# Patient Record
Sex: MALE | Race: WHITE | Employment: UNEMPLOYED | URBAN - METROPOLITAN AREA
[De-identification: names, ages, dates, MRNs, and addresses within clinical notes are randomized per-mention and may not be internally consistent; named-entity substitution may affect disease eponyms.]

---

## 2018-03-14 ENCOUNTER — HOSPITAL ENCOUNTER (OUTPATIENT)
Dept: MRI IMAGING | Age: 58
Discharge: HOME | End: 2018-03-16
Payer: MEDICAID

## 2018-03-14 DIAGNOSIS — M50.20 DISPLACEMENT OF CERVICAL INTERVERTEBRAL DISC WITHOUT MYELOPATHY: ICD-10-CM

## 2018-03-14 PROCEDURE — 72141 MRI NECK SPINE W/O DYE: CPT

## 2018-04-17 ENCOUNTER — OFFICE VISIT (OUTPATIENT)
Dept: FAMILY MEDICINE CLINIC | Age: 58
End: 2018-04-17
Payer: MEDICAID

## 2018-04-17 ENCOUNTER — TELEPHONE (OUTPATIENT)
Dept: FAMILY MEDICINE CLINIC | Age: 58
End: 2018-04-17

## 2018-04-17 VITALS
HEIGHT: 68 IN | RESPIRATION RATE: 16 BRPM | BODY MASS INDEX: 30.31 KG/M2 | SYSTOLIC BLOOD PRESSURE: 154 MMHG | WEIGHT: 200 LBS | DIASTOLIC BLOOD PRESSURE: 94 MMHG | HEART RATE: 78 BPM

## 2018-04-17 DIAGNOSIS — K42.9 UMBILICAL HERNIA WITHOUT OBSTRUCTION AND WITHOUT GANGRENE: ICD-10-CM

## 2018-04-17 DIAGNOSIS — E78.2 MIXED HYPERLIPIDEMIA: ICD-10-CM

## 2018-04-17 DIAGNOSIS — M54.12 CERVICAL RADICULOPATHY AT C7: Primary | ICD-10-CM

## 2018-04-17 DIAGNOSIS — R29.898 RIGHT HAND WEAKNESS: ICD-10-CM

## 2018-04-17 DIAGNOSIS — R03.0 ELEVATED BLOOD PRESSURE READING: ICD-10-CM

## 2018-04-17 DIAGNOSIS — M54.2 NECK PAIN: ICD-10-CM

## 2018-04-17 DIAGNOSIS — Z13.1 SCREENING FOR DIABETES MELLITUS: ICD-10-CM

## 2018-04-17 PROCEDURE — 99205 OFFICE O/P NEW HI 60 MIN: CPT | Performed by: FAMILY MEDICINE

## 2018-04-17 PROCEDURE — 1036F TOBACCO NON-USER: CPT | Performed by: FAMILY MEDICINE

## 2018-04-17 PROCEDURE — G8427 DOCREV CUR MEDS BY ELIG CLIN: HCPCS | Performed by: FAMILY MEDICINE

## 2018-04-17 PROCEDURE — G8417 CALC BMI ABV UP PARAM F/U: HCPCS | Performed by: FAMILY MEDICINE

## 2018-04-17 PROCEDURE — 3017F COLORECTAL CA SCREEN DOC REV: CPT | Performed by: FAMILY MEDICINE

## 2018-04-17 RX ORDER — GABAPENTIN 300 MG/1
300 CAPSULE ORAL 3 TIMES DAILY
Qty: 30 CAPSULE | Refills: 0 | Status: SHIPPED | OUTPATIENT
Start: 2018-04-17 | End: 2018-06-15 | Stop reason: ALTCHOICE

## 2018-04-17 RX ORDER — NAPROXEN 500 MG/1
500 TABLET ORAL 2 TIMES DAILY WITH MEALS
Qty: 60 TABLET | Refills: 1 | Status: SHIPPED | OUTPATIENT
Start: 2018-04-17 | End: 2018-10-18 | Stop reason: SDUPTHER

## 2018-04-17 RX ORDER — IBUPROFEN 800 MG/1
TABLET ORAL
Refills: 0 | COMMUNITY
Start: 2018-04-11 | End: 2018-04-17 | Stop reason: ALTCHOICE

## 2018-04-17 ASSESSMENT — PATIENT HEALTH QUESTIONNAIRE - PHQ9
SUM OF ALL RESPONSES TO PHQ QUESTIONS 1-9: 0
2. FEELING DOWN, DEPRESSED OR HOPELESS: 0
1. LITTLE INTEREST OR PLEASURE IN DOING THINGS: 0
SUM OF ALL RESPONSES TO PHQ9 QUESTIONS 1 & 2: 0

## 2018-04-30 ENCOUNTER — HOSPITAL ENCOUNTER (OUTPATIENT)
Dept: NEUROLOGY | Age: 58
Discharge: HOME | End: 2018-04-30
Payer: MEDICAID

## 2018-04-30 DIAGNOSIS — R29.898 RIGHT HAND WEAKNESS: ICD-10-CM

## 2018-04-30 DIAGNOSIS — M54.12 CERVICAL RADICULOPATHY AT C7: ICD-10-CM

## 2018-04-30 PROCEDURE — 95907 NVR CNDJ TST 1-2 STUDIES: CPT

## 2018-04-30 PROCEDURE — 95886 MUSC TEST DONE W/N TEST COMP: CPT

## 2018-05-04 ENCOUNTER — HOSPITAL ENCOUNTER (OUTPATIENT)
Age: 58
Discharge: HOME | End: 2018-05-04
Payer: MEDICAID

## 2018-05-04 DIAGNOSIS — Z13.1 SCREENING FOR DIABETES MELLITUS: ICD-10-CM

## 2018-05-04 DIAGNOSIS — E78.2 MIXED HYPERLIPIDEMIA: ICD-10-CM

## 2018-05-04 DIAGNOSIS — R03.0 ELEVATED BLOOD PRESSURE READING: ICD-10-CM

## 2018-05-04 LAB
ALBUMIN SERPL-MCNC: 4.6 G/DL (ref 3.5–5.2)
ALP BLD-CCNC: 46 U/L (ref 40–129)
ALT SERPL-CCNC: 16 U/L (ref 0–40)
ANION GAP SERPL CALCULATED.3IONS-SCNC: 11 MMOL/L (ref 7–16)
AST SERPL-CCNC: 19 U/L (ref 0–39)
BILIRUB SERPL-MCNC: 0.3 MG/DL (ref 0–1.2)
BUN BLDV-MCNC: 13 MG/DL (ref 6–20)
CALCIUM SERPL-MCNC: 9.5 MG/DL (ref 8.6–10.2)
CHLORIDE BLD-SCNC: 101 MMOL/L (ref 98–107)
CHOLESTEROL, TOTAL: 219 MG/DL (ref 0–199)
CO2: 27 MMOL/L (ref 22–29)
CREAT SERPL-MCNC: 0.7 MG/DL (ref 0.7–1.2)
GFR AFRICAN AMERICAN: >60
GFR NON-AFRICAN AMERICAN: >60 ML/MIN/1.73
GLUCOSE BLD-MCNC: 170 MG/DL (ref 74–109)
HBA1C MFR BLD: 9.1 % (ref 4.8–5.9)
HDLC SERPL-MCNC: 62 MG/DL
LDL CHOLESTEROL CALCULATED: 130 MG/DL (ref 0–99)
POTASSIUM SERPL-SCNC: 4.8 MMOL/L (ref 3.5–5)
SODIUM BLD-SCNC: 139 MMOL/L (ref 132–146)
TOTAL PROTEIN: 7.3 G/DL (ref 6.4–8.3)
TRIGL SERPL-MCNC: 137 MG/DL (ref 0–149)
VLDLC SERPL CALC-MCNC: 27 MG/DL

## 2018-05-04 PROCEDURE — 80061 LIPID PANEL: CPT

## 2018-05-04 PROCEDURE — 80053 COMPREHEN METABOLIC PANEL: CPT

## 2018-05-04 PROCEDURE — 83036 HEMOGLOBIN GLYCOSYLATED A1C: CPT

## 2018-05-04 PROCEDURE — 36415 COLL VENOUS BLD VENIPUNCTURE: CPT

## 2018-05-08 ENCOUNTER — OFFICE VISIT (OUTPATIENT)
Dept: FAMILY MEDICINE CLINIC | Age: 58
End: 2018-05-08
Payer: MEDICAID

## 2018-05-08 VITALS
WEIGHT: 202 LBS | HEART RATE: 104 BPM | SYSTOLIC BLOOD PRESSURE: 145 MMHG | HEIGHT: 68 IN | RESPIRATION RATE: 16 BRPM | DIASTOLIC BLOOD PRESSURE: 98 MMHG | BODY MASS INDEX: 30.62 KG/M2

## 2018-05-08 DIAGNOSIS — E78.2 MIXED HYPERLIPIDEMIA: ICD-10-CM

## 2018-05-08 DIAGNOSIS — M54.12 CERVICAL RADICULOPATHY AT C7: Primary | ICD-10-CM

## 2018-05-08 DIAGNOSIS — E11.65 POORLY CONTROLLED TYPE 2 DIABETES MELLITUS WITH NEUROPATHY (HCC): Chronic | ICD-10-CM

## 2018-05-08 DIAGNOSIS — I10 ESSENTIAL HYPERTENSION: ICD-10-CM

## 2018-05-08 DIAGNOSIS — E11.40 POORLY CONTROLLED TYPE 2 DIABETES MELLITUS WITH NEUROPATHY (HCC): Chronic | ICD-10-CM

## 2018-05-08 DIAGNOSIS — J30.2 CHRONIC SEASONAL ALLERGIC RHINITIS, UNSPECIFIED TRIGGER: ICD-10-CM

## 2018-05-08 PROBLEM — R03.0 ELEVATED BLOOD PRESSURE READING: Status: RESOLVED | Noted: 2018-04-17 | Resolved: 2018-05-08

## 2018-05-08 PROCEDURE — G8427 DOCREV CUR MEDS BY ELIG CLIN: HCPCS | Performed by: FAMILY MEDICINE

## 2018-05-08 PROCEDURE — 93000 ELECTROCARDIOGRAM COMPLETE: CPT | Performed by: FAMILY MEDICINE

## 2018-05-08 PROCEDURE — 1036F TOBACCO NON-USER: CPT | Performed by: FAMILY MEDICINE

## 2018-05-08 PROCEDURE — G8417 CALC BMI ABV UP PARAM F/U: HCPCS | Performed by: FAMILY MEDICINE

## 2018-05-08 PROCEDURE — 3046F HEMOGLOBIN A1C LEVEL >9.0%: CPT | Performed by: FAMILY MEDICINE

## 2018-05-08 PROCEDURE — 3017F COLORECTAL CA SCREEN DOC REV: CPT | Performed by: FAMILY MEDICINE

## 2018-05-08 PROCEDURE — 99214 OFFICE O/P EST MOD 30 MIN: CPT | Performed by: FAMILY MEDICINE

## 2018-05-08 PROCEDURE — 2022F DILAT RTA XM EVC RTNOPTHY: CPT | Performed by: FAMILY MEDICINE

## 2018-05-08 RX ORDER — FLUTICASONE PROPIONATE 50 MCG
2 SPRAY, SUSPENSION (ML) NASAL DAILY
Qty: 2 BOTTLE | Refills: 3 | Status: SHIPPED | OUTPATIENT
Start: 2018-05-08 | End: 2019-02-09 | Stop reason: SDUPTHER

## 2018-05-08 RX ORDER — METFORMIN HYDROCHLORIDE 500 MG/1
1000 TABLET, EXTENDED RELEASE ORAL
Qty: 120 TABLET | Refills: 3 | Status: SHIPPED | OUTPATIENT
Start: 2018-05-08 | End: 2018-05-22

## 2018-05-08 RX ORDER — VALSARTAN AND HYDROCHLOROTHIAZIDE 160; 12.5 MG/1; MG/1
1 TABLET, FILM COATED ORAL DAILY
Qty: 30 TABLET | Refills: 3 | Status: SHIPPED | OUTPATIENT
Start: 2018-05-08 | End: 2018-08-20 | Stop reason: ALTCHOICE

## 2018-05-11 ENCOUNTER — OFFICE VISIT (OUTPATIENT)
Dept: NEUROSURGERY | Age: 58
End: 2018-05-11
Payer: MEDICAID

## 2018-05-11 VITALS
HEART RATE: 100 BPM | DIASTOLIC BLOOD PRESSURE: 86 MMHG | HEIGHT: 68 IN | BODY MASS INDEX: 31.07 KG/M2 | SYSTOLIC BLOOD PRESSURE: 132 MMHG | WEIGHT: 205 LBS

## 2018-05-11 DIAGNOSIS — M54.12 CERVICAL RADICULOPATHY: Primary | ICD-10-CM

## 2018-05-11 PROCEDURE — 1036F TOBACCO NON-USER: CPT | Performed by: PHYSICIAN ASSISTANT

## 2018-05-11 PROCEDURE — 3017F COLORECTAL CA SCREEN DOC REV: CPT | Performed by: PHYSICIAN ASSISTANT

## 2018-05-11 PROCEDURE — 99203 OFFICE O/P NEW LOW 30 MIN: CPT | Performed by: PHYSICIAN ASSISTANT

## 2018-05-11 PROCEDURE — G8417 CALC BMI ABV UP PARAM F/U: HCPCS | Performed by: PHYSICIAN ASSISTANT

## 2018-05-11 PROCEDURE — G8427 DOCREV CUR MEDS BY ELIG CLIN: HCPCS | Performed by: PHYSICIAN ASSISTANT

## 2018-05-11 ASSESSMENT — ENCOUNTER SYMPTOMS
GASTROINTESTINAL NEGATIVE: 1
EYES NEGATIVE: 1
ALLERGIC/IMMUNOLOGIC NEGATIVE: 1
RESPIRATORY NEGATIVE: 1

## 2018-05-17 ENCOUNTER — TELEPHONE (OUTPATIENT)
Dept: FAMILY MEDICINE CLINIC | Age: 58
End: 2018-05-17

## 2018-05-21 ENCOUNTER — TELEPHONE (OUTPATIENT)
Dept: FAMILY MEDICINE CLINIC | Age: 58
End: 2018-05-21

## 2018-05-22 DIAGNOSIS — E11.40 POORLY CONTROLLED TYPE 2 DIABETES MELLITUS WITH NEUROPATHY (HCC): Primary | Chronic | ICD-10-CM

## 2018-05-22 DIAGNOSIS — E11.65 POORLY CONTROLLED TYPE 2 DIABETES MELLITUS WITH NEUROPATHY (HCC): Chronic | ICD-10-CM

## 2018-05-22 DIAGNOSIS — E11.65 POORLY CONTROLLED TYPE 2 DIABETES MELLITUS WITH NEUROPATHY (HCC): Primary | Chronic | ICD-10-CM

## 2018-05-22 DIAGNOSIS — E11.40 POORLY CONTROLLED TYPE 2 DIABETES MELLITUS WITH NEUROPATHY (HCC): Chronic | ICD-10-CM

## 2018-05-22 RX ORDER — GLIPIZIDE 5 MG/1
5 TABLET, FILM COATED, EXTENDED RELEASE ORAL DAILY
Qty: 30 TABLET | Refills: 1 | Status: SHIPPED | OUTPATIENT
Start: 2018-05-22 | End: 2018-06-15 | Stop reason: ALTCHOICE

## 2018-05-22 RX ORDER — GLIPIZIDE 5 MG/1
5 TABLET, FILM COATED, EXTENDED RELEASE ORAL DAILY
Qty: 30 TABLET | Refills: 1 | Status: SHIPPED | OUTPATIENT
Start: 2018-05-22 | End: 2018-05-22 | Stop reason: SDUPTHER

## 2018-06-15 ENCOUNTER — OFFICE VISIT (OUTPATIENT)
Dept: FAMILY MEDICINE CLINIC | Age: 58
End: 2018-06-15
Payer: MEDICAID

## 2018-06-15 VITALS
RESPIRATION RATE: 16 BRPM | DIASTOLIC BLOOD PRESSURE: 93 MMHG | SYSTOLIC BLOOD PRESSURE: 140 MMHG | BODY MASS INDEX: 31.93 KG/M2 | WEIGHT: 210 LBS | HEART RATE: 100 BPM

## 2018-06-15 DIAGNOSIS — I10 ESSENTIAL HYPERTENSION: ICD-10-CM

## 2018-06-15 DIAGNOSIS — M54.12 CERVICAL RADICULOPATHY AT C7: ICD-10-CM

## 2018-06-15 DIAGNOSIS — E78.2 MIXED HYPERLIPIDEMIA: ICD-10-CM

## 2018-06-15 DIAGNOSIS — E11.65 POORLY CONTROLLED TYPE 2 DIABETES MELLITUS WITH NEUROPATHY (HCC): Primary | Chronic | ICD-10-CM

## 2018-06-15 DIAGNOSIS — E11.40 POORLY CONTROLLED TYPE 2 DIABETES MELLITUS WITH NEUROPATHY (HCC): Primary | Chronic | ICD-10-CM

## 2018-06-15 PROCEDURE — G8427 DOCREV CUR MEDS BY ELIG CLIN: HCPCS | Performed by: FAMILY MEDICINE

## 2018-06-15 PROCEDURE — 3046F HEMOGLOBIN A1C LEVEL >9.0%: CPT | Performed by: FAMILY MEDICINE

## 2018-06-15 PROCEDURE — 99214 OFFICE O/P EST MOD 30 MIN: CPT | Performed by: FAMILY MEDICINE

## 2018-06-15 PROCEDURE — G8417 CALC BMI ABV UP PARAM F/U: HCPCS | Performed by: FAMILY MEDICINE

## 2018-06-15 PROCEDURE — 2022F DILAT RTA XM EVC RTNOPTHY: CPT | Performed by: FAMILY MEDICINE

## 2018-06-15 PROCEDURE — 1036F TOBACCO NON-USER: CPT | Performed by: FAMILY MEDICINE

## 2018-06-15 PROCEDURE — 3017F COLORECTAL CA SCREEN DOC REV: CPT | Performed by: FAMILY MEDICINE

## 2018-06-15 RX ORDER — GLIPIZIDE 10 MG/1
10 TABLET, FILM COATED, EXTENDED RELEASE ORAL DAILY
Qty: 30 TABLET | Refills: 3 | Status: SHIPPED | OUTPATIENT
Start: 2018-06-15 | End: 2018-08-20 | Stop reason: SDUPTHER

## 2018-06-15 RX ORDER — CYCLOBENZAPRINE HCL 10 MG
TABLET ORAL
COMMUNITY
Start: 2018-06-06 | End: 2018-12-28 | Stop reason: ALTCHOICE

## 2018-06-15 RX ORDER — OXYCODONE HYDROCHLORIDE AND ACETAMINOPHEN 5; 325 MG/1; MG/1
1 TABLET ORAL EVERY 6 HOURS PRN
Qty: 60 TABLET | Refills: 0 | Status: SHIPPED | OUTPATIENT
Start: 2018-06-15 | End: 2018-07-15

## 2018-06-15 RX ORDER — GABAPENTIN 300 MG/1
CAPSULE ORAL
Refills: 0 | COMMUNITY
Start: 2018-06-12 | End: 2018-12-28 | Stop reason: ALTCHOICE

## 2018-06-15 ASSESSMENT — PATIENT HEALTH QUESTIONNAIRE - PHQ9
1. LITTLE INTEREST OR PLEASURE IN DOING THINGS: 0
SUM OF ALL RESPONSES TO PHQ QUESTIONS 1-9: 0
2. FEELING DOWN, DEPRESSED OR HOPELESS: 0
SUM OF ALL RESPONSES TO PHQ9 QUESTIONS 1 & 2: 0

## 2018-08-18 ENCOUNTER — HOSPITAL ENCOUNTER (OUTPATIENT)
Age: 58
Discharge: HOME | End: 2018-08-18
Payer: MEDICAID

## 2018-08-18 DIAGNOSIS — E11.40 POORLY CONTROLLED TYPE 2 DIABETES MELLITUS WITH NEUROPATHY (HCC): Chronic | ICD-10-CM

## 2018-08-18 DIAGNOSIS — E11.65 POORLY CONTROLLED TYPE 2 DIABETES MELLITUS WITH NEUROPATHY (HCC): Chronic | ICD-10-CM

## 2018-08-18 DIAGNOSIS — E78.2 MIXED HYPERLIPIDEMIA: ICD-10-CM

## 2018-08-18 DIAGNOSIS — I10 ESSENTIAL HYPERTENSION: ICD-10-CM

## 2018-08-18 LAB
ALBUMIN SERPL-MCNC: 4.7 G/DL (ref 3.5–5.2)
ALP BLD-CCNC: 51 U/L (ref 40–129)
ALT SERPL-CCNC: 24 U/L (ref 0–40)
ANION GAP SERPL CALCULATED.3IONS-SCNC: 12 MMOL/L (ref 7–16)
AST SERPL-CCNC: 24 U/L (ref 0–39)
BILIRUB SERPL-MCNC: 0.3 MG/DL (ref 0–1.2)
BUN BLDV-MCNC: 17 MG/DL (ref 6–20)
CALCIUM SERPL-MCNC: 9.8 MG/DL (ref 8.6–10.2)
CHLORIDE BLD-SCNC: 101 MMOL/L (ref 98–107)
CHOLESTEROL, TOTAL: 256 MG/DL (ref 0–199)
CO2: 27 MMOL/L (ref 22–29)
CREAT SERPL-MCNC: 0.9 MG/DL (ref 0.7–1.2)
CREATININE URINE: 134 MG/DL (ref 40–278)
GFR AFRICAN AMERICAN: >60
GFR NON-AFRICAN AMERICAN: >60 ML/MIN/1.73
GLUCOSE BLD-MCNC: 182 MG/DL (ref 74–109)
HBA1C MFR BLD: 8 % (ref 4–5.6)
HDLC SERPL-MCNC: 49 MG/DL
LDL CHOLESTEROL CALCULATED: 176 MG/DL (ref 0–99)
MICROALBUMIN UR-MCNC: 23.7 MG/L
MICROALBUMIN/CREAT UR-RTO: 17.7 (ref 0–30)
POTASSIUM SERPL-SCNC: 4.4 MMOL/L (ref 3.5–5)
SODIUM BLD-SCNC: 140 MMOL/L (ref 132–146)
TOTAL PROTEIN: 7.8 G/DL (ref 6.4–8.3)
TRIGL SERPL-MCNC: 156 MG/DL (ref 0–149)
VLDLC SERPL CALC-MCNC: 31 MG/DL

## 2018-08-18 PROCEDURE — 83036 HEMOGLOBIN GLYCOSYLATED A1C: CPT

## 2018-08-18 PROCEDURE — 36415 COLL VENOUS BLD VENIPUNCTURE: CPT

## 2018-08-18 PROCEDURE — 82570 ASSAY OF URINE CREATININE: CPT

## 2018-08-18 PROCEDURE — 80053 COMPREHEN METABOLIC PANEL: CPT

## 2018-08-18 PROCEDURE — 82044 UR ALBUMIN SEMIQUANTITATIVE: CPT

## 2018-08-18 PROCEDURE — 80061 LIPID PANEL: CPT

## 2018-08-18 NOTE — PROGRESS NOTES
Call patient  Glucose elevated but improving  Cholesterol elevated  Will discuss at next apt  Thanks.

## 2018-08-20 ENCOUNTER — TELEPHONE (OUTPATIENT)
Dept: FAMILY MEDICINE CLINIC | Age: 58
End: 2018-08-20

## 2018-08-20 ENCOUNTER — OFFICE VISIT (OUTPATIENT)
Dept: FAMILY MEDICINE CLINIC | Age: 58
End: 2018-08-20
Payer: MEDICAID

## 2018-08-20 VITALS
DIASTOLIC BLOOD PRESSURE: 82 MMHG | HEART RATE: 104 BPM | WEIGHT: 223 LBS | SYSTOLIC BLOOD PRESSURE: 118 MMHG | RESPIRATION RATE: 16 BRPM | HEIGHT: 68 IN | BODY MASS INDEX: 33.8 KG/M2

## 2018-08-20 DIAGNOSIS — E78.2 MIXED HYPERLIPIDEMIA: ICD-10-CM

## 2018-08-20 DIAGNOSIS — E11.40 POORLY CONTROLLED TYPE 2 DIABETES MELLITUS WITH NEUROPATHY (HCC): Chronic | ICD-10-CM

## 2018-08-20 DIAGNOSIS — K43.9 VENTRAL HERNIA WITHOUT OBSTRUCTION OR GANGRENE: Primary | ICD-10-CM

## 2018-08-20 DIAGNOSIS — I10 ESSENTIAL HYPERTENSION: ICD-10-CM

## 2018-08-20 DIAGNOSIS — E11.65 POORLY CONTROLLED TYPE 2 DIABETES MELLITUS WITH NEUROPATHY (HCC): Chronic | ICD-10-CM

## 2018-08-20 PROCEDURE — G8427 DOCREV CUR MEDS BY ELIG CLIN: HCPCS | Performed by: FAMILY MEDICINE

## 2018-08-20 PROCEDURE — 2022F DILAT RTA XM EVC RTNOPTHY: CPT | Performed by: FAMILY MEDICINE

## 2018-08-20 PROCEDURE — 3045F PR MOST RECENT HEMOGLOBIN A1C LEVEL 7.0-9.0%: CPT | Performed by: FAMILY MEDICINE

## 2018-08-20 PROCEDURE — 1036F TOBACCO NON-USER: CPT | Performed by: FAMILY MEDICINE

## 2018-08-20 PROCEDURE — 99214 OFFICE O/P EST MOD 30 MIN: CPT | Performed by: FAMILY MEDICINE

## 2018-08-20 PROCEDURE — 3017F COLORECTAL CA SCREEN DOC REV: CPT | Performed by: FAMILY MEDICINE

## 2018-08-20 PROCEDURE — G8417 CALC BMI ABV UP PARAM F/U: HCPCS | Performed by: FAMILY MEDICINE

## 2018-08-20 RX ORDER — LOSARTAN POTASSIUM AND HYDROCHLOROTHIAZIDE 12.5; 1 MG/1; MG/1
1 TABLET ORAL DAILY
Qty: 30 TABLET | Refills: 3 | Status: SHIPPED | OUTPATIENT
Start: 2018-08-20 | End: 2018-12-20 | Stop reason: SDUPTHER

## 2018-08-20 RX ORDER — ATORVASTATIN CALCIUM 10 MG/1
10 TABLET, FILM COATED ORAL DAILY
Qty: 90 TABLET | Refills: 1 | Status: SHIPPED | OUTPATIENT
Start: 2018-08-20 | End: 2019-03-14 | Stop reason: SDUPTHER

## 2018-08-20 RX ORDER — GLIPIZIDE 10 MG/1
10 TABLET, FILM COATED, EXTENDED RELEASE ORAL DAILY
Qty: 90 TABLET | Refills: 1 | Status: SHIPPED | OUTPATIENT
Start: 2018-08-20 | End: 2018-10-12 | Stop reason: SDUPTHER

## 2018-08-20 ASSESSMENT — PATIENT HEALTH QUESTIONNAIRE - PHQ9
2. FEELING DOWN, DEPRESSED OR HOPELESS: 0
SUM OF ALL RESPONSES TO PHQ QUESTIONS 1-9: 0
1. LITTLE INTEREST OR PLEASURE IN DOING THINGS: 0
SUM OF ALL RESPONSES TO PHQ9 QUESTIONS 1 & 2: 0
SUM OF ALL RESPONSES TO PHQ QUESTIONS 1-9: 0

## 2018-08-20 NOTE — PROGRESS NOTES
Chief complaint : Johnie Maria  here for follow up of Diabetes Mellitus, type 2. Present illness :    Doing well   Taking medications regularly  Home glucose not checking regularly daily  No episodes of hypoglycemia  No polyuria or polydipsia  No visual changes  Last eye examination last year  No significant weight changes  No lightheadedness or syncope  No exertional chest pain or dyspnea  No intermittent claudication  No foot pain or numbness  No skin ulcers or rashes  Recent labs reviewed and discussed with patient  Underwent cervical spine discectomy and fusion. Pain and weakness are much improved. Complains of bulge in midabdomen with pain      Past Medical History:   Diagnosis Date    Arthritis     Back pain     Hyperlipidemia     Poorly controlled type 2 diabetes mellitus with neuropathy ([facility] [address] 5/8/2018       Past Surgical History:   Procedure Laterality Date    COLONOSCOPY      at age 40 polyps    CYST REMOVAL Right     HERNIA REPAIR Bilateral     groin and umbilical    SPINE SURGERY  06/05/2018    C5-7 fusion at [facility]       Current Outpatient Prescriptions   Medication Sig Dispense Refill    cyclobenzaprine (FLEXERIL) 10 MG tablet       gabapentin (NEURONTIN) 300 MG capsule take 1 capsule by mouth 3 TO 4 TIMES A DAY  0    glipiZIDE (GLUCOTROL XL) 10 MG extended release tablet Take 1 tablet by mouth daily 30 tablet 3    valsartan-hydrochlorothiazide (DIOVAN-HCT) 160-12.5 MG per tablet Take 1 tablet by mouth daily 30 tablet 3    fluticasone (FLONASE) 50 MCG/ACT nasal spray 2 sprays by Nasal route daily 2 Bottle 3    naproxen (NAPROSYN) 500 MG tablet Take 1 tablet by mouth 2 times daily (with meals) 60 tablet 1     No current facility-administered medications for this visit.         Family History   Problem Relation Age of Onset    Diabetes Mother     Other Mother         parkinsons    Diabetes Father    Vertell Favre Sister 79        unknown    Cancer Brother 64 9. 8     Total Protein 08/18/2018 7.8     Alb 08/18/2018 4.7     Total Bilirubin 08/18/2018 0.3     Alkaline Phosphatase 08/18/2018 51     ALT 08/18/2018 24     AST 08/18/2018 24     Microalbumin, Random Uri* 08/18/2018 23.7*    Creatinine, Ur 08/18/2018 134     Microalbumin Creatinine * 08/18/2018 17.7        Assessment / Plan   1. Poorly controlled type 2 diabetes mellitus with neuropathy (HCC)  A1c improving  Add invokana    2. Mixed hyperlipidemia  LDL higher now  10 year risk 8.2%. Patient is fully informed. We'll start Lipitor 10 mg daily    3. Refer to surgery for ventral and umbilical hernia    4. Hypertension well controlled. Continue same. Advised modest weight loss low-salt diet and increase cardio activity. pneumovax  Tdap       Patient reminded of importance of close control of hyperglycemia and hypertension in order to minimize the risks of accelerated macro and microvascular complications including coronary artery disease, cerebrovascular disease and stroke, peripheral artery disease, retinopathy, neuropathy and renal disease. Also reminded of importance of yearly eye examination, regular exercise and attention to proper diet and nutrition. Advised about self-monitoring of blood glucose , regular laboratory tests and follow-up visits. Patient advised to check feet on a daily basis and call if open sores or lesions or any changes in the skin. All questions answered.

## 2018-08-23 DIAGNOSIS — E11.40 POORLY CONTROLLED TYPE 2 DIABETES MELLITUS WITH NEUROPATHY (HCC): Primary | Chronic | ICD-10-CM

## 2018-08-23 DIAGNOSIS — E11.65 POORLY CONTROLLED TYPE 2 DIABETES MELLITUS WITH NEUROPATHY (HCC): Primary | Chronic | ICD-10-CM

## 2018-10-12 DIAGNOSIS — E11.40 POORLY CONTROLLED TYPE 2 DIABETES MELLITUS WITH NEUROPATHY (HCC): Chronic | ICD-10-CM

## 2018-10-12 DIAGNOSIS — E11.65 POORLY CONTROLLED TYPE 2 DIABETES MELLITUS WITH NEUROPATHY (HCC): Chronic | ICD-10-CM

## 2018-10-12 RX ORDER — GLIPIZIDE 10 MG/1
10 TABLET, FILM COATED, EXTENDED RELEASE ORAL DAILY
Qty: 90 TABLET | Refills: 1 | Status: SHIPPED | OUTPATIENT
Start: 2018-10-12 | End: 2019-09-22 | Stop reason: SDUPTHER

## 2018-10-19 ENCOUNTER — TELEPHONE (OUTPATIENT)
Dept: FAMILY MEDICINE CLINIC | Age: 58
End: 2018-10-19

## 2018-10-23 ENCOUNTER — TELEPHONE (OUTPATIENT)
Dept: FAMILY MEDICINE CLINIC | Age: 58
End: 2018-10-23

## 2018-11-02 ENCOUNTER — OFFICE VISIT (OUTPATIENT)
Dept: HEMATOLOGY | Age: 58
End: 2018-11-02
Payer: MEDICAID

## 2018-11-02 VITALS
BODY MASS INDEX: 33.65 KG/M2 | OXYGEN SATURATION: 94 % | RESPIRATION RATE: 18 BRPM | DIASTOLIC BLOOD PRESSURE: 75 MMHG | SYSTOLIC BLOOD PRESSURE: 132 MMHG | HEART RATE: 103 BPM | TEMPERATURE: 98.1 F | WEIGHT: 222 LBS | HEIGHT: 68 IN

## 2018-11-02 DIAGNOSIS — K86.89 PANCREATIC MASS: Primary | ICD-10-CM

## 2018-11-02 PROCEDURE — G8484 FLU IMMUNIZE NO ADMIN: HCPCS | Performed by: TRANSPLANT SURGERY

## 2018-11-02 PROCEDURE — G8417 CALC BMI ABV UP PARAM F/U: HCPCS | Performed by: TRANSPLANT SURGERY

## 2018-11-02 PROCEDURE — 99202 OFFICE O/P NEW SF 15 MIN: CPT | Performed by: TRANSPLANT SURGERY

## 2018-11-02 PROCEDURE — 3017F COLORECTAL CA SCREEN DOC REV: CPT | Performed by: TRANSPLANT SURGERY

## 2018-11-02 PROCEDURE — G8427 DOCREV CUR MEDS BY ELIG CLIN: HCPCS | Performed by: TRANSPLANT SURGERY

## 2018-11-02 PROCEDURE — 99205 OFFICE O/P NEW HI 60 MIN: CPT | Performed by: TRANSPLANT SURGERY

## 2018-11-02 PROCEDURE — 1036F TOBACCO NON-USER: CPT | Performed by: TRANSPLANT SURGERY

## 2018-11-03 ASSESSMENT — ENCOUNTER SYMPTOMS
PHOTOPHOBIA: 0
SHORTNESS OF BREATH: 0
BACK PAIN: 0
EYE DISCHARGE: 0
EYE PAIN: 0
CONSTIPATION: 0
NAUSEA: 0
ABDOMINAL PAIN: 1
VOMITING: 0
BLOOD IN STOOL: 0
DIARRHEA: 0

## 2018-11-09 ENCOUNTER — TELEPHONE (OUTPATIENT)
Dept: SURGERY | Age: 58
End: 2018-11-09

## 2018-11-13 ENCOUNTER — TELEPHONE (OUTPATIENT)
Dept: FAMILY MEDICINE CLINIC | Age: 58
End: 2018-11-13

## 2018-11-13 NOTE — TELEPHONE ENCOUNTER
Patient is having surgery Monday for a biopsy of the pancreas. He needs to know what meds you want him to take in the morning. Surgeon advised him to call you.  thanks

## 2018-11-14 ENCOUNTER — HOSPITAL ENCOUNTER (OUTPATIENT)
Age: 58
Discharge: HOME | End: 2018-11-14
Payer: MEDICAID

## 2018-11-14 DIAGNOSIS — E11.65 POORLY CONTROLLED TYPE 2 DIABETES MELLITUS WITH NEUROPATHY (HCC): Chronic | ICD-10-CM

## 2018-11-14 DIAGNOSIS — I10 ESSENTIAL HYPERTENSION: ICD-10-CM

## 2018-11-14 DIAGNOSIS — E78.2 MIXED HYPERLIPIDEMIA: ICD-10-CM

## 2018-11-14 DIAGNOSIS — E11.40 POORLY CONTROLLED TYPE 2 DIABETES MELLITUS WITH NEUROPATHY (HCC): Chronic | ICD-10-CM

## 2018-11-14 LAB
ALBUMIN SERPL-MCNC: 4.6 G/DL (ref 3.5–5.2)
ALP BLD-CCNC: 48 U/L (ref 40–129)
ALT SERPL-CCNC: 20 U/L (ref 0–40)
ANION GAP SERPL CALCULATED.3IONS-SCNC: 14 MMOL/L (ref 7–16)
AST SERPL-CCNC: 24 U/L (ref 0–39)
BILIRUB SERPL-MCNC: 0.3 MG/DL (ref 0–1.2)
BUN BLDV-MCNC: 24 MG/DL (ref 6–20)
CALCIUM SERPL-MCNC: 9.4 MG/DL (ref 8.6–10.2)
CHLORIDE BLD-SCNC: 101 MMOL/L (ref 98–107)
CHOLESTEROL, TOTAL: 190 MG/DL (ref 0–199)
CO2: 25 MMOL/L (ref 22–29)
CREAT SERPL-MCNC: 0.9 MG/DL (ref 0.7–1.2)
GFR AFRICAN AMERICAN: >60
GFR NON-AFRICAN AMERICAN: >60 ML/MIN/1.73
GLUCOSE BLD-MCNC: 138 MG/DL (ref 74–99)
HBA1C MFR BLD: 7.8 % (ref 4–5.6)
HDLC SERPL-MCNC: 52 MG/DL
LDL CHOLESTEROL CALCULATED: 115 MG/DL (ref 0–99)
POTASSIUM SERPL-SCNC: 4.2 MMOL/L (ref 3.5–5)
SODIUM BLD-SCNC: 140 MMOL/L (ref 132–146)
TOTAL PROTEIN: 7.7 G/DL (ref 6.4–8.3)
TRIGL SERPL-MCNC: 117 MG/DL (ref 0–149)
VLDLC SERPL CALC-MCNC: 23 MG/DL

## 2018-11-14 PROCEDURE — 36415 COLL VENOUS BLD VENIPUNCTURE: CPT

## 2018-11-14 PROCEDURE — 80061 LIPID PANEL: CPT

## 2018-11-14 PROCEDURE — 80053 COMPREHEN METABOLIC PANEL: CPT

## 2018-11-14 PROCEDURE — 83036 HEMOGLOBIN GLYCOSYLATED A1C: CPT

## 2018-11-15 NOTE — TELEPHONE ENCOUNTER
Patient got a call from [facility] on 11/19/18.     Electronically signed by Andres Aguero on 11/15/18 at 8:36 AM

## 2018-11-20 ENCOUNTER — OFFICE VISIT (OUTPATIENT)
Dept: FAMILY MEDICINE CLINIC | Age: 58
End: 2018-11-20
Payer: MEDICAID

## 2018-11-20 VITALS
WEIGHT: 225 LBS | SYSTOLIC BLOOD PRESSURE: 131 MMHG | BODY MASS INDEX: 34.1 KG/M2 | RESPIRATION RATE: 18 BRPM | DIASTOLIC BLOOD PRESSURE: 84 MMHG | HEART RATE: 91 BPM | HEIGHT: 68 IN

## 2018-11-20 DIAGNOSIS — E11.9 TYPE 2 DIABETES MELLITUS WITHOUT COMPLICATION, WITHOUT LONG-TERM CURRENT USE OF INSULIN (HCC): ICD-10-CM

## 2018-11-20 DIAGNOSIS — G89.29 CHRONIC BILATERAL LOW BACK PAIN WITH BILATERAL SCIATICA: ICD-10-CM

## 2018-11-20 DIAGNOSIS — E78.2 MIXED HYPERLIPIDEMIA: Primary | ICD-10-CM

## 2018-11-20 DIAGNOSIS — M54.42 CHRONIC BILATERAL LOW BACK PAIN WITH BILATERAL SCIATICA: ICD-10-CM

## 2018-11-20 DIAGNOSIS — I10 ESSENTIAL HYPERTENSION: ICD-10-CM

## 2018-11-20 DIAGNOSIS — M54.41 CHRONIC BILATERAL LOW BACK PAIN WITH BILATERAL SCIATICA: ICD-10-CM

## 2018-11-20 DIAGNOSIS — Z23 NEED FOR INFLUENZA VACCINATION: ICD-10-CM

## 2018-11-20 PROBLEM — E11.40 POORLY CONTROLLED TYPE 2 DIABETES MELLITUS WITH NEUROPATHY (HCC): Chronic | Status: RESOLVED | Noted: 2018-05-08 | Resolved: 2018-11-20

## 2018-11-20 PROBLEM — E11.65 POORLY CONTROLLED TYPE 2 DIABETES MELLITUS WITH NEUROPATHY (HCC): Chronic | Status: RESOLVED | Noted: 2018-05-08 | Resolved: 2018-11-20

## 2018-11-20 PROCEDURE — 90686 IIV4 VACC NO PRSV 0.5 ML IM: CPT | Performed by: FAMILY MEDICINE

## 2018-11-20 PROCEDURE — G8417 CALC BMI ABV UP PARAM F/U: HCPCS | Performed by: FAMILY MEDICINE

## 2018-11-20 PROCEDURE — 90471 IMMUNIZATION ADMIN: CPT | Performed by: FAMILY MEDICINE

## 2018-11-20 PROCEDURE — 1036F TOBACCO NON-USER: CPT | Performed by: FAMILY MEDICINE

## 2018-11-20 PROCEDURE — G8482 FLU IMMUNIZE ORDER/ADMIN: HCPCS | Performed by: FAMILY MEDICINE

## 2018-11-20 PROCEDURE — 3017F COLORECTAL CA SCREEN DOC REV: CPT | Performed by: FAMILY MEDICINE

## 2018-11-20 PROCEDURE — G8427 DOCREV CUR MEDS BY ELIG CLIN: HCPCS | Performed by: FAMILY MEDICINE

## 2018-11-20 PROCEDURE — 2022F DILAT RTA XM EVC RTNOPTHY: CPT | Performed by: FAMILY MEDICINE

## 2018-11-20 PROCEDURE — 3045F PR MOST RECENT HEMOGLOBIN A1C LEVEL 7.0-9.0%: CPT | Performed by: FAMILY MEDICINE

## 2018-11-20 PROCEDURE — 99214 OFFICE O/P EST MOD 30 MIN: CPT | Performed by: FAMILY MEDICINE

## 2018-11-20 ASSESSMENT — PATIENT HEALTH QUESTIONNAIRE - PHQ9
2. FEELING DOWN, DEPRESSED OR HOPELESS: 0
SUM OF ALL RESPONSES TO PHQ QUESTIONS 1-9: 0
SUM OF ALL RESPONSES TO PHQ QUESTIONS 1-9: 0
SUM OF ALL RESPONSES TO PHQ9 QUESTIONS 1 & 2: 0
1. LITTLE INTEREST OR PLEASURE IN DOING THINGS: 0

## 2018-11-20 NOTE — PROGRESS NOTES
today, and weight loss plan recommended is : conventional weight loss and daily exercise regimen. Hospital Outpatient Visit on 11/14/2018   Component Date Value    Sodium 11/14/2018 140     Potassium 11/14/2018 4.2     Chloride 11/14/2018 101     CO2 11/14/2018 25     Anion Gap 11/14/2018 14     Glucose 11/14/2018 138*    BUN 11/14/2018 24*    CREATININE 11/14/2018 0.9     GFR Non- 11/14/2018 >60     GFR  11/14/2018 >60     Calcium 11/14/2018 9.4     Total Protein 11/14/2018 7.7     Alb 11/14/2018 4.6     Total Bilirubin 11/14/2018 0.3     Alkaline Phosphatase 11/14/2018 48     ALT 11/14/2018 20     AST 11/14/2018 24     Hemoglobin A1C 11/14/2018 7.8*    Cholesterol, Total 11/14/2018 190     Triglycerides 11/14/2018 117     HDL 11/14/2018 52     LDL Calculated 11/14/2018 115*    VLDL Cholesterol Calcula* 11/14/2018 23        Assessment / Plan :   1. Mixed hyperlipidemia  Improving control. Continue same. Advised diet and weight loss    2. Essential hypertension  Well controlled. Continue same    3. Need for influenza vaccination  Administered    4. Type 2 diabetes mellitus without complication, without long-term current use of insulin (HCC)  Continue same. Hemoglobin A1c is much improved. Diet and weight loss    5. Chronic bilateral low back pain with bilateral sciatica  follow up in 2-3 weeks to evaluate this particular concern. Pain radiates and is severe  Patient took narcotics for post op pain for cervical spine and felt better  Advised that it is not recommended in this situation. .               Patient again reminded of importance of close control of  hyperglycemia, hypertension  and hyperlipidemia in order to minimize the risks of accelerated atherosclerosis ( heart disease, strokes, PAD ) and microvascular complications such as renal disease, retinal disease and neuropathies.  Also reminded of the importance of a low calorie, low fat and low salt diet. Encouraged to exercise regularly and maintain proper foot care at all times. Patient advised to check feet daily and call if any sores lesions or skin abnormalities or changes. To have yearly eye examinations and monitor the glucose levels at home. All questions answered. Call at any time if sugar levels remains high or abnormally low. Call if sore of foot or leg. Call if chest pain with exertion or with physical activity. Call if unusual shortness or breath or if any questions or concerns. Follow-up : as scheduled.

## 2018-11-29 ENCOUNTER — OFFICE VISIT (OUTPATIENT)
Dept: HEMATOLOGY | Age: 58
End: 2018-11-29
Payer: MEDICAID

## 2018-11-29 VITALS
WEIGHT: 224.2 LBS | HEART RATE: 86 BPM | DIASTOLIC BLOOD PRESSURE: 72 MMHG | SYSTOLIC BLOOD PRESSURE: 130 MMHG | TEMPERATURE: 98.7 F | HEIGHT: 68 IN | BODY MASS INDEX: 33.98 KG/M2 | OXYGEN SATURATION: 92 %

## 2018-11-29 DIAGNOSIS — D49.0 IPMN (INTRADUCTAL PAPILLARY MUCINOUS NEOPLASM): Primary | ICD-10-CM

## 2018-11-29 PROCEDURE — G8427 DOCREV CUR MEDS BY ELIG CLIN: HCPCS | Performed by: TRANSPLANT SURGERY

## 2018-11-29 PROCEDURE — 99212 OFFICE O/P EST SF 10 MIN: CPT | Performed by: TRANSPLANT SURGERY

## 2018-11-29 PROCEDURE — 3017F COLORECTAL CA SCREEN DOC REV: CPT | Performed by: TRANSPLANT SURGERY

## 2018-11-29 PROCEDURE — 1036F TOBACCO NON-USER: CPT | Performed by: TRANSPLANT SURGERY

## 2018-11-29 PROCEDURE — G8482 FLU IMMUNIZE ORDER/ADMIN: HCPCS | Performed by: TRANSPLANT SURGERY

## 2018-11-29 PROCEDURE — G8417 CALC BMI ABV UP PARAM F/U: HCPCS | Performed by: TRANSPLANT SURGERY

## 2018-12-07 ENCOUNTER — PREP FOR PROCEDURE (OUTPATIENT)
Dept: SURGERY | Age: 58
End: 2018-12-07

## 2018-12-07 RX ORDER — SODIUM CHLORIDE 0.9 % (FLUSH) 0.9 %
10 SYRINGE (ML) INJECTION EVERY 12 HOURS SCHEDULED
Status: CANCELLED | OUTPATIENT
Start: 2018-12-07

## 2018-12-07 RX ORDER — SODIUM CHLORIDE, SODIUM LACTATE, POTASSIUM CHLORIDE, CALCIUM CHLORIDE 600; 310; 30; 20 MG/100ML; MG/100ML; MG/100ML; MG/100ML
INJECTION, SOLUTION INTRAVENOUS CONTINUOUS
Status: CANCELLED | OUTPATIENT
Start: 2018-12-07

## 2018-12-07 NOTE — H&P
Date:   [facility]  Name: Dallin Montoya               : 1960 Sex: M  Age: 63 yrs  Acct#:  [id number]           Patient was referred by Nelia Walls MD.  Patient's primary care provider is Nelia Walls MD.  CC:  Abdominal pain    HPI: This 10year-old male with a ventral hernia. He had some issues with possible pancreatic neoplasm has been cleared up. He has been cleared for hernia surgery. He still complaining of abdominal pain    Meds Prior to Visit:  Losartan Potassium/Hydrochlorothiazide  100-12.5 mg  daily  Lipitor     Glipizide  10 mg  1 by mouth every day  Steglatro  5 mg   Cyclobenzaprine HCL  10 mg   Gabapentin  300 mg   Flonase Allergy Relief        Allergies:  Iodine, Metformin    PMH:  Problem List: Epigastric pain, Diastasis of muscle, Incisional hernia, Neoplasm of digestive system, K43.9 - Ventral hernia without obstruction or gangrene  Health Maintenance:  Colonoscopy - ()  Medical Problems:  Weight Gain, Hypertension, Type 2 Diabetes  Surgical Hx:  Cervical Fusion - (2018)  Abdominal Hernia Repair, Hernia Repair  Reviewed, no changes. FH:  Father:  . Mother:  . Reviewed, no changes. SH:  Personal Habits:  Smoking: Patient has never smoked. Alcohol: Drinks 2 Alcoholic Beverages Per Week. Daily Caffeine: Consumes on average 2 cups of regular coffee per day. Reviewed, no changes. ROS:  Const: Reports fatigue and weight gain, but denies anorexia, anxiety, night sweats and weight loss. Eyes: Denies eye symptoms. ENMT: Denies ear symptoms. Denies nasal symptoms. Denies mouth or throat symptoms. CV: Reports hypertension, but denies other cardiovascular symptoms. Resp: Denies respiratory symptoms. GI: Reports other gastrointestinal symptoms, but denies hepatitis and liver disease. Musculo: Denies musculoskeletal symptoms. Skin: Denies skin, hair and nail symptoms. Breast: Denies breast problems.   Neuro: Denies neurologic

## 2018-12-13 ENCOUNTER — ANESTHESIA (OUTPATIENT)
Dept: OPERATING ROOM | Age: 58
End: 2018-12-13
Payer: MEDICAID

## 2018-12-13 ENCOUNTER — HOSPITAL ENCOUNTER (OUTPATIENT)
Age: 58
Setting detail: OUTPATIENT SURGERY
Discharge: HOME | End: 2018-12-13
Attending: SURGERY | Admitting: SURGERY
Payer: MEDICAID

## 2018-12-13 ENCOUNTER — ANESTHESIA EVENT (OUTPATIENT)
Dept: OPERATING ROOM | Age: 58
End: 2018-12-13
Payer: MEDICAID

## 2018-12-13 VITALS
TEMPERATURE: 97.5 F | WEIGHT: 215 LBS | BODY MASS INDEX: 32.58 KG/M2 | DIASTOLIC BLOOD PRESSURE: 82 MMHG | RESPIRATION RATE: 20 BRPM | SYSTOLIC BLOOD PRESSURE: 147 MMHG | HEIGHT: 68 IN | HEART RATE: 98 BPM | OXYGEN SATURATION: 93 %

## 2018-12-13 VITALS
SYSTOLIC BLOOD PRESSURE: 119 MMHG | RESPIRATION RATE: 21 BRPM | DIASTOLIC BLOOD PRESSURE: 73 MMHG | OXYGEN SATURATION: 91 %

## 2018-12-13 DIAGNOSIS — G89.18 POST-OPERATIVE PAIN: Primary | ICD-10-CM

## 2018-12-13 LAB
ANION GAP SERPL CALCULATED.3IONS-SCNC: 15 MMOL/L (ref 7–16)
BASOPHILS ABSOLUTE: 0.07 E9/L (ref 0–0.2)
BASOPHILS RELATIVE PERCENT: 1 % (ref 0–2)
BUN BLDV-MCNC: 19 MG/DL (ref 6–20)
CALCIUM SERPL-MCNC: 8.6 MG/DL (ref 8.6–10.2)
CHLORIDE BLD-SCNC: 103 MMOL/L (ref 98–107)
CO2: 22 MMOL/L (ref 22–29)
CREAT SERPL-MCNC: 0.7 MG/DL (ref 0.7–1.2)
EOSINOPHILS ABSOLUTE: 0.43 E9/L (ref 0.05–0.5)
EOSINOPHILS RELATIVE PERCENT: 6.2 % (ref 0–6)
GFR AFRICAN AMERICAN: >60
GFR NON-AFRICAN AMERICAN: >60 ML/MIN/1.73
GLUCOSE BLD-MCNC: 130 MG/DL (ref 74–99)
HCT VFR BLD CALC: 46.1 % (ref 37–54)
HEMOGLOBIN: 14.1 G/DL (ref 12.5–16.5)
IMMATURE GRANULOCYTES #: 0.02 E9/L
IMMATURE GRANULOCYTES %: 0.3 % (ref 0–5)
LYMPHOCYTES ABSOLUTE: 1.63 E9/L (ref 1.5–4)
LYMPHOCYTES RELATIVE PERCENT: 23.5 % (ref 20–42)
MCH RBC QN AUTO: 23 PG (ref 26–35)
MCHC RBC AUTO-ENTMCNC: 30.6 % (ref 32–34.5)
MCV RBC AUTO: 75.1 FL (ref 80–99.9)
METER GLUCOSE: 150 MG/DL (ref 74–99)
MONOCYTES ABSOLUTE: 0.7 E9/L (ref 0.1–0.95)
MONOCYTES RELATIVE PERCENT: 10.1 % (ref 2–12)
NEUTROPHILS ABSOLUTE: 4.08 E9/L (ref 1.8–7.3)
NEUTROPHILS RELATIVE PERCENT: 58.9 % (ref 43–80)
PDW BLD-RTO: 15.3 FL (ref 11.5–15)
PLATELET # BLD: 209 E9/L (ref 130–450)
PMV BLD AUTO: 12.9 FL (ref 7–12)
POTASSIUM REFLEX MAGNESIUM: 4.8 MMOL/L (ref 3.5–5)
RBC # BLD: 6.14 E12/L (ref 3.8–5.8)
SODIUM BLD-SCNC: 140 MMOL/L (ref 132–146)
WBC # BLD: 6.9 E9/L (ref 4.5–11.5)

## 2018-12-13 PROCEDURE — S2900 ROBOTIC SURGICAL SYSTEM: HCPCS | Performed by: SURGERY

## 2018-12-13 PROCEDURE — 36415 COLL VENOUS BLD VENIPUNCTURE: CPT

## 2018-12-13 PROCEDURE — 2500000003 HC RX 250 WO HCPCS: Performed by: NURSE ANESTHETIST, CERTIFIED REGISTERED

## 2018-12-13 PROCEDURE — 85025 COMPLETE CBC W/AUTO DIFF WBC: CPT

## 2018-12-13 PROCEDURE — 2500000003 HC RX 250 WO HCPCS: Performed by: SURGERY

## 2018-12-13 PROCEDURE — 7100000001 HC PACU RECOVERY - ADDTL 15 MIN: Performed by: SURGERY

## 2018-12-13 PROCEDURE — 6360000002 HC RX W HCPCS: Performed by: NURSE ANESTHETIST, CERTIFIED REGISTERED

## 2018-12-13 PROCEDURE — 2580000003 HC RX 258: Performed by: SURGERY

## 2018-12-13 PROCEDURE — 3700000001 HC ADD 15 MINUTES (ANESTHESIA): Performed by: SURGERY

## 2018-12-13 PROCEDURE — 82962 GLUCOSE BLOOD TEST: CPT

## 2018-12-13 PROCEDURE — 6360000002 HC RX W HCPCS: Performed by: ANESTHESIOLOGY

## 2018-12-13 PROCEDURE — 3700000000 HC ANESTHESIA ATTENDED CARE: Performed by: SURGERY

## 2018-12-13 PROCEDURE — 7100000010 HC PHASE II RECOVERY - FIRST 15 MIN: Performed by: SURGERY

## 2018-12-13 PROCEDURE — 7100000011 HC PHASE II RECOVERY - ADDTL 15 MIN: Performed by: SURGERY

## 2018-12-13 PROCEDURE — 6370000000 HC RX 637 (ALT 250 FOR IP): Performed by: ANESTHESIOLOGY

## 2018-12-13 PROCEDURE — 3600000019 HC SURGERY ROBOT ADDTL 15MIN: Performed by: SURGERY

## 2018-12-13 PROCEDURE — 7100000000 HC PACU RECOVERY - FIRST 15 MIN: Performed by: SURGERY

## 2018-12-13 PROCEDURE — 6360000002 HC RX W HCPCS: Performed by: SURGERY

## 2018-12-13 PROCEDURE — 3600000009 HC SURGERY ROBOT BASE: Performed by: SURGERY

## 2018-12-13 PROCEDURE — 80048 BASIC METABOLIC PNL TOTAL CA: CPT

## 2018-12-13 PROCEDURE — 2709999900 HC NON-CHARGEABLE SUPPLY: Performed by: SURGERY

## 2018-12-13 PROCEDURE — 93005 ELECTROCARDIOGRAM TRACING: CPT

## 2018-12-13 RX ORDER — FENTANYL CITRATE 50 UG/ML
INJECTION, SOLUTION INTRAMUSCULAR; INTRAVENOUS PRN
Status: DISCONTINUED | OUTPATIENT
Start: 2018-12-13 | End: 2018-12-13 | Stop reason: SDUPTHER

## 2018-12-13 RX ORDER — DEXAMETHASONE SODIUM PHOSPHATE 4 MG/ML
INJECTION, SOLUTION INTRA-ARTICULAR; INTRALESIONAL; INTRAMUSCULAR; INTRAVENOUS; SOFT TISSUE PRN
Status: DISCONTINUED | OUTPATIENT
Start: 2018-12-13 | End: 2018-12-13 | Stop reason: SDUPTHER

## 2018-12-13 RX ORDER — OXYCODONE HYDROCHLORIDE AND ACETAMINOPHEN 5; 325 MG/1; MG/1
1 TABLET ORAL EVERY 6 HOURS PRN
Qty: 28 TABLET | Refills: 0 | Status: SHIPPED | OUTPATIENT
Start: 2018-12-13 | End: 2018-12-20

## 2018-12-13 RX ORDER — PROMETHAZINE HYDROCHLORIDE 25 MG/ML
6.25 INJECTION, SOLUTION INTRAMUSCULAR; INTRAVENOUS
Status: DISCONTINUED | OUTPATIENT
Start: 2018-12-13 | End: 2018-12-13 | Stop reason: HOSPADM

## 2018-12-13 RX ORDER — OXYCODONE HYDROCHLORIDE AND ACETAMINOPHEN 5; 325 MG/1; MG/1
1 TABLET ORAL
Status: COMPLETED | OUTPATIENT
Start: 2018-12-13 | End: 2018-12-13

## 2018-12-13 RX ORDER — LIDOCAINE HYDROCHLORIDE 20 MG/ML
INJECTION, SOLUTION INFILTRATION; PERINEURAL PRN
Status: DISCONTINUED | OUTPATIENT
Start: 2018-12-13 | End: 2018-12-13 | Stop reason: SDUPTHER

## 2018-12-13 RX ORDER — ONDANSETRON 4 MG/1
4 TABLET, FILM COATED ORAL 3 TIMES DAILY PRN
Qty: 30 TABLET | Refills: 0 | Status: SHIPPED | OUTPATIENT
Start: 2018-12-13 | End: 2018-12-28 | Stop reason: ALTCHOICE

## 2018-12-13 RX ORDER — MIDAZOLAM HYDROCHLORIDE 1 MG/ML
INJECTION INTRAMUSCULAR; INTRAVENOUS PRN
Status: DISCONTINUED | OUTPATIENT
Start: 2018-12-13 | End: 2018-12-13 | Stop reason: SDUPTHER

## 2018-12-13 RX ORDER — DOCUSATE SODIUM 100 MG/1
100 CAPSULE, LIQUID FILLED ORAL 2 TIMES DAILY
Qty: 60 CAPSULE | Refills: 0 | Status: SHIPPED | OUTPATIENT
Start: 2018-12-13 | End: 2019-01-12

## 2018-12-13 RX ORDER — SODIUM CHLORIDE, SODIUM LACTATE, POTASSIUM CHLORIDE, CALCIUM CHLORIDE 600; 310; 30; 20 MG/100ML; MG/100ML; MG/100ML; MG/100ML
INJECTION, SOLUTION INTRAVENOUS CONTINUOUS
Status: DISCONTINUED | OUTPATIENT
Start: 2018-12-13 | End: 2018-12-13 | Stop reason: HOSPADM

## 2018-12-13 RX ORDER — SODIUM CHLORIDE 0.9 % (FLUSH) 0.9 %
10 SYRINGE (ML) INJECTION EVERY 12 HOURS SCHEDULED
Status: DISCONTINUED | OUTPATIENT
Start: 2018-12-13 | End: 2018-12-13 | Stop reason: HOSPADM

## 2018-12-13 RX ORDER — MEPERIDINE HYDROCHLORIDE 25 MG/ML
12.5 INJECTION INTRAMUSCULAR; INTRAVENOUS; SUBCUTANEOUS EVERY 10 MIN PRN
Status: DISCONTINUED | OUTPATIENT
Start: 2018-12-13 | End: 2018-12-13 | Stop reason: HOSPADM

## 2018-12-13 RX ORDER — FENTANYL CITRATE 50 UG/ML
50 INJECTION, SOLUTION INTRAMUSCULAR; INTRAVENOUS EVERY 5 MIN PRN
Status: DISCONTINUED | OUTPATIENT
Start: 2018-12-13 | End: 2018-12-13 | Stop reason: HOSPADM

## 2018-12-13 RX ORDER — PROPOFOL 10 MG/ML
INJECTION, EMULSION INTRAVENOUS PRN
Status: DISCONTINUED | OUTPATIENT
Start: 2018-12-13 | End: 2018-12-13 | Stop reason: SDUPTHER

## 2018-12-13 RX ORDER — ONDANSETRON 2 MG/ML
INJECTION INTRAMUSCULAR; INTRAVENOUS PRN
Status: DISCONTINUED | OUTPATIENT
Start: 2018-12-13 | End: 2018-12-13 | Stop reason: SDUPTHER

## 2018-12-13 RX ORDER — ROCURONIUM BROMIDE 10 MG/ML
INJECTION, SOLUTION INTRAVENOUS PRN
Status: DISCONTINUED | OUTPATIENT
Start: 2018-12-13 | End: 2018-12-13 | Stop reason: SDUPTHER

## 2018-12-13 RX ORDER — NEOSTIGMINE METHYLSULFATE 1 MG/ML
INJECTION, SOLUTION INTRAVENOUS PRN
Status: DISCONTINUED | OUTPATIENT
Start: 2018-12-13 | End: 2018-12-13 | Stop reason: SDUPTHER

## 2018-12-13 RX ORDER — GLYCOPYRROLATE 0.2 MG/ML
INJECTION INTRAMUSCULAR; INTRAVENOUS PRN
Status: DISCONTINUED | OUTPATIENT
Start: 2018-12-13 | End: 2018-12-13 | Stop reason: SDUPTHER

## 2018-12-13 RX ORDER — BUPIVACAINE HYDROCHLORIDE AND EPINEPHRINE 2.5; 5 MG/ML; UG/ML
INJECTION, SOLUTION EPIDURAL; INFILTRATION; INTRACAUDAL; PERINEURAL PRN
Status: DISCONTINUED | OUTPATIENT
Start: 2018-12-13 | End: 2018-12-13 | Stop reason: HOSPADM

## 2018-12-13 RX ORDER — CEFAZOLIN SODIUM 2 G/50ML
2 SOLUTION INTRAVENOUS
Status: COMPLETED | OUTPATIENT
Start: 2018-12-13 | End: 2018-12-13

## 2018-12-13 RX ADMIN — PROPOFOL 200 MG: 10 INJECTION, EMULSION INTRAVENOUS at 08:25

## 2018-12-13 RX ADMIN — HYDROMORPHONE HYDROCHLORIDE 0.5 MG: 1 INJECTION, SOLUTION INTRAMUSCULAR; INTRAVENOUS; SUBCUTANEOUS at 09:59

## 2018-12-13 RX ADMIN — HYDROMORPHONE HYDROCHLORIDE 0.5 MG: 1 INJECTION, SOLUTION INTRAMUSCULAR; INTRAVENOUS; SUBCUTANEOUS at 09:48

## 2018-12-13 RX ADMIN — ROCURONIUM BROMIDE 40 MG: 10 SOLUTION INTRAVENOUS at 08:25

## 2018-12-13 RX ADMIN — Medication 3 MG: at 09:14

## 2018-12-13 RX ADMIN — CEFAZOLIN SODIUM 2 G: 2 SOLUTION INTRAVENOUS at 08:19

## 2018-12-13 RX ADMIN — HYDROMORPHONE HYDROCHLORIDE 0.5 MG: 1 INJECTION, SOLUTION INTRAMUSCULAR; INTRAVENOUS; SUBCUTANEOUS at 10:08

## 2018-12-13 RX ADMIN — SODIUM CHLORIDE, POTASSIUM CHLORIDE, SODIUM LACTATE AND CALCIUM CHLORIDE: 600; 310; 30; 20 INJECTION, SOLUTION INTRAVENOUS at 08:19

## 2018-12-13 RX ADMIN — DEXAMETHASONE SODIUM PHOSPHATE 10 MG: 4 INJECTION, SOLUTION INTRAMUSCULAR; INTRAVENOUS at 08:33

## 2018-12-13 RX ADMIN — HYDROMORPHONE HYDROCHLORIDE 0.5 MG: 1 INJECTION, SOLUTION INTRAMUSCULAR; INTRAVENOUS; SUBCUTANEOUS at 09:40

## 2018-12-13 RX ADMIN — ONDANSETRON HYDROCHLORIDE 4 MG: 2 INJECTION, SOLUTION INTRAMUSCULAR; INTRAVENOUS at 08:33

## 2018-12-13 RX ADMIN — MIDAZOLAM HYDROCHLORIDE 2 MG: 1 INJECTION, SOLUTION INTRAMUSCULAR; INTRAVENOUS at 08:19

## 2018-12-13 RX ADMIN — LIDOCAINE HYDROCHLORIDE 40 MG: 20 INJECTION, SOLUTION INFILTRATION; PERINEURAL at 08:25

## 2018-12-13 RX ADMIN — OXYCODONE AND ACETAMINOPHEN 1 TABLET: 5; 325 TABLET ORAL at 11:11

## 2018-12-13 RX ADMIN — GLYCOPYRROLATE 0.6 MG: 0.2 INJECTION, SOLUTION INTRAMUSCULAR; INTRAVENOUS at 09:14

## 2018-12-13 RX ADMIN — FENTANYL CITRATE 100 MCG: 50 INJECTION, SOLUTION INTRAMUSCULAR; INTRAVENOUS at 08:25

## 2018-12-13 ASSESSMENT — PULMONARY FUNCTION TESTS
PIF_VALUE: 34
PIF_VALUE: 36
PIF_VALUE: 21
PIF_VALUE: 0
PIF_VALUE: 37
PIF_VALUE: 32
PIF_VALUE: 35
PIF_VALUE: 33
PIF_VALUE: 34
PIF_VALUE: 26
PIF_VALUE: 35
PIF_VALUE: 34
PIF_VALUE: 1
PIF_VALUE: 22
PIF_VALUE: 35
PIF_VALUE: 24
PIF_VALUE: 0
PIF_VALUE: 23
PIF_VALUE: 34
PIF_VALUE: 22
PIF_VALUE: 21
PIF_VALUE: 34
PIF_VALUE: 30
PIF_VALUE: 2
PIF_VALUE: 0
PIF_VALUE: 29
PIF_VALUE: 35
PIF_VALUE: 34
PIF_VALUE: 3
PIF_VALUE: 34
PIF_VALUE: 35
PIF_VALUE: 27
PIF_VALUE: 23
PIF_VALUE: 26
PIF_VALUE: 34
PIF_VALUE: 3
PIF_VALUE: 3
PIF_VALUE: 24
PIF_VALUE: 35
PIF_VALUE: 34
PIF_VALUE: 31
PIF_VALUE: 35
PIF_VALUE: 35
PIF_VALUE: 23
PIF_VALUE: 38
PIF_VALUE: 35
PIF_VALUE: 35
PIF_VALUE: 2
PIF_VALUE: 21
PIF_VALUE: 4
PIF_VALUE: 21
PIF_VALUE: 24
PIF_VALUE: 39
PIF_VALUE: 23
PIF_VALUE: 35
PIF_VALUE: 34
PIF_VALUE: 36
PIF_VALUE: 21
PIF_VALUE: 29
PIF_VALUE: 34
PIF_VALUE: 21
PIF_VALUE: 1

## 2018-12-13 ASSESSMENT — PAIN DESCRIPTION - PAIN TYPE
TYPE: SURGICAL PAIN

## 2018-12-13 ASSESSMENT — PAIN SCALES - GENERAL
PAINLEVEL_OUTOF10: 8
PAINLEVEL_OUTOF10: 1
PAINLEVEL_OUTOF10: 6
PAINLEVEL_OUTOF10: 7
PAINLEVEL_OUTOF10: 6
PAINLEVEL_OUTOF10: 6
PAINLEVEL_OUTOF10: 8
PAINLEVEL_OUTOF10: 7
PAINLEVEL_OUTOF10: 5
PAINLEVEL_OUTOF10: 6
PAINLEVEL_OUTOF10: 6

## 2018-12-13 ASSESSMENT — PAIN DESCRIPTION - ORIENTATION
ORIENTATION: LEFT;MID

## 2018-12-13 ASSESSMENT — PAIN DESCRIPTION - PROGRESSION
CLINICAL_PROGRESSION: GRADUALLY IMPROVING
CLINICAL_PROGRESSION: NOT CHANGED
CLINICAL_PROGRESSION: RAPIDLY WORSENING
CLINICAL_PROGRESSION: GRADUALLY IMPROVING

## 2018-12-13 ASSESSMENT — PAIN DESCRIPTION - DESCRIPTORS
DESCRIPTORS: DISCOMFORT
DESCRIPTORS: DISCOMFORT
DESCRIPTORS: THROBBING
DESCRIPTORS: DISCOMFORT

## 2018-12-13 ASSESSMENT — PAIN DESCRIPTION - LOCATION
LOCATION: ABDOMEN

## 2018-12-13 ASSESSMENT — PAIN DESCRIPTION - FREQUENCY
FREQUENCY: INTERMITTENT

## 2018-12-13 ASSESSMENT — PAIN DESCRIPTION - ONSET
ONSET: AWAKENED FROM SLEEP
ONSET: ON-GOING

## 2018-12-13 ASSESSMENT — PAIN - FUNCTIONAL ASSESSMENT: PAIN_FUNCTIONAL_ASSESSMENT: 0-10

## 2018-12-13 NOTE — PROGRESS NOTES
Just reached both for ride home.  awaiting his arrival.
Patient states diabetes is controlled at average am blood sugars of 150 as well as hypertension.
products on the day of surgery    [x] If not already done, you can expect a call from registration    [x] You can expect a call the business day prior to procedure to notify you if your arrival time changes    [x] If you receive a survey after surgery we would greatly appreciate your comments    [] Parent/guardian of a minor must accompany their child and remain on the premises  the entire time they are under our care     [] Pediatric patients may bring favorite toy, blanket or comfort item with them    [] A caregiver or family member must remain with the patient during their stay if they are mentally handicapped, have dementia, disoriented or unable to use a call light or would be a safety concern if left unattended    [x] Please notify surgeon if you develop any illness between now and time of surgery (cold, cough, sore throat, fever, nausea, vomiting) or any signs of infections  including skin, wounds, and dental.    [x]  The Outpatient Pharmacy is available to fill your prescription here on your day of surgery, ask your preop nurse for details    [] Other instructions  EDUCATIONAL MATERIALS PROVIDED:    [] PAT Preoperative Education Packet/Booklet     [] Medication List    [] Fluoroscopy Information Pamphlet    [] Transfusion bracelet applied with instructions    [] Joint replacement video reviewed    [] Shower with soap, lather and rinse well, and use CHG wipes provided the evening before surgery as instructed

## 2018-12-13 NOTE — ANESTHESIA PRE PROCEDURE
Department of Anesthesiology  Preprocedure Note       Name:  Torsten Armando   Age:  63 y.o.  :  1960                                          MRN:  [id number]         Date:  2018      Surgeon: Candance Pottier):  David Mays MD    Procedure: HERNIA VENTRAL REPAIR LAPAROSCOPIC ROBOTIC ASSISTED WITH MESH POSS OPEN ++IODINE ALLERGY ++STAFF FROM ROOM 9++ (N/A )    Medications prior to admission:   Prior to Admission medications    Medication Sig Start Date End Date Taking? Authorizing Provider   naproxen (NAPROSYN) 500 MG tablet Take 1 tablet by mouth 2 times daily as needed for Pain 10/19/18  Yes Deborra Moritz, MD   glipiZIDE (GLUCOTROL XL) 10 MG extended release tablet Take 1 tablet by mouth daily 10/12/18  Yes Deborra Moritz, MD   Ertugliflozin L-PyroglutamicAc (STEGLATRO) 5 MG TABS One tab daily  Patient taking differently: Take 5 mg by mouth daily One tab daily 18  Yes Deborra Moritz, MD   losartan-hydrochlorothiazide [facility]) 100-12.5 MG per tablet Take 1 tablet by mouth daily 18  Yes Deborra Moritz, MD   atorvastatin (LIPITOR) 10 MG tablet Take 1 tablet by mouth daily 18  Yes Deborra Moritz, MD   gabapentin (NEURONTIN) 300 MG capsule take 1 capsule by mouth 3 TO 4 TIMES A DAY 18  Yes Historical Provider, MD   fluticasone [facility]) 50 MCG/ACT nasal spray 2 sprays by Nasal route daily 18  Yes Deborra Moritz, MD   cyclobenzaprine (FLEXERIL) 10 MG tablet  18   Historical Provider, MD       Current medications:    Current Facility-Administered Medications   Medication Dose Route Frequency Provider Last Rate Last Dose    ceFAZolin (ANCEF) 2 g in dextrose 3 % 50 mL IVPB (duplex)  2 g Intravenous On Call to OR David Mays MD        lactated ringers infusion   Intravenous Continuous David Mays MD        sodium chloride flush 0.9 % injection 10 mL  10 mL Intravenous 2 times per day David Mays MD           Allergies:     Allergies   Allergen Reactions    Iodine Swelling     Sea food

## 2018-12-14 LAB
EKG ATRIAL RATE: 79 BPM
EKG P AXIS: 57 DEGREES
EKG P-R INTERVAL: 166 MS
EKG Q-T INTERVAL: 396 MS
EKG QRS DURATION: 86 MS
EKG QTC CALCULATION (BAZETT): 454 MS
EKG R AXIS: 14 DEGREES
EKG T AXIS: 70 DEGREES
EKG VENTRICULAR RATE: 79 BPM

## 2018-12-14 NOTE — OP NOTE
[id number] [facility]                  [address]                                OPERATIVE REPORT    PATIENT NAME: Maia Barker                  :        1960  MED REC NO:   [id number]                            ROOM:  ACCOUNT NO:   [de-identified]                           ADMIT DATE: 2018  PROVIDER:     Sun Vaughan MD    DATE OF PROCEDURE:  2018    REFERRING PROVIDER:  _____    PREOPERATIVE DIAGNOSIS:  Recurrent ventral hernia. POSTOPERATIVE DIAGNOSIS:  Recurrent ventral hernia. PROCEDURE PERFORMED:  Robotic assisted laparoscopic repair of recurrent  ventral hernia, primary tissue repair. SURGEON:  Sun Vaughan MD    ANESTHESIA:  General.    ESTIMATED BLOOD LOSS:  Minimal.    FLUIDS:  Per Anesthesia. COMPLICATIONS:  None. SPECIMENS:  None. DISPOSITION:  The patient stable to recovery room. Plan is for  discharge home. DETAILS OF PROCEDURE:  With the patient in the supine position after  induction of anesthesia, skin was prepped and draped in aseptic fashion. 1% lidocaine was used for local anesthesia. Veress needle placed in the  left upper abdomen at Cowan's point. Saline flush was used to confirm  placement. Intraabdominal pressures were then raised to 15. Following  this, the robotic trocar camera port was placed at the left axially line  as lateral as possible. Upon insertion of laparoscope, there was noted  to be incarcerated omentum going underneath the mesh where approximately  one-quarter of the mesh _____ away from the abdominal wall. Two  additional robotic trocars were placed above and below the camera port. The robot was docked and I began my dissection. Using electrocautery  and scissors, I reduced the herniated omentum and exposed the mesh.   Due  to majority of the mesh being intact and peritonealized with good  results on the abdominal wall, I decided to primarily close the

## 2018-12-21 DIAGNOSIS — I10 ESSENTIAL HYPERTENSION: ICD-10-CM

## 2018-12-21 RX ORDER — LOSARTAN POTASSIUM AND HYDROCHLOROTHIAZIDE 12.5; 1 MG/1; MG/1
1 TABLET ORAL DAILY
Qty: 30 TABLET | Refills: 3 | Status: SHIPPED | OUTPATIENT
Start: 2018-12-21 | End: 2018-12-28

## 2018-12-21 NOTE — TELEPHONE ENCOUNTER
From: Patricio Srinivasan  Sent: 12/21/2018 1:16 AM EST  Subject: Medication Renewal Request    Maia Forbes.  Joey would like a refill of the following medications:     losartan-hydrochlorothiazide (HYZAAR) 100-12.5 MG per tablet Jagjit Bautista MD]    Preferred pharmacy: [facility] [address] [place] [address] [phone] -  [phone]

## 2018-12-28 ENCOUNTER — OFFICE VISIT (OUTPATIENT)
Dept: FAMILY MEDICINE CLINIC | Age: 58
End: 2018-12-28
Payer: MEDICAID

## 2018-12-28 ENCOUNTER — TELEPHONE (OUTPATIENT)
Dept: FAMILY MEDICINE CLINIC | Age: 58
End: 2018-12-28

## 2018-12-28 VITALS
DIASTOLIC BLOOD PRESSURE: 78 MMHG | RESPIRATION RATE: 18 BRPM | WEIGHT: 223 LBS | HEART RATE: 90 BPM | SYSTOLIC BLOOD PRESSURE: 133 MMHG | BODY MASS INDEX: 33.8 KG/M2 | HEIGHT: 68 IN

## 2018-12-28 DIAGNOSIS — N62 GYNECOMASTIA: ICD-10-CM

## 2018-12-28 DIAGNOSIS — G89.29 CHRONIC BILATERAL LOW BACK PAIN WITH RIGHT-SIDED SCIATICA: Primary | ICD-10-CM

## 2018-12-28 DIAGNOSIS — M54.41 CHRONIC BILATERAL LOW BACK PAIN WITH RIGHT-SIDED SCIATICA: Primary | ICD-10-CM

## 2018-12-28 PROCEDURE — G8427 DOCREV CUR MEDS BY ELIG CLIN: HCPCS | Performed by: FAMILY MEDICINE

## 2018-12-28 PROCEDURE — 99214 OFFICE O/P EST MOD 30 MIN: CPT | Performed by: FAMILY MEDICINE

## 2018-12-28 PROCEDURE — G8417 CALC BMI ABV UP PARAM F/U: HCPCS | Performed by: FAMILY MEDICINE

## 2018-12-28 PROCEDURE — 1036F TOBACCO NON-USER: CPT | Performed by: FAMILY MEDICINE

## 2018-12-28 PROCEDURE — G8482 FLU IMMUNIZE ORDER/ADMIN: HCPCS | Performed by: FAMILY MEDICINE

## 2018-12-28 PROCEDURE — 3017F COLORECTAL CA SCREEN DOC REV: CPT | Performed by: FAMILY MEDICINE

## 2018-12-28 ASSESSMENT — PATIENT HEALTH QUESTIONNAIRE - PHQ9
2. FEELING DOWN, DEPRESSED OR HOPELESS: 0
SUM OF ALL RESPONSES TO PHQ9 QUESTIONS 1 & 2: 0
SUM OF ALL RESPONSES TO PHQ QUESTIONS 1-9: 0
1. LITTLE INTEREST OR PLEASURE IN DOING THINGS: 0
SUM OF ALL RESPONSES TO PHQ QUESTIONS 1-9: 0

## 2019-02-12 ENCOUNTER — OFFICE VISIT (OUTPATIENT)
Dept: PHYSICAL MEDICINE AND REHAB | Age: 59
End: 2019-02-12
Payer: MEDICAID

## 2019-02-12 VITALS
HEIGHT: 68 IN | HEART RATE: 94 BPM | WEIGHT: 214 LBS | DIASTOLIC BLOOD PRESSURE: 72 MMHG | SYSTOLIC BLOOD PRESSURE: 124 MMHG | BODY MASS INDEX: 32.43 KG/M2

## 2019-02-12 DIAGNOSIS — M54.42 CHRONIC BILATERAL LOW BACK PAIN WITH BILATERAL SCIATICA: ICD-10-CM

## 2019-02-12 DIAGNOSIS — M48.062 LUMBAR STENOSIS WITH NEUROGENIC CLAUDICATION: Primary | ICD-10-CM

## 2019-02-12 DIAGNOSIS — R20.0 NUMBNESS AND TINGLING: ICD-10-CM

## 2019-02-12 DIAGNOSIS — G89.29 CHRONIC BILATERAL LOW BACK PAIN WITH BILATERAL SCIATICA: ICD-10-CM

## 2019-02-12 DIAGNOSIS — R20.2 NUMBNESS AND TINGLING: ICD-10-CM

## 2019-02-12 DIAGNOSIS — M54.41 CHRONIC BILATERAL LOW BACK PAIN WITH BILATERAL SCIATICA: ICD-10-CM

## 2019-02-12 DIAGNOSIS — G56.21 ULNAR NEUROPATHY OF RIGHT UPPER EXTREMITY: ICD-10-CM

## 2019-02-12 PROCEDURE — G8417 CALC BMI ABV UP PARAM F/U: HCPCS | Performed by: PHYSICAL MEDICINE & REHABILITATION

## 2019-02-12 PROCEDURE — 3017F COLORECTAL CA SCREEN DOC REV: CPT | Performed by: PHYSICAL MEDICINE & REHABILITATION

## 2019-02-12 PROCEDURE — 1036F TOBACCO NON-USER: CPT | Performed by: PHYSICAL MEDICINE & REHABILITATION

## 2019-02-12 PROCEDURE — G8427 DOCREV CUR MEDS BY ELIG CLIN: HCPCS | Performed by: PHYSICAL MEDICINE & REHABILITATION

## 2019-02-12 PROCEDURE — 99204 OFFICE O/P NEW MOD 45 MIN: CPT | Performed by: PHYSICAL MEDICINE & REHABILITATION

## 2019-02-12 PROCEDURE — G8482 FLU IMMUNIZE ORDER/ADMIN: HCPCS | Performed by: PHYSICAL MEDICINE & REHABILITATION

## 2019-02-20 ENCOUNTER — TELEPHONE (OUTPATIENT)
Dept: PHYSICAL MEDICINE AND REHAB | Age: 59
End: 2019-02-20

## 2019-02-23 ENCOUNTER — HOSPITAL ENCOUNTER (OUTPATIENT)
Age: 59
Discharge: HOME | End: 2019-02-23
Payer: MEDICAID

## 2019-02-23 DIAGNOSIS — E11.9 TYPE 2 DIABETES MELLITUS WITHOUT COMPLICATION, WITHOUT LONG-TERM CURRENT USE OF INSULIN (HCC): ICD-10-CM

## 2019-02-23 LAB
CREATININE URINE: 156 MG/DL (ref 40–278)
HBA1C MFR BLD: 7.1 % (ref 4–5.6)
MICROALBUMIN UR-MCNC: <12 MG/L
MICROALBUMIN/CREAT UR-RTO: ABNORMAL (ref 0–30)

## 2019-02-23 PROCEDURE — 36415 COLL VENOUS BLD VENIPUNCTURE: CPT

## 2019-02-23 PROCEDURE — 82044 UR ALBUMIN SEMIQUANTITATIVE: CPT

## 2019-02-23 PROCEDURE — 83036 HEMOGLOBIN GLYCOSYLATED A1C: CPT

## 2019-02-23 PROCEDURE — 82570 ASSAY OF URINE CREATININE: CPT

## 2019-02-25 ENCOUNTER — OFFICE VISIT (OUTPATIENT)
Dept: PHYSICAL MEDICINE AND REHAB | Age: 59
End: 2019-02-25
Payer: MEDICAID

## 2019-02-25 VITALS — WEIGHT: 214 LBS | HEIGHT: 68 IN | BODY MASS INDEX: 32.43 KG/M2

## 2019-02-25 DIAGNOSIS — M54.17 LUMBOSACRAL RADICULITIS: Primary | ICD-10-CM

## 2019-02-25 DIAGNOSIS — G56.21 ULNAR NEUROPATHY OF RIGHT UPPER EXTREMITY: ICD-10-CM

## 2019-02-25 DIAGNOSIS — R20.2 NUMBNESS AND TINGLING: ICD-10-CM

## 2019-02-25 DIAGNOSIS — R20.0 NUMBNESS AND TINGLING: ICD-10-CM

## 2019-02-25 PROCEDURE — 95886 MUSC TEST DONE W/N TEST COMP: CPT | Performed by: PHYSICAL MEDICINE & REHABILITATION

## 2019-02-25 PROCEDURE — 95912 NRV CNDJ TEST 11-12 STUDIES: CPT | Performed by: PHYSICAL MEDICINE & REHABILITATION

## 2019-02-26 ENCOUNTER — TELEPHONE (OUTPATIENT)
Dept: PHYSICAL MEDICINE AND REHAB | Age: 59
End: 2019-02-26

## 2019-02-26 DIAGNOSIS — M54.18 SACRAL RADICULITIS: ICD-10-CM

## 2019-02-26 DIAGNOSIS — M54.41 CHRONIC BILATERAL LOW BACK PAIN WITH RIGHT-SIDED SCIATICA: Primary | ICD-10-CM

## 2019-02-26 DIAGNOSIS — G89.29 CHRONIC BILATERAL LOW BACK PAIN WITH RIGHT-SIDED SCIATICA: Primary | ICD-10-CM

## 2019-02-27 PROBLEM — M54.18 SACRAL RADICULITIS: Status: ACTIVE | Noted: 2019-02-27

## 2019-02-28 ENCOUNTER — OFFICE VISIT (OUTPATIENT)
Dept: FAMILY MEDICINE CLINIC | Age: 59
End: 2019-02-28
Payer: MEDICAID

## 2019-02-28 ENCOUNTER — TELEPHONE (OUTPATIENT)
Dept: FAMILY MEDICINE CLINIC | Age: 59
End: 2019-02-28

## 2019-02-28 VITALS
DIASTOLIC BLOOD PRESSURE: 87 MMHG | RESPIRATION RATE: 18 BRPM | HEART RATE: 93 BPM | HEIGHT: 68 IN | BODY MASS INDEX: 31.67 KG/M2 | WEIGHT: 209 LBS | SYSTOLIC BLOOD PRESSURE: 136 MMHG

## 2019-02-28 DIAGNOSIS — F41.0 PANIC DISORDER: ICD-10-CM

## 2019-02-28 DIAGNOSIS — I10 ESSENTIAL HYPERTENSION: ICD-10-CM

## 2019-02-28 DIAGNOSIS — N62 GYNECOMASTIA: ICD-10-CM

## 2019-02-28 DIAGNOSIS — E78.2 MIXED HYPERLIPIDEMIA: ICD-10-CM

## 2019-02-28 DIAGNOSIS — E11.9 TYPE 2 DIABETES MELLITUS WITHOUT COMPLICATION, WITHOUT LONG-TERM CURRENT USE OF INSULIN (HCC): Primary | ICD-10-CM

## 2019-02-28 PROCEDURE — G8482 FLU IMMUNIZE ORDER/ADMIN: HCPCS | Performed by: FAMILY MEDICINE

## 2019-02-28 PROCEDURE — 3017F COLORECTAL CA SCREEN DOC REV: CPT | Performed by: FAMILY MEDICINE

## 2019-02-28 PROCEDURE — G8427 DOCREV CUR MEDS BY ELIG CLIN: HCPCS | Performed by: FAMILY MEDICINE

## 2019-02-28 PROCEDURE — 99214 OFFICE O/P EST MOD 30 MIN: CPT | Performed by: FAMILY MEDICINE

## 2019-02-28 PROCEDURE — 3045F PR MOST RECENT HEMOGLOBIN A1C LEVEL 7.0-9.0%: CPT | Performed by: FAMILY MEDICINE

## 2019-02-28 PROCEDURE — 1036F TOBACCO NON-USER: CPT | Performed by: FAMILY MEDICINE

## 2019-02-28 PROCEDURE — G8417 CALC BMI ABV UP PARAM F/U: HCPCS | Performed by: FAMILY MEDICINE

## 2019-02-28 PROCEDURE — 2022F DILAT RTA XM EVC RTNOPTHY: CPT | Performed by: FAMILY MEDICINE

## 2019-02-28 ASSESSMENT — PATIENT HEALTH QUESTIONNAIRE - PHQ9
1. LITTLE INTEREST OR PLEASURE IN DOING THINGS: 0
2. FEELING DOWN, DEPRESSED OR HOPELESS: 0
SUM OF ALL RESPONSES TO PHQ QUESTIONS 1-9: 0
SUM OF ALL RESPONSES TO PHQ9 QUESTIONS 1 & 2: 0
SUM OF ALL RESPONSES TO PHQ QUESTIONS 1-9: 0

## 2019-03-01 ENCOUNTER — TELEPHONE (OUTPATIENT)
Dept: FAMILY MEDICINE CLINIC | Age: 59
End: 2019-03-01

## 2019-03-01 ENCOUNTER — HOSPITAL ENCOUNTER (OUTPATIENT)
Age: 59
Discharge: HOME | End: 2019-03-01
Payer: MEDICAID

## 2019-03-01 DIAGNOSIS — N62 GYNECOMASTIA: ICD-10-CM

## 2019-03-01 DIAGNOSIS — N62 GYNECOMASTIA: Primary | ICD-10-CM

## 2019-03-01 LAB
PROLACTIN: 7.71 NG/ML
TESTOSTERONE TOTAL: 330.7 NG/DL

## 2019-03-01 PROCEDURE — 36415 COLL VENOUS BLD VENIPUNCTURE: CPT

## 2019-03-01 PROCEDURE — 84403 ASSAY OF TOTAL TESTOSTERONE: CPT

## 2019-03-01 PROCEDURE — 84146 ASSAY OF PROLACTIN: CPT

## 2019-03-01 RX ORDER — TAMOXIFEN CITRATE 10 MG/1
10 TABLET ORAL 2 TIMES DAILY
Qty: 60 TABLET | Refills: 2 | Status: SHIPPED | OUTPATIENT
Start: 2019-03-01 | End: 2019-03-25 | Stop reason: ALTCHOICE

## 2019-03-04 ENCOUNTER — OFFICE VISIT (OUTPATIENT)
Dept: ORTHOPEDIC SURGERY | Age: 59
End: 2019-03-04
Payer: MEDICAID

## 2019-03-04 VITALS — BODY MASS INDEX: 31.67 KG/M2 | WEIGHT: 209 LBS | HEIGHT: 68 IN

## 2019-03-04 DIAGNOSIS — G56.01 RIGHT CARPAL TUNNEL SYNDROME: Primary | ICD-10-CM

## 2019-03-04 DIAGNOSIS — G56.21 CUBITAL TUNNEL SYNDROME ON RIGHT: ICD-10-CM

## 2019-03-04 PROCEDURE — G8482 FLU IMMUNIZE ORDER/ADMIN: HCPCS | Performed by: ORTHOPAEDIC SURGERY

## 2019-03-04 PROCEDURE — 99204 OFFICE O/P NEW MOD 45 MIN: CPT | Performed by: ORTHOPAEDIC SURGERY

## 2019-03-04 PROCEDURE — G8417 CALC BMI ABV UP PARAM F/U: HCPCS | Performed by: ORTHOPAEDIC SURGERY

## 2019-03-04 PROCEDURE — 1036F TOBACCO NON-USER: CPT | Performed by: ORTHOPAEDIC SURGERY

## 2019-03-04 PROCEDURE — G8427 DOCREV CUR MEDS BY ELIG CLIN: HCPCS | Performed by: ORTHOPAEDIC SURGERY

## 2019-03-04 PROCEDURE — 3017F COLORECTAL CA SCREEN DOC REV: CPT | Performed by: ORTHOPAEDIC SURGERY

## 2019-03-09 PROBLEM — M47.816 SPONDYLOSIS OF LUMBAR SPINE: Chronic | Status: ACTIVE | Noted: 2019-03-09

## 2019-03-09 PROBLEM — K42.9 UMBILICAL HERNIA WITHOUT OBSTRUCTION AND WITHOUT GANGRENE: Status: RESOLVED | Noted: 2018-04-17 | Resolved: 2019-03-09

## 2019-03-11 ENCOUNTER — TELEPHONE (OUTPATIENT)
Dept: PHYSICAL MEDICINE AND REHAB | Age: 59
End: 2019-03-11

## 2019-03-12 ENCOUNTER — HOSPITAL ENCOUNTER (OUTPATIENT)
Dept: MAMMOGRAPHY | Age: 59
Discharge: HOME | End: 2019-03-14
Payer: MEDICAID

## 2019-03-12 ENCOUNTER — HOSPITAL ENCOUNTER (OUTPATIENT)
Dept: ULTRASOUND IMAGING | Age: 59
End: 2019-03-12
Payer: MEDICAID

## 2019-03-12 DIAGNOSIS — N62 GYNECOMASTIA: ICD-10-CM

## 2019-03-12 PROCEDURE — 77066 DX MAMMO INCL CAD BI: CPT

## 2019-03-15 ENCOUNTER — APPOINTMENT (OUTPATIENT)
Dept: CT IMAGING | Age: 59
End: 2019-03-15
Payer: MEDICAID

## 2019-03-15 ENCOUNTER — HOSPITAL ENCOUNTER (EMERGENCY)
Age: 59
Discharge: HOME | End: 2019-03-15
Attending: EMERGENCY MEDICINE
Payer: MEDICAID

## 2019-03-15 VITALS
SYSTOLIC BLOOD PRESSURE: 123 MMHG | RESPIRATION RATE: 18 BRPM | HEART RATE: 72 BPM | HEIGHT: 68 IN | WEIGHT: 207 LBS | OXYGEN SATURATION: 95 % | TEMPERATURE: 97.9 F | DIASTOLIC BLOOD PRESSURE: 78 MMHG | BODY MASS INDEX: 31.37 KG/M2

## 2019-03-15 DIAGNOSIS — G51.0 BELL'S PALSY: Primary | ICD-10-CM

## 2019-03-15 LAB
ANION GAP SERPL CALCULATED.3IONS-SCNC: 13 MMOL/L (ref 7–16)
BASOPHILS ABSOLUTE: 0.07 E9/L (ref 0–0.2)
BASOPHILS RELATIVE PERCENT: 1 % (ref 0–2)
BUN BLDV-MCNC: 14 MG/DL (ref 6–20)
CALCIUM SERPL-MCNC: 9.9 MG/DL (ref 8.6–10.2)
CHLORIDE BLD-SCNC: 104 MMOL/L (ref 98–107)
CO2: 26 MMOL/L (ref 22–29)
CREAT SERPL-MCNC: 0.8 MG/DL (ref 0.7–1.2)
EOSINOPHILS ABSOLUTE: 0.21 E9/L (ref 0.05–0.5)
EOSINOPHILS RELATIVE PERCENT: 3 % (ref 0–6)
GFR AFRICAN AMERICAN: >60
GFR NON-AFRICAN AMERICAN: >60 ML/MIN/1.73
GLUCOSE BLD-MCNC: 99 MG/DL (ref 74–99)
HCT VFR BLD CALC: 49.3 % (ref 37–54)
HEMOGLOBIN: 15 G/DL (ref 12.5–16.5)
IMMATURE GRANULOCYTES #: 0.03 E9/L
IMMATURE GRANULOCYTES %: 0.4 % (ref 0–5)
LYMPHOCYTES ABSOLUTE: 1.61 E9/L (ref 1.5–4)
LYMPHOCYTES RELATIVE PERCENT: 23.4 % (ref 20–42)
MCH RBC QN AUTO: 22.4 PG (ref 26–35)
MCHC RBC AUTO-ENTMCNC: 30.4 % (ref 32–34.5)
MCV RBC AUTO: 73.6 FL (ref 80–99.9)
MONOCYTES ABSOLUTE: 0.65 E9/L (ref 0.1–0.95)
MONOCYTES RELATIVE PERCENT: 9.4 % (ref 2–12)
NEUTROPHILS ABSOLUTE: 4.32 E9/L (ref 1.8–7.3)
NEUTROPHILS RELATIVE PERCENT: 62.8 % (ref 43–80)
PDW BLD-RTO: 16.3 FL (ref 11.5–15)
PLATELET # BLD: 229 E9/L (ref 130–450)
PMV BLD AUTO: 12.2 FL (ref 7–12)
POTASSIUM SERPL-SCNC: 4.5 MMOL/L (ref 3.5–5)
RBC # BLD: 6.7 E12/L (ref 3.8–5.8)
SODIUM BLD-SCNC: 143 MMOL/L (ref 132–146)
WBC # BLD: 6.9 E9/L (ref 4.5–11.5)

## 2019-03-15 PROCEDURE — 93005 ELECTROCARDIOGRAM TRACING: CPT | Performed by: EMERGENCY MEDICINE

## 2019-03-15 PROCEDURE — 36415 COLL VENOUS BLD VENIPUNCTURE: CPT

## 2019-03-15 PROCEDURE — 99284 EMERGENCY DEPT VISIT MOD MDM: CPT

## 2019-03-15 PROCEDURE — 70450 CT HEAD/BRAIN W/O DYE: CPT

## 2019-03-15 PROCEDURE — 80048 BASIC METABOLIC PNL TOTAL CA: CPT

## 2019-03-15 PROCEDURE — 85025 COMPLETE CBC W/AUTO DIFF WBC: CPT

## 2019-03-15 RX ORDER — PREDNISONE 50 MG/1
50 TABLET ORAL DAILY
Qty: 4 TABLET | Refills: 0 | Status: SHIPPED | OUTPATIENT
Start: 2019-03-15 | End: 2019-03-19

## 2019-03-15 RX ORDER — ACYCLOVIR 400 MG/1
400 TABLET ORAL
Qty: 35 TABLET | Refills: 0 | Status: SHIPPED | OUTPATIENT
Start: 2019-03-15 | End: 2019-03-22

## 2019-03-15 ASSESSMENT — ENCOUNTER SYMPTOMS
ABDOMINAL DISTENTION: 0
ABDOMINAL PAIN: 0
DIARRHEA: 0
CHEST TIGHTNESS: 0
FACIAL SWELLING: 0
VOMITING: 0
BACK PAIN: 0
EYE PAIN: 0
NAUSEA: 0
SORE THROAT: 0
PHOTOPHOBIA: 0
WHEEZING: 0
TROUBLE SWALLOWING: 0
EYE REDNESS: 0
RHINORRHEA: 1
EYE DISCHARGE: 0
SHORTNESS OF BREATH: 0
SINUS PRESSURE: 0
COUGH: 0

## 2019-03-16 LAB
EKG ATRIAL RATE: 77 BPM
EKG P AXIS: 49 DEGREES
EKG P-R INTERVAL: 158 MS
EKG Q-T INTERVAL: 386 MS
EKG QRS DURATION: 78 MS
EKG QTC CALCULATION (BAZETT): 436 MS
EKG R AXIS: 13 DEGREES
EKG T AXIS: 72 DEGREES
EKG VENTRICULAR RATE: 77 BPM

## 2019-03-17 DIAGNOSIS — E11.40 POORLY CONTROLLED TYPE 2 DIABETES MELLITUS WITH NEUROPATHY (HCC): Chronic | ICD-10-CM

## 2019-03-17 DIAGNOSIS — E11.65 POORLY CONTROLLED TYPE 2 DIABETES MELLITUS WITH NEUROPATHY (HCC): Chronic | ICD-10-CM

## 2019-03-19 ENCOUNTER — TELEPHONE (OUTPATIENT)
Dept: FAMILY MEDICINE CLINIC | Age: 59
End: 2019-03-19

## 2019-03-19 RX ORDER — PREDNISONE 20 MG/1
60 TABLET ORAL DAILY
Qty: 21 TABLET | Refills: 0 | Status: SHIPPED | OUTPATIENT
Start: 2019-03-19 | End: 2019-03-25 | Stop reason: ALTCHOICE

## 2019-03-25 ENCOUNTER — OFFICE VISIT (OUTPATIENT)
Dept: FAMILY MEDICINE CLINIC | Age: 59
End: 2019-03-25
Payer: MEDICAID

## 2019-03-25 VITALS
WEIGHT: 210 LBS | HEART RATE: 82 BPM | HEIGHT: 68 IN | DIASTOLIC BLOOD PRESSURE: 85 MMHG | RESPIRATION RATE: 16 BRPM | BODY MASS INDEX: 31.83 KG/M2 | SYSTOLIC BLOOD PRESSURE: 130 MMHG

## 2019-03-25 DIAGNOSIS — G51.0 BELL'S PALSY: Chronic | ICD-10-CM

## 2019-03-25 PROCEDURE — G8482 FLU IMMUNIZE ORDER/ADMIN: HCPCS | Performed by: FAMILY MEDICINE

## 2019-03-25 PROCEDURE — 1036F TOBACCO NON-USER: CPT | Performed by: FAMILY MEDICINE

## 2019-03-25 PROCEDURE — G8427 DOCREV CUR MEDS BY ELIG CLIN: HCPCS | Performed by: FAMILY MEDICINE

## 2019-03-25 PROCEDURE — 3017F COLORECTAL CA SCREEN DOC REV: CPT | Performed by: FAMILY MEDICINE

## 2019-03-25 PROCEDURE — 99213 OFFICE O/P EST LOW 20 MIN: CPT | Performed by: FAMILY MEDICINE

## 2019-03-25 PROCEDURE — G8417 CALC BMI ABV UP PARAM F/U: HCPCS | Performed by: FAMILY MEDICINE

## 2019-03-26 ENCOUNTER — TELEPHONE (OUTPATIENT)
Dept: FAMILY MEDICINE CLINIC | Age: 59
End: 2019-03-26

## 2019-03-27 ENCOUNTER — ANESTHESIA EVENT (OUTPATIENT)
Dept: OPERATING ROOM | Age: 59
End: 2019-03-27
Payer: MEDICAID

## 2019-03-29 ENCOUNTER — ANESTHESIA (OUTPATIENT)
Dept: OPERATING ROOM | Age: 59
End: 2019-03-29
Payer: MEDICAID

## 2019-03-29 ENCOUNTER — HOSPITAL ENCOUNTER (OUTPATIENT)
Age: 59
Setting detail: OUTPATIENT SURGERY
Discharge: HOME | End: 2019-03-29
Attending: ORTHOPAEDIC SURGERY | Admitting: ORTHOPAEDIC SURGERY
Payer: MEDICAID

## 2019-03-29 VITALS — OXYGEN SATURATION: 97 % | TEMPERATURE: 96.8 F | DIASTOLIC BLOOD PRESSURE: 60 MMHG | SYSTOLIC BLOOD PRESSURE: 84 MMHG

## 2019-03-29 VITALS
RESPIRATION RATE: 14 BRPM | SYSTOLIC BLOOD PRESSURE: 145 MMHG | BODY MASS INDEX: 31.52 KG/M2 | DIASTOLIC BLOOD PRESSURE: 59 MMHG | WEIGHT: 208 LBS | HEIGHT: 68 IN | HEART RATE: 90 BPM | OXYGEN SATURATION: 96 % | TEMPERATURE: 98 F

## 2019-03-29 DIAGNOSIS — G56.21 CUBITAL TUNNEL SYNDROME ON RIGHT: Primary | ICD-10-CM

## 2019-03-29 DIAGNOSIS — G56.01 RIGHT CARPAL TUNNEL SYNDROME: ICD-10-CM

## 2019-03-29 PROBLEM — M71.322 OTHER BURSAL CYST, LEFT ELBOW: Status: ACTIVE | Noted: 2019-03-29

## 2019-03-29 LAB — METER GLUCOSE: 100 MG/DL (ref 74–99)

## 2019-03-29 PROCEDURE — 64721 CARPAL TUNNEL SURGERY: CPT | Performed by: ORTHOPAEDIC SURGERY

## 2019-03-29 PROCEDURE — 6360000002 HC RX W HCPCS: Performed by: NURSE PRACTITIONER

## 2019-03-29 PROCEDURE — 3700000001 HC ADD 15 MINUTES (ANESTHESIA): Performed by: ORTHOPAEDIC SURGERY

## 2019-03-29 PROCEDURE — 82962 GLUCOSE BLOOD TEST: CPT

## 2019-03-29 PROCEDURE — 6370000000 HC RX 637 (ALT 250 FOR IP): Performed by: ANESTHESIOLOGY

## 2019-03-29 PROCEDURE — 2709999900 HC NON-CHARGEABLE SUPPLY: Performed by: ORTHOPAEDIC SURGERY

## 2019-03-29 PROCEDURE — 7100000010 HC PHASE II RECOVERY - FIRST 15 MIN: Performed by: ORTHOPAEDIC SURGERY

## 2019-03-29 PROCEDURE — 7100000001 HC PACU RECOVERY - ADDTL 15 MIN: Performed by: ORTHOPAEDIC SURGERY

## 2019-03-29 PROCEDURE — 2580000003 HC RX 258: Performed by: ANESTHESIOLOGY

## 2019-03-29 PROCEDURE — 7100000000 HC PACU RECOVERY - FIRST 15 MIN: Performed by: ORTHOPAEDIC SURGERY

## 2019-03-29 PROCEDURE — 64718 REVISE ULNAR NERVE AT ELBOW: CPT | Performed by: ORTHOPAEDIC SURGERY

## 2019-03-29 PROCEDURE — 3600000012 HC SURGERY LEVEL 2 ADDTL 15MIN: Performed by: ORTHOPAEDIC SURGERY

## 2019-03-29 PROCEDURE — 3600000002 HC SURGERY LEVEL 2 BASE: Performed by: ORTHOPAEDIC SURGERY

## 2019-03-29 PROCEDURE — 3700000000 HC ANESTHESIA ATTENDED CARE: Performed by: ORTHOPAEDIC SURGERY

## 2019-03-29 PROCEDURE — 7100000011 HC PHASE II RECOVERY - ADDTL 15 MIN: Performed by: ORTHOPAEDIC SURGERY

## 2019-03-29 PROCEDURE — 6360000002 HC RX W HCPCS: Performed by: NURSE ANESTHETIST, CERTIFIED REGISTERED

## 2019-03-29 RX ORDER — DEXAMETHASONE SODIUM PHOSPHATE 10 MG/ML
INJECTION, SOLUTION INTRAMUSCULAR; INTRAVENOUS PRN
Status: DISCONTINUED | OUTPATIENT
Start: 2019-03-29 | End: 2019-03-29 | Stop reason: SDUPTHER

## 2019-03-29 RX ORDER — FENTANYL CITRATE 50 UG/ML
INJECTION, SOLUTION INTRAMUSCULAR; INTRAVENOUS PRN
Status: DISCONTINUED | OUTPATIENT
Start: 2019-03-29 | End: 2019-03-29 | Stop reason: SDUPTHER

## 2019-03-29 RX ORDER — METOCLOPRAMIDE 10 MG/1
10 TABLET ORAL ONCE
Status: COMPLETED | OUTPATIENT
Start: 2019-03-29 | End: 2019-03-29

## 2019-03-29 RX ORDER — OXYCODONE HYDROCHLORIDE AND ACETAMINOPHEN 5; 325 MG/1; MG/1
2 TABLET ORAL
Status: COMPLETED | OUTPATIENT
Start: 2019-03-29 | End: 2019-03-29

## 2019-03-29 RX ORDER — CEFAZOLIN SODIUM 2 G/50ML
2 SOLUTION INTRAVENOUS ONCE
Status: COMPLETED | OUTPATIENT
Start: 2019-03-29 | End: 2019-03-29

## 2019-03-29 RX ORDER — OXYCODONE AND ACETAMINOPHEN 7.5; 325 MG/1; MG/1
1 TABLET ORAL EVERY 6 HOURS PRN
Qty: 28 TABLET | Refills: 0 | Status: SHIPPED | OUTPATIENT
Start: 2019-03-29 | End: 2019-04-04 | Stop reason: SDUPTHER

## 2019-03-29 RX ORDER — SODIUM CHLORIDE, SODIUM LACTATE, POTASSIUM CHLORIDE, CALCIUM CHLORIDE 600; 310; 30; 20 MG/100ML; MG/100ML; MG/100ML; MG/100ML
INJECTION, SOLUTION INTRAVENOUS CONTINUOUS
Status: DISCONTINUED | OUTPATIENT
Start: 2019-03-29 | End: 2019-03-29 | Stop reason: HOSPADM

## 2019-03-29 RX ORDER — ONDANSETRON 2 MG/ML
INJECTION INTRAMUSCULAR; INTRAVENOUS PRN
Status: DISCONTINUED | OUTPATIENT
Start: 2019-03-29 | End: 2019-03-29 | Stop reason: SDUPTHER

## 2019-03-29 RX ORDER — PROPOFOL 10 MG/ML
INJECTION, EMULSION INTRAVENOUS PRN
Status: DISCONTINUED | OUTPATIENT
Start: 2019-03-29 | End: 2019-03-29 | Stop reason: SDUPTHER

## 2019-03-29 RX ORDER — FAMOTIDINE 20 MG/1
20 TABLET, FILM COATED ORAL ONCE
Status: COMPLETED | OUTPATIENT
Start: 2019-03-29 | End: 2019-03-29

## 2019-03-29 RX ORDER — MIDAZOLAM HYDROCHLORIDE 1 MG/ML
INJECTION INTRAMUSCULAR; INTRAVENOUS PRN
Status: DISCONTINUED | OUTPATIENT
Start: 2019-03-29 | End: 2019-03-29 | Stop reason: SDUPTHER

## 2019-03-29 RX ADMIN — METOCLOPRAMIDE 10 MG: 10 TABLET ORAL at 06:54

## 2019-03-29 RX ADMIN — OXYCODONE AND ACETAMINOPHEN 2 TABLET: 5; 325 TABLET ORAL at 09:00

## 2019-03-29 RX ADMIN — DEXAMETHASONE SODIUM PHOSPHATE 10 MG: 10 INJECTION, SOLUTION INTRAMUSCULAR; INTRAVENOUS at 07:20

## 2019-03-29 RX ADMIN — SODIUM CHLORIDE, POTASSIUM CHLORIDE, SODIUM LACTATE AND CALCIUM CHLORIDE: 600; 310; 30; 20 INJECTION, SOLUTION INTRAVENOUS at 07:13

## 2019-03-29 RX ADMIN — PROPOFOL 200 MG: 10 INJECTION, EMULSION INTRAVENOUS at 07:16

## 2019-03-29 RX ADMIN — FAMOTIDINE 20 MG: 20 TABLET ORAL at 07:02

## 2019-03-29 RX ADMIN — ONDANSETRON HYDROCHLORIDE 4 MG: 2 INJECTION, SOLUTION INTRAMUSCULAR; INTRAVENOUS at 07:20

## 2019-03-29 RX ADMIN — CEFAZOLIN SODIUM 2 G: 2 SOLUTION INTRAVENOUS at 07:08

## 2019-03-29 RX ADMIN — SODIUM CHLORIDE, POTASSIUM CHLORIDE, SODIUM LACTATE AND CALCIUM CHLORIDE: 600; 310; 30; 20 INJECTION, SOLUTION INTRAVENOUS at 07:01

## 2019-03-29 RX ADMIN — LIDOCAINE HYDROCHLORIDE 60 MG: 20 INJECTION, SOLUTION INTRAVENOUS at 07:16

## 2019-03-29 RX ADMIN — MIDAZOLAM 2 MG: 1 INJECTION INTRAMUSCULAR; INTRAVENOUS at 07:13

## 2019-03-29 RX ADMIN — FENTANYL CITRATE 50 MCG: 50 INJECTION, SOLUTION INTRAMUSCULAR; INTRAVENOUS at 07:16

## 2019-03-29 RX ADMIN — FENTANYL CITRATE 50 MCG: 50 INJECTION, SOLUTION INTRAMUSCULAR; INTRAVENOUS at 07:25

## 2019-03-29 RX ADMIN — FENTANYL CITRATE 50 MCG: 50 INJECTION, SOLUTION INTRAMUSCULAR; INTRAVENOUS at 07:47

## 2019-03-29 RX ADMIN — FENTANYL CITRATE 50 MCG: 50 INJECTION, SOLUTION INTRAMUSCULAR; INTRAVENOUS at 07:34

## 2019-03-29 ASSESSMENT — PULMONARY FUNCTION TESTS
PIF_VALUE: 5
PIF_VALUE: 4
PIF_VALUE: 5
PIF_VALUE: 4
PIF_VALUE: 5
PIF_VALUE: 4
PIF_VALUE: 2
PIF_VALUE: 5
PIF_VALUE: 5
PIF_VALUE: 2
PIF_VALUE: 4
PIF_VALUE: 5
PIF_VALUE: 6
PIF_VALUE: 5
PIF_VALUE: 5
PIF_VALUE: 1
PIF_VALUE: 1
PIF_VALUE: 3
PIF_VALUE: 5
PIF_VALUE: 4
PIF_VALUE: 3
PIF_VALUE: 4
PIF_VALUE: 5
PIF_VALUE: 6
PIF_VALUE: 3
PIF_VALUE: 5
PIF_VALUE: 2
PIF_VALUE: 5
PIF_VALUE: 5
PIF_VALUE: 2
PIF_VALUE: 5
PIF_VALUE: 3
PIF_VALUE: 4
PIF_VALUE: 1
PIF_VALUE: 2
PIF_VALUE: 0
PIF_VALUE: 3
PIF_VALUE: 4
PIF_VALUE: 5
PIF_VALUE: 5
PIF_VALUE: 6
PIF_VALUE: 5
PIF_VALUE: 3
PIF_VALUE: 12
PIF_VALUE: 3
PIF_VALUE: 4
PIF_VALUE: 3
PIF_VALUE: 4
PIF_VALUE: 3
PIF_VALUE: 5
PIF_VALUE: 19
PIF_VALUE: 5
PIF_VALUE: 3
PIF_VALUE: 0

## 2019-03-29 ASSESSMENT — PAIN DESCRIPTION - LOCATION
LOCATION: ELBOW

## 2019-03-29 ASSESSMENT — PAIN SCALES - GENERAL
PAINLEVEL_OUTOF10: 5
PAINLEVEL_OUTOF10: 7
PAINLEVEL_OUTOF10: 7
PAINLEVEL_OUTOF10: 6
PAINLEVEL_OUTOF10: 7
PAINLEVEL_OUTOF10: 7

## 2019-03-29 ASSESSMENT — PAIN DESCRIPTION - PAIN TYPE
TYPE: CHRONIC PAIN;SURGICAL PAIN
TYPE: SURGICAL PAIN;CHRONIC PAIN
TYPE: SURGICAL PAIN;CHRONIC PAIN
TYPE: CHRONIC PAIN;SURGICAL PAIN
TYPE: SURGICAL PAIN

## 2019-03-29 ASSESSMENT — PAIN DESCRIPTION - ORIENTATION
ORIENTATION: RIGHT

## 2019-03-29 ASSESSMENT — PAIN DESCRIPTION - FREQUENCY
FREQUENCY: CONTINUOUS

## 2019-03-29 ASSESSMENT — PAIN DESCRIPTION - DESCRIPTORS
DESCRIPTORS: ACHING;BURNING;DISCOMFORT
DESCRIPTORS: ACHING;BURNING;DISCOMFORT
DESCRIPTORS: STABBING;SHARP
DESCRIPTORS: ACHING;BURNING;DISCOMFORT
DESCRIPTORS: BURNING;ACHING;DISCOMFORT
DESCRIPTORS: ACHING;BURNING;DISCOMFORT

## 2019-03-29 ASSESSMENT — PAIN - FUNCTIONAL ASSESSMENT: PAIN_FUNCTIONAL_ASSESSMENT: 0-10

## 2019-03-29 NOTE — H&P
Brother        REVIEW OF SYSTEMS:  CONSTITUTIONAL:  negative for  fevers, chills and fatigue  EYES:  negative for  double vision, blurred vision, eye discharge, visual disturbance, redness and icterus  HEENT:  negative for  hearing loss, tinnitus, ear drainage, earaches, epistaxis, sore throat and voice change  RESPIRATORY:  negative for  dyspnea, wheezing, hemoptysis, chest pain, pleuritic pain and cyanosis  CARDIOVASCULAR:  negative for  chest pain, dyspnea, palpitations, syncope  GASTROINTESTINAL:  negative for nausea, vomiting, change in bowel habits, diarrhea, constipation, abdominal pain, hematemesis and hemtochezia  GENITOURINARY:  negative for frequency, urinary incontinence and hematuria  HEMATOLOGIC/LYMPHATIC:  negative for bleeding and petechiae  MUSCULOSKELETAL:  positive for  pain  NEUROLOGICAL:  negative for headaches, dizziness, visual disturbance, weakness, syncope and near syncope  BEHAVIOR/PSYCH:  negative for agitated, increased agitation and anxiety    PHYSICAL EXAM:    VITALS:  /73   Pulse 87   Temp 98.6 °F (37 °C) Comment (Src): skin  Resp 14   Ht 5' 8\" (1.727 m)   Wt 208 lb (94.3 kg)   SpO2 96%   BMI 31.63 kg/m²   CONSTITUTIONAL:  awake, alert, cooperative, no apparent distress, and appears stated age  EYES:  Lids and lashes normal, extra ocular muscles intact, sclera clear, conjunctiva normal  ENT:  Normocephalic, without obvious abnormality, atraumatic, external ears without lesions. NECK:  Supple, symmetrical, trachea midline, no adenopathy, skin normal  LUNGS:  No increased work of breathing, symmetric expansion, good air exchange. CARDIOVASCULAR:  Regular rate and rhythm  ABDOMEN:  Soft, non-distended, non-tender, no masses palpated  NEUROLOGIC:  Awake, alert, oriented to name, place and time. Cranial nerves II-XII are grossly intact. Motor is 5 out of 5 bilaterally. Sensory is intact. Babinski down going.   MUSCULOSKELETAL:  Right upper Extremity:   · Skin intact  · Compartments soft  · 2+ Radial pulse      DATA:    CBC:   Lab Results   Component Value Date    WBC 6.9 03/15/2019    RBC 6.70 03/15/2019    HGB 15.0 03/15/2019    HCT 49.3 03/15/2019    MCV 73.6 03/15/2019    MCH 22.4 03/15/2019    MCHC 30.4 03/15/2019    RDW 16.3 03/15/2019     03/15/2019    MPV 12.2 03/15/2019     IMPRESSION:  · R CTS    PLAN:  · R CTR  · We will perform a Right carpal tunnel and cubital tunnel release with possible cyst excision. The risks and benefits were reviewed with the patient such as:  DVT, infection,  injuries to blood vessels and nerves, non relief of symptoms, continued pain, worsening of symptoms.

## 2019-03-29 NOTE — OP NOTE
syndrome. PROCEDURE: right Cubital tunnel release. ANESTHESIA: general  ESTIMATED BLOOD LOSS: Minimal.   COMPLICATIONS: None. OPERATIVE PROCEDURE: The patient was brought to the operative suite and was   given a general anesthesia. The right arm was identified with a preoperative   time out, placed a tourniquet around the right arm, prepped and draped the arm   in a sterile fashion. I outlined an incision along the medial elbow, along the area of the cubital   tunnel. I made an incision with a #15 blade through skin and subcutaneous   tissue. I dissected down to the level of the medial epicondyle and medial   elbow. I exposed the underlying nerve and felt it. I then dove down into the   cubital tunnel, right through [facility] ligament, released the ligament   proximally and distally to full extent, proximally to anisa of Elysia. I   released the complete extent of the [facility] ligament, dissected down through   into the flexor carpi ulnaris muscle belly and fascia. The nerve was   completely released. There was a small bulbous area that was noted in the   midportion of the ulnar nerve, probably in the area of the Renner ligament. I thoroughly irrigated the wound upon closure and closed the incision with   3-0 Vicryl in the subcu layer followed by 4-0 Prolene in the skin in a   horizontal mattress-type fashion, and the skin was closed. I put a sterile   dressing on the wound. The patient recovered in the recovery room without   difficulty. The patient tolerated the procedure well.

## 2019-04-04 ENCOUNTER — TELEPHONE (OUTPATIENT)
Dept: ORTHOPEDIC SURGERY | Age: 59
End: 2019-04-04

## 2019-04-04 DIAGNOSIS — G56.21 CUBITAL TUNNEL SYNDROME ON RIGHT: ICD-10-CM

## 2019-04-04 DIAGNOSIS — G56.01 RIGHT CARPAL TUNNEL SYNDROME: ICD-10-CM

## 2019-04-04 RX ORDER — OXYCODONE AND ACETAMINOPHEN 7.5; 325 MG/1; MG/1
1 TABLET ORAL EVERY 6 HOURS PRN
Qty: 28 TABLET | Refills: 0 | Status: SHIPPED | OUTPATIENT
Start: 2019-04-04 | End: 2019-04-12 | Stop reason: SDUPTHER

## 2019-04-12 ENCOUNTER — OFFICE VISIT (OUTPATIENT)
Dept: ORTHOPEDIC SURGERY | Age: 59
End: 2019-04-12

## 2019-04-12 VITALS — TEMPERATURE: 98 F | WEIGHT: 208 LBS | HEIGHT: 68 IN | BODY MASS INDEX: 31.52 KG/M2

## 2019-04-12 DIAGNOSIS — G56.21 CUBITAL TUNNEL SYNDROME ON RIGHT: ICD-10-CM

## 2019-04-12 DIAGNOSIS — G56.01 RIGHT CARPAL TUNNEL SYNDROME: ICD-10-CM

## 2019-04-12 PROCEDURE — 99024 POSTOP FOLLOW-UP VISIT: CPT | Performed by: NURSE PRACTITIONER

## 2019-04-12 RX ORDER — OXYCODONE AND ACETAMINOPHEN 7.5; 325 MG/1; MG/1
1 TABLET ORAL EVERY 6 HOURS PRN
Qty: 28 TABLET | Refills: 0 | Status: SHIPPED | OUTPATIENT
Start: 2019-04-12 | End: 2019-04-19 | Stop reason: SDUPTHER

## 2019-04-16 ENCOUNTER — EVALUATION (OUTPATIENT)
Dept: OCCUPATIONAL THERAPY | Age: 59
End: 2019-04-16
Payer: MEDICAID

## 2019-04-16 DIAGNOSIS — G56.21 CUBITAL TUNNEL SYNDROME ON RIGHT: Primary | ICD-10-CM

## 2019-04-16 DIAGNOSIS — G56.01 RIGHT CARPAL TUNNEL SYNDROME: ICD-10-CM

## 2019-04-16 PROCEDURE — 97165 OT EVAL LOW COMPLEX 30 MIN: CPT | Performed by: OCCUPATIONAL THERAPIST

## 2019-04-16 NOTE — PROGRESS NOTES
OCCUPATIONAL THERAPY INITIAL EVALUATION    Phone: [phone]  Fax: [phone]     Date:  2019  Initial Evaluation Date: 19    Patient Name:  Trey Willoughby    :  1960    Restrictions/Precautions: Activity as tolerated, Low fall risk  Diagnosis:  R CTS/ R cubital tunnel       Insurance/Certification information:  Sadia Lax  Referring Physician:  Dr Justyn Parham  Date of Surgery/Injury: surgery 3-29-19  Plan of care signed (Y/N):  N  Visit# / total visits:     Past Medical History:   Past Medical History:   Diagnosis Date    Arthritis     Back pain     5th finger & ring finger on right hand is numb    Bell's palsy     NEW ONSET 3-     Hyperlipidemia     Hypertension     Neuropathy     toes numb    Poorly controlled type 2 diabetes mellitus with neuropathy ([facility] [address] 2018    Sacral radiculitis 2019    Spondylosis of lumbar spine 3/9/2019    Advanced arthritis and degenerative disc disease by MRI 3/2019    Ventral hernia      Past Surgical History:   Past Surgical History:   Procedure Laterality Date    CARPAL TUNNEL RELEASE Right 2019    right wrist carpal tunnel release and right elbow cubital tunnel release    CARPAL TUNNEL RELEASE Right 3/29/2019    RIGHT WRIST CARPAL TUNNEL RELEASE AND RIGHT ELBOW CUBITAL TUNNEL RELEASE performed by Maddy Weaver DO at [facility]      at age 40 polyps    CYST REMOVAL Right     HERNIA REPAIR Bilateral     groin and umbilical    HERNIA REPAIR N/A 2018    ROBOTIC ASSISTED LAPAROSCOPIC PRIMARY REPAIR OF RECURRENT VENTRAL HERNIA  REPAIR performed by Alexei Saeed MD at [address] [facility]  2018    C5-7 fusion at [facility] in [place]       Reason for Referral: Patient presents for outpatient Occupational Therapy with a history of pain and CT Sx in the right wrist/ hand and elbow. Pt required R CTR and R cubital tunnel release on 3-29-19.  His main complaints today include pain in the elbow/ wrist and decrease ease in using his arm for daily activities. Comments: Incision to the inner elbow is red on the scar line with moderate swelling. No active signs of infection are present. Incision over the CT is very tight in the surrounding tissues, although it is well healed. Pt was provided with an elbow protector during evaluation to protect the tender inner elbow. Home Living: Lives with his brother in a townhouse  Prior Level of Function: Independent  IADL History  Homemaking Responsibility: shared  Shopping Responsibility: shared  Mode of Transportation: car  Leisure & Hobbies: time with dogs  Work: pt has applied for temporary disability    Pain Level: 7-8 on scale of 1-10, with elbow movement and 5-6/10 with wrist/ hand movement    Cognition:   Alert/Oriented x3     ADL  Feeding: I  Grooming: Mod I  Bathing: Mod I  UE Dressing: Mod I  LE Dressing: Mod I  Toileting: Mod I  Transfer:  I  Comments: Pt reports increased time and effort to complete self care and light homemaking tasks. Pt states he often overuses the non-effected arm. Pt has yet to resume moderate to heavy activity. UE Assessment: RHD    R UE AROM: Exceptions to WNL  Comments: All AROM is WFL but painful. In addition , tendon glides are tight. R UE strength: Exceptions to WNL  Wrist 3+/5  Elbow 3+/5    Sensation: Mild numbness is present in the ulnar right hand. Light touch to the area is affected but other sensation is [facility]. Dynamometer (setting 2):         Right: 10#    Pinch Meter:   Lateral: Right= 3.5#    Tip: Right= 3#    Coordination: WNL with no acute deficits    Assessment of current deficits   Functional mobility []  ADLs [x] Strength [x]  Cognition []  Functional transfers  [] IADLs [x] Safety Awareness []  Endurance []  Fine Motor Coordination [] Balance [] Vision/perception [] Sensation [x]   Gross Motor Coordination [] ROM []  Work []  Leisure[]     Eval Complexity: Low  Profile and History- Patient to report decreased pain in their affected right elbow/ wrist upper from 5-8/10 with AROM  to 3/10 or less with resistive functional use. 5) Patient will demonstrate a non-tender/non-adherent scar. 6) Patient will report ADL/ IADL functions same as prior to diagnosis of CT/ cubital tunnel. Patient. Education:  [x] Plans/Goals, Risks/Benefits discussed  [] Home exercise program  Method of Education: [x] Verbal  [] Demo  [] Written  Comprehension of Education:  [x] Verbalizes understanding. [] Demonstrates understanding. [] Needs Review. [] Demonstrates/verbalizes understanding of HEP/Ed previously given. Patient understands diagnosis/prognosis and consents to treatment, plan and goals: [x] Yes    [] No         Electronically signed by: Leena Horne OT/FAMILIA   [id number]      [id number] Certification / Comments     Frequency/Duration 2 / week for 8 visits. Certification period From: 4-16-19  To: 5-16-19    I have reviewed the Plan of Care established for skilled therapy services and certify that the services are required and that they will be provided while the patient is under my care. Physician's Comments/Revisions:         Physician's Printed Name:           Dr Yasmine Gallego                                Physician's Signature:                                                               Date:       Please review Patient's OT evaluation and if you agree sign/date and fax back to us at our [facility] fax # [phone].

## 2019-04-17 ENCOUNTER — EVALUATION (OUTPATIENT)
Dept: PHYSICAL THERAPY | Age: 59
End: 2019-04-17
Payer: MEDICAID

## 2019-04-17 DIAGNOSIS — G89.29 CHRONIC BILATERAL LOW BACK PAIN WITH RIGHT-SIDED SCIATICA: Primary | ICD-10-CM

## 2019-04-17 DIAGNOSIS — M54.18 SACRAL RADICULITIS: ICD-10-CM

## 2019-04-17 DIAGNOSIS — M54.41 CHRONIC BILATERAL LOW BACK PAIN WITH RIGHT-SIDED SCIATICA: Primary | ICD-10-CM

## 2019-04-17 PROCEDURE — 97163 PT EVAL HIGH COMPLEX 45 MIN: CPT | Performed by: PHYSICAL THERAPIST

## 2019-04-17 NOTE — PROGRESS NOTES
[facility] OUTPATIENT REHABILITATION  PHYSICAL THERAPY INITIAL EVALUATION         Date:  2019   Patient: Maida Rice  : 1960  MRN: [id number]  Referring Provider: Cora Garcia DO   [place] [id number] [facility]     Medical Diagnosis:   M54.41, G89.29 (ICD-10-CM) - Chronic bilateral low back pain with right-sided sciatica   M54.17 (ICD-10-CM) - Sacral radiculitis       SUBJECTIVE:     Onset date: >10 years became significantly worse 2018    Onset: Insidious onset    Previous PT: yes - helped    Medical Management for Current Problem: medications, injections, epidurals, chiropractic    Chief complaint: pain and decreased motion    Behavior: condition is staying the same    Pain: intermittent  Current: 8/10     Best: 5/10     Worst:9/10    Symptom Type/Quality: sharp, burning, stabbing,   Location[de-identified] Back: lumbar region and right lateral side radiates down the anterior right and left leg         Provoking Activities/Positions: shopping for groceries over 1 hour anterior thighs hurt badly                 Relieving Activitie/Positions: sitting, ice, heat in mornings    Disturbed Sleep: no  Bladder Dysfunction: no  Bowel Dysfunction: no     Imaging results:   Multilevel advanced arthritis and degenerative disc disease seen   throughout the lumbar spine with neuroforaminal narrowing most of   which appeared not to affect the exiting nerve roots although some   levels are equivocal and should be correlated with the side level   radiculopathy.          Past Medical History:  Past Medical History:   Diagnosis Date    Arthritis     Back pain     5th finger & ring finger on right hand is numb    Bell's palsy     NEW ONSET 3-     Hyperlipidemia     Hypertension     Neuropathy     toes numb    Poorly controlled type 2 diabetes mellitus with neuropathy ([facility] [address] 2018    Sacral radiculitis 2019    Spondylosis of lumbar spine 3/9/2019 Advanced arthritis and degenerative disc disease by MRI 3/2019    Ventral hernia      Past Surgical History:   Procedure Laterality Date    CARPAL TUNNEL RELEASE Right 03/29/2019    right wrist carpal tunnel release and right elbow cubital tunnel release    CARPAL TUNNEL RELEASE Right 3/29/2019    RIGHT WRIST CARPAL TUNNEL RELEASE AND RIGHT ELBOW CUBITAL TUNNEL RELEASE performed by Luiza Hodge DO at [facility]      at age 40 polyps    CYST REMOVAL Right     HERNIA REPAIR Bilateral     groin and umbilical    HERNIA REPAIR N/A 12/13/2018    ROBOTIC ASSISTED LAPAROSCOPIC PRIMARY REPAIR OF RECURRENT VENTRAL HERNIA  REPAIR performed by Dawn Whitten MD at [address] [facility]  06/05/2018    C5-7 fusion at [facility] in [facility] [employer]       Medications:   Current Outpatient Medications   Medication Sig Dispense Refill    oxyCODONE-acetaminophen (PERCOCET) 7.5-325 MG per tablet Take 1 tablet by mouth every 6 hours as needed for Pain for up to 7 days. Intended supply: 7 days 28 tablet 0    Ertugliflozin L-PyroglutamicAc (STEGLATRO) 5 MG TABS take 1 tablet by mouth once daily 30 tablet 5    atorvastatin (LIPITOR) 10 MG tablet take 1 tablet by mouth once daily 90 tablet 1    diclofenac (VOLTAREN) 50 MG EC tablet Take 1 tablet by mouth 2 times daily as needed for Pain 60 tablet 2    fluticasone (FLONASE) 50 MCG/ACT nasal spray instill 2 sprays into each nostril once daily (Patient taking differently: instill 2 sprays into each nostril once daily PRN) 32 g 2    TRUE METRIX BLOOD GLUCOSE TEST strip TEST 3 TIMES A WEEK 50 strip 5    losartan-hydrochlorothiazide (HYZAAR) 100-12.5 MG per tablet take 1 tablet by mouth once daily 30 tablet 2    glipiZIDE (GLUCOTROL XL) 10 MG extended release tablet Take 1 tablet by mouth daily 90 tablet 1     No current facility-administered medications for this visit. Occupation: hasn't worked since 2017, construction.  .  Status: applied for disability states he applied for temporary disability    Exercise regimen: none    Hobbies:      Patient Goals: get back to normal life    Contraindications/Precautions: recent surgery, R cubital tunnel release and carpal tunnel     OBJECTIVE:     Inspection:  Standing    Cervical: Forward Head   [] Normal   [x]Mild   [] Moderate   []Severe      Thoracic:         [x] Normal   [] Increased Kyphosis  [] Decreased Kyphosis    Lumbar:   [] Normal   [] Increased Lordosis   [x] Decreased Lordosis           Observations: well nourished male, anxious affect    Gait: antalgic, altered with short step length, width, height    Functional Strength:   Able to toe walk, heel walk, and squat. Range of Motion:    Trunk:   Flexion:  [] Normal   [x] Limited 25%   Extension:  [] Normal   [x] Limited 50%    Right Rotation: [] Normal   [x] Limited 50%   Left Rotation:  [] Normal   [x] Limited 50%   Right Side Bending: [] Normal   [x] Limited 25%   Left Side Bending: [] Normal   [x] Limited 25%    Lower Extremity:   Right:   [x] Normal   [] Limited    Left:   [x] Normal   [] Limited       Strength:     Trunk: 4/5 pain   R LE: 5/5   L LE: 5/5    Palpation: Tender to palpation R LB paraspinal muscles     Sensation: decresaed to light touch R lateral lower leg    Special Tests:   [x] Nerve Root Compression           Right []+ / [x] -    Left []+ / [x] -  [x] Slump           Right []+ / [x] -    Left []+ / [x] -  [x] FADIR          Right []+ / [x] -    Left []+ / [x] -  [] S-I Distraction          Right []+ / [] -    Left []+ / [] -     [x] SLR  R pain about R greater trochanter           Right []+ / [x] -    Left []+ / [x] -     [x] TERRELL          Right []+ / [x] -    Left []+ / [x] -  [] S-I Compression          Right []+ / [] -    Left []+ / [] -   [] Other: []+ / [] -       Special Test Comments: none    ASSESSMENT     Outcome Measure: PT G-Codes  Functional Assessment Tool Used: Oswestry  Score: 66%    Problems:   1.  Pain reported 4-6/10  2. ROM decreased   3. Strength decreased    [x] There are no barriers affecting plan of care or recovery    [] Barriers to this patient's plan of care or recovery include. Domestic Concerns:  [x] No  [] Yes:    Short Term goals (2-3 weeks)  1. Decrease reported pain to 0-5/10  2. Increase ROM to limited 0-25%  3. Increase Strength to 4+/5   4. Oswestry Low Back Disability Questionnaire 45% disability    Long Term goals (4-6 weeks)  1. Decrease reported pain to 0-3/10  2. Increase ROM to WNL w/o pain  3. Increase Strength to 5-/5   4. Oswestry Low Back Disability Questionnaire 25% disability   5. Independent with Home Exercise Programs    Rehab Potential: [x] Good  [] Fair  [] Poor    PLAN       Treatment Plan:  [x] Therapeutic Exercise  [x] Therapeutic Activity  [x] Neuromuscular Re-education   [] Gait Training  [] Balance Training  [] Aerobic conditioning  [] Manual Therapy  [] Massage/Fascial release   [] Work/Sport specific activities    [] Pain Neuroscience [x] Cold/hotpack  [] Vasocompression  [x] Electrical Stimulation  [] Lumbar/Cervical Traction  [] Ultrasound   [] Iontophoresis: 4 mg/mL Dexamethasone Sodium Phosphate 40-80 mAmin        [x] Instruction in HEP      []  Medication allergies reviewed for use of Dexamethasone Sodium Phosphate 4mg/ml  with iontophoresis treatments. Patient is not allergic. The following CPT codes are likely to be used in the care of this patient: 74054 PT Evaluation: High Complexity , 76795 Therapeutic Exercise , 76955 Therapeutic Activities , 50790 Electrical Stimulation (Unattended)       Suggested Professional Referral: [x] No  [] Yes:     Patient Education:  [x] Plans/Goals, Risks/Benefits discussed  [x] Home exercise program  Method of Education: [x] Verbal  [x] Demo  [x] Written  Comprehension of Education:  [x] Verbalizes understanding. [x] Demonstrates understanding. [] Needs Review.   [] Demonstrates/verbalizes understanding of HEP/Ed previously given.    Frequency:  1-2 days per week for 4-6 weeks    Patient understands diagnosis/prognosis and consents to treatment, plan and goals: [x] Yes    [] No     Thank you for the opportunity to work with your patient. If you have questions or comments, please contact me at [phone]; fax: [phone]. Electronically signed by: Frankie Keen PT         Please sign Physician's Certification and return to: [place] [address]  Dept: [phone]  Dept Fax: [phone] Certification / Comments     Frequency/Duration 1-2 days per week for 4-6 weeks. Certification period from 4/17/2019  To 7-10-19. I have reviewed the Plan of Care established for skilled therapy services and certify that the services are required and that they will be provided while the patient is under my care.     Physician's Comments/Revisions:               Physician's Printed Name:                                           [de-identified] Signature:                                                               Date:

## 2019-04-18 ENCOUNTER — TELEPHONE (OUTPATIENT)
Dept: ORTHOPEDIC SURGERY | Age: 59
End: 2019-04-18

## 2019-04-18 DIAGNOSIS — G56.21 CUBITAL TUNNEL SYNDROME ON RIGHT: ICD-10-CM

## 2019-04-18 DIAGNOSIS — G56.01 RIGHT CARPAL TUNNEL SYNDROME: ICD-10-CM

## 2019-04-18 NOTE — TELEPHONE ENCOUNTER
Patient called in requesting a refill of    oxyCODONE-acetaminophen (PERCOCET) 7.5-325 MG per tablet         [facility] [address] [place] [address] [address] [place] [address] [phone] - F [phone]

## 2019-04-19 PROBLEM — M54.18 SACRAL RADICULITIS: Status: ACTIVE | Noted: 2019-04-19

## 2019-04-19 RX ORDER — OXYCODONE AND ACETAMINOPHEN 7.5; 325 MG/1; MG/1
1 TABLET ORAL EVERY 8 HOURS PRN
Qty: 21 TABLET | Refills: 0 | Status: SHIPPED | OUTPATIENT
Start: 2019-04-19 | End: 2019-04-26 | Stop reason: SDUPTHER

## 2019-04-22 ENCOUNTER — TREATMENT (OUTPATIENT)
Dept: PHYSICAL THERAPY | Age: 59
End: 2019-04-22
Payer: MEDICAID

## 2019-04-22 ENCOUNTER — TREATMENT (OUTPATIENT)
Dept: OCCUPATIONAL THERAPY | Age: 59
End: 2019-04-22
Payer: MEDICAID

## 2019-04-22 DIAGNOSIS — G56.01 RIGHT CARPAL TUNNEL SYNDROME: ICD-10-CM

## 2019-04-22 DIAGNOSIS — G56.21 CUBITAL TUNNEL SYNDROME ON RIGHT: Primary | ICD-10-CM

## 2019-04-22 DIAGNOSIS — M54.41 CHRONIC BILATERAL LOW BACK PAIN WITH RIGHT-SIDED SCIATICA: Primary | ICD-10-CM

## 2019-04-22 DIAGNOSIS — G89.29 CHRONIC BILATERAL LOW BACK PAIN WITH RIGHT-SIDED SCIATICA: Primary | ICD-10-CM

## 2019-04-22 PROCEDURE — 97110 THERAPEUTIC EXERCISES: CPT

## 2019-04-22 PROCEDURE — 97530 THERAPEUTIC ACTIVITIES: CPT | Performed by: OCCUPATIONAL THERAPIST

## 2019-04-22 PROCEDURE — 97140 MANUAL THERAPY 1/> REGIONS: CPT | Performed by: OCCUPATIONAL THERAPIST

## 2019-04-22 PROCEDURE — 97014 ELECTRIC STIMULATION THERAPY: CPT

## 2019-04-22 NOTE — PROGRESS NOTES
Physical Therapy Treatment Note    Date: 2019  Patient Name: Nicole Bearden  : 1960   MRN: [id number]  DOInjury: 2018 became significantly worse, has had pain for years  DOSx: none  Referring Provider: Rosanne Messer DO   [facility],  [facility]          Medical Diagnosis:   M54.41, G89.29 (ICD-10-CM) - Chronic bilateral low back pain with right-sided sciatica   M54.17 (ICD-10-CM) - Sacral radiculitis     Outcome Measure:       S: pt reports continued soreness                                      Pt also seeing OT for R wrist/elbow  O:  Time 0930-1030am           Visit -  Repeat outcome measure at mid point and end. Pain 7-8 /10     ROM      Modalities      MH + ES X 20 min seated           Pre ex's  MO         Exercise      ALL EXERCISE DONE WITH DRAW-IN TECHNIQUE                            Functional activities To aid in reaching , pushing, pulling tasks at home     ROWS: H  \"    ROWS: M  \"    ROWS: L  \"    Obliques - high  \"    Obliques - low  \"     THEREX           Nustep   L 5 x 10 min  xx TE   Seated trunk flexion 3 X 10 secs  Xx 3 x 10 sec TE   Trunk rotation X 20 reps  Xx 20 reps TE         Marching            Trunk ext TB      Trunk flex TB      Hip abd      Hip EXT      TG Squats                  A:  Tolerated well. Above added to written HEP. P: Continue with rehab plan  Oj Briscoe PTA  Short Term goals (2-3 weeks)  1. Decrease reported pain to 0-5/10  2. Increase ROM to limited 0-25%  3. Increase Strength to 4+/5   4. Oswestry Low Back Disability Questionnaire 45% disability     Long Term goals (4-6 weeks)  1. Decrease reported pain to 0-3/10  2. Increase ROM to WNL w/o pain  3. Increase Strength to 5-/5   4. Oswestry Low Back Disability Questionnaire 25% disability   5.  Independent with Home Exercise Programs      Treatment Charges: Mins Units   Initial Evaluation     Re-Evaluation     Ther Exercise         TE 35 2   Manual Therapy     MT Ther Activities        TA     Gait Training          GT     Neuro Re-education NR     Modalities ES x 20 min 1   Non-Billable Service Time     Other     Total Time/Units 55 2

## 2019-04-22 NOTE — PROGRESS NOTES
OCCUPATIONAL THERAPY PROGRESS NOTE    Date:  2019                         Initial Evaluation Date: 2019    Patient Name:  Marilee Barone    :  1960    Restrictions/Precautions: Activity as tolerated, Low fall risk  Diagnosis:  R CTS/ R cubital tunnel                                                   Insurance/Certification information:  Renuka Walker  Referring Physician:  Dr Radha Dimas  Date of Surgery/Injury: surgery 3-29-19  Plan of care signed (Y/N):  N  Visit# / total visits:      Pain Level:  Upon entering clinic pain 8/10 - more in the elbow. Achy, hypersensative in elbow scar area. Subjective: \" This elbow pad is really helping. I still can't touch my elbow scar. \"     Objective:  Updated POC to be completed by visit number number 8. INTERVENTION: COMPLETED: SPECIFICS/COMMENTS:   Modality:     MHP x Pre and post trment to decrease pain         AROM:     Tendon gliding X - R hand 10 rep's    Blue ball stretch x Tabletop with elbow ext./wrist flexion and elbow flex /wrist ext. Cuauhtemoc Haagensen:               PROM/Stretching:     R wrist and elbow all planes x Wrist ext stretch with thumb ext stretch        Scar Mass/Edema Control:     Scar and retrograde mass. x Both scar areas - gentle to elbow scar. Strengthening:               Other:     median nerve glide X- 5 rep;s           Assessment/Comments: Pt is making Good progress toward stated plan of care. Pt will continue to wear the elbow sleeve - to try doing gentle scar massage also.  Pain decreased to 4-5/10 by end of session.  -Rehab Potential: Good  -Requires OT Follow Up: Yes  Time In: 10:30 am            Time Out:  11:30 am             Visit #: 2    Treatment Charges: Mins Units   Modalities     Ther Exercise     Manual Therapy 20 1   Thera Activities 30 \2   ADL/Home Mgt      Neuro Re-education     Gait Training     Group Therapy     Non-Billable Service Time MHP 10 0   Other     Total Time/Units 60 4       -Response to Treatment: Pt was happy that his arm pain had decreased by the end of the session. Will continue to update program on a daily basis as he tolerates. Goals: Goals for pt can be see on initial eval occurring on 4/16/2019    Plan:   [x]  Continues Plan of care: Treatment covered based on POC and graduated to patient's progress. Pt education continues at each visit to obtain maximum benefits from skilled OT intervention.   []  Alter Plan of care:   []  Discharge:      Keven Stanton  OT/L, CHT

## 2019-04-25 ENCOUNTER — TREATMENT (OUTPATIENT)
Dept: OCCUPATIONAL THERAPY | Age: 59
End: 2019-04-25
Payer: MEDICAID

## 2019-04-25 ENCOUNTER — TREATMENT (OUTPATIENT)
Dept: PHYSICAL THERAPY | Age: 59
End: 2019-04-25
Payer: MEDICAID

## 2019-04-25 DIAGNOSIS — G56.21 CUBITAL TUNNEL SYNDROME ON RIGHT: Primary | ICD-10-CM

## 2019-04-25 DIAGNOSIS — G56.01 RIGHT CARPAL TUNNEL SYNDROME: ICD-10-CM

## 2019-04-25 DIAGNOSIS — M54.41 CHRONIC BILATERAL LOW BACK PAIN WITH RIGHT-SIDED SCIATICA: Primary | ICD-10-CM

## 2019-04-25 DIAGNOSIS — G89.29 CHRONIC BILATERAL LOW BACK PAIN WITH RIGHT-SIDED SCIATICA: Primary | ICD-10-CM

## 2019-04-25 PROCEDURE — 97530 THERAPEUTIC ACTIVITIES: CPT | Performed by: OCCUPATIONAL THERAPIST

## 2019-04-25 PROCEDURE — 97530 THERAPEUTIC ACTIVITIES: CPT

## 2019-04-25 PROCEDURE — 97022 WHIRLPOOL THERAPY: CPT | Performed by: OCCUPATIONAL THERAPIST

## 2019-04-25 PROCEDURE — 97014 ELECTRIC STIMULATION THERAPY: CPT

## 2019-04-25 PROCEDURE — 97110 THERAPEUTIC EXERCISES: CPT

## 2019-04-25 PROCEDURE — 97140 MANUAL THERAPY 1/> REGIONS: CPT | Performed by: OCCUPATIONAL THERAPIST

## 2019-04-25 NOTE — PROGRESS NOTES
Physical Therapy Treatment Note    Date: 2019  Patient Name: Saima Ruiz  : 1960   MRN: [id number]  DOInjury: 2018 became significantly worse, has had pain for years  DOSx: none  Referring Provider: Naheed Nur DO   [facility],  [facility]          Medical Diagnosis:   M54.41, G89.29 (ICD-10-CM) - Chronic bilateral low back pain with right-sided sciatica   M54.17 (ICD-10-CM) - Sacral radiculitis     Outcome Measure:       S: pt reports continued soreness                                      Pt also seeing OT for R wrist/elbow  O:  Time 3567-1664  am           Visit -  Repeat outcome measure at mid point and end. Pain -8 /10     ROM      Modalities      MH + ES X 15 min seated           Pre ex's  MO         Exercise      ALL EXERCISE DONE WITH DRAW-IN TECHNIQUE                            Functional activities To aid in reaching , pushing, pulling tasks at home     ROWS: H  unable due to R arm  \" TA   ROWS: M \" \" TA   ROWS: L \" \" TA   Obliques - high  \"    Obliques - low  \"     THEREX           Nustep   L 5 x 10 min   using L arm only  TE         Seated trunk flexion 3 X 10 secs   TE   Trunk rotation X 20 reps   TE         Marching                  Trunk ext TB      Trunk flex TB      Hip abd      Hip EXT      TG Squats                  A:  Tolerated well. However, limited for ex's due to R arm. Above added to written HEP. P: Continue with rehab plan  [place] Saunders PTA  Short Term goals (2-3 weeks)  1. Decrease reported pain to 0-5/10  2. Increase ROM to limited 0-25%  3. Increase Strength to 4+/5   4. Oswestry Low Back Disability Questionnaire 45% disability     Long Term goals (4-6 weeks)  1. Decrease reported pain to 0-3/10  2. Increase ROM to WNL w/o pain  3. Increase Strength to 5-/5   4. Oswestry Low Back Disability Questionnaire 25% disability   5.  Independent with Home Exercise Programs      Treatment Charges: Mins Units   Initial Evaluation Re-Evaluation     Ther Exercise         TE 20 1   Manual Therapy     MT     Ther Activities        TA 20 1   Gait Training          GT     Neuro Re-education NR     Modalities ES x 15 min 1   Non-Billable Service Time     Other     Total Time/Units 55 3

## 2019-04-25 NOTE — PROGRESS NOTES
to wear the elbow sleeve -. Pain decreased to 4-5/10 by end of session.  -Rehab Potential: Good  -Requires OT Follow Up: Yes  Time In: 10:30 am            Time Out:  11:30 am             Visit #: 3    Treatment Charges: Mins Units   Modalities-fluiother 10 1   Ther Exercise     Manual Therapy 20 1   Thera Activities 30 2   ADL/Home Mgt      Neuro Re-education     Gait Training     Group Therapy     Non-Billable Service Time MHP     Other     Total Time/Units 60 4       -Response to Treatment: Pt was happy that his arm pain had decreased by the end of the session. Pt continues to c/o pain in his  Elbow scar area and thenar area - therapist assured his this was not unusual. Will continue to update program on a daily basis as he tolerates. Goals: Goals for pt can be see on initial eval occurring on 4/16/2019    Plan:   [x]  Continues Plan of care: Treatment covered based on POC and graduated to patient's progress. Pt education continues at each visit to obtain maximum benefits from skilled OT intervention.   []  Alter Plan of care:   []  Discharge:      March Erik  OT/L, CHT   OT 82

## 2019-04-26 DIAGNOSIS — G56.21 CUBITAL TUNNEL SYNDROME ON RIGHT: ICD-10-CM

## 2019-04-26 DIAGNOSIS — G56.01 RIGHT CARPAL TUNNEL SYNDROME: ICD-10-CM

## 2019-04-26 RX ORDER — OXYCODONE AND ACETAMINOPHEN 7.5; 325 MG/1; MG/1
1 TABLET ORAL EVERY 8 HOURS PRN
Qty: 21 TABLET | Refills: 0 | Status: SHIPPED | OUTPATIENT
Start: 2019-04-26 | End: 2019-05-03

## 2019-05-02 ENCOUNTER — TREATMENT (OUTPATIENT)
Dept: OCCUPATIONAL THERAPY | Age: 59
End: 2019-05-02
Payer: MEDICAID

## 2019-05-02 ENCOUNTER — TREATMENT (OUTPATIENT)
Dept: PHYSICAL THERAPY | Age: 59
End: 2019-05-02
Payer: MEDICAID

## 2019-05-02 DIAGNOSIS — M54.41 CHRONIC BILATERAL LOW BACK PAIN WITH RIGHT-SIDED SCIATICA: Primary | ICD-10-CM

## 2019-05-02 DIAGNOSIS — G56.01 RIGHT CARPAL TUNNEL SYNDROME: ICD-10-CM

## 2019-05-02 DIAGNOSIS — G56.21 CUBITAL TUNNEL SYNDROME ON RIGHT: Primary | ICD-10-CM

## 2019-05-02 DIAGNOSIS — G89.29 CHRONIC BILATERAL LOW BACK PAIN WITH RIGHT-SIDED SCIATICA: Primary | ICD-10-CM

## 2019-05-02 PROCEDURE — 97022 WHIRLPOOL THERAPY: CPT | Performed by: OCCUPATIONAL THERAPIST

## 2019-05-02 PROCEDURE — 97530 THERAPEUTIC ACTIVITIES: CPT | Performed by: OCCUPATIONAL THERAPIST

## 2019-05-02 PROCEDURE — 97014 ELECTRIC STIMULATION THERAPY: CPT

## 2019-05-02 PROCEDURE — 97110 THERAPEUTIC EXERCISES: CPT

## 2019-05-02 PROCEDURE — 97140 MANUAL THERAPY 1/> REGIONS: CPT | Performed by: OCCUPATIONAL THERAPIST

## 2019-05-02 NOTE — PROGRESS NOTES
Physical Therapy Treatment Note    Date: 2019  Patient Name: Loyda Latham  : 1960   MRN: [id number]  DOInjury: 2018 became significantly worse, has had pain for years  DOSx: none  Referring Provider: DO Sarai   [facility],  [facility]          Medical Diagnosis:   M54.41, G89.29 (ICD-10-CM) - Chronic bilateral low back pain with right-sided sciatica   M54.17 (ICD-10-CM) - Sacral radiculitis     Outcome Measure:       S: pt reports continued soreness along with joint pain today due to arthritis . Pt also seeing OT for R wrist/elbow  O:  Time 6169-0122  am           Visit -  Repeat outcome measure at mid point and end. Pain 7-8 /10     ROM      Modalities      MH + ES X 20 min seated           Pre ex's  MO         Exercise      ALL EXERCISE DONE WITH DRAW-IN TECHNIQUE                            Functional activities To aid in reaching , pushing, pulling tasks at home     ROWS: H  unable due to R arm  \" TA   ROWS: M \" \" TA   ROWS: L \" \" TA   Obliques - high  \"    Obliques - low  \"     THEREX           Nustep   L 5 x 15 min   using L arm only  TE         Seated trunk flexion 3 X 10 secs   TE   Trunk rotation X 20 reps   TE         Marching                  Trunk ext TB      Trunk flex TB      Hip abd      Hip EXT      TG Squats                  A:  Tolerated well. Above added to written HEP. P: Continue with rehab plan  Chhaya Solitario PTA  Short Term goals (2-3 weeks)  1. Decrease reported pain to 0-5/10  2. Increase ROM to limited 0-25%  3. Increase Strength to 4+/5   4. Oswestry Low Back Disability Questionnaire 45% disability     Long Term goals (4-6 weeks)  1. Decrease reported pain to 0-3/10  2. Increase ROM to WNL w/o pain  3. Increase Strength to 5-/5   4. Oswestry Low Back Disability Questionnaire 25% disability   5.  Independent with Home Exercise Programs      Treatment Charges: Mins Units   Initial Evaluation     Re-Evaluation     Ther Exercise         TE     Manual Therapy     MT     Ther Activities        TA 35 2   Gait Training          GT     Neuro Re-education NR     Modalities ES x 20 min 1   Non-Billable Service Time     Other     Total Time/Units 55 3

## 2019-05-02 NOTE — PROGRESS NOTES
OCCUPATIONAL THERAPY PROGRESS NOTE    Date:  2019                         Initial Evaluation Date: 2019    Patient Name:  Dexter Plan    :  1960    Restrictions/Precautions: Activity as tolerated, Low fall risk  Diagnosis:  R CTS/ R cubital tunnel                                                   Insurance/Certification information:  Stu Sultana  Referring Physician:  Dr Asad Dimas  Date of Surgery/Injury: surgery 3-29-19  Plan of care signed (Y/N):  N  Visit# / total visits:      Pain Level:  Upon entering clinic pain 7 /10 - more in the elbow. Achy, hypersensative in elbow scar area. Subjective: Pt presents with no new complaints. Objective:  Updated POC to be completed by visit number number 8. INTERVENTION: COMPLETED: SPECIFICS/COMMENTS:   Modality:     MHP  Pre and post trment to decrease pain    fluiotherapy X 10 min. For soft tissue warm- up R wrist/forearm while intermittently moving his wrist/ fingers   AROM:     Tendon gliding X - R hand 10 rep's    Blue ball stretch X - 10 rep's ea Did away from table today-both arms . Did 5 elbow ext - wrist flex. ext  And elbow flex  And then elbow ext with forearm rotation and elbow flexion. Hold wrist and forearm stretch 10 sec   Wrist er ciser X - 5 min. Some cramping in the hand at ex end   AAROM:     UBE X - 4 min Standing 2 min forward and 2 min back - no ^  In pain        PROM/Stretching:     R wrist and elbow all planes x Wrist ext stretch with thumb ext stretch        Scar Mass/Edema Control:     Scar and retrograde mass. x CT area scar  -.massage to thenar area with thumb extension    Blue ball roll elbow scar  Roll onto elbow scar - pt able to control pressure so dorita better then therapist massaging. Strengthening:               Other:     median nerve glide X- 5 rep;s           Assessment/Comments: Pt is making Good progress toward stated plan of care.  Pt will continue to wear the elbow sleeve -. Pain decreased to 4-5/10 by end of session.  -Rehab Potential: Good  -Requires OT Follow Up: Yes  Time In: 10:30 am            Time Out:  11:30 am             Visit #: 4    Treatment Charges: Mins Units   Modalities-fluiother 10 1   Ther Exercise     Manual Therapy 20 1   Thera Activities 30 2   ADL/Home Mgt      Neuro Re-education     Gait Training     Group Therapy     Non-Billable Service Time MHP     Other     Total Time/Units 60 4       -Response to Treatment: Slow progress continues. Goals: Goals for pt can be seen on initial eval occurring on 4/16/2019    Plan:   [x]  Continue Plan of care: Treatment covered based on POC and graduated to patient's progress. Pt education continues at each visit to obtain maximum benefits from skilled OT intervention.   []  [facility] of care:   []  Discharge:      Clint Guzman  OT/L, [id number]

## 2019-05-03 ENCOUNTER — TELEPHONE (OUTPATIENT)
Dept: ORTHOPEDIC SURGERY | Age: 59
End: 2019-05-03

## 2019-05-03 DIAGNOSIS — G56.21 CUBITAL TUNNEL SYNDROME ON RIGHT: Primary | ICD-10-CM

## 2019-05-03 RX ORDER — OXYCODONE AND ACETAMINOPHEN 7.5; 325 MG/1; MG/1
1 TABLET ORAL EVERY 8 HOURS PRN
Qty: 21 TABLET | Refills: 0 | Status: SHIPPED | OUTPATIENT
Start: 2019-05-03 | End: 2019-05-11 | Stop reason: SDUPTHER

## 2019-05-03 NOTE — TELEPHONE ENCOUNTER
Patient called in requesting a refill of   oxyCODONE-acetaminophen (PERCOCET) 7.5-325 MG per tablet         [facility] [address] [place], [place] [id number] [address] [place] [address] [phone] - F [phone]

## 2019-05-06 ENCOUNTER — TREATMENT (OUTPATIENT)
Dept: PHYSICAL THERAPY | Age: 59
End: 2019-05-06
Payer: MEDICAID

## 2019-05-06 ENCOUNTER — TREATMENT (OUTPATIENT)
Dept: OCCUPATIONAL THERAPY | Age: 59
End: 2019-05-06
Payer: MEDICAID

## 2019-05-06 DIAGNOSIS — G89.29 CHRONIC BILATERAL LOW BACK PAIN WITH RIGHT-SIDED SCIATICA: Primary | ICD-10-CM

## 2019-05-06 DIAGNOSIS — G56.21 CUBITAL TUNNEL SYNDROME ON RIGHT: Primary | ICD-10-CM

## 2019-05-06 DIAGNOSIS — M54.41 CHRONIC BILATERAL LOW BACK PAIN WITH RIGHT-SIDED SCIATICA: Primary | ICD-10-CM

## 2019-05-06 DIAGNOSIS — G56.01 RIGHT CARPAL TUNNEL SYNDROME: ICD-10-CM

## 2019-05-06 PROCEDURE — 97022 WHIRLPOOL THERAPY: CPT | Performed by: OCCUPATIONAL THERAPIST

## 2019-05-06 PROCEDURE — 97140 MANUAL THERAPY 1/> REGIONS: CPT | Performed by: OCCUPATIONAL THERAPIST

## 2019-05-06 PROCEDURE — 97014 ELECTRIC STIMULATION THERAPY: CPT

## 2019-05-06 PROCEDURE — 97530 THERAPEUTIC ACTIVITIES: CPT | Performed by: OCCUPATIONAL THERAPIST

## 2019-05-06 NOTE — PROGRESS NOTES
Physical Therapy Treatment Note    Date: 2019  Patient Name: Maida Rice  : 1960   MRN: [id number]  DOInjury: 2018 became significantly worse, has had pain for years  DOSx: none  Referring Provider: Cora Garcia DO   [facility],  [facility]          Medical Diagnosis:   M54.41, G89.29 (ICD-10-CM) - Chronic bilateral low back pain with right-sided sciatica   M54.17 (ICD-10-CM) - Sacral radiculitis     Outcome Measure:       S: pt reports feeling better when the weather is warm. Pt also seeing OT for R wrist/elbow  O:  Time 0930- 1000 am Pt  Arrived 30 mins late . Visit -  Repeat outcome measure at mid point and end. Pain 7-8 /10     ROM      Modalities      MH + ES X 20 min seated           Pre ex's  MO         Exercise      ALL EXERCISE DONE WITH DRAW-IN TECHNIQUE                            Functional activities To aid in reaching , pushing, pulling tasks at home     ROWS: H  unable due to R arm  \" TA   ROWS: M \" \" TA   ROWS: L \" \" TA   Obliques - high  \"    Obliques - low  \"     THEREX                 Marching                  Trunk ext TB      Trunk flex TB      Hip abd      Hip EXT      TG Squats                  A:  Tolerated well. Modalities only due to pt arriving at 0930 am.   Above added to written HEP. P: Continue with rehab plan  Severino Mederos PTA  Short Term goals (2-3 weeks)  1. Decrease reported pain to 0-5/10  2. Increase ROM to limited 0-25%  3. Increase Strength to 4+/5   4. Oswestry Low Back Disability Questionnaire 45% disability     Long Term goals (4-6 weeks)  1. Decrease reported pain to 0-3/10  2. Increase ROM to WNL w/o pain  3. Increase Strength to 5-/5   4. Oswestry Low Back Disability Questionnaire 25% disability   5.  Independent with Home Exercise Programs      Treatment Charges: Mins Units   Initial Evaluation     Re-Evaluation     Ther Exercise         TE     Manual Therapy     MT     Ther Activities        TA Gait Training          GT     Neuro Re-education NR     Modalities ES x 20 min 1   Non-Billable Service Time 10    Other     Total Time/Units 30 1

## 2019-05-06 NOTE — PROGRESS NOTES
OCCUPATIONAL THERAPY PROGRESS NOTE    Date:  2019                         Initial Evaluation Date: 2019    Patient Name:  Nicole Bearden    :  1960    Restrictions/Precautions: Activity as tolerated, Low fall risk  Diagnosis:  R CTS/ R cubital tunnel                                                   Insurance/Certification information:  Karla Shultz  Referring Physician:  Dr Reyes Brunson  Date of Surgery/Injury: surgery 3-29-19  Plan of care signed (Y/N):  N  Visit# / total visits:      Pain Level:  Upon entering clinic pain 6 /10 - more in the elbow/ sensitive scar. However, \"the hand feels better\"    Subjective: Pt presents with no new complaints. Objective:  Updated POC to be completed by visit 8. INTERVENTION: COMPLETED: SPECIFICS/COMMENTS:   Modality:     MHP  Pre and post trment to decrease pain    fluiotherapy X 10 min. For soft tissue warm- up R wrist/forearm while intermittently moving his wrist/ fingers   AROM:     Tendon gliding X - R hand 10 rep's / VQ to decrease straining with ex   Blue ball stretch X - 10 rep's ea Flex/ ext and supin/pron   Wrist er ciser X - 4 min. No cramping today   AAROM:     UBE X - 4 min Standing 2 min forward and 2 min back - no ^  In pain        PROM/Stretching:     R wrist and elbow all planes x Wrist ext stretch with thumb ext stretch        Scar Mass/Edema Control:     Scar and retrograde mass. x CT area scar  -.massage to thenar area with thumb extension    Blue ball roll elbow scar x Roll onto elbow scar - pt able to control pressure so dorita better then therapist massaging. Strengthening:               Other: HEP     median nerve glide/ tendon glides X- 5 rep;s    Scar massage x           Assessment/Comments: Pt is making Good progress toward stated plan of care. Swelling is present in the right hand today. Edema glove provided for home use.  Pain complaints increased for the elbow vs the wrist/ hand.    -Rehab Potential: Good  -Requires OT Follow Up: Yes  Time In: 10:00 am            Time Out:  11:00 am             Visit #: 5    Treatment Charges: Mins Units   Modalities-fluiother 10 1   Ther Exercise     Manual Therapy 20 1   Thera Activities 30 2   ADL/Home Mgt      Neuro Re-education     Gait Training     Group Therapy     Non-Billable Service Time MHP     Other     Total Time/Units 60 4       -Response to Treatment: Slow progress continues. Goals: Goals for pt can be seen on initial eval occurring on 4/16/2019    Plan:   [x]  Continue Plan of care: Treatment covered based on POC and graduated to patient's progress. Pt education continues at each visit to obtain maximum benefits from skilled OT intervention.   []  [address] of care:   []  Discharge:      Sandy Mohamud  OT/L, [id number]

## 2019-05-06 NOTE — TELEPHONE ENCOUNTER
Pharmacy requesting refill of diclofenac 50 mg 1 tab bid prn #60 w/ 2 refills. Last written 2-12-19. Last visit  2-25-19, no new visit scheduled. Medication pended, please advise.

## 2019-05-06 NOTE — TELEPHONE ENCOUNTER
I called the patient and informed him refills were sent to his pharmacy. He voiced understanding and asked when he should follow up. He's doing OT.

## 2019-05-09 ENCOUNTER — TREATMENT (OUTPATIENT)
Dept: OCCUPATIONAL THERAPY | Age: 59
End: 2019-05-09
Payer: MEDICAID

## 2019-05-09 ENCOUNTER — TREATMENT (OUTPATIENT)
Dept: PHYSICAL THERAPY | Age: 59
End: 2019-05-09
Payer: MEDICAID

## 2019-05-09 ENCOUNTER — OFFICE VISIT (OUTPATIENT)
Dept: ORTHOPEDIC SURGERY | Age: 59
End: 2019-05-09

## 2019-05-09 VITALS — HEIGHT: 68 IN | WEIGHT: 210 LBS | BODY MASS INDEX: 31.83 KG/M2

## 2019-05-09 DIAGNOSIS — G56.01 RIGHT CARPAL TUNNEL SYNDROME: ICD-10-CM

## 2019-05-09 DIAGNOSIS — G56.21 CUBITAL TUNNEL SYNDROME ON RIGHT: Primary | ICD-10-CM

## 2019-05-09 DIAGNOSIS — G89.29 CHRONIC BILATERAL LOW BACK PAIN WITH RIGHT-SIDED SCIATICA: Primary | ICD-10-CM

## 2019-05-09 DIAGNOSIS — M54.41 CHRONIC BILATERAL LOW BACK PAIN WITH RIGHT-SIDED SCIATICA: Primary | ICD-10-CM

## 2019-05-09 PROCEDURE — 97110 THERAPEUTIC EXERCISES: CPT

## 2019-05-09 PROCEDURE — G0283 ELEC STIM OTHER THAN WOUND: HCPCS

## 2019-05-09 PROCEDURE — 97022 WHIRLPOOL THERAPY: CPT | Performed by: OCCUPATIONAL THERAPIST

## 2019-05-09 PROCEDURE — 97530 THERAPEUTIC ACTIVITIES: CPT | Performed by: OCCUPATIONAL THERAPIST

## 2019-05-09 PROCEDURE — 97140 MANUAL THERAPY 1/> REGIONS: CPT | Performed by: OCCUPATIONAL THERAPIST

## 2019-05-09 PROCEDURE — 99024 POSTOP FOLLOW-UP VISIT: CPT | Performed by: ORTHOPAEDIC SURGERY

## 2019-05-09 PROCEDURE — 97110 THERAPEUTIC EXERCISES: CPT | Performed by: OCCUPATIONAL THERAPIST

## 2019-05-09 NOTE — PROGRESS NOTES
Physical Therapy Treatment Note    Date: 2019  Patient Name: Loyda Latham  : 1960   MRN: [id number]  DOInjury: 2018 became significantly worse, has had pain for years  DOSx: none  Referring Provider: Alfred Gentile DO   [facility],  [facility]          Medical Diagnosis:   M54.41, G89.29 (ICD-10-CM) - Chronic bilateral low back pain with right-sided sciatica   M54.17 (ICD-10-CM) - Sacral radiculitis     Outcome Measure:       S: pt reports being very stiff the first 2 hours waking up from sleeping each day. Pt also seeing OT for R wrist/elbow  O:  Time 7528-6961   am  Pt arrived at [place] [address] [facility] am            Visit -  Repeat outcome measure at mid point and end. Pain -8  /10 Low back Disability  Questionnaire   completed mid point 2019  Score = 50%     ROM      Modalities      MH + ES X 15 min seated           Pre ex's  MO         Exercise      ALL EXERCISE DONE WITH DRAW-IN TECHNIQUE                            Functional activities To aid in reaching , pushing, pulling tasks at home     ROWS: H  unable due to R arm  \" TA   ROWS: M \" \" TA   ROWS: L \" \" TA   Obliques - high  \"    Obliques - low  \"     THEREX           Nustep   L 5 x 15 min   using L arm only  TE         Marching                  Trunk ext TB      Trunk flex TB      Hip abd      Hip EXT      TG Squats                  A:  Tolerated well. Limited ex's today due to pt having appt after PT with Dr. Debo Unger . Above added to written HEP. P: Continue with rehab plan  Chhaya Solitario PTA  Short Term goals (2-3 weeks)  1. Decrease reported pain to 0-5/10  2. Increase ROM to limited 0-25%  3. Increase Strength to 4+/5   4. Oswestry Low Back Disability Questionnaire 45% disability     Long Term goals (4-6 weeks)  1. Decrease reported pain to 0-3/10  2. Increase ROM to WNL w/o pain  3. Increase Strength to 5-/5   4. Oswestry Low Back Disability Questionnaire 25% disability   5.  Independent with Home Exercise Programs      Treatment Charges: Mins Units   Initial Evaluation     Re-Evaluation     Ther Exercise         TE 15 1   Manual Therapy     MT     Ther Activities        TA     Gait Training          GT     Neuro Re-education NR     Modalities ES x 15  min 1   Non-Billable Service Time     Other 10    Total Time/Units 40 2

## 2019-05-09 NOTE — PROGRESS NOTES
Subjective: Lynn Arshad is here for followup after right carpal tunnel and cubital tunnel surgery. He does report some continued issues with cubital tunnel. He is in OT. Objective:      General :    alert, appears stated age and cooperative   Sutures:   out   Incision:  healing well, no significant drainage, no dehiscence, no significant erythema   Tenderness:  none   Flexion ROM:  full range of motion   Extension ROM:  full range of motion   Effusion:  none        Assessment:     Encounter Diagnoses   Name Primary?  Cubital tunnel syndrome on right Yes    Right carpal tunnel syndrome        Plan:   Continue OT  Range of motion and rehabilitation exercises discussed with the patient.   Follow up in 2 months  Call with any questions or concerns at [phone]

## 2019-05-09 NOTE — PROGRESS NOTES
OCCUPATIONAL THERAPY PROGRESS NOTE    Date:  2019                         Initial Evaluation Date: 2019    Patient Name:  Fanny Qiu    :  1960    Restrictions/Precautions: Activity as tolerated, Low fall risk  Diagnosis:  R CTS/ R cubital tunnel                                                   Insurance/Certification information:  [place]  Referring Physician:  Dr Britton Schaumann  Date of Surgery/Injury: surgery 3-29-19  Plan of care signed (Y/N):  Y  Visit# / total visits:   8    Pain Level:  Upon entering clinic pain 7-8 /10 - more in the elbow/ sensitive scar, mostly in the elbow- Pain decreased to 6/10 by tx end    Subjective: Pt presents with no new complaints. Objective:  Updated POC to be completed by visit 8. INTERVENTION: COMPLETED: SPECIFICS/COMMENTS:   Modality:     MHP  Pre and post trment to decrease pain    fluiotherapy X 10 min. For soft tissue warm- up R wrist/forearm while intermittently moving his wrist/ fingers   AROM:     Tendon gliding X - R hand 10 rep's / VQ to decrease straining with ex   Blue ball stretch X - 10 rep's ea Flex/ ext and supin/pron   Wrist er ciser X - 4 min. No cramping today   AAROM:     UBE X - 4 min Standing 2 min forward and 2 min back - no ^  In pain        PROM/Stretching:     R wrist and elbow all planes x Wrist ext stretch with thumb ext stretch        Scar Mass/Edema Control:     Scar and retrograde mass. x CT area scar  -.massage to thenar area with thumb extension    Blue ball roll elbow scar  Roll onto elbow scar - pt able to control pressure so dorita better then therapist massaging. Strengthening:     Putty ex- soft x  x 20, roll with finger/ wrist extension   Wrist PREs 1# 1-15 Flexion/ ext/ supin/ pron   Other: HEP     median nerve glide/ tendon glides X- 5 rep;s    Scar massage x           Assessment/Comments: Pt is making Good- progress toward stated plan of care. Swelling in the hand is better with edema glove use.  Pains in

## 2019-05-10 ENCOUNTER — TELEPHONE (OUTPATIENT)
Dept: ORTHOPEDIC SURGERY | Age: 59
End: 2019-05-10

## 2019-05-10 DIAGNOSIS — G56.21 CUBITAL TUNNEL SYNDROME ON RIGHT: ICD-10-CM

## 2019-05-10 NOTE — TELEPHONE ENCOUNTER
Patient called in requesting a refill of    oxyCODONE-acetaminophen (PERCOCET) 7.5-325 MG per tablet         [facility] [address] [place] [address] [address] [place] [address] -  [phone] - F [phone]

## 2019-05-11 RX ORDER — OXYCODONE AND ACETAMINOPHEN 7.5; 325 MG/1; MG/1
1 TABLET ORAL EVERY 8 HOURS PRN
Qty: 21 TABLET | Refills: 0 | Status: SHIPPED | OUTPATIENT
Start: 2019-05-11 | End: 2019-05-20 | Stop reason: SDUPTHER

## 2019-05-17 ENCOUNTER — TELEPHONE (OUTPATIENT)
Dept: ORTHOPEDIC SURGERY | Age: 59
End: 2019-05-17

## 2019-05-17 DIAGNOSIS — G56.21 CUBITAL TUNNEL SYNDROME ON RIGHT: ICD-10-CM

## 2019-05-20 ENCOUNTER — TREATMENT (OUTPATIENT)
Dept: PHYSICAL THERAPY | Age: 59
End: 2019-05-20
Payer: MEDICAID

## 2019-05-20 ENCOUNTER — TREATMENT (OUTPATIENT)
Dept: OCCUPATIONAL THERAPY | Age: 59
End: 2019-05-20
Payer: MEDICAID

## 2019-05-20 DIAGNOSIS — M54.41 CHRONIC BILATERAL LOW BACK PAIN WITH RIGHT-SIDED SCIATICA: Primary | ICD-10-CM

## 2019-05-20 DIAGNOSIS — G89.29 CHRONIC BILATERAL LOW BACK PAIN WITH RIGHT-SIDED SCIATICA: Primary | ICD-10-CM

## 2019-05-20 DIAGNOSIS — G56.21 CUBITAL TUNNEL SYNDROME ON RIGHT: Primary | ICD-10-CM

## 2019-05-20 DIAGNOSIS — G56.01 RIGHT CARPAL TUNNEL SYNDROME: ICD-10-CM

## 2019-05-20 PROCEDURE — 97140 MANUAL THERAPY 1/> REGIONS: CPT | Performed by: OCCUPATIONAL THERAPIST

## 2019-05-20 PROCEDURE — 97018 PARAFFIN BATH THERAPY: CPT | Performed by: OCCUPATIONAL THERAPIST

## 2019-05-20 PROCEDURE — 97014 ELECTRIC STIMULATION THERAPY: CPT

## 2019-05-20 PROCEDURE — 97110 THERAPEUTIC EXERCISES: CPT

## 2019-05-20 PROCEDURE — 97110 THERAPEUTIC EXERCISES: CPT | Performed by: OCCUPATIONAL THERAPIST

## 2019-05-20 PROCEDURE — 97530 THERAPEUTIC ACTIVITIES: CPT | Performed by: OCCUPATIONAL THERAPIST

## 2019-05-20 RX ORDER — OXYCODONE AND ACETAMINOPHEN 7.5; 325 MG/1; MG/1
1 TABLET ORAL EVERY 12 HOURS PRN
Qty: 14 TABLET | Refills: 0 | Status: SHIPPED | OUTPATIENT
Start: 2019-05-20 | End: 2019-05-28 | Stop reason: SDUPTHER

## 2019-05-20 NOTE — PROGRESS NOTES
0-3/10  2. Increase ROM to WNL w/o pain  3. Increase Strength to 5-/5   4. Oswestry Low Back Disability Questionnaire 25% disability   5.  Independent with Home Exercise Programs      Treatment Charges: Mins Units   Initial Evaluation     Re-Evaluation     Ther Exercise         TE 30 2   Manual Therapy     MT     Ther Activities        TA     Gait Training          GT     Neuro Re-education NR     Modalities ES x 15  min 1   Non-Billable Service Time     Other     Total Time/Units 45 3

## 2019-05-23 ENCOUNTER — TREATMENT (OUTPATIENT)
Dept: PHYSICAL THERAPY | Age: 59
End: 2019-05-23
Payer: MEDICAID

## 2019-05-23 ENCOUNTER — TREATMENT (OUTPATIENT)
Dept: OCCUPATIONAL THERAPY | Age: 59
End: 2019-05-23
Payer: MEDICAID

## 2019-05-23 DIAGNOSIS — G56.01 RIGHT CARPAL TUNNEL SYNDROME: ICD-10-CM

## 2019-05-23 DIAGNOSIS — G89.29 CHRONIC BILATERAL LOW BACK PAIN WITH RIGHT-SIDED SCIATICA: Primary | ICD-10-CM

## 2019-05-23 DIAGNOSIS — M54.41 CHRONIC BILATERAL LOW BACK PAIN WITH RIGHT-SIDED SCIATICA: Primary | ICD-10-CM

## 2019-05-23 DIAGNOSIS — G56.21 CUBITAL TUNNEL SYNDROME ON RIGHT: Primary | ICD-10-CM

## 2019-05-23 PROCEDURE — 97014 ELECTRIC STIMULATION THERAPY: CPT

## 2019-05-23 PROCEDURE — 97018 PARAFFIN BATH THERAPY: CPT | Performed by: OCCUPATIONAL THERAPY ASSISTANT

## 2019-05-23 PROCEDURE — 97140 MANUAL THERAPY 1/> REGIONS: CPT | Performed by: OCCUPATIONAL THERAPY ASSISTANT

## 2019-05-23 PROCEDURE — 97110 THERAPEUTIC EXERCISES: CPT | Performed by: OCCUPATIONAL THERAPY ASSISTANT

## 2019-05-23 PROCEDURE — 97110 THERAPEUTIC EXERCISES: CPT

## 2019-05-23 PROCEDURE — 97530 THERAPEUTIC ACTIVITIES: CPT | Performed by: OCCUPATIONAL THERAPY ASSISTANT

## 2019-05-23 NOTE — PROGRESS NOTES
OCCUPATIONAL THERAPY PROGRESS NOTE    Date:  2019                         Initial Evaluation Date: 2019    Patient Name:  Kimmy Fuentes    :  1960    Restrictions/Precautions: Activity as tolerated, Low fall risk  Diagnosis:  R CTS/ R cubital tunnel                                                   Insurance/Certification information:  [place]  Referring Physician:  Dr Valentin Roe  Date of Surgery/Injury: surgery 3-29-19  Plan of care signed (Y/N):  Y  Visit# / total visits:      Pain Level:  6/10 in the R elbow with specific movements. Subjective: Pt. Stated that his pain is decreasing, but is still there. Objective:  Updated POC to be completed by visit 8. INTERVENTION: COMPLETED: SPECIFICS/COMMENTS:   Modality:     MHP X Pre and post trment to decrease pain    Paraffin Tx X 10 min. For soft tissue warm- up R wrist/hand prior to exercise   AROM:     Intrinsic hand ex 10x each Digit ABD/ ADD, finger walking, isolated finger extension- challenging   Tendon gliding X - R hand 10 rep's / VQ to decrease straining with ex   Blue ball stretch X Flex/ ext and supin/pron   Wrist er ciser  No cramping today   AAROM:     UBE  Standing 2 min forward and 2 min back - no ^  In pain        PROM/Stretching:     R wrist and elbow all planes x Wrist ext stretch with thumb ext stretch        Scar Mass/Edema Control:     Scar and retrograde mass. x CT area scar  -.massage to thenar area with thumb extension    Blue ball roll elbow scar  Roll onto elbow scar - pt able to control pressure so dorita better then therapist massaging. Strengthening:     Putty ex- soft X  Focus on digit ADD and thumb strengthening today  x 20, roll with finger/ wrist extension   Wrist PREs  Flexion/ ext/ supin/ pron   Other: HEP     median nerve glide/ tendon glides X- 5 rep;s    Scar massage x           Assessment/Comments: Pt is making Good- progress toward stated plan of care.   -Rehab Potential: Good  -Requires OT Follow Up: Yes  Time In: 1400           Time Out:  1500            Visit #:8    Treatment Charges: Mins Units   Modalities-fluido. 10 1   Ther Exercise 20 1   Manual Therapy 15 1   Thera Activities 15 1   ADL/Home Mgt      Neuro Re-education     Gait Training     Group Therapy     Non-Billable Service Time MHP     Other     Total Time/Units 60 4       -Response to Treatment: Slow progress continues. Activation of the ulnar muscles remains challenging. One episode of cramping noted in the ulnar hand. Will re-evaluate at next visit. Goals: Goals for pt can be seen on initial eval occurring on 4/16/2019    Plan:   [x]  Continue Plan of care: Treatment covered based on POC and graduated to patient's progress. Pt education continues at each visit to obtain maximum benefits from skilled OT intervention.   []  [facility] of care:   []  Discharge:      Jose BERMUDEZ/FAMILIA [id number]

## 2019-05-23 NOTE — PROGRESS NOTES
Physical Therapy Treatment Note    Date: 2019  Patient Name: Saima Ruiz  : 1960   MRN: [id number]  DOInjury: 2018 became significantly worse, has had pain for years  DOSx: none  Referring Provider: Naheed Nur DO   [facility],  [facility]          Medical Diagnosis:   M54.41, G89.29 (ICD-10-CM) - Chronic bilateral low back pain with right-sided sciatica   M54.17 (ICD-10-CM) - Sacral radiculitis     Outcome Measure:  Eval score = 66%  Mid point score =50%      S: pt reports soreness today. Pt also seeing OT for R wrist/elbow  O:  Time 1400- 1450                Visit -  Repeat outcome measure at mid point and end. Pain -8  /10 Low back Disability  Questionnaire   completed mid point 2019  Score = 50%     ROM      Modalities      MH + ES X 15 min seated       Post ex's MO         Exercise      ALL EXERCISE DONE WITH DRAW-IN TECHNIQUE                            Functional activities To aid in reaching , pushing, pulling tasks at home     ROWS: H  unable due to R arm  \" TA   ROWS: M \" \" TA   ROWS: L \" \" TA   Obliques - high  \"    Obliques - low  \"     THEREX           Nustep   L 5 x 15 min   using  B arms to dorita. TE          perform next rx  TE    TE    TE             Trunk ext TB      Trunk flex TB      Hip abd      Hip EXT      TG Squats                  A:  Tolerated fair . Newly added WB ex'  as listed above. Above added to written HEP. P: Continue with rehab plan  [place] PTA  Short Term goals (2-3 weeks)  1. Decrease reported pain to 0-5/10  2. Increase ROM to limited 0-25%  3. Increase Strength to 4+/5   4. Oswestry Low Back Disability Questionnaire 45% disability     Long Term goals (4-6 weeks)  1. Decrease reported pain to 0-3/10  2. Increase ROM to WNL w/o pain  3. Increase Strength to 5-/5   4. Oswestry Low Back Disability Questionnaire 25% disability   5.  Independent with Home Exercise Programs      Treatment Charges: Mins

## 2019-05-28 ENCOUNTER — TELEPHONE (OUTPATIENT)
Dept: ORTHOPEDIC SURGERY | Age: 59
End: 2019-05-28

## 2019-05-28 DIAGNOSIS — G56.21 CUBITAL TUNNEL SYNDROME ON RIGHT: ICD-10-CM

## 2019-05-28 RX ORDER — OXYCODONE AND ACETAMINOPHEN 7.5; 325 MG/1; MG/1
1 TABLET ORAL EVERY 12 HOURS PRN
Qty: 14 TABLET | Refills: 0 | Status: SHIPPED | OUTPATIENT
Start: 2019-05-28 | End: 2019-06-05 | Stop reason: SDUPTHER

## 2019-06-04 ENCOUNTER — TELEPHONE (OUTPATIENT)
Dept: ORTHOPEDIC SURGERY | Age: 59
End: 2019-06-04

## 2019-06-04 DIAGNOSIS — G56.21 CUBITAL TUNNEL SYNDROME ON RIGHT: ICD-10-CM

## 2019-06-05 RX ORDER — OXYCODONE AND ACETAMINOPHEN 7.5; 325 MG/1; MG/1
1 TABLET ORAL EVERY 12 HOURS PRN
Qty: 14 TABLET | Refills: 0 | Status: SHIPPED | OUTPATIENT
Start: 2019-06-05 | End: 2019-06-12 | Stop reason: SDUPTHER

## 2019-06-10 RX ORDER — GLUCOSAM/CHON-MSM1/C/MANG/BOSW 500-416.6
TABLET ORAL
Qty: 100 EACH | Refills: 5 | Status: SHIPPED
Start: 2019-06-10 | End: 2020-06-23

## 2019-06-11 ENCOUNTER — TREATMENT (OUTPATIENT)
Dept: OCCUPATIONAL THERAPY | Age: 59
End: 2019-06-11
Payer: MEDICAID

## 2019-06-11 DIAGNOSIS — G56.01 RIGHT CARPAL TUNNEL SYNDROME: ICD-10-CM

## 2019-06-11 DIAGNOSIS — G56.21 CUBITAL TUNNEL SYNDROME ON RIGHT: Primary | ICD-10-CM

## 2019-06-11 PROCEDURE — 97530 THERAPEUTIC ACTIVITIES: CPT | Performed by: OCCUPATIONAL THERAPIST

## 2019-06-11 PROCEDURE — 97022 WHIRLPOOL THERAPY: CPT | Performed by: OCCUPATIONAL THERAPIST

## 2019-06-11 PROCEDURE — 97110 THERAPEUTIC EXERCISES: CPT | Performed by: OCCUPATIONAL THERAPIST

## 2019-06-11 PROCEDURE — 97140 MANUAL THERAPY 1/> REGIONS: CPT | Performed by: OCCUPATIONAL THERAPIST

## 2019-06-11 NOTE — PROGRESS NOTES
OCCUPATIONAL THERAPY   PROGRESS NOTE/ RE-EVALUATION    Date:  2019                         Initial Evaluation Date: 2019    Patient Name:  Cole Hollins    :  1960    Restrictions/Precautions: Activity as tolerated, Low fall risk  Diagnosis:  R CTS/ R cubital tunnel                                                   Insurance/Certification information:  Deedee Oakley  Referring Physician:  Dr Ross Contreras  Date of Surgery/Injury: surgery 3-29-19  Plan of care signed (Y/N):  Y  Visit# / total visits:  9    Pain Level:  6/10 in the R wrist/ hand     Subjective: \" My wrist is hurting me more than the elbow. It wakes me up in the middle of the night\". Objective:  Updated POC to be completed by visit 9. INTERVENTION: COMPLETED: SPECIFICS/COMMENTS:   Modality:     MHP  Pre and post trment to decrease pain    Paraffin Tx X 10 min. For soft tissue warm- up R wrist/hand prior to exercise   AROM:     Intrinsic hand ex 10x each Digit ABD/ ADD, finger walking, isolated finger extension- challenging   Tendon gliding X - R hand 10 rep's / VQ to decrease straining with ex   Blue ball stretch X Flex/ ext and supin/pron   Wrist er ciser  No cramping today   AAROM:     UBE  Standing 2 min forward and 2 min back - no ^  In pain        PROM/Stretching:     R wrist and elbow all planes x Wrist ext stretch with thumb ext stretch        Scar Mass/Edema Control:     Scar and retrograde mass. x CT area scar  -.massage to thenar area with thumb extension    Blue ball roll elbow scar  Roll onto elbow scar - pt able to control pressure so dorita better then therapist massaging. Strengthening:     Putty ex- soft X    x 20, roll with finger/ wrist extension, intrinsics   Wrist PREs 2# 1-20 each Flexion/ ext/ supin/ pron   Other: HEP     median nerve glide/ tendon glides X- 5 rep;s    Scar massage x           Assessment/Comments: Pt is making Good- progress toward stated plan of care.     GOALS (Long term same as Short term):  1) Patient will demonstrate good understanding of home program(exercises/activities/diagnosis/prognosis/goals) with good accuracy. (goal met for ROM, stretches, scar massage, strengthening)    2) Patient will demonstrate increased strength of their right elbow and wrist to [facility] for ADL/IADL completion.(progress noted)  4-/5 from 3+/5    3) Patient will demonstrate increased /pinch strength of at least 10 / 5 pinch pounds of their right hand. (slow progress)   13# from 10#  Lateral pinch 3# from 3.5#    4) Patient to report decreased pain in their affected right elbow/ wrist upper from 5-8/10 with AROM  to 3/10 or less with resistive functional use. (pain levels fluctuate)  Average 6/10    5) Patient will demonstrate a non-tender/non-adherent scar. ( slow progress)  Tightness noted in the CT scar. 6) Patient will report ADL/ IADL functions same as prior to diagnosis of CT/ cubital tunnel. (slow, intermittent progress)    -Rehab Potential: Good  -Requires OT Follow Up: Yes  Time In: 1400           Time Out:  1500            Visit # 9    Treatment Charges: Mins Units   Modalities-fluido. 10 1   Ther Exercise 20 1   Manual Therapy 15 1   Thera Activities 15 1   ADL/Home Mgt      Neuro Re-education     Gait Training     Group Therapy     Non-Billable Service Time [facility]     Other     Total Time/Units 60 4       -Response to Treatment: Continued OT is recommended at 2x/ week for another 2-3 weeks. Will then transition to HEP. Goals: Goals for pt can be seen on initial eval occurring on 4/16/2019    Plan:   [x]  Continue Plan of care: Treatment covered based on POC and graduated to patient's progress. Pt education continues at each visit to obtain maximum benefits from skilled OT intervention.   []  [facility] of care:   []  Discharge:      Sergei Wong  OT/L   [id number]

## 2019-06-12 ENCOUNTER — TELEPHONE (OUTPATIENT)
Dept: ORTHOPEDIC SURGERY | Age: 59
End: 2019-06-12

## 2019-06-12 DIAGNOSIS — G56.21 CUBITAL TUNNEL SYNDROME ON RIGHT: ICD-10-CM

## 2019-06-12 RX ORDER — OXYCODONE AND ACETAMINOPHEN 7.5; 325 MG/1; MG/1
1 TABLET ORAL EVERY 12 HOURS PRN
Qty: 14 TABLET | Refills: 0 | Status: SHIPPED | OUTPATIENT
Start: 2019-06-12 | End: 2019-06-19 | Stop reason: SDUPTHER

## 2019-06-12 NOTE — TELEPHONE ENCOUNTER
.Patient called in requesting refill of   :  oxyCODONE-acetaminophen (PERCOCET) 7.5-325 MG per tablet [[id number]]      Pharmacy:  [facility], [address] - f [phone]

## 2019-06-14 ENCOUNTER — TREATMENT (OUTPATIENT)
Dept: OCCUPATIONAL THERAPY | Age: 59
End: 2019-06-14
Payer: MEDICAID

## 2019-06-14 DIAGNOSIS — G56.01 RIGHT CARPAL TUNNEL SYNDROME: ICD-10-CM

## 2019-06-14 DIAGNOSIS — G56.21 CUBITAL TUNNEL SYNDROME ON RIGHT: Primary | ICD-10-CM

## 2019-06-14 PROCEDURE — 97022 WHIRLPOOL THERAPY: CPT | Performed by: OCCUPATIONAL THERAPIST

## 2019-06-14 PROCEDURE — 97110 THERAPEUTIC EXERCISES: CPT | Performed by: OCCUPATIONAL THERAPIST

## 2019-06-14 PROCEDURE — 97140 MANUAL THERAPY 1/> REGIONS: CPT | Performed by: OCCUPATIONAL THERAPIST

## 2019-06-14 PROCEDURE — 97530 THERAPEUTIC ACTIVITIES: CPT | Performed by: OCCUPATIONAL THERAPIST

## 2019-06-14 NOTE — PROGRESS NOTES
OCCUPATIONAL THERAPY   PROGRESS NOTE    Date:  2019                         Initial Evaluation Date: 2019    Patient Name:  Connor Aburto    :  1960    Restrictions/Precautions: Activity as tolerated, Low fall risk  Diagnosis:  R CTS/ R cubital tunnel                                                   Insurance/Certification information:  Tian Long  Referring Physician:  Dr Mark Garrett  Date of Surgery/Injury: surgery 3-29-19  Plan of care signed (Y/N):  Y  Visit# / total visits:  1/ up to 6 (9 prior)    Pain Level:  6/10 in the R wrist/ hand     Subjective:  Pt presents with no new complaints  Objective:  Updated POC to be completed by visit 9. INTERVENTION: COMPLETED: SPECIFICS/COMMENTS:   Modality:     MHP  Pre and post trment to decrease pain    Paraffin Tx X 10 min. For soft tissue warm- up R wrist/hand prior to exercise   AROM:     Intrinsic hand ex  Digit ABD/ ADD, finger walking, isolated finger extension   Tendon gliding X - R hand 10 rep's / VQ to decrease straining with ex   Blue ball stretch  Flex/ ext and supin/pron   Wrist er ciser x    AAROM:     UBE  Standing 2 min forward and 2 min back - no ^  In pain        PROM/Stretching:     R wrist and elbow all planes x Wrist ext stretch with thumb ext stretch        Scar Mass/Edema Control:     Scar and retrograde mass. x CT area scar  -.massage to thenar area with thumb extension    Strengthening:     Putty ex- soft X    x 20, roll with finger/ wrist extension, intrinsics   Putty ex- tools/ soft x Hook, large lid   Wrist PREs 2# 1-20 each Flexion/ ext/ supin/ pron   Other: HEP     median nerve glide/ tendon glides X- 5 rep;s    Scar massage x           Assessment/Comments: Pt is making Good- progress toward stated plan of care. -Rehab Potential: Good  -Requires OT Follow Up: Yes  Time In: 1435          Time Out:  1535            Visit #  prior Tx    Treatment Charges: Mins Units   Modalities-fluido.  10 1   Ther Exercise 20 1 Manual Therapy 15 1   Thera Activities 15 1   ADL/Home Mgt      Neuro Re-education     Gait Training     Group Therapy     Non-Billable Service Time MHP     Other     Total Time/Units 60 4       -Response to Treatment: Continued OT is recommended at 2x/ week for another 2-3 weeks. Will then transition to HEP. Goals: Goals for pt can be seen on initial eval occurring on 4/16/2019    Plan:   [x]  Continue Plan of care: Treatment covered based on POC and graduated to patient's progress. Pt education continues at each visit to obtain maximum benefits from skilled OT intervention.   []  [address] of care:   []  Discharge:      Sandy Mohamud  OT/L   [id number]

## 2019-06-18 ENCOUNTER — TREATMENT (OUTPATIENT)
Dept: PHYSICAL THERAPY | Age: 59
End: 2019-06-18
Payer: MEDICAID

## 2019-06-18 ENCOUNTER — TREATMENT (OUTPATIENT)
Dept: OCCUPATIONAL THERAPY | Age: 59
End: 2019-06-18
Payer: MEDICAID

## 2019-06-18 DIAGNOSIS — G56.21 CUBITAL TUNNEL SYNDROME ON RIGHT: Primary | ICD-10-CM

## 2019-06-18 DIAGNOSIS — M54.41 CHRONIC BILATERAL LOW BACK PAIN WITH RIGHT-SIDED SCIATICA: Primary | ICD-10-CM

## 2019-06-18 DIAGNOSIS — G56.01 RIGHT CARPAL TUNNEL SYNDROME: ICD-10-CM

## 2019-06-18 DIAGNOSIS — G89.29 CHRONIC BILATERAL LOW BACK PAIN WITH RIGHT-SIDED SCIATICA: Primary | ICD-10-CM

## 2019-06-18 PROCEDURE — 97022 WHIRLPOOL THERAPY: CPT | Performed by: OCCUPATIONAL THERAPIST

## 2019-06-18 PROCEDURE — 97110 THERAPEUTIC EXERCISES: CPT | Performed by: OCCUPATIONAL THERAPIST

## 2019-06-18 PROCEDURE — 97140 MANUAL THERAPY 1/> REGIONS: CPT | Performed by: OCCUPATIONAL THERAPIST

## 2019-06-18 PROCEDURE — 97014 ELECTRIC STIMULATION THERAPY: CPT

## 2019-06-18 PROCEDURE — 97530 THERAPEUTIC ACTIVITIES: CPT | Performed by: OCCUPATIONAL THERAPIST

## 2019-06-18 PROCEDURE — 97110 THERAPEUTIC EXERCISES: CPT

## 2019-06-18 NOTE — PROGRESS NOTES
Programs      Treatment Charges: Mins Units   Initial Evaluation     Re-Evaluation     Ther Exercise         TE 30 2   Manual Therapy     MT     Ther Activities        TA     Gait Training          GT     Neuro Re-education NR     Modalities ES x 15  min 1   Non-Billable Service Time     Other     Total Time/Units 45 3

## 2019-06-18 NOTE — PROGRESS NOTES
OCCUPATIONAL THERAPY   PROGRESS NOTE    Date:  2019                         Initial Evaluation Date: 2019    Patient Name:  Gilford Nailer    :  1960    Restrictions/Precautions: Activity as tolerated, Low fall risk  Diagnosis:  R CTS/ R cubital tunnel                                                   Insurance/Certification information:  [facility]  Referring Physician:  Dr Missy Villanueva  Date of Surgery/Injury: surgery 3-29-19  Plan of care signed (Y/N):  Y  Visit# / total visits:  2/ up to 6 (9 prior)    Pain Level:  6/10 in the R wrist/ hand     Subjective:  Pt presents with no new complaints. Objective:  Updated POC to be completed by visit 9. INTERVENTION: COMPLETED: SPECIFICS/COMMENTS:   Modality:     MHP  Pre and post trment to decrease pain    Paraffin Tx X 10 min. For soft tissue warm- up R wrist/hand prior to exercise   AROM:     Intrinsic hand ex  Digit ABD/ ADD, finger walking, isolated finger extension   Tendon gliding X - R hand 10 rep's / VQ to decrease straining with ex   Blue ball stretch  Flex/ ext and supin/pron   Wrist er ciser x    AAROM:     UBE  Standing 2 min forward and 2 min back - no ^  In pain        PROM/Stretching:     R wrist and elbow all planes x Wrist ext stretch with thumb ext stretch        Scar Mass/Edema Control:     Scar and retrograde mass. x CT area scar  -.massage to thenar area with thumb extension    Strengthening:     Putty ex- soft X    x 20, roll with finger/ wrist extension, intrinsics   Putty ex- tools/ soft x Hook, large lid   giuliana bar/ green 10x each Twist 2 directions, bending   Elbow PREs 5# 1-20 Curls/ neutral and supination   Wrist PREs 4# 1-20 each Flexion/ ext/ supin/ pron   Other: HEP     median nerve glide/ tendon glides X- 5 rep;s    Scar massage x           Assessment/Comments: Pt is making Good- progress toward stated plan of care.   -Rehab Potential: Good  -Requires OT Follow Up: Yes  Time In: 1350          Time Out:  3699 Visit # 2/ 9 prior Tx    Treatment Charges: Mins Units   Modalities-fluido. 10 1   Ther Exercise 20 1   Manual Therapy 10 1   Thera Activities 15 1   ADL/Home Mgt      Neuro Re-education     Gait Training     Group Therapy     Non-Billable Service Time MHP     Other     Total Time/Units 55 4       -Response to Treatment:  Tolerance for resistive activity continues to get better. Goals: Goals for pt can be seen on initial eval occurring on 4/16/2019    Plan:   [x]  Continue Plan of care: Treatment covered based on POC and graduated to patient's progress. Pt education continues at each visit to obtain maximum benefits from skilled OT intervention.   []  [address] [facility] of care:   []  Discharge:      Clint Guzman  OT/L   [id number]

## 2019-06-19 ENCOUNTER — TELEPHONE (OUTPATIENT)
Dept: ORTHOPEDIC SURGERY | Age: 59
End: 2019-06-19

## 2019-06-19 DIAGNOSIS — G56.21 CUBITAL TUNNEL SYNDROME ON RIGHT: ICD-10-CM

## 2019-06-19 RX ORDER — OXYCODONE AND ACETAMINOPHEN 7.5; 325 MG/1; MG/1
1 TABLET ORAL DAILY PRN
Qty: 7 TABLET | Refills: 0 | Status: SHIPPED | OUTPATIENT
Start: 2019-06-19 | End: 2019-06-26 | Stop reason: SDUPTHER

## 2019-06-19 NOTE — TELEPHONE ENCOUNTER
Patient calling in for pain medication refill- Perc 7.5mg to [facility] on [address].

## 2019-06-20 ENCOUNTER — TREATMENT (OUTPATIENT)
Dept: OCCUPATIONAL THERAPY | Age: 59
End: 2019-06-20
Payer: MEDICAID

## 2019-06-20 ENCOUNTER — TREATMENT (OUTPATIENT)
Dept: PHYSICAL THERAPY | Age: 59
End: 2019-06-20
Payer: MEDICAID

## 2019-06-20 DIAGNOSIS — M54.41 CHRONIC BILATERAL LOW BACK PAIN WITH RIGHT-SIDED SCIATICA: Primary | ICD-10-CM

## 2019-06-20 DIAGNOSIS — G56.21 CUBITAL TUNNEL SYNDROME ON RIGHT: Primary | ICD-10-CM

## 2019-06-20 DIAGNOSIS — G89.29 CHRONIC BILATERAL LOW BACK PAIN WITH RIGHT-SIDED SCIATICA: Primary | ICD-10-CM

## 2019-06-20 DIAGNOSIS — G56.01 RIGHT CARPAL TUNNEL SYNDROME: ICD-10-CM

## 2019-06-20 PROCEDURE — 97530 THERAPEUTIC ACTIVITIES: CPT | Performed by: OCCUPATIONAL THERAPIST

## 2019-06-20 PROCEDURE — 97110 THERAPEUTIC EXERCISES: CPT

## 2019-06-20 PROCEDURE — 97140 MANUAL THERAPY 1/> REGIONS: CPT | Performed by: OCCUPATIONAL THERAPIST

## 2019-06-20 PROCEDURE — 97022 WHIRLPOOL THERAPY: CPT | Performed by: OCCUPATIONAL THERAPIST

## 2019-06-20 PROCEDURE — 97014 ELECTRIC STIMULATION THERAPY: CPT

## 2019-06-20 PROCEDURE — 97110 THERAPEUTIC EXERCISES: CPT | Performed by: OCCUPATIONAL THERAPIST

## 2019-06-20 NOTE — PROGRESS NOTES
plan of care. -Rehab Potential: Good  -Requires OT Follow Up: Yes  Time In: 1350          Time Out:  1445        Visit # 3/ 9 prior Tx    Treatment Charges: Mins Units   Modalities-fluido. 10 1   Ther Exercise 20 1   Manual Therapy 10 1   Thera Activities 15 1   ADL/Home Mgt      Neuro Re-education     Gait Training     Group Therapy     Non-Billable Service Time MHP     Other     Total Time/Units 55 4       -Response to Treatment:  Tolerance for resistive activity continues to get better. Goals: Goals for pt can be seen on initial eval occurring on 4/16/2019    Plan:   [x]  Continue Plan of care: Treatment covered based on POC and graduated to patient's progress. Pt education continues at each visit to obtain maximum benefits from skilled OT intervention.   []  [address] of care:   []  Discharge:      Vinay Rey  OT/L   [id number]

## 2019-06-20 NOTE — PROGRESS NOTES
Physical Therapy Treatment Note    Date: 2019  Patient Name: Chip Hyman  : 1960   MRN: [id number]  DOInjury: 2018 became significantly worse, has had pain for years  DOSx: none  Referring Provider: Anisa Fitzgerald DO   [facility], [address] [facility]          Medical Diagnosis:   M54.41, G89.29 (ICD-10-CM) - Chronic bilateral low back pain with right-sided sciatica   M54.17 (ICD-10-CM) - Sacral radiculitis     Outcome Measure:  Eval score = 66%  Mid point score =50%      S: pt reports soreness today. Pt also seeing OT for R wrist/elbow  O:  Time 6131-0905              Visit 10 / 8-12  Repeat outcome measure at mid point and end. Pain -8  /10 Low back Disability  Questionnaire   completed mid point 2019  Score = 50%     ROM      Modalities      MH + ES X 15 min supine       Post ex's MO    Pre-mod setting      Exercise      ALL EXERCISE DONE WITH DRAW-IN TECHNIQUE                            Functional activities To aid in reaching , pushing, pulling tasks at home     ROWS: H  unable due to R arm  \" TA   ROWS: M \" \" TA   ROWS: L \" \" TA   Obliques - high  \"    Obliques - low  \"     THEREX           Nustep   L 5 x 10 min   using  B arms to dorita. TE         Calf raises  2 x 15-20    TE   Marching 2 x 15-20   TE   Sidekicks  2 x 15-20  TE             Trunk ext TB      Trunk flex TB      Hip abd      Hip EXT      TG Squats                  A:  Tolerated well with short term pain relief . Limited ex's . Pt with continued c/o. Above added to written HEP. P: Continue with rehab plan x 2 more rx sessions. Rachelle Mishra PTA  Short Term goals (2-3 weeks)  1. Decrease reported pain to 0-5/10  2. Increase ROM to limited 0-25%  3. Increase Strength to 4+/5   4. Oswestry Low Back Disability Questionnaire 45% disability     Long Term goals (4-6 weeks)  1. Decrease reported pain to 0-3/10  2. Increase ROM to WNL w/o pain  3. Increase Strength to 5-/5   4.  Oswestry Low Back Disability Questionnaire 25% disability   5.  Independent with Home Exercise Programs      Treatment Charges: Mins Units   Initial Evaluation     Re-Evaluation     Ther Exercise         TE 30 2   Manual Therapy     MT     Ther Activities        TA     Gait Training          GT     Neuro Re-education NR     Modalities ES x 15  min 1   Non-Billable Service Time     Other     Total Time/Units 45 3

## 2019-06-23 NOTE — PROGRESS NOTES
Chief complaint : Bettie Ramos  presents for yearly check up and for follow-up of diabetes type 2 , hypertension and hyperlipidemia. Present illness :    Generally feels well. Patient here for scheduled follow-up visit. Diabetes type 2 : no polyuria or polydipsia. No visual changes. No pain or numbness and no unintended weight loss. Taking medications regularly. Glucose levels at home : Not checking regularly  Last eye exam : Over a year ago. Patient fully informed    Hypertension : No headaches. No exertional chest pain, dyspnea or edema. Taking medications regularly. Trying to avoid excessive salt intake. reports that he drinks alcohol. Exercise : Does not    Hyperlipidemia : no myalgias or muscle weakness. Diet : Diabetic  Medications : As listed    Recent labs reviewed and discussed with patient.     Other concerns : numbness of the toes and burning of the thighs  Was evaluated by PM&R  Tried gabapentin to no avail    Past Medical History:   Diagnosis Date    Arthritis     Back pain     5th finger & ring finger on right hand is numb    Bell's palsy     NEW ONSET 3-     Hyperlipidemia     Hypertension     Neuropathy     toes numb    Poorly controlled type 2 diabetes mellitus with neuropathy ([facility] [address] 5/8/2018    Sacral radiculitis 2/27/2019    Spondylosis of lumbar spine 3/9/2019    Advanced arthritis and degenerative disc disease by MRI 3/2019    Ventral hernia        Past Surgical History:   Procedure Laterality Date    CARPAL TUNNEL RELEASE Right 03/29/2019    right wrist carpal tunnel release and right elbow cubital tunnel release    CARPAL TUNNEL RELEASE Right 3/29/2019    RIGHT WRIST CARPAL TUNNEL RELEASE AND RIGHT ELBOW CUBITAL TUNNEL RELEASE performed by De Rush DO at [place] [address]      at age 40 polyps    CYST REMOVAL Right     HERNIA REPAIR Bilateral     groin and umbilical    HERNIA REPAIR N/A 12/13/2018    ROBOTIC ASSISTED LAPAROSCOPIC sores. No skin lesions, no rash. No heat or cold intolerance. No weight changes. No flank pain, no hematuria. No genital lesions or abnormalities. Physical examination :  Blood pressure 118/77, pulse 73, resp. rate 16, height 5' 8\" (1.727 m), weight 219 lb (99.3 kg). GA: alert oriented x 3, no distress. HEENT : unremarkable. Neck : supple, no masses. Thyroid : no goiter. Lymph nodes : No cervical or supraclavicular lymphadenopathy. Carotids : no  bruits. Chest : no deformities. Lungs : clear, no wheezing or crackles. No consolidation. Heart : regular rate and rhythm, normal S1S2 . No murmurs. Abdomen : soft, no masses. No hepatomegaly  or splenomegaly. No bruits. Extremities : no edema. Normal peripheral pulses. Skin : no suspicious lesions or rash. Musculoskeletal : no gross deformities. Feet : no lesions or deformities. Decreased sensations to touch, monofilament, cold and vibration. Neurological : normal gait and balance. Normal speech. No focal deficits. Psychiatric : well groomed. Normal thought content. Mood is normal and affect is congruent. BMI was elevated today, and weight loss plan recommended is : conventional weight loss and daily exercise regimen. Admission on 03/29/2019, Discharged on 03/29/2019   Component Date Value    Meter Glucose 03/29/2019 100*       Assessment / Plan :      1. Type 2 diabetes mellitus without complication, without long-term current use of insulin (HCC)  Controlled. Monitor A1c level periodically    2. Essential hypertension  Very well controlled. Continue same regimen    3. Bell's palsy  Resolved    4. Spondylosis of lumbar spine  Stable    5. Chronic bilateral low back pain with right-sided sciatica  Stable    6. Peripheral neuropathy and neuropathic pain. Chronic problem  Tried neurontin up to 1200 mg per day to no avail. Was denied disability.   Trial of lyrica    Pneumovax  Tdap    Refer for colonoscopy in [place]        Patient again reminded of importance of close control of  hyperglycemia, hypertension  and hyperlipidemia in order to minimize the risks of accelerated atherosclerosis ( heart disease, strokes, PAD ) and microvascular complications such as renal disease, retinal disease and neuropathies. Also reminded of the importance of a low calorie, low fat and low salt diet. Encouraged to exercise regularly and maintain proper foot care at all times. Patient advised to check feet daily and call if any sores lesions or skin abnormalities or changes. To have yearly eye examinations and monitor the glucose levels at home. All questions answered. Call at any time if sugar levels remains high or abnormally low. Call if sore of foot or leg. Call if chest pain with exertion or with physical activity. Call if unusual shortness or breath or if any questions or concerns. Follow-up : as scheduled.

## 2019-06-24 ENCOUNTER — OFFICE VISIT (OUTPATIENT)
Dept: FAMILY MEDICINE CLINIC | Age: 59
End: 2019-06-24
Payer: MEDICAID

## 2019-06-24 ENCOUNTER — TELEPHONE (OUTPATIENT)
Dept: FAMILY MEDICINE CLINIC | Age: 59
End: 2019-06-24

## 2019-06-24 VITALS
BODY MASS INDEX: 33.19 KG/M2 | RESPIRATION RATE: 16 BRPM | HEIGHT: 68 IN | DIASTOLIC BLOOD PRESSURE: 77 MMHG | WEIGHT: 219 LBS | HEART RATE: 73 BPM | SYSTOLIC BLOOD PRESSURE: 118 MMHG

## 2019-06-24 DIAGNOSIS — M79.2 PERIPHERAL NEUROPATHIC PAIN: ICD-10-CM

## 2019-06-24 DIAGNOSIS — M54.41 CHRONIC BILATERAL LOW BACK PAIN WITH RIGHT-SIDED SCIATICA: ICD-10-CM

## 2019-06-24 DIAGNOSIS — G51.0 BELL'S PALSY: Chronic | ICD-10-CM

## 2019-06-24 DIAGNOSIS — I10 ESSENTIAL HYPERTENSION: ICD-10-CM

## 2019-06-24 DIAGNOSIS — E11.9 TYPE 2 DIABETES MELLITUS WITHOUT COMPLICATION, WITHOUT LONG-TERM CURRENT USE OF INSULIN (HCC): Primary | ICD-10-CM

## 2019-06-24 DIAGNOSIS — M47.816 SPONDYLOSIS OF LUMBAR SPINE: Chronic | ICD-10-CM

## 2019-06-24 DIAGNOSIS — Z12.11 SCREEN FOR COLON CANCER: ICD-10-CM

## 2019-06-24 DIAGNOSIS — G89.29 CHRONIC BILATERAL LOW BACK PAIN WITH RIGHT-SIDED SCIATICA: ICD-10-CM

## 2019-06-24 PROCEDURE — 90732 PPSV23 VACC 2 YRS+ SUBQ/IM: CPT | Performed by: FAMILY MEDICINE

## 2019-06-24 PROCEDURE — 99396 PREV VISIT EST AGE 40-64: CPT | Performed by: FAMILY MEDICINE

## 2019-06-24 PROCEDURE — 90715 TDAP VACCINE 7 YRS/> IM: CPT | Performed by: FAMILY MEDICINE

## 2019-06-24 PROCEDURE — 90471 IMMUNIZATION ADMIN: CPT | Performed by: FAMILY MEDICINE

## 2019-06-24 PROCEDURE — 90472 IMMUNIZATION ADMIN EACH ADD: CPT | Performed by: FAMILY MEDICINE

## 2019-06-24 RX ORDER — PREGABALIN 75 MG/1
75 CAPSULE ORAL 2 TIMES DAILY
Qty: 60 CAPSULE | Refills: 3 | Status: SHIPPED | OUTPATIENT
Start: 2019-06-24 | End: 2019-08-12

## 2019-06-25 ENCOUNTER — TREATMENT (OUTPATIENT)
Dept: OCCUPATIONAL THERAPY | Age: 59
End: 2019-06-25
Payer: MEDICAID

## 2019-06-25 ENCOUNTER — TREATMENT (OUTPATIENT)
Dept: PHYSICAL THERAPY | Age: 59
End: 2019-06-25
Payer: MEDICAID

## 2019-06-25 DIAGNOSIS — G89.29 CHRONIC BILATERAL LOW BACK PAIN WITH RIGHT-SIDED SCIATICA: Primary | ICD-10-CM

## 2019-06-25 DIAGNOSIS — M54.41 CHRONIC BILATERAL LOW BACK PAIN WITH RIGHT-SIDED SCIATICA: Primary | ICD-10-CM

## 2019-06-25 DIAGNOSIS — G56.01 RIGHT CARPAL TUNNEL SYNDROME: ICD-10-CM

## 2019-06-25 DIAGNOSIS — G56.21 CUBITAL TUNNEL SYNDROME ON RIGHT: Primary | ICD-10-CM

## 2019-06-25 PROCEDURE — 97110 THERAPEUTIC EXERCISES: CPT | Performed by: OCCUPATIONAL THERAPIST

## 2019-06-25 PROCEDURE — 97014 ELECTRIC STIMULATION THERAPY: CPT | Performed by: PHYSICAL THERAPIST

## 2019-06-25 PROCEDURE — 97110 THERAPEUTIC EXERCISES: CPT | Performed by: PHYSICAL THERAPIST

## 2019-06-25 PROCEDURE — 97018 PARAFFIN BATH THERAPY: CPT | Performed by: OCCUPATIONAL THERAPIST

## 2019-06-25 PROCEDURE — 97140 MANUAL THERAPY 1/> REGIONS: CPT | Performed by: OCCUPATIONAL THERAPIST

## 2019-06-25 PROCEDURE — 97530 THERAPEUTIC ACTIVITIES: CPT | Performed by: OCCUPATIONAL THERAPIST

## 2019-06-25 NOTE — PROGRESS NOTES
Physical Therapy Treatment Note    Date: 2019  Patient Name: Fanny Qiu  : 1960   MRN: [id number]  DOInjury: 2018 became significantly worse, has had pain for years  DOSx: none  Referring Provider: You Huynh DO   [facility], [address] [facility]          Medical Diagnosis:   M54.41, G89.29 (ICD-10-CM) - Chronic bilateral low back pain with right-sided sciatica   M54.17 (ICD-10-CM) - Sacral radiculitis     Outcome Measure:  Eval score = 66%  Mid point score =50%      S: pt reports soreness today. O:  Time 1500- 1615              Visit   Repeat outcome measure at mid point and end. Pain -8  /10 Low back Disability  Questionnaire   completed mid point 2019  Score = 50%     ROM      Modalities      MH + ES X 15 min supine       Post ex's MO    Pre-mod setting      Exercise      ALL EXERCISE DONE WITH DRAW-IN TECHNIQUE                            Functional activities To aid in reaching , pushing, pulling tasks at home     ROWS: H  unable due to R arm  \" TA   ROWS: M \" \" TA   ROWS: L \" \" TA   Obliques - high  \"    Obliques - low  \"     THEREX           Nustep   L 5 x 10 min   using  B arms to dorita. TE         Calf raises  2 x 15-20    TE   Marching 2 x 15-20   TE   Sidekicks  2 x 15-20  TE             Trunk ext TB      Trunk flex TB      Hip abd      Hip EXT      TG Squats                  A:  Tolerated well with short term pain relief . Limited ex's . Pt with continued c/o. Above added to written HEP. P: Continue with rehab plan x 1 more rx session. [place], PTA  Short Term goals (2-3 weeks)  1. Decrease reported pain to 0-5/10  2. Increase ROM to limited 0-25%  3. Increase Strength to 4+/5   4. Oswestry Low Back Disability Questionnaire 45% disability     Long Term goals (4-6 weeks)  1. Decrease reported pain to 0-3/10  2. Increase ROM to WNL w/o pain  3. Increase Strength to 5-/5   4.  Oswestry Low Back Disability Questionnaire 25%

## 2019-06-25 NOTE — PROGRESS NOTES
OCCUPATIONAL THERAPY   PROGRESS NOTE    Date:  2019                         Initial Evaluation Date: 2019    Patient Name:  Licha Nash    :  1960    Restrictions/Precautions: Activity as tolerated, Low fall risk  Diagnosis:  R CTS/ R cubital tunnel                                                   Insurance/Certification information:  [facility]  Referring Physician:  Dr Laci Soria  Date of Surgery/Injury: surgery 3-29-19  Plan of care signed (Y/N):  Y  Visit# / total visits:  4/ up to 6 (9 prior)    Pain Level: 5- 6/10 in the R wrist/ hand / tender in the elbow scar  Subjective:  \" My hand hurts worse than my elbow\". Objective:  Updated POC to be completed by visit 9. INTERVENTION: COMPLETED: SPECIFICS/COMMENTS:   Modality:     MHP  Pre and post treatment to decrease pain    Paraffin Tx X 10 min. For soft tissue warm- up R wrist/hand prior to exercise   AROM:     Intrinsic hand ex x Digit ABD/ ADD, finger walking, isolated finger extension   Tendon gliding X - R hand 10 rep's / VQ to decrease straining with ex   Wrist er ciser     AAROM:     UBE  Standing 2 min forward and 2 min back - no ^  In pain        PROM/Stretching:     R wrist and elbow all planes x Wrist ext stretch with thumb ext stretch        Scar Mass/Edema Control:     Scar massage x    Strengthening:     Putty ex- med soft X    x 20, roll with finger/ wrist extension, intrinsics   Putty ex- tools/ soft  Hook, large lid   giuliana bar/ green 10x each Twist 2 directions, bending   Weighted stick x 2 weight deviations x 20   Elbow PREs 5# 1-20 Curls/ neutral and supination   Wrist PREs 3# 1-20 each Flexion/ ext/ supin/ pron   Other: HEP     median nerve glide/ tendon glides X- 5 rep;s    Scar massage x           Assessment/Comments: Pt is making Good- progress toward stated plan of care.   -Rehab Potential: Good  -Requires OT Follow Up: Yes  Time In: 1355          Time Out:  1455       Visit #  prior Tx    Treatment Charges: Mins Units   Modalities-fluido. 10 1   Ther Exercise 20 1   Manual Therapy 10 1   Thera Activities 20 1   ADL/Home Mgt      Neuro Re-education     Gait Training     Group Therapy     Non-Billable Service Time MHP     Other     Total Time/Units 60 4       -Response to Treatment:  Tolerance for resistive activity continues to get better. Discomfort in along to median and ulnar nerves continues but with decreasing intensity. Goals: Goals for pt can be seen on initial eval occurring on 4/16/2019    Plan:   [x]  Continue Plan of care: Treatment covered based on POC and graduated to patient's progress. Pt education continues at each visit to obtain maximum benefits from skilled OT intervention.   []  [address] of care:   []  Discharge:      Mike Maldonado  OT/L   [id number]

## 2019-06-26 DIAGNOSIS — G56.21 CUBITAL TUNNEL SYNDROME ON RIGHT: ICD-10-CM

## 2019-06-26 RX ORDER — OXYCODONE AND ACETAMINOPHEN 7.5; 325 MG/1; MG/1
1 TABLET ORAL DAILY PRN
Qty: 7 TABLET | Refills: 0 | Status: SHIPPED | OUTPATIENT
Start: 2019-06-26 | End: 2019-07-03 | Stop reason: SDUPTHER

## 2019-06-27 ENCOUNTER — TREATMENT (OUTPATIENT)
Dept: PHYSICAL THERAPY | Age: 59
End: 2019-06-27
Payer: MEDICAID

## 2019-06-27 ENCOUNTER — TREATMENT (OUTPATIENT)
Dept: OCCUPATIONAL THERAPY | Age: 59
End: 2019-06-27
Payer: MEDICAID

## 2019-06-27 DIAGNOSIS — G56.21 CUBITAL TUNNEL SYNDROME ON RIGHT: Primary | ICD-10-CM

## 2019-06-27 DIAGNOSIS — M54.41 CHRONIC BILATERAL LOW BACK PAIN WITH RIGHT-SIDED SCIATICA: Primary | ICD-10-CM

## 2019-06-27 DIAGNOSIS — G56.01 RIGHT CARPAL TUNNEL SYNDROME: ICD-10-CM

## 2019-06-27 DIAGNOSIS — G89.29 CHRONIC BILATERAL LOW BACK PAIN WITH RIGHT-SIDED SCIATICA: Primary | ICD-10-CM

## 2019-06-27 PROCEDURE — 97018 PARAFFIN BATH THERAPY: CPT | Performed by: OCCUPATIONAL THERAPIST

## 2019-06-27 PROCEDURE — 97110 THERAPEUTIC EXERCISES: CPT

## 2019-06-27 PROCEDURE — 97110 THERAPEUTIC EXERCISES: CPT | Performed by: OCCUPATIONAL THERAPIST

## 2019-06-27 PROCEDURE — 97140 MANUAL THERAPY 1/> REGIONS: CPT | Performed by: OCCUPATIONAL THERAPIST

## 2019-06-27 PROCEDURE — 97530 THERAPEUTIC ACTIVITIES: CPT | Performed by: OCCUPATIONAL THERAPIST

## 2019-06-27 PROCEDURE — 97014 ELECTRIC STIMULATION THERAPY: CPT

## 2019-06-27 NOTE — PROGRESS NOTES
Charges: Mins Units   Modalities-fluido. 10 1   Ther Exercise 15 1   Manual Therapy 10 1   Thera Activities 20 1   ADL/Home Mgt      Neuro Re-education     Gait Training     Group Therapy     Non-Billable Service Time MHP     Other     Total Time/Units 55 4       -Response to Treatment:  Tolerance for resistive activity continues to get better. Discomfort in along to median and ulnar nerves continues but with decreasing intensity. Goals: Goals for pt can be seen on initial eval occurring on 4/16/2019    Plan:   [x]  Continue Plan of care: Treatment covered based on POC and graduated to patient's progress. Pt education continues at each visit to obtain maximum benefits from skilled OT intervention.   []  [address] of care:   []  Discharge:      Reba Ramos  OT/L   [id number]

## 2019-06-30 DIAGNOSIS — I10 ESSENTIAL HYPERTENSION: ICD-10-CM

## 2019-07-01 RX ORDER — LOSARTAN POTASSIUM AND HYDROCHLOROTHIAZIDE 12.5; 1 MG/1; MG/1
TABLET ORAL
Qty: 30 TABLET | Refills: 5 | Status: SHIPPED | OUTPATIENT
Start: 2019-07-01 | End: 2020-01-22

## 2019-07-02 ENCOUNTER — PREP FOR PROCEDURE (OUTPATIENT)
Dept: PHYSICAL MEDICINE AND REHAB | Age: 59
End: 2019-07-02

## 2019-07-02 ENCOUNTER — OFFICE VISIT (OUTPATIENT)
Dept: PHYSICAL MEDICINE AND REHAB | Age: 59
End: 2019-07-02
Payer: MEDICAID

## 2019-07-02 ENCOUNTER — TREATMENT (OUTPATIENT)
Dept: OCCUPATIONAL THERAPY | Age: 59
End: 2019-07-02
Payer: MEDICAID

## 2019-07-02 VITALS
SYSTOLIC BLOOD PRESSURE: 130 MMHG | HEART RATE: 70 BPM | HEIGHT: 68 IN | DIASTOLIC BLOOD PRESSURE: 81 MMHG | BODY MASS INDEX: 33.49 KG/M2 | WEIGHT: 221 LBS

## 2019-07-02 DIAGNOSIS — M54.17 LUMBOSACRAL RADICULITIS: Primary | ICD-10-CM

## 2019-07-02 DIAGNOSIS — G56.21 CUBITAL TUNNEL SYNDROME ON RIGHT: Primary | ICD-10-CM

## 2019-07-02 DIAGNOSIS — M47.816 LUMBAR FACET ARTHROPATHY: ICD-10-CM

## 2019-07-02 DIAGNOSIS — G56.01 RIGHT CARPAL TUNNEL SYNDROME: ICD-10-CM

## 2019-07-02 DIAGNOSIS — M47.816 LUMBAR SPONDYLOSIS: ICD-10-CM

## 2019-07-02 PROCEDURE — 97140 MANUAL THERAPY 1/> REGIONS: CPT | Performed by: OCCUPATIONAL THERAPIST

## 2019-07-02 PROCEDURE — 97110 THERAPEUTIC EXERCISES: CPT | Performed by: OCCUPATIONAL THERAPIST

## 2019-07-02 PROCEDURE — 97530 THERAPEUTIC ACTIVITIES: CPT | Performed by: OCCUPATIONAL THERAPIST

## 2019-07-02 PROCEDURE — 3017F COLORECTAL CA SCREEN DOC REV: CPT | Performed by: PHYSICAL MEDICINE & REHABILITATION

## 2019-07-02 PROCEDURE — G8417 CALC BMI ABV UP PARAM F/U: HCPCS | Performed by: PHYSICAL MEDICINE & REHABILITATION

## 2019-07-02 PROCEDURE — 99214 OFFICE O/P EST MOD 30 MIN: CPT | Performed by: PHYSICAL MEDICINE & REHABILITATION

## 2019-07-02 PROCEDURE — G8427 DOCREV CUR MEDS BY ELIG CLIN: HCPCS | Performed by: PHYSICAL MEDICINE & REHABILITATION

## 2019-07-02 PROCEDURE — 1036F TOBACCO NON-USER: CPT | Performed by: PHYSICAL MEDICINE & REHABILITATION

## 2019-07-02 PROCEDURE — 97022 WHIRLPOOL THERAPY: CPT | Performed by: OCCUPATIONAL THERAPIST

## 2019-07-02 NOTE — PROGRESS NOTES
Back/Hip Exam:   Inspection: Pelvis was symmetric. Lumbar lordotic curvature was decreased. There was no scoliosis. No ecchymoses or erythema. Palpation: Palpatory exam revealed tenderness along lumbosacral paraspinals, midline spine, no ttp SI joint sulcus, greater trochanters and TFL on both side. There was paraspinal spasms. There were no trigger points. ROM: ROM decreased  Special/provocative testing:   Supine SLR negative   negative FABERS. Neurological Exam:  Strength:   R  L  Hip Flex  5  5  Knee Ext  5  5  Ankle dorsi  5  5  EHL   5 5  Ankle Plantar  5  5    Sensory:  Decreased sensation LT bilateral soles      Reflexes:   R  L  Patellar  (3+) (3+)  Ankle Jerk  (3+) (3+)      Gait is Antalgic. Difficulty with toe and heel walking       Imaging: (personally reviewed by me 07/02/19)  MRI L spine   T12-L1: Normal T12-L1       L1-L2: Demonstrates a broad-based diffuse disc bulge which flattens   the anterior thecal sac. This also results in bilateral neuroforaminal   narrowing which may contact the left exiting nerve root, sagittal   image 14 series 3. Correlation with side and level of radiculopathy   would be helpful       L2-L3: Diffuse disc bulge flattening the anterior thecal sac. Bilateral neuroforaminal narrowing, not grossly contacting the exiting   nerve roots. Disc bulge raises the posterior longitudinal ligament.       L3-L4: Diffuse disc bulge which flattens the anterior thecal sac. The   nerve roots appear to have exited uninvolved.       L4-L5: Diffuse disc bulge hypertrophy of posterior elements resulting   in transverse narrowing of the spinal canal. The facet joints may   contact the right and left descending nerve roots at this level axial   image 23.  The exiting nerve roots are involved.       L5-S1: Demonstrates a diffuse disc bulge not contacting the exiting   nerve roots.       SOFT TISSUES: The visualized paravertebral soft tissues are within   normal limits.         Impression   Multilevel advanced arthritis and degenerative disc disease seen   throughout the lumbar spine with neuroforaminal narrowing most of   which appeared not to affect the exiting nerve roots although some   levels are equivocal and should be correlated with the side level   radiculopathy.             Impression:   Socorro Schultz is a 63 y.o. male     1. Lumbosacral radiculitis    2. Lumbar facet arthropathy    3. Lumbar spondylosis        Plan:   · MRI reviewed. · Patient would benefit from bilateral L4-5 TFESI. Procedure risks, benefits and alternatives were discussed. Patient would like to proceed. · voltaren 50mg BID PRN   · lyrica in prior auth process     The patient was educated about the diagnosis, prognosis, indications, risks and benefits of treatment. An opportunity to ask questions was given to the patient and questions were answered. The patient agreed to proceed with the recommended treatment as described above.      Follow up after injection     Chhaya Baker DO [facility]   Board Certified Physical Medicine and Rehabilitation

## 2019-07-02 NOTE — H&P
tablet 0    pregabalin (LYRICA) 75 MG capsule Take 1 capsule by mouth 2 times daily for 30 days. 60 capsule 3    TRUEPLUS LANCETS 33G MISC use as directed 3 TIMES A WEEK 100 each 5    diclofenac (VOLTAREN) 50 MG EC tablet take 1 tablet by mouth twice a day if needed for pain 60 tablet 2    Ertugliflozin L-PyroglutamicAc (STEGLATRO) 5 MG TABS take 1 tablet by mouth once daily 30 tablet 5    atorvastatin (LIPITOR) 10 MG tablet take 1 tablet by mouth once daily 90 tablet 1    fluticasone (FLONASE) 50 MCG/ACT nasal spray instill 2 sprays into each nostril once daily (Patient taking differently: instill 2 sprays into each nostril once daily PRN) 32 g 2    TRUE METRIX BLOOD GLUCOSE TEST strip TEST 3 TIMES A WEEK 50 strip 5    glipiZIDE (GLUCOTROL XL) 10 MG extended release tablet Take 1 tablet by mouth daily 90 tablet 1     No current facility-administered medications for this visit.         Past Medical History:   Diagnosis Date    Arthritis     Back pain     5th finger & ring finger on right hand is numb    Bell's palsy     NEW ONSET 3-     Hyperlipidemia     Hypertension     Neuropathy     toes numb    Poorly controlled type 2 diabetes mellitus with neuropathy ([place]) 5/8/2018    Sacral radiculitis 2/27/2019    Spondylosis of lumbar spine 3/9/2019    Advanced arthritis and degenerative disc disease by MRI 3/2019    Ventral hernia        Past Surgical History:   Procedure Laterality Date    CARPAL TUNNEL RELEASE Right 03/29/2019    right wrist carpal tunnel release and right elbow cubital tunnel release    CARPAL TUNNEL RELEASE Right 3/29/2019    RIGHT WRIST CARPAL TUNNEL RELEASE AND RIGHT ELBOW CUBITAL TUNNEL RELEASE performed by Cait Montenegro DO at [place] [address]      at age 40 polyps    CYST REMOVAL Right     HERNIA REPAIR Bilateral     groin and umbilical    HERNIA REPAIR N/A 12/13/2018    ROBOTIC ASSISTED LAPAROSCOPIC PRIMARY REPAIR OF RECURRENT VENTRAL HERNIA  REPAIR

## 2019-07-03 DIAGNOSIS — G56.21 CUBITAL TUNNEL SYNDROME ON RIGHT: ICD-10-CM

## 2019-07-03 RX ORDER — OXYCODONE AND ACETAMINOPHEN 7.5; 325 MG/1; MG/1
1 TABLET ORAL DAILY PRN
Qty: 7 TABLET | Refills: 0 | Status: SHIPPED | OUTPATIENT
Start: 2019-07-03 | End: 2019-07-09 | Stop reason: SDUPTHER

## 2019-07-09 ENCOUNTER — OFFICE VISIT (OUTPATIENT)
Dept: ORTHOPEDIC SURGERY | Age: 59
End: 2019-07-09
Payer: MEDICAID

## 2019-07-09 VITALS — WEIGHT: 210 LBS | BODY MASS INDEX: 31.83 KG/M2 | TEMPERATURE: 98 F | HEIGHT: 68 IN

## 2019-07-09 DIAGNOSIS — G56.01 RIGHT CARPAL TUNNEL SYNDROME: ICD-10-CM

## 2019-07-09 DIAGNOSIS — G56.21 CUBITAL TUNNEL SYNDROME ON RIGHT: Primary | ICD-10-CM

## 2019-07-09 PROCEDURE — 99213 OFFICE O/P EST LOW 20 MIN: CPT | Performed by: ORTHOPAEDIC SURGERY

## 2019-07-09 PROCEDURE — G8417 CALC BMI ABV UP PARAM F/U: HCPCS | Performed by: ORTHOPAEDIC SURGERY

## 2019-07-09 PROCEDURE — 3017F COLORECTAL CA SCREEN DOC REV: CPT | Performed by: ORTHOPAEDIC SURGERY

## 2019-07-09 PROCEDURE — G8427 DOCREV CUR MEDS BY ELIG CLIN: HCPCS | Performed by: ORTHOPAEDIC SURGERY

## 2019-07-09 PROCEDURE — 1036F TOBACCO NON-USER: CPT | Performed by: ORTHOPAEDIC SURGERY

## 2019-07-09 RX ORDER — OXYCODONE AND ACETAMINOPHEN 7.5; 325 MG/1; MG/1
1 TABLET ORAL EVERY 12 HOURS PRN
Qty: 14 TABLET | Refills: 0 | Status: SHIPPED | OUTPATIENT
Start: 2019-07-09 | End: 2019-07-16 | Stop reason: SDUPTHER

## 2019-07-09 NOTE — PROGRESS NOTES
bilateral upper extremities, there is no evidence of deformity of the wrist.  ROM Wrist ROM R wrist DF 80, VF 90, L wrist DF 80, VF 90, R pronation 90/ supination 90, L pronation 90/supination 90. Motor strength is 5/5 with Dorsiflexion/Volarflexion/Supination/Pronation. Motor and sensation is intact and symmetric throughout the bilateral upper extremities in the median, ulnar and radial , musclcutaneous, and axillary nerve distributions. Hand exam:  The skin overlying the hand is  intact. There is not evidence of scar, lesion, laceration, or abrasion. The motion in the small joints of the hand are intact with no stiffness or deformity. The ROM in the MCP flexion 90/ extension 0 , PIP flexion 90/ extension 0, DIP flexion 70/ extension 0. There is not rotational deformity. There is no masses or adenopathy in bilateral upper extremities. Radial pulses are 2+ and symmetric bilaterally. Capillary refill is intact and < 2 seconds. Motor strength is 5/5 with flexion and extension of the small finger joints. Phallens sign(-), Tinnells sign (-), Median nerve compression test (-),  Finklesteins (-), [employer] Grind test (-), [employer](-). Xrays:   n/a  Radiographic findings reviewed with patient    Impression:   Encounter Diagnoses   Name Primary?  Cubital tunnel syndrome on right Yes    Right carpal tunnel syndrome        Plan:   Natural history and expected course discussed. Questions answered. Educational materials distributed. Rest, ice, compression, and elevation (RICE) therapy. Reduction in offending activity discussed.    Ok to discontinue OT  Continue HEP  Activity as tolerated  Fu in 3 months

## 2019-07-11 ENCOUNTER — HOSPITAL ENCOUNTER (OUTPATIENT)
Age: 59
Setting detail: OUTPATIENT SURGERY
Discharge: HOME | End: 2019-07-11
Attending: PHYSICAL MEDICINE & REHABILITATION | Admitting: PHYSICAL MEDICINE & REHABILITATION
Payer: MEDICAID

## 2019-07-11 VITALS
TEMPERATURE: 98 F | HEART RATE: 86 BPM | RESPIRATION RATE: 14 BRPM | OXYGEN SATURATION: 99 % | DIASTOLIC BLOOD PRESSURE: 83 MMHG | SYSTOLIC BLOOD PRESSURE: 136 MMHG

## 2019-07-11 DIAGNOSIS — M54.16 LUMBAR RADICULOPATHY: ICD-10-CM

## 2019-07-11 PROCEDURE — 64483 NJX AA&/STRD TFRM EPI L/S 1: CPT | Performed by: PHYSICAL MEDICINE & REHABILITATION

## 2019-07-11 PROCEDURE — 2500000003 HC RX 250 WO HCPCS: Performed by: PHYSICAL MEDICINE & REHABILITATION

## 2019-07-11 PROCEDURE — 7100000010 HC PHASE II RECOVERY - FIRST 15 MIN: Performed by: PHYSICAL MEDICINE & REHABILITATION

## 2019-07-11 PROCEDURE — 6360000004 HC RX CONTRAST MEDICATION: Performed by: PHYSICAL MEDICINE & REHABILITATION

## 2019-07-11 PROCEDURE — 6360000002 HC RX W HCPCS: Performed by: PHYSICAL MEDICINE & REHABILITATION

## 2019-07-11 PROCEDURE — 2709999900 HC NON-CHARGEABLE SUPPLY: Performed by: PHYSICAL MEDICINE & REHABILITATION

## 2019-07-11 PROCEDURE — 3600000005 HC SURGERY LEVEL 5 BASE: Performed by: PHYSICAL MEDICINE & REHABILITATION

## 2019-07-11 PROCEDURE — 7100000011 HC PHASE II RECOVERY - ADDTL 15 MIN: Performed by: PHYSICAL MEDICINE & REHABILITATION

## 2019-07-11 PROCEDURE — 2580000003 HC RX 258: Performed by: PHYSICAL MEDICINE & REHABILITATION

## 2019-07-11 RX ORDER — LIDOCAINE HYDROCHLORIDE 10 MG/ML
INJECTION, SOLUTION EPIDURAL; INFILTRATION; INTRACAUDAL; PERINEURAL PRN
Status: DISCONTINUED | OUTPATIENT
Start: 2019-07-11 | End: 2019-07-11 | Stop reason: ALTCHOICE

## 2019-07-11 ASSESSMENT — PAIN SCALES - GENERAL
PAINLEVEL_OUTOF10: 4
PAINLEVEL_OUTOF10: 6

## 2019-07-11 ASSESSMENT — PAIN - FUNCTIONAL ASSESSMENT: PAIN_FUNCTIONAL_ASSESSMENT: 0-10

## 2019-07-11 ASSESSMENT — PAIN DESCRIPTION - DESCRIPTORS: DESCRIPTORS: ACHING;DISCOMFORT

## 2019-07-11 NOTE — H&P
for Pain for up to 7 days. 7 tablet 0    pregabalin (LYRICA) 75 MG capsule Take 1 capsule by mouth 2 times daily for 30 days.  60 capsule 3    TRUEPLUS LANCETS 33G MISC use as directed 3 TIMES A WEEK 100 each 5    diclofenac (VOLTAREN) 50 MG EC tablet take 1 tablet by mouth twice a day if needed for pain 60 tablet 2    Ertugliflozin L-PyroglutamicAc (STEGLATRO) 5 MG TABS take 1 tablet by mouth once daily 30 tablet 5    atorvastatin (LIPITOR) 10 MG tablet take 1 tablet by mouth once daily 90 tablet 1    fluticasone (FLONASE) 50 MCG/ACT nasal spray instill 2 sprays into each nostril once daily (Patient taking differently: instill 2 sprays into each nostril once daily PRN) 32 g 2    TRUE METRIX BLOOD GLUCOSE TEST strip TEST 3 TIMES A WEEK 50 strip 5    glipiZIDE (GLUCOTROL XL) 10 MG extended release tablet Take 1 tablet by mouth daily 90 tablet 1      No current facility-administered medications for this visit.               Past Medical History:   Diagnosis Date    Arthritis      Back pain       5th finger & ring finger on right hand is numb    Bell's palsy       NEW ONSET 3-     Hyperlipidemia      Hypertension      Neuropathy       toes numb    Poorly controlled type 2 diabetes mellitus with neuropathy ([facility] [address] 5/8/2018    Sacral radiculitis 2/27/2019    Spondylosis of lumbar spine 3/9/2019     Advanced arthritis and degenerative disc disease by MRI 3/2019    Ventral hernia                 Past Surgical History:   Procedure Laterality Date    CARPAL TUNNEL RELEASE Right 03/29/2019     right wrist carpal tunnel release and right elbow cubital tunnel release    CARPAL TUNNEL RELEASE Right 3/29/2019     RIGHT WRIST CARPAL TUNNEL RELEASE AND RIGHT ELBOW CUBITAL TUNNEL RELEASE performed by Mirian Raymond DO at [place] [address]         at age 40 polyps    CYST REMOVAL Right      HERNIA REPAIR Bilateral       groin and umbilical    HERNIA REPAIR N/A 12/13/2018     ROBOTIC ASSISTED Psych: Mood is calm. Affect is normal.   Ext: No edema noted   Right hand intrinsic atrophy      MSK:   Back/Hip Exam:   Inspection: Pelvis was symmetric. Lumbar lordotic curvature was decreased. There was no scoliosis. No ecchymoses or erythema. Palpation: Palpatory exam revealed tenderness along lumbosacral paraspinals, midline spine, no ttp SI joint sulcus, greater trochanters and TFL on both side. There was paraspinal spasms. There were no trigger points. ROM: ROM decreased  Special/provocative testing:   Supine SLR negative   negative FABERS.      Neurological Exam:  Strength:                     R          L  Hip Flex                       5          5  Knee Ext                     5          5  Ankle dorsi                  5          5  EHL                             5          5  Ankle Plantar               5          5     Sensory:  Decreased sensation LT bilateral soles       Reflexes:                     R          L  Patellar                        (3+)      (3+)  Ankle Jerk                   (3+)      (3+)        Gait is Antalgic. Difficulty with toe and heel walking         Imaging: (personally reviewed by me 07/02/19)  MRI L spine   T12-L1: Normal T12-L1       L1-L2: Demonstrates a broad-based diffuse disc bulge which flattens   the anterior thecal sac. This also results in bilateral neuroforaminal   narrowing which may contact the left exiting nerve root, sagittal   image 14 series 3. Correlation with side and level of radiculopathy   would be helpful       L2-L3: Diffuse disc bulge flattening the anterior thecal sac. Bilateral neuroforaminal narrowing, not grossly contacting the exiting   nerve roots. Disc bulge raises the posterior longitudinal ligament.       L3-L4: Diffuse disc bulge which flattens the anterior thecal sac.  The   nerve roots appear to have exited uninvolved.       L4-L5: Diffuse disc bulge hypertrophy of posterior elements resulting   in transverse narrowing of the

## 2019-07-11 NOTE — OP NOTE
LUMBOSACRAL EPIDURAL STEROID INJECTION, TRANSFORAMINAL APPROACH       WITH FLUOROSCOPIC GUIDANCE    Patient: Marciano Villatoro                         MRN#: [id number]  : 1960   Date of procedure: 2019      Physician Performing Procedure:  Gordon Linda DO    Clinical Scenario: As per electronic documentation. Diagnosis: lumbar radiculitis     Injectate: A total of 5cc, consisting of 1 cc of Dexamethasone 10mg/ml,  with the remainder of normal saline    Levels Treated:  Bilateral L4-5 neural foramen    Approach:   Transforaminal    Improvement after today's procedure: As per nursing record. Comments:  None     Pre-procedural evaluation: The patient was examined today just prior to performing the procedure listed above. The patient's heart rate was normal, lungs were clear to auscultation bilaterally. Procedure: The patient was prepped and draped in a sterile fashion in the prone position after informed consent was signed and all the patient's questions were answered including the risks, benefits, alternative treatment options, and prognosis. The risks include - but are not limited to - infection, allergic reaction, increased pain, lack of therapeutic benefit, steroid reaction, nerve damage, paralysis, stroke, epidural hematoma, syncope, headache, respiratory or cardiac arrest, and scar formation. The C-arm was positioned so that an oblique view of the neural foramen as noted above was visualized. The soft tissues overlying this structure were infiltrated with 2-3 cc. of 1% Lidocaine without Epinephrine. A 22 gauge 3.5 inch spinal needle was inserted toward the target using a trajectory view along the fluoroscope beam.  Under AP and lateral visualization, the needle was advanced so it did not puncture dura. Biplanar projections were used to confirm position. Aspiration was confirmed to be negative for CSF and/or blood.   A 1-2 cc. volume of Isovue contrast was injected at this

## 2019-07-12 ENCOUNTER — TELEPHONE (OUTPATIENT)
Dept: FAMILY MEDICINE CLINIC | Age: 59
End: 2019-07-12

## 2019-07-12 DIAGNOSIS — J30.2 CHRONIC SEASONAL ALLERGIC RHINITIS: ICD-10-CM

## 2019-07-12 RX ORDER — FLUTICASONE PROPIONATE 50 MCG
SPRAY, SUSPENSION (ML) NASAL
Qty: 1 BOTTLE | Refills: 5 | Status: ON HOLD
Start: 2019-07-12 | End: 2021-05-12

## 2019-07-12 NOTE — TELEPHONE ENCOUNTER
Patient called and wanted to know if he should adjust his sugar medication. He had a steroid injection yesterday and they told him it may raise his sugar. Today it was 250. It is usually upper 100s so he was concerned. Please advise.

## 2019-07-16 ENCOUNTER — TELEPHONE (OUTPATIENT)
Dept: ORTHOPEDIC SURGERY | Age: 59
End: 2019-07-16

## 2019-07-16 DIAGNOSIS — G56.21 CUBITAL TUNNEL SYNDROME ON RIGHT: ICD-10-CM

## 2019-07-16 RX ORDER — OXYCODONE AND ACETAMINOPHEN 7.5; 325 MG/1; MG/1
1 TABLET ORAL EVERY 12 HOURS PRN
Qty: 14 TABLET | Refills: 0 | Status: SHIPPED | OUTPATIENT
Start: 2019-07-16 | End: 2019-07-23 | Stop reason: SDUPTHER

## 2019-07-16 NOTE — TELEPHONE ENCOUNTER
Patient called in for refill of  oxyCODONE-acetaminophen (PERCOCET) 7.5-325 MG per tablet    To  [facility] [place], [place] - [id number] [phone]

## 2019-07-23 ENCOUNTER — TELEPHONE (OUTPATIENT)
Dept: ORTHOPEDIC SURGERY | Age: 59
End: 2019-07-23

## 2019-07-23 DIAGNOSIS — G56.21 CUBITAL TUNNEL SYNDROME ON RIGHT: ICD-10-CM

## 2019-07-23 RX ORDER — OXYCODONE AND ACETAMINOPHEN 7.5; 325 MG/1; MG/1
1 TABLET ORAL EVERY 12 HOURS PRN
Qty: 14 TABLET | Refills: 0 | Status: SHIPPED | OUTPATIENT
Start: 2019-07-23 | End: 2019-07-30 | Stop reason: SDUPTHER

## 2019-07-24 ENCOUNTER — OFFICE VISIT (OUTPATIENT)
Dept: PHYSICAL MEDICINE AND REHAB | Age: 59
End: 2019-07-24
Payer: MEDICAID

## 2019-07-24 VITALS
HEART RATE: 81 BPM | DIASTOLIC BLOOD PRESSURE: 75 MMHG | SYSTOLIC BLOOD PRESSURE: 126 MMHG | HEIGHT: 68 IN | BODY MASS INDEX: 34.1 KG/M2 | WEIGHT: 225 LBS

## 2019-07-24 DIAGNOSIS — M47.816 LUMBAR SPONDYLOSIS: ICD-10-CM

## 2019-07-24 DIAGNOSIS — M47.819 FACET ARTHROPATHY: Primary | ICD-10-CM

## 2019-07-24 PROCEDURE — G8427 DOCREV CUR MEDS BY ELIG CLIN: HCPCS | Performed by: PHYSICAL MEDICINE & REHABILITATION

## 2019-07-24 PROCEDURE — 1036F TOBACCO NON-USER: CPT | Performed by: PHYSICAL MEDICINE & REHABILITATION

## 2019-07-24 PROCEDURE — 99214 OFFICE O/P EST MOD 30 MIN: CPT | Performed by: PHYSICAL MEDICINE & REHABILITATION

## 2019-07-24 PROCEDURE — G8417 CALC BMI ABV UP PARAM F/U: HCPCS | Performed by: PHYSICAL MEDICINE & REHABILITATION

## 2019-07-24 PROCEDURE — 3017F COLORECTAL CA SCREEN DOC REV: CPT | Performed by: PHYSICAL MEDICINE & REHABILITATION

## 2019-07-24 NOTE — H&P
Doyel Monge, [address] [facility] Physical Medicine and Rehabilitation  [address] [facility] [place]  Phone: [phone]  Fax: [phone]    PCP: Peter Landa MD  Date of visit: 7/24/19    Chief Complaint   Patient presents with    Back Pain     follow up after injection    Leg Pain     Interval:   Patient presents today for follow up visit regarding low back pain. He underwent bilateral L4-5 TFESI and reports 30% relief in his pain, but reports less cramping. He reports low back pain that is worse than leg pain. The pain is rated Pain Score:   7, is described as stabbing, and is located in the low back. The pain is better with rest.  The pain is worse with standing and walking. There is associated numbness/tingling in toes. There is no weakness. There is no bowel/bladder changes. neurontin was ineffective. The prior workup has included: Xray. , EMG, MRI L Spine     The prior treatment has included:  PT: yes with no relief   Chiropractic: yes with no relief. Modalities: yes with no relief   OTC Tylenol: yes with no relief   NSAIDS: naprosyn with no relief   Opioids: hydrocodone, oxycodone with no relief   Membrane stabilizers: neurontin with no relief   Muscle relaxers: flexeril with no relief   Previous injections: bilateral L4-5 TFESI   Previous surgery at this site: none     Allergies   Allergen Reactions    Iodine Swelling     Sea food    Wheat Extract Swelling    Metformin And Related Hives       Current Outpatient Medications   Medication Sig Dispense Refill    oxyCODONE-acetaminophen (PERCOCET) 7.5-325 MG per tablet Take 1 tablet by mouth every 12 hours as needed for Pain for up to 7 days.  14 tablet 0    fluticasone (FLONASE) 50 MCG/ACT nasal spray instill 2 sprays into each nostril once daily 1 Bottle 5    losartan-hydrochlorothiazide (HYZAAR) 100-12.5 MG per tablet take 1 tablet by mouth once daily 30 tablet 5    pregabalin (LYRICA) 75 MG capsule Take 1

## 2019-07-29 NOTE — TELEPHONE ENCOUNTER
Called and spoke with the patient to inform him that a script for Voltaren was sent to his pharmacy. Patient aware and voiced understanding.

## 2019-07-30 ENCOUNTER — TELEPHONE (OUTPATIENT)
Dept: ORTHOPEDIC SURGERY | Age: 59
End: 2019-07-30

## 2019-07-30 DIAGNOSIS — G56.21 CUBITAL TUNNEL SYNDROME ON RIGHT: ICD-10-CM

## 2019-07-30 RX ORDER — OXYCODONE AND ACETAMINOPHEN 7.5; 325 MG/1; MG/1
1 TABLET ORAL EVERY 12 HOURS PRN
Qty: 14 TABLET | Refills: 0 | Status: SHIPPED | OUTPATIENT
Start: 2019-07-30 | End: 2019-09-03 | Stop reason: SDUPTHER

## 2019-08-01 ENCOUNTER — HOSPITAL ENCOUNTER (OUTPATIENT)
Dept: OPERATING ROOM | Age: 59
Setting detail: OUTPATIENT SURGERY
Discharge: HOME | End: 2019-08-01
Attending: PHYSICAL MEDICINE & REHABILITATION
Payer: MEDICAID

## 2019-08-01 ENCOUNTER — HOSPITAL ENCOUNTER (OUTPATIENT)
Age: 59
Setting detail: OUTPATIENT SURGERY
Discharge: HOME | End: 2019-08-01
Attending: PHYSICAL MEDICINE & REHABILITATION | Admitting: PHYSICAL MEDICINE & REHABILITATION
Payer: MEDICAID

## 2019-08-01 VITALS
RESPIRATION RATE: 16 BRPM | OXYGEN SATURATION: 96 % | DIASTOLIC BLOOD PRESSURE: 73 MMHG | TEMPERATURE: 98 F | HEART RATE: 83 BPM | SYSTOLIC BLOOD PRESSURE: 123 MMHG

## 2019-08-01 DIAGNOSIS — M47.816 OSTEOARTHRITIS OF FACET JOINT OF LUMBAR SPINE: ICD-10-CM

## 2019-08-01 PROCEDURE — 64493 INJ PARAVERT F JNT L/S 1 LEV: CPT | Performed by: PHYSICAL MEDICINE & REHABILITATION

## 2019-08-01 PROCEDURE — 2500000003 HC RX 250 WO HCPCS: Performed by: PHYSICAL MEDICINE & REHABILITATION

## 2019-08-01 PROCEDURE — 3209999900 FLUORO FOR SURGICAL PROCEDURES

## 2019-08-01 PROCEDURE — 7100000011 HC PHASE II RECOVERY - ADDTL 15 MIN: Performed by: PHYSICAL MEDICINE & REHABILITATION

## 2019-08-01 PROCEDURE — 2709999900 HC NON-CHARGEABLE SUPPLY: Performed by: PHYSICAL MEDICINE & REHABILITATION

## 2019-08-01 PROCEDURE — 3600000015 HC SURGERY LEVEL 5 ADDTL 15MIN: Performed by: PHYSICAL MEDICINE & REHABILITATION

## 2019-08-01 PROCEDURE — 3600000005 HC SURGERY LEVEL 5 BASE: Performed by: PHYSICAL MEDICINE & REHABILITATION

## 2019-08-01 PROCEDURE — 64494 INJ PARAVERT F JNT L/S 2 LEV: CPT | Performed by: PHYSICAL MEDICINE & REHABILITATION

## 2019-08-01 PROCEDURE — 7100000010 HC PHASE II RECOVERY - FIRST 15 MIN: Performed by: PHYSICAL MEDICINE & REHABILITATION

## 2019-08-01 RX ORDER — LIDOCAINE HYDROCHLORIDE 10 MG/ML
INJECTION, SOLUTION EPIDURAL; INFILTRATION; INTRACAUDAL; PERINEURAL PRN
Status: DISCONTINUED | OUTPATIENT
Start: 2019-08-01 | End: 2019-08-01 | Stop reason: ALTCHOICE

## 2019-08-01 ASSESSMENT — PAIN SCALES - GENERAL
PAINLEVEL_OUTOF10: 0
PAINLEVEL_OUTOF10: 0

## 2019-08-01 ASSESSMENT — PAIN DESCRIPTION - DESCRIPTORS: DESCRIPTORS: ACHING;DISCOMFORT

## 2019-08-01 ASSESSMENT — PAIN - FUNCTIONAL ASSESSMENT: PAIN_FUNCTIONAL_ASSESSMENT: 0-10

## 2019-08-01 NOTE — H&P
Nicole Lunsford, [address] [facility] Physical Medicine and Rehabilitation  [address] [facility] [place]  Phone: [phone]  Fax: [phone]     PCP: Kirit Finney MD  Date of visit: 7/24/19          Chief Complaint   Patient presents with    Back Pain       follow up after injection    Leg Pain      Interval:   Patient presents today for follow up visit regarding low back pain. He underwent bilateral L4-5 TFESI and reports 30% relief in his pain, but reports less cramping. He reports low back pain that is worse than leg pain. The pain is rated Pain Score:   7, is described as stabbing, and is located in the low back. The pain is better with rest.  The pain is worse with standing and walking. There is associated numbness/tingling in toes. There is no weakness. There is no bowel/bladder changes. neurontin was ineffective.      The prior workup has included: Xray. , EMG, MRI L Spine      The prior treatment has included:  PT: yes with no relief   Chiropractic: yes with no relief. Modalities: yes with no relief   OTC Tylenol: yes with no relief   NSAIDS: naprosyn with no relief   Opioids: hydrocodone, oxycodone with no relief   Membrane stabilizers: neurontin with no relief   Muscle relaxers: flexeril with no relief   Previous injections: bilateral L4-5 TFESI   Previous surgery at this site: none            Allergies   Allergen Reactions    Iodine Swelling       Sea food    Wheat Extract Swelling    Metformin And Related Hives                Current Outpatient Medications   Medication Sig Dispense Refill    oxyCODONE-acetaminophen (PERCOCET) 7.5-325 MG per tablet Take 1 tablet by mouth every 12 hours as needed for Pain for up to 7 days.  14 tablet 0    fluticasone (FLONASE) 50 MCG/ACT nasal spray instill 2 sprays into each nostril once daily 1 Bottle 5    losartan-hydrochlorothiazide (HYZAAR) 100-12.5 MG per tablet take 1 tablet by mouth once daily 30 tablet 5    pregabalin performed by Doron Jamil MD at [address] [facility] [address] Bilateral 07/11/2019     L4-5 transforaminal     SPINE SURGERY   06/05/2018     C5-7 fusion at [facility] in [place]               Family History   Problem Relation Age of Onset    Diabetes Mother      Other Mother           parkinsons    Diabetes Father      Cancer Sister 79         unknown    Cancer Brother 64         stomach    Diabetes Brother        Social History            Tobacco Use    Smoking status: Never Smoker    Smokeless tobacco: Never Used   Substance Use Topics    Alcohol use: Yes       Comment: occasional    Drug use: No         Functional Status: The patient is able to ambulate and perform activities of daily living without the use of an assistive device.       Occupation: The patient is currently seeking disability. ROS:    Constitutional: Denies fevers, chills, night sweats, unintentional weight loss     Skin: Denies rash or skin changes     Eyes: Denies vision changes    Ears/Nose/Throat: Denies nasal congestion or sore throat     Respiratory: Denies SOB or cough     Cardiovascular: Denies CP, palpitations, edema      Gastrointestinal: Denies abdominal pain,  N/V, constipation, or diarrhea    Genitourinary: Denies urinary symptoms    Neurologic: See HPI.     MSK: See HPI. Psychiatric:+sleep disturbance, anxiety, depression    Hematologic/Lymphatic/Immunologic: Denies bruising         Physical Exam:   Blood pressure 126/75, pulse 81, height 5' 8\" (1.727 m), weight 225 lb (102.1 kg). General: well developed and well nourished in no acute distress. Body habitus is non obese  HEENT: No rhinorrhea, sneezing, yawning, or lacrimation. No scleral icterus or conjunctival injection. Resp: symmetrical chest expansion, unlabored breathing, respirations unlabored. CV: Heart rate is regular. Peripheral pulses are palpable  Lymph: No visible regional lymphadenopathy. Skin: No rashes or ecchymosis. Normal turgor.    Psych: Mood is calm. Affect is normal.   Ext: No edema noted   Right hand intrinsic atrophy      MSK:   Back/Hip Exam:   Inspection: Pelvis was symmetric. Lumbar lordotic curvature was decreased. There was no scoliosis. No ecchymoses or erythema. Palpation: Palpatory exam revealed tenderness along lumbosacral paraspinals- right side worse than left, no ttp midline spine, no ttp SI joint sulcus, greater trochanters and TFL on both side. There was paraspinal spasms. There were no trigger points. ROM: ROM decreased. +Facet loading   Special/provocative testing:   Supine SLR negative   negative FABERS.      Neurological Exam:  Strength:                     R          L  Hip Flex                       5          5  Knee Ext                     5          5  Ankle dorsi                  5          5  EHL                             5          5  Ankle Plantar               5          5     Sensory:  Decreased sensation LT bilateral soles       Reflexes:                     R          L  Patellar                        (3+)      (3+)  Ankle Jerk                   (3+)      (3+)        Gait is Antalgic. Difficulty with toe and heel walking         Imaging: (personally reviewed by me 07/24/19)  MRI L spine   T12-L1: Normal T12-L1       L1-L2: Demonstrates a broad-based diffuse disc bulge which flattens   the anterior thecal sac. This also results in bilateral neuroforaminal   narrowing which may contact the left exiting nerve root, sagittal   image 14 series 3. Correlation with side and level of radiculopathy   would be helpful       L2-L3: Diffuse disc bulge flattening the anterior thecal sac. Bilateral neuroforaminal narrowing, not grossly contacting the exiting   nerve roots. Disc bulge raises the posterior longitudinal ligament.       L3-L4: Diffuse disc bulge which flattens the anterior thecal sac.  The   nerve roots appear to have exited uninvolved.       L4-L5: Diffuse disc bulge hypertrophy of posterior elements resulting   in transverse narrowing of the spinal canal. The facet joints may   contact the right and left descending nerve roots at this level axial   image 23. The exiting nerve roots are involved.       L5-S1: Demonstrates a diffuse disc bulge not contacting the exiting   nerve roots.       SOFT TISSUES: The visualized paravertebral soft tissues are within   normal limits.           Impression   Multilevel advanced arthritis and degenerative disc disease seen   throughout the lumbar spine with neuroforaminal narrowing most of   which appeared not to affect the exiting nerve roots although some   levels are equivocal and should be correlated with the side level   radiculopathy.                Impression:   Seamus Kovacs is a 63 y.o. male      1. Facet arthropathy    2. Lumbar spondylosis          Plan:   · MRI reviewed again. · Patient would benefit from Bilateral MBB at L4-5 and L5-S1 x 2 for possible RFA. Procedure risks, benefits and alternatives were discussed. Patient would like to proceed. · Continue HEP      · The patient was educated about the diagnosis, prognosis, indications, risks and benefits of treatment. An opportunity to ask questions was given to the patient and questions were answered.   The patient agreed to proceed with the recommended treatment as described above.      Follow up after injection      Heather Rosales DO, [facility]   Board Certified Physical Medicine and Rehabilitation

## 2019-08-01 NOTE — OP NOTE
levels: Bilateral  L4-5, L5-S1. Negative aspiration was noted prior to each injection. The needles were removed intact and Band-Aids were applied. Baylee Cohen was transferred to the recovery area. He was monitored, reassessed and eventually discharged after an appropriate observatory period. No complications were noted. Following the procedure Baylee Cohen noted improvement of previous pain symptoms. Plan: Baylee Cohen will return to the office as scheduled. He was encouraged to call with questions, concerns or if worsening of symptoms occurs.       Devin Shaffer DO, [facility]   Board Certified Physical Medicine and Rehabilitation

## 2019-08-09 DIAGNOSIS — G89.29 OTHER CHRONIC PAIN: Primary | ICD-10-CM

## 2019-08-12 ENCOUNTER — OFFICE VISIT (OUTPATIENT)
Dept: PHYSICAL MEDICINE AND REHAB | Age: 59
End: 2019-08-12
Payer: MEDICAID

## 2019-08-12 VITALS
HEART RATE: 87 BPM | BODY MASS INDEX: 33.95 KG/M2 | WEIGHT: 224 LBS | SYSTOLIC BLOOD PRESSURE: 122 MMHG | DIASTOLIC BLOOD PRESSURE: 81 MMHG | HEIGHT: 68 IN

## 2019-08-12 DIAGNOSIS — G56.21 ULNAR NEUROPATHY OF RIGHT UPPER EXTREMITY: ICD-10-CM

## 2019-08-12 DIAGNOSIS — M47.816 LUMBAR SPONDYLOSIS: ICD-10-CM

## 2019-08-12 DIAGNOSIS — M54.17 LUMBOSACRAL RADICULITIS: ICD-10-CM

## 2019-08-12 DIAGNOSIS — M47.819 FACET ARTHROPATHY: Primary | ICD-10-CM

## 2019-08-12 DIAGNOSIS — M79.2 NEUROPATHIC PAIN: ICD-10-CM

## 2019-08-12 PROCEDURE — 3017F COLORECTAL CA SCREEN DOC REV: CPT | Performed by: PHYSICAL MEDICINE & REHABILITATION

## 2019-08-12 PROCEDURE — 99214 OFFICE O/P EST MOD 30 MIN: CPT | Performed by: PHYSICAL MEDICINE & REHABILITATION

## 2019-08-12 PROCEDURE — G8428 CUR MEDS NOT DOCUMENT: HCPCS | Performed by: PHYSICAL MEDICINE & REHABILITATION

## 2019-08-12 PROCEDURE — 1036F TOBACCO NON-USER: CPT | Performed by: PHYSICAL MEDICINE & REHABILITATION

## 2019-08-12 PROCEDURE — G8417 CALC BMI ABV UP PARAM F/U: HCPCS | Performed by: PHYSICAL MEDICINE & REHABILITATION

## 2019-08-12 RX ORDER — PREGABALIN 150 MG/1
CAPSULE ORAL
Qty: 60 CAPSULE | Refills: 5 | Status: SHIPPED
Start: 2019-08-12 | End: 2020-06-23

## 2019-08-12 RX ORDER — PREGABALIN 100 MG/1
100 CAPSULE ORAL 2 TIMES DAILY
Qty: 14 CAPSULE | Refills: 0 | Status: SHIPPED | OUTPATIENT
Start: 2019-08-12 | End: 2019-10-15

## 2019-08-12 NOTE — H&P
Right     HERNIA REPAIR Bilateral     groin and umbilical    HERNIA REPAIR N/A 12/13/2018    ROBOTIC ASSISTED LAPAROSCOPIC PRIMARY REPAIR OF RECURRENT VENTRAL HERNIA  REPAIR performed by Didier Sewell MD at [address] Bilateral 07/11/2019    L4-5 transforaminal     NERVE BLOCK Bilateral 08/01/2019    medial branch block    SPINE SURGERY  06/05/2018    C5-7 fusion at [facility]       Family History   Problem Relation Age of Onset    Diabetes Mother     Other Mother         parkinsons    Diabetes Father     Cancer Sister 79        unknown    Cancer Brother 64        stomach    Diabetes Brother      Social History     Tobacco Use    Smoking status: Never Smoker    Smokeless tobacco: Never Used   Substance Use Topics    Alcohol use: Yes     Comment: occasional    Drug use: No       Functional Status: The patient is able to ambulate and perform activities of daily living without the use of an assistive device. Occupation: The patient is currently seeking disability. ROS:    Constitutional: Denies fevers, chills, night sweats, unintentional weight loss     Skin: Denies rash or skin changes     Eyes: Denies vision changes    Ears/Nose/Throat: Denies nasal congestion or sore throat     Respiratory: Denies SOB or cough     Cardiovascular: Denies CP, palpitations, edema      Gastrointestinal: Denies abdominal pain,  N/V, constipation, or diarrhea    Genitourinary: Denies urinary symptoms    Neurologic: See HPI.     MSK: See HPI. Psychiatric:+sleep disturbance, anxiety, depression    Hematologic/Lymphatic/Immunologic: Denies bruising       Physical Exam:   Blood pressure 122/81, pulse 87, height 5' 8\" (1.727 m), weight 224 lb (101.6 kg). General: well developed and well nourished in no acute distress. Body habitus is non obese  HEENT: No rhinorrhea, sneezing, yawning, or lacrimation. No scleral icterus or conjunctival injection.   Resp: symmetrical chest expansion,

## 2019-08-15 ENCOUNTER — HOSPITAL ENCOUNTER (OUTPATIENT)
Age: 59
Setting detail: OUTPATIENT SURGERY
Discharge: HOME | End: 2019-08-15
Attending: PHYSICAL MEDICINE & REHABILITATION | Admitting: PHYSICAL MEDICINE & REHABILITATION
Payer: MEDICAID

## 2019-08-15 ENCOUNTER — HOSPITAL ENCOUNTER (OUTPATIENT)
Dept: OPERATING ROOM | Age: 59
Discharge: HOME | End: 2019-08-15
Payer: MEDICAID

## 2019-08-15 VITALS
HEART RATE: 83 BPM | RESPIRATION RATE: 14 BRPM | DIASTOLIC BLOOD PRESSURE: 94 MMHG | OXYGEN SATURATION: 97 % | SYSTOLIC BLOOD PRESSURE: 149 MMHG

## 2019-08-15 DIAGNOSIS — M47.896 OTHER OSTEOARTHRITIS OF SPINE, LUMBAR REGION: ICD-10-CM

## 2019-08-15 PROCEDURE — 64493 INJ PARAVERT F JNT L/S 1 LEV: CPT | Performed by: PHYSICAL MEDICINE & REHABILITATION

## 2019-08-15 PROCEDURE — 64494 INJ PARAVERT F JNT L/S 2 LEV: CPT | Performed by: PHYSICAL MEDICINE & REHABILITATION

## 2019-08-15 PROCEDURE — 3209999900 FLUORO FOR SURGICAL PROCEDURES

## 2019-08-15 PROCEDURE — 3600000015 HC SURGERY LEVEL 5 ADDTL 15MIN: Performed by: PHYSICAL MEDICINE & REHABILITATION

## 2019-08-15 PROCEDURE — 7100000011 HC PHASE II RECOVERY - ADDTL 15 MIN: Performed by: PHYSICAL MEDICINE & REHABILITATION

## 2019-08-15 PROCEDURE — 2500000003 HC RX 250 WO HCPCS: Performed by: PHYSICAL MEDICINE & REHABILITATION

## 2019-08-15 PROCEDURE — 2709999900 HC NON-CHARGEABLE SUPPLY: Performed by: PHYSICAL MEDICINE & REHABILITATION

## 2019-08-15 PROCEDURE — 7100000010 HC PHASE II RECOVERY - FIRST 15 MIN: Performed by: PHYSICAL MEDICINE & REHABILITATION

## 2019-08-15 PROCEDURE — 3600000005 HC SURGERY LEVEL 5 BASE: Performed by: PHYSICAL MEDICINE & REHABILITATION

## 2019-08-15 PROCEDURE — 64495 INJ PARAVERT F JNT L/S 3 LEV: CPT | Performed by: PHYSICAL MEDICINE & REHABILITATION

## 2019-08-15 RX ORDER — LIDOCAINE HYDROCHLORIDE 10 MG/ML
INJECTION, SOLUTION EPIDURAL; INFILTRATION; INTRACAUDAL; PERINEURAL PRN
Status: DISCONTINUED | OUTPATIENT
Start: 2019-08-15 | End: 2019-08-15 | Stop reason: ALTCHOICE

## 2019-08-15 RX ORDER — BUPIVACAINE HYDROCHLORIDE 2.5 MG/ML
INJECTION, SOLUTION EPIDURAL; INFILTRATION; INTRACAUDAL PRN
Status: DISCONTINUED | OUTPATIENT
Start: 2019-08-15 | End: 2019-08-15 | Stop reason: ALTCHOICE

## 2019-08-15 ASSESSMENT — PAIN SCALES - GENERAL
PAINLEVEL_OUTOF10: 5
PAINLEVEL_OUTOF10: 5

## 2019-08-15 ASSESSMENT — PAIN DESCRIPTION - DESCRIPTORS
DESCRIPTORS: DISCOMFORT

## 2019-08-15 ASSESSMENT — PAIN DESCRIPTION - LOCATION
LOCATION: BACK
LOCATION: BACK

## 2019-08-15 ASSESSMENT — PAIN DESCRIPTION - PAIN TYPE
TYPE: CHRONIC PAIN;SURGICAL PAIN
TYPE: CHRONIC PAIN;SURGICAL PAIN

## 2019-08-15 ASSESSMENT — PAIN DESCRIPTION - ORIENTATION
ORIENTATION: LOWER
ORIENTATION: LOWER

## 2019-08-15 ASSESSMENT — PAIN - FUNCTIONAL ASSESSMENT: PAIN_FUNCTIONAL_ASSESSMENT: 0-10

## 2019-08-15 ASSESSMENT — PAIN DESCRIPTION - FREQUENCY
FREQUENCY: CONTINUOUS
FREQUENCY: CONTINUOUS

## 2019-08-15 NOTE — OP NOTE
Lamar Cook    8/15/2019     CHIEF COMPLAINT:  Low back pain. PRE-OPERATIVE DIAGNOSIS:  Lumbar spondylosis, lumbar facet syndrome, lumbosacral osteoarthritis     POST-OPERATIVE DIAGNOSIS:  Lumbar spondylosis, lumbar facet syndrome, lumbosacral osteoarthritis     PROCEDURE:  # 2 Fluoroscopic guided lumbar medial branch blocks at  levels: medial branch level: L3, L4, L5 Joint: Right L4-5, L5-S1. SURGEON:    Brigitte Wesley DO    ANESTHESIA:   Local.    ESTIMATED BLOOD LOSS:  None.  ______________________________________________________________________  HISTORY & PHYSICAL: See separate H&P. PROCEDURE:  After confirming written and informed consent, Nusrat Jackson was brought to the procedure room and placed in the prone position. Blood pressure, heart rate, O2 saturation, and visual and verbal monitoring were established. A time-out was performed and Eliezers name, date of birth, the procedure to be performed, as well as the plan for the location of the needle insertion were confirmed. Fluoroscopy was utilized to delineate the anatomy of the lumbosacral spine. Surface landmarks were identified as well. After prep and drape, an oblique fluoroscopic view was created to optimize visualization of the junction of the transverse process and the pedicle. After infiltration of local, a #22-gauge, 3.5-inch regional block needle was passed to position the tip at the junction of the superior articular process and the transverse process, along the course of the medial branch. Satisfactory needle placement was confirmed by AP and oblique projections. At the sacral alar level, the sacral alar region was visualized and the needle tip was positioned on the sacral alar at the base of the superior articulating process where the medial branch traverses. At each level the patient received 0.75 cc of 0.25% Marcaine.     The above procedure was performed at each of the following levels:

## 2019-08-15 NOTE — H&P
Sandhya Rice, [id number] [facility]  [address] [place]  Phone: [phone]  Fax: [phone]     PCP: Norma Gould MD  Date of visit: 8/12/19          Chief Complaint   Patient presents with    Back Pain       follow up       Interval:   Patient presents today for first MBB follow up. He underwent bilateral MBB L4-5 and L5-S1 joints on 8/1/19. He reports 50-60% improvement in his pain the day of the procedure and the following morning. The pain is rated Pain Score:   8, is described as stabbing, and is located in the low back. The pain is better with rest.  The pain is worse with standing and walking. There is associated numbness/tingling in toes. There is no weakness. There is no bowel/bladder changes. neurontin was ineffective. He reports worsening right arm and hand aching that wakes him up at night. He is on lyrica 75mg daily currently.      The prior workup has included: Xray. , EMG, MRI L Spine      The prior treatment has included:  PT: yes with no relief   Chiropractic: yes with no relief. Modalities: yes with no relief   OTC Tylenol: yes with no relief   NSAIDS: naprosyn with no relief   Opioids: hydrocodone, oxycodone with no relief   Membrane stabilizers: neurontin with no relief   Muscle relaxers: flexeril with no relief   Previous injections: bilateral L4-5 TFESI   Bilateral MBB L4-5, L5-S1  Previous surgery at this site: none            Allergies   Allergen Reactions    Iodine Swelling       Sea food    Wheat Extract Swelling    Metformin And Related Hives                Current Outpatient Medications   Medication Sig Dispense Refill    pregabalin (LYRICA) 100 MG capsule Take 1 capsule by mouth 2 times daily for 7 days.  And then start 150mg BID 14 capsule 0    pregabalin (LYRICA) 150 MG capsule Take 1 PO BID when finished with 100mg BID 60 capsule 5    diclofenac (VOLTAREN) 50 MG EC tablet Take 1 tablet by mouth lacrimation. No scleral icterus or conjunctival injection. Resp: symmetrical chest expansion, unlabored breathing, respirations unlabored. CV: Heart rate is regular. Peripheral pulses are palpable  Lymph: No visible regional lymphadenopathy. Skin: No rashes or ecchymosis. Normal turgor. Psych: Mood is calm. Affect is normal.   Ext: No edema noted   Right hand intrinsic atrophy      MSK:   Back/Hip Exam:   Inspection: Pelvis was symmetric. Lumbar lordotic curvature was decreased. There was no scoliosis. No ecchymoses or erythema. Palpation: Palpatory exam revealed tenderness along lumbosacral paraspinals- right side worse than left, no ttp midline spine, no ttp SI joint sulcus, greater trochanters and TFL on both side. There was paraspinal spasms. There were no trigger points. ROM: ROM decreased. +Facet loading   Special/provocative testing:   Supine SLR negative   negative FABERS.      Neurological Exam:  Strength:                     R          L  Hip Flex                       5          5  Knee Ext                     5          5  Ankle dorsi                  5          5  EHL                             5          5  Ankle Plantar               5          5     Sensory:  Decreased sensation LT bilateral soles       Reflexes:                     R          L  Patellar                        (3+)      (3+)  Ankle Jerk                   (3+)      (3+)        Gait is Antalgic. Difficulty with toe and heel walking         Imaging: (personally reviewed by me 08/12/19)  MRI L spine   T12-L1: Normal T12-L1       L1-L2: Demonstrates a broad-based diffuse disc bulge which flattens   the anterior thecal sac. This also results in bilateral neuroforaminal   narrowing which may contact the left exiting nerve root, sagittal   image 14 series 3. Correlation with side and level of radiculopathy   would be helpful       L2-L3: Diffuse disc bulge flattening the anterior thecal sac.    Bilateral neuroforaminal

## 2019-08-16 ENCOUNTER — TELEPHONE (OUTPATIENT)
Dept: PHYSICAL MEDICINE AND REHAB | Age: 59
End: 2019-08-16

## 2019-08-22 NOTE — TELEPHONE ENCOUNTER
He would not be a candidate for an RFA because only 20% relief indicates his pain is not coming from the facet joints. We can increase the lyrica even more to 150mg BID if he is tolerating the 100mg okay and see if this helps even more.    Let me know if he wants to

## 2019-08-22 NOTE — TELEPHONE ENCOUNTER
Patient states maybe 20% relief for a few hours. He is interested in the RFA stating that this is something that had been discussed. He also wanted me to inform you that his lyrica increase seems to be helping especially at night. He states that he is getting a lot of spasms in the day and would like to know if that is a normal thing.

## 2019-08-23 ENCOUNTER — OFFICE VISIT (OUTPATIENT)
Dept: PAIN MANAGEMENT | Age: 59
End: 2019-08-23
Payer: MEDICAID

## 2019-08-23 VITALS
HEIGHT: 68 IN | BODY MASS INDEX: 33.34 KG/M2 | HEART RATE: 93 BPM | DIASTOLIC BLOOD PRESSURE: 84 MMHG | OXYGEN SATURATION: 97 % | WEIGHT: 220 LBS | TEMPERATURE: 98.1 F | RESPIRATION RATE: 16 BRPM | SYSTOLIC BLOOD PRESSURE: 122 MMHG

## 2019-08-23 DIAGNOSIS — G89.29 CHRONIC BILATERAL LOW BACK PAIN WITH RIGHT-SIDED SCIATICA: ICD-10-CM

## 2019-08-23 DIAGNOSIS — M54.41 CHRONIC BILATERAL LOW BACK PAIN WITH RIGHT-SIDED SCIATICA: ICD-10-CM

## 2019-08-23 PROCEDURE — 3017F COLORECTAL CA SCREEN DOC REV: CPT | Performed by: PAIN MEDICINE

## 2019-08-23 PROCEDURE — 99204 OFFICE O/P NEW MOD 45 MIN: CPT | Performed by: PAIN MEDICINE

## 2019-08-23 PROCEDURE — 1036F TOBACCO NON-USER: CPT | Performed by: PAIN MEDICINE

## 2019-08-23 PROCEDURE — G8417 CALC BMI ABV UP PARAM F/U: HCPCS | Performed by: PAIN MEDICINE

## 2019-08-23 PROCEDURE — G8427 DOCREV CUR MEDS BY ELIG CLIN: HCPCS | Performed by: PAIN MEDICINE

## 2019-08-28 ENCOUNTER — OFFICE VISIT (OUTPATIENT)
Dept: PHYSICAL MEDICINE AND REHAB | Age: 59
End: 2019-08-28
Payer: MEDICAID

## 2019-08-28 VITALS
WEIGHT: 232 LBS | HEIGHT: 68 IN | BODY MASS INDEX: 35.16 KG/M2 | HEART RATE: 81 BPM | DIASTOLIC BLOOD PRESSURE: 87 MMHG | SYSTOLIC BLOOD PRESSURE: 132 MMHG

## 2019-08-28 DIAGNOSIS — M54.17 LUMBOSACRAL RADICULITIS: ICD-10-CM

## 2019-08-28 DIAGNOSIS — M48.062 LUMBAR STENOSIS WITH NEUROGENIC CLAUDICATION: ICD-10-CM

## 2019-08-28 DIAGNOSIS — M79.641 RIGHT HAND PAIN: ICD-10-CM

## 2019-08-28 DIAGNOSIS — M47.819 FACET ARTHROPATHY: Primary | ICD-10-CM

## 2019-08-28 DIAGNOSIS — M47.816 LUMBAR SPONDYLOSIS: ICD-10-CM

## 2019-08-28 PROCEDURE — G8427 DOCREV CUR MEDS BY ELIG CLIN: HCPCS | Performed by: PHYSICAL MEDICINE & REHABILITATION

## 2019-08-28 PROCEDURE — 99213 OFFICE O/P EST LOW 20 MIN: CPT | Performed by: PHYSICAL MEDICINE & REHABILITATION

## 2019-08-28 PROCEDURE — 3017F COLORECTAL CA SCREEN DOC REV: CPT | Performed by: PHYSICAL MEDICINE & REHABILITATION

## 2019-08-28 PROCEDURE — 1036F TOBACCO NON-USER: CPT | Performed by: PHYSICAL MEDICINE & REHABILITATION

## 2019-08-28 PROCEDURE — G8417 CALC BMI ABV UP PARAM F/U: HCPCS | Performed by: PHYSICAL MEDICINE & REHABILITATION

## 2019-08-28 NOTE — PROGRESS NOTES
Dominique Opitz, [address] [facility] Physical Medicine and Rehabilitation  [address] [place]  Phone: [phone]  Fax: [phone]    PCP: Celso Washburn MD  Date of visit: 8/28/19    Chief Complaint   Patient presents with    Back Pain     follow up     Interval:   Patient presents today for second MBB follow up. He reports no relief in his symptoms at all. Today, he is complaining of increase in neck pain and worsening right hand pain. He describes the pain in his hand as \"something is inside of it\". It is very tender and hard to use. He follows up with ortho in October. He is scheduled to follow up with Dr. Sanchez Soto for his neck pain. He is taking lyrica 150mg BID and reports it does help but he is excessively hungry. The pain is rated Pain Score:   8, is described as stabbing, and is located in the low back. The pain is better with rest.  The pain is worse with standing and walking. There is associated numbness/tingling in toes. There is no weakness. There is no bowel/bladder changes. The prior workup has included: Xray. , EMG, MRI L Spine     The prior treatment has included:  PT: yes with no relief   Chiropractic: yes with no relief.    Modalities: yes with no relief   OTC Tylenol: yes with no relief   NSAIDS: naprosyn with no relief   Opioids: hydrocodone, oxycodone with no relief   Membrane stabilizers: neurontin with no relief, lyrica   Muscle relaxers: flexeril with no relief   Previous injections: bilateral L4-5 TFESI   Bilateral MBB L4-5, L5-S1 x 2 no relief  Previous surgery at this site: none     Allergies   Allergen Reactions    Iodine Swelling     Sea food    Wheat Extract Swelling    Metformin And Related Hives       Current Outpatient Medications   Medication Sig Dispense Refill    pregabalin (LYRICA) 150 MG capsule Take 1 PO BID when finished with 100mg BID 60 capsule 5    diclofenac (VOLTAREN) 50 MG EC tablet Take 1 tablet by mouth 2 times daily as

## 2019-09-03 ENCOUNTER — OFFICE VISIT (OUTPATIENT)
Dept: ORTHOPEDIC SURGERY | Age: 59
End: 2019-09-03
Payer: MEDICAID

## 2019-09-03 VITALS — HEIGHT: 68 IN | BODY MASS INDEX: 34.86 KG/M2 | TEMPERATURE: 98 F | WEIGHT: 230 LBS

## 2019-09-03 DIAGNOSIS — G56.21 CUBITAL TUNNEL SYNDROME ON RIGHT: Primary | ICD-10-CM

## 2019-09-03 DIAGNOSIS — G56.01 RIGHT CARPAL TUNNEL SYNDROME: ICD-10-CM

## 2019-09-03 DIAGNOSIS — M19.049 ARTHRITIS OF CARPOMETACARPAL JOINT: ICD-10-CM

## 2019-09-03 DIAGNOSIS — M79.2 NEUROPATHIC PAIN: ICD-10-CM

## 2019-09-03 DIAGNOSIS — G56.21 ULNAR NEUROPATHY OF RIGHT UPPER EXTREMITY: ICD-10-CM

## 2019-09-03 PROCEDURE — 99213 OFFICE O/P EST LOW 20 MIN: CPT | Performed by: ORTHOPAEDIC SURGERY

## 2019-09-03 PROCEDURE — 20600 DRAIN/INJ JOINT/BURSA W/O US: CPT | Performed by: ORTHOPAEDIC SURGERY

## 2019-09-03 PROCEDURE — G8417 CALC BMI ABV UP PARAM F/U: HCPCS | Performed by: ORTHOPAEDIC SURGERY

## 2019-09-03 PROCEDURE — G8427 DOCREV CUR MEDS BY ELIG CLIN: HCPCS | Performed by: ORTHOPAEDIC SURGERY

## 2019-09-03 PROCEDURE — 1036F TOBACCO NON-USER: CPT | Performed by: ORTHOPAEDIC SURGERY

## 2019-09-03 PROCEDURE — 3017F COLORECTAL CA SCREEN DOC REV: CPT | Performed by: ORTHOPAEDIC SURGERY

## 2019-09-03 RX ORDER — OXYCODONE AND ACETAMINOPHEN 7.5; 325 MG/1; MG/1
1 TABLET ORAL EVERY 12 HOURS PRN
Qty: 14 TABLET | Refills: 0 | Status: SHIPPED | OUTPATIENT
Start: 2019-09-03 | End: 2019-09-10

## 2019-09-06 ENCOUNTER — OFFICE VISIT (OUTPATIENT)
Dept: PAIN MANAGEMENT | Age: 59
End: 2019-09-06
Payer: MEDICAID

## 2019-09-06 ENCOUNTER — HOSPITAL ENCOUNTER (OUTPATIENT)
Age: 59
Discharge: HOME | End: 2019-09-06
Payer: MEDICAID

## 2019-09-06 VITALS
TEMPERATURE: 97.8 F | HEIGHT: 68 IN | OXYGEN SATURATION: 90 % | RESPIRATION RATE: 16 BRPM | SYSTOLIC BLOOD PRESSURE: 118 MMHG | BODY MASS INDEX: 34.1 KG/M2 | HEART RATE: 81 BPM | DIASTOLIC BLOOD PRESSURE: 70 MMHG | WEIGHT: 225 LBS

## 2019-09-06 DIAGNOSIS — M47.816 LUMBAR SPONDYLOSIS: ICD-10-CM

## 2019-09-06 DIAGNOSIS — M47.812 SPONDYLOSIS OF CERVICAL REGION WITHOUT MYELOPATHY OR RADICULOPATHY: Primary | ICD-10-CM

## 2019-09-06 DIAGNOSIS — M48.062 SPINAL STENOSIS OF LUMBAR REGION WITH NEUROGENIC CLAUDICATION: ICD-10-CM

## 2019-09-06 DIAGNOSIS — M54.41 CHRONIC BILATERAL LOW BACK PAIN WITH RIGHT-SIDED SCIATICA: ICD-10-CM

## 2019-09-06 DIAGNOSIS — M51.9 LUMBAR DISC DISORDER: ICD-10-CM

## 2019-09-06 DIAGNOSIS — M47.812 CERVICAL FACET JOINT SYNDROME: ICD-10-CM

## 2019-09-06 DIAGNOSIS — M54.16 LUMBAR RADICULOPATHY: ICD-10-CM

## 2019-09-06 DIAGNOSIS — M50.90 CERVICAL DISC DISORDER: ICD-10-CM

## 2019-09-06 DIAGNOSIS — G89.29 CHRONIC BILATERAL LOW BACK PAIN WITH RIGHT-SIDED SCIATICA: ICD-10-CM

## 2019-09-06 DIAGNOSIS — M47.816 LUMBAR FACET ARTHROPATHY: ICD-10-CM

## 2019-09-06 PROCEDURE — 99213 OFFICE O/P EST LOW 20 MIN: CPT | Performed by: PAIN MEDICINE

## 2019-09-06 PROCEDURE — 1036F TOBACCO NON-USER: CPT | Performed by: PAIN MEDICINE

## 2019-09-06 PROCEDURE — G0480 DRUG TEST DEF 1-7 CLASSES: HCPCS

## 2019-09-06 PROCEDURE — 3017F COLORECTAL CA SCREEN DOC REV: CPT | Performed by: PAIN MEDICINE

## 2019-09-06 PROCEDURE — 99214 OFFICE O/P EST MOD 30 MIN: CPT | Performed by: PAIN MEDICINE

## 2019-09-06 PROCEDURE — G8427 DOCREV CUR MEDS BY ELIG CLIN: HCPCS | Performed by: PAIN MEDICINE

## 2019-09-06 PROCEDURE — 80307 DRUG TEST PRSMV CHEM ANLYZR: CPT

## 2019-09-06 PROCEDURE — G8417 CALC BMI ABV UP PARAM F/U: HCPCS | Performed by: PAIN MEDICINE

## 2019-09-06 NOTE — PROGRESS NOTES
Hayden Hemphill presents to the [address] on 9/6/2019. Brynn Sotelo is complaining of pain in lower back. . The pain is constant. The pain is described as aching, throbbing and stabbing. Pain is rated on his best day at a 7, on his worst day at a 10, and on average at a 8 on the VAS scale. He took his last dose of Percocet this AM.. Any procedures since your last visit: NO    He has not been on anticoagulation medications to include none and is managed by NA. Pacemaker or defibrilator: No Physician managing device is NA.       /70   Pulse 81   Temp 97.8 °F (36.6 °C) (Oral)   Resp 16   Ht 5' 8\" (1.727 m)   Wt 225 lb (102.1 kg)   SpO2 90%   BMI 34.21 kg/m²      No LMP for male patient.

## 2019-09-06 NOTE — PROGRESS NOTES
 Number of children: Not on file    Years of education: Not on file    Highest education level: Not on file   Occupational History    Not on file   Social Needs    Financial resource strain: Not on file    Food insecurity:     Worry: Not on file     Inability: Not on file    Transportation needs:     Medical: Not on file     Non-medical: Not on file   Tobacco Use    Smoking status: Never Smoker    Smokeless tobacco: Never Used   Substance and Sexual Activity    Alcohol use: Yes     Comment: occasional    Drug use: No    Sexual activity: Not on file   Lifestyle    Physical activity:     Days per week: Not on file     Minutes per session: Not on file    Stress: Not on file   Relationships    Social connections:     Talks on phone: Not on file     Gets together: Not on file     Attends Taoism service: Not on file     Active member of club or organization: Not on file     Attends meetings of clubs or organizations: Not on file     Relationship status: Not on file    Intimate partner violence:     Fear of current or ex partner: Not on file     Emotionally abused: Not on file     Physically abused: Not on file     Forced sexual activity: Not on file   Other Topics Concern    Not on file   Social History Narrative    Not on file     Family History   Problem Relation Age of Onset    Diabetes Mother     Other Mother         parkinsons    Diabetes Father     Cancer Sister 79        unknown    Cancer Brother 64        stomach    Diabetes Brother      REVIEW OF SYSTEMS:     Aidee Christian denies fever/chills, chest pain, shortness of breath, new bowel or bladder complaints. All other review of systems was negative. PHYSICAL EXAMINATION:      /70   Pulse 81   Temp 97.8 °F (36.6 °C) (Oral)   Resp 16   Ht 5' 8\" (1.727 m)   Wt 225 lb (102.1 kg)   SpO2 90%   BMI 34.21 kg/m²     General:       General appearance:   pleasant and well-hydrated.    , in moderate discomfort and A & O x3  Build:Overweight     HEENT:     Head:normocephalic and atraumatic  Pupils:regular, round and equal.  Sclera: icterus absent,      Lungs:     Breathing:Breathing Pattern: regular, no distress     Abdomen:     Shape:non-distended and normal  Tenderness:none     Cervical spine:     Inspection:anterior fusion scar  Palpation:tenderness paravertebral muscles, facet loading, left, right, positive and tenderness. Range of motion:abnormal moderately flexion, extension rotation bilateral and is  painful.     Lumbar spine:     Spine inspection:normal   CVA tenderness:No   Palpation:tenderness paravertebral muscles, facet loading, left, right, positive and tenderness. Range of motion:abnormal moderately Lateral bending, flexion, extension rotation bilateral and is  painful.     Musculoskeletal:     Trigger points in Paraveteral:absent bilaterally  Skinner's:negative right, negative left   SI joint tenderness:negative right, negative left              TERRELL test:negative right, negative             left  Piriformis tenderness:negative right, negative left  Trochanteric bursa tenderness:negative right, negative left  SLR:negative right, negative left, sitting      Extremities:     Tremors:None bilaterally upper and lower  Range of motion:Generally normal shoulders, pain with internal rotation of hips negative.   Intact:Yes  Edema:Normal     Neurological:     Sensory:normal to light touch bilateral upper extremities , Tinel's positive median right   Motor:              Right Bicep5/5           Left Bicep5/5              Right Triceps5/5       Left Triceps5/5          Right Deltoid5/5     Left Deltoid5/5                  Right Quadriceps5/5          Left Quadriceps5/5           Right Gastrocnemius5/5    Left Gastrocnemius5/5  Right Ant Tibialis5/5  Left Ant Tibialis5/5  Reflexes:    Right Brachioradialis reflex2+  Left Brachioradialis reflex2+  Right Biceps reflex2+  Left Biceps reflex2+  Right Triceps reflex2+  Left Triceps

## 2019-09-07 PROBLEM — M47.812 SPONDYLOSIS OF CERVICAL REGION WITHOUT MYELOPATHY OR RADICULOPATHY: Status: ACTIVE | Noted: 2019-09-07

## 2019-09-07 PROBLEM — M50.90 CERVICAL DISC DISORDER: Status: ACTIVE | Noted: 2019-09-07

## 2019-09-07 PROBLEM — M47.812 CERVICAL FACET JOINT SYNDROME: Status: ACTIVE | Noted: 2019-09-07

## 2019-09-07 PROBLEM — M51.9 LUMBAR DISC DISORDER: Status: ACTIVE | Noted: 2019-09-07

## 2019-09-07 PROBLEM — M47.816 LUMBAR FACET ARTHROPATHY: Status: ACTIVE | Noted: 2019-09-07

## 2019-09-07 PROBLEM — M47.816 LUMBAR SPONDYLOSIS: Status: ACTIVE | Noted: 2019-09-07

## 2019-09-07 PROBLEM — M48.062 SPINAL STENOSIS OF LUMBAR REGION WITH NEUROGENIC CLAUDICATION: Status: ACTIVE | Noted: 2019-09-07

## 2019-09-09 LAB — SPECIFIC GRAVITY UA: <=1.005 (ref 1–1.03)

## 2019-09-11 LAB
6AM URINE: <10 NG/ML
CODEINE, URINE: <20 NG/ML
HYDROCODONE, URINE: <20 NG/ML
HYDROMORPHONE, URINE: <20 NG/ML
MORPHINE URINE: <20 NG/ML
NORHYDROCODONE, URINE: <20 NG/ML
NOROXYCODONE, URINE: 867 NG/ML
NOROXYMORPHONE, URINE: 71 NG/ML
OXYCODONE, URINE CONFIRMATION: 631 NG/ML
OXYMORPHONE, URINE: <20 NG/ML

## 2019-09-12 DIAGNOSIS — E11.65 POORLY CONTROLLED TYPE 2 DIABETES MELLITUS WITH NEUROPATHY (HCC): Chronic | ICD-10-CM

## 2019-09-12 DIAGNOSIS — E11.40 POORLY CONTROLLED TYPE 2 DIABETES MELLITUS WITH NEUROPATHY (HCC): Chronic | ICD-10-CM

## 2019-09-12 LAB
7-AMINOCLONAZEPAM, URINE: <5 NG/ML
ALPHA-HYDROXYALPRAZOLAM, URINE: <5 NG/ML
ALPHA-HYDROXYMIDAZOLAM, URINE: <20 NG/ML
ALPRAZOLAM, URINE: <5 NG/ML
CHLORDIAZEPOXIDE, URINE: <20 NG/ML
CLONAZEPAM, URINE: <5 NG/ML
DIAZEPAM, URINE: <20 NG/ML
LORAZEPAM, URINE: <20 NG/ML
MIDAZOLAM, URINE: <20 NG/ML
NORDIAZEPAM, URINE: <20 NG/ML
OXAZEPAM, URINE: <20 NG/ML
TEMAZEPAM, URINE: <20 NG/ML

## 2019-09-13 LAB
Lab: NORMAL
REPORT: NORMAL
THIS TEST SENT TO: NORMAL

## 2019-09-20 ENCOUNTER — OFFICE VISIT (OUTPATIENT)
Dept: PAIN MANAGEMENT | Age: 59
End: 2019-09-20
Payer: MEDICAID

## 2019-09-20 ENCOUNTER — PREP FOR PROCEDURE (OUTPATIENT)
Dept: PAIN MANAGEMENT | Age: 59
End: 2019-09-20

## 2019-09-20 VITALS
SYSTOLIC BLOOD PRESSURE: 116 MMHG | WEIGHT: 225 LBS | BODY MASS INDEX: 34.1 KG/M2 | HEIGHT: 68 IN | OXYGEN SATURATION: 98 % | RESPIRATION RATE: 16 BRPM | TEMPERATURE: 98.4 F | HEART RATE: 95 BPM | DIASTOLIC BLOOD PRESSURE: 82 MMHG

## 2019-09-20 DIAGNOSIS — G89.29 CHRONIC BILATERAL LOW BACK PAIN WITH RIGHT-SIDED SCIATICA: ICD-10-CM

## 2019-09-20 DIAGNOSIS — M54.41 CHRONIC BILATERAL LOW BACK PAIN WITH RIGHT-SIDED SCIATICA: ICD-10-CM

## 2019-09-20 DIAGNOSIS — M51.9 LUMBAR DISC DISORDER: Primary | ICD-10-CM

## 2019-09-20 DIAGNOSIS — M48.062 SPINAL STENOSIS OF LUMBAR REGION WITH NEUROGENIC CLAUDICATION: Primary | ICD-10-CM

## 2019-09-20 DIAGNOSIS — M47.812 CERVICAL FACET JOINT SYNDROME: ICD-10-CM

## 2019-09-20 DIAGNOSIS — M48.062 SPINAL STENOSIS OF LUMBAR REGION WITH NEUROGENIC CLAUDICATION: ICD-10-CM

## 2019-09-20 DIAGNOSIS — M50.90 CERVICAL DISC DISORDER: ICD-10-CM

## 2019-09-20 DIAGNOSIS — M47.812 SPONDYLOSIS OF CERVICAL REGION WITHOUT MYELOPATHY OR RADICULOPATHY: ICD-10-CM

## 2019-09-20 DIAGNOSIS — M47.816 LUMBAR SPONDYLOSIS: ICD-10-CM

## 2019-09-20 DIAGNOSIS — M47.816 LUMBAR FACET ARTHROPATHY: ICD-10-CM

## 2019-09-20 DIAGNOSIS — M54.16 LUMBAR RADICULOPATHY: ICD-10-CM

## 2019-09-20 PROCEDURE — G8427 DOCREV CUR MEDS BY ELIG CLIN: HCPCS | Performed by: PAIN MEDICINE

## 2019-09-20 PROCEDURE — 99213 OFFICE O/P EST LOW 20 MIN: CPT | Performed by: PAIN MEDICINE

## 2019-09-20 PROCEDURE — 3017F COLORECTAL CA SCREEN DOC REV: CPT | Performed by: PAIN MEDICINE

## 2019-09-20 PROCEDURE — 1036F TOBACCO NON-USER: CPT | Performed by: PAIN MEDICINE

## 2019-09-20 PROCEDURE — 99214 OFFICE O/P EST MOD 30 MIN: CPT | Performed by: PAIN MEDICINE

## 2019-09-20 PROCEDURE — G8417 CALC BMI ABV UP PARAM F/U: HCPCS | Performed by: PAIN MEDICINE

## 2019-09-20 RX ORDER — ACETAMINOPHEN AND CODEINE PHOSPHATE 300; 30 MG/1; MG/1
1 TABLET ORAL 2 TIMES DAILY PRN
Qty: 60 TABLET | Refills: 0 | Status: SHIPPED | OUTPATIENT
Start: 2019-09-20 | End: 2019-10-16 | Stop reason: SDUPTHER

## 2019-09-20 NOTE — PROGRESS NOTES
L4-5 transforaminal     NERVE BLOCK Bilateral 08/01/2019    medial branch block    NERVE BLOCK Bilateral 08/15/2019    bilateral medial branch block L4-S1    SPINE SURGERY  06/05/2018    C5-7 fusion at [facility] in [place]     Prior to Admission medications    Medication Sig Start Date End Date Taking? Authorizing Provider   Ertugliflozin L-PyroglutamicAc (STEGLATRO) 5 MG TABS take 1 tablet by mouth once daily 9/12/19  Yes Tori Starkey MD   diclofenac (VOLTAREN) 50 MG EC tablet Take 1 tablet by mouth 2 times daily as needed for Pain 7/29/19  Yes Stuart Valero DO   blood glucose test strips (TRUE METRIX BLOOD GLUCOSE TEST) strip As needed. 7/29/19  Yes Tori Starkey MD   fluticasone (FLONASE) 50 MCG/ACT nasal spray instill 2 sprays into each nostril once daily 7/12/19  Yes Barbaraann Curling, MD   losartan-hydrochlorothiazide [facility]) 100-12.5 MG per tablet take 1 tablet by mouth once daily 7/1/19  Yes Tori Starkey MD   TRUEPLUS LANCETS 33G MISC use as directed 3 TIMES A WEEK 6/10/19  Yes Robbin Jones MD   atorvastatin (LIPITOR) 10 MG tablet take 1 tablet by mouth once daily 3/14/19  Yes Tori Starkey MD   glipiZIDE (GLUCOTROL XL) 10 MG extended release tablet Take 1 tablet by mouth daily 10/12/18  Yes Tori Starkey MD   pregabalin (LYRICA) 100 MG capsule Take 1 capsule by mouth 2 times daily for 7 days.  And then start 150mg BID 8/12/19 8/19/19  Aminata Sinha DO   pregabalin (LYRICA) 150 MG capsule Take 1 PO BID when finished with 100mg BID 8/12/19 9/12/19  Aminata Sinha,      Allergies   Allergen Reactions    Iodine Swelling     Sea food    Wheat Extract Swelling    Metformin And Related Hives     Social History     Socioeconomic History    Marital status: Single     Spouse name: Not on file    Number of children: Not on file    Years of education: Not on file    Highest education level: Not on file   Occupational History    Not on file   Social Needs

## 2019-09-22 DIAGNOSIS — E11.65 POORLY CONTROLLED TYPE 2 DIABETES MELLITUS WITH NEUROPATHY (HCC): Chronic | ICD-10-CM

## 2019-09-22 DIAGNOSIS — E11.40 POORLY CONTROLLED TYPE 2 DIABETES MELLITUS WITH NEUROPATHY (HCC): Chronic | ICD-10-CM

## 2019-09-23 RX ORDER — GLIPIZIDE 10 MG/1
10 TABLET, FILM COATED, EXTENDED RELEASE ORAL DAILY
Qty: 90 TABLET | Refills: 1 | Status: ON HOLD
Start: 2019-09-23 | End: 2021-05-12

## 2019-10-10 ENCOUNTER — OFFICE VISIT (OUTPATIENT)
Dept: ORTHOPEDIC SURGERY | Age: 59
End: 2019-10-10
Payer: MEDICAID

## 2019-10-10 ENCOUNTER — TELEPHONE (OUTPATIENT)
Dept: PAIN MANAGEMENT | Age: 59
End: 2019-10-10

## 2019-10-10 VITALS — HEIGHT: 68 IN | BODY MASS INDEX: 34.11 KG/M2 | WEIGHT: 225.09 LBS

## 2019-10-10 DIAGNOSIS — G56.21 CUBITAL TUNNEL SYNDROME ON RIGHT: Primary | ICD-10-CM

## 2019-10-10 DIAGNOSIS — M79.2 NEUROPATHIC PAIN: ICD-10-CM

## 2019-10-10 DIAGNOSIS — G56.21 ULNAR NEUROPATHY OF RIGHT UPPER EXTREMITY: ICD-10-CM

## 2019-10-10 DIAGNOSIS — M19.049 ARTHRITIS OF CARPOMETACARPAL JOINT: ICD-10-CM

## 2019-10-10 DIAGNOSIS — G89.29 OTHER CHRONIC PAIN: ICD-10-CM

## 2019-10-10 DIAGNOSIS — G56.01 RIGHT CARPAL TUNNEL SYNDROME: ICD-10-CM

## 2019-10-10 PROCEDURE — G8417 CALC BMI ABV UP PARAM F/U: HCPCS | Performed by: ORTHOPAEDIC SURGERY

## 2019-10-10 PROCEDURE — G8484 FLU IMMUNIZE NO ADMIN: HCPCS | Performed by: ORTHOPAEDIC SURGERY

## 2019-10-10 PROCEDURE — 3017F COLORECTAL CA SCREEN DOC REV: CPT | Performed by: ORTHOPAEDIC SURGERY

## 2019-10-10 PROCEDURE — 99212 OFFICE O/P EST SF 10 MIN: CPT | Performed by: ORTHOPAEDIC SURGERY

## 2019-10-10 PROCEDURE — G8427 DOCREV CUR MEDS BY ELIG CLIN: HCPCS | Performed by: ORTHOPAEDIC SURGERY

## 2019-10-10 PROCEDURE — 1036F TOBACCO NON-USER: CPT | Performed by: ORTHOPAEDIC SURGERY

## 2019-10-16 ENCOUNTER — OFFICE VISIT (OUTPATIENT)
Dept: PAIN MANAGEMENT | Age: 59
End: 2019-10-16
Payer: MEDICAID

## 2019-10-16 VITALS
BODY MASS INDEX: 34.1 KG/M2 | DIASTOLIC BLOOD PRESSURE: 70 MMHG | SYSTOLIC BLOOD PRESSURE: 102 MMHG | HEIGHT: 68 IN | OXYGEN SATURATION: 91 % | TEMPERATURE: 97.7 F | HEART RATE: 92 BPM | RESPIRATION RATE: 18 BRPM | WEIGHT: 225 LBS

## 2019-10-16 DIAGNOSIS — M50.90 CERVICAL DISC DISORDER: ICD-10-CM

## 2019-10-16 DIAGNOSIS — M47.816 LUMBAR FACET ARTHROPATHY: ICD-10-CM

## 2019-10-16 DIAGNOSIS — M54.16 LUMBAR RADICULOPATHY: ICD-10-CM

## 2019-10-16 DIAGNOSIS — G89.29 CHRONIC BILATERAL LOW BACK PAIN WITH RIGHT-SIDED SCIATICA: ICD-10-CM

## 2019-10-16 DIAGNOSIS — M51.9 LUMBAR DISC DISORDER: ICD-10-CM

## 2019-10-16 DIAGNOSIS — M47.812 SPONDYLOSIS OF CERVICAL REGION WITHOUT MYELOPATHY OR RADICULOPATHY: ICD-10-CM

## 2019-10-16 DIAGNOSIS — M54.16 LUMBAR RADICULITIS: ICD-10-CM

## 2019-10-16 DIAGNOSIS — M54.41 CHRONIC BILATERAL LOW BACK PAIN WITH RIGHT-SIDED SCIATICA: ICD-10-CM

## 2019-10-16 DIAGNOSIS — M47.812 CERVICAL FACET JOINT SYNDROME: ICD-10-CM

## 2019-10-16 DIAGNOSIS — M48.062 SPINAL STENOSIS OF LUMBAR REGION WITH NEUROGENIC CLAUDICATION: ICD-10-CM

## 2019-10-16 DIAGNOSIS — M47.816 LUMBAR SPONDYLOSIS: Primary | ICD-10-CM

## 2019-10-16 PROCEDURE — G8484 FLU IMMUNIZE NO ADMIN: HCPCS | Performed by: PAIN MEDICINE

## 2019-10-16 PROCEDURE — 99213 OFFICE O/P EST LOW 20 MIN: CPT | Performed by: PAIN MEDICINE

## 2019-10-16 PROCEDURE — 3017F COLORECTAL CA SCREEN DOC REV: CPT | Performed by: PAIN MEDICINE

## 2019-10-16 PROCEDURE — 1036F TOBACCO NON-USER: CPT | Performed by: PAIN MEDICINE

## 2019-10-16 PROCEDURE — G8417 CALC BMI ABV UP PARAM F/U: HCPCS | Performed by: PAIN MEDICINE

## 2019-10-16 PROCEDURE — G8427 DOCREV CUR MEDS BY ELIG CLIN: HCPCS | Performed by: PAIN MEDICINE

## 2019-10-16 RX ORDER — ACETAMINOPHEN AND CODEINE PHOSPHATE 300; 30 MG/1; MG/1
1 TABLET ORAL 2 TIMES DAILY PRN
Qty: 60 TABLET | Refills: 0 | Status: SHIPPED | OUTPATIENT
Start: 2019-10-20 | End: 2019-11-19

## 2019-10-17 ENCOUNTER — HOSPITAL ENCOUNTER (OUTPATIENT)
Age: 59
Setting detail: OUTPATIENT SURGERY
Discharge: HOME | End: 2019-10-17
Attending: PAIN MEDICINE | Admitting: PAIN MEDICINE
Payer: MEDICAID

## 2019-10-17 ENCOUNTER — HOSPITAL ENCOUNTER (OUTPATIENT)
Dept: OPERATING ROOM | Age: 59
Setting detail: OUTPATIENT SURGERY
Discharge: HOME | End: 2019-10-17
Attending: PAIN MEDICINE
Payer: MEDICAID

## 2019-10-17 VITALS
SYSTOLIC BLOOD PRESSURE: 124 MMHG | RESPIRATION RATE: 16 BRPM | HEART RATE: 85 BPM | DIASTOLIC BLOOD PRESSURE: 80 MMHG | OXYGEN SATURATION: 94 %

## 2019-10-17 DIAGNOSIS — M51.9 LUMBAR DISC DISEASE: ICD-10-CM

## 2019-10-17 PROCEDURE — 7100000011 HC PHASE II RECOVERY - ADDTL 15 MIN: Performed by: PAIN MEDICINE

## 2019-10-17 PROCEDURE — 6360000004 HC RX CONTRAST MEDICATION: Performed by: PAIN MEDICINE

## 2019-10-17 PROCEDURE — 2709999900 HC NON-CHARGEABLE SUPPLY: Performed by: PAIN MEDICINE

## 2019-10-17 PROCEDURE — 62323 NJX INTERLAMINAR LMBR/SAC: CPT | Performed by: PAIN MEDICINE

## 2019-10-17 PROCEDURE — 6360000002 HC RX W HCPCS: Performed by: PAIN MEDICINE

## 2019-10-17 PROCEDURE — 3600000005 HC SURGERY LEVEL 5 BASE: Performed by: PAIN MEDICINE

## 2019-10-17 PROCEDURE — 2500000003 HC RX 250 WO HCPCS: Performed by: PAIN MEDICINE

## 2019-10-17 PROCEDURE — A9579 GAD-BASE MR CONTRAST NOS,1ML: HCPCS | Performed by: PAIN MEDICINE

## 2019-10-17 PROCEDURE — 7100000010 HC PHASE II RECOVERY - FIRST 15 MIN: Performed by: PAIN MEDICINE

## 2019-10-17 PROCEDURE — 3209999900 FLUORO FOR SURGICAL PROCEDURES

## 2019-10-17 RX ORDER — LIDOCAINE HYDROCHLORIDE 5 MG/ML
INJECTION, SOLUTION INFILTRATION; INTRAVENOUS PRN
Status: DISCONTINUED | OUTPATIENT
Start: 2019-10-17 | End: 2019-10-17 | Stop reason: ALTCHOICE

## 2019-10-17 ASSESSMENT — PAIN SCALES - GENERAL
PAINLEVEL_OUTOF10: 0
PAINLEVEL_OUTOF10: 0

## 2019-10-17 ASSESSMENT — PAIN - FUNCTIONAL ASSESSMENT: PAIN_FUNCTIONAL_ASSESSMENT: 0-10

## 2019-10-17 ASSESSMENT — PAIN DESCRIPTION - DESCRIPTORS: DESCRIPTORS: ACHING;DISCOMFORT

## 2019-10-25 DIAGNOSIS — D49.0 IPMN (INTRADUCTAL PAPILLARY MUCINOUS NEOPLASM): Primary | ICD-10-CM

## 2019-10-25 RX ORDER — PREDNISONE 50 MG/1
50 TABLET ORAL EVERY 6 HOURS
Qty: 3 TABLET | Refills: 0 | Status: SHIPPED | OUTPATIENT
Start: 2019-10-25 | End: 2019-10-26

## 2019-10-25 RX ORDER — DIPHENHYDRAMINE HCL 25 MG
50 TABLET ORAL ONCE
Qty: 2 TABLET | Refills: 0 | Status: SHIPPED
Start: 2019-10-25 | End: 2021-06-21

## 2019-10-28 ENCOUNTER — TELEPHONE (OUTPATIENT)
Dept: ORTHOPEDIC SURGERY | Age: 59
End: 2019-10-28

## 2019-11-13 ENCOUNTER — TELEPHONE (OUTPATIENT)
Dept: HEMATOLOGY | Age: 59
End: 2019-11-13

## 2019-11-22 ENCOUNTER — HOSPITAL ENCOUNTER (OUTPATIENT)
Age: 59
Discharge: HOME | End: 2019-11-22
Payer: MEDICAID

## 2019-11-22 ENCOUNTER — HOSPITAL ENCOUNTER (OUTPATIENT)
Dept: MRI IMAGING | Age: 59
Discharge: HOME | End: 2019-11-24
Payer: MEDICAID

## 2019-11-22 DIAGNOSIS — D49.0 IPMN (INTRADUCTAL PAPILLARY MUCINOUS NEOPLASM): ICD-10-CM

## 2019-11-22 LAB
ANION GAP SERPL CALCULATED.3IONS-SCNC: 21 MMOL/L (ref 7–16)
BUN BLDV-MCNC: 28 MG/DL (ref 6–20)
CALCIUM SERPL-MCNC: 9.8 MG/DL (ref 8.6–10.2)
CHLORIDE BLD-SCNC: 101 MMOL/L (ref 98–107)
CO2: 20 MMOL/L (ref 22–29)
CREAT SERPL-MCNC: 0.9 MG/DL (ref 0.7–1.2)
GFR AFRICAN AMERICAN: >60
GFR NON-AFRICAN AMERICAN: >60 ML/MIN/1.73
GLUCOSE BLD-MCNC: 241 MG/DL (ref 74–99)
POTASSIUM SERPL-SCNC: 4.3 MMOL/L (ref 3.5–5)
SODIUM BLD-SCNC: 142 MMOL/L (ref 132–146)

## 2019-11-22 PROCEDURE — 74183 MRI ABD W/O CNTR FLWD CNTR: CPT

## 2019-11-22 PROCEDURE — 36415 COLL VENOUS BLD VENIPUNCTURE: CPT

## 2019-11-22 PROCEDURE — 80048 BASIC METABOLIC PNL TOTAL CA: CPT

## 2019-11-22 PROCEDURE — 6360000004 HC RX CONTRAST MEDICATION: Performed by: RADIOLOGY

## 2019-11-22 PROCEDURE — A9579 GAD-BASE MR CONTRAST NOS,1ML: HCPCS | Performed by: RADIOLOGY

## 2019-11-22 RX ADMIN — GADOTERIDOL 20 ML: 279.3 INJECTION, SOLUTION INTRAVENOUS at 08:55

## 2019-12-02 ENCOUNTER — OFFICE VISIT (OUTPATIENT)
Dept: HEMATOLOGY | Age: 59
End: 2019-12-02
Payer: MEDICAID

## 2019-12-02 VITALS
DIASTOLIC BLOOD PRESSURE: 78 MMHG | TEMPERATURE: 97.5 F | SYSTOLIC BLOOD PRESSURE: 108 MMHG | OXYGEN SATURATION: 96 % | HEIGHT: 68 IN | WEIGHT: 229.2 LBS | BODY MASS INDEX: 34.74 KG/M2 | HEART RATE: 73 BPM

## 2019-12-02 DIAGNOSIS — K43.2 INCISIONAL HERNIA, WITHOUT OBSTRUCTION OR GANGRENE: ICD-10-CM

## 2019-12-02 DIAGNOSIS — D49.0 IPMN (INTRADUCTAL PAPILLARY MUCINOUS NEOPLASM): Primary | ICD-10-CM

## 2019-12-02 PROCEDURE — 99214 OFFICE O/P EST MOD 30 MIN: CPT | Performed by: TRANSPLANT SURGERY

## 2019-12-02 PROCEDURE — G8417 CALC BMI ABV UP PARAM F/U: HCPCS | Performed by: TRANSPLANT SURGERY

## 2019-12-02 PROCEDURE — G8484 FLU IMMUNIZE NO ADMIN: HCPCS | Performed by: TRANSPLANT SURGERY

## 2019-12-02 PROCEDURE — 99213 OFFICE O/P EST LOW 20 MIN: CPT | Performed by: TRANSPLANT SURGERY

## 2019-12-02 PROCEDURE — G8427 DOCREV CUR MEDS BY ELIG CLIN: HCPCS | Performed by: TRANSPLANT SURGERY

## 2019-12-02 PROCEDURE — 1036F TOBACCO NON-USER: CPT | Performed by: TRANSPLANT SURGERY

## 2019-12-02 PROCEDURE — 3017F COLORECTAL CA SCREEN DOC REV: CPT | Performed by: TRANSPLANT SURGERY

## 2019-12-02 RX ORDER — TRAMADOL HYDROCHLORIDE 50 MG/1
50 TABLET ORAL 2 TIMES DAILY
Refills: 0 | COMMUNITY
Start: 2019-11-23 | End: 2020-06-23

## 2019-12-03 ENCOUNTER — TELEPHONE (OUTPATIENT)
Dept: HEMATOLOGY | Age: 59
End: 2019-12-03

## 2019-12-07 ENCOUNTER — HOSPITAL ENCOUNTER (OUTPATIENT)
Dept: CT IMAGING | Age: 59
Discharge: HOME | End: 2019-12-09
Payer: MEDICAID

## 2019-12-07 DIAGNOSIS — K43.2 INCISIONAL HERNIA, WITHOUT OBSTRUCTION OR GANGRENE: ICD-10-CM

## 2019-12-07 PROCEDURE — 74176 CT ABD & PELVIS W/O CONTRAST: CPT

## 2019-12-09 ENCOUNTER — TELEPHONE (OUTPATIENT)
Dept: HEMATOLOGY | Age: 59
End: 2019-12-09

## 2020-02-03 RX ORDER — PREGABALIN 150 MG/1
CAPSULE ORAL
Qty: 60 CAPSULE | OUTPATIENT
Start: 2020-02-03

## 2020-02-03 NOTE — TELEPHONE ENCOUNTER
Patient last visit 8-28-19 no return visit scheduled. Pharmacy requesting refill on Lyrica 150 mg 1 capsule bid sent 8-12-19 #[phone] refills. Please advise.

## 2020-05-21 ENCOUNTER — OFFICE VISIT (OUTPATIENT)
Dept: ORTHOPEDIC SURGERY | Age: 60
End: 2020-05-21
Payer: MEDICAID

## 2020-05-21 VITALS — BODY MASS INDEX: 34.86 KG/M2 | WEIGHT: 230 LBS | HEIGHT: 68 IN

## 2020-05-21 PROCEDURE — 3017F COLORECTAL CA SCREEN DOC REV: CPT | Performed by: ORTHOPAEDIC SURGERY

## 2020-05-21 PROCEDURE — 20610 DRAIN/INJ JOINT/BURSA W/O US: CPT | Performed by: ORTHOPAEDIC SURGERY

## 2020-05-21 PROCEDURE — G8417 CALC BMI ABV UP PARAM F/U: HCPCS | Performed by: ORTHOPAEDIC SURGERY

## 2020-05-21 PROCEDURE — 1036F TOBACCO NON-USER: CPT | Performed by: ORTHOPAEDIC SURGERY

## 2020-05-21 PROCEDURE — 99214 OFFICE O/P EST MOD 30 MIN: CPT | Performed by: ORTHOPAEDIC SURGERY

## 2020-05-21 PROCEDURE — G8428 CUR MEDS NOT DOCUMENT: HCPCS | Performed by: ORTHOPAEDIC SURGERY

## 2020-05-21 RX ORDER — TRIAMCINOLONE ACETONIDE 40 MG/ML
40 INJECTION, SUSPENSION INTRA-ARTICULAR; INTRAMUSCULAR ONCE
Status: COMPLETED | OUTPATIENT
Start: 2020-05-21 | End: 2020-05-21

## 2020-05-21 RX ADMIN — TRIAMCINOLONE ACETONIDE 40 MG: 40 INJECTION, SUSPENSION INTRA-ARTICULAR; INTRAMUSCULAR at 11:01

## 2020-05-21 NOTE — PROGRESS NOTES
Chief Complaint   Patient presents with    Arm Pain     FOllow up from cubital and carpal tunnel release DOS 3/29/2019. Complains spasms in the right palm. He also states that the ulnar nerve is causing pain in the right shoulder. . He brought disc today. Merlin Money is a 63y.o. year old who presents for follow up s/p carpal and cubital tunnel release, DOS 3/29/19. He reports increased pain to palm of hand, minimal relief to CSI. He continues to have numbness to fingers.  He c/o cramping and spasms to hand intermittently throughout the day    Past Medical History:   Diagnosis Date    Arthritis     Back pain     5th finger & ring finger on right hand is numb    Bell's palsy     NEW ONSET 3-     Hyperlipidemia     Hypertension     Neuropathy     toes numb    Poorly controlled type 2 diabetes mellitus with neuropathy ([facility] [address] 5/8/2018    Sacral radiculitis 2/27/2019    Spondylosis of lumbar spine 3/9/2019    Advanced arthritis and degenerative disc disease by MRI 3/2019    Ventral hernia      Past Surgical History:   Procedure Laterality Date    ANESTHESIA NERVE BLOCK Bilateral 7/11/2019    BILATERAL L4-5 TRANSFORAMINAL EPIDURAL STEROID INJECTION [facility] Bilateral 8/1/2019    BILATERAL MEDIAL BRANCH BLOCK L4-5 AND L5-S1 performed by Chava Holland DO at [address] [place] [id number] Bilateral 8/15/2019    BILATERAL MEDIAL BRANCH BLOCK L4 - 5 AND L5 - S1 performed by Chava Holland DO at [facility] Right 03/29/2019    right wrist carpal tunnel release and right elbow cubital tunnel release    CARPAL TUNNEL RELEASE Right 3/29/2019    RIGHT WRIST CARPAL TUNNEL RELEASE AND RIGHT ELBOW CUBITAL TUNNEL RELEASE performed by Alverto Hills DO at [place] [address]      at age 40 polyps    CYST REMOVAL Right     EPIDURAL STEROID INJECTION N/A 10/17/2019    LUMBAR EPIDURAL STEROID INJECTION UNDER FLUOROSCOPIC GUIDANCE AT L2-L3 WITHOUT SEDATION performed by Elana Pallas, MD at [address] Bilateral     groin and umbilical    HERNIA REPAIR N/A 12/13/2018    ROBOTIC ASSISTED LAPAROSCOPIC PRIMARY REPAIR OF RECURRENT VENTRAL HERNIA  REPAIR performed by Amber Murray MD at [facility] Bilateral 07/11/2019    L4-5 transforaminal     NERVE BLOCK Bilateral 08/01/2019    medial branch block    NERVE BLOCK Bilateral 08/15/2019    bilateral medial branch block L4-S1    NERVE BLOCK  10/17/2019    lumbar epidural    SPINE SURGERY  06/05/2018    C5-7 fusion at [facility] in [facility]       Current Outpatient Medications:     losartan-hydrochlorothiazide (HYZAAR) 100-12.5 MG per tablet, take 1 tablet by mouth once daily, Disp: 30 tablet, Rfl: 1    traMADol (ULTRAM) 50 MG tablet, Take 50 mg by mouth 2 times daily. , Disp: , Rfl: 0    glipiZIDE (GLUCOTROL XL) 10 MG extended release tablet, take 1 tablet by mouth daily, Disp: 90 tablet, Rfl: 1    Ertugliflozin L-PyroglutamicAc (STEGLATRO) 5 MG TABS, take 1 tablet by mouth once daily, Disp: 30 tablet, Rfl: 5    pregabalin (LYRICA) 150 MG capsule, Take 1 PO BID when finished with 100mg BID, Disp: 60 capsule, Rfl: 5    diclofenac (VOLTAREN) 50 MG EC tablet, Take 1 tablet by mouth 2 times daily as needed for Pain, Disp: 60 tablet, Rfl: 1    blood glucose test strips (TRUE METRIX BLOOD GLUCOSE TEST) strip, As needed. , Disp: 50 strip, Rfl: 5    fluticasone (FLONASE) 50 MCG/ACT nasal spray, instill 2 sprays into each nostril once daily, Disp: 1 Bottle, Rfl: 5    TRUEPLUS LANCETS 33G MISC, use as directed 3 TIMES A WEEK, Disp: 100 each, Rfl: 5    atorvastatin (LIPITOR) 10 MG tablet, take 1 tablet by mouth once daily, Disp: 90 tablet, Rfl: 1  Allergies   Allergen Reactions    Iodine Swelling     Sea food    Wheat Extract Swelling    Metformin And Related Hives     Social History

## 2020-06-15 ENCOUNTER — OFFICE VISIT (OUTPATIENT)
Dept: PHYSICAL MEDICINE AND REHAB | Age: 60
End: 2020-06-15
Payer: MEDICAID

## 2020-06-15 VITALS — HEIGHT: 68 IN | BODY MASS INDEX: 34.86 KG/M2 | WEIGHT: 230 LBS

## 2020-06-15 PROCEDURE — 95911 NRV CNDJ TEST 9-10 STUDIES: CPT | Performed by: PHYSICAL MEDICINE & REHABILITATION

## 2020-06-15 PROCEDURE — 95886 MUSC TEST DONE W/N TEST COMP: CPT | Performed by: PHYSICAL MEDICINE & REHABILITATION

## 2020-06-15 NOTE — PROGRESS NOTES
[address] [facility]  Electrodiagnostic Laboratory  *Accredited by the [facility] with exemplary status  [address] [facility]  Phone: [phone]  Fax: [phone]    Referring Provider: Daysi Chapman DO  Primary Care Physician: Whitney Garces DO  Patient Name: Gwendolyn Marquez  Patient YOB: 1960  Gender: male  BMI: Body mass index is 34.97 kg/m². Height 5' 8\" (1.727 m), weight 230 lb (104.3 kg). 6/15/2020    Description of clinical problem:   Chief Complaint   Patient presents with    Hand Pain     Right hand pain present for approximately 2.5 years after no acute injury    Hand Numbness     Right hand numbness      Pain Yes  Pain Score:   6-8; Numbness/tingling  Yes; Weakness  Yes       Brief physical exam:   Sensory deficit Yes; Weakness Yes; Atrophy  Yes; Reflex abnormality No    Sensory NCS      Nerve / Sites Rec. Site Peak Lat PP Amp Segments Distance Velocity Temp. ms µV  cm m/s °C   R Median - Digit II (Antidromic)      Palm Dig II 1.82 22.0 Palm - Dig II 7 58 33.2      Wrist Dig II 3.96 18.9 Wrist - Dig II 14 48 33.2   R Ulnar - Digit V (Antidromic)      Wrist Dig V 2.97 12.5 Wrist - Dig V 14 61 33.8   R Radial - Anatomical snuff box (Forearm)      Forearm Wrist 2.45 21.7 Forearm - Wrist 10 53 33.4   R Dorsal ulnar cutaneous - Hand dorsum (Forearm)      Forearm Hand dorsum 2.08 13.6 Forearm - Hand dorsum 8 50 32.5       Combined Sensory Index      Nerve / Sites Rec. Site Peak Lat NP Amp PP Amp Segments Dist. Peak Diff Temp.      ms µV µV  cm ms °C   R Median - CSI      Median Thumb 3.59 14.5 25.1 Median - Radial 10 1.15 33.2      Radial Thumb 2.45 10.5 10.1 Median - Ulnar 14        CSI     CSI  1.15        Motor NCS      Nerve / Sites Muscle Onset Amplitude Segments Distance Velocity Temp.     ms mV  cm m/s °C   R Median - APB      Palm APB 2.34 4.2 Palm - APB   33      Wrist APB 4.53 3.3* Wrist - Palm 8 37 33      Elbow APB 8.33 3.0* Elbow - Wrist

## 2020-06-23 ENCOUNTER — OFFICE VISIT (OUTPATIENT)
Dept: ORTHOPEDIC SURGERY | Age: 60
End: 2020-06-23
Payer: MEDICAID

## 2020-06-23 VITALS — HEIGHT: 68 IN | BODY MASS INDEX: 34.86 KG/M2 | WEIGHT: 230 LBS

## 2020-06-23 PROCEDURE — 1036F TOBACCO NON-USER: CPT | Performed by: ORTHOPAEDIC SURGERY

## 2020-06-23 PROCEDURE — 99213 OFFICE O/P EST LOW 20 MIN: CPT | Performed by: ORTHOPAEDIC SURGERY

## 2020-06-23 PROCEDURE — 3017F COLORECTAL CA SCREEN DOC REV: CPT | Performed by: ORTHOPAEDIC SURGERY

## 2020-06-23 PROCEDURE — G8428 CUR MEDS NOT DOCUMENT: HCPCS | Performed by: ORTHOPAEDIC SURGERY

## 2020-06-23 PROCEDURE — G8417 CALC BMI ABV UP PARAM F/U: HCPCS | Performed by: ORTHOPAEDIC SURGERY

## 2020-06-23 RX ORDER — EMPAGLIFLOZIN 10 MG/1
TABLET, FILM COATED ORAL
Status: ON HOLD | COMMUNITY
Start: 2020-05-26 | End: 2021-05-12

## 2020-06-23 RX ORDER — LOSARTAN POTASSIUM 100 MG/1
TABLET ORAL
Status: ON HOLD | COMMUNITY
Start: 2020-05-22 | End: 2021-05-12

## 2020-06-23 RX ORDER — HYDROCHLOROTHIAZIDE 12.5 MG/1
TABLET ORAL
Status: ON HOLD | COMMUNITY
Start: 2020-05-22 | End: 2021-05-12

## 2020-06-23 RX ORDER — TIZANIDINE 4 MG/1
TABLET ORAL
COMMUNITY
Start: 2020-05-14 | End: 2020-12-29

## 2020-06-23 NOTE — PROGRESS NOTES
Chief Complaint   Patient presents with    Arm Pain     EMG follow up. Shruthi Orta is a 63y.o. year old who presents for follow up s/p carpal and cubital tunnel release, DOS 3/29/19. He reports increased pain to palm of hand, minimal relief to CSI. He continues to have numbness to fingers. He c/o cramping and spasms to hand intermittently throughout the day. He had EMG.      Past Medical History:   Diagnosis Date    Arthritis     Back pain     5th finger & ring finger on right hand is numb    Bell's palsy     NEW ONSET 3-     Hyperlipidemia     Hypertension     Neuropathy     toes numb    Poorly controlled type 2 diabetes mellitus with neuropathy ([facility] [address] 5/8/2018    Sacral radiculitis 2/27/2019    Spondylosis of lumbar spine 3/9/2019    Advanced arthritis and degenerative disc disease by MRI 3/2019    Ventral hernia      Past Surgical History:   Procedure Laterality Date    ANESTHESIA NERVE BLOCK Bilateral 7/11/2019    BILATERAL L4-5 TRANSFORAMINAL EPIDURAL STEROID INJECTION performed by Bard Velia DO at [address] Bilateral 8/1/2019    BILATERAL MEDIAL BRANCH BLOCK L4-5 AND L5-S1 performed by Bard Velia DO at [address] [place] [address] Bilateral 8/15/2019    BILATERAL MEDIAL BRANCH BLOCK L4 - 5 AND L5 - S1 performed by Bard Velia DO at [facility] Right 03/29/2019    right wrist carpal tunnel release and right elbow cubital tunnel release    CARPAL TUNNEL RELEASE Right 3/29/2019    RIGHT WRIST CARPAL TUNNEL RELEASE AND RIGHT ELBOW CUBITAL TUNNEL RELEASE performed by Neetu Chowdary DO at [place] [address]      at age 40 polyps    CYST REMOVAL Right     EPIDURAL STEROID INJECTION N/A 10/17/2019    LUMBAR EPIDURAL STEROID INJECTION UNDER FLUOROSCOPIC GUIDANCE AT L2-L3 WITHOUT SEDATION performed by Edil Wilcox MD at [address] [facility] status: Never Smoker    Smokeless tobacco: Never Used   Substance and Sexual Activity    Alcohol use: Yes     Comment: occasional    Drug use: No    Sexual activity: Not on file   Lifestyle    Physical activity     Days per week: Not on file     Minutes per session: Not on file    Stress: Not on file   Relationships    Social connections     Talks on phone: Not on file     Gets together: Not on file     Attends Mandaen service: Not on file     Active member of club or organization: Not on file     Attends meetings of clubs or organizations: Not on file     Relationship status: Not on file    Intimate partner violence     Fear of current or ex partner: Not on file     Emotionally abused: Not on file     Physically abused: Not on file     Forced sexual activity: Not on file   Other Topics Concern    Not on file   Social History Narrative    Not on file     Family History   Problem Relation Age of Onset    Diabetes Mother     Other Mother         parkinsons    Diabetes Father     Cancer Sister 79        unknown    Cancer Brother 64        stomach    Diabetes Brother        REVIEW OF SYSTEMS:    General/Constitution:  (-)weight loss, (-)fever, (-)chills, (-)weakness. Skin: (-) rash,(-) psoriasis,(-) eczema, (-)skin cancer. Musculoskeletal: (-) fractures,  (-) dislocations,(-) collagen vascular disease, (-) fibromyalgia, (-) multiple sclerosis, (-) muscular dystrophy, (-) RSD,(-) joint pain (-)swelling, (-) joint pain,swelling. Neurologic: (-) epilepsy, (-)seizures,(-) brain tumor,(-) TIA, (-)stroke, (-)headaches, (-)Parkinson disease,(-) memory loss, (-) LOC. Cardiovascular: (-) Chest pain, (-) swelling in legs/feet, (-) SOB, (-) cramping in legs/feet with walking. Respiratory: (-) SOB, (-) Coughing, (-) night sweats. GI: (-) nausea, (-) vomiting, (-) diarrhea, (-) blood in stool, (-) gastric ulcer.   Psychiatric: (-) Depression, (-) Anxiety, (-) bipolar disease, (-) Alzheimer's shoulder girdle. The range of motion for the Right Shoulder is 140/50/t10 and for the Left shoulder is 160/50/t10. Right shoulder Motor strength is 5/5 in the supraspinatus, 5/5 internal rotation and 5/5 in external rotation, and Left shoulder motor strength 5/5 in supraspinatus, 5/5 in internal rotation, 5/5 in external rotation. + impingement, + pastor    Elbow exam:  Evaluation of the elbow, reveals no signs of swelling or deformity. ROM is 0-140. There is not instability with varus/valgus stresses. Motor strength is 5/5 with flexion/extension. Wrist exam:  Inspection of the bilateral upper extremities, there is no evidence of deformity of the wrist.  ROM Wrist ROM R wrist DF 80, VF 90, L wrist DF 80, VF 90, R pronation 90/ supination 90, L pronation 90/supination 90. Motor strength is 5/5 with Dorsiflexion/Volarflexion/Supination/Pronation. Motor and sensation is intact and symmetric throughout the bilateral upper extremities in the median, ulnar and radial , musclcutaneous, and axillary nerve distributions. Hand exam:  The skin overlying the hand is  intact. There is not evidence of scar, lesion, laceration, or abrasion. The motion in the small joints of the hand are intact with no stiffness or deformity. The ROM in the MCP flexion 90/ extension 0 , PIP flexion 90/ extension 0, DIP flexion 70/ extension 0. There is not rotational deformity. There is no masses or adenopathy in bilateral upper extremities. Radial pulses are 2+ and symmetric bilaterally. Capillary refill is intact and < 2 seconds. Motor strength is 5/5 with flexion and extension of the small finger joints. Bilateral wrist:    Phallens sign(+), Tinnells sign (+), Median nerve compression test (+),  Finklesteins (-), [employer] Grind test (+), [employer](-). Xrays:   Minimal narrowing to cmc joint of thumb    EMG:  This study was abnormal.      1.  There is electrodiagnostic evidence for a non-localizable right motor ulnar

## 2020-09-09 ENCOUNTER — HOSPITAL ENCOUNTER (EMERGENCY)
Age: 60
Discharge: HOME | End: 2020-09-09
Attending: EMERGENCY MEDICINE
Payer: MEDICAID

## 2020-09-09 VITALS
RESPIRATION RATE: 16 BRPM | HEART RATE: 88 BPM | DIASTOLIC BLOOD PRESSURE: 79 MMHG | SYSTOLIC BLOOD PRESSURE: 146 MMHG | OXYGEN SATURATION: 95 % | TEMPERATURE: 98.2 F

## 2020-09-09 LAB
CHP ED QC CHECK: NORMAL
GLUCOSE BLD-MCNC: 136 MG/DL
GLUCOSE BLD-MCNC: 165 MG/DL
GLUCOSE BLD-MCNC: 77 MG/DL
METER GLUCOSE: 136 MG/DL (ref 74–99)
METER GLUCOSE: 165 MG/DL (ref 74–99)
METER GLUCOSE: 77 MG/DL (ref 74–99)

## 2020-09-09 PROCEDURE — 82962 GLUCOSE BLOOD TEST: CPT

## 2020-09-09 PROCEDURE — 99283 EMERGENCY DEPT VISIT LOW MDM: CPT

## 2020-09-09 ASSESSMENT — ENCOUNTER SYMPTOMS
SHORTNESS OF BREATH: 0
BACK PAIN: 0
NAUSEA: 0
EYE PAIN: 0
EYE DISCHARGE: 0
SORE THROAT: 0
SINUS PRESSURE: 0
COUGH: 0
ABDOMINAL PAIN: 0
VOMITING: 0
DIARRHEA: 0
EYE REDNESS: 0
WHEEZING: 0

## 2020-09-09 NOTE — ED PROVIDER NOTES
Patient is a 63 y/o male who presents to the ED via EMS with hypoglycemia. EMS reports that the patient was found to be unresponsive on the floor tonight. His glucose was checked and it was 58. He ate and they rechecked it and his glucose was 69. Patient denies any current complaints. He states that he had a strenuous day and does not have test strips to check his glucose. He denies any injury. Review of Systems   Constitutional: Negative for chills and fever. HENT: Negative for ear pain, sinus pressure and sore throat. Eyes: Negative for pain, discharge and redness. Respiratory: Negative for cough, shortness of breath and wheezing. Cardiovascular: Negative for chest pain. Gastrointestinal: Negative for abdominal pain, diarrhea, nausea and vomiting. Genitourinary: Negative for dysuria and frequency. Musculoskeletal: Negative for arthralgias and back pain. Skin: Negative for rash and wound. Neurological: Negative for weakness and headaches. Hematological: Negative for adenopathy. All other systems reviewed and are negative. Physical Exam  Vitals signs and nursing note reviewed. Constitutional:       General: He is not in acute distress. HENT:      Head: Normocephalic and atraumatic. Right Ear: External ear normal.      Left Ear: External ear normal.      Nose: Nose normal.      Mouth/Throat:      Mouth: Mucous membranes are moist.   Eyes:      Conjunctiva/sclera: Conjunctivae normal.      Pupils: Pupils are equal, round, and reactive to light. Neck:      Musculoskeletal: Normal range of motion and neck supple. Cardiovascular:      Rate and Rhythm: Regular rhythm. Tachycardia present. Heart sounds: No murmur. Pulmonary:      Effort: Pulmonary effort is normal. No respiratory distress. Breath sounds: Normal breath sounds. No stridor. No wheezing, rhonchi or rales. Abdominal:      General: Bowel sounds are normal. There is no distension. Palpations: Abdomen is soft. Tenderness: There is no abdominal tenderness. There is no guarding. Musculoskeletal: Normal range of motion. General: No swelling. Skin:     General: Skin is warm and dry. Findings: No rash. Neurological:      Mental Status: He is alert and oriented to person, place, and time. Procedures     [facility]                --------------------------------------------- PAST HISTORY ---------------------------------------------  Past Medical History:  has a past medical history of Arthritis, Back pain, Bell's palsy, Hyperlipidemia, Hypertension, Neuropathy, Poorly controlled type 2 diabetes mellitus with neuropathy ([facility] [address] Sacral radiculitis, Spondylosis of lumbar spine, and Ventral hernia. Past Surgical History:  has a past surgical history that includes cyst removal (Right); hernia repair (Bilateral); Colonoscopy; Spine surgery (06/05/2018); hernia repair (N/A, 12/13/2018); Carpal tunnel release (Right, 03/29/2019); Carpal tunnel release (Right, 3/29/2019); Nerve Block (Bilateral, 07/11/2019); Anesthesia Nerve Block (Bilateral, 7/11/2019); Nerve Block (Bilateral, 08/01/2019); Anesthesia Nerve Block (Bilateral, 8/1/2019); Nerve Block (Bilateral, 08/15/2019); Anesthesia Nerve Block (Bilateral, 8/15/2019); Nerve Block (10/17/2019); and epidural steroid injection (N/A, 10/17/2019). Social History:  reports that he has never smoked. He has never used smokeless tobacco. He reports current alcohol use. He reports that he does not use drugs. Family History: family history includes Cancer (age of onset: 64) in his brother; Cancer (age of onset: 79) in his sister; Diabetes in his brother, father, and mother; Other in his mother. The patients home medications have been reviewed. Allergies: Iodine;  Wheat extract; and Metformin and related    -------------------------------------------------- RESULTS -------------------------------------------------  Labs:  Results for orders placed or performed during the hospital encounter of 09/09/20   POCT glucose   Result Value Ref Range    Glucose 77 mg/dL    QC OK? ok    POCT Glucose   Result Value Ref Range    Meter Glucose 77 74 - 99 mg/dL   POCT glucose   Result Value Ref Range    Glucose 136 mg/dL    QC OK? ok    POCT Glucose   Result Value Ref Range    Meter Glucose 136 (H) 74 - 99 mg/dL   POCT glucose   Result Value Ref Range    Glucose 165 mg/dL    QC OK? OK    POCT Glucose   Result Value Ref Range    Meter Glucose 165 (H) 74 - 99 mg/dL       Radiology:  No orders to display       ------------------------- NURSING NOTES AND VITALS REVIEWED ---------------------------  Date / Time Roomed:  9/9/2020 12:43 AM  ED Bed Assignment:  13/13    The nursing notes within the ED encounter and vital signs as below have been reviewed. BP (!) 146/79   Pulse 88   Temp 98.2 °F (36.8 °C) (Oral)   Resp 16   SpO2 95%   Oxygen Saturation Interpretation: Normal      ------------------------------------------ PROGRESS NOTES ------------------------------------------  I have spoken with the patient and discussed todays results, in addition to providing specific details for the plan of care and counseling regarding the diagnosis and prognosis. Their questions are answered at this time and they are agreeable with the plan. I discussed at length with them reasons for immediate return here for re evaluation. They will followup with primary care by calling their office tomorrow. --------------------------------- ADDITIONAL PROVIDER NOTES ---------------------------------  At this time the patient is without objective evidence of an acute process requiring hospitalization or inpatient management. They have remained hemodynamically stable throughout their entire ED visit and are stable for discharge with outpatient follow-up.      The plan has been discussed in detail and they are aware of the specific conditions for emergent return, as well as the importance of follow-up. New Prescriptions    No medications on file       Diagnosis:  1. Hypoglycemia        Disposition:  Patient's disposition: Discharge to home  Patient's condition is stable.            [address] [facility],   09/09/20 0097

## 2020-11-03 PROBLEM — M47.816 LUMBAR SPONDYLOSIS: Status: RESOLVED | Noted: 2019-09-07 | Resolved: 2020-11-03

## 2020-12-15 ENCOUNTER — HOSPITAL ENCOUNTER (OUTPATIENT)
Dept: MRI IMAGING | Age: 60
Discharge: HOME | End: 2020-12-17
Payer: MEDICAID

## 2020-12-15 PROCEDURE — 74183 MRI ABD W/O CNTR FLWD CNTR: CPT

## 2020-12-15 PROCEDURE — 6360000004 HC RX CONTRAST MEDICATION: Performed by: RADIOLOGY

## 2020-12-15 PROCEDURE — A9579 GAD-BASE MR CONTRAST NOS,1ML: HCPCS | Performed by: RADIOLOGY

## 2020-12-15 RX ADMIN — GADOTERIDOL 20 ML: 279.3 INJECTION, SOLUTION INTRAVENOUS at 08:54

## 2020-12-17 ENCOUNTER — VIRTUAL VISIT (OUTPATIENT)
Dept: HEMATOLOGY | Age: 60
End: 2020-12-17
Payer: MEDICAID

## 2020-12-17 PROCEDURE — 99441 PR PHYS/QHP TELEPHONE EVALUATION 5-10 MIN: CPT | Performed by: TRANSPLANT SURGERY

## 2020-12-21 ENCOUNTER — TELEPHONE (OUTPATIENT)
Dept: HEMATOLOGY | Age: 60
End: 2020-12-21

## 2020-12-21 NOTE — TELEPHONE ENCOUNTER
Patients insurance [facility] did require prior auth which was received and scanned into the patients chart. The patient is scheduled with his EUS with Dr Deanna Haro at [facility] [address] on 01/08/2021 at 8:00am. Pt was made aware that our pre admission testing office will call prior with more instructions. The patient was also made aware that he MUST go on 12/31/2020 to [facility] location between 6:00am and 2:00 pm, which the patient confirmed this location.  The patient was also made aware of his  Follow up appt with dr Mis Dunn  Electronically signed by Juli Kaiser MA on 12/21/2020 at 12:07 PM

## 2020-12-29 RX ORDER — METHOCARBAMOL 750 MG/1
750 TABLET, FILM COATED ORAL DAILY
Status: ON HOLD | COMMUNITY
End: 2021-05-12

## 2020-12-29 RX ORDER — MELOXICAM 7.5 MG/1
15 TABLET ORAL DAILY
Status: ON HOLD | COMMUNITY
End: 2021-05-12

## 2020-12-29 RX ORDER — OXYCODONE HYDROCHLORIDE 5 MG/1
TABLET ORAL EVERY 8 HOURS PRN
Status: ON HOLD | COMMUNITY
Start: 2020-01-31 | End: 2021-05-12

## 2020-12-29 NOTE — PROGRESS NOTES
Patient agreed to COVID test on 12-31 at the  [address] [place] [address]  located at  [address] the hours of 6 am- 2:30 pm, instructed to bring ID. Patient instructed to self isolate until day of surgery.

## 2020-12-29 NOTE — PROGRESS NOTES
[address] PRE-ADMISSION TESTING ENDOSCOPY INSTRUCTIONS- [facility]-phone number:[phone]    ENDOSCOPY INSTRUCTIONS:   [] Bowel prep instructions reviewed. [x] Nothing by mouth after midnight, including gum, candy, mints, or water. Please follow your surgeons instructions if you are required to complete a bowel prep. Colonoscopy- no solid food-only clear liquids the day prior). [x] You may brush your teeth, gargle, but do NOT swallow water. [] Do not wear makeup, lotions, powders, deodorant. Nail polish as directed by the nurse. [x] Arrange transportation with a responsible adult  to and from the hospital. If you do not have a responsible adult  to transport you, you will need to make arrangements with a medical transportation company (i.e. [employer]. A Uber/taxi/bus is not appropriate unless you are accompanied by a responsible adult ). Arrange for someone to be with you for the remainder of the day and for 24 hours after your procedure due to having had anesthesia. Who will be your  for transportation?____friend______________   Who will be staying with you for 24 hrs after your procedure?___________friend_______    PARKING INSTRUCTIONS:   [x] Arrival Time:______0[address] via [address] door__________________  · [] Parking lot  \"I\" OR 1 is located on [facility] (the corner of [facility]). To enter, press the button and the gate will lift. A free token will be provided to exit the lot. One car per patient is allowed to park in this lot. All other cars are to park on [address] [place] [address] Street either in the parking garage or the handicap lot. [] To reach the [facility] lobby from [address] [place] [address], upon entering the hospital, take elevator B to the 3rd floor. EDUCATION INSTRUCTIONS:  [x] Bring a complete list of your medications, please write the last time you took the medicine, give this list to the nurse. [x] Take the following medications the morning of surgery with 1-2 ounces of water:   [] Stop herbal supplements and vitamins 5 days before your surgery. [x] DO NOT take any diabetic medicine the morning of surgery. Follow instructions for insulin the day before surgery. [] If you are diabetic and your blood sugar is low or you feel symptomatic, you may drink 1-2 ounces of apple juice or take a glucose tablet. The morning of your procedure, you may call the pre-op area if you have concerns about your blood sugar [phone]. [] Use your inhalers the morning of surgery. Bring your emergency inhaler with you day of surgery. [x] Follow physician instructions regarding any blood thinners you may be taking. WHAT TO EXPECT:  [x] The day of your procedure you will be greeted and checked in by the [employer].  In addition, you will be registered in the [place] by a Patient Access Representative. Please bring your photo ID and insurance card. A nurse will greet you in accordance to the time you are needed in the pre-op area to prepare you for surgery. Please do not be discouraged if you are not greeted in the order you arrive as there are many variables that are involved in patient preparation. Your patience is greatly appreciated as you wait for your nurse. Please bring in items such as: books, magazines, newspapers, electronics, or any other items  to occupy your time in the waiting area. []  Delays may occur. Staff will make a sincere effort to keep you informed of delays. If any delays occur with your procedure, we apologize ahead of time for your inconvenience as we recognize the value of your time.

## 2021-01-04 ENCOUNTER — HOSPITAL ENCOUNTER (OUTPATIENT)
Age: 61
Setting detail: SPECIMEN
Discharge: HOME | End: 2021-01-04
Payer: MEDICAID

## 2021-01-04 ENCOUNTER — TELEPHONE (OUTPATIENT)
Dept: HEMATOLOGY | Age: 61
End: 2021-01-04

## 2021-01-04 DIAGNOSIS — U07.1 COVID-19: ICD-10-CM

## 2021-01-04 PROCEDURE — U0003 INFECTIOUS AGENT DETECTION BY NUCLEIC ACID (DNA OR RNA); SEVERE ACUTE RESPIRATORY SYNDROME CORONAVIRUS 2 (SARS-COV-2) (CORONAVIRUS DISEASE [COVID-19]), AMPLIFIED PROBE TECHNIQUE, MAKING USE OF HIGH THROUGHPUT TECHNOLOGIES AS DESCRIBED BY CMS-2020-01-R: HCPCS

## 2021-01-04 NOTE — TELEPHONE ENCOUNTER
Pt called and stated he went for his covid test on Friday at our [facility]. and they were closed. I called and spoke with Aleksandra Krause in [facility], she stated that since the patient is scheduled for his procedure on Friday he can go today and have his covid testing done.  I called patient to let him know and he confirmed  Electronically signed by Christine Pittman MA on 1/4/2021 at 8:04 AM

## 2021-01-05 LAB
SARS-COV-2: NOT DETECTED
SOURCE: NORMAL

## 2021-01-08 ENCOUNTER — ANESTHESIA (OUTPATIENT)
Dept: ENDOSCOPY | Age: 61
End: 2021-01-08
Payer: MEDICAID

## 2021-01-08 ENCOUNTER — ANESTHESIA EVENT (OUTPATIENT)
Dept: ENDOSCOPY | Age: 61
End: 2021-01-08
Payer: MEDICAID

## 2021-01-08 ENCOUNTER — HOSPITAL ENCOUNTER (OUTPATIENT)
Age: 61
Setting detail: OUTPATIENT SURGERY
Discharge: HOME | End: 2021-01-08
Attending: INTERNAL MEDICINE | Admitting: INTERNAL MEDICINE
Payer: MEDICAID

## 2021-01-08 VITALS
OXYGEN SATURATION: 93 % | SYSTOLIC BLOOD PRESSURE: 116 MMHG | DIASTOLIC BLOOD PRESSURE: 73 MMHG | RESPIRATION RATE: 22 BRPM

## 2021-01-08 VITALS
HEART RATE: 81 BPM | RESPIRATION RATE: 16 BRPM | OXYGEN SATURATION: 97 % | HEIGHT: 68 IN | BODY MASS INDEX: 31.83 KG/M2 | SYSTOLIC BLOOD PRESSURE: 123 MMHG | WEIGHT: 210 LBS | DIASTOLIC BLOOD PRESSURE: 72 MMHG | TEMPERATURE: 98 F

## 2021-01-08 DIAGNOSIS — Z01.818 PREOP TESTING: Primary | ICD-10-CM

## 2021-01-08 DIAGNOSIS — U07.1 COVID-19: ICD-10-CM

## 2021-01-08 LAB — METER GLUCOSE: 135 MG/DL (ref 74–99)

## 2021-01-08 PROCEDURE — 3609020800 HC EGD W/EUS FNA: Performed by: INTERNAL MEDICINE

## 2021-01-08 PROCEDURE — 3609017100 HC EGD: Performed by: INTERNAL MEDICINE

## 2021-01-08 PROCEDURE — 43238 EGD US FINE NEEDLE BX/ASPIR: CPT | Performed by: INTERNAL MEDICINE

## 2021-01-08 PROCEDURE — 2720000010 HC SURG SUPPLY STERILE: Performed by: INTERNAL MEDICINE

## 2021-01-08 PROCEDURE — 88305 TISSUE EXAM BY PATHOLOGIST: CPT

## 2021-01-08 PROCEDURE — 2580000003 HC RX 258

## 2021-01-08 PROCEDURE — 2709999900 HC NON-CHARGEABLE SUPPLY: Performed by: INTERNAL MEDICINE

## 2021-01-08 PROCEDURE — 82962 GLUCOSE BLOOD TEST: CPT

## 2021-01-08 PROCEDURE — 7100000011 HC PHASE II RECOVERY - ADDTL 15 MIN: Performed by: INTERNAL MEDICINE

## 2021-01-08 PROCEDURE — 7100000010 HC PHASE II RECOVERY - FIRST 15 MIN: Performed by: INTERNAL MEDICINE

## 2021-01-08 PROCEDURE — 88173 CYTOPATH EVAL FNA REPORT: CPT

## 2021-01-08 PROCEDURE — 3700000000 HC ANESTHESIA ATTENDED CARE: Performed by: INTERNAL MEDICINE

## 2021-01-08 PROCEDURE — 6360000002 HC RX W HCPCS

## 2021-01-08 PROCEDURE — 3700000001 HC ADD 15 MINUTES (ANESTHESIA): Performed by: INTERNAL MEDICINE

## 2021-01-08 RX ORDER — SODIUM CHLORIDE 9 MG/ML
INJECTION, SOLUTION INTRAVENOUS CONTINUOUS PRN
Status: DISCONTINUED | OUTPATIENT
Start: 2021-01-08 | End: 2021-01-08 | Stop reason: SDUPTHER

## 2021-01-08 RX ORDER — HYDROCODONE BITARTRATE AND ACETAMINOPHEN 5; 325 MG/1; MG/1
1 TABLET ORAL
Status: DISCONTINUED | OUTPATIENT
Start: 2021-01-08 | End: 2021-01-08 | Stop reason: HOSPADM

## 2021-01-08 RX ORDER — PROPOFOL 10 MG/ML
INJECTION, EMULSION INTRAVENOUS PRN
Status: DISCONTINUED | OUTPATIENT
Start: 2021-01-08 | End: 2021-01-08 | Stop reason: SDUPTHER

## 2021-01-08 RX ORDER — SODIUM CHLORIDE 0.9 % (FLUSH) 0.9 %
10 SYRINGE (ML) INJECTION EVERY 12 HOURS SCHEDULED
Status: DISCONTINUED | OUTPATIENT
Start: 2021-01-08 | End: 2021-01-08 | Stop reason: HOSPADM

## 2021-01-08 RX ORDER — SODIUM CHLORIDE 0.9 % (FLUSH) 0.9 %
10 SYRINGE (ML) INJECTION PRN
Status: DISCONTINUED | OUTPATIENT
Start: 2021-01-08 | End: 2021-01-08 | Stop reason: HOSPADM

## 2021-01-08 RX ADMIN — PROPOFOL 20 MG: 10 INJECTION, EMULSION INTRAVENOUS at 08:21

## 2021-01-08 RX ADMIN — PROPOFOL 30 MG: 10 INJECTION, EMULSION INTRAVENOUS at 08:24

## 2021-01-08 RX ADMIN — PROPOFOL 20 MG: 10 INJECTION, EMULSION INTRAVENOUS at 08:12

## 2021-01-08 RX ADMIN — PROPOFOL 20 MG: 10 INJECTION, EMULSION INTRAVENOUS at 08:16

## 2021-01-08 RX ADMIN — PROPOFOL 20 MG: 10 INJECTION, EMULSION INTRAVENOUS at 08:11

## 2021-01-08 RX ADMIN — PROPOFOL 30 MG: 10 INJECTION, EMULSION INTRAVENOUS at 08:30

## 2021-01-08 RX ADMIN — PROPOFOL 20 MG: 10 INJECTION, EMULSION INTRAVENOUS at 08:13

## 2021-01-08 RX ADMIN — PROPOFOL 20 MG: 10 INJECTION, EMULSION INTRAVENOUS at 08:15

## 2021-01-08 RX ADMIN — PROPOFOL 30 MG: 10 INJECTION, EMULSION INTRAVENOUS at 08:27

## 2021-01-08 RX ADMIN — PROPOFOL 20 MG: 10 INJECTION, EMULSION INTRAVENOUS at 08:19

## 2021-01-08 RX ADMIN — PROPOFOL 20 MG: 10 INJECTION, EMULSION INTRAVENOUS at 08:18

## 2021-01-08 RX ADMIN — SODIUM CHLORIDE: 9 INJECTION, SOLUTION INTRAVENOUS at 07:20

## 2021-01-08 RX ADMIN — PROPOFOL 20 MG: 10 INJECTION, EMULSION INTRAVENOUS at 08:14

## 2021-01-08 RX ADMIN — PROPOFOL 60 MG: 10 INJECTION, EMULSION INTRAVENOUS at 08:10

## 2021-01-08 ASSESSMENT — PAIN - FUNCTIONAL ASSESSMENT: PAIN_FUNCTIONAL_ASSESSMENT: 0-10

## 2021-01-08 ASSESSMENT — PAIN SCALES - GENERAL: PAINLEVEL_OUTOF10: 0

## 2021-01-08 NOTE — ANESTHESIA PRE PROCEDURE
Department of Anesthesiology  Preprocedure Note       Name:  Opal Mendoza   Age:  62 y.o.  :  1960                                          MRN:  [id number]         Date:  2021      Surgeon: Jodie Sol):  Shellie Aguilar DO    Procedure: Procedure(s):  ENDOSCOPIC EGD  ULTRASOUND    Medications prior to admission:   Prior to Admission medications    Medication Sig Start Date End Date Taking? Authorizing Provider   oxyCODONE (ROXICODONE) 5 MG immediate release tablet oxyCODONE Oxycodone HCl * (Roxicodone 5mg Tablet*) 5 MG TABLET Active 10 Oral EVERY 4 HOURS AS NEEDED as needed for Severe Pain (pain scale 7-10) 60 7 2020 1:39pm 2020  [address] [place] [address] ([id number]) 20  Yes Historical Provider, MD   meloxicam (MOBIC) 7.5 MG tablet Take 15 mg by mouth daily STOP PREOP MED    Yes Historical Provider, MD   methocarbamol (ROBAXIN) 750 MG tablet Take 750 mg by mouth daily   Yes Historical Provider, MD   JARDIANCE 10 MG tablet take 1 tablet by mouth daily 20   Historical Provider, MD   losartan (COZAAR) 100 MG tablet take 1 tablet by mouth once daily 20   Historical Provider, MD   hydrochlorothiazide (HYDRODIURIL) 12.5 MG tablet take 1 tablet by mouth once daily 20   Historical Provider, MD   glipiZIDE (GLUCOTROL XL) 10 MG extended release tablet take 1 tablet by mouth daily 19   Son Osman MD   blood glucose test strips (TRUE METRIX BLOOD GLUCOSE TEST) strip As needed. 19   Anuja Schwab MD   fluticasone (FLONASE) 50 MCG/ACT nasal spray instill 2 sprays into each nostril once daily 19   Reymundo Isidro MD   atorvastatin (LIPITOR) 10 MG tablet take 1 tablet by mouth once daily 3/14/19   Anuja Schwab MD       Current medications:    No current facility-administered medications for this encounter. Allergies:     Allergies   Allergen Reactions    Iodine Swelling     Sea food    Metformin And Related Hives    Wheat Extract Swelling       Problem List:    Patient Active Problem List   Diagnosis Code    Mixed hyperlipidemia E78.2    Neck pain M54.2    Cervical radiculopathy at C7 M54.12    Essential hypertension I10    Chronic seasonal allergic rhinitis J30.2    Ventral hernia without obstruction or gangrene K43.9    Type 2 diabetes mellitus without complication, without long-term current use of insulin ([facility]) E11.9    Chronic bilateral low back pain with right-sided sciatica M54.41, G89.29    Gynecomastia N62    Panic disorder F41.0    Cubital tunnel syndrome on right G56.21    Right carpal tunnel syndrome G56.01    Spondylosis of lumbar spine M47.816    Bell's palsy G51.0    Other bursal cyst, left elbow M71.322    Sacral radiculitis M54.18    Lumbar radiculitis M54.16    Spondylosis of cervical region without myelopathy or radiculopathy M47.812    Cervical facet joint syndrome M47.812    Cervical disc disorder M50.90    Lumbar facet arthropathy M47.816    Spinal stenosis of lumbar region with neurogenic claudication M48.062    Lumbar disc disorder M51.9       Past Medical History:        Diagnosis Date    Arthritis     Back pain     5th finger & ring finger on right hand is numb    Bell's palsy     NEW ONSET 3-     Hyperlipidemia     Hypertension     Neuropathy     toes numb    Poorly controlled type 2 diabetes mellitus with neuropathy ([facility] [address] 5/8/2018    Sacral radiculitis 2/27/2019    Spondylosis of lumbar spine 3/9/2019    Advanced arthritis and degenerative disc disease by MRI 3/2019    Ventral hernia        Past Surgical History:        Procedure Laterality Date    ANESTHESIA NERVE BLOCK Bilateral 7/11/2019    BILATERAL L4-5 TRANSFORAMINAL EPIDURAL STEROID INJECTION performed by Padmini Alegria DO at [address] [facility] Bilateral 8/1/2019    BILATERAL MEDIAL BRANCH BLOCK L4-5 AND L5-S1 performed by Padmini Alegria DO at [address] [facility] ANESTHESIA NERVE BLOCK Bilateral 8/15/2019    BILATERAL MEDIAL BRANCH BLOCK L4 - 5 AND L5 - S1 performed by Ellis Hussein DO at [facility] Right 03/29/2019    right wrist carpal tunnel release and right elbow cubital tunnel release    CARPAL TUNNEL RELEASE Right 3/29/2019    RIGHT WRIST CARPAL TUNNEL RELEASE AND RIGHT ELBOW CUBITAL TUNNEL RELEASE performed by Henrry Fonseca DO at [place] [address]      at age 40 polyps    CYST REMOVAL Right     EPIDURAL STEROID INJECTION N/A 10/17/2019    LUMBAR EPIDURAL STEROID INJECTION UNDER FLUOROSCOPIC GUIDANCE AT L2-L3 WITHOUT SEDATION performed by Paula Torres MD at [address] [facility] Bilateral     groin and umbilical    HERNIA REPAIR N/A 12/13/2018    ROBOTIC ASSISTED LAPAROSCOPIC PRIMARY REPAIR OF RECURRENT VENTRAL HERNIA  REPAIR performed by Maddie Resendiz MD at [address] [facility] [address] Bilateral 07/11/2019    L4-5 transforaminal     NERVE BLOCK Bilateral 08/01/2019    medial branch block    NERVE BLOCK Bilateral 08/15/2019    bilateral medial branch block L4-S1    NERVE BLOCK  10/17/2019    lumbar epidural    SPINE SURGERY  06/05/2018    C5-7 fusion at [place] [address] History:    Social History     Tobacco Use    Smoking status: Never Smoker    Smokeless tobacco: Never Used   Substance Use Topics    Alcohol use: Yes     Comment: occasional                                Counseling given: Not Answered      Vital Signs (Current):   Vitals:    12/29/20 0818 01/08/21 0647   Weight: 210 lb (95.3 kg) 210 lb (95.3 kg)   Height: 5' 8\" (1.727 m) 5' 8\" (1.727 m)                                              BP Readings from Last 3 Encounters:   09/09/20 (!) 146/79   12/02/19 108/78   10/17/19 124/80       NPO Status:                                                                                 BMI:   Wt Readings from Last 3 Encounters:   01/08/21 210 lb (95.3 kg)   06/23/20 230 lb (104.3 kg)   06/15/20 230 lb (104.3 kg)     Body mass index is 31.93 kg/m². CBC:   Lab Results   Component Value Date    WBC 6.9 03/15/2019    RBC 6.70 03/15/2019    HGB 15.0 03/15/2019    HCT 49.3 03/15/2019    MCV 73.6 03/15/2019    RDW 16.3 03/15/2019     03/15/2019       CMP:   Lab Results   Component Value Date     11/22/2019    K 4.3 11/22/2019    K 4.8 12/13/2018     11/22/2019    CO2 20 11/22/2019    BUN 28 11/22/2019    CREATININE 0.9 11/22/2019    GFRAA >60 11/22/2019    LABGLOM >60 11/22/2019    GLUCOSE 165 09/09/2020    PROT 7.7 11/14/2018    CALCIUM 9.8 11/22/2019    BILITOT 0.3 11/14/2018    ALKPHOS 48 11/14/2018    AST 24 11/14/2018    ALT 20 11/14/2018       POC Tests: No results for input(s): POCGLU, POCNA, POCK, POCCL, POCBUN, POCHEMO, POCHCT in the last 72 hours. Coags: No results found for: PROTIME, INR, APTT    HCG (If Applicable): No results found for: PREGTESTUR, PREGSERUM, HCG, HCGQUANT     ABGs: No results found for: PHART, PO2ART, RBH8NZK, JJD5PIC, BEART, Y5IHUMZN     Type & Screen (If Applicable):  No results found for: LABABO, LABRH    Drug/Infectious Status (If Applicable):  No results found for: HIV, HEPCAB    COVID-19 Screening (If Applicable):   Lab Results   Component Value Date    COVID19 Not Detected 01/04/2021     Location: Anterior body of the pancreas   Size: 1.9 x 1.5 x 1.5 cm   Change: Slight interval increase in size since 11/22/2019 MRI. Solid: No solid or enhancing features. Cystic: Overall appearance appears to represent a cluster of cysts.  Thin   septations may be present. Pancreatic Duct: Above findings have previously been characterized as side   branch IPMN.  On the current study, communication with the pancreatic duct is   established for at least 1 cystic component.  No ductal dilatation otherwise. Vascular: No vascular abnormality.    Other: None         Anesthesia Evaluation  Patient summary reviewed and Nursing notes reviewed no history of anesthetic complications:   Airway: Mallampati: III  TM distance: >3 FB   Neck ROM: limited  Mouth opening: > = 3 FB Dental:          Pulmonary:Negative Pulmonary ROS and normal exam                               Cardiovascular:  Exercise tolerance: good (>4 METS),   (+) hypertension: moderate, hyperlipidemia      ECG reviewed  Rhythm: regular  Rate: normal           Beta Blocker:  Not on Beta Blocker      ROS comment: Normal sinus rhythm  Normal ECG  No previous ECGs available  Confirmed by Hunter Levy ([id number]) on 3/16/2019 12:38:07 AM     Neuro/Psych:   (+) neuromuscular disease (Bell's palsy):, psychiatric history: stable with treatmentdepression/anxiety              ROS comment: Neuopathy GI/Hepatic/Renal:            ROS comment: Enlarging pancreatic mass with associated epigastric pain. Endo/Other:    (+) Diabetes (A1C 7.1% in 2019. AM  mg/dL. )Type II DM, poorly controlled, , .          Pt had no PAT visit        ROS comment: OA, DJD, DDD, cervical radiculopathy, chronic low back pain with right sciatica Abdominal:   (+) obese,         Vascular:                                      Anesthesia Plan      MAC     ASA 3     (PONV prophylaxis)  Induction: intravenous. MIPS: Prophylactic antiemetics administered. Anesthetic plan and risks discussed with patient. Plan discussed with JOE.                 Dalton Vazquez DO   1/8/2021

## 2021-01-08 NOTE — PROGRESS NOTES
0940-Patient and verbaizes understanding of given discharge instructions. 1000- Patient discharged per wheelchair to car driven by in stable condition.

## 2021-01-08 NOTE — H&P
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 Rose Dunn for an EUS     Subjective: Patient doing well however he did have a severe bout of upper abdominal pain that radiated to the left side and back after a holiday meal of chicken wings. He had his yearly MRI for surveillance imaging and has had a 5mm growth. He has lost about 30lbs over the past year after stopping neurotin and lyrica. He is going to pain management and had a spinal fusion earlier in the year. He has diabetes but no family history of pancreatic cancer.       Past Medical History:        Diagnosis Date    Arthritis     Back pain     5th finger & ring finger on right hand is numb    Bell's palsy     NEW ONSET 3-     Hyperlipidemia     Hypertension     Neuropathy     toes numb    Poorly controlled type 2 diabetes mellitus with neuropathy ([facility] [address] 5/8/2018    Sacral radiculitis 2/27/2019    Spondylosis of lumbar spine 3/9/2019    Advanced arthritis and degenerative disc disease by MRI 3/2019    Ventral hernia      Past Surgical History:        Procedure Laterality Date    ANESTHESIA NERVE BLOCK Bilateral 7/11/2019    BILATERAL L4-5 TRANSFORAMINAL EPIDURAL STEROID INJECTION performed by Jose Huntley DO at [address] Bilateral 8/1/2019    BILATERAL MEDIAL BRANCH BLOCK L4-5 AND L5-S1 performed by Jose Huntley DO at [address] Bilateral 8/15/2019    BILATERAL MEDIAL BRANCH BLOCK L4 - 5 AND L5 - S1 performed by Jose [facility]ley DO at [facility] Right 03/29/2019    right wrist carpal tunnel release and right elbow cubital tunnel release    CARPAL TUNNEL RELEASE Right 3/29/2019 RIGHT WRIST CARPAL TUNNEL RELEASE AND RIGHT ELBOW CUBITAL TUNNEL RELEASE performed by Chantal Farfan DO at [facility]      at age 40 polyps    CYST REMOVAL Right     EPIDURAL STEROID INJECTION N/A 10/17/2019    LUMBAR EPIDURAL STEROID INJECTION UNDER FLUOROSCOPIC GUIDANCE AT L2-L3 WITHOUT SEDATION performed by Patricia Do MD at [address] [facility] Bilateral     groin and umbilical    HERNIA REPAIR N/A 12/13/2018    ROBOTIC ASSISTED LAPAROSCOPIC PRIMARY REPAIR OF RECURRENT VENTRAL HERNIA  REPAIR performed by Jonathan Espinoza MD at [address] Bilateral 07/11/2019    L4-5 transforaminal     NERVE BLOCK Bilateral 08/01/2019    medial branch block    NERVE BLOCK Bilateral 08/15/2019    bilateral medial branch block L4-S1    NERVE BLOCK  10/17/2019    lumbar epidural    SPINE SURGERY  06/05/2018    C5-7 fusion at [facility] in [place] [address] History:    TOBACCO:   reports that he has never smoked. He has never used smokeless tobacco.  ETOH:   reports current alcohol use. DRUGS:   reports no history of drug use.   Family History:       Problem Relation Age of Onset    Diabetes Mother     Other Mother         parkinsons    Diabetes Father     Cancer Sister 79        unknown    Cancer Brother 64        stomach    Diabetes Brother      Medications Prior to Admission:  Medications Prior to Admission: oxyCODONE (ROXICODONE) 5 MG immediate release tablet, oxyCODONE Oxycodone HCl * (Roxicodone 5mg Tablet*) 5 MG TABLET Active 10 Oral EVERY 4 HOURS AS NEEDED as needed for Severe Pain (pain scale 7-10) 60 7 January 31st, 2020 1:39pm 01-  [address] [facility] ([id number])  meloxicam (MOBIC) 7.5 MG tablet, Take 15 mg by mouth daily STOP PREOP MED 12-31  methocarbamol (ROBAXIN) 750 MG tablet, Take 750 mg by mouth daily  JARDIANCE 10 MG tablet, take 1 tablet by mouth daily  losartan (COZAAR) 100 MG tablet, take 1 tablet by mouth once daily hydrochlorothiazide (HYDRODIURIL) 12.5 MG tablet, take 1 tablet by mouth once daily  glipiZIDE (GLUCOTROL XL) 10 MG extended release tablet, take 1 tablet by mouth daily  fluticasone (FLONASE) 50 MCG/ACT nasal spray, instill 2 sprays into each nostril once daily  atorvastatin (LIPITOR) 10 MG tablet, take 1 tablet by mouth once daily  blood glucose test strips (TRUE METRIX BLOOD GLUCOSE TEST) strip, As needed.   Allergies:  Iodine, Metformin and related, and Wheat extract    CONSTITUTIONAL:  negative  HEENT:  negative  RESPIRATORY:  negative  CARDIOVASCULAR:  negative  GASTROINTESTINAL:  negative  GENITOURINARY:  negative  ENDOCRINE:  negative  MUSCULOSKELETAL:  negative  NEUROLOGICAL:  negative  BEHAVIOR/PSYCH:  negative    PHYSICAL EXAM:  General appearance:  awake, alert, cooperative, no apparent distress, and appears stated age  Neurologic:  No sensory or motor defecits  Lungs:  no increased work of breathing  Heart:  normal apical pulses  Abdomen:  soft  Skin: warm and dry, no rash or erythema  ENT: tympanic membrane, external ear and ear canal normal bilaterally, oropharynx clear and moist with normal mucous membranes and oropharynx clear and moist with normal mucous membrane      General Labs:  CBC:   Lab Results   Component Value Date    WBC 6.9 03/15/2019    RBC 6.70 03/15/2019    HGB 15.0 03/15/2019    HCT 49.3 03/15/2019    MCV 73.6 03/15/2019    RDW 16.3 03/15/2019     03/15/2019     CMP:    Lab Results   Component Value Date     11/22/2019    K 4.3 11/22/2019    K 4.8 12/13/2018     11/22/2019    CO2 20 11/22/2019    BUN 28 11/22/2019    PROT 7.7 11/14/2018     U/A:  No components found for: Vanna Godinez, USPGRAV, UPH, Olive Marcello, UKETONE, UBILI, UBLOOD, UNITRITE, UUROBIL, [place], [facility], [place], [facility], [place], [facility], [place]    Radiology: Reviewed CT and MRI       ASSESSMENT AND PLAN:

## 2021-01-08 NOTE — OP NOTE
Endoscopic Ultrasound Procedure Note    Date of Procedure: 1/8/2021    Pre-procedure Diagnosis: Pancreatic cyst    Post-procedure Diagnosis: Retained gastric food consistent with gastroparesis; Mixed solid and cystic neoplasm in the body of the pancreas - s/p FNA    Physician: Ethel Morales DO    Assistant: None    Estimated Blood Loss: Minimal    Anesthesia: LMAC     Complications: None    Indications and History:  The patient is a 62 y.o. male. The risks, benefits, complications, treatment options and expected outcomes were discussed with the patient. The possibilities of reaction to medication, pulmonary aspiration, perforation of the gastrointestinal tract, bleeding requiring transfusion or operation, respiratory failure requiring placement on a ventilator and failure to diagnose a condition were discussed with the patient who freely signed the consent. Description of Procedure: The patient was taken to the endoscopy suite, identified as [facility] and the procedure verified as Endoscopic Ultrasound (EUS). A Time Out was held and the above information confirmed. The patient was positioned in the left lateral position with an oral bite block and anesthesia was provided for sedation and comfort. The endoscope was passed through the mouth to the second portion of the duodenum. Following the routine endoscopy, the echoendoscope was passed to the second portion of the duodenum. EGD/EUS findings:   Esophagus: normal   Stomach: there is a significant amount of food residue in the fundus, body and antrum consistent with delayed gastric emptying. No mucosal lesions were noted, however more than 50% of the wall was obscured.    Duodenum: normal Pancreas: the lesion in the pancreas body measured at least 21 mm in maximal diameter with the appearance of a multicystic lesion; the walls and septum were thin; the central area had the appearance of a scar or nodule; the lesion overlapped the main duct with mild dilation of the proximal main duct in the tail; FNA was performed with 1 pass of a 25 gauge Sharkcore needle and sent for cytology. Insufficient fluid was obtained for CEA or glucose level. The remainder of the pancreas was normal.     Bile Duct: normal   Gallbladder: normal      Specimens:  1. Pancreas FNA     The Patient was taken to the Endoscopy Recovery area in satisfactory condition.       Electronically signed by Cynthia Yanez DO on 1/8/2021 at 8:07 AM

## 2021-01-08 NOTE — ANESTHESIA POSTPROCEDURE EVALUATION
Department of Anesthesiology  Postprocedure Note    Patient: Nasra Landa  MRN: [id number]  YOB: 1960  Date of evaluation: 1/8/2021  Time:  8:57 AM     Procedure Summary     Date: 01/08/21 Room / Location: [facility] 03 / [facility]    Anesthesia Start: 8526 Anesthesia Stop: 0840    Procedure: EGD W/EUS FNA (N/A ) Diagnosis: (PANCREATIC SIDE BRANCH)    Surgeons: Karina Willard DO Responsible Provider: Jerome العراقي DO    Anesthesia Type: MAC ASA Status: 3          Anesthesia Type: MAC    Dominic Phase I: Dominic Score: 10    Dominic Phase II:      Last vitals: Reviewed and per EMR flowsheets.        Anesthesia Post Evaluation    Patient location during evaluation: bedside  Patient participation: complete - patient participated  Level of consciousness: awake  Pain score: 2  Airway patency: patent  Nausea & Vomiting: no vomiting and no nausea  Complications: no  Cardiovascular status: hemodynamically stable  Respiratory status: acceptable  Hydration status: stable

## 2021-01-26 ENCOUNTER — OFFICE VISIT (OUTPATIENT)
Dept: SURGERY | Age: 61
End: 2021-01-26
Payer: MEDICAID

## 2021-01-26 VITALS
BODY MASS INDEX: 34.1 KG/M2 | WEIGHT: 225 LBS | DIASTOLIC BLOOD PRESSURE: 88 MMHG | TEMPERATURE: 98.9 F | HEIGHT: 68 IN | HEART RATE: 76 BPM | SYSTOLIC BLOOD PRESSURE: 146 MMHG | RESPIRATION RATE: 18 BRPM

## 2021-01-26 DIAGNOSIS — D49.0 IPMN (INTRADUCTAL PAPILLARY MUCINOUS NEOPLASM): Primary | ICD-10-CM

## 2021-01-26 PROCEDURE — G8417 CALC BMI ABV UP PARAM F/U: HCPCS | Performed by: TRANSPLANT SURGERY

## 2021-01-26 PROCEDURE — 3017F COLORECTAL CA SCREEN DOC REV: CPT | Performed by: TRANSPLANT SURGERY

## 2021-01-26 PROCEDURE — 99213 OFFICE O/P EST LOW 20 MIN: CPT | Performed by: TRANSPLANT SURGERY

## 2021-01-26 PROCEDURE — 1036F TOBACCO NON-USER: CPT | Performed by: TRANSPLANT SURGERY

## 2021-01-26 PROCEDURE — G8484 FLU IMMUNIZE NO ADMIN: HCPCS | Performed by: TRANSPLANT SURGERY

## 2021-01-26 PROCEDURE — G8427 DOCREV CUR MEDS BY ELIG CLIN: HCPCS | Performed by: TRANSPLANT SURGERY

## 2021-03-24 ENCOUNTER — TELEPHONE (OUTPATIENT)
Dept: HEMATOLOGY | Age: 61
End: 2021-03-24

## 2021-03-24 NOTE — TELEPHONE ENCOUNTER
I received a voice automated telephone call from the patients insurance [facility] comm plan stating the patients MRI has been approved. Valid from 03/24/21 thru 05/08/21. [id number]. The patient is scheduled for [facility] on 04/19/21. The patient must arrive at 9:30 am and MRI to start at 10:00am. Pt to be NPO 6-8 hours prior. I also made the patient aware that he has a set of labs that need drawn prior, he can either go the same day as his scan at 8:30 am. Or stop at any [facility] lab a week or so prior to have them drawn., the patient stated he would stop at [facility] prior and have them drawn.  While on the phone the patient was given his follow up appt info which he also confirmed, all questions were answered  Electronically signed by Faiza Hendricks MA on 3/24/2021 at 12:55 PM

## 2021-04-16 ENCOUNTER — HOSPITAL ENCOUNTER (OUTPATIENT)
Age: 61
Discharge: HOME | End: 2021-04-16
Payer: MEDICAID

## 2021-04-16 LAB
ANION GAP SERPL CALCULATED.3IONS-SCNC: 10 MMOL/L (ref 7–16)
BUN BLDV-MCNC: 21 MG/DL (ref 6–20)
CALCIUM SERPL-MCNC: 10.1 MG/DL (ref 8.6–10.2)
CHLORIDE BLD-SCNC: 104 MMOL/L (ref 98–107)
CO2: 30 MMOL/L (ref 22–29)
CREAT SERPL-MCNC: 0.8 MG/DL (ref 0.7–1.2)
GFR AFRICAN AMERICAN: >60
GFR NON-AFRICAN AMERICAN: >60 ML/MIN/1.73
GLUCOSE BLD-MCNC: 169 MG/DL (ref 74–99)
POTASSIUM SERPL-SCNC: 5 MMOL/L (ref 3.5–5)
SODIUM BLD-SCNC: 144 MMOL/L (ref 132–146)

## 2021-04-16 PROCEDURE — 36415 COLL VENOUS BLD VENIPUNCTURE: CPT

## 2021-04-16 PROCEDURE — 80048 BASIC METABOLIC PNL TOTAL CA: CPT

## 2021-04-19 ENCOUNTER — HOSPITAL ENCOUNTER (OUTPATIENT)
Dept: MRI IMAGING | Age: 61
Discharge: HOME | End: 2021-04-21
Payer: MEDICAID

## 2021-04-19 DIAGNOSIS — D49.0 IPMN (INTRADUCTAL PAPILLARY MUCINOUS NEOPLASM): ICD-10-CM

## 2021-04-19 PROCEDURE — 6360000004 HC RX CONTRAST MEDICATION: Performed by: RADIOLOGY

## 2021-04-19 PROCEDURE — 74183 MRI ABD W/O CNTR FLWD CNTR: CPT

## 2021-04-19 PROCEDURE — A9579 GAD-BASE MR CONTRAST NOS,1ML: HCPCS | Performed by: RADIOLOGY

## 2021-04-19 RX ADMIN — GADOTERIDOL 20 ML: 279.3 INJECTION, SOLUTION INTRAVENOUS at 11:04

## 2021-04-20 ENCOUNTER — OFFICE VISIT (OUTPATIENT)
Dept: SURGERY | Age: 61
End: 2021-04-20
Payer: MEDICAID

## 2021-04-20 VITALS
RESPIRATION RATE: 18 BRPM | BODY MASS INDEX: 35.04 KG/M2 | WEIGHT: 231.2 LBS | SYSTOLIC BLOOD PRESSURE: 140 MMHG | HEIGHT: 68 IN | TEMPERATURE: 96.8 F | HEART RATE: 65 BPM | DIASTOLIC BLOOD PRESSURE: 81 MMHG

## 2021-04-20 DIAGNOSIS — D49.0 IPMN (INTRADUCTAL PAPILLARY MUCINOUS NEOPLASM): ICD-10-CM

## 2021-04-20 PROCEDURE — G8427 DOCREV CUR MEDS BY ELIG CLIN: HCPCS | Performed by: TRANSPLANT SURGERY

## 2021-04-20 PROCEDURE — 99214 OFFICE O/P EST MOD 30 MIN: CPT | Performed by: TRANSPLANT SURGERY

## 2021-04-20 PROCEDURE — 3017F COLORECTAL CA SCREEN DOC REV: CPT | Performed by: TRANSPLANT SURGERY

## 2021-04-20 PROCEDURE — 1036F TOBACCO NON-USER: CPT | Performed by: TRANSPLANT SURGERY

## 2021-04-20 PROCEDURE — G8417 CALC BMI ABV UP PARAM F/U: HCPCS | Performed by: TRANSPLANT SURGERY

## 2021-04-20 NOTE — PROGRESS NOTES
triphasicHepatobiliary and Pancreatic Surgery Attending History and Physical     Patient's Name/Date of Birth: Roselia Banks /1960 (8 y.o.)     Date: April 20, 2021     CC: Follow up MRI regarding BD-IPMN     HPI:  Patient is a very pleasant 63year old male whom presented to Dr. Kip Zapata office secondary to having an abdominal hernia. A CT scan without contrast was ordered and pancreatic mass was suspected. He then had an MRI with IV contrast which showed a 1.4cm cystic mass in the neck of his pancreas. He has been a diabetic for many years that is somewhat controlled. He admits to weight gain since his neck surgery. is a nonsmoker and a non drinker. We have been following this since 2018, however over the last 6 months we have seen a 1cm growth. He had an EUS 3 months ago with Dr. Quang Melvin secondary to a 5mm growth. At that time he was found to have a nodule and thin septations. We then repeated his MRI yesterday for a 3 month follow up and is has grown to 2.9cm. He has had 1cm growth within the last 6 months.     Had lower back surgery 16 months ago.     Past Medical History        Past Medical History:   Diagnosis Date    Arthritis      Back pain      Hyperlipidemia      Poorly controlled type 2 diabetes mellitus with neuropathy ([facility] [address] 5/8/2018            Past Surgical History         Past Surgical History:   Procedure Laterality Date    COLONOSCOPY         at age 40 polyps    CYST REMOVAL Right      HERNIA REPAIR Bilateral       groin and umbilical    SPINE SURGERY   06/05/2018     C5-7 fusion at [facility] in [facility] [facility]            Current Facility-Administered Medications          Current Outpatient Prescriptions   Medication Sig Dispense Refill    naproxen (NAPROSYN) 500 MG tablet Take 1 tablet by mouth 2 times daily as needed for Pain 60 tablet 1    glipiZIDE (GLUCOTROL XL) 10 MG extended release tablet Take 1 tablet by mouth daily 90 tablet 1    Ertugliflozin L-PyroglutamicAc (STEGLATRO) 5 MG TABS One tab daily 30 tablet 5    losartan-hydrochlorothiazide (HYZAAR) 100-12.5 MG per tablet Take 1 tablet by mouth daily 30 tablet 3    atorvastatin (LIPITOR) 10 MG tablet Take 1 tablet by mouth daily 90 tablet 1    gabapentin (NEURONTIN) 300 MG capsule take 1 capsule by mouth 3 TO 4 TIMES A DAY   0    fluticasone (FLONASE) 50 MCG/ACT nasal spray 2 sprays by Nasal route daily 2 Bottle 3    cyclobenzaprine (FLEXERIL) 10 MG tablet            No current facility-administered medications for this visit.                  Allergies   Allergen Reactions    Iodine Swelling    Wheat Extract Swelling    Metformin And Related Hives         Family History         Family History   Problem Relation Age of Onset    Diabetes Mother      Other Mother           parkinsons    Diabetes Father      Cancer Sister 79         unknown    Cancer Brother 64         stomach    Diabetes Brother              Social History               Social History    Marital status: Single       Spouse name: N/A    Number of children: N/A    Years of education: N/A          Occupational History    Not on file.             Social History Main Topics    Smoking status: Never Smoker    Smokeless tobacco: Never Used    Alcohol use Yes         Comment: occasional    Drug use: No    Sexual activity: Not on file           Other Topics Concern    Not on file          Social History Narrative    No narrative on file            ROS:   Review of Systems   Constitutional: Negative for chills, diaphoresis and fever. HENT: Negative for congestion, ear discharge, ear pain, hearing loss, nosebleeds and tinnitus. Eyes: Negative for photophobia, pain and discharge. Respiratory: Negative for shortness of breath. Cardiovascular: Negative for palpitations and leg swelling. Gastrointestinal: Positive for abdominal pain. Negative for blood in stool, constipation, diarrhea, nausea and vomiting.    Endocrine: Negative for polydipsia. Genitourinary: Negative for frequency, hematuria and urgency. Musculoskeletal: Negative for back pain and neck pain. Skin: Negative for rash. Allergic/Immunologic: Negative for environmental allergies. Neurological: Negative for tremors and seizures. Psychiatric/Behavioral: Negative for hallucinations and suicidal ideas. The patient is not nervous/anxious.          Physical Exam:      Vitals:     11/02/18 1121   BP: 132/75   Pulse: 103   Resp: 18   Temp: 98.1 °F (36.7 °C)   SpO2: 94%         PSYCH: mood and affect normal, alert and oriented x 3  CONSTITUTIONAL: No apparent distress, comfortable  EYES: Sclera white, pupils equal round and reactive to light  ENMT:  Hearing normal, trachea midline, ears externally intact  LYMPH: no lympadenopathy in neck. Nolympadenopathy in groins  RESP: Breath sounds were clear and equal with no rales, wheezes, or rhonchi. Respiratory effort was normal with no retractions or use of accessory muscles. CV: Heart soundswere normal with a regular rate and rhythm. 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 with rapid growth to now 2.9cm (nodule and septations) in 6 months  - I reviewed his images prior to his office visit and we also reviewed them together today  - we discussed that rapid growth is concerning along with his nodule  - we discussed surgical resection  - he would need a mid body distal pancreatectomy and splenectomy  - will need vaccinations  - triphasic CT scan for preoperative planning  - Patient was made aware of the risks, benefits, alternatives, and complications of a laparoscopic robotic distal pancreatectomy and splenectomy, intraoperative ultrasound, and planned transition to open and wish to proceed with surgery.     - will need EKG at PAT    35 Minutes of which greater than 50% was spent counseling or coordinating his care.     Thank you for the consultation allowing me to take part in Mr. Cook' care.      Please send a copy of my note to Dr.'s Christopher

## 2021-04-21 ENCOUNTER — TELEPHONE (OUTPATIENT)
Dept: HEMATOLOGY | Age: 61
End: 2021-04-21

## 2021-04-21 DIAGNOSIS — D49.0 PANCREATIC NEOPLASM: ICD-10-CM

## 2021-04-26 NOTE — PROGRESS NOTES
PATIENT AGREES TO COVID TESTING  TO BE DONE 5/6/21 @ [facility], AGREES TO SELF ISOLATE UNTIL SURGERY DAY.

## 2021-04-27 ENCOUNTER — HOSPITAL ENCOUNTER (OUTPATIENT)
Dept: INFUSION THERAPY | Age: 61
Setting detail: INFUSION SERIES
Discharge: HOME | End: 2021-04-27
Payer: MEDICAID

## 2021-04-27 VITALS
RESPIRATION RATE: 18 BRPM | DIASTOLIC BLOOD PRESSURE: 98 MMHG | TEMPERATURE: 97.4 F | SYSTOLIC BLOOD PRESSURE: 127 MMHG | HEART RATE: 98 BPM

## 2021-04-27 DIAGNOSIS — D49.0 PANCREATIC NEOPLASM: Primary | ICD-10-CM

## 2021-04-27 DIAGNOSIS — D49.0 IPMN (INTRADUCTAL PAPILLARY MUCINOUS NEOPLASM): ICD-10-CM

## 2021-04-27 PROCEDURE — G0009 ADMIN PNEUMOCOCCAL VACCINE: HCPCS | Performed by: TRANSPLANT SURGERY

## 2021-04-27 PROCEDURE — 90647 HIB PRP-OMP VACC 3 DOSE IM: CPT | Performed by: TRANSPLANT SURGERY

## 2021-04-27 PROCEDURE — 96372 THER/PROPH/DIAG INJ SC/IM: CPT

## 2021-04-27 PROCEDURE — 90472 IMMUNIZATION ADMIN EACH ADD: CPT | Performed by: TRANSPLANT SURGERY

## 2021-04-27 PROCEDURE — 90670 PCV13 VACCINE IM: CPT | Performed by: TRANSPLANT SURGERY

## 2021-04-27 PROCEDURE — 90734 MENACWYD/MENACWYCRM VACC IM: CPT | Performed by: TRANSPLANT SURGERY

## 2021-04-27 PROCEDURE — 6360000002 HC RX W HCPCS: Performed by: TRANSPLANT SURGERY

## 2021-04-27 PROCEDURE — 90620 MENB-4C VACCINE IM: CPT | Performed by: TRANSPLANT SURGERY

## 2021-04-27 PROCEDURE — 90471 IMMUNIZATION ADMIN: CPT | Performed by: TRANSPLANT SURGERY

## 2021-04-27 RX ADMIN — NEISSERIA MENINGITIDIS SEROGROUP B NHBA FUSION PROTEIN ANTIGEN, NEISSERIA MENINGITIDIS SEROGROUP B FHBP FUSION PROTEIN ANTIGEN AND NEISSERIA MENINGITIDIS SEROGROUP B NADA PROTEIN ANTIGEN 0.5 ML: 50; 50; 50; 25 INJECTION, SUSPENSION INTRAMUSCULAR at 15:36

## 2021-04-27 RX ADMIN — HAEMOPHILUS B CONJUGATE VACCINE (MENINGOCOCCAL PROTEIN CONJUGATE) 7.5 MCG: 7.5 INJECTION, SUSPENSION INTRAMUSCULAR at 15:38

## 2021-04-27 RX ADMIN — Medication 0.5 ML: at 15:30

## 2021-04-27 RX ADMIN — PNEUMOCOCCAL 13-VALENT CONJUGATE VACCINE 0.5 ML: 2.2; 2.2; 2.2; 2.2; 2.2; 4.4; 2.2; 2.2; 2.2; 2.2; 2.2; 2.2; 2.2 INJECTION, SUSPENSION INTRAMUSCULAR at 15:34

## 2021-04-30 ENCOUNTER — TELEPHONE (OUTPATIENT)
Dept: HEMATOLOGY | Age: 61
End: 2021-04-30

## 2021-04-30 DIAGNOSIS — Z88.8 ALLERGY TO IODINE: Primary | ICD-10-CM

## 2021-04-30 RX ORDER — PREDNISONE 50 MG/1
TABLET ORAL
Qty: 10 TABLET | Refills: 0 | Status: ON HOLD
Start: 2021-04-30 | End: 2021-05-12

## 2021-05-04 RX ORDER — GABAPENTIN 300 MG/1
1 CAPSULE ORAL 2 TIMES DAILY
COMMUNITY
Start: 2021-04-19 | End: 2021-09-01

## 2021-05-04 RX ORDER — PREDNISOLONE ACETATE 10 MG/ML
1 SUSPENSION/ DROPS OPHTHALMIC 2 TIMES DAILY
Status: ON HOLD | COMMUNITY
Start: 2021-04-01 | End: 2021-05-12 | Stop reason: ALTCHOICE

## 2021-05-04 NOTE — PROGRESS NOTES
[address] PRE-ADMISSION TESTING GENERAL INSTRUCTIONS- [facility]-phone number:[phone]    GENERAL INSTRUCTIONS  [x] Antibacterial Soap shower Night before Surgery  [x] Ready Prep CHG wipe instruction sheet and wipes given. [x] Nothing by mouth after midnight, including gum, candy, mints, or water. [x] You may brush your teeth, gargle, but do NOT swallow water. [x]No smoking, chewing tobacco, illegal drugs, or alcohol within 24 hours of your surgery. [x] Jewelry, valuables or body piercing's should not be brought to the hospital. All body and/or tongue piercing's must be removed prior to arriving to hospital.  ALL hair pins must be removed. [x] Do not wear makeup, lotions, powders, deodorant. Nail polish as directed by the nurse. [x] Arrange transportation with a responsible adult  to and from the hospital. If you do not have a responsible adult  to transport you, you will need to make arrangements with a medical transportation company (i.e. [employer]. A Uber/taxi/bus is not appropriate unless you are accompanied by a responsible adult ). Arrange for someone to be with you for the remainder of the day and for 24 hours after your procedure due to having had anesthesia. Who will be your  for transportation? Lazaro, cousin    [x] Bring insurance card and photo ID. [x] Transfusion Bracelet: Please bring with you to hospital, day of surgery       PARKING INSTRUCTIONS:   [x] Arrival Time: 5 am, you and your  will need to wear a mask. · [x] Parking lot '\"I\"  is located on [facility] (the corner of [facility]). To enter, press the button and the gate will lift. A free token will be provided to exit the lot. One car per patient is allowed to park in this lot. All other cars are to park on [address] [place] [address] either in the parking garage or the handicap lot.       Walk up the front walk to the [facility], the door will be locked an employee will greet you and let you in. Park on [facility], enter the main entrance. One person may accompany you. Wear a mask. EDUCATION INSTRUCTIONS:        [x] Pre-admission Testing educational folder given  [x] Incentive Spirometry,coughing & deep breathing exercises reviewed. [x]Medication information sheet(s)       [x]Pain: Post-op pain is normal and to be expected. You will be asked to rate your pain from 0-10 (a zero is not acceptable-education is needed). Your post-op pain goal is:  [x] Ask your nurse for your pain medication. [] Other:  Bring with you  ____________    MEDICATION INSTRUCTIONS:  [x]Bring a complete list of your medications, please write the last time you took the medicine, give this list to the nurse. [x] Take the following medications the morning of surgery with 1-2 ounces of water:  [id number]      May take oxycodone 5 mg morning of surgery if needed with sip of water  [x] Stop herbal supplements and vitamins 5 days before your surgery. Last day meloxicam  (Mobic)5/6  [x] DO NOT take any diabetic medicine the morning of surgery. Follow instructions for insulin the day before surgery. Not prescribed insulin  [x] If you are diabetic and your blood sugar is low or you feel symptomatic, you may drink 1-2 ounces of apple juice or take a glucose tablet. The morning of your procedure, you may call the pre-op area if you have concerns about your blood sugar [phone]. [x] Follow physician instructions regarding any blood thinners you may be taking. WHAT TO EXPECT:  [x] The day of surgery you will be greeted and checked in by the [employer]. Please bring your photo ID and insurance card. A nurse will greet you in accordance to the time you are needed in the pre-op area to prepare you for surgery.  Please do not be discouraged if you are not greeted in the order you arrive as there are many variables that are involved in patient preparation. Your patience is greatly appreciated as you wait for your nurse. Please bring in items such as: books, magazines, newspapers, electronics, or any other items  to occupy your time in the waiting area. [x]  Delays may occur with surgery and staff will make a sincere effort to keep you informed of delays. If any delays occur with your procedure, we apologize ahead of time for your inconvenience as we recognize the value of your time.

## 2021-05-06 ENCOUNTER — HOSPITAL ENCOUNTER (OUTPATIENT)
Dept: PREADMISSION TESTING | Age: 61
Discharge: HOME | End: 2021-05-06
Payer: MEDICAID

## 2021-05-06 ENCOUNTER — HOSPITAL ENCOUNTER (OUTPATIENT)
Dept: CT IMAGING | Age: 61
Discharge: HOME | End: 2021-05-08
Payer: MEDICAID

## 2021-05-06 ENCOUNTER — HOSPITAL ENCOUNTER (OUTPATIENT)
Age: 61
Discharge: HOME | End: 2021-05-08
Payer: MEDICAID

## 2021-05-06 VITALS
OXYGEN SATURATION: 96 % | RESPIRATION RATE: 16 BRPM | TEMPERATURE: 97.8 F | HEART RATE: 82 BPM | SYSTOLIC BLOOD PRESSURE: 131 MMHG | BODY MASS INDEX: 33.8 KG/M2 | HEIGHT: 68 IN | DIASTOLIC BLOOD PRESSURE: 78 MMHG | WEIGHT: 223 LBS

## 2021-05-06 DIAGNOSIS — U07.1 COVID-19: ICD-10-CM

## 2021-05-06 DIAGNOSIS — Z01.812 PRE-OPERATIVE LABORATORY EXAMINATION: Primary | ICD-10-CM

## 2021-05-06 DIAGNOSIS — D49.0 IPMN (INTRADUCTAL PAPILLARY MUCINOUS NEOPLASM): ICD-10-CM

## 2021-05-06 DIAGNOSIS — D49.0: ICD-10-CM

## 2021-05-06 LAB
ABO/RH: NORMAL
ANION GAP SERPL CALCULATED.3IONS-SCNC: 14 MMOL/L (ref 7–16)
ANTIBODY SCREEN: NORMAL
BUN BLDV-MCNC: 24 MG/DL (ref 6–20)
CALCIUM SERPL-MCNC: 10.2 MG/DL (ref 8.6–10.2)
CHLORIDE BLD-SCNC: 100 MMOL/L (ref 98–107)
CO2: 25 MMOL/L (ref 22–29)
CREAT SERPL-MCNC: 0.8 MG/DL (ref 0.7–1.2)
EKG ATRIAL RATE: 78 BPM
EKG P AXIS: 49 DEGREES
EKG P-R INTERVAL: 156 MS
EKG Q-T INTERVAL: 384 MS
EKG QRS DURATION: 88 MS
EKG QTC CALCULATION (BAZETT): 437 MS
EKG R AXIS: 18 DEGREES
EKG T AXIS: 63 DEGREES
EKG VENTRICULAR RATE: 78 BPM
GFR AFRICAN AMERICAN: >60
GFR NON-AFRICAN AMERICAN: >60 ML/MIN/1.73
GLUCOSE BLD-MCNC: 246 MG/DL (ref 74–99)
HCT VFR BLD CALC: 46.3 % (ref 37–54)
HEMOGLOBIN: 14.4 G/DL (ref 12.5–16.5)
INR BLD: 1.1
MCH RBC QN AUTO: 23.4 PG (ref 26–35)
MCHC RBC AUTO-ENTMCNC: 31.1 % (ref 32–34.5)
MCV RBC AUTO: 75.2 FL (ref 80–99.9)
PDW BLD-RTO: 15.7 FL (ref 11.5–15)
PLATELET # BLD: 236 E9/L (ref 130–450)
PMV BLD AUTO: 12.3 FL (ref 7–12)
POTASSIUM SERPL-SCNC: 4.6 MMOL/L (ref 3.5–5)
PROTHROMBIN TIME: 11.9 SEC (ref 9.3–12.4)
RBC # BLD: 6.16 E12/L (ref 3.8–5.8)
SODIUM BLD-SCNC: 139 MMOL/L (ref 132–146)
WBC # BLD: 13 E9/L (ref 4.5–11.5)

## 2021-05-06 PROCEDURE — U0003 INFECTIOUS AGENT DETECTION BY NUCLEIC ACID (DNA OR RNA); SEVERE ACUTE RESPIRATORY SYNDROME CORONAVIRUS 2 (SARS-COV-2) (CORONAVIRUS DISEASE [COVID-19]), AMPLIFIED PROBE TECHNIQUE, MAKING USE OF HIGH THROUGHPUT TECHNOLOGIES AS DESCRIBED BY CMS-2020-01-R: HCPCS

## 2021-05-06 PROCEDURE — 80048 BASIC METABOLIC PNL TOTAL CA: CPT

## 2021-05-06 PROCEDURE — 86850 RBC ANTIBODY SCREEN: CPT

## 2021-05-06 PROCEDURE — 93010 ELECTROCARDIOGRAM REPORT: CPT | Performed by: INTERNAL MEDICINE

## 2021-05-06 PROCEDURE — 85610 PROTHROMBIN TIME: CPT

## 2021-05-06 PROCEDURE — 36415 COLL VENOUS BLD VENIPUNCTURE: CPT

## 2021-05-06 PROCEDURE — 6360000002 HC RX W HCPCS: Performed by: TRANSPLANT SURGERY

## 2021-05-06 PROCEDURE — U0005 INFEC AGEN DETEC AMPLI PROBE: HCPCS

## 2021-05-06 PROCEDURE — 86901 BLOOD TYPING SEROLOGIC RH(D): CPT

## 2021-05-06 PROCEDURE — 6360000004 HC RX CONTRAST MEDICATION: Performed by: RADIOLOGY

## 2021-05-06 PROCEDURE — 74175 CTA ABDOMEN W/CONTRAST: CPT

## 2021-05-06 PROCEDURE — 85027 COMPLETE CBC AUTOMATED: CPT

## 2021-05-06 PROCEDURE — 86900 BLOOD TYPING SEROLOGIC ABO: CPT

## 2021-05-06 PROCEDURE — 93005 ELECTROCARDIOGRAM TRACING: CPT | Performed by: TRANSPLANT SURGERY

## 2021-05-06 PROCEDURE — 86923 COMPATIBILITY TEST ELECTRIC: CPT

## 2021-05-06 PROCEDURE — 2580000003 HC RX 258: Performed by: RADIOLOGY

## 2021-05-06 PROCEDURE — 87081 CULTURE SCREEN ONLY: CPT

## 2021-05-06 RX ORDER — DIPHENHYDRAMINE HYDROCHLORIDE 50 MG/ML
50 INJECTION INTRAMUSCULAR; INTRAVENOUS ONCE
Status: COMPLETED | OUTPATIENT
Start: 2021-05-06 | End: 2021-05-06

## 2021-05-06 RX ORDER — SODIUM CHLORIDE 0.9 % (FLUSH) 0.9 %
10 SYRINGE (ML) INJECTION
Status: COMPLETED | OUTPATIENT
Start: 2021-05-06 | End: 2021-05-06

## 2021-05-06 RX ADMIN — IOPAMIDOL 90 ML: 755 INJECTION, SOLUTION INTRAVENOUS at 09:41

## 2021-05-06 RX ADMIN — Medication 10 ML: at 09:41

## 2021-05-06 RX ADMIN — DIPHENHYDRAMINE HYDROCHLORIDE 50 MG: 50 INJECTION, SOLUTION INTRAMUSCULAR; INTRAVENOUS at 09:15

## 2021-05-06 ASSESSMENT — PAIN DESCRIPTION - ORIENTATION: ORIENTATION: LOWER

## 2021-05-06 ASSESSMENT — PAIN DESCRIPTION - LOCATION: LOCATION: BACK

## 2021-05-07 LAB — MRSA CULTURE ONLY: NORMAL

## 2021-05-08 LAB
SARS-COV-2: NOT DETECTED
SOURCE: NORMAL

## 2021-05-11 ENCOUNTER — ANESTHESIA EVENT (OUTPATIENT)
Dept: OPERATING ROOM | Age: 61
DRG: 260 | End: 2021-05-11
Payer: MEDICAID

## 2021-05-11 NOTE — H&P
Hepatobiliary and Pancreatic Surgery Attending History and Physical     Patient's Name/Date of Birth: Eliezer Cook /1960 (58 y.o.)     Date: April 20, 2021     CC: Follow up MRI regarding BD-IPMN     HPI:  Patient is a very pleasant 63year old male whom presented to Dr. Avery Avila office secondary to having an abdominal hernia.  A CT scan without contrast was ordered and pancreatic mass was suspected. He then had an MRI with IV contrast which showed a 1.4cm cystic mass in the neck of his pancreas.  He has been a diabetic for many years that is somewhat controlled. [facility] admits to weight gain since his neck surgery. is a nonsmoker and a non drinker. We have been following this since 2018, however over the last 6 months we have seen a 1cm growth. He had an EUS 3 months ago with Dr. Natalie Cassidy secondary to a 5mm growth. At that time he was found to have a nodule and thin septations. We then repeated his MRI yesterday for a 3 month follow up and is has grown to 2.9cm. He has had 1cm growth within the last 6 months.     Had lower back surgery 16 months ago.     Past Medical History           Past Medical History:   Diagnosis Date    Arthritis      Back pain      Hyperlipidemia      Poorly controlled type 2 diabetes mellitus with neuropathy ([facility] [address] 5/8/2018            Past Surgical History             Past Surgical History:   Procedure Laterality Date    COLONOSCOPY         at age 40 polyps    CYST REMOVAL Right      HERNIA REPAIR Bilateral       groin and umbilical    SPINE SURGERY   06/05/2018     C5-7 fusion at [facility] in [place]            Current Facility-Administered Medications               Current Outpatient Prescriptions   Medication Sig Dispense Refill    naproxen (NAPROSYN) 500 MG tablet Take 1 tablet by mouth 2 times daily as needed for Pain 60 tablet 1    glipiZIDE (GLUCOTROL XL) 10 MG extended release tablet Take 1 tablet by mouth daily 90 tablet 1    Ertugliflozin L-PyroglutamicAc (STEGLATRO) 5 MG TABS One tab daily 30 tablet 5    losartan-hydrochlorothiazide (HYZAAR) 100-12.5 MG per tablet Take 1 tablet by mouth daily 30 tablet 3    atorvastatin (LIPITOR) 10 MG tablet Take 1 tablet by mouth daily 90 tablet 1    gabapentin (NEURONTIN) 300 MG capsule take 1 capsule by mouth 3 TO 4 TIMES A DAY   0    fluticasone (FLONASE) 50 MCG/ACT nasal spray 2 sprays by Nasal route daily 2 Bottle 3    cyclobenzaprine (FLEXERIL) 10 MG tablet            No current facility-administered medications for this visit.                     Allergies   Allergen Reactions    Iodine Swelling    Wheat Extract Swelling    Metformin And Related Hives         Family History             Family History   Problem Relation Age of Onset    Diabetes Mother      Other Mother           parkinsons    Diabetes Father      Cancer Sister 79         unknown    Cancer Brother 64         stomach    Diabetes Brother              Social History                   Social History    Marital status: Single       Spouse name: N/A    Number of children: N/A    Years of education: N/A            Occupational History    Not on file.                  Social History Main Topics    Smoking status: Never Smoker    Smokeless tobacco: Never Used    Alcohol use Yes         Comment: occasional    Drug use: No    Sexual activity: Not on file              Other Topics Concern    Not on file            Social History Narrative    No narrative on file            ROS:   Review of Systems   Constitutional: Negative for chills, diaphoresis and fever. HENT: Negative for congestion, ear discharge, ear pain, hearing loss, nosebleeds and tinnitus.    Eyes: Negative for photophobia, pain and discharge. Respiratory: Negative for shortness of breath.    Cardiovascular: Negative for palpitations and leg swelling. Gastrointestinal: Positive for abdominal pain. Negative for blood in stool, constipation, diarrhea, nausea and vomiting. Endocrine: Negative for polydipsia. Genitourinary: Negative for frequency, hematuria and urgency. Musculoskeletal: Negative for back pain and neck pain. Skin: Negative for rash. Allergic/Immunologic: Negative for environmental allergies. Neurological: Negative for tremors and seizures. Psychiatric/Behavioral: Negative for hallucinations and suicidal ideas. The patient is not nervous/anxious.          Physical Exam:        Vitals:     11/02/18 1121   BP: 132/75   Pulse: 103   Resp: 18   Temp: 98.1 °F (36.7 °C)   SpO2: 94%         PSYCH: mood and affect normal, alert and oriented x 3  CONSTITUTIONAL: No apparent distress, comfortable  EYES: Sclera white, pupils equal round and reactive to light  ENMT:  Hearing normal, trachea midline, ears externally intact  LYMPH: no lympadenopathy in neck. Nolympadenopathy in groins  RESP: Breath sounds were clear and equal with no rales, wheezes, or rhonchi.              Respiratory effort was normal with no retractions or use of accessory muscles. CV: Heart soundswere normal with a regular rate and rhythm.   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 to now 2.9cm (nodule and septations) in 6 months  - I reviewed his images prior to his office visit and we also reviewed them together today  - we discussed that rapid growth is concerning along with his nodule  - we discussed surgical resection  - he would need a mid body distal pancreatectomy and splenectomy  - will need vaccinations  - triphasic CT scan for preoperative planning  - Patient was made aware of the risks, benefits, alternatives, and complications of a laparoscopic robotic distal pancreatectomy and splenectomy, intraoperative ultrasound, and planned transition to open and wish to proceed with surgery.     - will need EKG at PAT     35 Minutes of which greater than 50% was spent counseling or coordinating his care.     Thank you for the consultation allowing me to take part in Mr. Cook' care.      Please send a copy of my note to Dr.'s Christopher

## 2021-05-12 ENCOUNTER — HOSPITAL ENCOUNTER (INPATIENT)
Age: 61
LOS: 8 days | Discharge: HOME HEALTH | DRG: 260 | End: 2021-05-20
Attending: TRANSPLANT SURGERY | Admitting: TRANSPLANT SURGERY
Payer: MEDICAID

## 2021-05-12 ENCOUNTER — APPOINTMENT (OUTPATIENT)
Dept: GENERAL RADIOLOGY | Age: 61
DRG: 260 | End: 2021-05-12
Attending: TRANSPLANT SURGERY
Payer: MEDICAID

## 2021-05-12 ENCOUNTER — ANESTHESIA (OUTPATIENT)
Dept: OPERATING ROOM | Age: 61
DRG: 260 | End: 2021-05-12
Payer: MEDICAID

## 2021-05-12 VITALS — OXYGEN SATURATION: 92 % | TEMPERATURE: 98.1 F | SYSTOLIC BLOOD PRESSURE: 108 MMHG | DIASTOLIC BLOOD PRESSURE: 63 MMHG

## 2021-05-12 DIAGNOSIS — G89.18 POST-OP PAIN: ICD-10-CM

## 2021-05-12 DIAGNOSIS — Z01.812 PRE-OPERATIVE LABORATORY EXAMINATION: ICD-10-CM

## 2021-05-12 DIAGNOSIS — E78.2 MIXED HYPERLIPIDEMIA: ICD-10-CM

## 2021-05-12 DIAGNOSIS — D49.0 IPMN (INTRADUCTAL PAPILLARY MUCINOUS NEOPLASM): Primary | ICD-10-CM

## 2021-05-12 DIAGNOSIS — U07.1 COVID-19: ICD-10-CM

## 2021-05-12 LAB
AADO2: 131.5 MMHG
ANION GAP SERPL CALCULATED.3IONS-SCNC: 16 MMOL/L (ref 7–16)
B.E.: -4.3 MMOL/L (ref -3–0)
B.E.: -5.2 MMOL/L (ref -3–0)
B.E.: -5.5 MMOL/L (ref -3–3)
B.E.: -6.6 MMOL/L (ref -3–0)
BUN BLDV-MCNC: 17 MG/DL (ref 6–20)
CALCIUM SERPL-MCNC: 7.8 MG/DL (ref 8.6–10.2)
CARDIOPULMONARY BYPASS: NO
CHLORIDE BLD-SCNC: 100 MMOL/L (ref 98–107)
CO2: 19 MMOL/L (ref 22–29)
COHB: 0.4 % (ref 0–1.5)
CREAT SERPL-MCNC: 0.7 MG/DL (ref 0.7–1.2)
CRITICAL: ABNORMAL
DATE ANALYZED: ABNORMAL
DATE OF COLLECTION: ABNORMAL
DEVICE: ABNORMAL
FIO2: 40 %
GFR AFRICAN AMERICAN: >60
GFR NON-AFRICAN AMERICAN: >60 ML/MIN/1.73
GLUCOSE BLD-MCNC: 250 MG/DL (ref 74–99)
HCO3 ARTERIAL: 19.1 MMOL/L (ref 22–26)
HCO3 ARTERIAL: 21.6 MMOL/L (ref 22–26)
HCO3 ARTERIAL: 22.9 MMOL/L (ref 22–26)
HCO3: 19.9 MMOL/L (ref 22–26)
HCT (EST): 33 % (ref 37–54)
HCT (EST): 33 % (ref 37–54)
HCT (EST): 40 % (ref 37–54)
HGB, (EST): 11.1 G/DL (ref 12.5–15.5)
HGB, (EST): 11.3 G/DL (ref 12.5–15.5)
HGB, (EST): 13.6 G/DL (ref 12.5–15.5)
HHB: 2.7 % (ref 0–5)
LAB: ABNORMAL
Lab: ABNORMAL
METER GLUCOSE: 197 MG/DL (ref 74–99)
METER GLUCOSE: 200 MG/DL (ref 74–99)
METER GLUCOSE: 214 MG/DL (ref 74–99)
METER GLUCOSE: 240 MG/DL (ref 74–99)
METER GLUCOSE: 240 MG/DL (ref 74–99)
METER GLUCOSE: 256 MG/DL (ref 74–99)
METHB: 0.2 % (ref 0–1.5)
MODE: ABNORMAL
O2 SATURATION: 94.3 % (ref 92–98.5)
O2 SATURATION: 97.3 % (ref 92–98.5)
O2 SATURATION: 97.6 % (ref 92–98.5)
O2 SATURATION: 97.6 % (ref 92–98.5)
O2HB: 96.7 % (ref 94–97)
OPERATOR ID: 1926
OPERATOR ID: ABNORMAL
PATIENT TEMP: 37 C
PCO2 ARTERIAL: 37.8 MMHG (ref 35–45)
PCO2 ARTERIAL: 41.9 MMHG (ref 35–45)
PCO2 ARTERIAL: 56 MMHG (ref 35–45)
PCO2: 38.6 MMHG (ref 35–45)
PFO2: 2.48 MMHG/%
PH BLOOD GAS: 7.22 (ref 7.35–7.45)
PH BLOOD GAS: 7.31 (ref 7.35–7.45)
PH BLOOD GAS: 7.32 (ref 7.35–7.45)
PH BLOOD GAS: 7.33 (ref 7.35–7.45)
PO2 ARTERIAL: 105.5 MMHG (ref 80–100)
PO2 ARTERIAL: 118.2 MMHG (ref 80–100)
PO2 ARTERIAL: 78 MMHG (ref 80–100)
PO2: 99.3 MMHG (ref 75–100)
POTASSIUM SERPL-SCNC: 4.29 MMOL/L (ref 3.5–5)
POTASSIUM SERPL-SCNC: 4.3 MMOL/L (ref 3.5–5.5)
POTASSIUM SERPL-SCNC: 4.3 MMOL/L (ref 3.5–5.5)
POTASSIUM SERPL-SCNC: 4.4 MMOL/L (ref 3.5–5)
POTASSIUM SERPL-SCNC: 4.6 MMOL/L (ref 3.5–5.5)
RI(T): 1.32
SODIUM BLD-SCNC: 135 MMOL/L (ref 132–146)
SOURCE, BLOOD GAS: ABNORMAL
THB: 12.1 G/DL (ref 11.5–16.5)
TIME ANALYZED: 1617

## 2021-05-12 PROCEDURE — 94660 CPAP INITIATION&MGMT: CPT

## 2021-05-12 PROCEDURE — 84132 ASSAY OF SERUM POTASSIUM: CPT

## 2021-05-12 PROCEDURE — 88309 TISSUE EXAM BY PATHOLOGIST: CPT

## 2021-05-12 PROCEDURE — 6370000000 HC RX 637 (ALT 250 FOR IP): Performed by: STUDENT IN AN ORGANIZED HEALTH CARE EDUCATION/TRAINING PROGRAM

## 2021-05-12 PROCEDURE — 82805 BLOOD GASES W/O2 SATURATION: CPT

## 2021-05-12 PROCEDURE — 2500000003 HC RX 250 WO HCPCS: Performed by: TRANSPLANT SURGERY

## 2021-05-12 PROCEDURE — P9041 ALBUMIN (HUMAN),5%, 50ML: HCPCS | Performed by: NURSE ANESTHETIST, CERTIFIED REGISTERED

## 2021-05-12 PROCEDURE — 2720000010 HC SURG SUPPLY STERILE: Performed by: TRANSPLANT SURGERY

## 2021-05-12 PROCEDURE — 82803 BLOOD GASES ANY COMBINATION: CPT

## 2021-05-12 PROCEDURE — 3600000009 HC SURGERY ROBOT BASE: Performed by: TRANSPLANT SURGERY

## 2021-05-12 PROCEDURE — 0FT40ZZ RESECTION OF GALLBLADDER, OPEN APPROACH: ICD-10-PCS | Performed by: TRANSPLANT SURGERY

## 2021-05-12 PROCEDURE — 3700000000 HC ANESTHESIA ATTENDED CARE: Performed by: TRANSPLANT SURGERY

## 2021-05-12 PROCEDURE — 2500000003 HC RX 250 WO HCPCS: Performed by: NURSE ANESTHETIST, CERTIFIED REGISTERED

## 2021-05-12 PROCEDURE — 2580000003 HC RX 258: Performed by: NURSE ANESTHETIST, CERTIFIED REGISTERED

## 2021-05-12 PROCEDURE — 71045 X-RAY EXAM CHEST 1 VIEW: CPT

## 2021-05-12 PROCEDURE — 80048 BASIC METABOLIC PNL TOTAL CA: CPT

## 2021-05-12 PROCEDURE — 2500000003 HC RX 250 WO HCPCS: Performed by: ANESTHESIOLOGY

## 2021-05-12 PROCEDURE — 49905 OMENTAL FLAP INTRA-ABDOM: CPT | Performed by: TRANSPLANT SURGERY

## 2021-05-12 PROCEDURE — 51702 INSERT TEMP BLADDER CATH: CPT

## 2021-05-12 PROCEDURE — 88305 TISSUE EXAM BY PATHOLOGIST: CPT

## 2021-05-12 PROCEDURE — 76998 US GUIDE INTRAOP: CPT | Performed by: TRANSPLANT SURGERY

## 2021-05-12 PROCEDURE — 2580000003 HC RX 258: Performed by: STUDENT IN AN ORGANIZED HEALTH CARE EDUCATION/TRAINING PROGRAM

## 2021-05-12 PROCEDURE — 8E0W4CZ ROBOTIC ASSISTED PROCEDURE OF TRUNK REGION, PERCUTANEOUS ENDOSCOPIC APPROACH: ICD-10-PCS | Performed by: TRANSPLANT SURGERY

## 2021-05-12 PROCEDURE — 6360000002 HC RX W HCPCS: Performed by: STUDENT IN AN ORGANIZED HEALTH CARE EDUCATION/TRAINING PROGRAM

## 2021-05-12 PROCEDURE — 0FBG0ZZ EXCISION OF PANCREAS, OPEN APPROACH: ICD-10-PCS | Performed by: TRANSPLANT SURGERY

## 2021-05-12 PROCEDURE — 2580000003 HC RX 258: Performed by: TRANSPLANT SURGERY

## 2021-05-12 PROCEDURE — 82962 GLUCOSE BLOOD TEST: CPT

## 2021-05-12 PROCEDURE — 02HV33Z INSERTION OF INFUSION DEVICE INTO SUPERIOR VENA CAVA, PERCUTANEOUS APPROACH: ICD-10-PCS | Performed by: TRANSPLANT SURGERY

## 2021-05-12 PROCEDURE — 6360000002 HC RX W HCPCS: Performed by: ANESTHESIOLOGY

## 2021-05-12 PROCEDURE — C9113 INJ PANTOPRAZOLE SODIUM, VIA: HCPCS | Performed by: STUDENT IN AN ORGANIZED HEALTH CARE EDUCATION/TRAINING PROGRAM

## 2021-05-12 PROCEDURE — 3700000001 HC ADD 15 MINUTES (ANESTHESIA): Performed by: TRANSPLANT SURGERY

## 2021-05-12 PROCEDURE — 88307 TISSUE EXAM BY PATHOLOGIST: CPT

## 2021-05-12 PROCEDURE — 6360000002 HC RX W HCPCS: Performed by: NURSE ANESTHETIST, CERTIFIED REGISTERED

## 2021-05-12 PROCEDURE — 88304 TISSUE EXAM BY PATHOLOGIST: CPT

## 2021-05-12 PROCEDURE — 62325 NJX INTERLAMINAR CRV/THRC: CPT | Performed by: ANESTHESIOLOGY

## 2021-05-12 PROCEDURE — 07TP0ZZ RESECTION OF SPLEEN, OPEN APPROACH: ICD-10-PCS | Performed by: TRANSPLANT SURGERY

## 2021-05-12 PROCEDURE — 7100000000 HC PACU RECOVERY - FIRST 15 MIN: Performed by: TRANSPLANT SURGERY

## 2021-05-12 PROCEDURE — 2060000000 HC ICU INTERMEDIATE R&B

## 2021-05-12 PROCEDURE — S2900 ROBOTIC SURGICAL SYSTEM: HCPCS | Performed by: TRANSPLANT SURGERY

## 2021-05-12 PROCEDURE — 2580000003 HC RX 258: Performed by: ANESTHESIOLOGY

## 2021-05-12 PROCEDURE — 47600 CHOLECYSTECTOMY: CPT | Performed by: TRANSPLANT SURGERY

## 2021-05-12 PROCEDURE — 3600000019 HC SURGERY ROBOT ADDTL 15MIN: Performed by: TRANSPLANT SURGERY

## 2021-05-12 PROCEDURE — 0FJG4ZZ INSPECTION OF PANCREAS, PERCUTANEOUS ENDOSCOPIC APPROACH: ICD-10-PCS | Performed by: TRANSPLANT SURGERY

## 2021-05-12 PROCEDURE — 48140 PARTIAL REMOVAL OF PANCREAS: CPT | Performed by: TRANSPLANT SURGERY

## 2021-05-12 PROCEDURE — 2709999900 HC NON-CHARGEABLE SUPPLY: Performed by: TRANSPLANT SURGERY

## 2021-05-12 PROCEDURE — 7100000001 HC PACU RECOVERY - ADDTL 15 MIN: Performed by: TRANSPLANT SURGERY

## 2021-05-12 RX ORDER — VECURONIUM BROMIDE 1 MG/ML
INJECTION, POWDER, LYOPHILIZED, FOR SOLUTION INTRAVENOUS PRN
Status: DISCONTINUED | OUTPATIENT
Start: 2021-05-12 | End: 2021-05-12 | Stop reason: SDUPTHER

## 2021-05-12 RX ORDER — SODIUM CHLORIDE 9 MG/ML
10 INJECTION INTRAVENOUS DAILY
Status: DISCONTINUED | OUTPATIENT
Start: 2021-05-12 | End: 2021-05-20 | Stop reason: HOSPADM

## 2021-05-12 RX ORDER — DEXTROSE MONOHYDRATE 50 MG/ML
100 INJECTION, SOLUTION INTRAVENOUS PRN
Status: DISCONTINUED | OUTPATIENT
Start: 2021-05-12 | End: 2021-05-20 | Stop reason: HOSPADM

## 2021-05-12 RX ORDER — MIDAZOLAM HYDROCHLORIDE 1 MG/ML
INJECTION INTRAMUSCULAR; INTRAVENOUS PRN
Status: DISCONTINUED | OUTPATIENT
Start: 2021-05-12 | End: 2021-05-12 | Stop reason: SDUPTHER

## 2021-05-12 RX ORDER — LABETALOL HYDROCHLORIDE 5 MG/ML
10 INJECTION, SOLUTION INTRAVENOUS EVERY 30 MIN PRN
Status: DISCONTINUED | OUTPATIENT
Start: 2021-05-12 | End: 2021-05-20 | Stop reason: HOSPADM

## 2021-05-12 RX ORDER — SODIUM CHLORIDE, SODIUM LACTATE, POTASSIUM CHLORIDE, CALCIUM CHLORIDE 600; 310; 30; 20 MG/100ML; MG/100ML; MG/100ML; MG/100ML
INJECTION, SOLUTION INTRAVENOUS CONTINUOUS PRN
Status: DISCONTINUED | OUTPATIENT
Start: 2021-05-12 | End: 2021-05-12 | Stop reason: SDUPTHER

## 2021-05-12 RX ORDER — FENTANYL CITRATE 50 UG/ML
50 INJECTION, SOLUTION INTRAMUSCULAR; INTRAVENOUS EVERY 5 MIN PRN
Status: DISCONTINUED | OUTPATIENT
Start: 2021-05-12 | End: 2021-05-12 | Stop reason: HOSPADM

## 2021-05-12 RX ORDER — HYDROMORPHONE HCL 110MG/55ML
PATIENT CONTROLLED ANALGESIA SYRINGE INTRAVENOUS PRN
Status: DISCONTINUED | OUTPATIENT
Start: 2021-05-12 | End: 2021-05-12 | Stop reason: SDUPTHER

## 2021-05-12 RX ORDER — ONDANSETRON 2 MG/ML
4 INJECTION INTRAMUSCULAR; INTRAVENOUS EVERY 6 HOURS PRN
Status: DISCONTINUED | OUTPATIENT
Start: 2021-05-12 | End: 2021-05-12

## 2021-05-12 RX ORDER — NICOTINE POLACRILEX 4 MG
15 LOZENGE BUCCAL PRN
Status: DISCONTINUED | OUTPATIENT
Start: 2021-05-12 | End: 2021-05-20 | Stop reason: HOSPADM

## 2021-05-12 RX ORDER — SODIUM CHLORIDE 0.9 % (FLUSH) 0.9 %
5-40 SYRINGE (ML) INJECTION PRN
Status: DISCONTINUED | OUTPATIENT
Start: 2021-05-12 | End: 2021-05-12 | Stop reason: HOSPADM

## 2021-05-12 RX ORDER — HEPARIN SODIUM 10000 [USP'U]/ML
5000 INJECTION, SOLUTION INTRAVENOUS; SUBCUTANEOUS EVERY 8 HOURS SCHEDULED
Status: DISCONTINUED | OUTPATIENT
Start: 2021-05-12 | End: 2021-05-13

## 2021-05-12 RX ORDER — FENTANYL CITRATE 50 UG/ML
INJECTION, SOLUTION INTRAMUSCULAR; INTRAVENOUS PRN
Status: DISCONTINUED | OUTPATIENT
Start: 2021-05-12 | End: 2021-05-12 | Stop reason: SDUPTHER

## 2021-05-12 RX ORDER — PROPOFOL 10 MG/ML
INJECTION, EMULSION INTRAVENOUS CONTINUOUS PRN
Status: DISCONTINUED | OUTPATIENT
Start: 2021-05-12 | End: 2021-05-12 | Stop reason: SDUPTHER

## 2021-05-12 RX ORDER — PHENYLEPHRINE HCL IN 0.9% NACL 1 MG/10 ML
SYRINGE (ML) INTRAVENOUS PRN
Status: DISCONTINUED | OUTPATIENT
Start: 2021-05-12 | End: 2021-05-12 | Stop reason: SDUPTHER

## 2021-05-12 RX ORDER — PANTOPRAZOLE SODIUM 40 MG/10ML
40 INJECTION, POWDER, LYOPHILIZED, FOR SOLUTION INTRAVENOUS DAILY
Status: DISCONTINUED | OUTPATIENT
Start: 2021-05-12 | End: 2021-05-20 | Stop reason: HOSPADM

## 2021-05-12 RX ORDER — SODIUM CHLORIDE, SODIUM LACTATE, POTASSIUM CHLORIDE, CALCIUM CHLORIDE 600; 310; 30; 20 MG/100ML; MG/100ML; MG/100ML; MG/100ML
INJECTION, SOLUTION INTRAVENOUS CONTINUOUS
Status: DISCONTINUED | OUTPATIENT
Start: 2021-05-12 | End: 2021-05-14

## 2021-05-12 RX ORDER — LIDOCAINE HYDROCHLORIDE 20 MG/ML
INJECTION, SOLUTION INTRAVENOUS PRN
Status: DISCONTINUED | OUTPATIENT
Start: 2021-05-12 | End: 2021-05-12 | Stop reason: SDUPTHER

## 2021-05-12 RX ORDER — BUPIVACAINE HYDROCHLORIDE 5 MG/ML
INJECTION, SOLUTION EPIDURAL; INTRACAUDAL PRN
Status: DISCONTINUED | OUTPATIENT
Start: 2021-05-12 | End: 2021-05-12 | Stop reason: ALTCHOICE

## 2021-05-12 RX ORDER — ATORVASTATIN CALCIUM 20 MG/1
20 TABLET, FILM COATED ORAL DAILY
Status: DISCONTINUED | OUTPATIENT
Start: 2021-05-12 | End: 2021-05-20 | Stop reason: HOSPADM

## 2021-05-12 RX ORDER — PROMETHAZINE HYDROCHLORIDE 25 MG/1
12.5 TABLET ORAL EVERY 6 HOURS PRN
Status: DISCONTINUED | OUTPATIENT
Start: 2021-05-12 | End: 2021-05-20 | Stop reason: HOSPADM

## 2021-05-12 RX ORDER — GLYCOPYRROLATE 1 MG/5 ML
SYRINGE (ML) INTRAVENOUS PRN
Status: DISCONTINUED | OUTPATIENT
Start: 2021-05-12 | End: 2021-05-12 | Stop reason: SDUPTHER

## 2021-05-12 RX ORDER — NALOXONE HYDROCHLORIDE 0.4 MG/ML
0.4 INJECTION, SOLUTION INTRAMUSCULAR; INTRAVENOUS; SUBCUTANEOUS PRN
Status: DISCONTINUED | OUTPATIENT
Start: 2021-05-12 | End: 2021-05-12

## 2021-05-12 RX ORDER — LIDOCAINE HYDROCHLORIDE ANHYDROUS AND DEXTROSE MONOHYDRATE .4; 5 G/100ML; G/100ML
INJECTION, SOLUTION INTRAVENOUS CONTINUOUS PRN
Status: DISCONTINUED | OUTPATIENT
Start: 2021-05-12 | End: 2021-05-12 | Stop reason: SDUPTHER

## 2021-05-12 RX ORDER — PROMETHAZINE HYDROCHLORIDE 25 MG/ML
6.25 INJECTION, SOLUTION INTRAMUSCULAR; INTRAVENOUS
Status: DISCONTINUED | OUTPATIENT
Start: 2021-05-12 | End: 2021-05-12 | Stop reason: HOSPADM

## 2021-05-12 RX ORDER — DOCUSATE SODIUM 50 MG/5ML
100 LIQUID ORAL DAILY
Status: DISCONTINUED | OUTPATIENT
Start: 2021-05-12 | End: 2021-05-14 | Stop reason: DRUGHIGH

## 2021-05-12 RX ORDER — ONDANSETRON 2 MG/ML
4 INJECTION INTRAMUSCULAR; INTRAVENOUS EVERY 6 HOURS PRN
Status: DISCONTINUED | OUTPATIENT
Start: 2021-05-12 | End: 2021-05-20 | Stop reason: HOSPADM

## 2021-05-12 RX ORDER — OXYCODONE HYDROCHLORIDE AND ACETAMINOPHEN 5; 325 MG/1; MG/1
1 TABLET ORAL
Status: DISCONTINUED | OUTPATIENT
Start: 2021-05-12 | End: 2021-05-12 | Stop reason: HOSPADM

## 2021-05-12 RX ORDER — SODIUM CHLORIDE 9 MG/ML
25 INJECTION, SOLUTION INTRAVENOUS PRN
Status: DISCONTINUED | OUTPATIENT
Start: 2021-05-12 | End: 2021-05-12 | Stop reason: HOSPADM

## 2021-05-12 RX ORDER — ALBUMIN, HUMAN INJ 5% 5 %
SOLUTION INTRAVENOUS PRN
Status: DISCONTINUED | OUTPATIENT
Start: 2021-05-12 | End: 2021-05-12 | Stop reason: SDUPTHER

## 2021-05-12 RX ORDER — ACETAMINOPHEN 325 MG/1
650 TABLET ORAL EVERY 4 HOURS PRN
Status: DISCONTINUED | OUTPATIENT
Start: 2021-05-12 | End: 2021-05-18

## 2021-05-12 RX ORDER — SODIUM CHLORIDE 0.9 % (FLUSH) 0.9 %
5-40 SYRINGE (ML) INJECTION EVERY 12 HOURS SCHEDULED
Status: DISCONTINUED | OUTPATIENT
Start: 2021-05-12 | End: 2021-05-12 | Stop reason: HOSPADM

## 2021-05-12 RX ORDER — POLYETHYLENE GLYCOL 3350 17 G/17G
17 POWDER, FOR SOLUTION ORAL DAILY
Status: DISCONTINUED | OUTPATIENT
Start: 2021-05-12 | End: 2021-05-20 | Stop reason: HOSPADM

## 2021-05-12 RX ORDER — MEPERIDINE HYDROCHLORIDE 25 MG/ML
12.5 INJECTION INTRAMUSCULAR; INTRAVENOUS; SUBCUTANEOUS EVERY 5 MIN PRN
Status: DISCONTINUED | OUTPATIENT
Start: 2021-05-12 | End: 2021-05-12 | Stop reason: HOSPADM

## 2021-05-12 RX ORDER — ONDANSETRON 2 MG/ML
INJECTION INTRAMUSCULAR; INTRAVENOUS PRN
Status: DISCONTINUED | OUTPATIENT
Start: 2021-05-12 | End: 2021-05-12 | Stop reason: SDUPTHER

## 2021-05-12 RX ORDER — FENTANYL CITRATE 50 UG/ML
25 INJECTION, SOLUTION INTRAMUSCULAR; INTRAVENOUS EVERY 5 MIN PRN
Status: DISCONTINUED | OUTPATIENT
Start: 2021-05-12 | End: 2021-05-12 | Stop reason: HOSPADM

## 2021-05-12 RX ORDER — DEXTROSE MONOHYDRATE 25 G/50ML
12.5 INJECTION, SOLUTION INTRAVENOUS PRN
Status: DISCONTINUED | OUTPATIENT
Start: 2021-05-12 | End: 2021-05-20 | Stop reason: HOSPADM

## 2021-05-12 RX ORDER — HYDRALAZINE HYDROCHLORIDE 20 MG/ML
10 INJECTION INTRAMUSCULAR; INTRAVENOUS EVERY 30 MIN PRN
Status: DISCONTINUED | OUTPATIENT
Start: 2021-05-12 | End: 2021-05-20 | Stop reason: HOSPADM

## 2021-05-12 RX ORDER — MEROPENEM 1 G/1
INJECTION, POWDER, FOR SOLUTION INTRAVENOUS PRN
Status: DISCONTINUED | OUTPATIENT
Start: 2021-05-12 | End: 2021-05-12 | Stop reason: SDUPTHER

## 2021-05-12 RX ORDER — NEOSTIGMINE METHYLSULFATE 1 MG/ML
INJECTION, SOLUTION INTRAVENOUS PRN
Status: DISCONTINUED | OUTPATIENT
Start: 2021-05-12 | End: 2021-05-12 | Stop reason: SDUPTHER

## 2021-05-12 RX ORDER — MIDAZOLAM HYDROCHLORIDE 1 MG/ML
2 INJECTION INTRAMUSCULAR; INTRAVENOUS ONCE
Status: COMPLETED | OUTPATIENT
Start: 2021-05-12 | End: 2021-05-12

## 2021-05-12 RX ORDER — SODIUM CHLORIDE 9 MG/ML
INJECTION, SOLUTION INTRAVENOUS CONTINUOUS PRN
Status: DISCONTINUED | OUTPATIENT
Start: 2021-05-12 | End: 2021-05-12 | Stop reason: SDUPTHER

## 2021-05-12 RX ORDER — FENTANYL CITRATE 50 UG/ML
100 INJECTION, SOLUTION INTRAMUSCULAR; INTRAVENOUS ONCE
Status: COMPLETED | OUTPATIENT
Start: 2021-05-12 | End: 2021-05-12

## 2021-05-12 RX ORDER — DEXAMETHASONE SODIUM PHOSPHATE 10 MG/ML
INJECTION INTRAMUSCULAR; INTRAVENOUS PRN
Status: DISCONTINUED | OUTPATIENT
Start: 2021-05-12 | End: 2021-05-12 | Stop reason: SDUPTHER

## 2021-05-12 RX ORDER — BUPIVACAINE HYDROCHLORIDE 2.5 MG/ML
5 INJECTION, SOLUTION EPIDURAL; INFILTRATION; INTRACAUDAL ONCE
Status: COMPLETED | OUTPATIENT
Start: 2021-05-12 | End: 2021-05-12

## 2021-05-12 RX ORDER — DIPHENHYDRAMINE HYDROCHLORIDE 50 MG/ML
12.5 INJECTION INTRAMUSCULAR; INTRAVENOUS
Status: DISCONTINUED | OUTPATIENT
Start: 2021-05-12 | End: 2021-05-12 | Stop reason: HOSPADM

## 2021-05-12 RX ORDER — PROPOFOL 10 MG/ML
INJECTION, EMULSION INTRAVENOUS PRN
Status: DISCONTINUED | OUTPATIENT
Start: 2021-05-12 | End: 2021-05-12 | Stop reason: SDUPTHER

## 2021-05-12 RX ORDER — SODIUM CHLORIDE 9 MG/ML
25 INJECTION, SOLUTION INTRAVENOUS PRN
Status: DISCONTINUED | OUTPATIENT
Start: 2021-05-12 | End: 2021-05-20 | Stop reason: HOSPADM

## 2021-05-12 RX ORDER — SODIUM CHLORIDE 0.9 % (FLUSH) 0.9 %
5-40 SYRINGE (ML) INJECTION PRN
Status: DISCONTINUED | OUTPATIENT
Start: 2021-05-12 | End: 2021-05-20 | Stop reason: HOSPADM

## 2021-05-12 RX ORDER — GABAPENTIN 300 MG/1
300 CAPSULE ORAL 2 TIMES DAILY
Status: DISCONTINUED | OUTPATIENT
Start: 2021-05-12 | End: 2021-05-20 | Stop reason: HOSPADM

## 2021-05-12 RX ORDER — SODIUM CHLORIDE 0.9 % (FLUSH) 0.9 %
5-40 SYRINGE (ML) INJECTION EVERY 12 HOURS SCHEDULED
Status: DISCONTINUED | OUTPATIENT
Start: 2021-05-12 | End: 2021-05-20 | Stop reason: HOSPADM

## 2021-05-12 RX ORDER — ALBUMIN, HUMAN INJ 5% 5 %
SOLUTION INTRAVENOUS PRN
Status: DISCONTINUED | OUTPATIENT
Start: 2021-05-12 | End: 2021-05-12

## 2021-05-12 RX ADMIN — FENTANYL CITRATE 50 MCG: 50 INJECTION, SOLUTION INTRAMUSCULAR; INTRAVENOUS at 10:17

## 2021-05-12 RX ADMIN — DEXAMETHASONE SODIUM PHOSPHATE 10 MG: 10 INJECTION INTRAMUSCULAR; INTRAVENOUS at 07:48

## 2021-05-12 RX ADMIN — SODIUM CHLORIDE, POTASSIUM CHLORIDE, SODIUM LACTATE AND CALCIUM CHLORIDE: 600; 310; 30; 20 INJECTION, SOLUTION INTRAVENOUS at 13:55

## 2021-05-12 RX ADMIN — HEPARIN SODIUM 5000 UNITS: 10000 INJECTION INTRAVENOUS; SUBCUTANEOUS at 22:14

## 2021-05-12 RX ADMIN — MIDAZOLAM 1 MG: 1 INJECTION INTRAMUSCULAR; INTRAVENOUS at 07:15

## 2021-05-12 RX ADMIN — VECURONIUM BROMIDE 3 MG: 10 INJECTION, POWDER, LYOPHILIZED, FOR SOLUTION INTRAVENOUS at 08:38

## 2021-05-12 RX ADMIN — SODIUM CHLORIDE: 9 INJECTION, SOLUTION INTRAVENOUS at 13:19

## 2021-05-12 RX ADMIN — SODIUM CHLORIDE: 9 INJECTION, SOLUTION INTRAVENOUS at 10:08

## 2021-05-12 RX ADMIN — SODIUM CHLORIDE 25 ML: 9 INJECTION, SOLUTION INTRAVENOUS at 06:07

## 2021-05-12 RX ADMIN — FENTANYL CITRATE 6 ML/HR: 0.05 INJECTION, SOLUTION INTRAMUSCULAR; INTRAVENOUS at 10:39

## 2021-05-12 RX ADMIN — INSULIN LISPRO 6 UNITS: 100 INJECTION, SOLUTION INTRAVENOUS; SUBCUTANEOUS at 17:28

## 2021-05-12 RX ADMIN — Medication 200 MCG: at 08:04

## 2021-05-12 RX ADMIN — Medication 3 MG: at 13:06

## 2021-05-12 RX ADMIN — ALBUMIN (HUMAN) 500 ML: 12.5 INJECTION, SOLUTION INTRAVENOUS at 11:10

## 2021-05-12 RX ADMIN — BUPIVACAINE HYDROCHLORIDE 12.5 MG: 2.5 INJECTION, SOLUTION EPIDURAL; INFILTRATION; INTRACAUDAL; PERINEURAL at 17:39

## 2021-05-12 RX ADMIN — PROPOFOL 150 MCG/KG/MIN: 10 INJECTION, EMULSION INTRAVENOUS at 07:51

## 2021-05-12 RX ADMIN — GABAPENTIN 300 MG: 300 CAPSULE ORAL at 20:09

## 2021-05-12 RX ADMIN — FENTANYL CITRATE 50 MCG: 50 INJECTION, SOLUTION INTRAMUSCULAR; INTRAVENOUS at 07:15

## 2021-05-12 RX ADMIN — Medication 100 MCG: at 08:59

## 2021-05-12 RX ADMIN — Medication 100 MCG: at 08:40

## 2021-05-12 RX ADMIN — PANTOPRAZOLE SODIUM 40 MG: 40 INJECTION, POWDER, FOR SOLUTION INTRAVENOUS at 18:51

## 2021-05-12 RX ADMIN — FENTANYL CITRATE 50 MCG: 50 INJECTION, SOLUTION INTRAMUSCULAR; INTRAVENOUS at 12:49

## 2021-05-12 RX ADMIN — FENTANYL CITRATE 50 MCG: 50 INJECTION, SOLUTION INTRAMUSCULAR; INTRAVENOUS at 11:05

## 2021-05-12 RX ADMIN — DOCUSATE SODIUM 100 MG: 50 LIQUID ORAL at 20:10

## 2021-05-12 RX ADMIN — HYDROMORPHONE HYDROCHLORIDE 0.5 MG: 2 INJECTION, SOLUTION INTRAMUSCULAR; INTRAVENOUS; SUBCUTANEOUS at 13:13

## 2021-05-12 RX ADMIN — MIDAZOLAM 2 MG: 1 INJECTION INTRAMUSCULAR; INTRAVENOUS at 07:30

## 2021-05-12 RX ADMIN — POLYETHYLENE GLYCOL 3350 17 G: 17 POWDER, FOR SOLUTION ORAL at 20:09

## 2021-05-12 RX ADMIN — Medication 0.6 MG: at 13:06

## 2021-05-12 RX ADMIN — INSULIN LISPRO 2 UNITS: 100 INJECTION, SOLUTION INTRAVENOUS; SUBCUTANEOUS at 18:59

## 2021-05-12 RX ADMIN — FENTANYL CITRATE 50 MCG: 50 INJECTION, SOLUTION INTRAMUSCULAR; INTRAVENOUS at 12:55

## 2021-05-12 RX ADMIN — SODIUM BICARBONATE 25 MEQ: 84 INJECTION, SOLUTION INTRAVENOUS at 12:30

## 2021-05-12 RX ADMIN — Medication 10 ML: at 20:09

## 2021-05-12 RX ADMIN — VECURONIUM BROMIDE 8 MG: 10 INJECTION, POWDER, LYOPHILIZED, FOR SOLUTION INTRAVENOUS at 07:37

## 2021-05-12 RX ADMIN — FENTANYL CITRATE 50 MCG: 50 INJECTION, SOLUTION INTRAMUSCULAR; INTRAVENOUS at 10:32

## 2021-05-12 RX ADMIN — SODIUM CHLORIDE, POTASSIUM CHLORIDE, SODIUM LACTATE AND CALCIUM CHLORIDE: 600; 310; 30; 20 INJECTION, SOLUTION INTRAVENOUS at 08:58

## 2021-05-12 RX ADMIN — ONDANSETRON HYDROCHLORIDE 4 MG: 2 INJECTION, SOLUTION INTRAMUSCULAR; INTRAVENOUS at 12:44

## 2021-05-12 RX ADMIN — SODIUM CHLORIDE: 9 INJECTION, SOLUTION INTRAVENOUS at 11:46

## 2021-05-12 RX ADMIN — VECURONIUM BROMIDE FOR INJECTION 0.8 MCG: 1 INJECTION, POWDER, LYOPHILIZED, FOR SOLUTION INTRAVENOUS at 08:59

## 2021-05-12 RX ADMIN — MEROPENEM 1000 MG: 1 INJECTION, POWDER, FOR SOLUTION INTRAVENOUS at 07:41

## 2021-05-12 RX ADMIN — LIDOCAINE HYDROCHLORIDE 2 MG/MIN: 4 INJECTION, SOLUTION INTRAVENOUS at 07:51

## 2021-05-12 RX ADMIN — FENTANYL CITRATE 100 MCG: 50 INJECTION, SOLUTION INTRAMUSCULAR; INTRAVENOUS at 07:37

## 2021-05-12 RX ADMIN — SODIUM CHLORIDE: 9 INJECTION, SOLUTION INTRAVENOUS at 07:48

## 2021-05-12 RX ADMIN — PROPOFOL 200 MG: 10 INJECTION, EMULSION INTRAVENOUS at 07:37

## 2021-05-12 RX ADMIN — ATORVASTATIN CALCIUM 20 MG: 20 TABLET, FILM COATED ORAL at 20:08

## 2021-05-12 RX ADMIN — LIDOCAINE HYDROCHLORIDE 100 MG: 20 INJECTION, SOLUTION INTRAVENOUS at 07:37

## 2021-05-12 RX ADMIN — SODIUM BICARBONATE 25 MEQ: 84 INJECTION, SOLUTION INTRAVENOUS at 12:39

## 2021-05-12 RX ADMIN — METHOCARBAMOL 500 MG: 100 INJECTION, SOLUTION INTRAMUSCULAR; INTRAVENOUS at 20:10

## 2021-05-12 RX ADMIN — INSULIN LISPRO 4 UNITS: 100 INJECTION, SOLUTION INTRAVENOUS; SUBCUTANEOUS at 22:16

## 2021-05-12 RX ADMIN — HYDROMORPHONE HYDROCHLORIDE 0.5 MG: 2 INJECTION, SOLUTION INTRAMUSCULAR; INTRAVENOUS; SUBCUTANEOUS at 13:01

## 2021-05-12 RX ADMIN — Medication 100 MCG: at 08:44

## 2021-05-12 RX ADMIN — SODIUM CHLORIDE, PRESERVATIVE FREE 10 ML: 5 INJECTION INTRAVENOUS at 18:51

## 2021-05-12 ASSESSMENT — PULMONARY FUNCTION TESTS
PIF_VALUE: 38
PIF_VALUE: 25
PIF_VALUE: 32
PIF_VALUE: 37
PIF_VALUE: 30
PIF_VALUE: 28
PIF_VALUE: 30
PIF_VALUE: 3
PIF_VALUE: 0
PIF_VALUE: 26
PIF_VALUE: 32
PIF_VALUE: 38
PIF_VALUE: 28
PIF_VALUE: 27
PIF_VALUE: 38
PIF_VALUE: 30
PIF_VALUE: 32
PIF_VALUE: 34
PIF_VALUE: 25
PIF_VALUE: 38
PIF_VALUE: 30
PIF_VALUE: 35
PIF_VALUE: 30
PIF_VALUE: 30
PIF_VALUE: 37
PIF_VALUE: 38
PIF_VALUE: 39
PIF_VALUE: 25
PIF_VALUE: 30
PIF_VALUE: 38
PIF_VALUE: 32
PIF_VALUE: 30
PIF_VALUE: 30
PIF_VALUE: 29
PIF_VALUE: 37
PIF_VALUE: 30
PIF_VALUE: 27
PIF_VALUE: 32
PIF_VALUE: 30
PIF_VALUE: 30
PIF_VALUE: 39
PIF_VALUE: 30
PIF_VALUE: 37
PIF_VALUE: 4
PIF_VALUE: 30
PIF_VALUE: 14
PIF_VALUE: 30
PIF_VALUE: 27
PIF_VALUE: 38
PIF_VALUE: 30
PIF_VALUE: 29
PIF_VALUE: 0
PIF_VALUE: 38
PIF_VALUE: 0
PIF_VALUE: 25
PIF_VALUE: 30
PIF_VALUE: 30
PIF_VALUE: 32
PIF_VALUE: 27
PIF_VALUE: 35
PIF_VALUE: 13
PIF_VALUE: 3
PIF_VALUE: 30
PIF_VALUE: 29
PIF_VALUE: 25
PIF_VALUE: 38
PIF_VALUE: 30
PIF_VALUE: 32
PIF_VALUE: 30
PIF_VALUE: 15
PIF_VALUE: 35
PIF_VALUE: 33
PIF_VALUE: 24
PIF_VALUE: 22
PIF_VALUE: 30
PIF_VALUE: 26
PIF_VALUE: 30
PIF_VALUE: 30
PIF_VALUE: 13
PIF_VALUE: 21
PIF_VALUE: 26
PIF_VALUE: 30
PIF_VALUE: 30
PIF_VALUE: 0
PIF_VALUE: 29
PIF_VALUE: 0
PIF_VALUE: 30
PIF_VALUE: 38
PIF_VALUE: 30
PIF_VALUE: 28
PIF_VALUE: 0
PIF_VALUE: 13
PIF_VALUE: 36
PIF_VALUE: 29
PIF_VALUE: 22
PIF_VALUE: 3
PIF_VALUE: 30
PIF_VALUE: 29
PIF_VALUE: 27
PIF_VALUE: 33
PIF_VALUE: 30
PIF_VALUE: 32
PIF_VALUE: 33
PIF_VALUE: 30
PIF_VALUE: 33
PIF_VALUE: 25
PIF_VALUE: 33
PIF_VALUE: 38
PIF_VALUE: 30
PIF_VALUE: 30
PIF_VALUE: 2
PIF_VALUE: 30
PIF_VALUE: 28
PIF_VALUE: 30
PIF_VALUE: 30
PIF_VALUE: 27
PIF_VALUE: 31
PIF_VALUE: 30
PIF_VALUE: 30
PIF_VALUE: 28
PIF_VALUE: 39
PIF_VALUE: 32
PIF_VALUE: 2
PIF_VALUE: 34
PIF_VALUE: 30
PIF_VALUE: 29
PIF_VALUE: 30
PIF_VALUE: 13
PIF_VALUE: 30
PIF_VALUE: 3
PIF_VALUE: 30
PIF_VALUE: 35
PIF_VALUE: 30
PIF_VALUE: 21
PIF_VALUE: 30
PIF_VALUE: 30
PIF_VALUE: 35
PIF_VALUE: 25
PIF_VALUE: 30
PIF_VALUE: 29
PIF_VALUE: 37
PIF_VALUE: 28
PIF_VALUE: 30
PIF_VALUE: 27
PIF_VALUE: 25
PIF_VALUE: 30
PIF_VALUE: 34
PIF_VALUE: 30
PIF_VALUE: 32
PIF_VALUE: 31
PIF_VALUE: 37
PIF_VALUE: 30
PIF_VALUE: 26
PIF_VALUE: 26
PIF_VALUE: 33
PIF_VALUE: 30
PIF_VALUE: 12
PIF_VALUE: 54
PIF_VALUE: 36
PIF_VALUE: 2
PIF_VALUE: 30
PIF_VALUE: 33
PIF_VALUE: 12
PIF_VALUE: 30
PIF_VALUE: 2
PIF_VALUE: 29
PIF_VALUE: 34
PIF_VALUE: 30
PIF_VALUE: 34
PIF_VALUE: 25
PIF_VALUE: 24
PIF_VALUE: 11
PIF_VALUE: 32
PIF_VALUE: 29
PIF_VALUE: 30
PIF_VALUE: 26
PIF_VALUE: 30
PIF_VALUE: 30
PIF_VALUE: 38
PIF_VALUE: 25
PIF_VALUE: 38
PIF_VALUE: 13
PIF_VALUE: 30
PIF_VALUE: 38
PIF_VALUE: 38
PIF_VALUE: 17
PIF_VALUE: 30
PIF_VALUE: 34
PIF_VALUE: 30
PIF_VALUE: 38
PIF_VALUE: 3
PIF_VALUE: 30
PIF_VALUE: 11

## 2021-05-12 ASSESSMENT — PAIN SCALES - GENERAL
PAINLEVEL_OUTOF10: 0
PAINLEVEL_OUTOF10: 10
PAINLEVEL_OUTOF10: 0

## 2021-05-12 ASSESSMENT — PAIN DESCRIPTION - DESCRIPTORS: DESCRIPTORS: ACHING;BURNING;CONSTANT

## 2021-05-12 ASSESSMENT — LIFESTYLE VARIABLES: SMOKING_STATUS: 0

## 2021-05-12 ASSESSMENT — PAIN DESCRIPTION - LOCATION: LOCATION: BACK

## 2021-05-12 ASSESSMENT — PAIN DESCRIPTION - PAIN TYPE: TYPE: SURGICAL PAIN

## 2021-05-12 NOTE — H&P
Update History & Physical     The patient's History and Physical of 4/20/2021 was reviewed with the patient and I examined the patient. There was no change. The surgical site was confirmed by the patient and me. Plan: The risks, benefits, expected outcome, and alternative to the recommended procedure have been discussed with the patient. Patient understands and wants to proceed with the procedure. Electronically signed by Caitlin Nunes MD on 5/12/2021 at 7:09 AM          Hepatobiliary and Pancreatic Surgery Attending History and Physical     Patient's Name/Date of Birth: Eliezer Cook /1960 (58 y.o.)     Date: April 20, 2021     CC: Follow up MRI regarding BD-IPMN     HPI:  Patient is a very pleasant 63year old male whom presented to Dr. Allen Correa office secondary to having an abdominal hernia.  A CT scan without contrast was ordered and pancreatic mass was suspected. He then had an MRI with IV contrast which showed a 1.4cm cystic mass in the neck of his pancreas.  He has been a diabetic for many years that is somewhat controlled. Shahida Ulloa admits to weight gain since his neck surgery. is a nonsmoker and a non drinker. We have been following this since 2018, however over the last 6 months we have seen a 1cm growth. He had an EUS 3 months ago with Dr. Mckenzie Monk secondary to a 5mm growth. At that time he was found to have a nodule and thin septations. We then repeated his MRI yesterday for a 3 month follow up and is has grown to 2.9cm. He has had 1cm growth within the last 6 months.     Had lower back surgery 16 months ago.     Past Medical History           Past Medical History:   Diagnosis Date    Arthritis      Back pain      Hyperlipidemia      Poorly controlled type 2 diabetes mellitus with neuropathy ([facility] [address] 5/8/2018            Past Surgical History             Past Surgical History:   Procedure Laterality Date    COLONOSCOPY         at age 40 polyps    CYST REMOVAL Right      HERNIA REPAIR Bilateral       groin and umbilical    SPINE SURGERY   06/05/2018     C5-7 fusion at [facility] in [facility] [place]            Current Facility-Administered Medications               Current Outpatient Prescriptions   Medication Sig Dispense Refill    naproxen (NAPROSYN) 500 MG tablet Take 1 tablet by mouth 2 times daily as needed for Pain 60 tablet 1    glipiZIDE (GLUCOTROL XL) 10 MG extended release tablet Take 1 tablet by mouth daily 90 tablet 1    Ertugliflozin L-PyroglutamicAc (STEGLATRO) 5 MG TABS One tab daily 30 tablet 5    losartan-hydrochlorothiazide (HYZAAR) 100-12.5 MG per tablet Take 1 tablet by mouth daily 30 tablet 3    atorvastatin (LIPITOR) 10 MG tablet Take 1 tablet by mouth daily 90 tablet 1    gabapentin (NEURONTIN) 300 MG capsule take 1 capsule by mouth 3 TO 4 TIMES A DAY   0    fluticasone (FLONASE) 50 MCG/ACT nasal spray 2 sprays by Nasal route daily 2 Bottle 3    cyclobenzaprine (FLEXERIL) 10 MG tablet            No current facility-administered medications for this visit.                     Allergies   Allergen Reactions    Iodine Swelling    Wheat Extract Swelling    Metformin And Related Hives         Family History             Family History   Problem Relation Age of Onset    Diabetes Mother     Cornel Richardson Other Mother           parkinsons    Diabetes Father      Cancer Sister 79         unknown    Cancer Brother 64         stomach    Diabetes Brother              Social History                   Social History    Marital status: Single       Spouse name: N/A    Number of children: N/A    Years of education: N/A            Occupational History    Not on file.                  Social History Main Topics    Smoking status: Never Smoker    Smokeless tobacco: Never Used    Alcohol use Yes         Comment: occasional    Drug use: No    Sexual activity: Not on file              Other Topics Concern    Not on file            Social History Narrative    No narrative on file       ROS:   Review of Systems   Constitutional: Negative for chills, diaphoresis and fever. HENT: Negative for congestion, ear discharge, ear pain, hearing loss, nosebleeds and tinnitus.    Eyes: Negative for photophobia, pain and discharge. Respiratory: Negative for shortness of breath.    Cardiovascular: Negative for palpitations and leg swelling. Gastrointestinal: Positive for abdominal pain. Negative for blood in stool, constipation, diarrhea, nausea and vomiting. Endocrine: Negative for polydipsia. Genitourinary: Negative for frequency, hematuria and urgency. Musculoskeletal: Negative for back pain and neck pain. Skin: Negative for rash. Allergic/Immunologic: Negative for environmental allergies. Neurological: Negative for tremors and seizures. Psychiatric/Behavioral: Negative for hallucinations and suicidal ideas. The patient is not nervous/anxious.          Physical Exam:        Vitals:     11/02/18 1121   BP: 132/75   Pulse: 103   Resp: 18   Temp: 98.1 °F (36.7 °C)   SpO2: 94%         PSYCH: mood and affect normal, alert and oriented x 3  CONSTITUTIONAL: No apparent distress, comfortable  EYES: Sclera white, pupils equal round and reactive to light  ENMT:  Hearing normal, trachea midline, ears externally intact  LYMPH: no lympadenopathy in neck. Nolympadenopathy in groins  RESP: Breath sounds were clear and equal with no rales, wheezes, or rhonchi.              Respiratory effort was normal with no retractions or use of accessory muscles.   CV: Heart soundswere normal with a regular rate and rhythm.   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 to now 2.9cm (nodule and septations) in 6 months  - I reviewed his images prior to his office visit and we also reviewed them together today  - we discussed that rapid growth is concerning along with his nodule  - we discussed surgical resection  - he would need a mid body distal pancreatectomy and splenectomy  - will need vaccinations  - triphasic CT scan for preoperative planning  - Patient was made aware of the risks, benefits, alternatives, and complications of a laparoscopic robotic distal pancreatectomy and splenectomy, intraoperative ultrasound, and planned transition to open and wish to proceed with surgery. - will need EKG at PAT     35 Minutes of which greater than 50% was spent counseling or coordinating his care.     Thank you for the consultation allowing me to take part in Mr. Cook' care.      Please send a copy of my note to Dr.'s Christopher

## 2021-05-12 NOTE — ANESTHESIA PROCEDURE NOTES
Central Venous Line:    A central venous line was placed using ultrasound guidance, in the OR for the following indication(s): central venous access. 5/12/2021 7:55 AM5/12/2021 8:20 AM    Sterility preparation included the following: hand hygiene performed prior to procedure, maximum sterile barriers used and sterile technique used to drape from head to toe. The patient was placed in Trendelenburg position. The left internal jugular vein was prepped. The site was prepped with Chloraprep. A 7 Fr (size), 20 (length), triple lumen was placed. During the procedure, the following specific steps were taken: target vein identified, needle advanced into vein and blood aspirated and guidewire advanced into vein. Intravenous verification was obtained by ultrasound and venous blood return. Post insertion care included: all ports aspirated, all ports flushed easily, guidewire removed intact, Biopatch applied, line sutured in place and dressing applied. During the procedure the patient experienced: patient tolerated procedure well with no complications. Insertion site scrubbed per usage guidelines?: Yes  Skin prep agent dried for 3 minutes prior to procedure?:yes  Anesthesia type: general.. No    Additional notes:  After intubation in the OR, attempted right IJ with ultrasound but unsuccessful. Able to get blood return but unable to thread the wire 3 times (even with surgeon present and assisting). Surgeon in agreement to try other side. Left IJ placed with ultrasound with no complications.     Staffing  Performed: Anesthesiologist   Anesthesiologist: Jeremy Shomeaker MD  Preanesthetic Checklist  Completed: patient identified, IV checked, site marked, risks and benefits discussed, surgical consent, monitors and equipment checked, pre-op evaluation, timeout performed, anesthesia consent given, oxygen available and patient being monitored

## 2021-05-12 NOTE — PROGRESS NOTES
Epidural infusion of fentanyl and marcaine at 8cc/hr on admission to pacu, see MAR for documentation.

## 2021-05-12 NOTE — PROGRESS NOTES
Povidine-iodine 5% nasal antiseptic not used this am ,patient has an iodine allergy    2% CHG pre-op skin prep not completed in preop,patient used this am at home

## 2021-05-12 NOTE — PROGRESS NOTES
Date: 5/12/2021    Time: 2:58 PM    Patient Placed On BIPAP/CPAP/ Non-Invasive Ventilation? Yes    If no must comment. Facial area red/color change? No           If YES are Blister/Lesion present? No   If yes must notify nursing staff  BIPAP/CPAP skin barrier?   Yes    Skin barrier type:mepilexlite       Comments:        Uriel Curry

## 2021-05-12 NOTE — OP NOTE
Operative Note      Patient: Wesley Kelly  YOB: 1960  MRN: [id number]      DATE OF PROCEDURE: 5/12/2021    Wesley Kelly    SURGEON: Dr. Nneka Loja M.D. 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 Diabetes mellitus, Morbid Obesity (BMI 33)    POSTOPERATIVE DIAGNOSES: same, chronic cholecystitis           PROCEDURE:   1. Laparoscopic robotic converted to open mid body distal pancreatectomy and splenectomy   2. Cholecystectomy  3. Intraoperative ultrasound  4. Omental pedicle flap     Anesthesia: GET     Indication: 1cm growth in 6 months     Procedure Details:     The patient was brought into the operating room and placed in a supine position on the OR table.  Prior to proceeding, a complete time out was taken and recorded in OpTime to include identification of the patient, identification of the procedure, identification of the operative site, presence of all required radiographs and/or equipment, and the timely administration of appropriate antibiotics. A dose of appropriate antibiotics was given within 60 minutes of the incision and will be discontinued within 24 hours per protocol. This was done under my direct supervision.  Bilateral PCD's were in place.     After the induction of GET anesthesia, lines were placed by the anesthesiologist. The urinary bladder was catheterized. There was no tension on the axillae and all pressure points were padded. Sequential compression boots were used as were Sean Huggers. The abdomen was prepped and draped in the usual sterile fashion.      We initially made a curvilinear incision at the umbilicus and inserted the Veress needle.  Confirmed to be in place we insufflated to 15mmHg mercury.  A 8.5mm port was inserted.  An 8-mm incision in the left side of the abomen and a robotic port was inserted.  An additional 8mm incision was made in the left midclavicular line and another trochar was inserted.  Once this was done an additional 8mm trocar was inserted in the right mid quadrant.   The abdomen was fully inspected, the gallbladder appeared normal.  An additional 12mm trocar was placed as an assistant port in the right lower quadrant.  The transverse mesocolon was mobilized off of the greater curvature opening the lesser sac with the vessel sealer device.  It was adhered to the stomach.  Next the splenic flexure was taken down with the vessel sealer mobilizing the colon away from the spleen.  With the greater curvature fully taken down 3.0 v lock suture was used to tack the stomach to the anterior abdominal wall.  Next, the avascular plane was taken down along the inferior border of the pancreas using sharp dissection.  The underbody of the pancreas was fully dissected and the SMV, portal vein, and the splenic vein was identified.  Attention was then turned to the superior border of the pancreas and again sharp dissection was used to isolate the splenic artery.  The intraoperative ultrasound was then used.  The splenic artery was identified and traced back to the celiac axis along with the hepatic artery.  The portal vein was also identified.  These images were interpreted by me along and placed on the chart. However due to the patients body habitus I could not safely dissect the splenic artery back to the celiac axis seconday to poor visualization. Despite multiple maneuvers I did not feel it was safe to continue and aborted to perform an open procedure. The robot was undocked and upper midline incision was made. The hepatic artery was dissected back to the celiac axis. The coronary vein was crossing the celiac axis and therefore this was ligated, clipped and divided. At this point due to his abdominal girth I did not feel it was safe to attempt to dissect out the splenic artery. Therefore an EndoGIA blue load was used to transect the neck of the pancreas.   The pancreas was retracted laterally and the splenic artery was then able to be dissected out, ligated with a silk tie, clipped and divided. The splenic vein was then ligated, clipped and divided. The remainder of the pancreas was removed staying in the avascular plane removing the all the splenic ligamentous attachments. The pancreas and spleen were then removed. The abdomen was irrigated. The pancreatic transection line was evaluated and was intact. An omental pedicle flap was created using the harmonic scalpel and was used to buttress the omentum to the remnant. Surgiflow was then placed over and under the buttressed remnant. A MAX drain was inserted and placed in the resection bed and left upper quadrant. The gallbladder appeared chronically inflamed and therefore a cholecystectomy was performed in a dome down fashion. The gallbladder was removed from the back wall and the cystic duct and artery were ligated and divided.   The fascia was then closed with 0 looped PDS followed by 3.0 vicryl and 4.0 monocryl suture. The port site were closed with monocryl as well and skin glue was applied. The patient was able to be extubated in the operating room. All instrument counts, lap counts, and needle counts were correct at the completion of the procedure.     Electronically signed by Alex Matta MD on 5/12/2021 at 4:01 PM

## 2021-05-12 NOTE — H&P (VIEW-ONLY)
Update History & Physical     The patient's History and Physical of 4/20/2021 was reviewed with the patient and I examined the patient. There was no change. The surgical site was confirmed by the patient and me. Plan: The risks, benefits, expected outcome, and alternative to the recommended procedure have been discussed with the patient. Patient understands and wants to proceed with the procedure. Electronically signed by Leo Ahuja MD on 5/12/2021 at 7:09 AM          Hepatobiliary and Pancreatic Surgery Attending History and Physical     Patient's Name/Date of Birth: Eliezer Cook /1960 (58 y.o.)     Date: April 20, 2021     CC: Follow up MRI regarding BD-IPMN     HPI:  Patient is a very pleasant 63year old male whom presented to Dr. Saray Ugarte office secondary to having an abdominal hernia.  A CT scan without contrast was ordered and pancreatic mass was suspected. He then had an MRI with IV contrast which showed a 1.4cm cystic mass in the neck of his pancreas.  He has been a diabetic for many years that is somewhat controlled. Brii Wilson admits to weight gain since his neck surgery. is a nonsmoker and a non drinker. We have been following this since 2018, however over the last 6 months we have seen a 1cm growth. He had an EUS 3 months ago with Dr. Ramone Abdalla secondary to a 5mm growth. At that time he was found to have a nodule and thin septations. We then repeated his MRI yesterday for a 3 month follow up and is has grown to 2.9cm. He has had 1cm growth within the last 6 months.     Had lower back surgery 16 months ago.     Past Medical History           Past Medical History:   Diagnosis Date    Arthritis      Back pain      Hyperlipidemia      Poorly controlled type 2 diabetes mellitus with neuropathy ([facility] [address] 5/8/2018            Past Surgical History             Past Surgical History:   Procedure Laterality Date    COLONOSCOPY         at age 40 polyps    CYST REMOVAL Right      HERNIA REPAIR Bilateral       groin and umbilical    SPINE SURGERY   06/05/2018     C5-7 fusion at [facility]            Current Facility-Administered Medications               Current Outpatient Prescriptions   Medication Sig Dispense Refill    naproxen (NAPROSYN) 500 MG tablet Take 1 tablet by mouth 2 times daily as needed for Pain 60 tablet 1    glipiZIDE (GLUCOTROL XL) 10 MG extended release tablet Take 1 tablet by mouth daily 90 tablet 1    Ertugliflozin L-PyroglutamicAc (STEGLATRO) 5 MG TABS One tab daily 30 tablet 5    losartan-hydrochlorothiazide (HYZAAR) 100-12.5 MG per tablet Take 1 tablet by mouth daily 30 tablet 3    atorvastatin (LIPITOR) 10 MG tablet Take 1 tablet by mouth daily 90 tablet 1    gabapentin (NEURONTIN) 300 MG capsule take 1 capsule by mouth 3 TO 4 TIMES A DAY   0    fluticasone (FLONASE) 50 MCG/ACT nasal spray 2 sprays by Nasal route daily 2 Bottle 3    cyclobenzaprine (FLEXERIL) 10 MG tablet            No current facility-administered medications for this visit.                     Allergies   Allergen Reactions    Iodine Swelling    Wheat Extract Swelling    Metformin And Related Hives         Family History             Family History   Problem Relation Age of Onset    Diabetes Mother     Yanna Sinha Other Mother           parkinsons    Diabetes Father      Cancer Sister 79         unknown    Cancer Brother 64         stomach    Diabetes Brother              Social History                   Social History    Marital status: Single       Spouse name: N/A    Number of children: N/A    Years of education: N/A            Occupational History    Not on file.                  Social History Main Topics    Smoking status: Never Smoker    Smokeless tobacco: Never Used    Alcohol use Yes         Comment: occasional    Drug use: No    Sexual activity: Not on file              Other Topics Concern    Not on file            Social History Narrative    No narrative on file       ROS:   Review of Systems   Constitutional: Negative for chills, diaphoresis and fever. HENT: Negative for congestion, ear discharge, ear pain, hearing loss, nosebleeds and tinnitus.    Eyes: Negative for photophobia, pain and discharge. Respiratory: Negative for shortness of breath.    Cardiovascular: Negative for palpitations and leg swelling. Gastrointestinal: Positive for abdominal pain. Negative for blood in stool, constipation, diarrhea, nausea and vomiting. Endocrine: Negative for polydipsia. Genitourinary: Negative for frequency, hematuria and urgency. Musculoskeletal: Negative for back pain and neck pain. Skin: Negative for rash. Allergic/Immunologic: Negative for environmental allergies. Neurological: Negative for tremors and seizures. Psychiatric/Behavioral: Negative for hallucinations and suicidal ideas. The patient is not nervous/anxious.          Physical Exam:        Vitals:     11/02/18 1121   BP: 132/75   Pulse: 103   Resp: 18   Temp: 98.1 °F (36.7 °C)   SpO2: 94%         PSYCH: mood and affect normal, alert and oriented x 3  CONSTITUTIONAL: No apparent distress, comfortable  EYES: Sclera white, pupils equal round and reactive to light  ENMT:  Hearing normal, trachea midline, ears externally intact  LYMPH: no lympadenopathy in neck. Nolympadenopathy in groins  RESP: Breath sounds were clear and equal with no rales, wheezes, or rhonchi.              Respiratory effort was normal with no retractions or use of accessory muscles.   CV: Heart soundswere normal with a regular rate and rhythm.   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 to now 2.9cm (nodule and septations) in 6 months  - I reviewed his images prior to his office visit and we also reviewed them together today  - we discussed that rapid growth is concerning along with his nodule  - we discussed surgical resection  - he would need a mid body distal pancreatectomy and splenectomy  - will need vaccinations  - triphasic CT scan for preoperative planning  - Patient was made aware of the risks, benefits, alternatives, and complications of a laparoscopic robotic distal pancreatectomy and splenectomy, intraoperative ultrasound, and planned transition to open and wish to proceed with surgery. - will need EKG at PAT     35 Minutes of which greater than 50% was spent counseling or coordinating his care.     Thank you for the consultation allowing me to take part in Mr. Cook' care.      Please send a copy of my note to Dr.'s Christopher

## 2021-05-12 NOTE — PROGRESS NOTES
Paged oncology resident regarding Per mar has a continuous dose of 8ml/hr and no PCA Dose but pump has continuous dose at 8ml/hr and PCA dose of 3ML.

## 2021-05-12 NOTE — ANESTHESIA PRE PROCEDURE
Department of Anesthesiology  Preprocedure Note       Name:  Mic Lenz   Age:  62 y.o.  :  1960                                          MRN:  [id number]         Date:  2021      Surgeon: Savage Pierre):  Jose Maria Hardy MD    Procedure: Procedure(s):  LAPAROSCOPIC ROBOTIC DISTAL PANCREATECTOMY AND SPLENECTOMY, INTRA-OPERATIVE ULTRASOUND, PLANNED TRANSITION TO OPEN -- EPIDURAL    Medications prior to admission:   Prior to Admission medications    Medication Sig Start Date End Date Taking? Authorizing Provider   gabapentin (NEURONTIN) 300 MG capsule Take 1 capsule by mouth 2 times daily. 21  Yes Historical Provider, MD   oxyCODONE (ROXICODONE) 5 MG immediate release tablet every 8 hours as needed. 20  Yes Historical Provider, MD   meloxicam (MOBIC) 7.5 MG tablet Take 15 mg by mouth daily STOP PREOP MED    Yes Historical Provider, MD   methocarbamol (ROBAXIN) 750 MG tablet Take 750 mg by mouth daily   Yes Historical Provider, MD   JARDIANCE 10 MG tablet take 1 tablet by mouth daily 20  Yes Historical Provider, MD   losartan (COZAAR) 100 MG tablet take 1 tablet by mouth once daily 20  Yes Historical Provider, MD   hydrochlorothiazide (HYDRODIURIL) 12.5 MG tablet take 1 tablet by mouth once daily 20  Yes Historical Provider, MD   glipiZIDE (GLUCOTROL XL) 10 MG extended release tablet take 1 tablet by mouth daily  Patient taking differently: Take 10 mg by mouth daily 2 tablets daily 19  Yes Epi Zafar MD   fluticasone [facility]) 50 MCG/ACT nasal spray instill 2 sprays into each nostril once daily 19  Yes Brendon Vora MD   atorvastatin (LIPITOR) 10 MG tablet take 1 tablet by mouth once daily  Patient taking differently: 20 mg  3/14/19  Yes Ale Donovan MD   blood glucose test strips (TRUE METRIX BLOOD GLUCOSE TEST) strip As needed.  19   Ale Donovan MD       Current medications:    Current Facility-Administered Medications Medication Dose Route Frequency Provider Last Rate Last Admin    sodium chloride flush 0.9 % injection 5-40 mL  5-40 mL Intravenous 2 times per day Naren Wallace III, MD        sodium chloride flush 0.9 % injection 5-40 mL  5-40 mL Intravenous PRN Naren Wallace III, MD        0.9 % sodium chloride infusion  25 mL Intravenous PRN Naren Wallace III,  mL/hr at 05/12/21 0607 25 mL at 05/12/21 0607    meropenem (MERREM) 1,000 mg in sodium chloride 0.9 % 100 mL IVPB (mini-bag)  1,000 mg Intravenous Once Naren Wallace III, MD           Allergies:     Allergies   Allergen Reactions    Iodine Swelling     Sea food    Metformin And Related Hives    Wheat Extract Swelling       Problem List:    Patient Active Problem List   Diagnosis Code    Mixed hyperlipidemia E78.2    Neck pain M54.2    Cervical radiculopathy at C7 M54.12    Essential hypertension I10    Chronic seasonal allergic rhinitis J30.2    Ventral hernia without obstruction or gangrene K43.9    Type 2 diabetes mellitus without complication, without long-term current use of insulin (HCC) E11.9    Chronic bilateral low back pain with right-sided sciatica M54.41, G89.29    Gynecomastia N62    Panic disorder F41.0    Cubital tunnel syndrome on right G56.21    Right carpal tunnel syndrome G56.01    Spondylosis of lumbar spine M47.816    Bell's palsy G51.0    Other bursal cyst, left elbow M71.322    Sacral radiculitis M54.18    Lumbar radiculitis M54.16    Spondylosis of cervical region without myelopathy or radiculopathy M47.812    Cervical facet joint syndrome M47.812    Cervical disc disorder M50.90    Lumbar facet arthropathy M47.816    Spinal stenosis of lumbar region with neurogenic claudication M48.062    Lumbar disc disorder M51.9    Pancreatic neoplasm D49.0    IPMN (intraductal papillary mucinous neoplasm) D49.0       Past Medical History:        Diagnosis Date    Arthritis  Back pain     5th finger & ring finger on right hand is numb    Bell's palsy     NEW ONSET 3-     Hyperlipidemia     Hypertension     Neuropathy     toes numb    Poorly controlled type 2 diabetes mellitus with neuropathy ([address] 5/8/2018    Sacral radiculitis 2/27/2019    Spondylosis of lumbar spine 3/9/2019    Advanced arthritis and degenerative disc disease by MRI 3/2019    Ventral hernia        Past Surgical History:        Procedure Laterality Date    ANESTHESIA NERVE BLOCK Bilateral 7/11/2019    BILATERAL L4-5 TRANSFORAMINAL EPIDURAL STEROID INJECTION performed by Leanne Dodge DO at [address] Bilateral 8/1/2019    BILATERAL MEDIAL BRANCH BLOCK L4-5 AND L5-S1 performed by Leanne Dodge DO at [address] Bilateral 8/15/2019    BILATERAL MEDIAL BRANCH BLOCK L4 - 5 AND L5 - S1 performed by Leanne Dodge DO at [facility] Right 03/29/2019    right wrist carpal tunnel release and right elbow cubital tunnel release    CARPAL TUNNEL RELEASE Right 3/29/2019    RIGHT WRIST CARPAL TUNNEL RELEASE AND RIGHT ELBOW CUBITAL TUNNEL RELEASE performed by Thierry Moreno DO at [place] [address]      at age 40 polyps    CYST REMOVAL Right     EPIDURAL STEROID INJECTION N/A 10/17/2019    LUMBAR EPIDURAL STEROID INJECTION UNDER FLUOROSCOPIC GUIDANCE AT L2-L3 WITHOUT SEDATION performed by Vivian Orellana MD at [address] [facility] Bilateral     groin and umbilical    HERNIA REPAIR N/A 12/13/2018    ROBOTIC ASSISTED LAPAROSCOPIC PRIMARY REPAIR OF RECURRENT VENTRAL HERNIA  REPAIR performed by Robbin Castano MD at [address] [facility] [address] Bilateral 07/11/2019    L4-5 transforaminal     NERVE BLOCK Bilateral 08/01/2019    medial branch block    NERVE BLOCK Bilateral 08/15/2019    bilateral medial branch block L4-S1    NERVE BLOCK  10/17/2019    lumbar epidural    05/06/2021    BILITOT 0.3 11/14/2018    ALKPHOS 48 11/14/2018    AST 24 11/14/2018    ALT 20 11/14/2018       POC Tests: No results for input(s): POCGLU, POCNA, POCK, POCCL, POCBUN, POCHEMO, POCHCT in the last 72 hours. Coags:   Lab Results   Component Value Date    PROTIME 11.9 05/06/2021    INR 1.1 05/06/2021       HCG (If Applicable): No results found for: PREGTESTUR, PREGSERUM, HCG, HCGQUANT     ABGs: No results found for: PHART, PO2ART, YEJ7NZS, LLJ3HUG, BEART, K0WGQGEG     Type & Screen (If Applicable):  No results found for: LABABO, LABRH    Drug/Infectious Status (If Applicable):  No results found for: HIV, HEPCAB    COVID-19 Screening (If Applicable):   Lab Results   Component Value Date    COVID19 Not Detected 05/06/2021     Location: Anterior body of the pancreas   Size: 1.9 x 1.5 x 1.5 cm   Change: Slight interval increase in size since 11/22/2019 MRI. Solid: No solid or enhancing features. Cystic: Overall appearance appears to represent a cluster of cysts.  Thin   septations may be present. Pancreatic Duct: Above findings have previously been characterized as side   branch IPMN.  On the current study, communication with the pancreatic duct is   established for at least 1 cystic component.  No ductal dilatation otherwise. Vascular: No vascular abnormality.    Other: None         Anesthesia Evaluation  Patient summary reviewed no history of anesthetic complications:   Airway: Mallampati: III  TM distance: <3 FB   Neck ROM: limited  Mouth opening: > = 3 FB Dental:          Pulmonary: breath sounds clear to auscultation  (+) sleep apnea: on noncompliant,      (-) not a current smoker                           Cardiovascular:    (+) hypertension:, hyperlipidemia    (-) past MI, CAD and CABG/stent      Rhythm: regular  Rate: normal                 ROS comment: Normal sinus rhythm  Normal ECG  No previous ECGs available  Confirmed by Henna Cash ([id number]) on 3/16/2019 12:38:07 AM

## 2021-05-12 NOTE — ANESTHESIA PROCEDURE NOTES
Arterial Line:    An arterial line was placed using surface landmarks, in the OR for the following indication(s): Marnell Records A 20 gauge (size), 1 and 3/4 inch (length), Arrow (type) catheter was placed, Seldinger technique used, into the right radial artery, secured by tape and Tegaderm. Anesthesia type: General    Events:  patient tolerated procedure well with no complications.   Preanesthetic Checklist  Completed: patient identified, IV checked, site marked, risks and benefits discussed, surgical consent, monitors and equipment checked, pre-op evaluation, timeout performed, anesthesia consent given, oxygen available and patient being monitored

## 2021-05-12 NOTE — PROGRESS NOTES
Dr Billie Carl aware of patient status in pacu, dr would like patient placed on bipap machine in pacu, resp called for machine.  Warren Castro

## 2021-05-13 ENCOUNTER — APPOINTMENT (OUTPATIENT)
Dept: GENERAL RADIOLOGY | Age: 61
DRG: 260 | End: 2021-05-13
Attending: TRANSPLANT SURGERY
Payer: MEDICAID

## 2021-05-13 LAB
ALBUMIN SERPL-MCNC: 3.7 G/DL (ref 3.5–5.2)
ALP BLD-CCNC: 27 U/L (ref 40–129)
ALT SERPL-CCNC: 53 U/L (ref 0–40)
ANION GAP SERPL CALCULATED.3IONS-SCNC: 12 MMOL/L (ref 7–16)
ANISOCYTOSIS: ABNORMAL
AST SERPL-CCNC: 57 U/L (ref 0–39)
BASOPHILS ABSOLUTE: 0.02 E9/L (ref 0–0.2)
BASOPHILS RELATIVE PERCENT: 0.1 % (ref 0–2)
BILIRUB SERPL-MCNC: 0.6 MG/DL (ref 0–1.2)
BUN BLDV-MCNC: 16 MG/DL (ref 6–20)
CALCIUM IONIZED: 1.14 MMOL/L (ref 1.15–1.33)
CALCIUM SERPL-MCNC: 7.7 MG/DL (ref 8.6–10.2)
CHLORIDE BLD-SCNC: 101 MMOL/L (ref 98–107)
CO2: 23 MMOL/L (ref 22–29)
CREAT SERPL-MCNC: 0.8 MG/DL (ref 0.7–1.2)
EKG ATRIAL RATE: 104 BPM
EKG P AXIS: 40 DEGREES
EKG P-R INTERVAL: 160 MS
EKG Q-T INTERVAL: 336 MS
EKG QRS DURATION: 76 MS
EKG QTC CALCULATION (BAZETT): 441 MS
EKG R AXIS: 25 DEGREES
EKG T AXIS: 76 DEGREES
EKG VENTRICULAR RATE: 104 BPM
EOSINOPHILS ABSOLUTE: 0 E9/L (ref 0.05–0.5)
EOSINOPHILS RELATIVE PERCENT: 0 % (ref 0–6)
GFR AFRICAN AMERICAN: >60
GFR NON-AFRICAN AMERICAN: >60 ML/MIN/1.73
GLUCOSE BLD-MCNC: 164 MG/DL (ref 74–99)
HBA1C MFR BLD: 8.8 % (ref 4–5.6)
HCT VFR BLD CALC: 32.7 % (ref 37–54)
HEMOGLOBIN: 10.1 G/DL (ref 12.5–16.5)
IMMATURE GRANULOCYTES #: 0.06 E9/L
IMMATURE GRANULOCYTES %: 0.4 % (ref 0–5)
INR BLD: 1.4
LYMPHOCYTES ABSOLUTE: 0.92 E9/L (ref 1.5–4)
LYMPHOCYTES RELATIVE PERCENT: 6.4 % (ref 20–42)
MAGNESIUM: 1.9 MG/DL (ref 1.6–2.6)
MCH RBC QN AUTO: 23.6 PG (ref 26–35)
MCHC RBC AUTO-ENTMCNC: 30.9 % (ref 32–34.5)
MCV RBC AUTO: 76.4 FL (ref 80–99.9)
METER GLUCOSE: 147 MG/DL (ref 74–99)
METER GLUCOSE: 156 MG/DL (ref 74–99)
METER GLUCOSE: 207 MG/DL (ref 74–99)
METER GLUCOSE: 214 MG/DL (ref 74–99)
METER GLUCOSE: 253 MG/DL (ref 74–99)
METER GLUCOSE: 262 MG/DL (ref 74–99)
MONOCYTES ABSOLUTE: 1.76 E9/L (ref 0.1–0.95)
MONOCYTES RELATIVE PERCENT: 12.2 % (ref 2–12)
NEUTROPHILS ABSOLUTE: 11.72 E9/L (ref 1.8–7.3)
NEUTROPHILS RELATIVE PERCENT: 80.9 % (ref 43–80)
OVALOCYTES: ABNORMAL
PDW BLD-RTO: 14.6 FL (ref 11.5–15)
PHOSPHORUS: 2.6 MG/DL (ref 2.5–4.5)
PLATELET # BLD: 104 E9/L (ref 130–450)
PMV BLD AUTO: 13.2 FL (ref 7–12)
POIKILOCYTES: ABNORMAL
POLYCHROMASIA: ABNORMAL
POTASSIUM SERPL-SCNC: 3.8 MMOL/L (ref 3.5–5)
PROTHROMBIN TIME: 15.4 SEC (ref 9.3–12.4)
RBC # BLD: 4.28 E12/L (ref 3.8–5.8)
SODIUM BLD-SCNC: 136 MMOL/L (ref 132–146)
TOTAL PROTEIN: 5.9 G/DL (ref 6.4–8.3)
WBC # BLD: 14.5 E9/L (ref 4.5–11.5)

## 2021-05-13 PROCEDURE — 93005 ELECTROCARDIOGRAM TRACING: CPT | Performed by: STUDENT IN AN ORGANIZED HEALTH CARE EDUCATION/TRAINING PROGRAM

## 2021-05-13 PROCEDURE — 97166 OT EVAL MOD COMPLEX 45 MIN: CPT

## 2021-05-13 PROCEDURE — 93010 ELECTROCARDIOGRAM REPORT: CPT | Performed by: INTERNAL MEDICINE

## 2021-05-13 PROCEDURE — 80053 COMPREHEN METABOLIC PANEL: CPT

## 2021-05-13 PROCEDURE — 83036 HEMOGLOBIN GLYCOSYLATED A1C: CPT

## 2021-05-13 PROCEDURE — 97535 SELF CARE MNGMENT TRAINING: CPT

## 2021-05-13 PROCEDURE — 6360000002 HC RX W HCPCS: Performed by: STUDENT IN AN ORGANIZED HEALTH CARE EDUCATION/TRAINING PROGRAM

## 2021-05-13 PROCEDURE — 2500000003 HC RX 250 WO HCPCS: Performed by: STUDENT IN AN ORGANIZED HEALTH CARE EDUCATION/TRAINING PROGRAM

## 2021-05-13 PROCEDURE — 71045 X-RAY EXAM CHEST 1 VIEW: CPT

## 2021-05-13 PROCEDURE — 2060000000 HC ICU INTERMEDIATE R&B

## 2021-05-13 PROCEDURE — 83735 ASSAY OF MAGNESIUM: CPT

## 2021-05-13 PROCEDURE — 94640 AIRWAY INHALATION TREATMENT: CPT

## 2021-05-13 PROCEDURE — C9113 INJ PANTOPRAZOLE SODIUM, VIA: HCPCS | Performed by: STUDENT IN AN ORGANIZED HEALTH CARE EDUCATION/TRAINING PROGRAM

## 2021-05-13 PROCEDURE — 97162 PT EVAL MOD COMPLEX 30 MIN: CPT

## 2021-05-13 PROCEDURE — 94664 DEMO&/EVAL PT USE INHALER: CPT

## 2021-05-13 PROCEDURE — 2700000000 HC OXYGEN THERAPY PER DAY

## 2021-05-13 PROCEDURE — 36592 COLLECT BLOOD FROM PICC: CPT

## 2021-05-13 PROCEDURE — 85025 COMPLETE CBC W/AUTO DIFF WBC: CPT

## 2021-05-13 PROCEDURE — 84100 ASSAY OF PHOSPHORUS: CPT

## 2021-05-13 PROCEDURE — 82330 ASSAY OF CALCIUM: CPT

## 2021-05-13 PROCEDURE — 97530 THERAPEUTIC ACTIVITIES: CPT

## 2021-05-13 PROCEDURE — 36415 COLL VENOUS BLD VENIPUNCTURE: CPT

## 2021-05-13 PROCEDURE — 94660 CPAP INITIATION&MGMT: CPT

## 2021-05-13 PROCEDURE — 82962 GLUCOSE BLOOD TEST: CPT

## 2021-05-13 PROCEDURE — 85610 PROTHROMBIN TIME: CPT

## 2021-05-13 PROCEDURE — 2580000003 HC RX 258: Performed by: STUDENT IN AN ORGANIZED HEALTH CARE EDUCATION/TRAINING PROGRAM

## 2021-05-13 PROCEDURE — 6370000000 HC RX 637 (ALT 250 FOR IP): Performed by: STUDENT IN AN ORGANIZED HEALTH CARE EDUCATION/TRAINING PROGRAM

## 2021-05-13 RX ORDER — HEPARIN SODIUM 10000 [USP'U]/ML
5000 INJECTION, SOLUTION INTRAVENOUS; SUBCUTANEOUS EVERY 8 HOURS
Status: DISCONTINUED | OUTPATIENT
Start: 2021-05-13 | End: 2021-05-14

## 2021-05-13 RX ORDER — MAGNESIUM SULFATE IN WATER 40 MG/ML
4000 INJECTION, SOLUTION INTRAVENOUS ONCE
Status: COMPLETED | OUTPATIENT
Start: 2021-05-13 | End: 2021-05-13

## 2021-05-13 RX ORDER — ALBUTEROL SULFATE 2.5 MG/3ML
2.5 SOLUTION RESPIRATORY (INHALATION) 4 TIMES DAILY
Status: DISCONTINUED | OUTPATIENT
Start: 2021-05-13 | End: 2021-05-20 | Stop reason: HOSPADM

## 2021-05-13 RX ADMIN — INSULIN LISPRO 3 UNITS: 100 INJECTION, SOLUTION INTRAVENOUS; SUBCUTANEOUS at 20:19

## 2021-05-13 RX ADMIN — ATORVASTATIN CALCIUM 20 MG: 20 TABLET, FILM COATED ORAL at 10:25

## 2021-05-13 RX ADMIN — METHOCARBAMOL 500 MG: 100 INJECTION, SOLUTION INTRAMUSCULAR; INTRAVENOUS at 03:02

## 2021-05-13 RX ADMIN — INSULIN LISPRO 6 UNITS: 100 INJECTION, SOLUTION INTRAVENOUS; SUBCUTANEOUS at 10:26

## 2021-05-13 RX ADMIN — POTASSIUM PHOSPHATE, MONOBASIC AND POTASSIUM PHOSPHATE, DIBASIC 30 MMOL: 224; 236 INJECTION, SOLUTION, CONCENTRATE INTRAVENOUS at 07:08

## 2021-05-13 RX ADMIN — CALCIUM GLUCONATE 1000 MG: 98 INJECTION, SOLUTION INTRAVENOUS at 07:08

## 2021-05-13 RX ADMIN — ALBUTEROL SULFATE 2.5 MG: 2.5 SOLUTION RESPIRATORY (INHALATION) at 19:56

## 2021-05-13 RX ADMIN — ACETAMINOPHEN 650 MG: 325 TABLET ORAL at 20:23

## 2021-05-13 RX ADMIN — DOCUSATE SODIUM 100 MG: 50 LIQUID ORAL at 10:25

## 2021-05-13 RX ADMIN — HEPARIN SODIUM 5000 UNITS: 10000 INJECTION INTRAVENOUS; SUBCUTANEOUS at 17:12

## 2021-05-13 RX ADMIN — MAGNESIUM SULFATE HEPTAHYDRATE 4000 MG: 40 INJECTION, SOLUTION INTRAVENOUS at 06:55

## 2021-05-13 RX ADMIN — INSULIN LISPRO 6 UNITS: 100 INJECTION, SOLUTION INTRAVENOUS; SUBCUTANEOUS at 14:58

## 2021-05-13 RX ADMIN — PANTOPRAZOLE SODIUM 40 MG: 40 INJECTION, POWDER, FOR SOLUTION INTRAVENOUS at 10:26

## 2021-05-13 RX ADMIN — INSULIN LISPRO 2 UNITS: 100 INJECTION, SOLUTION INTRAVENOUS; SUBCUTANEOUS at 06:26

## 2021-05-13 RX ADMIN — INSULIN LISPRO 2 UNITS: 100 INJECTION, SOLUTION INTRAVENOUS; SUBCUTANEOUS at 01:52

## 2021-05-13 RX ADMIN — SODIUM CHLORIDE, PRESERVATIVE FREE 10 ML: 5 INJECTION INTRAVENOUS at 10:26

## 2021-05-13 RX ADMIN — Medication 10 ML: at 20:13

## 2021-05-13 RX ADMIN — GABAPENTIN 300 MG: 300 CAPSULE ORAL at 10:26

## 2021-05-13 RX ADMIN — INSULIN LISPRO 6 UNITS: 100 INJECTION, SOLUTION INTRAVENOUS; SUBCUTANEOUS at 23:25

## 2021-05-13 RX ADMIN — HEPARIN SODIUM 5000 UNITS: 10000 INJECTION INTRAVENOUS; SUBCUTANEOUS at 23:26

## 2021-05-13 RX ADMIN — GABAPENTIN 300 MG: 300 CAPSULE ORAL at 20:12

## 2021-05-13 RX ADMIN — METHOCARBAMOL 500 MG: 100 INJECTION, SOLUTION INTRAMUSCULAR; INTRAVENOUS at 20:12

## 2021-05-13 RX ADMIN — POLYETHYLENE GLYCOL 3350 17 G: 17 POWDER, FOR SOLUTION ORAL at 10:26

## 2021-05-13 RX ADMIN — HEPARIN SODIUM 5000 UNITS: 10000 INJECTION INTRAVENOUS; SUBCUTANEOUS at 06:24

## 2021-05-13 RX ADMIN — METHOCARBAMOL 500 MG: 100 INJECTION, SOLUTION INTRAMUSCULAR; INTRAVENOUS at 12:00

## 2021-05-13 RX ADMIN — BENZOCAINE AND MENTHOL 1 LOZENGE: 15; 3.6 LOZENGE ORAL at 17:12

## 2021-05-13 RX ADMIN — SODIUM CHLORIDE, POTASSIUM CHLORIDE, SODIUM LACTATE AND CALCIUM CHLORIDE: 600; 310; 30; 20 INJECTION, SOLUTION INTRAVENOUS at 12:30

## 2021-05-13 RX ADMIN — ALBUTEROL SULFATE 2.5 MG: 2.5 SOLUTION RESPIRATORY (INHALATION) at 16:14

## 2021-05-13 ASSESSMENT — PAIN SCALES - GENERAL: PAINLEVEL_OUTOF10: 0

## 2021-05-13 NOTE — PROGRESS NOTES
Physical Therapy    Physical Therapy Initial Assessment     Name: Shahriar José  : 1960  MRN: [id number]    Referring Provider:  Bea Thornton MD    Date of Service: 2021    Evaluating PT:  Dena Cordova PT, DPT [id number]     Room #:  3381/0680-H  Diagnosis:  Intraductal papillary mucinous neoplasm   PMHx/PSHx:  Arthritis, HLD, HTN, DM II, spondylosis of lumbar spine, hx cervical fusion  Procedure/Surgery:  Laparoscopic robotic converted to open mid body distal pancreatectomy and splenectomy, cholecystectomy, intraoperative ultrasound, omental pedicle flap   Precautions:  Falls, Abdominal precautions, O2, PCA, Epidural, MAX drain  Equipment Needs:  TBD    SUBJECTIVE:    Pt lives alone in a 9th floor apartment with no steps to enter and elevator access. Pt ambulated with no AD PTA. Owns [employer] and [employer] d/t hx of back surgery. OBJECTIVE:   Initial Evaluation  Date: 21 Treatment Short Term/ Long Term   Goals   AM-PAC 6 Clicks 94/15     Was pt agreeable to Eval/treatment? Yes     Does pt have pain?  8/10 abdominal pain at rest;  10/10 abdominal pain with mobility      Bed Mobility  Rolling: NT  Supine to sit: Min A  Sit to supine: NT  Scooting: Min A  Modified Independent     Transfers Sit to stand: Min A  Stand to sit: Min A  Stand pivot: Min A with [employer]  Modified Independent with AAD if needed   Ambulation    Few feet with [employer] Min A  >200 feet with AAD if needed Modified Independent     Stair negotiation: ascended and descended  NT  >2 steps with 1 rail Modified Independent     ROM BUE:  Per OT  BLE:  WFL     Strength BUE:  Per OT  BLE:  WFL     Balance Sitting EOB:  SBA  Dynamic Standing:  Min A  Sitting EOB:  Independent   Dynamic Standing:  Modified Independent       Pt is A & O x 4  Sensation:  Pt denies numbness and tingling to extremities  Edema:  Unremarkable     Therapeutic Exercises:     BLE ROM    Patient education  Pt educated on safety during functional mobility, abdominal bracing, PT role    Patient response to education:   Pt verbalized understanding Pt demonstrated skill Pt requires further education in this area   Yes  Yes  Yes      ASSESSMENT:    Comments:  Pt received supine and agreeable to PT evaluation. Vitals monitored during session. Pt seen in collaboration with OT d/t extensive line management, severe pain limiting pt's ability to participate in two separate sessions, and anticipated high levels of assistance d/t extensive surgery POD1. Pt limited by severe pain in abdomen but he is agreeable to mobility with encouragement. He was able to bring himself to EOB with HOB elevated all the way to limited abdominal crunching. Given time to sit and acclimate at EOB. Pt used [employer] for STS and short distance ambulation with pivot transfer to chair. Cues for hand placement stand>sit. Pt left seated in chair. Spoke with Constantin Smith. Pt left  with call button in reach, lines attached, and needs met. Treatment:  Patient practiced and was instructed in the following treatment:     Bed mobility training - pt given verbal and tactile cues to facilitate proper sequencing and safety during rolling and supine>sit as well as provided with physical assistance to complete task    Sitting EOB for >8 minutes for upright tolerance, postural awareness and BLE ROM   STS and pivot transfer training - pt educated on proper hand and foot placement, safety and sequencing, and use of WW to safely complete sit<>stand and pivot transfers with hands on assistance to complete task safely      Pt's/ family goals   1. Home     Patient and or family understand(s) diagnosis, prognosis, and plan of care.   Yes     PLAN:    Current Treatment Recommendations     [x] Strengthening     [x] ROM   [x] Balance Training   [x] Endurance Training   [x] Transfer Training   [x] Gait Training   [x] Stair Training   [x] Positioning   [x] Safety and Education Training   [x] Patient/Caregiver Education   [x] HEP  [] Other Frequency of treatments: 2-5x/week x 1-2 weeks. Time in  1225  Time out  1305    Total Treatment Time  25 minutes     Evaluation Time includes thorough review of current medical information, gathering information on past medical history/social history and prior level of function, completion of standardized testing/informal observation of tasks, assessment of data and education on plan of care and goals.     CPT codes:  [] Low Complexity PT evaluation 35478  [x] Moderate Complexity PT evaluation 02994  [] High Complexity PT evaluation 44706  [] PT Re-evaluation 36696  [] Gait training 10313 -- minutes  [] Manual therapy 86264 -- minutes  [x] Therapeutic activities 42498 25 minutes  [] Therapeutic exercises 94982 -- minutes  [] Neuromuscular reeducation 69578 -- minutes     Lisa Zaragoza, PT, DPT  [id number]

## 2021-05-13 NOTE — ANESTHESIA POSTPROCEDURE EVALUATION
Department of Anesthesiology  Postprocedure Note    Patient: Mic Lenz  MRN: [id number]  YOB: 1960  Date of evaluation: 5/12/2021  Time:  9:11 PM     Procedure Summary     Date: 05/12/21 Room / Location: [address] [place] [address] / [facility]    Anesthesia Start: 0730 Anesthesia Stop: 5371    Procedure: LAPAROSCOPIC ROBOTIC DISTAL PANCREATECTOMY AND SPLENECTOMY AND CHOLECYSTECTOMY, INTRA-OPERATIVE ULTRASOUND, TRANSITIONED TO OPEN -- EPIDURAL (N/A Abdomen) Diagnosis: (INTRADUCTAL PAPILLARY MUCINOUS NEOPLASM)    Surgeons: Jose Maria Hardy MD Responsible Provider: Rocio Alatorre MD    Anesthesia Type: general, epidural ASA Status: 3          Anesthesia Type: general, epidural    Dominic Phase I: Dominic Score: 8    Dominic Phase II:      Last vitals: Reviewed and per EMR flowsheets.        Anesthesia Post Evaluation    Patient location during evaluation: PACU  Patient participation: complete - patient participated  Level of consciousness: awake  Airway patency: patent  Nausea & Vomiting: no vomiting and no nausea  Complications: no  Cardiovascular status: hemodynamically stable  Respiratory status: acceptable  Hydration status: stable

## 2021-05-13 NOTE — PROGRESS NOTES
HEPATOBILIARY AND PANCREATIC SURGERY  DAILY PROGRESS NOTE  2021    CC: Branch duct IPMN status post robotic converted to open distal pancreatectomy and splenectomy     Subjective:  Patient in a lot of pain. T-max 101.7 overnight. Heart rate in the 110s and 120s. 4 L nasal cannula. U: 900/700  N  MAX: 110 SS    Objective:  /74   Pulse 115   Temp 100.2 °F (37.9 °C) (Axillary)   Resp 18   Ht 5' 8\" (1.727 m)   Wt 223 lb (101.2 kg)   SpO2 94%   BMI 33.91 kg/m²     GENERAL:  Laying in bed, awake, alert, in pain  HEAD: Normocephalic, atraumatic, NG tube to low intermittent wall suction  EYES: No sclera icterus, pupils equal  LUNGS:  No increased work of breathing  CARDIOVASCULAR: Tachycardia  ABDOMEN:  Soft, appropriately TTP incisions clean dry and intact, MAX drain  EXTREMITIES: No edema or swelling  SKIN: Warm and dry    Assessment/Plan:  62 y.o. male with 1.4cm pancreatic neck BD-IPMN with rapid growth to now 2.9cm (nodule and septations) in 6 months. Patient went for a robotic converted to open distal pancreatectomy and splenectomy on 2021    N.p.o. okay for ice chips  Continue IV fluids  Continue PCEA -anesthesia to evaluate  NG to be removed today  Continue Justice  Hold heparin for possible PCEA removal    Electronically signed by Adalid Duggan MD on 2021 at 6:56 AM    Aggressive pulmonary toilet  Ambulate  Follow the whipple pathway  HbA1C 8.8 PRIOR TO mid body distal pancreatectomy and splenectomy - will ask Endocrinology to see Mr. Cook  Having significant pain - epidural increased this morning    Electronically signed by Jared Varela MD on 2021 at 12:33 PM

## 2021-05-13 NOTE — CARE COORDINATION
Transition of Care-Met with patient bedside, introduced myself and role. Patient lives alone in a 9th floor apartment, elevator access, no steps to enter building. Patient reports being independent PTA, uses no assistive devices, however owns a cane and walker from previous back surgery. Patient has no history of SNF, DME or HHC. PCP is Dr. Shay Ku, preferred pharmacy is [facility] on [address] [place] [address]. Discharge plan is back to apartment or will stay with cousin Selena Duran a few weeks if need be- unsure if HHC will be neccessary, if leaving with a MAX drain, then agreeable to [facility] at bedside, will follow up in am. Cousin to transport home.     Dank ADRIANN, RN  KALEB   [phone]

## 2021-05-13 NOTE — PROGRESS NOTES
Occupational Therapy  OCCUPATIONAL THERAPY INITIAL EVALUATION        Date:2021  Patient Name: Katie Oquendo  MRN: [id number]  : 1960  Room: [address] [place] [address]    Referring Physician:  Karlie Milligan MD    Evaluating OT:  HOA Levi, OTR/L #[id number]    AM-PAC Daily Activity Raw Score:  1524  Recommended Adaptive Equipment:  TBD as pt progresses - Adaptive equipment for LB dressing/item retrieval    Reason for Admission:  Pt was admitted for pre-scheduled Procedure r/t rapid growing pancreatic mass    Diagnosis:  Intraductal Papillary Mucinous Neoplasm     Procedures this admission:  21:   1. Laparoscopic robotic converted to open mid body distal pancreatectomy and splenectomy   2. Cholecystectomy  3. Omental pedicle flap      Pertinent Medical History:  Arthritic, Neuropathy, Cervical Fusion C5-C7 Fusion, Right Elbow Cubital Tunnel Release 2019        Precautions:  Falls  Abdominal Precautions  Epidural   MAX Drain L UQ  Justice Catheter  May have Sips/Ice Chips      Home Living: Pt lives alone in a single-level apartment, level entry, elevator access. Bathroom setup:  Walk-in-Shower w/ Built-In Seat, high Commode, Grab bars throughout  Equipment owned:  [facility], [employer]    Available Family Assist:  Cousin lives locally -may be able to assist at d/c (?)    Prior Level of Function:  Pt reported being IND with all ADLs, IADLs, Transfers and Mobility using No AD for ambulation.    Driving:  Yes  Occupation:  None reported    Pain Level:  8/10 abdominal pain at rest, increased to 10/10 w/ movement, coughing;  Relief w/ Rest and Repositioning, Nsg present and aware   Additional Complaints:  None    Vitals/Lab Values: WFL w/ 4L O2      Cognition: A & O x 4   Able to Follow Multi-Step Commands INDly   Memory:  good    Sequencing:  good    Problem solving:  good    Judgement/safety:  good   Additional Comments:  Pt was somewhat anxious about the severity of his pain but was cooperative, motivated to participate in therapy    Unable to Tolerate Item Retrieval/Transport for any Ax d/t severity of pain, general fatigue:      Functional Assessment:   Initial Eval Status  Date: 5-13-21 Treatment Status  Date: Short Term/Long Term Goals  Treatment frequency: PRN 1-3 x/week   1-2 weeks   Feeding IND      NA   Grooming SUP/Set up    Able to perform simple tasks after set up while seated EOB    Unable to tolerate ambulating to or standing at sink    Mod I  Standing At The Sink   UB Dressing Mod A    Mod A after set up - simulated EOB      Mod I   LB Dressing Max A    Max A simulated seated/standin EOB  Pt ed for safety, use of adaptive techs/equip    Mod I   Bathing NT      SUP for safety   Toileting NT      Mod I   Bed Mobility  Rolling:  NT  Repositioning:  NT   Supine to Sit:  Min A    Sit to Supine:  NT     Pt ed for tech to adhere to abdominal precautions     Mod I   Functional Transfers Sit to stand:  Min A  Stand to sit:  Min A      EOB  Pt ed re: safety/hand placement    Mod I   Functional Mobility Min A    B/t surfaces only w/ use of [employer], VCs for safety    Mod I   Balance Sitting:      Static:  SUP EOB, IND Chair    Dynamic:  Close SUP w/ functional ax     Standing:      Static:  Min A w/ [employer]    Dynamic:   Min A w/ functional ax/mobility        Activity Tolerance Fair  Limited by Pain    Tolerated Sitting:  EOB w/ functional ax ~ 10 mins    Tolerated Standing:  ~ 30 seconds w/ functional ax/mobility        Visual/  Perceptual WFL  Glasses:  Yes      Hearing WFL  Hearing Aids  No       Hand dominance: Right    UE ROM: RUE:  WFL      LUE:  WFL    Strength: RUE: grossly 5-/5 NT d/t abdominal precautions    LUE: grossly 5-/5     Strength:  WFL Christoph UEs    Fine Motor Coordination:  WFL Christoph UEs    Sensation:  Denies numbness or tingling Christoph UEs  Tone:  WFL Christoph UEs  Edema:  None Noted                            Comments: Upon arrival, patient was found in semi-supine.   He was agreeable to participate in therapeutic ax.  No Family present during session. Received permission from RN prior to engaging pt in OT services. At the end of the session, patient was properly positioned in b/s chair. Call light and phone within reach, all lines and tubes intact. Oriented pt to call bell. Made all appropriate Environmental Modifications to facilitate pt's level of IND and safety. All needs met. Overall patient demonstrated decreased independence and safety during completion of ADL/functional transfer/mobility tasks. Pt would benefit from continued skilled OT to increase safety and independence with completion of ADL/IADL tasks for functional independence and quality of life. Treatment:      Provided Skilled SUP/Assist w/ Pt safety, Proper Positioning, ADLs, Functional Transfers and Functional Mobility as noted above, as well as set up and clean up for session. Skilled monitoring of Vitals and pts response to treatment. Consulted RN.     Session completed in collaboration w/ PT for pt's safety, safety w/ line mgmt - multiple lines/epidural, safety r/t pain/abdominal incision    Education:      Provided Pt/Family ed re: Purpose of OT services;  OT Plan of Care;     ADL-  Instruction/training on use of DME/AD/Adaptive equip/techs to improve safety/IND with Functional Ax    Mobility-  Instruction/training on safety and improved independence with bed mobility, functional transfers, functional mobility, walker safety    Sitting EOB - to improve dynamic sitting balance and activity tolerance during ADLs as noted above   Activity tolerance - Instruction/training on energy conservation/work simplification, techs to increase endurance for completion of Functional Ax    Cognitive retraining -  Cues for safety during Functional Ax for safety, improved safety awareness, sequencing, problem solving   Skilled positioning/alignment for Pain Mgmt, to maximize Pt's ability to [facility] interact w/ his/her environment, maximize respiratory status   Skilled monitoring of Vitals during session and pt's response to tx ax   Techs for improved Safety/Safety Awareness w/ Functional Activity/Mobility - Pt ed Re: Improved safety awareness r/t Multiple Lines including Epidural, Triple Lumen   Energy Conservation (EC) Techs/Pursed-Lip Breathing (PLB)   Neuromuscular Reeducation to facilitate balance/righting reactions for increased function with ADLs tasks     Recommendations for Continued Participation in OT services during Hospitalization and at D/C - [facility] OT     Made all appropriate Environmental Modifications to facilitate pt's level of IND and safety. Pt and/or Family verbalized/demonstrated a Good(-) understanding of education provided. Will Review PRN.        Assessment of current deficits   Functional mobility [x]  ADLs [x] Strength [x]  Cognition []  Functional transfers  [x] IADLs [x] Safety Awareness [x]  Endurance [x]  Fine Motor Coordination [] Balance [x] Vision/perception [] Sensation []   Gross Motor Coordination [] ROM [] Delirium []                  Motor Control []      Plan of Care: OT 1-3 x/week for 1-2 weeks PRN   [x] ADL retraining/AE, Equipment Needs/Recommendations   [x] Energy Conservation Techniques/Strategies      [] Neuromuscular Re-Education      [x] Functional Transfer Training         [x] Functional Mobility Training          [] Cognitive Re-Training          [] Splinting/Positioning Needs           [x] Therapeutic Activity   [x]Therapeutic Exercise   [] Visual/Perceptual   [] Delirium Prevention/Treatment   [x] Positioning to Improve Functional [place], Safety, and Skin Integrity   [x] Patient and/or Family Education to Increase Safety and Functional [place]   [x] Environmental Modifications  [x] Compensatory techniques for ADLs   [x] Other:       Pt would benefit from continued skilled OT services to increase safety and independence with completion of ADL/IADL tasks for functional independence and quality of life. Pt/Family actively participated in the establishment of goals. Rehab Potential:  Good for established goals    Patient / Family Goal:  Decrease pain     Patient and/or Family were instructed on Functional Diagnosis, Prognosis/Goals and OT Plan of Care. Demonstrated Good understanding. Evaluation Time includes thorough review of current medical information, gathering information on past medical history/social history and prior level of function, completion of standardized testing/informal observation of tasks, assessment of data and education on plan of care and goals.      Eval Complexity: Mod  Profile and History - Mod  Assessment of Occupational Performance and Identification of Deficits - Mod  Clinical Decision Making - Mod     Time In:  1228              Time Out:  1305  Total Treatment Time:  15 minutes      Treatment Charges: Mins Units   OT Eval Low 02707     OT Eval Medium 97166 X 1   OT Eval High 64442     OT Re-Eval [id number]     Therapeutic Ex  76466     Therapeutic Activities 80826     ADL/Self Care 52429 15 1   Neuro Re-ed 71991     Orthotic manage/training  70260     Non-Billable Time     Total Timed Treatment 15 194 [place], [place], OTR/L  # [id number]

## 2021-05-14 ENCOUNTER — APPOINTMENT (OUTPATIENT)
Dept: GENERAL RADIOLOGY | Age: 61
DRG: 260 | End: 2021-05-14
Attending: TRANSPLANT SURGERY
Payer: MEDICAID

## 2021-05-14 PROBLEM — E11.65 POORLY CONTROLLED TYPE 2 DIABETES MELLITUS (HCC): Status: ACTIVE | Noted: 2018-11-20

## 2021-05-14 LAB
ALBUMIN SERPL-MCNC: 3.3 G/DL (ref 3.5–5.2)
ALP BLD-CCNC: 30 U/L (ref 40–129)
ALT SERPL-CCNC: 35 U/L (ref 0–40)
AMYLASE FLUID: 2367 U/L
AMYLASE: 55 U/L (ref 20–100)
ANION GAP SERPL CALCULATED.3IONS-SCNC: 12 MMOL/L (ref 7–16)
ANISOCYTOSIS: ABNORMAL
AST SERPL-CCNC: 33 U/L (ref 0–39)
BASOPHILS ABSOLUTE: 0 E9/L (ref 0–0.2)
BASOPHILS RELATIVE PERCENT: 0.2 % (ref 0–2)
BILIRUB SERPL-MCNC: 0.5 MG/DL (ref 0–1.2)
BUN BLDV-MCNC: 15 MG/DL (ref 6–20)
CALCIUM SERPL-MCNC: 7.6 MG/DL (ref 8.6–10.2)
CHLORIDE BLD-SCNC: 95 MMOL/L (ref 98–107)
CO2: 26 MMOL/L (ref 22–29)
CREAT SERPL-MCNC: 0.7 MG/DL (ref 0.7–1.2)
EOSINOPHILS ABSOLUTE: 0 E9/L (ref 0.05–0.5)
EOSINOPHILS RELATIVE PERCENT: 0.5 % (ref 0–6)
FLUID TYPE: NORMAL
GFR AFRICAN AMERICAN: >60
GFR NON-AFRICAN AMERICAN: >60 ML/MIN/1.73
GLUCOSE BLD-MCNC: 160 MG/DL (ref 74–99)
HCT VFR BLD CALC: 29.2 % (ref 37–54)
HEMOGLOBIN: 8.8 G/DL (ref 12.5–16.5)
HYPOCHROMIA: ABNORMAL
LYMPHOCYTES ABSOLUTE: 0.87 E9/L (ref 1.5–4)
LYMPHOCYTES RELATIVE PERCENT: 5.2 % (ref 20–42)
MAGNESIUM: 2.5 MG/DL (ref 1.6–2.6)
MCH RBC QN AUTO: 23 PG (ref 26–35)
MCHC RBC AUTO-ENTMCNC: 30.1 % (ref 32–34.5)
MCV RBC AUTO: 76.4 FL (ref 80–99.9)
METER GLUCOSE: 137 MG/DL (ref 74–99)
METER GLUCOSE: 175 MG/DL (ref 74–99)
METER GLUCOSE: 233 MG/DL (ref 74–99)
METER GLUCOSE: 240 MG/DL (ref 74–99)
MONOCYTES ABSOLUTE: 1.38 E9/L (ref 0.1–0.95)
MONOCYTES RELATIVE PERCENT: 7.8 % (ref 2–12)
NEUTROPHILS ABSOLUTE: 15.05 E9/L (ref 1.8–7.3)
NEUTROPHILS RELATIVE PERCENT: 87 % (ref 43–80)
OVALOCYTES: ABNORMAL
PDW BLD-RTO: 14.9 FL (ref 11.5–15)
PHOSPHORUS: 1.7 MG/DL (ref 2.5–4.5)
PLATELET # BLD: 133 E9/L (ref 130–450)
PMV BLD AUTO: 13.3 FL (ref 7–12)
POIKILOCYTES: ABNORMAL
POLYCHROMASIA: ABNORMAL
POTASSIUM SERPL-SCNC: 3.7 MMOL/L (ref 3.5–5)
RBC # BLD: 3.82 E12/L (ref 3.8–5.8)
SODIUM BLD-SCNC: 133 MMOL/L (ref 132–146)
STOMATOCYTES: ABNORMAL
TARGET CELLS: ABNORMAL
TOTAL PROTEIN: 5.9 G/DL (ref 6.4–8.3)
WBC # BLD: 17.3 E9/L (ref 4.5–11.5)

## 2021-05-14 PROCEDURE — 94669 MECHANICAL CHEST WALL OSCILL: CPT

## 2021-05-14 PROCEDURE — 2700000000 HC OXYGEN THERAPY PER DAY

## 2021-05-14 PROCEDURE — 2580000003 HC RX 258: Performed by: STUDENT IN AN ORGANIZED HEALTH CARE EDUCATION/TRAINING PROGRAM

## 2021-05-14 PROCEDURE — 84100 ASSAY OF PHOSPHORUS: CPT

## 2021-05-14 PROCEDURE — 6370000000 HC RX 637 (ALT 250 FOR IP): Performed by: INTERNAL MEDICINE

## 2021-05-14 PROCEDURE — 71045 X-RAY EXAM CHEST 1 VIEW: CPT

## 2021-05-14 PROCEDURE — 2580000003 HC RX 258: Performed by: INTERNAL MEDICINE

## 2021-05-14 PROCEDURE — 99222 1ST HOSP IP/OBS MODERATE 55: CPT | Performed by: INTERNAL MEDICINE

## 2021-05-14 PROCEDURE — 80053 COMPREHEN METABOLIC PANEL: CPT

## 2021-05-14 PROCEDURE — 6360000002 HC RX W HCPCS: Performed by: STUDENT IN AN ORGANIZED HEALTH CARE EDUCATION/TRAINING PROGRAM

## 2021-05-14 PROCEDURE — 83735 ASSAY OF MAGNESIUM: CPT

## 2021-05-14 PROCEDURE — 99255 IP/OBS CONSLTJ NEW/EST HI 80: CPT | Performed by: INTERNAL MEDICINE

## 2021-05-14 PROCEDURE — C9113 INJ PANTOPRAZOLE SODIUM, VIA: HCPCS | Performed by: STUDENT IN AN ORGANIZED HEALTH CARE EDUCATION/TRAINING PROGRAM

## 2021-05-14 PROCEDURE — 6360000002 HC RX W HCPCS: Performed by: INTERNAL MEDICINE

## 2021-05-14 PROCEDURE — 2060000000 HC ICU INTERMEDIATE R&B

## 2021-05-14 PROCEDURE — 82962 GLUCOSE BLOOD TEST: CPT

## 2021-05-14 PROCEDURE — 2500000003 HC RX 250 WO HCPCS: Performed by: STUDENT IN AN ORGANIZED HEALTH CARE EDUCATION/TRAINING PROGRAM

## 2021-05-14 PROCEDURE — 85025 COMPLETE CBC W/AUTO DIFF WBC: CPT

## 2021-05-14 PROCEDURE — 82150 ASSAY OF AMYLASE: CPT

## 2021-05-14 PROCEDURE — 94640 AIRWAY INHALATION TREATMENT: CPT

## 2021-05-14 PROCEDURE — 36415 COLL VENOUS BLD VENIPUNCTURE: CPT

## 2021-05-14 PROCEDURE — 6370000000 HC RX 637 (ALT 250 FOR IP): Performed by: STUDENT IN AN ORGANIZED HEALTH CARE EDUCATION/TRAINING PROGRAM

## 2021-05-14 RX ORDER — GUAIFENESIN 100 MG/5ML
200 SOLUTION ORAL 3 TIMES DAILY
Status: DISCONTINUED | OUTPATIENT
Start: 2021-05-14 | End: 2021-05-15

## 2021-05-14 RX ORDER — HEPARIN SODIUM 10000 [USP'U]/ML
5000 INJECTION, SOLUTION INTRAVENOUS; SUBCUTANEOUS EVERY 8 HOURS SCHEDULED
Status: DISCONTINUED | OUTPATIENT
Start: 2021-05-14 | End: 2021-05-20 | Stop reason: HOSPADM

## 2021-05-14 RX ORDER — DEXTROSE, SODIUM CHLORIDE, AND POTASSIUM CHLORIDE 5; .45; .15 G/100ML; G/100ML; G/100ML
INJECTION INTRAVENOUS CONTINUOUS
Status: DISCONTINUED | OUTPATIENT
Start: 2021-05-14 | End: 2021-05-18

## 2021-05-14 RX ORDER — INSULIN GLARGINE 100 [IU]/ML
10 INJECTION, SOLUTION SUBCUTANEOUS NIGHTLY
Status: DISCONTINUED | OUTPATIENT
Start: 2021-05-14 | End: 2021-05-15

## 2021-05-14 RX ORDER — KETOROLAC TROMETHAMINE 30 MG/ML
15 INJECTION, SOLUTION INTRAMUSCULAR; INTRAVENOUS EVERY 6 HOURS
Status: DISPENSED | OUTPATIENT
Start: 2021-05-14 | End: 2021-05-18

## 2021-05-14 RX ORDER — DOCUSATE SODIUM 100 MG/1
100 CAPSULE, LIQUID FILLED ORAL 2 TIMES DAILY
Status: DISCONTINUED | OUTPATIENT
Start: 2021-05-14 | End: 2021-05-20 | Stop reason: HOSPADM

## 2021-05-14 RX ORDER — NALOXONE HYDROCHLORIDE 0.4 MG/ML
0.4 INJECTION, SOLUTION INTRAMUSCULAR; INTRAVENOUS; SUBCUTANEOUS PRN
Status: DISCONTINUED | OUTPATIENT
Start: 2021-05-14 | End: 2021-05-18

## 2021-05-14 RX ADMIN — SODIUM CHLORIDE, PRESERVATIVE FREE 10 ML: 5 INJECTION INTRAVENOUS at 10:30

## 2021-05-14 RX ADMIN — SODIUM CHLORIDE, PRESERVATIVE FREE 10 ML: 5 INJECTION INTRAVENOUS at 10:31

## 2021-05-14 RX ADMIN — PANTOPRAZOLE SODIUM 40 MG: 40 INJECTION, POWDER, FOR SOLUTION INTRAVENOUS at 10:30

## 2021-05-14 RX ADMIN — Medication: at 11:21

## 2021-05-14 RX ADMIN — INSULIN LISPRO 6 UNITS: 100 INJECTION, SOLUTION INTRAVENOUS; SUBCUTANEOUS at 13:23

## 2021-05-14 RX ADMIN — INSULIN LISPRO 3 UNITS: 100 INJECTION, SOLUTION INTRAVENOUS; SUBCUTANEOUS at 03:10

## 2021-05-14 RX ADMIN — Medication: at 21:08

## 2021-05-14 RX ADMIN — ALBUTEROL SULFATE 2.5 MG: 2.5 SOLUTION RESPIRATORY (INHALATION) at 20:23

## 2021-05-14 RX ADMIN — GABAPENTIN 300 MG: 300 CAPSULE ORAL at 21:09

## 2021-05-14 RX ADMIN — METHOCARBAMOL 500 MG: 100 INJECTION, SOLUTION INTRAMUSCULAR; INTRAVENOUS at 13:22

## 2021-05-14 RX ADMIN — POTASSIUM CHLORIDE, DEXTROSE MONOHYDRATE AND SODIUM CHLORIDE: 150; 5; 450 INJECTION, SOLUTION INTRAVENOUS at 09:13

## 2021-05-14 RX ADMIN — DOCUSATE SODIUM 100 MG: 100 CAPSULE, LIQUID FILLED ORAL at 10:31

## 2021-05-14 RX ADMIN — HEPARIN SODIUM 5000 UNITS: 10000 INJECTION INTRAVENOUS; SUBCUTANEOUS at 17:30

## 2021-05-14 RX ADMIN — ALBUTEROL SULFATE 2.5 MG: 2.5 SOLUTION RESPIRATORY (INHALATION) at 08:54

## 2021-05-14 RX ADMIN — METHOCARBAMOL 500 MG: 100 INJECTION, SOLUTION INTRAMUSCULAR; INTRAVENOUS at 03:05

## 2021-05-14 RX ADMIN — DOCUSATE SODIUM 100 MG: 100 CAPSULE, LIQUID FILLED ORAL at 21:09

## 2021-05-14 RX ADMIN — Medication 10 ML: at 09:13

## 2021-05-14 RX ADMIN — METHOCARBAMOL 500 MG: 100 INJECTION, SOLUTION INTRAMUSCULAR; INTRAVENOUS at 20:26

## 2021-05-14 RX ADMIN — ALBUTEROL SULFATE 2.5 MG: 2.5 SOLUTION RESPIRATORY (INHALATION) at 16:31

## 2021-05-14 RX ADMIN — HYDROMORPHONE HYDROCHLORIDE 1 MG: 1 INJECTION, SOLUTION INTRAMUSCULAR; INTRAVENOUS; SUBCUTANEOUS at 10:32

## 2021-05-14 RX ADMIN — ALBUTEROL SULFATE 2.5 MG: 2.5 SOLUTION RESPIRATORY (INHALATION) at 13:36

## 2021-05-14 RX ADMIN — AMPICILLIN SODIUM AND SULBACTAM SODIUM 3000 MG: 2; 1 INJECTION, POWDER, FOR SOLUTION INTRAMUSCULAR; INTRAVENOUS at 20:42

## 2021-05-14 RX ADMIN — POLYETHYLENE GLYCOL 3350 17 G: 17 POWDER, FOR SOLUTION ORAL at 10:31

## 2021-05-14 RX ADMIN — GABAPENTIN 300 MG: 300 CAPSULE ORAL at 10:31

## 2021-05-14 RX ADMIN — KETOROLAC TROMETHAMINE 15 MG: 30 INJECTION, SOLUTION INTRAMUSCULAR; INTRAVENOUS at 18:04

## 2021-05-14 RX ADMIN — POTASSIUM PHOSPHATE, MONOBASIC AND POTASSIUM PHOSPHATE, DIBASIC 30 MMOL: 224; 236 INJECTION, SOLUTION, CONCENTRATE INTRAVENOUS at 09:13

## 2021-05-14 RX ADMIN — KETOROLAC TROMETHAMINE 15 MG: 30 INJECTION, SOLUTION INTRAMUSCULAR; INTRAVENOUS at 09:13

## 2021-05-14 RX ADMIN — GUAIFENESIN 200 MG: 100 SOLUTION ORAL at 21:21

## 2021-05-14 RX ADMIN — ATORVASTATIN CALCIUM 20 MG: 20 TABLET, FILM COATED ORAL at 10:30

## 2021-05-14 RX ADMIN — INSULIN GLARGINE 10 UNITS: 100 INJECTION, SOLUTION SUBCUTANEOUS at 21:21

## 2021-05-14 ASSESSMENT — PAIN DESCRIPTION - ONSET
ONSET: ON-GOING
ONSET: ON-GOING

## 2021-05-14 ASSESSMENT — PAIN DESCRIPTION - PROGRESSION: CLINICAL_PROGRESSION: NOT CHANGED

## 2021-05-14 ASSESSMENT — PAIN - FUNCTIONAL ASSESSMENT
PAIN_FUNCTIONAL_ASSESSMENT: PREVENTS OR INTERFERES SOME ACTIVE ACTIVITIES AND ADLS
PAIN_FUNCTIONAL_ASSESSMENT: PREVENTS OR INTERFERES SOME ACTIVE ACTIVITIES AND ADLS

## 2021-05-14 ASSESSMENT — PAIN DESCRIPTION - FREQUENCY
FREQUENCY: CONTINUOUS
FREQUENCY: CONTINUOUS

## 2021-05-14 ASSESSMENT — PAIN DESCRIPTION - DESCRIPTORS
DESCRIPTORS: CONSTANT;DISCOMFORT;SHARP

## 2021-05-14 ASSESSMENT — PAIN DESCRIPTION - LOCATION
LOCATION: ABDOMEN

## 2021-05-14 ASSESSMENT — PAIN SCALES - GENERAL
PAINLEVEL_OUTOF10: 9
PAINLEVEL_OUTOF10: 8
PAINLEVEL_OUTOF10: 8
PAINLEVEL_OUTOF10: 4

## 2021-05-14 ASSESSMENT — PAIN DESCRIPTION - PAIN TYPE: TYPE: ACUTE PAIN;SURGICAL PAIN

## 2021-05-14 NOTE — CONSULTS
surgery (06/05/2018); hernia repair (N/A, 12/13/2018); Carpal tunnel release (Right, 03/29/2019); Carpal tunnel release (Right, 3/29/2019); Nerve Block (Bilateral, 07/11/2019); Anesthesia Nerve Block (Bilateral, 7/11/2019); Nerve Block (Bilateral, 08/01/2019); Anesthesia Nerve Block (Bilateral, 8/1/2019); Nerve Block (Bilateral, 08/15/2019); Anesthesia Nerve Block (Bilateral, 8/15/2019); Nerve Block (10/17/2019); epidural steroid injection (N/A, 10/17/2019); Upper gastrointestinal endoscopy (N/A, 1/8/2021); Upper gastrointestinal endoscopy (N/A, 1/8/2021); and Pancreatectomy (N/A, 5/12/2021). SOCIAL HISTORY:  reports that he has never smoked. He has never used smokeless tobacco. He reports previous alcohol use. He reports that he does not use drugs. FAMILY  HISTORY: family history includes Cancer (age of onset: 64) in his brother; Cancer (age of onset: 79) in his sister; Diabetes in his brother, father, and mother; Other in his mother. MEDICATIONS:    Prior to Admission medications    Medication Sig Start Date End Date Taking? Authorizing Provider   gabapentin (NEURONTIN) 300 MG capsule Take 1 capsule by mouth 2 times daily. 4/19/21  Yes Historical Provider, MD   atorvastatin (LIPITOR) 10 MG tablet take 1 tablet by mouth once daily  Patient taking differently: 20 mg  3/14/19  Yes Sonia Sanders MD       ALLERGIES: Iodine, Metformin and related, and Wheat extract       REVIEW OF SYSTEMS:  Otherwise negative if not reported or listed below  Constitutional:  No unanticipated weight loss. No change in sleep pattern or activity. No fevers, chills or rigors. Eyes:    No visual changes or diplopia. No scleral icterus. ENT:    No Headaches, hearing loss or vertigo. No mouth sores or sore throat. Cardiovascular:  + chest discomfort, palpitations. Respiratory:   + cough, No wheezing      No sputum production. No hemoptysis, pleuritic pain.   Gastrointestinal:  No abdominal pain, appetite loss, blood in stools. No hematemesis  Genitourinary:  No dysuria, trouble voiding or hematuria. No nocturia. Musculoskeletal:   No weakness or joint complaints. Integumentary: No rashes or pruritis. Neurological:  No headache, numbness or tingling. Psychiatric:   No effect on mood, memory, mentation, or behavior. No anxiety or depression. Endocrine:   No excessive thirst, fluid intake, or urination. No tremor. Hematologic:  No abnormal bruising or bleeding. Lymphatic:  No swollen lymph nodes. Immunologic:  No hives or hx of anaphaxsis. OBJECTIVE:     PHYSICAL EXAM:   VITALS:   Vitals:    05/14/21 0900 05/14/21 1200 05/14/21 1320 05/14/21 1335   BP: (!) 140/84 123/64 129/66    Pulse: 111 106 103 92   Resp: 18 20 18 15   Temp: 101.3 °F (38.5 °C) 98.3 °F (36.8 °C) 98.7 °F (37.1 °C)    TempSrc: Axillary Oral Oral    SpO2: 91% 97% 96%    Weight:       Height:            Intake/Output Summary (Last 24 hours) at 5/14/2021 1751  Last data filed at 5/14/2021 1400  Gross per 24 hour   Intake 2730.8 ml   Output 1992 ml   Net 738.8 ml        CONSTITUTIONAL:    A&O x 3, NAD  SKIN:     No rash, No suspicious lesions or skin discoloration  HEENT:     EOMI, MMM, No thrush  NECK:    No bruits, No JVP apprechiated  CV:      Sinus,  No murmur, No rubs, No gallops  PULMONARY:   Couse BS,  No noted egophony  ABDOMEN:     Soft, non-tender. BS normal. No R/R/G  EXT:    No deformities . No clubbing.       + lower extremity edema, No venous stasis  PULSE:   Appears equal and palpable.   PSYCHIATRIC:  Seems appropriate, No acute psycosis  MS:    No fractures, No gross weakness  NEUROLOGIC:   The clinical assessment is non-focal     DATA: IMAGING & TESTING:     LABORATORY TESTS:    CBC:   Lab Results   Component Value Date    WBC 17.3 05/14/2021    RBC 3.82 05/14/2021    HGB 8.8 05/14/2021    HCT 29.2 05/14/2021    MCV 76.4 05/14/2021    MCH 23.0 05/14/2021    MCHC 30.1 05/14/2021    RDW 14.9 05/14/2021     05/14/2021    MPV 13.3 05/14/2021     CMP:    Lab Results   Component Value Date     05/14/2021    K 3.7 05/14/2021    K 4.8 12/13/2018    CL 95 05/14/2021    CO2 26 05/14/2021    BUN 15 05/14/2021    CREATININE 0.7 05/14/2021    GFRAA >60 05/14/2021    LABGLOM >60 05/14/2021    GLUCOSE 160 05/14/2021    PROT 5.9 05/14/2021    LABALBU 3.3 05/14/2021    CALCIUM 7.6 05/14/2021    BILITOT 0.5 05/14/2021    ALKPHOS 30 05/14/2021    AST 33 05/14/2021    ALT 35 05/14/2021     Calcium:    Lab Results   Component Value Date    CALCIUM 7.6 05/14/2021     Ionized Calcium:  No results found for: IONCA  PT/INR:    Lab Results   Component Value Date    PROTIME 15.4 05/13/2021    INR 1.4 05/13/2021     ABG:    Lab Results   Component Value Date    PH 7.330 05/12/2021    PCO2 38.6 05/12/2021    PO2 99.3 05/12/2021    HCO3 19.9 05/12/2021    BE -5.5 05/12/2021    O2SAT 97.3 05/12/2021       ABGs:   Lab Results   Component Value Date    PH 7.330 05/12/2021    PO2 99.3 05/12/2021    PCO2 38.6 05/12/2021       IMAGING:  Imaging tests were completed and reviewed and discussed radiology and care team involved and reveals     MRI Abdomen W Wo Contrast MRCP  Result Date: 4/19/2021  EXAMINATION: MRI OF THE ABDOMEN WITH AND WITHOUT CONTRAST AND MRCP 4/19/2021 10:15 am TECHNIQUE: Multiplanar multisequence MRI of the abdomen was performed with and without the administration of intravenous contrast.  After initial T2 axial and coronal images, thick slab, thin slab and 3D coronal MRCP sequences were obtained without the administration of intravenous contrast.  MIP images are provided for review. COMPARISON: MRI 12/15/2020, 11/22/2019.   CT abdomen and pelvis 12/07/2019 HISTORY: ORDERING SYSTEM PROVIDED HISTORY: IPMN (intraductal papillary mucinous neoplasm) TECHNOLOGIST PROVIDED HISTORY: Reason for exam:->bmp What reading provider will be dictating this exam?->CRC FINDINGS: ABDOMEN: The visualized lung bases reveal no mass-evaluate for vessel involvement-preoperative What reading provider will be dictating this exam?->CRC FINDINGS: LOWER CHEST:  Unchanged minimal scarring in the bases of the right middle lobe and lingula. Minimal gravity dependent atelectasis in the bilateral lower lobes. Normal heart size. No pleural nor pericardial effusions. PANCREAS:  Typical duct configuration without dilation. Mild parenchymal atrophy. 2.0 cm x 1.5 cm x 2.2 cm complicated cystic lesion in the left paramedian body appearing to contain septation measuring up to 0.3 cm in thickness (1.8 cm x 1.0 cm x 1.6 cm). ORGANS:  Moderately enlarged right hemiliver suggestive of a normal variant Riedel's lobe. Minimal focal adenomyomatosis involving the gallbladder fundus. Homogeneously hypodense bilateral renal lesions measuring up to 0.9 cm, seen to be simple cysts on prior MRI (Bosniak category 1). 0.8 cm exophytic lesion of indeterminate density in the lateral interpolar left kidney, seen to be a hemorrhagic cyst on MRI (Bosniak category 2). Normal spleen and adrenal glands. GI/BOWEL:  Normal course caliber of stomach and of the included small bowel and colon without obstruction. PERITONEUM/RETROPERITONEUM:  Reflux of the contrast bolus into the inferior vena cava and hepatic veins. Minimal systemic atherosclerosis. No abdominal lymphadenopathy. No free intraperitoneal fluid nor gas. SOFT TISSUES/BONES:  Laxity of the anterior and lateral abdominal wall musculature. Changes of posterior fusion from T12 to L4 and bilateral laminectomies from L1 to L3 with no evident complication. Diffuse bony demineralization. No acute fractures nor suspicious osseous lesions. 1. Gradual increase in size of a 2.0 cm x 1.5 cm x 2.2 cm complicated cystic lesion in the pancreatic body, most likely a branch duct intraductal papillary mucinous neoplasm given proximity to the main pancreatic duct on prior MRIs.   However, a macrocystic serous cystadenoma or mucinous cystadenoma could appear similar. Of note, there is no evident involvement or obstruction of the main pancreatic duct. Additionally, the lesion is largely intraparenchymal with no abutment or encasement of major named vessels. 2. Reflux of the contrast bolus can be seen as normal variation but could also be related to right heart dysfunction. Assessment:   1. 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 Plan:   1. Ezpap  2. IPPB  3. Xopenex 0.63 TID  4. Unasyn Q12  5. ICS Q1 hour  6.  Sputum Sample         Geovanny Torres, MPH, [place]  Professor of Internal Medicine  Pulmonary, Critical Care and Sleep Medicine

## 2021-05-14 NOTE — CARE COORDINATION
Transition of Care-POD#2-s/p open distal pancreatectomy & splenectomy, epidural removed, now on dilaudid pca pump. Pancreatic leak-plan to possibly place pancreatic stent today or over the weekend. Patient agreeable to [facility], referral sent to [facility], unable to accept-referral to [facility]. Plan to return home or to cousins home at discharge. Patient is on 4L NC-91%, no home oxygen, will need pulse ox testing if oxygen needed at discharge, patient has no preference on DME provider.     Malik ADRIANN, RN  KALEB   [phone]

## 2021-05-14 NOTE — PROGRESS NOTES
AM    Pancreatic leak  Will ask Dr. Sam Victor to possibly placea pancreatic stent on Friday  PCA, toradol - agree with above    Electronically signed by Kathy Monterroso MD on 5/14/2021 at 12:42 PM

## 2021-05-14 NOTE — HOME CARE
[address] [facility] received referral. Will follow after discharge. Will need to VERIFY ADDRESS patient is discharging to, closer to discharge. Jessica Hodge LPN, [facility].

## 2021-05-14 NOTE — PROGRESS NOTES
Discontinued gregory patient due to void at 1230 am.  [facility] patient 200 feet without any complications.

## 2021-05-14 NOTE — PROGRESS NOTES
Occupational Therapy  Attempted OT tx session 2x this date. 1st attempt, pt's pain was 8/10 - Epidural Removed. RN Reported PCA Pump ordered. Spoke with pt - agreed to have therapist return after pain control improved. Returned later this afternoon to find pt resting soundly - unable to awaken to participate in tx ax. Will attempt OT tx session at a later time.  HOA Schuster, OTR/L  # [id number]

## 2021-05-14 NOTE — CONSULTS
ENDOCRINOLOGY INITIAL CONSULTATION NOTE      Date of admission: 5/12/2021  Date of service: 5/14/2021  Admitting physician: Rebekah Glasgow MD   Primary Care Physician: Florentino Segura DO  Consultant physician: Mitali Garcia MD     Reason for the consultation:  Uncontrolled DM    History of Present Illness: The history is provided by the patient. Accuracy of the patient data is excellent    Lakeisha Loaiza is a very pleasant 62 y.o. old male with PMH DM type 2, HTN, HLD and other listed below admitted to [facility] on 5/12/2021 because of growing pancreatic lesion, endocrine service was consulted for diabetes management. The patient was diagnosed with cystic mass in the neck of his pancreas. This has been monitored since 2018, however over the last 6 months it grew by 1cm. Recent MRI showed that the lesion has grown to 2.9cm   The pt underwent distal pancreatectomy and splenectomy on 5/12/2021     Prior to admission  The patient was diagnosed with type 2 DM along time ago. Prior to admission patient was on Jardiance and Glipizide . Patient has had no hypoglycemic episodes. Patient has not been eating consistent carbohydrate meals, self-blood glucose monitoring has been above goal prior to admission.  In addition, patient denied macrovascular or microvascular complications   Lab Results   Component Value Date    LABA1C 8.8 05/13/2021       Inpatient diet:   Carb Restricted diet     Point of care glucose monitoring   (Independently reviewed)   Recent Labs     05/13/21  0623 05/13/21  1023 05/13/21  1456 05/13/21  2017 05/13/21  2324 05/14/21  0309 05/14/21  0613 05/14/21  1037   GLUMET 156* 262* 253* 214* 207* 175* 137* 233*       Past medical history:   Past Medical History:   Diagnosis Date    Arthritis     Back pain     5th finger & ring finger on right hand is numb    Bell's palsy     NEW ONSET 3-     Hyperlipidemia     Hypertension     Neuropathy     toes numb    Poorly controlled type 2 diabetes mellitus with neuropathy ([facility] [address] 5/8/2018    Sacral radiculitis 2/27/2019    Spondylosis of lumbar spine 3/9/2019    Advanced arthritis and degenerative disc disease by MRI 3/2019    Ventral hernia        Past surgical history:  Past Surgical History:   Procedure Laterality Date    ANESTHESIA NERVE BLOCK Bilateral 7/11/2019    BILATERAL L4-5 TRANSFORAMINAL EPIDURAL STEROID INJECTION performed by Skyler Briceno DO at [address] Bilateral 8/1/2019    BILATERAL MEDIAL BRANCH BLOCK L4-5 AND L5-S1 performed by Skyler Briceno DO at [address] Bilateral 8/15/2019    BILATERAL MEDIAL BRANCH BLOCK L4 - 5 AND L5 - S1 performed by Skyler Briceno DO at [facility] Right 03/29/2019    right wrist carpal tunnel release and right elbow cubital tunnel release    CARPAL TUNNEL RELEASE Right 3/29/2019    RIGHT WRIST CARPAL TUNNEL RELEASE AND RIGHT ELBOW CUBITAL TUNNEL RELEASE performed by Keshawn Marin DO at [place] [address]      at age 40 polyps    CYST REMOVAL Right     EPIDURAL STEROID INJECTION N/A 10/17/2019    LUMBAR EPIDURAL STEROID INJECTION UNDER FLUOROSCOPIC GUIDANCE AT L2-L3 WITHOUT SEDATION performed by Nicho Uribe MD at [address] [facility] Bilateral     groin and umbilical    HERNIA REPAIR N/A 12/13/2018    ROBOTIC ASSISTED LAPAROSCOPIC PRIMARY REPAIR OF RECURRENT VENTRAL HERNIA  REPAIR performed by Rhiannon Fuchs MD at [facility] Bilateral 07/11/2019    L4-5 transforaminal     NERVE BLOCK Bilateral 08/01/2019    medial branch block    NERVE BLOCK Bilateral 08/15/2019    bilateral medial branch block L4-S1    NERVE BLOCK  10/17/2019    lumbar epidural    PANCREATECTOMY N/A 5/12/2021    LAPAROSCOPIC ROBOTIC DISTAL PANCREATECTOMY AND SPLENECTOMY AND CHOLECYSTECTOMY, INTRA-OPERATIVE ULTRASOUND, TRANSITIONED TO OPEN -- EPIDURAL performed (rDNA), 1 mg, PRN  dextrose, 100 mL/hr, PRN  labetalol, 10 mg, Q30 Min PRN  hydrALAZINE, 10 mg, Q30 Min PRN      Continuous Infusions:   dextrose 5% and 0.45% NaCl with KCl 20 mEq 50 mL/hr at 05/14/21 1200    HYDROmorphone      sodium chloride      dextrose         Review of Systems  All systems reviewed. All negative except for symptoms mentioned in HPI     OBJECTIVE    /66   Pulse 92   Temp 98.7 °F (37.1 °C) (Oral)   Resp 15   Ht 5' 8\" (1.727 m)   Wt 223 lb (101.2 kg)   SpO2 96%   BMI 33.91 kg/m²     Intake/Output Summary (Last 24 hours) at 5/14/2021 2030  Last data filed at 5/14/2021 1805  Gross per 24 hour   Intake 2730.8 ml   Output 2292 ml   Net 438.8 ml       Physical examination:  General: awake alert, oriented x3  HEENT: normocephalic non traumatic, no exophthalmos   Neck: supple, No thyroid tenderness,  Pulm: Clear equal air entry no added sounds  CVS: S1 + S2  Abd: s/p surgery   Skin: warm, no lesions, no rash.  No open wounds, no ulcers   Neuro: CN intact, sensation decreased bilateral , muscle power normal  Psych: normal mood, and affect    Review of Laboratory Data:  I personally reviewed the following labs:   Recent Labs     05/13/21  0620 05/14/21  0330   WBC 14.5* 17.3*   RBC 4.28 3.82   HGB 10.1* 8.8*   HCT 32.7* 29.2*   MCV 76.4* 76.4*   MCH 23.6* 23.0*   MCHC 30.9* 30.1*   RDW 14.6 14.9   * 133   MPV 13.2* 13.3*     Recent Labs     05/12/21  1555 05/12/21  1617 05/13/21  0310 05/14/21  0330     --  136 133   K 4.4 4.29 3.8 3.7     --  101 95*   CO2 19*  --  23 26   BUN 17  --  16 15   CREATININE 0.7  --  0.8 0.7   GLUCOSE 250*  --  164* 160*   CALCIUM 7.8*  --  7.7* 7.6*   PROT  --   --  5.9* 5.9*   LABALBU  --   --  3.7 3.3*   BILITOT  --   --  0.6 0.5   ALKPHOS  --   --  27* 30*   AST  --   --  57* 33   ALT  --   --  53* 35     No results found for: BHYDRXBUT  Lab Results   Component Value Date    LABA1C 8.8 05/13/2021    LABA1C 7.1 02/23/2019    LABA1C 7.8 11/14/2018     No results found for: TSH, T4FREE, Q3UPKTD, FT3, G0ECFBQ, TSI, TPOABS, THGAB  Lab Results   Component Value Date    LABA1C 8.8 05/13/2021    GLUCOSE 160 05/14/2021    MALBCR - 02/23/2019    LABMICR <12.0 02/23/2019    LABCREA 156 02/23/2019     Lab Results   Component Value Date    TRIG 117 11/14/2018    HDL 52 11/14/2018    LDLCALC 115 11/14/2018    CHOL 190 11/14/2018       Blood culture   No results found for: [facility]    Radiology:  XR CHEST PORTABLE   Final Result   1. Persistent patchy left perihilar and left lung base airspace disease. XR CHEST PORTABLE   Final Result   Mild increased aeration of left lower lung consolidation likely representing   atelectasis. XR CHEST PORTABLE   Final Result   1. Tip of the left IJ CVC overlies the junction of the right cephalic vein in   the SVC. No pneumothorax. 2. Left basilar pulmonary opacity may represent atelectasis or pneumonia. Medical Records/Labs/Images review:   I personally reviewed and summarized previous records   All labs and imaging were reviewed independently     [address], a 62 y.o.-old male seen today for inpatient diabetes management     Diabetes Mellitus type 2  · Pt underwent distal pancreatectomy and splenectomy on 5/12/2021   · Will change diabetes regimen to:  · Lantus 10 units nightly   · Humalog medium dose sliding scale Q6hrs   · Continue glucose check with meals and at bedtime   · Will titrate insulin dose based on the blood glucose trend & insulin requirement  · Will arrange for patient to be seen in endocrinology clinic upon discharge for routine diabetes maintenance and prevention. Pancreatic lesion  · Pt s/p distal pancreatectomy and splenectomy on 5/12/2021   · Pathology report in process       The above issues were reviewed with the patient who understood and agreed with the plan. Thank you for allowing us to participate in the care of this patient.  Please do not hesitate to contact us with any additional questions. Bhavana Sierra MD  Endocrinologist, [facility] Diabetes Care and Endocrinology   [address] [facility] [address]   Phone: [phone]  Fax: [phone]  --------------------------------------------  An electronic signature was used to authenticate this note.  Trinh Cui MD on 5/14/2021 at 8:30 PM

## 2021-05-15 LAB
ALBUMIN SERPL-MCNC: 3.4 G/DL (ref 3.5–5.2)
ALP BLD-CCNC: 44 U/L (ref 40–129)
ALT SERPL-CCNC: 26 U/L (ref 0–40)
ANION GAP SERPL CALCULATED.3IONS-SCNC: 6 MMOL/L (ref 7–16)
AST SERPL-CCNC: 27 U/L (ref 0–39)
BILIRUB SERPL-MCNC: 0.4 MG/DL (ref 0–1.2)
BUN BLDV-MCNC: 18 MG/DL (ref 6–20)
CALCIUM SERPL-MCNC: 7.8 MG/DL (ref 8.6–10.2)
CHLORIDE BLD-SCNC: 98 MMOL/L (ref 98–107)
CO2: 31 MMOL/L (ref 22–29)
CREAT SERPL-MCNC: 0.8 MG/DL (ref 0.7–1.2)
GFR AFRICAN AMERICAN: >60
GFR NON-AFRICAN AMERICAN: >60 ML/MIN/1.73
GLUCOSE BLD-MCNC: 219 MG/DL (ref 74–99)
HCT VFR BLD CALC: 28.7 % (ref 37–54)
HEMOGLOBIN: 8.8 G/DL (ref 12.5–16.5)
LACTIC ACID: 1.2 MMOL/L (ref 0.5–2.2)
MAGNESIUM: 3.1 MG/DL (ref 1.6–2.6)
MCH RBC QN AUTO: 23.6 PG (ref 26–35)
MCHC RBC AUTO-ENTMCNC: 30.7 % (ref 32–34.5)
MCV RBC AUTO: 76.9 FL (ref 80–99.9)
METER GLUCOSE: 163 MG/DL (ref 74–99)
METER GLUCOSE: 193 MG/DL (ref 74–99)
METER GLUCOSE: 235 MG/DL (ref 74–99)
METER GLUCOSE: 240 MG/DL (ref 74–99)
METER GLUCOSE: 249 MG/DL (ref 74–99)
PDW BLD-RTO: 14.8 FL (ref 11.5–15)
PHOSPHORUS: 2.4 MG/DL (ref 2.5–4.5)
PLATELET # BLD: 184 E9/L (ref 130–450)
PMV BLD AUTO: 12.8 FL (ref 7–12)
POTASSIUM SERPL-SCNC: 4.4 MMOL/L (ref 3.5–5)
RBC # BLD: 3.73 E12/L (ref 3.8–5.8)
SODIUM BLD-SCNC: 135 MMOL/L (ref 132–146)
TOTAL PROTEIN: 6.4 G/DL (ref 6.4–8.3)
WBC # BLD: 16.3 E9/L (ref 4.5–11.5)

## 2021-05-15 PROCEDURE — 2580000003 HC RX 258: Performed by: STUDENT IN AN ORGANIZED HEALTH CARE EDUCATION/TRAINING PROGRAM

## 2021-05-15 PROCEDURE — 84100 ASSAY OF PHOSPHORUS: CPT

## 2021-05-15 PROCEDURE — 6370000000 HC RX 637 (ALT 250 FOR IP): Performed by: INTERNAL MEDICINE

## 2021-05-15 PROCEDURE — 36415 COLL VENOUS BLD VENIPUNCTURE: CPT

## 2021-05-15 PROCEDURE — 94669 MECHANICAL CHEST WALL OSCILL: CPT

## 2021-05-15 PROCEDURE — 2060000000 HC ICU INTERMEDIATE R&B

## 2021-05-15 PROCEDURE — 94640 AIRWAY INHALATION TREATMENT: CPT

## 2021-05-15 PROCEDURE — 87206 SMEAR FLUORESCENT/ACID STAI: CPT

## 2021-05-15 PROCEDURE — 6370000000 HC RX 637 (ALT 250 FOR IP): Performed by: STUDENT IN AN ORGANIZED HEALTH CARE EDUCATION/TRAINING PROGRAM

## 2021-05-15 PROCEDURE — 2580000003 HC RX 258: Performed by: INTERNAL MEDICINE

## 2021-05-15 PROCEDURE — 6360000002 HC RX W HCPCS: Performed by: INTERNAL MEDICINE

## 2021-05-15 PROCEDURE — 87070 CULTURE OTHR SPECIMN AEROBIC: CPT

## 2021-05-15 PROCEDURE — 82962 GLUCOSE BLOOD TEST: CPT

## 2021-05-15 PROCEDURE — 85027 COMPLETE CBC AUTOMATED: CPT

## 2021-05-15 PROCEDURE — 83735 ASSAY OF MAGNESIUM: CPT

## 2021-05-15 PROCEDURE — 80053 COMPREHEN METABOLIC PANEL: CPT

## 2021-05-15 PROCEDURE — 94660 CPAP INITIATION&MGMT: CPT

## 2021-05-15 PROCEDURE — 83605 ASSAY OF LACTIC ACID: CPT

## 2021-05-15 PROCEDURE — 2500000003 HC RX 250 WO HCPCS: Performed by: STUDENT IN AN ORGANIZED HEALTH CARE EDUCATION/TRAINING PROGRAM

## 2021-05-15 PROCEDURE — 6360000002 HC RX W HCPCS: Performed by: STUDENT IN AN ORGANIZED HEALTH CARE EDUCATION/TRAINING PROGRAM

## 2021-05-15 PROCEDURE — 99233 SBSQ HOSP IP/OBS HIGH 50: CPT | Performed by: INTERNAL MEDICINE

## 2021-05-15 PROCEDURE — 2700000000 HC OXYGEN THERAPY PER DAY

## 2021-05-15 PROCEDURE — C9113 INJ PANTOPRAZOLE SODIUM, VIA: HCPCS | Performed by: STUDENT IN AN ORGANIZED HEALTH CARE EDUCATION/TRAINING PROGRAM

## 2021-05-15 RX ORDER — SODIUM CHLORIDE FOR INHALATION 3 %
4 VIAL, NEBULIZER (ML) INHALATION 3 TIMES DAILY
Status: DISCONTINUED | OUTPATIENT
Start: 2021-05-15 | End: 2021-05-20 | Stop reason: HOSPADM

## 2021-05-15 RX ORDER — GUAIFENESIN 100 MG/5ML
300 SOLUTION ORAL 3 TIMES DAILY
Status: DISCONTINUED | OUTPATIENT
Start: 2021-05-15 | End: 2021-05-20 | Stop reason: HOSPADM

## 2021-05-15 RX ORDER — INSULIN GLARGINE 100 [IU]/ML
16 INJECTION, SOLUTION SUBCUTANEOUS NIGHTLY
Status: DISCONTINUED | OUTPATIENT
Start: 2021-05-15 | End: 2021-05-17

## 2021-05-15 RX ORDER — BENZONATATE 100 MG/1
200 CAPSULE ORAL 3 TIMES DAILY
Status: DISCONTINUED | OUTPATIENT
Start: 2021-05-15 | End: 2021-05-20 | Stop reason: HOSPADM

## 2021-05-15 RX ORDER — BUDESONIDE 0.5 MG/2ML
1 INHALANT ORAL 2 TIMES DAILY
Status: DISCONTINUED | OUTPATIENT
Start: 2021-05-15 | End: 2021-05-20 | Stop reason: HOSPADM

## 2021-05-15 RX ADMIN — ALBUTEROL SULFATE 2.5 MG: 2.5 SOLUTION RESPIRATORY (INHALATION) at 20:50

## 2021-05-15 RX ADMIN — ALBUTEROL SULFATE 2.5 MG: 2.5 SOLUTION RESPIRATORY (INHALATION) at 16:08

## 2021-05-15 RX ADMIN — METHOCARBAMOL 500 MG: 100 INJECTION, SOLUTION INTRAMUSCULAR; INTRAVENOUS at 05:30

## 2021-05-15 RX ADMIN — GUAIFENESIN 200 MG: 100 SOLUTION ORAL at 09:30

## 2021-05-15 RX ADMIN — AMPICILLIN SODIUM AND SULBACTAM SODIUM 3000 MG: 2; 1 INJECTION, POWDER, FOR SOLUTION INTRAMUSCULAR; INTRAVENOUS at 07:13

## 2021-05-15 RX ADMIN — DOCUSATE SODIUM 100 MG: 100 CAPSULE, LIQUID FILLED ORAL at 09:30

## 2021-05-15 RX ADMIN — DOCUSATE SODIUM 100 MG: 100 CAPSULE, LIQUID FILLED ORAL at 21:06

## 2021-05-15 RX ADMIN — SODIUM CHLORIDE SOLN NEBU 3% 4 ML: 3 NEBU SOLN at 16:08

## 2021-05-15 RX ADMIN — POTASSIUM CHLORIDE, DEXTROSE MONOHYDRATE AND SODIUM CHLORIDE: 150; 5; 450 INJECTION, SOLUTION INTRAVENOUS at 02:45

## 2021-05-15 RX ADMIN — PANTOPRAZOLE SODIUM 40 MG: 40 INJECTION, POWDER, FOR SOLUTION INTRAVENOUS at 09:30

## 2021-05-15 RX ADMIN — ATORVASTATIN CALCIUM 20 MG: 20 TABLET, FILM COATED ORAL at 09:30

## 2021-05-15 RX ADMIN — Medication: at 15:40

## 2021-05-15 RX ADMIN — INSULIN LISPRO 3 UNITS: 100 INJECTION, SOLUTION INTRAVENOUS; SUBCUTANEOUS at 20:06

## 2021-05-15 RX ADMIN — ALBUTEROL SULFATE 2.5 MG: 2.5 SOLUTION RESPIRATORY (INHALATION) at 08:45

## 2021-05-15 RX ADMIN — BENZONATATE 200 MG: 100 CAPSULE ORAL at 16:23

## 2021-05-15 RX ADMIN — INSULIN LISPRO 4 UNITS: 100 INJECTION, SOLUTION INTRAVENOUS; SUBCUTANEOUS at 03:20

## 2021-05-15 RX ADMIN — GABAPENTIN 300 MG: 300 CAPSULE ORAL at 09:30

## 2021-05-15 RX ADMIN — INSULIN GLARGINE 16 UNITS: 100 INJECTION, SOLUTION SUBCUTANEOUS at 21:06

## 2021-05-15 RX ADMIN — ALBUTEROL SULFATE 2.5 MG: 2.5 SOLUTION RESPIRATORY (INHALATION) at 11:44

## 2021-05-15 RX ADMIN — GUAIFENESIN 300 MG: 200 SOLUTION ORAL at 21:17

## 2021-05-15 RX ADMIN — KETOROLAC TROMETHAMINE 15 MG: 30 INJECTION, SOLUTION INTRAMUSCULAR; INTRAVENOUS at 07:12

## 2021-05-15 RX ADMIN — BENZONATATE 200 MG: 100 CAPSULE ORAL at 21:05

## 2021-05-15 RX ADMIN — HEPARIN SODIUM 5000 UNITS: 10000 INJECTION INTRAVENOUS; SUBCUTANEOUS at 07:21

## 2021-05-15 RX ADMIN — INSULIN LISPRO 6 UNITS: 100 INJECTION, SOLUTION INTRAVENOUS; SUBCUTANEOUS at 09:36

## 2021-05-15 RX ADMIN — Medication: at 06:03

## 2021-05-15 RX ADMIN — SODIUM CHLORIDE, PRESERVATIVE FREE 10 ML: 5 INJECTION INTRAVENOUS at 07:14

## 2021-05-15 RX ADMIN — HEPARIN SODIUM 5000 UNITS: 10000 INJECTION INTRAVENOUS; SUBCUTANEOUS at 14:01

## 2021-05-15 RX ADMIN — POLYETHYLENE GLYCOL 3350 17 G: 17 POWDER, FOR SOLUTION ORAL at 09:29

## 2021-05-15 RX ADMIN — SODIUM CHLORIDE SOLN NEBU 3% 4 ML: 3 NEBU SOLN at 20:50

## 2021-05-15 RX ADMIN — HEPARIN SODIUM 5000 UNITS: 10000 INJECTION INTRAVENOUS; SUBCUTANEOUS at 21:06

## 2021-05-15 RX ADMIN — METHOCARBAMOL 500 MG: 100 INJECTION, SOLUTION INTRAMUSCULAR; INTRAVENOUS at 11:24

## 2021-05-15 RX ADMIN — SODIUM CHLORIDE, PRESERVATIVE FREE 10 ML: 5 INJECTION INTRAVENOUS at 11:26

## 2021-05-15 RX ADMIN — Medication 10 ML: at 21:12

## 2021-05-15 RX ADMIN — HEPARIN SODIUM 5000 UNITS: 10000 INJECTION INTRAVENOUS; SUBCUTANEOUS at 00:32

## 2021-05-15 RX ADMIN — KETOROLAC TROMETHAMINE 15 MG: 30 INJECTION, SOLUTION INTRAMUSCULAR; INTRAVENOUS at 00:29

## 2021-05-15 RX ADMIN — INSULIN LISPRO 6 UNITS: 100 INJECTION, SOLUTION INTRAVENOUS; SUBCUTANEOUS at 12:00

## 2021-05-15 RX ADMIN — INSULIN LISPRO 3 UNITS: 100 INJECTION, SOLUTION INTRAVENOUS; SUBCUTANEOUS at 16:15

## 2021-05-15 RX ADMIN — Medication: at 22:44

## 2021-05-15 RX ADMIN — GABAPENTIN 300 MG: 300 CAPSULE ORAL at 21:06

## 2021-05-15 RX ADMIN — SODIUM CHLORIDE, PRESERVATIVE FREE 10 ML: 5 INJECTION INTRAVENOUS at 00:30

## 2021-05-15 RX ADMIN — KETOROLAC TROMETHAMINE 15 MG: 30 INJECTION, SOLUTION INTRAMUSCULAR; INTRAVENOUS at 17:38

## 2021-05-15 RX ADMIN — METHOCARBAMOL 500 MG: 100 INJECTION, SOLUTION INTRAMUSCULAR; INTRAVENOUS at 21:03

## 2021-05-15 RX ADMIN — Medication 10 ML: at 09:20

## 2021-05-15 RX ADMIN — AMPICILLIN SODIUM AND SULBACTAM SODIUM 3000 MG: 2; 1 INJECTION, POWDER, FOR SOLUTION INTRAMUSCULAR; INTRAVENOUS at 18:35

## 2021-05-15 RX ADMIN — Medication 10 ML: at 09:30

## 2021-05-15 RX ADMIN — GUAIFENESIN 200 MG: 100 SOLUTION ORAL at 14:01

## 2021-05-15 RX ADMIN — KETOROLAC TROMETHAMINE 15 MG: 30 INJECTION, SOLUTION INTRAMUSCULAR; INTRAVENOUS at 11:26

## 2021-05-15 RX ADMIN — BUDESONIDE 1000 MCG: 0.5 SUSPENSION RESPIRATORY (INHALATION) at 20:50

## 2021-05-15 RX ADMIN — SODIUM CHLORIDE, PRESERVATIVE FREE 10 ML: 5 INJECTION INTRAVENOUS at 09:34

## 2021-05-15 ASSESSMENT — PAIN DESCRIPTION - DESCRIPTORS
DESCRIPTORS: ACHING;DISCOMFORT;SORE
DESCRIPTORS: CONSTANT;SHARP
DESCRIPTORS: ACHING;DISCOMFORT;SORE

## 2021-05-15 ASSESSMENT — PAIN DESCRIPTION - FREQUENCY
FREQUENCY: CONTINUOUS
FREQUENCY: INTERMITTENT

## 2021-05-15 ASSESSMENT — PAIN SCALES - GENERAL
PAINLEVEL_OUTOF10: 2
PAINLEVEL_OUTOF10: 8
PAINLEVEL_OUTOF10: 3
PAINLEVEL_OUTOF10: 3
PAINLEVEL_OUTOF10: 8
PAINLEVEL_OUTOF10: 0
PAINLEVEL_OUTOF10: 0

## 2021-05-15 ASSESSMENT — PAIN DESCRIPTION - ONSET
ONSET: GRADUAL
ONSET: GRADUAL

## 2021-05-15 ASSESSMENT — PAIN DESCRIPTION - PAIN TYPE: TYPE: SURGICAL PAIN

## 2021-05-15 ASSESSMENT — PAIN DESCRIPTION - PROGRESSION
CLINICAL_PROGRESSION: GRADUALLY IMPROVING
CLINICAL_PROGRESSION: GRADUALLY IMPROVING

## 2021-05-15 ASSESSMENT — PAIN DESCRIPTION - LOCATION: LOCATION: ABDOMEN

## 2021-05-15 NOTE — PROGRESS NOTES
[place]  Department of Internal Medicine  Division of Pulmonary, Critical Care and Sleep Medicine  Consult Note  Leesa Dawn DO, [place], [place], Katie Avalos MD, [place]    Patient: Catalina Bain  MRN: [id number]  : 1960    Encounter Time: 3:21 PM     Date of Admission: 2021  5:27 AM    Primary Care Physician: Shilpa Conn DO    Reason for Consultation: Post Op Resp Insuff       SUBJECTIVE:    Violent coughing  Tan sputum  CUlture pending Day 2 of Unasyn   No fevers  Did not use CPAP? OBJECTIVE:     PHYSICAL EXAM:   VITALS:   Vitals:    05/15/21 0330 05/15/21 0800 05/15/21 1215 05/15/21 1515   BP: (!) 140/80 (!) 140/88  (!) 142/76   Pulse: 108 100  94   Resp:    Temp: 98.2 °F (36.8 °C) 97.2 °F (36.2 °C)  97.7 °F (36.5 °C)   TempSrc: Temporal Temporal  Temporal   SpO2: 93% 96% 96% 96%   Weight:       Height:            Intake/Output Summary (Last 24 hours) at 5/15/2021 1521  Last data filed at 5/15/2021 1516  Gross per 24 hour   Intake 1034.54 ml   Output 1830 ml   Net -795.46 ml        CONSTITUTIONAL:    A&O x 3, NAD  SKIN:     No rash, No suspicious lesions or skin discoloration  HEENT:     EOMI, MMM, No thrush  NECK:    No bruits, No JVP apprechiated  CV:      Sinus,  No murmur, No rubs, No gallops  PULMONARY:   Couse BS,  No noted egophony  ABDOMEN:     Distended scars incision, BS normal. +G      MAX drain left upper quadrant w/ brown fluid. EXT:    No deformities . No clubbing.       + lower extremity edema, No venous stasis  PULSE:   Appears equal and palpable.   PSYCHIATRIC:  Seems appropriate, No acute psycosis  MS:    No fractures, No gross weakness  NEUROLOGIC:   The clinical assessment is non-focal     DATA: IMAGING & TESTING:     LABORATORY TESTS:    CBC:   Lab Results   Component Value Date    WBC 16.3 05/15/2021    RBC 3.73 05/15/2021    HGB 8.8 05/15/2021    HCT 28.7 05/15/2021    MCV 76.9 05/15/2021    MCH 23.6 05/15/2021    MCHC 30.7 05/15/2021    RDW 14.8 05/15/2021     05/15/2021    MPV 12.8 05/15/2021     CMP:    Lab Results   Component Value Date     05/15/2021    K 4.4 05/15/2021    K 4.8 12/13/2018    CL 98 05/15/2021    CO2 31 05/15/2021    BUN 18 05/15/2021    CREATININE 0.8 05/15/2021    GFRAA >60 05/15/2021    LABGLOM >60 05/15/2021    GLUCOSE 219 05/15/2021    PROT 6.4 05/15/2021    LABALBU 3.4 05/15/2021    CALCIUM 7.8 05/15/2021    BILITOT 0.4 05/15/2021    ALKPHOS 44 05/15/2021    AST 27 05/15/2021    ALT 26 05/15/2021     Calcium:    Lab Results   Component Value Date    CALCIUM 7.8 05/15/2021     Ionized Calcium:  No results found for: IONCA  PT/INR:    Lab Results   Component Value Date    PROTIME 15.4 05/13/2021    INR 1.4 05/13/2021     ABG:    Lab Results   Component Value Date    PH 7.330 05/12/2021    PCO2 38.6 05/12/2021    PO2 99.3 05/12/2021    HCO3 19.9 05/12/2021    BE -5.5 05/12/2021    O2SAT 97.3 05/12/2021       ABGs:   Lab Results   Component Value Date    PH 7.330 05/12/2021    PO2 99.3 05/12/2021    PCO2 38.6 05/12/2021       IMAGING:  Imaging tests were completed and reviewed and discussed radiology and care team involved and reveals     MRI Abdomen W Wo Contrast MRCP  Result Date: 4/19/2021  EXAMINATION: MRI OF THE ABDOMEN WITH AND WITHOUT CONTRAST AND MRCP 4/19/2021 10:15 am TECHNIQUE: Multiplanar multisequence MRI of the abdomen was performed with and without the administration of intravenous contrast.  After initial T2 axial and coronal images, thick slab, thin slab and 3D coronal MRCP sequences were obtained without the administration of intravenous contrast.  MIP images are provided for review. COMPARISON: MRI 12/15/2020, 11/22/2019.   CT abdomen and pelvis 12/07/2019 HISTORY: ORDERING SYSTEM PROVIDED HISTORY: IPMN (intraductal papillary mucinous neoplasm) TECHNOLOGIST PROVIDED HISTORY: Reason for exam:->bmp What reading provider will be dictating this exam?->CRC TECHNOLOGIST PROVIDED HISTORY: Reason for exam:->pancreatic mass-evaluate for vessel involvement-preoperative What reading provider will be dictating this exam?->CRC FINDINGS: LOWER CHEST:  Unchanged minimal scarring in the bases of the right middle lobe and lingula. Minimal gravity dependent atelectasis in the bilateral lower lobes. Normal heart size. No pleural nor pericardial effusions. PANCREAS:  Typical duct configuration without dilation. Mild parenchymal atrophy. 2.0 cm x 1.5 cm x 2.2 cm complicated cystic lesion in the left paramedian body appearing to contain septation measuring up to 0.3 cm in thickness (1.8 cm x 1.0 cm x 1.6 cm). ORGANS:  Moderately enlarged right hemiliver suggestive of a normal variant Riedel's lobe. Minimal focal adenomyomatosis involving the gallbladder fundus. Homogeneously hypodense bilateral renal lesions measuring up to 0.9 cm, seen to be simple cysts on prior MRI (Bosniak category 1). 0.8 cm exophytic lesion of indeterminate density in the lateral interpolar left kidney, seen to be a hemorrhagic cyst on MRI (Bosniak category 2). Normal spleen and adrenal glands. GI/BOWEL:  Normal course caliber of stomach and of the included small bowel and colon without obstruction. PERITONEUM/RETROPERITONEUM:  Reflux of the contrast bolus into the inferior vena cava and hepatic veins. Minimal systemic atherosclerosis. No abdominal lymphadenopathy. No free intraperitoneal fluid nor gas. SOFT TISSUES/BONES:  Laxity of the anterior and lateral abdominal wall musculature. Changes of posterior fusion from T12 to L4 and bilateral laminectomies from L1 to L3 with no evident complication. Diffuse bony demineralization. No acute fractures nor suspicious osseous lesions.      1. Gradual increase in size of a 2.0 cm x 1.5 cm x 2.2 cm complicated cystic lesion in the pancreatic body, most likely a branch duct intraductal papillary mucinous neoplasm given proximity to the main pancreatic duct on prior MRIs. However, a macrocystic serous cystadenoma or mucinous cystadenoma could appear similar. Of note, there is no evident involvement or obstruction of the main pancreatic duct. Additionally, the lesion is largely intraparenchymal with no abutment or encasement of major named vessels. 2. Reflux of the contrast bolus can be seen as normal variation but could also be related to right heart dysfunction. Assessment:   1. 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 Plan:   1. Ezpap  2. IPPB needed  3. Please get sputum to the lab  4. Xopenex 0.63 TID  5. Unasyn Q12 day2  6. Increase mucinex 300 QID  7. ICS Q1 hour  8.  CPAP nightly   9. 3 % saline nebs    Garret Hood DO DO, MPH, [facility], [facility]irre  Professor of Internal Medicine  Pulmonary, Critical Care and Sleep Medicine

## 2021-05-15 NOTE — PROGRESS NOTES
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 EXTREMITIES: No edema or swelling  SKIN: Warm and dry    Assessment/Plan:  62 y.o. male with 1.4cm pancreatic neck BD-IPMN with rapid growth to now 2.9cm (nodule and septations) in 6 months. Patient went for a robotic converted to open distal pancreatectomy and splenectomy on 5/12/2021    Pain and nausea control PRN  NPO, sips and chips less than 250 q4hrs  mIVF's  OOB/AAT/SMI  DVT ppx w/ heparin and scd's  Endocrinology consulted for glucose management  Bowel regimen  Hypoxia - pulmonology consult, improving  Plan for ERCP 5/17 w/ pancreatic duct stent placement      Electronically signed by Sheila Ayala MD on 5/15/2021 at 9:57 AM  Patient without significant complaints today. Continue n.p.o. and chips. ERCP Monday.

## 2021-05-15 NOTE — PROGRESS NOTES
Patient signed consent for \"Endoscopic Retrograde Cholangiopancreatography with Placement of Pancreatic Duct Stent with Dr Ramona Boyd"  Consent placed into patient's soft chart.

## 2021-05-15 NOTE — PROGRESS NOTES
Patient had two soft formed large BM's and ambulated 250 feet in the hallway. Patient tolerated very well.

## 2021-05-15 NOTE — PROGRESS NOTES
Endocrine Brief Communication Note         Date of admission: 5/12/2021  Date of service: 5/15/2021  Admitting physician: Gisela Bellamy MD   Primary Care Physician: Leanne Law DO  Consultant physician: Bhavana Sirera MD      Reason for the consultation:  DM type 2     Subjective:   I have reviewed patient's chart, blood glucose trend & insulin requirement. BG still above goal     Point of care glucose monitoring (Independently reviewed)   Recent Labs     05/13/21  1456 05/13/21  2017 05/13/21  2324 05/14/21  0309 05/14/21  0613 05/14/21  1037 05/14/21  2111 05/15/21  0301   GLUMET 253* 214* 207* 175* 137* 233* 240* 249*     Diabetes Mellitus type 2  · Pt underwent distal pancreatectomy and splenectomy on 5/12/2021   · BG still above goal   · Will change diabetes regimen to:  ? Lantus 16 units nightly   ? High dose sliding scale Q4hrs   · Continue glucose check with meals and at bedtime   · Will titrate insulin dose based on the blood glucose trend & insulin requirement  · Will arrange for patient to be seen in endocrinology clinic upon discharge for routine diabetes maintenance and prevention.        Bhavana Sierra MD  Endocrinologist, [facility] Diabetes Care and Endocrinology   [address] [facility], [address] [place] [address],[facility] [address] [id number]   Phone: [phone]  Fax: [phone]  -------------------------  An electronic signature was used to authenticate this note.  Trinh Cui MD on 5/15/2021 at 7:54 AM

## 2021-05-16 LAB
ABO/RH: NORMAL
ALBUMIN SERPL-MCNC: 2.9 G/DL (ref 3.5–5.2)
ALP BLD-CCNC: 43 U/L (ref 40–129)
ALT SERPL-CCNC: 26 U/L (ref 0–40)
ANION GAP SERPL CALCULATED.3IONS-SCNC: 7 MMOL/L (ref 7–16)
ANTIBODY SCREEN: NORMAL
AST SERPL-CCNC: 27 U/L (ref 0–39)
BASOPHILS ABSOLUTE: 0.04 E9/L (ref 0–0.2)
BASOPHILS RELATIVE PERCENT: 0.2 % (ref 0–2)
BILIRUB SERPL-MCNC: 0.4 MG/DL (ref 0–1.2)
BUN BLDV-MCNC: 18 MG/DL (ref 6–20)
CALCIUM SERPL-MCNC: 7.8 MG/DL (ref 8.6–10.2)
CHLORIDE BLD-SCNC: 102 MMOL/L (ref 98–107)
CO2: 29 MMOL/L (ref 22–29)
CREAT SERPL-MCNC: 0.7 MG/DL (ref 0.7–1.2)
EOSINOPHILS ABSOLUTE: 0.6 E9/L (ref 0.05–0.5)
EOSINOPHILS RELATIVE PERCENT: 3.6 % (ref 0–6)
GFR AFRICAN AMERICAN: >60
GFR NON-AFRICAN AMERICAN: >60 ML/MIN/1.73
GLUCOSE BLD-MCNC: 171 MG/DL (ref 74–99)
HCT VFR BLD CALC: 26.8 % (ref 37–54)
HEMOGLOBIN: 8.2 G/DL (ref 12.5–16.5)
IMMATURE GRANULOCYTES #: 0.11 E9/L
IMMATURE GRANULOCYTES %: 0.7 % (ref 0–5)
LACTIC ACID: 0.9 MMOL/L (ref 0.5–2.2)
LYMPHOCYTES ABSOLUTE: 0.67 E9/L (ref 1.5–4)
LYMPHOCYTES RELATIVE PERCENT: 4.1 % (ref 20–42)
MAGNESIUM: 2.6 MG/DL (ref 1.6–2.6)
MCH RBC QN AUTO: 23.4 PG (ref 26–35)
MCHC RBC AUTO-ENTMCNC: 30.6 % (ref 32–34.5)
MCV RBC AUTO: 76.6 FL (ref 80–99.9)
METER GLUCOSE: 125 MG/DL (ref 74–99)
METER GLUCOSE: 160 MG/DL (ref 74–99)
METER GLUCOSE: 162 MG/DL (ref 74–99)
METER GLUCOSE: 198 MG/DL (ref 74–99)
METER GLUCOSE: 219 MG/DL (ref 74–99)
MONOCYTES ABSOLUTE: 1.4 E9/L (ref 0.1–0.95)
MONOCYTES RELATIVE PERCENT: 8.5 % (ref 2–12)
NEUTROPHILS ABSOLUTE: 13.66 E9/L (ref 1.8–7.3)
NEUTROPHILS RELATIVE PERCENT: 82.9 % (ref 43–80)
PDW BLD-RTO: 14.9 FL (ref 11.5–15)
PHOSPHORUS: 2.3 MG/DL (ref 2.5–4.5)
PLATELET # BLD: 272 E9/L (ref 130–450)
PMV BLD AUTO: 11.6 FL (ref 7–12)
POTASSIUM SERPL-SCNC: 3.7 MMOL/L (ref 3.5–5)
RBC # BLD: 3.5 E12/L (ref 3.8–5.8)
SODIUM BLD-SCNC: 138 MMOL/L (ref 132–146)
TOTAL PROTEIN: 6 G/DL (ref 6.4–8.3)
WBC # BLD: 16.5 E9/L (ref 4.5–11.5)

## 2021-05-16 PROCEDURE — 86900 BLOOD TYPING SEROLOGIC ABO: CPT

## 2021-05-16 PROCEDURE — 6370000000 HC RX 637 (ALT 250 FOR IP): Performed by: INTERNAL MEDICINE

## 2021-05-16 PROCEDURE — 84100 ASSAY OF PHOSPHORUS: CPT

## 2021-05-16 PROCEDURE — 2700000000 HC OXYGEN THERAPY PER DAY

## 2021-05-16 PROCEDURE — 6370000000 HC RX 637 (ALT 250 FOR IP): Performed by: STUDENT IN AN ORGANIZED HEALTH CARE EDUCATION/TRAINING PROGRAM

## 2021-05-16 PROCEDURE — 2580000003 HC RX 258: Performed by: INTERNAL MEDICINE

## 2021-05-16 PROCEDURE — 83735 ASSAY OF MAGNESIUM: CPT

## 2021-05-16 PROCEDURE — C9113 INJ PANTOPRAZOLE SODIUM, VIA: HCPCS | Performed by: STUDENT IN AN ORGANIZED HEALTH CARE EDUCATION/TRAINING PROGRAM

## 2021-05-16 PROCEDURE — 2500000003 HC RX 250 WO HCPCS: Performed by: STUDENT IN AN ORGANIZED HEALTH CARE EDUCATION/TRAINING PROGRAM

## 2021-05-16 PROCEDURE — 6360000002 HC RX W HCPCS: Performed by: STUDENT IN AN ORGANIZED HEALTH CARE EDUCATION/TRAINING PROGRAM

## 2021-05-16 PROCEDURE — 80053 COMPREHEN METABOLIC PANEL: CPT

## 2021-05-16 PROCEDURE — 6360000002 HC RX W HCPCS: Performed by: INTERNAL MEDICINE

## 2021-05-16 PROCEDURE — 94669 MECHANICAL CHEST WALL OSCILL: CPT

## 2021-05-16 PROCEDURE — 83605 ASSAY OF LACTIC ACID: CPT

## 2021-05-16 PROCEDURE — 85025 COMPLETE CBC W/AUTO DIFF WBC: CPT

## 2021-05-16 PROCEDURE — 82962 GLUCOSE BLOOD TEST: CPT

## 2021-05-16 PROCEDURE — 94660 CPAP INITIATION&MGMT: CPT

## 2021-05-16 PROCEDURE — 86850 RBC ANTIBODY SCREEN: CPT

## 2021-05-16 PROCEDURE — 94640 AIRWAY INHALATION TREATMENT: CPT

## 2021-05-16 PROCEDURE — 86901 BLOOD TYPING SEROLOGIC RH(D): CPT

## 2021-05-16 PROCEDURE — 2060000000 HC ICU INTERMEDIATE R&B

## 2021-05-16 PROCEDURE — 2580000003 HC RX 258: Performed by: STUDENT IN AN ORGANIZED HEALTH CARE EDUCATION/TRAINING PROGRAM

## 2021-05-16 PROCEDURE — 36415 COLL VENOUS BLD VENIPUNCTURE: CPT

## 2021-05-16 RX ADMIN — HEPARIN SODIUM 5000 UNITS: 10000 INJECTION INTRAVENOUS; SUBCUTANEOUS at 21:13

## 2021-05-16 RX ADMIN — AMPICILLIN SODIUM AND SULBACTAM SODIUM 3000 MG: 2; 1 INJECTION, POWDER, FOR SOLUTION INTRAMUSCULAR; INTRAVENOUS at 18:41

## 2021-05-16 RX ADMIN — ALBUTEROL SULFATE 2.5 MG: 2.5 SOLUTION RESPIRATORY (INHALATION) at 20:54

## 2021-05-16 RX ADMIN — POLYETHYLENE GLYCOL 3350 17 G: 17 POWDER, FOR SOLUTION ORAL at 08:51

## 2021-05-16 RX ADMIN — DOCUSATE SODIUM 100 MG: 100 CAPSULE, LIQUID FILLED ORAL at 08:53

## 2021-05-16 RX ADMIN — INSULIN LISPRO 3 UNITS: 100 INJECTION, SOLUTION INTRAVENOUS; SUBCUTANEOUS at 20:06

## 2021-05-16 RX ADMIN — DOCUSATE SODIUM 100 MG: 100 CAPSULE, LIQUID FILLED ORAL at 21:46

## 2021-05-16 RX ADMIN — INSULIN LISPRO 3 UNITS: 100 INJECTION, SOLUTION INTRAVENOUS; SUBCUTANEOUS at 00:08

## 2021-05-16 RX ADMIN — KETOROLAC TROMETHAMINE 15 MG: 30 INJECTION, SOLUTION INTRAMUSCULAR; INTRAVENOUS at 00:08

## 2021-05-16 RX ADMIN — ATORVASTATIN CALCIUM 20 MG: 20 TABLET, FILM COATED ORAL at 08:52

## 2021-05-16 RX ADMIN — AMPICILLIN SODIUM AND SULBACTAM SODIUM 3000 MG: 2; 1 INJECTION, POWDER, FOR SOLUTION INTRAMUSCULAR; INTRAVENOUS at 06:05

## 2021-05-16 RX ADMIN — METHOCARBAMOL 500 MG: 100 INJECTION, SOLUTION INTRAMUSCULAR; INTRAVENOUS at 04:02

## 2021-05-16 RX ADMIN — ALBUTEROL SULFATE 2.5 MG: 2.5 SOLUTION RESPIRATORY (INHALATION) at 08:59

## 2021-05-16 RX ADMIN — SODIUM CHLORIDE, PRESERVATIVE FREE 10 ML: 5 INJECTION INTRAVENOUS at 08:51

## 2021-05-16 RX ADMIN — METHOCARBAMOL 500 MG: 100 INJECTION, SOLUTION INTRAMUSCULAR; INTRAVENOUS at 11:45

## 2021-05-16 RX ADMIN — SODIUM CHLORIDE SOLN NEBU 3% 4 ML: 3 NEBU SOLN at 08:59

## 2021-05-16 RX ADMIN — SODIUM CHLORIDE SOLN NEBU 3% 4 ML: 3 NEBU SOLN at 20:54

## 2021-05-16 RX ADMIN — ALBUTEROL SULFATE 2.5 MG: 2.5 SOLUTION RESPIRATORY (INHALATION) at 12:01

## 2021-05-16 RX ADMIN — BENZONATATE 200 MG: 100 CAPSULE ORAL at 13:42

## 2021-05-16 RX ADMIN — Medication 10 ML: at 08:53

## 2021-05-16 RX ADMIN — GUAIFENESIN 300 MG: 200 SOLUTION ORAL at 13:42

## 2021-05-16 RX ADMIN — BENZONATATE 200 MG: 100 CAPSULE ORAL at 08:52

## 2021-05-16 RX ADMIN — Medication: at 12:30

## 2021-05-16 RX ADMIN — INSULIN LISPRO 3 UNITS: 100 INJECTION, SOLUTION INTRAVENOUS; SUBCUTANEOUS at 04:02

## 2021-05-16 RX ADMIN — BUDESONIDE 1000 MCG: 0.5 SUSPENSION RESPIRATORY (INHALATION) at 20:54

## 2021-05-16 RX ADMIN — INSULIN GLARGINE 16 UNITS: 100 INJECTION, SOLUTION SUBCUTANEOUS at 21:12

## 2021-05-16 RX ADMIN — GUAIFENESIN 300 MG: 200 SOLUTION ORAL at 21:12

## 2021-05-16 RX ADMIN — KETOROLAC TROMETHAMINE 15 MG: 30 INJECTION, SOLUTION INTRAMUSCULAR; INTRAVENOUS at 11:45

## 2021-05-16 RX ADMIN — SODIUM CHLORIDE SOLN NEBU 3% 4 ML: 3 NEBU SOLN at 12:02

## 2021-05-16 RX ADMIN — Medication: at 20:01

## 2021-05-16 RX ADMIN — BENZONATATE 200 MG: 100 CAPSULE ORAL at 22:29

## 2021-05-16 RX ADMIN — PANTOPRAZOLE SODIUM 40 MG: 40 INJECTION, POWDER, FOR SOLUTION INTRAVENOUS at 08:51

## 2021-05-16 RX ADMIN — GABAPENTIN 300 MG: 300 CAPSULE ORAL at 08:51

## 2021-05-16 RX ADMIN — METHOCARBAMOL 500 MG: 100 INJECTION, SOLUTION INTRAMUSCULAR; INTRAVENOUS at 20:03

## 2021-05-16 RX ADMIN — KETOROLAC TROMETHAMINE 15 MG: 30 INJECTION, SOLUTION INTRAMUSCULAR; INTRAVENOUS at 06:04

## 2021-05-16 RX ADMIN — Medication: at 08:31

## 2021-05-16 RX ADMIN — HEPARIN SODIUM 5000 UNITS: 10000 INJECTION INTRAVENOUS; SUBCUTANEOUS at 13:42

## 2021-05-16 RX ADMIN — INSULIN LISPRO 3 UNITS: 100 INJECTION, SOLUTION INTRAVENOUS; SUBCUTANEOUS at 08:41

## 2021-05-16 RX ADMIN — SODIUM CHLORIDE, PRESERVATIVE FREE 10 ML: 5 INJECTION INTRAVENOUS at 11:46

## 2021-05-16 RX ADMIN — POTASSIUM CHLORIDE, DEXTROSE MONOHYDRATE AND SODIUM CHLORIDE: 150; 5; 450 INJECTION, SOLUTION INTRAVENOUS at 16:12

## 2021-05-16 RX ADMIN — BUDESONIDE 1000 MCG: 0.5 SUSPENSION RESPIRATORY (INHALATION) at 08:59

## 2021-05-16 RX ADMIN — Medication 10 ML: at 22:28

## 2021-05-16 RX ADMIN — GABAPENTIN 300 MG: 300 CAPSULE ORAL at 21:46

## 2021-05-16 RX ADMIN — HEPARIN SODIUM 5000 UNITS: 10000 INJECTION INTRAVENOUS; SUBCUTANEOUS at 06:04

## 2021-05-16 RX ADMIN — INSULIN LISPRO 6 UNITS: 100 INJECTION, SOLUTION INTRAVENOUS; SUBCUTANEOUS at 16:21

## 2021-05-16 RX ADMIN — KETOROLAC TROMETHAMINE 15 MG: 30 INJECTION, SOLUTION INTRAMUSCULAR; INTRAVENOUS at 17:30

## 2021-05-16 RX ADMIN — ALBUTEROL SULFATE 2.5 MG: 2.5 SOLUTION RESPIRATORY (INHALATION) at 15:44

## 2021-05-16 ASSESSMENT — PAIN DESCRIPTION - ORIENTATION
ORIENTATION: MID

## 2021-05-16 ASSESSMENT — PAIN - FUNCTIONAL ASSESSMENT
PAIN_FUNCTIONAL_ASSESSMENT: PREVENTS OR INTERFERES SOME ACTIVE ACTIVITIES AND ADLS

## 2021-05-16 ASSESSMENT — PAIN DESCRIPTION - DESCRIPTORS
DESCRIPTORS: ACHING;DISCOMFORT;SORE
DESCRIPTORS: ACHING;DISCOMFORT;SORE

## 2021-05-16 ASSESSMENT — PAIN DESCRIPTION - LOCATION
LOCATION: ABDOMEN
LOCATION: ABDOMEN

## 2021-05-16 ASSESSMENT — PAIN SCALES - GENERAL
PAINLEVEL_OUTOF10: 0
PAINLEVEL_OUTOF10: 8
PAINLEVEL_OUTOF10: 0
PAINLEVEL_OUTOF10: 0
PAINLEVEL_OUTOF10: 2

## 2021-05-16 ASSESSMENT — PAIN DESCRIPTION - FREQUENCY: FREQUENCY: CONTINUOUS

## 2021-05-16 ASSESSMENT — PAIN DESCRIPTION - PROGRESSION
CLINICAL_PROGRESSION: GRADUALLY IMPROVING

## 2021-05-16 ASSESSMENT — PAIN DESCRIPTION - ONSET: ONSET: GRADUAL

## 2021-05-16 NOTE — PROGRESS NOTES
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 States his pain is controlled and he is getting up. Reports passing flatus, but no BMs. Denies any N/V. Objective:  BP (!) 140/77   Pulse 92   Temp 99.5 °F (37.5 °C) (Temporal)   Resp 13   Ht 5' 8\" (1.727 m)   Wt 223 lb (101.2 kg)   SpO2 99%   BMI 33.91 kg/m²     GENERAL:  Laying in bed, awake, alert  HEAD: Normocephalic, atraumatic  EYES: No sclera icterus, pupils equal  LUNGS:  No increased work of breathing  CARDIOVASCULAR: RR and normotensive  ABDOMEN:  Soft, mildly distended, appropriately TTP incisions clean dry and intact, MAX drain left upper quadrant with serous drainage. EXTREMITIES: No edema or swelling  SKIN: Warm and dry    Assessment/Plan:  62 y.o. male with 1.4cm pancreatic neck BD-IPMN with rapid growth to now 2.9cm (nodule and septations) in 6 months. Patient went for a robotic converted to open distal pancreatectomy and splenectomy on 5/12/2021    Pain and nausea control PRN  NPO, sips and chips less than 250 q4hrs  IVFs  OOB/AAT/SMI  DVT ppx w/ heparin and scd's  Endocrinology consulted for glucose management  Bowel regimen  Hypoxia - pulm recs appreciated, improving  Plan for ERCP 5/17 w/ pancreatic duct stent placement      Electronically signed by Lee Kim MD on 5/16/2021 at 8:31 AM  Feeling a little better today. Abdomen is softer, less distended. On for pancreatic stent placement tomorrow.

## 2021-05-16 NOTE — PROGRESS NOTES
Patient refusing to sit up in the chair and ambulate in the michael today stating \"I prefer to use the Dilaudid to help me catch up on my sleep! \"  This nurse educated patient re: importance of ambulating and sitting in chair post-operatively. Patient stated 'I understand! \"

## 2021-05-16 NOTE — PROGRESS NOTES
Patient ambulated 220 feet in the hallway with assistance of two nursing staff members. Then patient sat up in chair for 90 minutes. Tolerated well. Patient had two soft formed brown BM's (large and medium).

## 2021-05-17 ENCOUNTER — ANESTHESIA (OUTPATIENT)
Dept: ENDOSCOPY | Age: 61
DRG: 260 | End: 2021-05-17
Payer: MEDICAID

## 2021-05-17 ENCOUNTER — ANESTHESIA EVENT (OUTPATIENT)
Dept: ENDOSCOPY | Age: 61
DRG: 260 | End: 2021-05-17
Payer: MEDICAID

## 2021-05-17 ENCOUNTER — APPOINTMENT (OUTPATIENT)
Dept: GENERAL RADIOLOGY | Age: 61
DRG: 260 | End: 2021-05-17
Attending: TRANSPLANT SURGERY
Payer: MEDICAID

## 2021-05-17 VITALS
SYSTOLIC BLOOD PRESSURE: 108 MMHG | OXYGEN SATURATION: 95 % | RESPIRATION RATE: 10 BRPM | DIASTOLIC BLOOD PRESSURE: 69 MMHG

## 2021-05-17 LAB
ALBUMIN SERPL-MCNC: 3.2 G/DL (ref 3.5–5.2)
ALP BLD-CCNC: 57 U/L (ref 40–129)
ALT SERPL-CCNC: 29 U/L (ref 0–40)
ANION GAP SERPL CALCULATED.3IONS-SCNC: 12 MMOL/L (ref 7–16)
AST SERPL-CCNC: 33 U/L (ref 0–39)
BASOPHILS ABSOLUTE: 0.05 E9/L (ref 0–0.2)
BASOPHILS RELATIVE PERCENT: 0.4 % (ref 0–2)
BILIRUB SERPL-MCNC: 0.4 MG/DL (ref 0–1.2)
BUN BLDV-MCNC: 13 MG/DL (ref 6–20)
CALCIUM SERPL-MCNC: 8.1 MG/DL (ref 8.6–10.2)
CHLORIDE BLD-SCNC: 100 MMOL/L (ref 98–107)
CO2: 25 MMOL/L (ref 22–29)
CREAT SERPL-MCNC: 0.7 MG/DL (ref 0.7–1.2)
CULTURE, RESPIRATORY: NORMAL
EOSINOPHILS ABSOLUTE: 0.7 E9/L (ref 0.05–0.5)
EOSINOPHILS RELATIVE PERCENT: 5.1 % (ref 0–6)
GFR AFRICAN AMERICAN: >60
GFR NON-AFRICAN AMERICAN: >60 ML/MIN/1.73
GLUCOSE BLD-MCNC: 182 MG/DL (ref 74–99)
HCT VFR BLD CALC: 27.2 % (ref 37–54)
HEMOGLOBIN: 7.9 G/DL (ref 12.5–16.5)
IMMATURE GRANULOCYTES #: 0.07 E9/L
IMMATURE GRANULOCYTES %: 0.5 % (ref 0–5)
INR BLD: 1.3
LACTIC ACID: 2 MMOL/L (ref 0.5–2.2)
LYMPHOCYTES ABSOLUTE: 1.15 E9/L (ref 1.5–4)
LYMPHOCYTES RELATIVE PERCENT: 8.3 % (ref 20–42)
MAGNESIUM: 2.4 MG/DL (ref 1.6–2.6)
MCH RBC QN AUTO: 22.6 PG (ref 26–35)
MCHC RBC AUTO-ENTMCNC: 29 % (ref 32–34.5)
MCV RBC AUTO: 77.7 FL (ref 80–99.9)
METER GLUCOSE: 174 MG/DL (ref 74–99)
METER GLUCOSE: 178 MG/DL (ref 74–99)
METER GLUCOSE: 228 MG/DL (ref 74–99)
METER GLUCOSE: 229 MG/DL (ref 74–99)
METER GLUCOSE: 251 MG/DL (ref 74–99)
MONOCYTES ABSOLUTE: 1.27 E9/L (ref 0.1–0.95)
MONOCYTES RELATIVE PERCENT: 9.2 % (ref 2–12)
NEUTROPHILS ABSOLUTE: 10.62 E9/L (ref 1.8–7.3)
NEUTROPHILS RELATIVE PERCENT: 76.5 % (ref 43–80)
PDW BLD-RTO: 14.8 FL (ref 11.5–15)
PHOSPHORUS: 2.3 MG/DL (ref 2.5–4.5)
PLATELET # BLD: 335 E9/L (ref 130–450)
PMV BLD AUTO: 11.4 FL (ref 7–12)
POTASSIUM SERPL-SCNC: 3.6 MMOL/L (ref 3.5–5)
PROTHROMBIN TIME: 13.8 SEC (ref 9.3–12.4)
RBC # BLD: 3.5 E12/L (ref 3.8–5.8)
SMEAR, RESPIRATORY: NORMAL
SODIUM BLD-SCNC: 137 MMOL/L (ref 132–146)
TOTAL PROTEIN: 6.2 G/DL (ref 6.4–8.3)
WBC # BLD: 13.9 E9/L (ref 4.5–11.5)

## 2021-05-17 PROCEDURE — 3609014900 HC ERCP W/SPHINCTEROTOMY &/OR PAPILLOTOMY: Performed by: SURGERY

## 2021-05-17 PROCEDURE — 6360000002 HC RX W HCPCS: Performed by: STUDENT IN AN ORGANIZED HEALTH CARE EDUCATION/TRAINING PROGRAM

## 2021-05-17 PROCEDURE — 3700000001 HC ADD 15 MINUTES (ANESTHESIA): Performed by: SURGERY

## 2021-05-17 PROCEDURE — 6370000000 HC RX 637 (ALT 250 FOR IP): Performed by: SURGERY

## 2021-05-17 PROCEDURE — 43274 ERCP DUCT STENT PLACEMENT: CPT | Performed by: SURGERY

## 2021-05-17 PROCEDURE — C1769 GUIDE WIRE: HCPCS | Performed by: SURGERY

## 2021-05-17 PROCEDURE — 99232 SBSQ HOSP IP/OBS MODERATE 35: CPT | Performed by: INTERNAL MEDICINE

## 2021-05-17 PROCEDURE — 0F7D8DZ DILATION OF PANCREATIC DUCT WITH INTRALUMINAL DEVICE, VIA NATURAL OR ARTIFICIAL OPENING ENDOSCOPIC: ICD-10-PCS | Performed by: SURGERY

## 2021-05-17 PROCEDURE — 83605 ASSAY OF LACTIC ACID: CPT

## 2021-05-17 PROCEDURE — 7100000001 HC PACU RECOVERY - ADDTL 15 MIN: Performed by: SURGERY

## 2021-05-17 PROCEDURE — 3609015100 HC ERCP STENT PLACEMENT BILIARY/PANCREATIC DUCT: Performed by: SURGERY

## 2021-05-17 PROCEDURE — 84100 ASSAY OF PHOSPHORUS: CPT

## 2021-05-17 PROCEDURE — 36415 COLL VENOUS BLD VENIPUNCTURE: CPT

## 2021-05-17 PROCEDURE — 6360000002 HC RX W HCPCS

## 2021-05-17 PROCEDURE — 7100000000 HC PACU RECOVERY - FIRST 15 MIN: Performed by: SURGERY

## 2021-05-17 PROCEDURE — 6360000002 HC RX W HCPCS: Performed by: INTERNAL MEDICINE

## 2021-05-17 PROCEDURE — 99222 1ST HOSP IP/OBS MODERATE 55: CPT | Performed by: SURGERY

## 2021-05-17 PROCEDURE — 2060000000 HC ICU INTERMEDIATE R&B

## 2021-05-17 PROCEDURE — 6370000000 HC RX 637 (ALT 250 FOR IP): Performed by: INTERNAL MEDICINE

## 2021-05-17 PROCEDURE — 6360000004 HC RX CONTRAST MEDICATION: Performed by: SURGERY

## 2021-05-17 PROCEDURE — 94660 CPAP INITIATION&MGMT: CPT

## 2021-05-17 PROCEDURE — 83735 ASSAY OF MAGNESIUM: CPT

## 2021-05-17 PROCEDURE — 2709999900 HC NON-CHARGEABLE SUPPLY: Performed by: SURGERY

## 2021-05-17 PROCEDURE — 85025 COMPLETE CBC W/AUTO DIFF WBC: CPT

## 2021-05-17 PROCEDURE — 3700000000 HC ANESTHESIA ATTENDED CARE: Performed by: SURGERY

## 2021-05-17 PROCEDURE — C2625 STENT, NON-COR, TEM W/DEL SY: HCPCS | Performed by: SURGERY

## 2021-05-17 PROCEDURE — 2500000003 HC RX 250 WO HCPCS: Performed by: STUDENT IN AN ORGANIZED HEALTH CARE EDUCATION/TRAINING PROGRAM

## 2021-05-17 PROCEDURE — C2617 STENT, NON-COR, TEM W/O DEL: HCPCS | Performed by: SURGERY

## 2021-05-17 PROCEDURE — 80053 COMPREHEN METABOLIC PANEL: CPT

## 2021-05-17 PROCEDURE — 2580000003 HC RX 258: Performed by: NURSE ANESTHETIST, CERTIFIED REGISTERED

## 2021-05-17 PROCEDURE — 3209999900 FLUORO FOR SURGICAL PROCEDURES

## 2021-05-17 PROCEDURE — 2580000003 HC RX 258: Performed by: INTERNAL MEDICINE

## 2021-05-17 PROCEDURE — 6370000000 HC RX 637 (ALT 250 FOR IP): Performed by: STUDENT IN AN ORGANIZED HEALTH CARE EDUCATION/TRAINING PROGRAM

## 2021-05-17 PROCEDURE — 85610 PROTHROMBIN TIME: CPT

## 2021-05-17 PROCEDURE — 94640 AIRWAY INHALATION TREATMENT: CPT

## 2021-05-17 PROCEDURE — 82962 GLUCOSE BLOOD TEST: CPT

## 2021-05-17 PROCEDURE — C9113 INJ PANTOPRAZOLE SODIUM, VIA: HCPCS | Performed by: STUDENT IN AN ORGANIZED HEALTH CARE EDUCATION/TRAINING PROGRAM

## 2021-05-17 PROCEDURE — 2580000003 HC RX 258: Performed by: STUDENT IN AN ORGANIZED HEALTH CARE EDUCATION/TRAINING PROGRAM

## 2021-05-17 PROCEDURE — 2700000000 HC OXYGEN THERAPY PER DAY

## 2021-05-17 DEVICE — PANCREATIC STENT DELIVERY SYSTEM
Type: IMPLANTABLE DEVICE | Site: BILE DUCT | Status: FUNCTIONAL
Brand: NAVIFLEX™ RX DELIVERY SYSTEM

## 2021-05-17 RX ORDER — LIDOCAINE HYDROCHLORIDE 20 MG/ML
INJECTION, SOLUTION INTRAVENOUS PRN
Status: DISCONTINUED | OUTPATIENT
Start: 2021-05-17 | End: 2021-05-17 | Stop reason: SDUPTHER

## 2021-05-17 RX ORDER — INSULIN GLARGINE 100 [IU]/ML
22 INJECTION, SOLUTION SUBCUTANEOUS NIGHTLY
Status: DISCONTINUED | OUTPATIENT
Start: 2021-05-17 | End: 2021-05-17

## 2021-05-17 RX ORDER — SODIUM CHLORIDE 9 MG/ML
INJECTION, SOLUTION INTRAVENOUS CONTINUOUS PRN
Status: DISCONTINUED | OUTPATIENT
Start: 2021-05-17 | End: 2021-05-17 | Stop reason: SDUPTHER

## 2021-05-17 RX ORDER — MIDAZOLAM HYDROCHLORIDE 1 MG/ML
INJECTION INTRAMUSCULAR; INTRAVENOUS PRN
Status: DISCONTINUED | OUTPATIENT
Start: 2021-05-17 | End: 2021-05-17 | Stop reason: SDUPTHER

## 2021-05-17 RX ORDER — MORPHINE SULFATE 2 MG/ML
2 INJECTION, SOLUTION INTRAMUSCULAR; INTRAVENOUS EVERY 5 MIN PRN
Status: DISCONTINUED | OUTPATIENT
Start: 2021-05-17 | End: 2021-05-17 | Stop reason: HOSPADM

## 2021-05-17 RX ORDER — INSULIN GLARGINE 100 [IU]/ML
20 INJECTION, SOLUTION SUBCUTANEOUS NIGHTLY
Status: DISCONTINUED | OUTPATIENT
Start: 2021-05-17 | End: 2021-05-20 | Stop reason: HOSPADM

## 2021-05-17 RX ORDER — MORPHINE SULFATE 2 MG/ML
1 INJECTION, SOLUTION INTRAMUSCULAR; INTRAVENOUS EVERY 5 MIN PRN
Status: DISCONTINUED | OUTPATIENT
Start: 2021-05-17 | End: 2021-05-17 | Stop reason: HOSPADM

## 2021-05-17 RX ORDER — PROPOFOL 10 MG/ML
INJECTION, EMULSION INTRAVENOUS CONTINUOUS PRN
Status: DISCONTINUED | OUTPATIENT
Start: 2021-05-17 | End: 2021-05-17 | Stop reason: SDUPTHER

## 2021-05-17 RX ORDER — PROMETHAZINE HYDROCHLORIDE 25 MG/ML
6.25 INJECTION, SOLUTION INTRAMUSCULAR; INTRAVENOUS EVERY 10 MIN PRN
Status: DISCONTINUED | OUTPATIENT
Start: 2021-05-17 | End: 2021-05-17 | Stop reason: HOSPADM

## 2021-05-17 RX ORDER — MEPERIDINE HYDROCHLORIDE 25 MG/ML
12.5 INJECTION INTRAMUSCULAR; INTRAVENOUS; SUBCUTANEOUS EVERY 5 MIN PRN
Status: DISCONTINUED | OUTPATIENT
Start: 2021-05-17 | End: 2021-05-17 | Stop reason: HOSPADM

## 2021-05-17 RX ORDER — HYDROCODONE BITARTRATE AND ACETAMINOPHEN 5; 325 MG/1; MG/1
2 TABLET ORAL PRN
Status: DISCONTINUED | OUTPATIENT
Start: 2021-05-17 | End: 2021-05-17 | Stop reason: HOSPADM

## 2021-05-17 RX ORDER — HYDROCODONE BITARTRATE AND ACETAMINOPHEN 5; 325 MG/1; MG/1
1 TABLET ORAL PRN
Status: DISCONTINUED | OUTPATIENT
Start: 2021-05-17 | End: 2021-05-17 | Stop reason: HOSPADM

## 2021-05-17 RX ADMIN — BENZONATATE 200 MG: 100 CAPSULE ORAL at 21:00

## 2021-05-17 RX ADMIN — METHOCARBAMOL 500 MG: 100 INJECTION, SOLUTION INTRAMUSCULAR; INTRAVENOUS at 11:35

## 2021-05-17 RX ADMIN — INSULIN LISPRO 6 UNITS: 100 INJECTION, SOLUTION INTRAVENOUS; SUBCUTANEOUS at 04:03

## 2021-05-17 RX ADMIN — ALBUTEROL SULFATE 2.5 MG: 2.5 SOLUTION RESPIRATORY (INHALATION) at 11:52

## 2021-05-17 RX ADMIN — ALBUTEROL SULFATE 2.5 MG: 2.5 SOLUTION RESPIRATORY (INHALATION) at 20:33

## 2021-05-17 RX ADMIN — MIDAZOLAM 2 MG: 1 INJECTION INTRAMUSCULAR; INTRAVENOUS at 15:55

## 2021-05-17 RX ADMIN — SODIUM CHLORIDE SOLN NEBU 3% 4 ML: 3 NEBU SOLN at 20:33

## 2021-05-17 RX ADMIN — METHOCARBAMOL 500 MG: 100 INJECTION, SOLUTION INTRAMUSCULAR; INTRAVENOUS at 04:03

## 2021-05-17 RX ADMIN — SODIUM CHLORIDE SOLN NEBU 3% 4 ML: 3 NEBU SOLN at 08:18

## 2021-05-17 RX ADMIN — KETOROLAC TROMETHAMINE 15 MG: 30 INJECTION, SOLUTION INTRAMUSCULAR; INTRAVENOUS at 11:35

## 2021-05-17 RX ADMIN — AMPICILLIN SODIUM AND SULBACTAM SODIUM 3000 MG: 2; 1 INJECTION, POWDER, FOR SOLUTION INTRAMUSCULAR; INTRAVENOUS at 19:05

## 2021-05-17 RX ADMIN — Medication 6 MG: at 06:41

## 2021-05-17 RX ADMIN — HEPARIN SODIUM 5000 UNITS: 10000 INJECTION INTRAVENOUS; SUBCUTANEOUS at 06:10

## 2021-05-17 RX ADMIN — METHOCARBAMOL 500 MG: 100 INJECTION, SOLUTION INTRAMUSCULAR; INTRAVENOUS at 19:59

## 2021-05-17 RX ADMIN — KETOROLAC TROMETHAMINE 15 MG: 30 INJECTION, SOLUTION INTRAMUSCULAR; INTRAVENOUS at 06:10

## 2021-05-17 RX ADMIN — AMPICILLIN SODIUM AND SULBACTAM SODIUM 3000 MG: 2; 1 INJECTION, POWDER, FOR SOLUTION INTRAMUSCULAR; INTRAVENOUS at 06:10

## 2021-05-17 RX ADMIN — SODIUM CHLORIDE: 9 INJECTION, SOLUTION INTRAVENOUS at 15:12

## 2021-05-17 RX ADMIN — SODIUM CHLORIDE SOLN NEBU 3% 4 ML: 3 NEBU SOLN at 11:52

## 2021-05-17 RX ADMIN — SODIUM CHLORIDE: 9 INJECTION, SOLUTION INTRAVENOUS at 15:55

## 2021-05-17 RX ADMIN — GUAIFENESIN 300 MG: 200 SOLUTION ORAL at 20:59

## 2021-05-17 RX ADMIN — Medication 10 ML: at 08:30

## 2021-05-17 RX ADMIN — Medication 10 ML: at 21:00

## 2021-05-17 RX ADMIN — ATORVASTATIN CALCIUM 20 MG: 20 TABLET, FILM COATED ORAL at 21:00

## 2021-05-17 RX ADMIN — HEPARIN SODIUM 5000 UNITS: 10000 INJECTION INTRAVENOUS; SUBCUTANEOUS at 21:41

## 2021-05-17 RX ADMIN — LIDOCAINE HYDROCHLORIDE 40 MG: 20 INJECTION, SOLUTION INTRAVENOUS at 16:30

## 2021-05-17 RX ADMIN — INSULIN LISPRO 6 UNITS: 100 INJECTION, SOLUTION INTRAVENOUS; SUBCUTANEOUS at 00:14

## 2021-05-17 RX ADMIN — POTASSIUM CHLORIDE, DEXTROSE MONOHYDRATE AND SODIUM CHLORIDE: 150; 5; 450 INJECTION, SOLUTION INTRAVENOUS at 08:31

## 2021-05-17 RX ADMIN — INSULIN LISPRO 9 UNITS: 100 INJECTION, SOLUTION INTRAVENOUS; SUBCUTANEOUS at 20:16

## 2021-05-17 RX ADMIN — SODIUM CHLORIDE, PRESERVATIVE FREE 10 ML: 5 INJECTION INTRAVENOUS at 08:30

## 2021-05-17 RX ADMIN — PANTOPRAZOLE SODIUM 40 MG: 40 INJECTION, POWDER, FOR SOLUTION INTRAVENOUS at 08:29

## 2021-05-17 RX ADMIN — Medication: at 11:57

## 2021-05-17 RX ADMIN — BUDESONIDE 1000 MCG: 0.5 SUSPENSION RESPIRATORY (INHALATION) at 08:18

## 2021-05-17 RX ADMIN — PROPOFOL 50 MCG/KG/MIN: 10 INJECTION, EMULSION INTRAVENOUS at 15:58

## 2021-05-17 RX ADMIN — KETOROLAC TROMETHAMINE 15 MG: 30 INJECTION, SOLUTION INTRAMUSCULAR; INTRAVENOUS at 00:15

## 2021-05-17 RX ADMIN — ALBUTEROL SULFATE 2.5 MG: 2.5 SOLUTION RESPIRATORY (INHALATION) at 08:18

## 2021-05-17 RX ADMIN — POTASSIUM PHOSPHATE, MONOBASIC AND POTASSIUM PHOSPHATE, DIBASIC 30 MMOL: 224; 236 INJECTION, SOLUTION, CONCENTRATE INTRAVENOUS at 21:14

## 2021-05-17 RX ADMIN — GABAPENTIN 300 MG: 300 CAPSULE ORAL at 21:01

## 2021-05-17 RX ADMIN — INSULIN GLARGINE 20 UNITS: 100 INJECTION, SOLUTION SUBCUTANEOUS at 20:16

## 2021-05-17 RX ADMIN — BUDESONIDE 1000 MCG: 0.5 SUSPENSION RESPIRATORY (INHALATION) at 20:33

## 2021-05-17 ASSESSMENT — PAIN SCALES - GENERAL
PAINLEVEL_OUTOF10: 3
PAINLEVEL_OUTOF10: 0
PAINLEVEL_OUTOF10: 2
PAINLEVEL_OUTOF10: 0
PAINLEVEL_OUTOF10: 0
PAINLEVEL_OUTOF10: 8
PAINLEVEL_OUTOF10: 7

## 2021-05-17 ASSESSMENT — PAIN DESCRIPTION - PROGRESSION
CLINICAL_PROGRESSION: GRADUALLY IMPROVING
CLINICAL_PROGRESSION: GRADUALLY IMPROVING

## 2021-05-17 ASSESSMENT — PAIN DESCRIPTION - FREQUENCY: FREQUENCY: INTERMITTENT

## 2021-05-17 ASSESSMENT — PAIN DESCRIPTION - LOCATION
LOCATION: ABDOMEN;BACK
LOCATION: ABDOMEN;BACK

## 2021-05-17 ASSESSMENT — PAIN DESCRIPTION - ONSET: ONSET: GRADUAL

## 2021-05-17 ASSESSMENT — PAIN DESCRIPTION - DESCRIPTORS: DESCRIPTORS: ACHING;DISCOMFORT;SORE

## 2021-05-17 NOTE — PROGRESS NOTES
PRN  morphine, 2 mg, Q5 Min PRN  HYDROmorphone, 0.25 mg, Q5 Min PRN  HYDROmorphone, 0.5 mg, Q5 Min PRN  HYDROcodone 5 mg - acetaminophen, 1 tablet, PRN   Or  HYDROcodone 5 mg - acetaminophen, 2 tablet, PRN  promethazine, 6.25 mg, Q10 Min PRN  indomethacin, , PRN  iopamidol, , PRN  naloxone, 0.4 mg, PRN  benzocaine-menthol, 1 lozenge, PRN  sodium chloride flush, 5-40 mL, PRN  sodium chloride, 25 mL, PRN  acetaminophen, 650 mg, Q4H PRN  promethazine, 12.5 mg, Q6H PRN   Or  ondansetron, 4 mg, Q6H PRN  glucose, 15 g, PRN  dextrose, 12.5 g, PRN  glucagon (rDNA), 1 mg, PRN  dextrose, 100 mL/hr, PRN  labetalol, 10 mg, Q30 Min PRN  hydrALAZINE, 10 mg, Q30 Min PRN      Continuous Infusions:   dextrose 5% and 0.45% NaCl with KCl 20 mEq 50 mL/hr at 05/17/21 0831    HYDROmorphone      sodium chloride      dextrose         Review of Systems  All systems reviewed. All negative except for symptoms mentioned in HPI     OBJECTIVE    /73   Pulse 98   Temp 98.2 °F (36.8 °C) (Temporal)   Resp 13   Ht 5' 8\" (1.727 m)   Wt 223 lb (101.2 kg)   SpO2 96%   BMI 33.91 kg/m²     Intake/Output Summary (Last 24 hours) at 5/17/2021 1719  Last data filed at 5/17/2021 1445  Gross per 24 hour   Intake 2346.67 ml   Output 769 ml   Net 1577.67 ml       Physical examination:  General: awake alert, oriented x3  HEENT: normocephalic non traumatic, no exophthalmos   Neck: supple, No thyroid tenderness,  Pulm: Clear equal air entry no added sounds  CVS: S1 + S2  Abd: s/p surgery   Skin: warm, no lesions, no rash.  No open wounds, no ulcers   Neuro: CN intact, sensation decreased bilateral , muscle power normal  Psych: normal mood, and affect    Review of Laboratory Data:  I personally reviewed the following labs:   Recent Labs     05/15/21  0520 05/16/21  0545 05/17/21  0500   WBC 16.3* 16.5* 13.9*   RBC 3.73* 3.50* 3.50*   HGB 8.8* 8.2* 7.9*   HCT 28.7* 26.8* 27.2*   MCV 76.9* 76.6* 77.7*   MCH 23.6* 23.4* 22.6*   MCHC 30.7* 30.6* 29.0* sliding scale Q4hrs   · Continue glucose check with meals and at bedtime   · Will titrate insulin dose based on the blood glucose trend & insulin requirement  · Will arrange for patient to be seen in endocrinology clinic upon discharge for routine diabetes maintenance and prevention. Pancreatic lesion  · Pt s/p distal pancreatectomy and splenectomy on 5/12/2021   · Pathology report in process     The above issues were reviewed with the patient who understood and agreed with the plan. Thank you for allowing us to participate in the care of this patient. Please do not hesitate to contact us with any additional questions. Claudeen Chaco MD  Endocrinologist, [facility] Diabetes Care and Endocrinology   [address] [place] [address]   Phone: [phone]  Fax: [phone]  --------------------------------------------  An electronic signature was used to authenticate this note.  Ni Cordova MD on 5/17/2021 at 5:19 PM

## 2021-05-17 NOTE — CONSULTS
Surgical Endoscopy Consult  Rupert Cockayne, MD    Patient's Name/Date of Birth: Jase Reyna / 1960    Date: May 17, 2021     Consulting Surgeon: Carlos Foy MD    PCP: Vilma Worthington DO     Chief Complaint: pancreatic leak    HPI:   Jase Reyna is a 62 y.o. male who underwent distal pancreatectomy for fast growing IPMN last week. He has had elevated amylase in surgical drain consistent with a leak. I was asked to see him for ERCP with pancreatic stent placement. He has otherwise done well since surgery and has minimal complaints. He denies nausea, vomiting, constipation, diarrhea, headache, chest pain, shortness of breath, fevers, chills.       Patient Active Problem List   Diagnosis    Mixed hyperlipidemia    Neck pain    Cervical radiculopathy at C7    Essential hypertension    Chronic seasonal allergic rhinitis    Ventral hernia without obstruction or gangrene    Poorly controlled type 2 diabetes mellitus (HCC)    Chronic bilateral low back pain with right-sided sciatica    Gynecomastia    Panic disorder    Cubital tunnel syndrome on right    Right carpal tunnel syndrome    Spondylosis of lumbar spine    Bell's palsy    Other bursal cyst, left elbow    Sacral radiculitis    Lumbar radiculitis    Spondylosis of cervical region without myelopathy or radiculopathy    Cervical facet joint syndrome    Cervical disc disorder    Lumbar facet arthropathy    Spinal stenosis of lumbar region with neurogenic claudication    Lumbar disc disorder    Pancreatic neoplasm    IPMN (intraductal papillary mucinous neoplasm)       Allergies   Allergen Reactions    Iodine Swelling     Sea food    Metformin And Related Hives    Wheat Extract Swelling       Past Medical History:   Diagnosis Date    Arthritis     Back pain     5th finger & ring finger on right hand is numb    Bell's palsy     NEW ONSET 3-     Hyperlipidemia     Hypertension     Neuropathy     toes numb    Poorly controlled type 2 diabetes mellitus with neuropathy ([facility] [address] 5/8/2018    Sacral radiculitis 2/27/2019    Spondylosis of lumbar spine 3/9/2019    Advanced arthritis and degenerative disc disease by MRI 3/2019    Ventral hernia        Past Surgical History:   Procedure Laterality Date    ANESTHESIA NERVE BLOCK Bilateral 7/11/2019    BILATERAL L4-5 TRANSFORAMINAL EPIDURAL STEROID INJECTION performed by Katja Gonzales DO at [address] Bilateral 8/1/2019    BILATERAL MEDIAL BRANCH BLOCK L4-5 AND L5-S1 performed by Katja Gonzales DO at [address] [facility] Bilateral 8/15/2019    BILATERAL MEDIAL BRANCH BLOCK L4 - 5 AND L5 - S1 performed by Katja Gonzales DO at [facility] Right 03/29/2019    right wrist carpal tunnel release and right elbow cubital tunnel release    CARPAL TUNNEL RELEASE Right 3/29/2019    RIGHT WRIST CARPAL TUNNEL RELEASE AND RIGHT ELBOW CUBITAL TUNNEL RELEASE performed by Flaquito Swenson DO at [place] [address]      at age 40 polyps    CYST REMOVAL Right     EPIDURAL STEROID INJECTION N/A 10/17/2019    LUMBAR EPIDURAL STEROID INJECTION UNDER FLUOROSCOPIC GUIDANCE AT L2-L3 WITHOUT SEDATION performed by Jade Rdz MD at [address] [facility] Bilateral     groin and umbilical    HERNIA REPAIR N/A 12/13/2018    ROBOTIC ASSISTED LAPAROSCOPIC PRIMARY REPAIR OF RECURRENT VENTRAL HERNIA  REPAIR performed by James Potter MD at [address] [facility] [address] Bilateral 07/11/2019    L4-5 transforaminal     NERVE BLOCK Bilateral 08/01/2019    medial branch block    NERVE BLOCK Bilateral 08/15/2019    bilateral medial branch block L4-S1    NERVE BLOCK  10/17/2019    lumbar epidural    PANCREATECTOMY N/A 5/12/2021    LAPAROSCOPIC ROBOTIC DISTAL PANCREATECTOMY AND SPLENECTOMY AND CHOLECYSTECTOMY, INTRA-OPERATIVE ULTRASOUND, TRANSITIONED TO OPEN -- EPIDURAL performed by Lakeshia Kumari MD at [place] [address] SURGERY  06/05/2018    C5-7 fusion at [facility] N/A 1/8/2021    EGD W/EUS FNA performed by Adrian Davies DO at [address] [place] N/A 1/8/2021    EGD DIAGNOSTIC ONLY performed by Adrian Davies DO at [address] History     Socioeconomic History    Marital status: Single     Spouse name: Not on file    Number of children: Not on file    Years of education: Not on file    Highest education level: Not on file   Occupational History    Not on file   Tobacco Use    Smoking status: Never Smoker    Smokeless tobacco: Never Used   Vaping Use    Vaping Use: Never used   Substance and Sexual Activity    Alcohol use: Not Currently     Comment: occasional    Drug use: Never    Sexual activity: Not on file   Other Topics Concern    Not on file   Social History Narrative    Not on file     Social Determinants of Health     Financial Resource Strain:     Difficulty of Paying Living Expenses:    Food Insecurity:     Worried About Running Out of Food in the Last Year:     [address] in the Last Year:    Transportation Needs:     Lack of Transportation (Medical):  Lack of Transportation (Non-Medical):    Physical Activity:     Days of Exercise per Week:     Minutes of Exercise per Session:    Stress:     Feeling of Stress :    Social Connections:     Frequency of Communication with Friends and Family:     Frequency of Social Gatherings with Friends and Family:     Attends Sikhism Services:     Active Member of Clubs or Organizations:     Attends Club or Organization Meetings:     Marital Status:    Intimate Partner Violence:     Fear of Current or Ex-Partner:     Emotionally Abused:     Physically Abused:     Sexually Abused:         The patient has a family history that is negative for severe cardiovascular or respiratory issues, negative for reaction to anesthesia. Recent Labs     05/15/21  0520 05/16/21  0545 05/17/21  0500   WBC 16.3* 16.5* 13.9*   HGB 8.8* 8.2* 7.9*   HCT 28.7* 26.8* 27.2*   MCV 76.9* 76.6* 77.7*    272 335       Recent Labs     05/15/21  0520 05/16/21  0545 05/17/21  0500    138 137   K 4.4 3.7 3.6   CO2 31* 29 25   PHOS 2.4* 2.3* 2.3*   BUN 18 18 13   CREATININE 0.8 0.7 0.7       Recent Labs     05/15/21  0520 05/16/21  0545 05/17/21  0500   PROT 6.4 6.0* 6.2*   INR  --   --  1.3         Intake/Output Summary (Last 24 hours) at 5/17/2021 1222  Last data filed at 5/17/2021 0945  Gross per 24 hour   Intake 2933.17 ml   Output 1372 ml   Net 1561.17 ml       I have reviewed relevant labs from this admission and interpretation is included in my assessment and plan      Review of Systems  A complete 10 system review was performed and are otherwise negative unless mentioned in the above HPI. Specific negatives are listed below but may not include all those reviewed.     General ROS: negative obtundation, AMS  ENT ROS: negative rhinorrhea, epistaxis  Allergy and Immunology ROS: negative itchy/watery eyes or nasal congestion  Hematological and Lymphatic ROS: negative spontaneous bleeding or bruising  Endocrine ROS: negative  lethargy, mood swings, palpitations or polydipsia/polyuria  Respiratory ROS: negative sputum changes, stridor, tachypnea or wheezing  Cardiovascular ROS: negative for - loss of consciousness, murmur or orthopnea  Gastrointestinal ROS: negative for - hematochezia or hematemesis  Genito-Urinary ROS: negative for -  genital discharge or hematuria  Musculoskeletal ROS: negative for - focal weakness, gangrene  Psych/Neuro ROS: negative for - visual or auditory hallucinations, suicidal ideation      Physical exam:   /71   Pulse 94   Temp 98.6 °F (37 °C) (Temporal)   Resp 18   Ht 5' 8\" (1.727 m)   Wt 223 lb (101.2 kg)   SpO2 94%   BMI 33.91 kg/m²   General discussing case with nursing, support staff and other physicians; as well as coordinating care.         Physician Signature: Electronically signed by Dr. Tina Snow

## 2021-05-17 NOTE — PROGRESS NOTES
[facility]  Department of Internal Medicine  Division of Pulmonary, Critical Care and Sleep Medicine  Consult Note  Jorge Dickens DO, Paula Murphy, [address] [place] [address], MD, [facility]    Patient: Mic Lenz  MRN: [id number]  : 1960    Encounter Time: 2:13 PM     Date of Admission: 2021  5:27 AM    Primary Care Physician: Og Loredo DO    Reason for Consultation: Post Op Resp Insuff       SUBJECTIVE:    Violent coughing  Tan sputum  CUlture pending Day  of Unasyn   Cultures are negative  To hae ERCP with pancreatic duct stent placement today,   Tolerating full liquid diet. No fevers  Did not use CPAP? OBJECTIVE:     PHYSICAL EXAM:   VITALS:   Vitals:    21 0819 21 0820 21 1152 21 1153   BP:       Pulse:       Resp: 18 18 18 18   Temp:       TempSrc:       SpO2: 94% 94% 97% 97%   Weight:       Height:            Intake/Output Summary (Last 24 hours) at 2021 1413  Last data filed at 2021 1257  Gross per 24 hour   Intake 2689.34 ml   Output 767 ml   Net 1922.34 ml        CONSTITUTIONAL:    A&O x 3, NAD  SKIN:     No rash, No suspicious lesions or skin discoloration  HEENT:     EOMI, MMM, No thrush  NECK:    No bruits, No JVP apprechiated  CV:      Sinus,  No murmur, No rubs, No gallops  PULMONARY:   Couse BS,  No noted egophony  ABDOMEN:     Distended scars incision, BS normal. +G      MAX drain left upper quadrant w/ brown fluid. EXT:    No deformities . No clubbing.       + lower extremity edema, No venous stasis  PULSE:   Appears equal and palpable.   PSYCHIATRIC:  Seems appropriate, No acute psycosis  MS:    No fractures, No gross weakness  NEUROLOGIC:   The clinical assessment is non-focal     DATA: IMAGING & TESTING:     LABORATORY TESTS:    CBC:   Lab Results   Component Value Date    WBC 13.9 2021    RBC 3.50 2021    HGB 7.9 2021    HCT 27.2 2021    MCV 77.7 2021    MCH 22.6 05/17/2021    MCHC 29.0 05/17/2021    RDW 14.8 05/17/2021     05/17/2021    MPV 11.4 05/17/2021     CMP:    Lab Results   Component Value Date     05/17/2021    K 3.6 05/17/2021    K 4.8 12/13/2018     05/17/2021    CO2 25 05/17/2021    BUN 13 05/17/2021    CREATININE 0.7 05/17/2021    GFRAA >60 05/17/2021    LABGLOM >60 05/17/2021    GLUCOSE 182 05/17/2021    PROT 6.2 05/17/2021    LABALBU 3.2 05/17/2021    CALCIUM 8.1 05/17/2021    BILITOT 0.4 05/17/2021    ALKPHOS 57 05/17/2021    AST 33 05/17/2021    ALT 29 05/17/2021     Calcium:    Lab Results   Component Value Date    CALCIUM 8.1 05/17/2021     Ionized Calcium:  No results found for: IONCA  PT/INR:    Lab Results   Component Value Date    PROTIME 13.8 05/17/2021    INR 1.3 05/17/2021     ABG:    Lab Results   Component Value Date    PH 7.330 05/12/2021    PCO2 38.6 05/12/2021    PO2 99.3 05/12/2021    HCO3 19.9 05/12/2021    BE -5.5 05/12/2021    O2SAT 97.3 05/12/2021       ABGs:   Lab Results   Component Value Date    PH 7.330 05/12/2021    PO2 99.3 05/12/2021    PCO2 38.6 05/12/2021       IMAGING:  Imaging tests were completed and reviewed and discussed radiology and care team involved and reveals     MRI Abdomen W Wo Contrast MRCP  Result Date: 4/19/2021  EXAMINATION: MRI OF THE ABDOMEN WITH AND WITHOUT CONTRAST AND MRCP 4/19/2021 10:15 am TECHNIQUE: Multiplanar multisequence MRI of the abdomen was performed with and without the administration of intravenous contrast.  After initial T2 axial and coronal images, thick slab, thin slab and 3D coronal MRCP sequences were obtained without the administration of intravenous contrast.  MIP images are provided for review. COMPARISON: MRI 12/15/2020, 11/22/2019.   CT abdomen and pelvis 12/07/2019 HISTORY: ORDERING SYSTEM PROVIDED HISTORY: IPMN (intraductal papillary mucinous neoplasm) TECHNOLOGIST PROVIDED HISTORY: Reason for exam:->bmp What reading provider will be dictating this exam?->CRC FINDINGS: ABDOMEN: The visualized lung bases reveal no signal abnormality. The liver is normal in morphology. No evidence for steatosis. No suspicious liver lesion identified. The hepatic veins and portal veins are appropriately opacified. The cystic lesion in the anterior body of the pancreas measures 2.8 x 1.4 x 1.7 cm and has thin internal septations. No discrete solid mass identified. This abuts the pancreatic duct however there is no duct dilatation. No surrounding inflammatory change. No new findings otherwise appreciated in the pancreas. The spleen, adrenals and kidneys reveal no significant findings. Subcentimeter renal cysts. The visualized bowel is normal in caliber. The aorta is normal in caliber. The visceral branches are patent. No lymphadenopathy. The IVC is appropriately opacified. Signal artifact from transpedicular fixation hardware in the lumbar spine. No suspicious signal abnormality identified. MRCP: Gallbladder: Unremarkable. Bile Ducts: Unremarkable. Pancreatic Duct: Normal caliber and contour. Interval increase in size of previously characterized side branch IPMN in the body of the pancreas now measuring 2.8 x 1.4 x 1.7 cm versus 1.9 x 1.5 x 1.5 cm. Internal complexity with septations appears unchanged. No duct enlargement. Cta Triphasic Pancreas    Result Date: 5/6/2021  EXAMINATION: CT ABDOMEN WITH AND WITHOUT CONTRAST 5/6/2021 8:42 am TECHNIQUE: CT of the abdomen was performed without and with the administration of intravenous contrast. Multiplanar reformatted images are provided for review. Dose modulation, iterative reconstruction, and/or weight based adjustment of the mA/kV was utilized to reduce the radiation dose to as low as reasonably achievable.  COMPARISON: Abdomen MRIs dating to 11/22/2019, abdomen and pelvis CTs dated 12/07/2019 and 09/17/2018 HISTORY: ORDERING SYSTEM PROVIDED HISTORY: Intraductal papillary mucinous neoplasm determined by biopsy of pancreas on prior MRIs. However, a macrocystic serous cystadenoma or mucinous cystadenoma could appear similar. Of note, there is no evident involvement or obstruction of the main pancreatic duct. Additionally, the lesion is largely intraparenchymal with no abutment or encasement of major named vessels. 2. Reflux of the contrast bolus can be seen as normal variation but could also be related to right heart dysfunction. Assessment:   1. 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 Plan:     1. Ezpap  2. Patient continues on Unasyn IV Q12 d4/5,   3. ERCP with pancreatic duct stent placement today,   4. IPPB needed  5. Please get sputum to the lab  6. Xopenex 0.63 TID  7. Unasyn Q12 day2  8. Increase mucinex 300 QID  9. ICS Q1 hour  10.  CPAP nightly   11. 3 % saline gonsalos    Rajiv Mello DO DO, MPH, FCCP, Rhiannon Lipoma  Professor of Internal Medicine  Pulmonary, Critical Care and Sleep Medicine

## 2021-05-17 NOTE — ANESTHESIA PRE PROCEDURE
Department of Anesthesiology  Preprocedure Note       Name:  Rossy Krishna   Age:  62 y.o.  :  1960                                          MRN:  [id number]         Date:  2021      Surgeon: Davey Herrera):  Levi Jefferson MD    Procedure: Procedure(s):  ERCP ENDOSCOPIC RETROGRADE CHOLANGIOPANCREATOGRAPHY & pancreatic duct stent    Medications prior to admission:   Prior to Admission medications    Medication Sig Start Date End Date Taking? Authorizing Provider   gabapentin (NEURONTIN) 300 MG capsule Take 1 capsule by mouth 2 times daily.  21  Yes Historical Provider, MD   atorvastatin (LIPITOR) 10 MG tablet take 1 tablet by mouth once daily  Patient taking differently: 20 mg  3/14/19  Yes Dameon Ramirez MD       Current medications:    Current Facility-Administered Medications   Medication Dose Route Frequency Provider Last Rate Last Admin    insulin glargine (LANTUS) injection vial 22 Units  22 Units Subcutaneous Nightly Nikhil Amaya Lind MD        insulin lispro (HUMALOG) injection vial 0-18 Units  0-18 Units Subcutaneous Q4H Trinh Cui MD   6 Units at 21 0403    benzonatate (TESSALON) capsule 200 mg  200 mg Oral TID Cleo Murrain, DO   200 mg at 21 2229    guaiFENesin (ROBITUSSIN) 100 MG/5ML oral solution 300 mg  300 mg Oral TID Cleo Murrain, DO   300 mg at 21 2112    budesonide (PULMICORT) nebulizer suspension 1,000 mcg  1 mg Nebulization BID Ni Horse Brigitte, DO   1,000 mcg at 21 0818    sodium chloride (Inhalant) 3 % nebulizer solution 4 mL  4 mL Nebulization TID Ni Horse Brigitte, DO   4 mL at 21 1152    dextrose 5 % and 0.45 % NaCl with KCl 20 mEq infusion   Intravenous Continuous Maddison Garsia MD 50 mL/hr at 21 0831 New Bag at 21 0831    docusate sodium (COLACE) capsule 100 mg  100 mg Oral BID Maddison Garsia MD   100 mg at 21 2146    ketorolac (TORADOL) injection 15 mg  15 mg Intravenous Q6H Finesse RIVAS Medical History:        Diagnosis Date    Arthritis     Back pain     5th finger & ring finger on right hand is numb    Bell's palsy     NEW ONSET 3-     Hyperlipidemia     Hypertension     Neuropathy     toes numb    Poorly controlled type 2 diabetes mellitus with neuropathy ([address] 5/8/2018    Sacral radiculitis 2/27/2019    Spondylosis of lumbar spine 3/9/2019    Advanced arthritis and degenerative disc disease by MRI 3/2019    Ventral hernia        Past Surgical History:        Procedure Laterality Date    ANESTHESIA NERVE BLOCK Bilateral 7/11/2019    BILATERAL L4-5 TRANSFORAMINAL EPIDURAL STEROID INJECTION performed by Dmitriy Dunn DO at [address] Bilateral 8/1/2019    BILATERAL MEDIAL BRANCH BLOCK L4-5 AND L5-S1 performed by Dmitriy Dunn DO at [address] Bilateral 8/15/2019    BILATERAL MEDIAL BRANCH BLOCK L4 - 5 AND L5 - S1 performed by Dmitriy Dunn DO at [facility] Right 03/29/2019    right wrist carpal tunnel release and right elbow cubital tunnel release    CARPAL TUNNEL RELEASE Right 3/29/2019    RIGHT WRIST CARPAL TUNNEL RELEASE AND RIGHT ELBOW CUBITAL TUNNEL RELEASE performed by Ginger Armendariz DO at [place] [address]      at age 40 polyps    CYST REMOVAL Right     EPIDURAL STEROID INJECTION N/A 10/17/2019    LUMBAR EPIDURAL STEROID INJECTION UNDER FLUOROSCOPIC GUIDANCE AT L2-L3 WITHOUT SEDATION performed by Beth Tanner MD at [address] [facility] Bilateral     groin and umbilical    HERNIA REPAIR N/A 12/13/2018    ROBOTIC ASSISTED LAPAROSCOPIC PRIMARY REPAIR OF RECURRENT VENTRAL HERNIA  REPAIR performed by Kalee Cheema MD at [address] Bilateral 07/11/2019    L4-5 transforaminal     NERVE BLOCK Bilateral 08/01/2019    medial branch block    NERVE BLOCK Bilateral 08/15/2019    bilateral medial branch block L4-S1    NERVE BLOCK  10/17/2019    lumbar epidural    PANCREATECTOMY N/A 5/12/2021    LAPAROSCOPIC ROBOTIC DISTAL PANCREATECTOMY AND SPLENECTOMY AND CHOLECYSTECTOMY, INTRA-OPERATIVE ULTRASOUND, TRANSITIONED TO OPEN -- EPIDURAL performed by Jose Maria Hardy MD at [place] [address] SURGERY  06/05/2018    C5-7 fusion at [address] in [address] N/A 1/8/2021    EGD W/EUS FNA performed by Mango Zaldivar DO at [facility] N/A 1/8/2021    EGD DIAGNOSTIC ONLY performed by Mango Zaldivar DO at [address] [place] [address] Main History:    Social History     Tobacco Use    Smoking status: Never Smoker    Smokeless tobacco: Never Used   Substance Use Topics    Alcohol use: Not Currently     Comment: occasional                                Counseling given: Not Answered      Vital Signs (Current):   Vitals:    05/17/21 0819 05/17/21 0820 05/17/21 1152 05/17/21 1153   BP:       Pulse:       Resp: 18 18 18 18   Temp:       TempSrc:       SpO2: 94% 94% 97% 97%   Weight:       Height:                                                  BP Readings from Last 3 Encounters:   05/17/21 121/71   05/12/21 108/63   05/06/21 131/78       NPO Status: Time of last liquid consumption: 2300                        Time of last solid consumption: 1930                        Date of last liquid consumption: 05/16/21                        Date of last solid food consumption: 05/11/21    BMI:   Wt Readings from Last 3 Encounters:   05/12/21 223 lb (101.2 kg)   05/06/21 223 lb (101.2 kg)   04/20/21 231 lb 3.2 oz (104.9 kg)     Body mass index is 33.91 kg/m².     CBC:   Lab Results   Component Value Date    WBC 13.9 05/17/2021    RBC 3.50 05/17/2021    HGB 7.9 05/17/2021    HCT 27.2 05/17/2021    MCV 77.7 05/17/2021    RDW 14.8 05/17/2021     05/17/2021       CMP:   Lab Results   Component Value Date     05/17/2021    K 3.6 05/17/2021    K 4.8 12/13/2018     05/17/2021    CO2 25 05/17/2021    BUN 13 05/17/2021    CREATININE 0.7 05/17/2021    GFRAA >60 05/17/2021    LABGLOM >60 05/17/2021    GLUCOSE 182 05/17/2021    PROT 6.2 05/17/2021    CALCIUM 8.1 05/17/2021    BILITOT 0.4 05/17/2021    ALKPHOS 57 05/17/2021    AST 33 05/17/2021    ALT 29 05/17/2021       POC Tests: No results for input(s): POCGLU, POCNA, POCK, POCCL, POCBUN, POCHEMO, POCHCT in the last 72 hours. Coags:   Lab Results   Component Value Date    PROTIME 13.8 05/17/2021    INR 1.3 05/17/2021       HCG (If Applicable): No results found for: PREGTESTUR, PREGSERUM, HCG, HCGQUANT     ABGs:   Lab Results   Component Value Date    PO2ART 118.2 05/12/2021    JZN6GWA 56.0 05/12/2021    AUI3WBV 22.9 05/12/2021        Type & Screen (If Applicable):  No results found for: LABABO, LABRH    Drug/Infectious Status (If Applicable):  No results found for: HIV, HEPCAB    COVID-19 Screening (If Applicable):   Lab Results   Component Value Date    COVID19 Not Detected 05/06/2021           Anesthesia Evaluation   no history of anesthetic complications:   Airway: Mallampati: III  TM distance: >3 FB   Neck ROM: limited  Mouth opening: > = 3 FB Dental:          Pulmonary:   (+) sleep apnea: on CPAP,  wheezes,                             Cardiovascular:    (+) hypertension:,         Rhythm: regular  Rate: normal                    Neuro/Psych:   (+) neuromuscular disease:, psychiatric history:            GI/Hepatic/Renal:   (+) morbid obesity          Endo/Other:    (+) DiabetesType II DM, , .                 Abdominal:   (+) obese,         Vascular:                                        Anesthesia Plan      MAC     ASA 3             Anesthetic plan and risks discussed with patient. Attending anesthesiologist reviewed and agrees with Pre Eval content              MARGAUX Pires - CRNA   5/17/2021      Pt seen, examined, chart reviewed, plan discussed.   Ro Santacruz MD  5/17/2021 3:18 PM

## 2021-05-17 NOTE — PROGRESS NOTES
Patient ambulated 250  Feet around nursing unit with assistance of two staff. Patient then sat up in chair for two hours. Tolerated well.

## 2021-05-17 NOTE — PROGRESS NOTES
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 Only complaint is shortness of breath. No nausea no vomiting. Tolerated full liquid diet over the weekend. Had multiple bowel movements including a large 1 this morning. U: 250/500  S: X3  MAX: 22 dark red    Objective:  /66   Pulse 88   Temp 98.1 °F (36.7 °C) (Temporal)   Resp 22   Ht 5' 8\" (1.727 m)   Wt 223 lb (101.2 kg)   SpO2 96%   BMI 33.91 kg/m²     GENERAL:  Laying in bed, awake, alert  HEAD: Normocephalic, atraumatic  EYES: No sclera icterus, pupils equal  LUNGS:  No increased work of breathing  CARDIOVASCULAR: RR and normotensive  ABDOMEN:  Soft, mildly distended, appropriately TTP incisions clean dry and intact, MAX drain left upper quadrant with serous drainage. EXTREMITIES: No edema or swelling  SKIN: Warm and dry    Assessment/Plan:  62 y.o. male with 1.4cm pancreatic neck BD-IPMN with rapid growth to now 2.9cm (nodule and septations) in 6 months. Patient went for a robotic converted to open distal pancreatectomy and splenectomy on 5/12/2021    Pain and nausea control PRN  NPO for procedure  IVFs  OOB/AAT/SMI  DVT ppx w/ heparin and scd's  Endocrinology consulted for glucose management  Bowel regimen  Hypoxia - pulm recs appreciated, improving  Plan for ERCP 5/17 w/ pancreatic duct stent placement      Electronically signed by Kamaljit Jewell MD on 5/17/2021 at 7:05 AM    Vital signs improved, continues to be afebrile.   Tachycardia improved  Appreciate Dr. Tina Snow placing pancreatic duct stent  Ambulate aggressively  May need placement pending re-evaluation with PT/OT    Electronically signed by Whitney Fink MD on 5/17/2021 at 1:20 PM

## 2021-05-17 NOTE — ANESTHESIA POSTPROCEDURE EVALUATION
Department of Anesthesiology  Postprocedure Note    Patient: Myrene Cheadle  MRN: [id number]  YOB: 1960  Date of evaluation: 5/17/2021  Time:  6:07 PM     Procedure Summary     Date: 05/17/21 Room / Location: [facility] 03 / [facility]    Anesthesia Start:  Anesthesia Stop:     Procedure: ERCP ENDOSCOPIC RETROGRADE CHOLANGIOPANCREATOGRAPHY & pancreatic duct stent (N/A ) Diagnosis: (.)    Surgeons: Carolina Shultz MD Responsible Provider:     Anesthesia Type: MAC ASA Status: 3          Anesthesia Type: MAC    Dominic Phase I: Dominic Score: 8    Dominic Phase II:      Last vitals: Reviewed and per EMR flowsheets.        Anesthesia Post Evaluation    Patient location during evaluation: PACU  Patient participation: complete - patient participated  Level of consciousness: awake  Pain score: 3  Airway patency: patent  Nausea & Vomiting: no nausea and no vomiting  Complications: no  Cardiovascular status: blood pressure returned to baseline  Respiratory status: acceptable  Hydration status: euvolemic

## 2021-05-18 LAB
ALBUMIN SERPL-MCNC: 2.9 G/DL (ref 3.5–5.2)
ALP BLD-CCNC: 74 U/L (ref 40–129)
ALT SERPL-CCNC: 40 U/L (ref 0–40)
ANION GAP SERPL CALCULATED.3IONS-SCNC: 12 MMOL/L (ref 7–16)
AST SERPL-CCNC: 48 U/L (ref 0–39)
BASOPHILS ABSOLUTE: 0.04 E9/L (ref 0–0.2)
BASOPHILS RELATIVE PERCENT: 0.4 % (ref 0–2)
BILIRUB SERPL-MCNC: 0.4 MG/DL (ref 0–1.2)
BUN BLDV-MCNC: 10 MG/DL (ref 6–20)
CALCIUM SERPL-MCNC: 7.8 MG/DL (ref 8.6–10.2)
CHLORIDE BLD-SCNC: 104 MMOL/L (ref 98–107)
CO2: 25 MMOL/L (ref 22–29)
CREAT SERPL-MCNC: 0.7 MG/DL (ref 0.7–1.2)
EOSINOPHILS ABSOLUTE: 0.4 E9/L (ref 0.05–0.5)
EOSINOPHILS RELATIVE PERCENT: 4.1 % (ref 0–6)
GFR AFRICAN AMERICAN: >60
GFR NON-AFRICAN AMERICAN: >60 ML/MIN/1.73
GLUCOSE BLD-MCNC: 186 MG/DL (ref 74–99)
HCT VFR BLD CALC: 25.5 % (ref 37–54)
HEMOGLOBIN: 7.6 G/DL (ref 12.5–16.5)
IMMATURE GRANULOCYTES #: 0.09 E9/L
IMMATURE GRANULOCYTES %: 0.9 % (ref 0–5)
LACTIC ACID: 1.6 MMOL/L (ref 0.5–2.2)
LIPASE: 29 U/L (ref 13–60)
LYMPHOCYTES ABSOLUTE: 0.6 E9/L (ref 1.5–4)
LYMPHOCYTES RELATIVE PERCENT: 6.1 % (ref 20–42)
MAGNESIUM: 2.2 MG/DL (ref 1.6–2.6)
MCH RBC QN AUTO: 23 PG (ref 26–35)
MCHC RBC AUTO-ENTMCNC: 29.8 % (ref 32–34.5)
MCV RBC AUTO: 77.3 FL (ref 80–99.9)
METER GLUCOSE: 163 MG/DL (ref 74–99)
METER GLUCOSE: 164 MG/DL (ref 74–99)
METER GLUCOSE: 204 MG/DL (ref 74–99)
METER GLUCOSE: 228 MG/DL (ref 74–99)
METER GLUCOSE: 235 MG/DL (ref 74–99)
METER GLUCOSE: 248 MG/DL (ref 74–99)
MONOCYTES ABSOLUTE: 0.98 E9/L (ref 0.1–0.95)
MONOCYTES RELATIVE PERCENT: 9.9 % (ref 2–12)
NEUTROPHILS ABSOLUTE: 7.76 E9/L (ref 1.8–7.3)
NEUTROPHILS RELATIVE PERCENT: 78.6 % (ref 43–80)
PDW BLD-RTO: 15.1 FL (ref 11.5–15)
PHOSPHORUS: 2.1 MG/DL (ref 2.5–4.5)
PLATELET # BLD: 405 E9/L (ref 130–450)
PMV BLD AUTO: 11.3 FL (ref 7–12)
POTASSIUM SERPL-SCNC: 3.5 MMOL/L (ref 3.5–5)
RBC # BLD: 3.3 E12/L (ref 3.8–5.8)
SODIUM BLD-SCNC: 141 MMOL/L (ref 132–146)
TOTAL PROTEIN: 5.7 G/DL (ref 6.4–8.3)
WBC # BLD: 9.9 E9/L (ref 4.5–11.5)

## 2021-05-18 PROCEDURE — 6360000002 HC RX W HCPCS: Performed by: STUDENT IN AN ORGANIZED HEALTH CARE EDUCATION/TRAINING PROGRAM

## 2021-05-18 PROCEDURE — 83690 ASSAY OF LIPASE: CPT

## 2021-05-18 PROCEDURE — 2060000000 HC ICU INTERMEDIATE R&B

## 2021-05-18 PROCEDURE — 2700000000 HC OXYGEN THERAPY PER DAY

## 2021-05-18 PROCEDURE — 36415 COLL VENOUS BLD VENIPUNCTURE: CPT

## 2021-05-18 PROCEDURE — 83735 ASSAY OF MAGNESIUM: CPT

## 2021-05-18 PROCEDURE — 2580000003 HC RX 258: Performed by: STUDENT IN AN ORGANIZED HEALTH CARE EDUCATION/TRAINING PROGRAM

## 2021-05-18 PROCEDURE — 99232 SBSQ HOSP IP/OBS MODERATE 35: CPT | Performed by: INTERNAL MEDICINE

## 2021-05-18 PROCEDURE — 83605 ASSAY OF LACTIC ACID: CPT

## 2021-05-18 PROCEDURE — 80053 COMPREHEN METABOLIC PANEL: CPT

## 2021-05-18 PROCEDURE — 94640 AIRWAY INHALATION TREATMENT: CPT

## 2021-05-18 PROCEDURE — 2500000003 HC RX 250 WO HCPCS: Performed by: STUDENT IN AN ORGANIZED HEALTH CARE EDUCATION/TRAINING PROGRAM

## 2021-05-18 PROCEDURE — 6370000000 HC RX 637 (ALT 250 FOR IP): Performed by: STUDENT IN AN ORGANIZED HEALTH CARE EDUCATION/TRAINING PROGRAM

## 2021-05-18 PROCEDURE — 6370000000 HC RX 637 (ALT 250 FOR IP): Performed by: INTERNAL MEDICINE

## 2021-05-18 PROCEDURE — 85025 COMPLETE CBC W/AUTO DIFF WBC: CPT

## 2021-05-18 PROCEDURE — C9113 INJ PANTOPRAZOLE SODIUM, VIA: HCPCS | Performed by: STUDENT IN AN ORGANIZED HEALTH CARE EDUCATION/TRAINING PROGRAM

## 2021-05-18 PROCEDURE — 2580000003 HC RX 258: Performed by: INTERNAL MEDICINE

## 2021-05-18 PROCEDURE — 6360000002 HC RX W HCPCS: Performed by: INTERNAL MEDICINE

## 2021-05-18 PROCEDURE — 94660 CPAP INITIATION&MGMT: CPT

## 2021-05-18 PROCEDURE — 82962 GLUCOSE BLOOD TEST: CPT

## 2021-05-18 PROCEDURE — 84100 ASSAY OF PHOSPHORUS: CPT

## 2021-05-18 RX ORDER — ACETAMINOPHEN 500 MG
500 TABLET ORAL EVERY 4 HOURS
Status: DISCONTINUED | OUTPATIENT
Start: 2021-05-18 | End: 2021-05-20 | Stop reason: HOSPADM

## 2021-05-18 RX ORDER — OXYCODONE HYDROCHLORIDE 5 MG/1
5 TABLET ORAL EVERY 4 HOURS PRN
Status: DISCONTINUED | OUTPATIENT
Start: 2021-05-18 | End: 2021-05-20 | Stop reason: HOSPADM

## 2021-05-18 RX ORDER — INSULIN GLARGINE 100 [IU]/ML
10 INJECTION, SOLUTION SUBCUTANEOUS EVERY MORNING
Status: DISCONTINUED | OUTPATIENT
Start: 2021-05-18 | End: 2021-05-19

## 2021-05-18 RX ORDER — OXYCODONE HYDROCHLORIDE 10 MG/1
10 TABLET ORAL EVERY 4 HOURS PRN
Status: DISCONTINUED | OUTPATIENT
Start: 2021-05-18 | End: 2021-05-20 | Stop reason: HOSPADM

## 2021-05-18 RX ADMIN — SODIUM CHLORIDE, PRESERVATIVE FREE 10 ML: 5 INJECTION INTRAVENOUS at 13:36

## 2021-05-18 RX ADMIN — ACETAMINOPHEN 500 MG: 500 TABLET ORAL at 13:35

## 2021-05-18 RX ADMIN — GABAPENTIN 300 MG: 300 CAPSULE ORAL at 09:28

## 2021-05-18 RX ADMIN — METHOCARBAMOL 500 MG: 100 INJECTION, SOLUTION INTRAMUSCULAR; INTRAVENOUS at 22:00

## 2021-05-18 RX ADMIN — ALBUTEROL SULFATE 2.5 MG: 2.5 SOLUTION RESPIRATORY (INHALATION) at 09:16

## 2021-05-18 RX ADMIN — KETOROLAC TROMETHAMINE 15 MG: 30 INJECTION, SOLUTION INTRAMUSCULAR; INTRAVENOUS at 11:47

## 2021-05-18 RX ADMIN — BUDESONIDE 1000 MCG: 0.5 SUSPENSION RESPIRATORY (INHALATION) at 09:17

## 2021-05-18 RX ADMIN — Medication 10 ML: at 09:29

## 2021-05-18 RX ADMIN — BENZONATATE 200 MG: 100 CAPSULE ORAL at 13:36

## 2021-05-18 RX ADMIN — INSULIN GLARGINE 10 UNITS: 100 INJECTION, SOLUTION SUBCUTANEOUS at 09:43

## 2021-05-18 RX ADMIN — HEPARIN SODIUM 5000 UNITS: 10000 INJECTION INTRAVENOUS; SUBCUTANEOUS at 13:37

## 2021-05-18 RX ADMIN — Medication 10 ML: at 21:02

## 2021-05-18 RX ADMIN — BENZOCAINE AND MENTHOL 1 LOZENGE: 15; 3.6 LOZENGE ORAL at 05:09

## 2021-05-18 RX ADMIN — HEPARIN SODIUM 5000 UNITS: 10000 INJECTION INTRAVENOUS; SUBCUTANEOUS at 22:40

## 2021-05-18 RX ADMIN — INSULIN GLARGINE 20 UNITS: 100 INJECTION, SOLUTION SUBCUTANEOUS at 21:34

## 2021-05-18 RX ADMIN — OXYCODONE HYDROCHLORIDE 10 MG: 10 TABLET ORAL at 17:58

## 2021-05-18 RX ADMIN — PANTOPRAZOLE SODIUM 40 MG: 40 INJECTION, POWDER, FOR SOLUTION INTRAVENOUS at 09:28

## 2021-05-18 RX ADMIN — METHOCARBAMOL 500 MG: 100 INJECTION, SOLUTION INTRAMUSCULAR; INTRAVENOUS at 04:58

## 2021-05-18 RX ADMIN — OXYCODONE HYDROCHLORIDE 10 MG: 10 TABLET ORAL at 21:59

## 2021-05-18 RX ADMIN — INSULIN LISPRO 3 UNITS: 100 INJECTION, SOLUTION INTRAVENOUS; SUBCUTANEOUS at 09:30

## 2021-05-18 RX ADMIN — GUAIFENESIN 300 MG: 200 SOLUTION ORAL at 13:36

## 2021-05-18 RX ADMIN — POTASSIUM PHOSPHATE, MONOBASIC AND POTASSIUM PHOSPHATE, DIBASIC 30 MMOL: 224; 236 INJECTION, SOLUTION, CONCENTRATE INTRAVENOUS at 11:47

## 2021-05-18 RX ADMIN — AMPICILLIN SODIUM AND SULBACTAM SODIUM 3000 MG: 2; 1 INJECTION, POWDER, FOR SOLUTION INTRAMUSCULAR; INTRAVENOUS at 19:15

## 2021-05-18 RX ADMIN — SODIUM CHLORIDE, PRESERVATIVE FREE 10 ML: 5 INJECTION INTRAVENOUS at 09:28

## 2021-05-18 RX ADMIN — METHOCARBAMOL 500 MG: 100 INJECTION, SOLUTION INTRAMUSCULAR; INTRAVENOUS at 13:00

## 2021-05-18 RX ADMIN — KETOROLAC TROMETHAMINE 15 MG: 30 INJECTION, SOLUTION INTRAMUSCULAR; INTRAVENOUS at 18:02

## 2021-05-18 RX ADMIN — INSULIN LISPRO 6 UNITS: 100 INJECTION, SOLUTION INTRAVENOUS; SUBCUTANEOUS at 11:50

## 2021-05-18 RX ADMIN — INSULIN LISPRO 6 UNITS: 100 INJECTION, SOLUTION INTRAVENOUS; SUBCUTANEOUS at 04:58

## 2021-05-18 RX ADMIN — ACETAMINOPHEN 500 MG: 500 TABLET ORAL at 09:28

## 2021-05-18 RX ADMIN — GABAPENTIN 300 MG: 300 CAPSULE ORAL at 21:25

## 2021-05-18 RX ADMIN — PANCRELIPASE 72000 UNITS: 60000; 12000; 38000 CAPSULE, DELAYED RELEASE PELLETS ORAL at 11:47

## 2021-05-18 RX ADMIN — ATORVASTATIN CALCIUM 20 MG: 20 TABLET, FILM COATED ORAL at 09:28

## 2021-05-18 RX ADMIN — SODIUM CHLORIDE SOLN NEBU 3% 4 ML: 3 NEBU SOLN at 09:17

## 2021-05-18 RX ADMIN — AMPICILLIN SODIUM AND SULBACTAM SODIUM 3000 MG: 2; 1 INJECTION, POWDER, FOR SOLUTION INTRAMUSCULAR; INTRAVENOUS at 06:30

## 2021-05-18 RX ADMIN — KETOROLAC TROMETHAMINE 15 MG: 30 INJECTION, SOLUTION INTRAMUSCULAR; INTRAVENOUS at 06:02

## 2021-05-18 RX ADMIN — INSULIN LISPRO 4 UNITS: 100 INJECTION, SOLUTION INTRAVENOUS; SUBCUTANEOUS at 11:52

## 2021-05-18 RX ADMIN — PANCRELIPASE 72000 UNITS: 60000; 12000; 38000 CAPSULE, DELAYED RELEASE PELLETS ORAL at 17:00

## 2021-05-18 RX ADMIN — SODIUM CHLORIDE SOLN NEBU 3% 4 ML: 3 NEBU SOLN at 20:41

## 2021-05-18 RX ADMIN — OXYCODONE HYDROCHLORIDE 10 MG: 10 TABLET ORAL at 13:35

## 2021-05-18 RX ADMIN — INSULIN LISPRO 6 UNITS: 100 INJECTION, SOLUTION INTRAVENOUS; SUBCUTANEOUS at 17:00

## 2021-05-18 RX ADMIN — SODIUM CHLORIDE, PRESERVATIVE FREE 10 ML: 5 INJECTION INTRAVENOUS at 16:51

## 2021-05-18 RX ADMIN — ALBUTEROL SULFATE 2.5 MG: 2.5 SOLUTION RESPIRATORY (INHALATION) at 20:41

## 2021-05-18 RX ADMIN — GUAIFENESIN 300 MG: 200 SOLUTION ORAL at 21:24

## 2021-05-18 RX ADMIN — BUDESONIDE 1000 MCG: 0.5 SUSPENSION RESPIRATORY (INHALATION) at 20:41

## 2021-05-18 RX ADMIN — BENZONATATE 200 MG: 100 CAPSULE ORAL at 21:27

## 2021-05-18 RX ADMIN — POTASSIUM PHOSPHATE, MONOBASIC AND POTASSIUM PHOSPHATE, DIBASIC 30 MMOL: 224; 236 INJECTION, SOLUTION, CONCENTRATE INTRAVENOUS at 16:50

## 2021-05-18 RX ADMIN — INSULIN LISPRO 4 UNITS: 100 INJECTION, SOLUTION INTRAVENOUS; SUBCUTANEOUS at 17:14

## 2021-05-18 RX ADMIN — KETOROLAC TROMETHAMINE 15 MG: 30 INJECTION, SOLUTION INTRAMUSCULAR; INTRAVENOUS at 00:45

## 2021-05-18 RX ADMIN — BENZONATATE 200 MG: 100 CAPSULE ORAL at 09:28

## 2021-05-18 RX ADMIN — SODIUM CHLORIDE, PRESERVATIVE FREE 10 ML: 5 INJECTION INTRAVENOUS at 11:48

## 2021-05-18 RX ADMIN — POTASSIUM CHLORIDE, DEXTROSE MONOHYDRATE AND SODIUM CHLORIDE: 150; 5; 450 INJECTION, SOLUTION INTRAVENOUS at 04:59

## 2021-05-18 RX ADMIN — INSULIN LISPRO 6 UNITS: 100 INJECTION, SOLUTION INTRAVENOUS; SUBCUTANEOUS at 00:45

## 2021-05-18 RX ADMIN — INSULIN LISPRO 3 UNITS: 100 INJECTION, SOLUTION INTRAVENOUS; SUBCUTANEOUS at 21:33

## 2021-05-18 RX ADMIN — SODIUM CHLORIDE, PRESERVATIVE FREE 10 ML: 5 INJECTION INTRAVENOUS at 00:46

## 2021-05-18 RX ADMIN — GUAIFENESIN 300 MG: 200 SOLUTION ORAL at 09:27

## 2021-05-18 RX ADMIN — Medication 0.2 MG: at 01:52

## 2021-05-18 RX ADMIN — ALBUTEROL SULFATE 2.5 MG: 2.5 SOLUTION RESPIRATORY (INHALATION) at 16:45

## 2021-05-18 RX ADMIN — ACETAMINOPHEN 500 MG: 500 TABLET ORAL at 17:58

## 2021-05-18 RX ADMIN — OXYCODONE HYDROCHLORIDE 10 MG: 10 TABLET ORAL at 09:28

## 2021-05-18 RX ADMIN — HEPARIN SODIUM 5000 UNITS: 10000 INJECTION INTRAVENOUS; SUBCUTANEOUS at 06:02

## 2021-05-18 RX ADMIN — ACETAMINOPHEN 500 MG: 500 TABLET ORAL at 21:25

## 2021-05-18 ASSESSMENT — PAIN DESCRIPTION - FREQUENCY
FREQUENCY: CONTINUOUS

## 2021-05-18 ASSESSMENT — PAIN SCALES - GENERAL
PAINLEVEL_OUTOF10: 9
PAINLEVEL_OUTOF10: 9
PAINLEVEL_OUTOF10: 6
PAINLEVEL_OUTOF10: 8

## 2021-05-18 ASSESSMENT — PAIN DESCRIPTION - DESCRIPTORS
DESCRIPTORS: ACHING;CONSTANT;DISCOMFORT

## 2021-05-18 ASSESSMENT — PAIN DESCRIPTION - PAIN TYPE
TYPE: ACUTE PAIN;SURGICAL PAIN

## 2021-05-18 ASSESSMENT — PAIN DESCRIPTION - ONSET
ONSET: ON-GOING

## 2021-05-18 ASSESSMENT — PAIN DESCRIPTION - LOCATION
LOCATION: ABDOMEN
LOCATION: ABDOMEN

## 2021-05-18 NOTE — PROGRESS NOTES
05/18/2021    MCV 77.3 05/18/2021    MCH 23.0 05/18/2021    MCHC 29.8 05/18/2021    RDW 15.1 05/18/2021     05/18/2021    MPV 11.3 05/18/2021     CMP:    Lab Results   Component Value Date     05/18/2021    K 3.5 05/18/2021    K 4.8 12/13/2018     05/18/2021    CO2 25 05/18/2021    BUN 10 05/18/2021    CREATININE 0.7 05/18/2021    GFRAA >60 05/18/2021    LABGLOM >60 05/18/2021    GLUCOSE 186 05/18/2021    PROT 5.7 05/18/2021    LABALBU 2.9 05/18/2021    CALCIUM 7.8 05/18/2021    BILITOT 0.4 05/18/2021    ALKPHOS 74 05/18/2021    AST 48 05/18/2021    ALT 40 05/18/2021     Calcium:    Lab Results   Component Value Date    CALCIUM 7.8 05/18/2021     Ionized Calcium:  No results found for: IONCA  PT/INR:    Lab Results   Component Value Date    PROTIME 13.8 05/17/2021    INR 1.3 05/17/2021     ABG:    Lab Results   Component Value Date    PH 7.330 05/12/2021    PCO2 38.6 05/12/2021    PO2 99.3 05/12/2021    HCO3 19.9 05/12/2021    BE -5.5 05/12/2021    O2SAT 97.3 05/12/2021       ABGs:   Lab Results   Component Value Date    PH 7.330 05/12/2021    PO2 99.3 05/12/2021    PCO2 38.6 05/12/2021       IMAGING:  Imaging tests were completed and reviewed and discussed radiology and care team involved and reveals     MRI Abdomen W Wo Contrast MRCP  Result Date: 4/19/2021  EXAMINATION: MRI OF THE ABDOMEN WITH AND WITHOUT CONTRAST AND MRCP 4/19/2021 10:15 am TECHNIQUE: Multiplanar multisequence MRI of the abdomen was performed with and without the administration of intravenous contrast.  After initial T2 axial and coronal images, thick slab, thin slab and 3D coronal MRCP sequences were obtained without the administration of intravenous contrast.  MIP images are provided for review. COMPARISON: MRI 12/15/2020, 11/22/2019.   CT abdomen and pelvis 12/07/2019 HISTORY: ORDERING SYSTEM PROVIDED HISTORY: IPMN (intraductal papillary mucinous neoplasm) TECHNOLOGIST PROVIDED HISTORY: Reason for exam:->bmp What reading neoplasm determined by biopsy of pancreas TECHNOLOGIST PROVIDED HISTORY: Reason for exam:->pancreatic mass-evaluate for vessel involvement-preoperative What reading provider will be dictating this exam?->CRC FINDINGS: LOWER CHEST:  Unchanged minimal scarring in the bases of the right middle lobe and lingula. Minimal gravity dependent atelectasis in the bilateral lower lobes. Normal heart size. No pleural nor pericardial effusions. PANCREAS:  Typical duct configuration without dilation. Mild parenchymal atrophy. 2.0 cm x 1.5 cm x 2.2 cm complicated cystic lesion in the left paramedian body appearing to contain septation measuring up to 0.3 cm in thickness (1.8 cm x 1.0 cm x 1.6 cm). ORGANS:  Moderately enlarged right hemiliver suggestive of a normal variant Riedel's lobe. Minimal focal adenomyomatosis involving the gallbladder fundus. Homogeneously hypodense bilateral renal lesions measuring up to 0.9 cm, seen to be simple cysts on prior MRI (Bosniak category 1). 0.8 cm exophytic lesion of indeterminate density in the lateral interpolar left kidney, seen to be a hemorrhagic cyst on MRI (Bosniak category 2). Normal spleen and adrenal glands. GI/BOWEL:  Normal course caliber of stomach and of the included small bowel and colon without obstruction. PERITONEUM/RETROPERITONEUM:  Reflux of the contrast bolus into the inferior vena cava and hepatic veins. Minimal systemic atherosclerosis. No abdominal lymphadenopathy. No free intraperitoneal fluid nor gas. SOFT TISSUES/BONES:  Laxity of the anterior and lateral abdominal wall musculature. Changes of posterior fusion from T12 to L4 and bilateral laminectomies from L1 to L3 with no evident complication. Diffuse bony demineralization. No acute fractures nor suspicious osseous lesions.      1. Gradual increase in size of a 2.0 cm x 1.5 cm x 2.2 cm complicated cystic lesion in the pancreatic body, most likely a branch duct intraductal papillary mucinous neoplasm given proximity to the main pancreatic duct on prior MRIs. However, a macrocystic serous cystadenoma or mucinous cystadenoma could appear similar. Of note, there is no evident involvement or obstruction of the main pancreatic duct. Additionally, the lesion is largely intraparenchymal with no abutment or encasement of major named vessels. 2. Reflux of the contrast bolus can be seen as normal variation but could also be related to right heart dysfunction. Assessment:   1. 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 Plan:     1. Ezpap  2. Patient continues on Unasyn IV Q12 d4/5,   3. ERCP with pancreatic duct stent placement today,   4. IPPB needed  5. Please get sputum to the lab  6. Xopenex 0.63 TID  7. Unasyn Q12 day5/5  8. Increase mucinex 300 QID  9. ICS Q1 hour  10. CPAP nightly   11. 3 % saline nebs  12. Discharge planning  13.    Jorge Dickens DO DO, MPH, Mirna Ling  Professor of Internal Medicine  Pulmonary, Critical Care and Sleep Medicine

## 2021-05-18 NOTE — CARE COORDINATION
Transition of Care-Met with patient at bedside for reassessment, plan is still to return to cousins home vs his home at discharge with [facility]. Patient is currently on 2L [place], wears no home oxygen, choiced for [facility] DME, will need pulse ox testing and DME order for oxygen if required at discharge. Discharge pending diet tolerance, advanced to low fat 5/17 and oxygen weaning, discharge expected 5/19. The Plan for Transition of Care is related to the following treatment goals: oxygen therapy    The Patient was provided with a choice of provider and agrees   with the discharge plan. [x] Yes [] No    Freedom of choice list was provided with basic dialogue that supports the patient's individualized plan of care/goals, treatment preferences and shares the quality data associated with the providers.  [x] Yes [] No      Frida ADRIANN, RN  KALEB   [phone]

## 2021-05-18 NOTE — PROGRESS NOTES
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 No nausea no vomiting. Having bowel movements. U: 400/  S: X3  MAX:  17 dark red    Objective:  BP 98/68   Pulse 64   Temp 97.8 °F (36.6 °C) (Oral)   Resp 16   Ht 5' 8\" (1.727 m)   Wt 223 lb (101.2 kg)   SpO2 100%   BMI 33.91 kg/m²     GENERAL:  Laying in bed, awake, alert  HEAD: Normocephalic, atraumatic  EYES: No sclera icterus, pupils equal  LUNGS:  No increased work of breathing  CARDIOVASCULAR: RR and normotensive  ABDOMEN:  Soft, mildly distended, appropriately TTP incisions clean dry and intact, MAX drain left upper quadrant with serous drainage. EXTREMITIES: No edema or swelling  SKIN: Warm and dry    Assessment/Plan:  62 y.o. male with 1.4cm pancreatic neck BD-IPMN with rapid growth to now 2.9cm (nodule and septations) in 6 months.  Patient went for a robotic converted to open distal pancreatectomy and splenectomy on 5/12/2021    Pain and nausea control PRN  Low-fat diet  Stop IV fluids  OOB/AAT/SMI  PT/OT  DVT ppx w/ heparin and scd's  Endocrinology consulted for glucose management  Bowel regimen  Hypoxia - pulm recs appreciated, improving  ERCP 5/17 pancreatic duct stent placement  Monitor lipase this morning      Electronically signed by Natalia Alba MD on 5/18/2021 at 7:53 AM

## 2021-05-18 NOTE — PROGRESS NOTES
Physician Progress Note      PATIENT:               Remy Mabry  CSN #:                  [id number]  :                       1960  ADMIT DATE:       2021 5:27 AM  DISCH DATE:  RESPONDING  PROVIDER #:        Chet Boyce DO          QUERY TEXT:    Pt noted to have Post Op respiratory failure secondary to pancreatic   resection. If possible, please document in progress notes and discharge   summary:    The medical record reflects the following:  Risk Factors: post pancreatic resection  Clinical Indicators: Progress notes \"Hypoxia - pulmonology consult,   improving\". Per your consult notes \"Post Op respiratory failure secondary to   pancreatic resection - affecting the diaphragm. \" Per flowsheets, Room air - 2L   NC pre-op. Post op, 4-3L NC, sats 91-99%. Treatment: continuous oxygen post op, Pulmonary consult    Thank you,  Jonas Walter, RN, BSN, CCDS, Clinical Documentation Improvement  [phone]  Options provided:  -- Post Op respiratory failure as a postoperative complication  -- Post Op respiratory failure as an expected/inherent condition that occurred   postoperatively and not a complication  -- Post Op respiratory failure related to other incidental risk factor (please   specify) occurring following surgery and not a complication, Please document   incidental risk factor. -- Other - I will add my own diagnosis  -- Disagree - Not applicable / Not valid  -- Disagree - Clinically unable to determine / Unknown  -- Refer to Clinical Documentation Reviewer    PROVIDER RESPONSE TEXT:    Provider disagreed with this query.     Query created by: Michelle uZluaga on 2021 1:16 PM      Electronically signed by:  Ceht Boyce DO 2021 12:28 PM

## 2021-05-18 NOTE — PROGRESS NOTES
Consult received.  Dr Aragon Allison to assess appropriateness for ARU after therapy evaluations are completed

## 2021-05-18 NOTE — PROGRESS NOTES
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 No nausea no vomiting. Having bowel movements. U: 400/  S: X3  MAX:  17 dark red    Objective:  BP 98/68   Pulse 64   Temp 97.8 °F (36.6 °C) (Oral)   Resp 16   Ht 5' 8\" (1.727 m)   Wt 223 lb (101.2 kg)   SpO2 100%   BMI 33.91 kg/m²     GENERAL:  Laying in bed, awake, alert  HEAD: Normocephalic, atraumatic  EYES: No sclera icterus, pupils equal  LUNGS:  No increased work of breathing  CARDIOVASCULAR: RR and normotensive  ABDOMEN:  Soft, mildly distended, appropriately TTP incisions clean dry and intact, MAX drain left upper quadrant with serous drainage. EXTREMITIES: No edema or swelling  SKIN: Warm and dry    Assessment/Plan:  62 y.o. male with 1.4cm pancreatic neck BD-IPMN with rapid growth to now 2.9cm (nodule and septations) in 6 months.  Patient went for a robotic converted to open distal pancreatectomy and splenectomy on 5/12/2021    Pain and nausea control PRN  Low-fat diet  Stop IV fluids  OOB/AAT/SMI  PT/OT  DVT ppx w/ heparin and scd's  Endocrinology consulted for glucose management  Bowel regimen  Hypoxia - pulm recs appreciated, improving  ERCP 5/17 pancreatic duct stent placement      Electronically signed by [place] MD on 5/18/2021 at 7:52 AM    Ambulating  MAX drain with Laura Prieto placing stent  Discharge tomorrow  PMR consulted    Electronically signed by Juan Rdz MD on 5/18/2021 at 10:57 AM

## 2021-05-18 NOTE — PROGRESS NOTES
Patient assisted with ambulation in hallway during day shift approximately 200 ft, and during afternoon shift approximately 200 ft. Patient up in chair for meals.

## 2021-05-19 LAB
ALBUMIN SERPL-MCNC: 3.4 G/DL (ref 3.5–5.2)
ALP BLD-CCNC: 79 U/L (ref 40–129)
ALT SERPL-CCNC: 51 U/L (ref 0–40)
ANION GAP SERPL CALCULATED.3IONS-SCNC: 10 MMOL/L (ref 7–16)
ANISOCYTOSIS: ABNORMAL
AST SERPL-CCNC: 53 U/L (ref 0–39)
BASOPHILS ABSOLUTE: 0 E9/L (ref 0–0.2)
BASOPHILS RELATIVE PERCENT: 0.6 % (ref 0–2)
BILIRUB SERPL-MCNC: 0.3 MG/DL (ref 0–1.2)
BUN BLDV-MCNC: 7 MG/DL (ref 6–20)
CALCIUM SERPL-MCNC: 8.5 MG/DL (ref 8.6–10.2)
CHLORIDE BLD-SCNC: 102 MMOL/L (ref 98–107)
CO2: 27 MMOL/L (ref 22–29)
CREAT SERPL-MCNC: 0.7 MG/DL (ref 0.7–1.2)
EOSINOPHILS ABSOLUTE: 0.08 E9/L (ref 0.05–0.5)
EOSINOPHILS RELATIVE PERCENT: 0.9 % (ref 0–6)
GFR AFRICAN AMERICAN: >60
GFR NON-AFRICAN AMERICAN: >60 ML/MIN/1.73
GLUCOSE BLD-MCNC: 166 MG/DL (ref 74–99)
HCT VFR BLD CALC: 28.1 % (ref 37–54)
HEMOGLOBIN: 8.4 G/DL (ref 12.5–16.5)
HYPOCHROMIA: ABNORMAL
LACTIC ACID: 2 MMOL/L (ref 0.5–2.2)
LYMPHOCYTES ABSOLUTE: 0.42 E9/L (ref 1.5–4)
LYMPHOCYTES RELATIVE PERCENT: 5.2 % (ref 20–42)
MAGNESIUM: 2.2 MG/DL (ref 1.6–2.6)
MCH RBC QN AUTO: 22.8 PG (ref 26–35)
MCHC RBC AUTO-ENTMCNC: 29.9 % (ref 32–34.5)
MCV RBC AUTO: 76.2 FL (ref 80–99.9)
METER GLUCOSE: 136 MG/DL (ref 74–99)
METER GLUCOSE: 173 MG/DL (ref 74–99)
METER GLUCOSE: 175 MG/DL (ref 74–99)
METER GLUCOSE: 178 MG/DL (ref 74–99)
METER GLUCOSE: 249 MG/DL (ref 74–99)
METER GLUCOSE: 267 MG/DL (ref 74–99)
MONOCYTES ABSOLUTE: 1.09 E9/L (ref 0.1–0.95)
MONOCYTES RELATIVE PERCENT: 13 % (ref 2–12)
NEUTROPHILS ABSOLUTE: 6.8 E9/L (ref 1.8–7.3)
NEUTROPHILS RELATIVE PERCENT: 80.9 % (ref 43–80)
NUCLEATED RED BLOOD CELLS: 8.7 /100 WBC
OVALOCYTES: ABNORMAL
PDW BLD-RTO: 15.3 FL (ref 11.5–15)
PHOSPHORUS: 2.5 MG/DL (ref 2.5–4.5)
PLATELET # BLD: 566 E9/L (ref 130–450)
PMV BLD AUTO: 10.6 FL (ref 7–12)
POIKILOCYTES: ABNORMAL
POLYCHROMASIA: ABNORMAL
POTASSIUM SERPL-SCNC: 3.6 MMOL/L (ref 3.5–5)
RBC # BLD: 3.69 E12/L (ref 3.8–5.8)
SODIUM BLD-SCNC: 139 MMOL/L (ref 132–146)
TARGET CELLS: ABNORMAL
TEAR DROP CELLS: ABNORMAL
TOTAL PROTEIN: 6.7 G/DL (ref 6.4–8.3)
WBC # BLD: 8.4 E9/L (ref 4.5–11.5)

## 2021-05-19 PROCEDURE — 6370000000 HC RX 637 (ALT 250 FOR IP): Performed by: INTERNAL MEDICINE

## 2021-05-19 PROCEDURE — 82962 GLUCOSE BLOOD TEST: CPT

## 2021-05-19 PROCEDURE — 97535 SELF CARE MNGMENT TRAINING: CPT

## 2021-05-19 PROCEDURE — 83605 ASSAY OF LACTIC ACID: CPT

## 2021-05-19 PROCEDURE — 6360000002 HC RX W HCPCS: Performed by: STUDENT IN AN ORGANIZED HEALTH CARE EDUCATION/TRAINING PROGRAM

## 2021-05-19 PROCEDURE — 99232 SBSQ HOSP IP/OBS MODERATE 35: CPT | Performed by: INTERNAL MEDICINE

## 2021-05-19 PROCEDURE — 2580000003 HC RX 258: Performed by: STUDENT IN AN ORGANIZED HEALTH CARE EDUCATION/TRAINING PROGRAM

## 2021-05-19 PROCEDURE — 97530 THERAPEUTIC ACTIVITIES: CPT

## 2021-05-19 PROCEDURE — 84100 ASSAY OF PHOSPHORUS: CPT

## 2021-05-19 PROCEDURE — 83735 ASSAY OF MAGNESIUM: CPT

## 2021-05-19 PROCEDURE — 36415 COLL VENOUS BLD VENIPUNCTURE: CPT

## 2021-05-19 PROCEDURE — 80053 COMPREHEN METABOLIC PANEL: CPT

## 2021-05-19 PROCEDURE — 6370000000 HC RX 637 (ALT 250 FOR IP): Performed by: STUDENT IN AN ORGANIZED HEALTH CARE EDUCATION/TRAINING PROGRAM

## 2021-05-19 PROCEDURE — 2700000000 HC OXYGEN THERAPY PER DAY

## 2021-05-19 PROCEDURE — 6360000002 HC RX W HCPCS: Performed by: INTERNAL MEDICINE

## 2021-05-19 PROCEDURE — C9113 INJ PANTOPRAZOLE SODIUM, VIA: HCPCS | Performed by: STUDENT IN AN ORGANIZED HEALTH CARE EDUCATION/TRAINING PROGRAM

## 2021-05-19 PROCEDURE — 2060000000 HC ICU INTERMEDIATE R&B

## 2021-05-19 PROCEDURE — 94660 CPAP INITIATION&MGMT: CPT

## 2021-05-19 PROCEDURE — 2580000003 HC RX 258: Performed by: INTERNAL MEDICINE

## 2021-05-19 PROCEDURE — 94640 AIRWAY INHALATION TREATMENT: CPT

## 2021-05-19 PROCEDURE — 85025 COMPLETE CBC W/AUTO DIFF WBC: CPT

## 2021-05-19 RX ORDER — INSULIN GLARGINE 100 [IU]/ML
15 INJECTION, SOLUTION SUBCUTANEOUS EVERY MORNING
Status: DISCONTINUED | OUTPATIENT
Start: 2021-05-20 | End: 2021-05-20 | Stop reason: HOSPADM

## 2021-05-19 RX ORDER — PANCRELIPASE 36000; 180000; 114000 [USP'U]/1; [USP'U]/1; [USP'U]/1
2 CAPSULE, DELAYED RELEASE PELLETS ORAL
Qty: 540 CAPSULE | Refills: 3 | Status: SHIPPED | OUTPATIENT
Start: 2021-05-19 | End: 2022-04-12 | Stop reason: ALTCHOICE

## 2021-05-19 RX ORDER — BENZONATATE 200 MG/1
200 CAPSULE ORAL 3 TIMES DAILY
Qty: 21 CAPSULE | Refills: 0 | Status: SHIPPED | OUTPATIENT
Start: 2021-05-19 | End: 2021-05-26

## 2021-05-19 RX ORDER — ATORVASTATIN CALCIUM 20 MG/1
20 TABLET, FILM COATED ORAL DAILY
Qty: 30 TABLET | Refills: 3 | Status: SHIPPED | OUTPATIENT
Start: 2021-05-20

## 2021-05-19 RX ORDER — INSULIN GLARGINE 100 [IU]/ML
15 INJECTION, SOLUTION SUBCUTANEOUS EVERY MORNING
Qty: 1 VIAL | Refills: 3 | Status: SHIPPED | OUTPATIENT
Start: 2021-05-20 | End: 2021-05-19 | Stop reason: HOSPADM

## 2021-05-19 RX ORDER — AMOXICILLIN 250 MG
2 CAPSULE ORAL DAILY
Qty: 14 TABLET | Refills: 0 | Status: SHIPPED | OUTPATIENT
Start: 2021-05-19 | End: 2021-05-26

## 2021-05-19 RX ORDER — ACETAMINOPHEN 500 MG
500 TABLET ORAL EVERY 6 HOURS
Qty: 120 TABLET | Refills: 3 | Status: SHIPPED | OUTPATIENT
Start: 2021-05-19 | End: 2021-05-27

## 2021-05-19 RX ORDER — PANTOPRAZOLE SODIUM 40 MG/1
40 TABLET, DELAYED RELEASE ORAL
Qty: 60 TABLET | Refills: 0 | Status: SHIPPED | OUTPATIENT
Start: 2021-05-19 | End: 2021-09-01

## 2021-05-19 RX ORDER — INSULIN LISPRO 100 [IU]/ML
7 INJECTION, SOLUTION INTRAVENOUS; SUBCUTANEOUS
Qty: 6.3 ML | Refills: 3 | Status: SHIPPED | OUTPATIENT
Start: 2021-05-19 | End: 2021-09-01

## 2021-05-19 RX ORDER — OXYCODONE HYDROCHLORIDE AND ACETAMINOPHEN 5; 325 MG/1; MG/1
1 TABLET ORAL EVERY 6 HOURS PRN
Qty: 28 TABLET | Refills: 0 | Status: SHIPPED | OUTPATIENT
Start: 2021-05-19 | End: 2021-05-19 | Stop reason: HOSPADM

## 2021-05-19 RX ORDER — LOSARTAN POTASSIUM 100 MG/1
TABLET ORAL
Qty: 30 TABLET | Refills: 3 | Status: SHIPPED | OUTPATIENT
Start: 2021-05-19 | End: 2021-09-01

## 2021-05-19 RX ORDER — CALCIUM CITRATE/VITAMIN D3 200MG-6.25
TABLET ORAL
Qty: 50 STRIP | Refills: 5 | Status: SHIPPED | OUTPATIENT
Start: 2021-05-19 | End: 2021-05-27

## 2021-05-19 RX ORDER — HYDROCHLOROTHIAZIDE 12.5 MG/1
TABLET ORAL
Qty: 30 TABLET | Refills: 3 | Status: SHIPPED | OUTPATIENT
Start: 2021-05-19 | End: 2021-09-01

## 2021-05-19 RX ORDER — SODIUM CHLORIDE FOR INHALATION 3 %
4 VIAL, NEBULIZER (ML) INHALATION 3 TIMES DAILY
Qty: 360 ML | Refills: 0 | Status: SHIPPED | OUTPATIENT
Start: 2021-05-19 | End: 2021-05-20 | Stop reason: HOSPADM

## 2021-05-19 RX ORDER — ALBUTEROL SULFATE 2.5 MG/3ML
2.5 SOLUTION RESPIRATORY (INHALATION) 4 TIMES DAILY
Qty: 120 EACH | Refills: 3 | Status: SHIPPED | OUTPATIENT
Start: 2021-05-19

## 2021-05-19 RX ORDER — INSULIN GLARGINE 100 [IU]/ML
20 INJECTION, SOLUTION SUBCUTANEOUS 2 TIMES DAILY
Qty: 12 ML | Refills: 3 | Status: SHIPPED | OUTPATIENT
Start: 2021-05-19 | End: 2022-03-15 | Stop reason: SDUPTHER

## 2021-05-19 RX ORDER — INSULIN GLARGINE 100 [IU]/ML
20 INJECTION, SOLUTION SUBCUTANEOUS NIGHTLY
Qty: 1 VIAL | Refills: 3 | Status: SHIPPED | OUTPATIENT
Start: 2021-05-19 | End: 2021-05-19 | Stop reason: HOSPADM

## 2021-05-19 RX ORDER — METHOCARBAMOL 750 MG/1
750 TABLET, FILM COATED ORAL DAILY
Qty: 30 TABLET | Refills: 0 | Status: ON HOLD | OUTPATIENT
Start: 2021-05-19 | End: 2021-06-04 | Stop reason: HOSPADM

## 2021-05-19 RX ORDER — GUAIFENESIN 100 MG/5ML
300 SOLUTION ORAL 3 TIMES DAILY
Qty: 1200 ML | Refills: 0 | Status: SHIPPED | OUTPATIENT
Start: 2021-05-19

## 2021-05-19 RX ORDER — OXYCODONE HYDROCHLORIDE 5 MG/1
5 TABLET ORAL EVERY 6 HOURS PRN
Qty: 28 TABLET | Refills: 0 | Status: SHIPPED | OUTPATIENT
Start: 2021-05-19 | End: 2021-05-26

## 2021-05-19 RX ORDER — BUDESONIDE 0.5 MG/2ML
1 INHALANT ORAL 2 TIMES DAILY
Qty: 60 AMPULE | Refills: 3 | Status: SHIPPED | OUTPATIENT
Start: 2021-05-19

## 2021-05-19 RX ADMIN — INSULIN LISPRO 4 UNITS: 100 INJECTION, SOLUTION INTRAVENOUS; SUBCUTANEOUS at 10:24

## 2021-05-19 RX ADMIN — METHOCARBAMOL 500 MG: 100 INJECTION, SOLUTION INTRAMUSCULAR; INTRAVENOUS at 03:57

## 2021-05-19 RX ADMIN — Medication 10 ML: at 21:02

## 2021-05-19 RX ADMIN — BUDESONIDE 1000 MCG: 0.5 SUSPENSION RESPIRATORY (INHALATION) at 20:20

## 2021-05-19 RX ADMIN — SODIUM CHLORIDE SOLN NEBU 3% 4 ML: 3 NEBU SOLN at 12:48

## 2021-05-19 RX ADMIN — OXYCODONE HYDROCHLORIDE 10 MG: 10 TABLET ORAL at 17:51

## 2021-05-19 RX ADMIN — OXYCODONE HYDROCHLORIDE 10 MG: 10 TABLET ORAL at 12:15

## 2021-05-19 RX ADMIN — GUAIFENESIN 300 MG: 200 SOLUTION ORAL at 14:00

## 2021-05-19 RX ADMIN — SODIUM CHLORIDE SOLN NEBU 3% 4 ML: 3 NEBU SOLN at 08:48

## 2021-05-19 RX ADMIN — OXYCODONE HYDROCHLORIDE 10 MG: 10 TABLET ORAL at 07:53

## 2021-05-19 RX ADMIN — ALBUTEROL SULFATE 2.5 MG: 2.5 SOLUTION RESPIRATORY (INHALATION) at 12:48

## 2021-05-19 RX ADMIN — ALBUTEROL SULFATE 2.5 MG: 2.5 SOLUTION RESPIRATORY (INHALATION) at 08:47

## 2021-05-19 RX ADMIN — PANCRELIPASE 72000 UNITS: 60000; 12000; 38000 CAPSULE, DELAYED RELEASE PELLETS ORAL at 12:15

## 2021-05-19 RX ADMIN — SODIUM CHLORIDE, PRESERVATIVE FREE 10 ML: 5 INJECTION INTRAVENOUS at 14:00

## 2021-05-19 RX ADMIN — ALBUTEROL SULFATE 2.5 MG: 2.5 SOLUTION RESPIRATORY (INHALATION) at 16:31

## 2021-05-19 RX ADMIN — ACETAMINOPHEN 500 MG: 500 TABLET ORAL at 14:00

## 2021-05-19 RX ADMIN — ACETAMINOPHEN 500 MG: 500 TABLET ORAL at 05:18

## 2021-05-19 RX ADMIN — PANCRELIPASE 72000 UNITS: 60000; 12000; 38000 CAPSULE, DELAYED RELEASE PELLETS ORAL at 08:55

## 2021-05-19 RX ADMIN — ACETAMINOPHEN 500 MG: 500 TABLET ORAL at 17:51

## 2021-05-19 RX ADMIN — INSULIN GLARGINE 10 UNITS: 100 INJECTION, SOLUTION SUBCUTANEOUS at 10:22

## 2021-05-19 RX ADMIN — PANCRELIPASE 72000 UNITS: 60000; 12000; 38000 CAPSULE, DELAYED RELEASE PELLETS ORAL at 17:51

## 2021-05-19 RX ADMIN — HEPARIN SODIUM 5000 UNITS: 10000 INJECTION INTRAVENOUS; SUBCUTANEOUS at 05:19

## 2021-05-19 RX ADMIN — ACETAMINOPHEN 500 MG: 500 TABLET ORAL at 10:16

## 2021-05-19 RX ADMIN — INSULIN LISPRO 3 UNITS: 100 INJECTION, SOLUTION INTRAVENOUS; SUBCUTANEOUS at 03:54

## 2021-05-19 RX ADMIN — BENZONATATE 200 MG: 100 CAPSULE ORAL at 14:00

## 2021-05-19 RX ADMIN — ALBUTEROL SULFATE 2.5 MG: 2.5 SOLUTION RESPIRATORY (INHALATION) at 20:20

## 2021-05-19 RX ADMIN — INSULIN LISPRO 3 UNITS: 100 INJECTION, SOLUTION INTRAVENOUS; SUBCUTANEOUS at 20:48

## 2021-05-19 RX ADMIN — INSULIN LISPRO 4 UNITS: 100 INJECTION, SOLUTION INTRAVENOUS; SUBCUTANEOUS at 12:19

## 2021-05-19 RX ADMIN — ATORVASTATIN CALCIUM 20 MG: 20 TABLET, FILM COATED ORAL at 10:15

## 2021-05-19 RX ADMIN — SODIUM CHLORIDE, PRESERVATIVE FREE 10 ML: 5 INJECTION INTRAVENOUS at 10:15

## 2021-05-19 RX ADMIN — BUDESONIDE 1000 MCG: 0.5 SUSPENSION RESPIRATORY (INHALATION) at 08:47

## 2021-05-19 RX ADMIN — INSULIN GLARGINE 20 UNITS: 100 INJECTION, SOLUTION SUBCUTANEOUS at 20:49

## 2021-05-19 RX ADMIN — METHOCARBAMOL 500 MG: 100 INJECTION, SOLUTION INTRAMUSCULAR; INTRAVENOUS at 20:59

## 2021-05-19 RX ADMIN — Medication 10 ML: at 10:21

## 2021-05-19 RX ADMIN — PANTOPRAZOLE SODIUM 40 MG: 40 INJECTION, POWDER, FOR SOLUTION INTRAVENOUS at 10:14

## 2021-05-19 RX ADMIN — INSULIN LISPRO 6 UNITS: 100 INJECTION, SOLUTION INTRAVENOUS; SUBCUTANEOUS at 10:21

## 2021-05-19 RX ADMIN — INSULIN LISPRO 9 UNITS: 100 INJECTION, SOLUTION INTRAVENOUS; SUBCUTANEOUS at 12:29

## 2021-05-19 RX ADMIN — BENZONATATE 200 MG: 100 CAPSULE ORAL at 10:15

## 2021-05-19 RX ADMIN — GUAIFENESIN 300 MG: 200 SOLUTION ORAL at 20:58

## 2021-05-19 RX ADMIN — SODIUM CHLORIDE SOLN NEBU 3% 4 ML: 3 NEBU SOLN at 20:20

## 2021-05-19 RX ADMIN — GUAIFENESIN 300 MG: 200 SOLUTION ORAL at 10:15

## 2021-05-19 RX ADMIN — GABAPENTIN 300 MG: 300 CAPSULE ORAL at 10:16

## 2021-05-19 RX ADMIN — BENZONATATE 200 MG: 100 CAPSULE ORAL at 20:59

## 2021-05-19 RX ADMIN — ACETAMINOPHEN 500 MG: 500 TABLET ORAL at 01:19

## 2021-05-19 RX ADMIN — OXYCODONE HYDROCHLORIDE 10 MG: 10 TABLET ORAL at 03:45

## 2021-05-19 RX ADMIN — METHOCARBAMOL 500 MG: 100 INJECTION, SOLUTION INTRAMUSCULAR; INTRAVENOUS at 14:00

## 2021-05-19 ASSESSMENT — PAIN DESCRIPTION - PROGRESSION
CLINICAL_PROGRESSION: NOT CHANGED
CLINICAL_PROGRESSION: NOT CHANGED

## 2021-05-19 ASSESSMENT — PAIN DESCRIPTION - ORIENTATION
ORIENTATION: MID

## 2021-05-19 ASSESSMENT — PAIN DESCRIPTION - ONSET: ONSET: ON-GOING

## 2021-05-19 ASSESSMENT — PAIN DESCRIPTION - LOCATION
LOCATION: ABDOMEN

## 2021-05-19 ASSESSMENT — PAIN SCALES - GENERAL
PAINLEVEL_OUTOF10: 8
PAINLEVEL_OUTOF10: 9
PAINLEVEL_OUTOF10: 8

## 2021-05-19 ASSESSMENT — PAIN DESCRIPTION - DESCRIPTORS
DESCRIPTORS: CONSTANT;DISCOMFORT;SORE;TENDER

## 2021-05-19 ASSESSMENT — PAIN DESCRIPTION - FREQUENCY
FREQUENCY: CONTINUOUS

## 2021-05-19 NOTE — PROGRESS NOTES
OT BEDSIDE TREATMENT NOTE      Date:2021  Patient Name: Sima Maria  MRN: [id number]  : 1960  Room: [address] [place] [address]I     Per OT Eval:    Referring Physician:  Randol Cowden, MD     Evaluating OT:  HOA Barraza, OTR/L #[id number]     AM-PAC Daily Activity Raw Score:    Recommended Adaptive Equipment:  Adaptive equipment for LB dressing/item retrieval     Reason for Admission:  Pt was admitted for pre-scheduled Procedure r/t rapid growing pancreatic mass     Diagnosis:  Intraductal Papillary Mucinous Neoplasm     Procedures this admission:  21:   1. Laparoscopic robotic converted to open mid body distal pancreatectomy and splenectomy   2. Cholecystectomy  3. Omental pedicle flap       Pertinent Medical History:  Arthritic, Neuropathy, Cervical Fusion C5-C7 Fusion, Right Elbow Cubital Tunnel Release       Precautions:  Falls  Abdominal Precautions    MAX Drain L UQ    Home Living: Pt lives alone in a single-level apartment, level entry, elevator access. Bathroom setup:  Walk-in-Shower w/ Built-In Seat, high Commode, Grab bars throughout  Equipment owned:  [facility], [employer]     Available Family Assist:  Cousin lives locally -may be able to assist at d/c (?)     Prior Level of Function:  Pt reported being IND with all ADLs, IADLs, Transfers and Mobility using No AD for ambulation.    Driving:  Yes  Occupation:  None reported     Pain Level:  8/10 abdominal pain at rest, pain meds taken recently, tolerable per pt  Additional Complaints:  None     Vitals/Lab Values: 100% on 2L O2, trial RA O2 95% through out ADL tasks, informed nsg      Cognition: A & O x 4 , pleasant & cooperative  Able to Follow Multi-Step Commands INDly              Memory:  good               Sequencing:  good               Problem solving:  good               Judgement/safety:  good                 Functional Assessment:    Initial Eval Status  Date: 21 Treatment Status  Date:  21 Short Term/Long Term Goals  Treatment frequency: PRN 1-3 x/week   1-2 weeks   Feeding IND        ----- NA   Grooming SUP/Set up     Able to perform simple tasks after set up while seated EOB     Unable to tolerate ambulating to or standing at sink     Supervision  Standing at sink washing hands & face, using electric  razor for trimming & shaving, applying deodorant   Mod I  Standing At The Sink   UB Dressing Mod A     Mod A after set up - simulated EOB       SBA  Doff/mitchel gown seated at EOB Mod I   LB Dressing Max A     Max A simulated seated/standin EOB  Pt ed for safety, use of adaptive techs/equip     SBA  After instruction on use of reacher & sock aide to doff/mitchel socks   Pt able to mitchel brief without reacher, but instructed pt on using reacher if needed to mitchel shorts or pants  Mod I   Bathing NT        Min A  Simulated task, pt has shower chair, walk in shower recommended hand held shower head & LH sponge SUP for safety   Toileting NT        Mod Indep   Reports increased time & effort for posterior hygiene, discussed toileting aides  Mod I   Bed Mobility  Rolling:  NT  Repositioning:  NT   Supine to Sit:  Min A    Sit to Supine:  NT      Pt ed for tech to adhere to abdominal precautions      SBA  Supine to sit with instruction on abdominal precautions, log roll technique   Mod I   Functional Transfers Sit to stand:  Min A  Stand to sit:  Min A       EOB  Pt ed re: safety/hand placement    SBA  Sit < > stand EOB & chair, increased effort from low back  Mod I   Functional Mobility Min A     B/t surfaces only w/ use of WW, VCs for safety     SBA  No AD in room distances Mod I   Balance Sitting:      Static:  SUP EOB, IND Chair    Dynamic:  Close SUP w/ functional ax      Standing:      Static:  Min A w/ [employer]    Dynamic:   Min A w/ functional ax/mobility      Sitting: Indep  Standing: SBA  No AD     Activity Tolerance Fair  Limited by Pain     Tolerated Sitting:  EOB w/ functional ax ~ 10 mins     Tolerated Standing:  ~ 30 seconds w/ functional ax/mobility     Fair+  Standing at sink 8-10 minutes       Visual/  Perceptual WFL  Glasses: Yes        Hearing WFL  Hearing Aids  No       Education:  Pt was educated through out treatment regarding proper technique & safety with bed mobility, functional transfers & mobility, maintaining sternal precautions & instructed on use of AE & ADL compensatory strategies to ease tasks to improve safety, prevent falls and allow pt to return home safely. Discussed home set with pt reporting he may go stay with his cousins due to living alone at home. Comments: Upon arrival pt was in bed & agreeable for therapy. At end of session pt was seated in chair, with nsg present, all lines and tubes intact & call light within reach. · Pt has made Good progress towards set goals. · Continue with current plan of care    Treatment Time In: 8:10            Treatment Time Out: 8:55               Treatment Charges: Mins Units   Ther Ex  06127     Manual Therapy [address] [facility]     Thera Activities 70033 15 1   ADL/Home Mgt 30201 30 2   Neuro Re-ed 86420     Group Therapy      Orthotic manage/training  20638     Non-Billable Time     Total Timed Treatment 45 3       Lashawn MARKS  [address] [place] [address], [address] [place] [address]

## 2021-05-19 NOTE — PROGRESS NOTES
Date: 5/18/2021    Time: 11:07 PM    Patient Placed On BIPAP/CPAP/ Non-Invasive Ventilation? Yes    If no must comment. Facial area red/color change? No           If YES are Blister/Lesion present? No   If yes must notify nursing staff  BIPAP/CPAP skin barrier?   Yes    Skin barrier type:mepilexlite       Comments:        Karin Waite

## 2021-05-19 NOTE — PROGRESS NOTES
ENDOCRINOLOGY PROGRESS NOTE      Date of admission: 5/12/2021  Date of service: 5/19/2021  Admitting physician: Haseeb Tyler MD   Primary Care Physician: Germaine Nicole DO  Consultant physician: Candice Downing MD     Reason for the consultation:  Uncontrolled DM    History of Present Illness: The history is provided by the patient. Accuracy of the patient data is excellent    Kayleen South is a very pleasant 62 y.o. old male with PMH DM type 2, HTN, HLD and other listed below admitted to [facility] on 5/12/2021 because of growing pancreatic lesion, endocrine service was consulted for diabetes management.     Subjective   Seen  this AM, BG improving     Inpatient diet:   Carb Restricted diet     Point of care glucose monitoring   (Independently reviewed)   Recent Labs     05/18/21  0030 05/18/21  0452 05/18/21  0926 05/18/21  1142 05/18/21  1659 05/18/21  2124 05/19/21  0119 05/19/21  0347   GLUMET 248* 235* 164* 228* 204* 163* 173* 178*     Scheduled Meds:   acetaminophen  500 mg Oral Q4H    lipase-protease-amylase  72,000 Units Oral TID WC    insulin glargine  10 Units Subcutaneous QAM    insulin lispro  4 Units Subcutaneous TID WC    insulin glargine  20 Units Subcutaneous Nightly    insulin lispro  0-18 Units Subcutaneous Q4H    benzonatate  200 mg Oral TID    guaiFENesin  300 mg Oral TID    budesonide  1 mg Nebulization BID    sodium chloride (Inhalant)  4 mL Nebulization TID    docusate sodium  100 mg Oral BID    heparin (porcine)  5,000 Units Subcutaneous 3 times per day    albuterol  2.5 mg Nebulization 4x daily    atorvastatin  20 mg Oral Daily    gabapentin  300 mg Oral BID    sodium chloride flush  5-40 mL Intravenous 2 times per day    methocarbamol IVPB  500 mg Intravenous Q8H    polyethylene glycol  17 g Oral Daily    pantoprazole  40 mg Intravenous Daily    And    sodium chloride (PF)  10 mL Intravenous Daily     PRN Meds:   oxyCODONE, 5 mg, Q4H PRN   Or  oxyCODONE, for: TSH, T4FREE, R4ZQERW, FT3, N2NNACG, TSI, TPOABS, THGAB  Lab Results   Component Value Date    LABA1C 8.8 05/13/2021    GLUCOSE 186 05/18/2021    MALBCR - 02/23/2019    LABMICR <12.0 02/23/2019    LABCREA 156 02/23/2019     Lab Results   Component Value Date    TRIG 117 11/14/2018    HDL 52 11/14/2018    LDLCALC 115 11/14/2018    CHOL 190 11/14/2018       Blood culture   No results found for: [facility]    Radiology:  [facility] FOR SURGICAL PROCEDURES   Final Result   Intraprocedural fluoroscopic spot images as above. See separate procedure   report for more information. that         XR CHEST PORTABLE   Final Result   1. Persistent patchy left perihilar and left lung base airspace disease. XR CHEST PORTABLE   Final Result   Mild increased aeration of left lower lung consolidation likely representing   atelectasis. XR CHEST PORTABLE   Final Result   1. Tip of the left IJ CVC overlies the junction of the right cephalic vein in   the SVC. No pneumothorax. 2. Left basilar pulmonary opacity may represent atelectasis or pneumonia. Medical Records/Labs/Images review:   I personally reviewed and summarized previous records   All labs and imaging were reviewed independently     [address], a 62 y.o.-old male seen today for inpatient diabetes management     Diabetes Mellitus type 2  · Pt underwent distal pancreatectomy and splenectomy on 5/12/2021   · Will change diabetes regimen to:  · Lantus 15 U AM and 20U nightly   · Humalog 7 units with meals   · High dose sliding scale with meals and at night   · Continue glucose check with meals and at bedtime   · Will titrate insulin dose based on the blood glucose trend & insulin requirement  · Pt will follow with us after discharge.  Endocrine follow up visit, Tuesday 5/25 at 9:45AM    Pancreatic lesion  · Pt s/p distal pancreatectomy and splenectomy on 5/12/2021     The above issues were reviewed with the patient who understood and agreed with the plan. Thank you for allowing us to participate in the care of this patient. Please do not hesitate to contact us with any additional questions. Jose Henao MD  Endocrinologist, [facility] Diabetes [facility] and Endocrinology   [address] [place] [address]   Phone: [phone]  Fax: [phone]  --------------------------------------------  An electronic signature was used to authenticate this note.  Ranjeet Grimaldo MD on 5/19/2021 at 8:06 AM

## 2021-05-19 NOTE — PROGRESS NOTES
Hebert Mention called again to have AM labs drawn. They connected me to the lab supervisor, who states she will look into it & send someone up to draw the labs.

## 2021-05-19 NOTE — PROGRESS NOTES
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 Still on 2 L nasal cannula. Passing gas and had 3 bowel movements yesterday. OBJECTIVE:     PHYSICAL EXAM:   VITALS:   Vitals:    21 1648 21 2307 21 0412   BP: 120/67 111/68  124/74   Pulse: 96 94  101   Resp:    Temp: 98.1 °F (36.7 °C) 98.1 °F (36.7 °C)  98.2 °F (36.8 °C)   TempSrc: Oral Oral  Oral   SpO2: 96% 98%  97%   Weight:       Height:            Intake/Output Summary (Last 24 hours) at 2021 1156  Last data filed at 2021 2307  Gross per 24 hour   Intake 640 ml   Output 420 ml   Net 220 ml        CONSTITUTIONAL:    A&O x 3, NAD  SKIN:     No rash, No suspicious lesions or skin discoloration  HEENT:     EOMI, MMM, No thrush  NECK:    No bruits, No JVP apprechiated  CV:      Sinus,  No murmur, No rubs, No gallops  PULMONARY:   Couse BS,  No noted egophony  ABDOMEN:     Distended scars incision, BS normal. +G      MAX drain left upper quadrant w/ brown fluid. EXT:    No deformities . No clubbing.       + lower extremity edema, No venous stasis  PULSE:   Appears equal and palpable.   PSYCHIATRIC:  Seems appropriate, No acute psycosis  MS:    No fractures, No gross weakness  NEUROLOGIC:   The clinical assessment is non-focal     DATA: IMAGING & TESTING:     LABORATORY TESTS:    CBC:   Lab Results   Component Value Date    WBC 9.9 2021    RBC 3.30 2021    HGB 7.6 05/18/2021    HCT 25.5 05/18/2021    MCV 77.3 05/18/2021    MCH 23.0 05/18/2021    MCHC 29.8 05/18/2021    RDW 15.1 05/18/2021     05/18/2021    MPV 11.3 05/18/2021     CMP:    Lab Results   Component Value Date     05/18/2021    K 3.5 05/18/2021    K 4.8 12/13/2018     05/18/2021    CO2 25 05/18/2021    BUN 10 05/18/2021    CREATININE 0.7 05/18/2021    GFRAA >60 05/18/2021    LABGLOM >60 05/18/2021    GLUCOSE 186 05/18/2021    PROT 5.7 05/18/2021    LABALBU 2.9 05/18/2021    CALCIUM 7.8 05/18/2021    BILITOT 0.4 05/18/2021    ALKPHOS 74 05/18/2021    AST 48 05/18/2021    ALT 40 05/18/2021     Calcium:    Lab Results   Component Value Date    CALCIUM 7.8 05/18/2021     Ionized Calcium:  No results found for: IONCA  PT/INR:    Lab Results   Component Value Date    PROTIME 13.8 05/17/2021    INR 1.3 05/17/2021     ABG:    Lab Results   Component Value Date    PH 7.330 05/12/2021    PCO2 38.6 05/12/2021    PO2 99.3 05/12/2021    HCO3 19.9 05/12/2021    BE -5.5 05/12/2021    O2SAT 97.3 05/12/2021       ABGs:   Lab Results   Component Value Date    PH 7.330 05/12/2021    PO2 99.3 05/12/2021    PCO2 38.6 05/12/2021       IMAGING:  Imaging tests were completed and reviewed and discussed radiology and care team involved and reveals     MRI Abdomen W Wo Contrast MRCP  Result Date: 4/19/2021  EXAMINATION: MRI OF THE ABDOMEN WITH AND WITHOUT CONTRAST AND MRCP 4/19/2021 10:15 am TECHNIQUE: Multiplanar multisequence MRI of the abdomen was performed with and without the administration of intravenous contrast.  After initial T2 axial and coronal images, thick slab, thin slab and 3D coronal MRCP sequences were obtained without the administration of intravenous contrast.  MIP images are provided for review. COMPARISON: MRI 12/15/2020, 11/22/2019.   CT abdomen and pelvis 12/07/2019 HISTORY: ORDERING SYSTEM PROVIDED HISTORY: IPMN (intraductal papillary mucinous neoplasm) TECHNOLOGIST PROVIDED HISTORY: Reason for exam:->bmp What reading provider will be dictating this exam?->CRC FINDINGS: ABDOMEN: The visualized lung bases reveal no signal abnormality. The liver is normal in morphology. No evidence for steatosis. No suspicious liver lesion identified. The hepatic veins and portal veins are appropriately opacified. The cystic lesion in the anterior body of the pancreas measures 2.8 x 1.4 x 1.7 cm and has thin internal septations. No discrete solid mass identified. This abuts the pancreatic duct however there is no duct dilatation. No surrounding inflammatory change. No new findings otherwise appreciated in the pancreas. The spleen, adrenals and kidneys reveal no significant findings. Subcentimeter renal cysts. The visualized bowel is normal in caliber. The aorta is normal in caliber. The visceral branches are patent. No lymphadenopathy. The IVC is appropriately opacified. Signal artifact from transpedicular fixation hardware in the lumbar spine. No suspicious signal abnormality identified. MRCP: Gallbladder: Unremarkable. Bile Ducts: Unremarkable. Pancreatic Duct: Normal caliber and contour. Interval increase in size of previously characterized side branch IPMN in the body of the pancreas now measuring 2.8 x 1.4 x 1.7 cm versus 1.9 x 1.5 x 1.5 cm. Internal complexity with septations appears unchanged. No duct enlargement. Cta Triphasic Pancreas    Result Date: 5/6/2021  EXAMINATION: CT ABDOMEN WITH AND WITHOUT CONTRAST 5/6/2021 8:42 am TECHNIQUE: CT of the abdomen was performed without and with the administration of intravenous contrast. Multiplanar reformatted images are provided for review. Dose modulation, iterative reconstruction, and/or weight based adjustment of the mA/kV was utilized to reduce the radiation dose to as low as reasonably achievable.  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 Minimal gravity dependent atelectasis in the bilateral lower lobes. Normal heart size. No pleural nor pericardial effusions. PANCREAS:  Typical duct configuration without dilation. Mild parenchymal atrophy. 2.0 cm x 1.5 cm x 2.2 cm complicated cystic lesion in the left paramedian body appearing to contain septation measuring up to 0.3 cm in thickness (1.8 cm x 1.0 cm x 1.6 cm). ORGANS:  Moderately enlarged right hemiliver suggestive of a normal variant Riedel's lobe. Minimal focal adenomyomatosis involving the gallbladder fundus. Homogeneously hypodense bilateral renal lesions measuring up to 0.9 cm, seen to be simple cysts on prior MRI (Bosniak category 1). 0.8 cm exophytic lesion of indeterminate density in the lateral interpolar left kidney, seen to be a hemorrhagic cyst on MRI (Bosniak category 2). Normal spleen and adrenal glands. GI/BOWEL:  Normal course caliber of stomach and of the included small bowel and colon without obstruction. PERITONEUM/RETROPERITONEUM:  Reflux of the contrast bolus into the inferior vena cava and hepatic veins. Minimal systemic atherosclerosis. No abdominal lymphadenopathy. No free intraperitoneal fluid nor gas. SOFT TISSUES/BONES:  Laxity of the anterior and lateral abdominal wall musculature. Changes of posterior fusion from T12 to L4 and bilateral laminectomies from L1 to L3 with no evident complication. Diffuse bony demineralization. No acute fractures nor suspicious osseous lesions.      1. Gradual increase in size of a 2.0 cm x 1.5 cm x 2.2 cm complicated cystic lesion in the pancreatic body, most likely a branch duct intraductal papillary mucinous neoplasm given proximity to the main pancreatic duct on prior MRIs. However, a macrocystic serous cystadenoma or mucinous cystadenoma could appear similar. Of note, there is no evident involvement or obstruction of the main pancreatic duct. Additionally, the lesion is largely intraparenchymal with no abutment or encasement of major named vessels. 2. Reflux of the contrast bolus can be seen as normal variation but could also be related to right heart dysfunction. Assessment:   1. 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 Plan:     1. Ezpap  2. Patient continues on Unasyn IV Q12 d4/5,   3. ERCP with pancreatic duct stent placement today,   4. IPPB needed  5. Please get sputum to the lab  6. Xopenex 0.63 TID  7. Unasyn Q12 day5/5  8. Increase mucinex 300 QID  9. ICS Q1 hour  10. CPAP nightly   11. 3 % saline nebs  12.  Discharge planning    Jorge Dickens DO DO, MPH, Mir[facility] Ling  Professor of Internal Medicine  Pulmonary, Critical Care and Sleep Medicine

## 2021-05-19 NOTE — PROGRESS NOTES
Chart reviewed with Dr Quang Mckinnon. Patient therapy scores too high functioning for ARU, ambulating 200 ft with no AD and SBA. Recommending HHC at discharge.

## 2021-05-19 NOTE — PROGRESS NOTES
Reason for consult:  Uncontrolled type 2 DM, pancreatic neoplasm--s/p distal pancreatectomy; new to insulin     A1C: 8.8% 5/13/21     [] Not available                 Patient states the following concerns/barriers to diabetes self-management:       [] None       [] Medication cost   [] Food cost/availability          [] Reading  [] Hearing   [] Vision                  [] Work    [] Transportation  [] No insurance    [] Physical limitations    [x] Other: poor appetite at times               Diabetes survival packet provided to:   [x] Patient     [] Other:    Information reviewed:   Definition of diabetes   Target glucose ranges/A1C   Self-monitoring of blood glucose   Prevention/symptoms/treatment of hypo-/hyperglycemia   Medication adherence   The plate method/meal planning guidelines   The benefits of exercise and recommendations   Reducing the risk of chronic complications            Comment:  Patient pleasant and receptive to education. States he was diagnosed with diabetes a few years ago and had been prescribed oral agents at that time: Jardiance and glipizide. Orals have been discontinued and he is now prescribed an insulin regimen of Lantus 20 units twice daily and Humalog 7 units before meals three times daily. Patient eats mostly salads at home, enjoys fruit, and rarely eats yogurt or drinks milk. He understands that if he is not feeling well enough to eat, he will not take his Humalog insulin. If he is unsure if he will finish his meal, he will wait and inject his Humalog immediately after he completes eating his meal.  Emphasized the importance of increasing physical activity when he feels better and is cleared by his doctor. He has a new glucose meter at home and is able to self-monitor with no troubles.      Diabetes medications reviewed (use, purpose, action, side effects):  Education provided regarding current basal/bolus regimen of Lantus and Humalog, including differences in types of insulin, timing with meals, onset, duration of effect and peak of insulin dose. Hypoglycemia signs, symptoms and treatments reviewed and literature provided. Patient instructed on the preparation and injection of insulin dose via pen method. Patient verbalized understanding of site rotation, storage and proper sharps disposal.     Instructed patient to always seek guidance from their PCP for any adjustment. Floor nursing should continue to have patient practice insulin self-injection when insulin dose is due and document in the progress notes. He plans on eating dinner here tonight and is agreeable to practicing a self-injection of Humalog at that time. Evaluation/Plan/Recommendations:   Patient's understanding of diabetes:   []Poor   [x]Fair    [x]Good   [] Excellent     Outpatient diabetes education is recommended:   [x] Yes  [] No   [] As needed  Patient is interested in outpatient diabetes education:   [x] Yes    [] No    [] Unsure    Recommended:     [x] Follow with Dr. Rohan Reyes in outpatient clinic    [] Script for glucometer and supplies (per preference of patient's insurance)               [x] Script for outpatient diabetes education classes     [x] Carbohydrate-controlled diet    [x] Patient prefers/educator recommends insulin pens instead of syringes         Thank you for this consult.     Stanley Velasquez, RN, BSN, [facility]

## 2021-05-19 NOTE — PROGRESS NOTES
Comprehensive Nutrition Assessment    Type and Reason for Visit:  Initial    Nutrition Recommendations/Plan: Continue current diet, Start Ensure HP TID    Nutrition Assessment:  Pt adm w/ IPMN now s/p lap converted to open miley, distal pancreatectomy w/ splenectomy, s/p ERCP. Noted hx DM. Will provide low fat ONS and monitor    Malnutrition Assessment:  Malnutrition Status: At risk for malnutrition (Comment)    Context:  Chronic Illness     Findings of the 6 clinical characteristics of malnutrition:  Energy Intake:  Mild decrease in energy intake (Comment)  Weight Loss:  No significant weight loss     Body Fat Loss:  No significant body fat loss     Muscle Mass Loss:  No significant muscle mass loss    Fluid Accumulation:  No significant fluid accumulation     Strength:  Not Performed    Estimated Daily Nutrient Needs:  Energy (kcal):   (MSJ 1801 x 1.2 SF); Weight Used for Energy Requirements:  Current     Protein (g):  105-120 (1.5-1.8 gm/kg IBW); Weight Used for Protein Requirements:  Ideal        Fluid (ml/day):  ; Method Used for Fluid Requirements:  1 ml/kcal      Nutrition Related Findings:  pt alert, abd distention/tenderness, active BS, MAX drain LUQ, +1 edema, +I/Os      Wounds:  Surgical Incision       Current Nutrition Therapies:    DIET LOW FAT; Carb Control: 4 carb choices (60 gms)/meal    Anthropometric Measures:  · Height: 5' 8\" (172.7 cm)  · Current Body Weight: 223 lb (101.2 kg) (stated, UTO updated CBW)   · Usual Body Weight: 231 lb (104.8 kg) (actual per EMR 04/2021)     · Ideal Body Weight: 154 lbs; % Ideal Body Weight 144.8 %   · BMI: 33.9  · BMI Categories: Obese Class 1 (BMI 30.0-34. 9)       Nutrition Diagnosis:   · Inadequate oral intake related to altered GI function (IPMN) as evidenced by intake 0-25%, poor intake prior to admission      Nutrition Interventions:   Food and/or Nutrient Delivery:  Continue Current Diet, Start Oral Nutrition Supplement (Ensure HP TID)  Nutrition Education/Counseling:  Education not indicated   Coordination of Nutrition Care:  Continue to monitor while inpatient    Goals:  Consume >50% meals/ONS       Nutrition Monitoring and Evaluation:   Food/Nutrient Intake Outcomes:  Diet Advancement/Tolerance, Food and Nutrient Intake, Supplement Intake  Physical Signs/Symptoms Outcomes:  Biochemical Data, GI Status, Fluid Status or Edema, Nutrition Focused Physical Findings, Skin, Weight     Discharge Planning:     Too soon to determine     Electronically signed by Alexei Longoria MS, RD, LD on 5/19/21 at 1:37 PM EDT    Contact: [phone]

## 2021-05-19 NOTE — PROGRESS NOTES
Physical Therapy    Physical Therapy Daily Treatment Note      Name: Kaycee Linton  : 1960  MRN: [id number]    Referring Provider:  Candelario Carey MD    Date of Service: 2021    Evaluating PT:  Jimmy Chavez PT, DPT [id number]     Room #:  1216/9246-D  Diagnosis:  Intraductal papillary mucinous neoplasm   PMHx/PSHx:  Arthritis, HLD, HTN, DM II, spondylosis of lumbar spine, hx cervical fusion  Procedure/Surgery:  Laparoscopic robotic converted to open mid body distal pancreatectomy and splenectomy, cholecystectomy, intraoperative ultrasound, omental pedicle flap   Precautions:  Falls, Abdominal precautions, O2  Equipment Needs:  TBD    SUBJECTIVE:    Pt lives alone in a 9th floor apartment with no steps to enter and elevator access. Pt ambulated with no AD PTA. 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 OBJECTIVE:   Initial Evaluation  Date: 21 Treatment  21 Short Term/ Long Term   Goals   AM-PAC 6 Clicks     Was pt agreeable to Eval/treatment? Yes Yes    Does pt have pain?  8/10 abdominal pain at rest;  10/10 abdominal pain with mobility  8/10 abdominal pain     Bed Mobility  Rolling: NT  Supine to sit: Min A  Sit to supine: NT  Scooting: Min A NT, pt sitting In chair on arrival Modified Independent     Transfers Sit to stand: Min A  Stand to sit: Min A  Stand pivot: Min A with [employer] Sit to stand: SBA  Stand to sit: SBA  Stand pivot: SBA with no AD Modified Independent with AAD if needed   Ambulation    Few feet with [employer] Min A 100 feet x2 reps with no AD SBA >200 feet with AAD if needed Modified Independent     Stair negotiation: ascended and descended  NT NT d/t fatigue and SOB during gait  >2 steps with 1 rail Modified Independent     ROM BUE:  Per OT  BLE:  WFL     Strength BUE:  Per OT  BLE:  WFL     Balance Sitting EOB:  SBA  Dynamic Standing:  Min A Sitting EOB:  NT  Dynamic Standing:  SBA Sitting EOB:  Independent   Dynamic Standing:  Modified Independent       Pt is A & O x 4  Sensation:  Pt denies numbness and tingling to extremities  Edema:  Unremarkable     Vitals:  SPO2 97% on 2 L at rest  SPO2 93% on 2 L standing rest break  SPO2 94% on 2 L completion of session     Therapeutic Exercises:     BLE ROM    Patient education  Pt educated on safety during functional mobility    Patient response to education:   Pt verbalized understanding Pt demonstrated skill Pt requires further education in this area   Yes  Yes  Yes      ASSESSMENT:    Comments:  Pt received sitting in chair and agreeable to PT treatment. Vitals monitored during session. Pt limited d/t endurance deficits at this time. Still on 2 L O2. Able to stand and ambulate with assistance of managing oxygen. Pt demonstrates fair gait speed, but reaches for michael handrail at times to steady himself and demonstrates SOB/RIVERS. Requires x1 standing rest break. Overall, pt has improved from initial eval but would benefit from continued PT at d/c to ensure safety and independence at home. Pt left  with call button in reach, lines attached, and needs met. Treatment:  Patient practiced and was instructed in the following treatment:    Gait training- pt was given verbal and tactile cues to facilitate safety/balance during ambulation as well as provided with physical assistance to complete task. PLAN:  Pt is making progress towards established goals. Continue PT POC.        Time in  0925  Time out  0950    Total Treatment Time  25 minutes     CPT codes:  [] Low Complexity PT evaluation 61602  [] Moderate Complexity PT evaluation 40276  [] High Complexity PT evaluation 04431  [] PT Re-evaluation 92175  [] Gait training 90957 -- minutes  [] Manual therapy 45323 -- minutes  [x] Therapeutic activities 98480 25 minutes  [] Therapeutic exercises 73757 -- minutes  [] Neuromuscular reeducation 53172 -- minutes     Esvin Villeda PT, DPT  [id number]

## 2021-05-19 NOTE — CARE COORDINATION
Transition of Care-Patient requested acute rehab, evaluated, his therapy scores were too high functioning for ARU, ambulating 200 ft with no AD and SBA. [facility] is following, if oxygen is required patient will need to use oxygen via [employer] [phone], message sent to jeanie Huynh to discharge with [facility], orders in .     Leanne ADRIANN, RN  [facility]   [phone]

## 2021-05-19 NOTE — PROGRESS NOTES
HEPATOBILIARY AND PANCREATIC SURGERY  DAILY PROGRESS NOTE  5/19/2021    CC: Branch duct IPMN status post robotic converted to open distal pancreatectomy and splenectomy 5/12    Subjective:  Pain well controlled. Still on 2 L nasal cannula. Passing gas and had 3 bowel movements yesterday. U: 400/  S: X3  MAX:  50 dark red    Objective:  /74   Pulse 101   Temp 98.2 °F (36.8 °C) (Oral)   Resp 18   Ht 5' 8\" (1.727 m)   Wt 223 lb (101.2 kg)   SpO2 97%   BMI 33.91 kg/m²     GENERAL:  Laying in bed, awake, alert  HEAD: Normocephalic, atraumatic  EYES: No sclera icterus, pupils equal  LUNGS:  No increased work of breathing  CARDIOVASCULAR: RR and normotensive  ABDOMEN:  Soft, mildly distended, appropriately TTP incisions clean dry and intact, MAX drain left upper quadrant with serous drainage. EXTREMITIES: No edema or swelling  SKIN: Warm and dry    Assessment/Plan:  62 y.o. male with 1.4cm pancreatic neck BD-IPMN with rapid growth to now 2.9cm (nodule and septations) in 6 months.  Patient went for a robotic converted to open distal pancreatectomy and splenectomy on 5/12/2021    Pain and nausea control PRN  Low-fat diet  OOB/AAT/SMI  PT/OT  DVT ppx w/ heparin and scd's  Endocrinology consulted for glucose management  Bowel regimen  Hypoxia - pulm recs appreciated, improving  ERCP 5/17 pancreatic duct stent placement    Asked acute rehab for recommendations -plan for discharge today either to acute rehab or home with home health care    Follow up in office in 1 week      Electronically signed by Corinne Thomas MD on 5/19/2021 at 7:09 AM    Doing well  Home today with home health    Electronically signed by Clara Mendenhall MD on 5/19/2021 at 2:53 PM

## 2021-05-20 ENCOUNTER — TELEPHONE (OUTPATIENT)
Dept: HEMATOLOGY | Age: 61
End: 2021-05-20

## 2021-05-20 VITALS
BODY MASS INDEX: 33.8 KG/M2 | RESPIRATION RATE: 16 BRPM | HEART RATE: 74 BPM | WEIGHT: 223 LBS | DIASTOLIC BLOOD PRESSURE: 74 MMHG | TEMPERATURE: 97.6 F | HEIGHT: 68 IN | OXYGEN SATURATION: 99 % | SYSTOLIC BLOOD PRESSURE: 116 MMHG

## 2021-05-20 LAB
ALBUMIN SERPL-MCNC: 2.9 G/DL (ref 3.5–5.2)
ALP BLD-CCNC: 57 U/L (ref 40–129)
ALT SERPL-CCNC: 48 U/L (ref 0–40)
ANION GAP SERPL CALCULATED.3IONS-SCNC: 10 MMOL/L (ref 7–16)
AST SERPL-CCNC: 60 U/L (ref 0–39)
BASOPHILS ABSOLUTE: 0.06 E9/L (ref 0–0.2)
BASOPHILS RELATIVE PERCENT: 0.7 % (ref 0–2)
BILIRUB SERPL-MCNC: 0.3 MG/DL (ref 0–1.2)
BUN BLDV-MCNC: 7 MG/DL (ref 6–20)
CALCIUM SERPL-MCNC: 7.9 MG/DL (ref 8.6–10.2)
CHLORIDE BLD-SCNC: 102 MMOL/L (ref 98–107)
CO2: 28 MMOL/L (ref 22–29)
CREAT SERPL-MCNC: 0.8 MG/DL (ref 0.7–1.2)
EOSINOPHILS ABSOLUTE: 0.22 E9/L (ref 0.05–0.5)
EOSINOPHILS RELATIVE PERCENT: 2.6 % (ref 0–6)
GFR AFRICAN AMERICAN: >60
GFR NON-AFRICAN AMERICAN: >60 ML/MIN/1.73
GLUCOSE BLD-MCNC: 155 MG/DL (ref 74–99)
HCT VFR BLD CALC: 26.2 % (ref 37–54)
HEMOGLOBIN: 7.9 G/DL (ref 12.5–16.5)
IMMATURE GRANULOCYTES #: 0.07 E9/L
IMMATURE GRANULOCYTES %: 0.8 % (ref 0–5)
LACTIC ACID: 1.3 MMOL/L (ref 0.5–2.2)
LYMPHOCYTES ABSOLUTE: 1.16 E9/L (ref 1.5–4)
LYMPHOCYTES RELATIVE PERCENT: 13.9 % (ref 20–42)
MAGNESIUM: 2 MG/DL (ref 1.6–2.6)
MCH RBC QN AUTO: 22.8 PG (ref 26–35)
MCHC RBC AUTO-ENTMCNC: 30.2 % (ref 32–34.5)
MCV RBC AUTO: 75.7 FL (ref 80–99.9)
METER GLUCOSE: 128 MG/DL (ref 74–99)
METER GLUCOSE: 159 MG/DL (ref 74–99)
METER GLUCOSE: 259 MG/DL (ref 74–99)
MONOCYTES ABSOLUTE: 1.23 E9/L (ref 0.1–0.95)
MONOCYTES RELATIVE PERCENT: 14.8 % (ref 2–12)
NEUTROPHILS ABSOLUTE: 5.59 E9/L (ref 1.8–7.3)
NEUTROPHILS RELATIVE PERCENT: 67.2 % (ref 43–80)
PDW BLD-RTO: 15.2 FL (ref 11.5–15)
PHOSPHORUS: 2.9 MG/DL (ref 2.5–4.5)
PLATELET # BLD: 578 E9/L (ref 130–450)
PMV BLD AUTO: 10.6 FL (ref 7–12)
POTASSIUM SERPL-SCNC: 3.4 MMOL/L (ref 3.5–5)
RBC # BLD: 3.46 E12/L (ref 3.8–5.8)
SODIUM BLD-SCNC: 140 MMOL/L (ref 132–146)
TOTAL PROTEIN: 5.8 G/DL (ref 6.4–8.3)
WBC # BLD: 8.3 E9/L (ref 4.5–11.5)

## 2021-05-20 PROCEDURE — C9113 INJ PANTOPRAZOLE SODIUM, VIA: HCPCS | Performed by: STUDENT IN AN ORGANIZED HEALTH CARE EDUCATION/TRAINING PROGRAM

## 2021-05-20 PROCEDURE — 2580000003 HC RX 258: Performed by: INTERNAL MEDICINE

## 2021-05-20 PROCEDURE — 99232 SBSQ HOSP IP/OBS MODERATE 35: CPT | Performed by: INTERNAL MEDICINE

## 2021-05-20 PROCEDURE — 80053 COMPREHEN METABOLIC PANEL: CPT

## 2021-05-20 PROCEDURE — 6370000000 HC RX 637 (ALT 250 FOR IP): Performed by: INTERNAL MEDICINE

## 2021-05-20 PROCEDURE — 6360000002 HC RX W HCPCS: Performed by: STUDENT IN AN ORGANIZED HEALTH CARE EDUCATION/TRAINING PROGRAM

## 2021-05-20 PROCEDURE — 85025 COMPLETE CBC W/AUTO DIFF WBC: CPT

## 2021-05-20 PROCEDURE — 94660 CPAP INITIATION&MGMT: CPT

## 2021-05-20 PROCEDURE — 94640 AIRWAY INHALATION TREATMENT: CPT

## 2021-05-20 PROCEDURE — 83605 ASSAY OF LACTIC ACID: CPT

## 2021-05-20 PROCEDURE — 36415 COLL VENOUS BLD VENIPUNCTURE: CPT

## 2021-05-20 PROCEDURE — 2580000003 HC RX 258: Performed by: STUDENT IN AN ORGANIZED HEALTH CARE EDUCATION/TRAINING PROGRAM

## 2021-05-20 PROCEDURE — 84100 ASSAY OF PHOSPHORUS: CPT

## 2021-05-20 PROCEDURE — 83735 ASSAY OF MAGNESIUM: CPT

## 2021-05-20 PROCEDURE — 82962 GLUCOSE BLOOD TEST: CPT

## 2021-05-20 PROCEDURE — 6360000002 HC RX W HCPCS: Performed by: INTERNAL MEDICINE

## 2021-05-20 PROCEDURE — 6370000000 HC RX 637 (ALT 250 FOR IP): Performed by: STUDENT IN AN ORGANIZED HEALTH CARE EDUCATION/TRAINING PROGRAM

## 2021-05-20 RX ORDER — INSULIN LISPRO 100 [IU]/ML
0-12 INJECTION, SOLUTION INTRAVENOUS; SUBCUTANEOUS
Qty: 10.8 ML | Refills: 0 | Status: SHIPPED | OUTPATIENT
Start: 2021-05-20 | End: 2021-09-01

## 2021-05-20 RX ORDER — PEN NEEDLE, DIABETIC 31 G X1/4"
1 NEEDLE, DISPOSABLE MISCELLANEOUS
Qty: 100 EACH | Refills: 3 | Status: SHIPPED | OUTPATIENT
Start: 2021-05-20 | End: 2021-05-27

## 2021-05-20 RX ADMIN — METHOCARBAMOL 500 MG: 100 INJECTION, SOLUTION INTRAMUSCULAR; INTRAVENOUS at 04:37

## 2021-05-20 RX ADMIN — SODIUM CHLORIDE SOLN NEBU 3% 4 ML: 3 NEBU SOLN at 08:15

## 2021-05-20 RX ADMIN — INSULIN LISPRO 7 UNITS: 100 INJECTION, SOLUTION INTRAVENOUS; SUBCUTANEOUS at 12:06

## 2021-05-20 RX ADMIN — ALBUTEROL SULFATE 2.5 MG: 2.5 SOLUTION RESPIRATORY (INHALATION) at 08:15

## 2021-05-20 RX ADMIN — GABAPENTIN 300 MG: 300 CAPSULE ORAL at 08:00

## 2021-05-20 RX ADMIN — OXYCODONE HYDROCHLORIDE 10 MG: 10 TABLET ORAL at 05:48

## 2021-05-20 RX ADMIN — SODIUM CHLORIDE SOLN NEBU 3% 4 ML: 3 NEBU SOLN at 12:07

## 2021-05-20 RX ADMIN — ALBUTEROL SULFATE 2.5 MG: 2.5 SOLUTION RESPIRATORY (INHALATION) at 12:07

## 2021-05-20 RX ADMIN — ATORVASTATIN CALCIUM 20 MG: 20 TABLET, FILM COATED ORAL at 08:01

## 2021-05-20 RX ADMIN — BENZONATATE 200 MG: 100 CAPSULE ORAL at 08:00

## 2021-05-20 RX ADMIN — METHOCARBAMOL 500 MG: 100 INJECTION, SOLUTION INTRAMUSCULAR; INTRAVENOUS at 12:13

## 2021-05-20 RX ADMIN — INSULIN LISPRO 6 UNITS: 100 INJECTION, SOLUTION INTRAVENOUS; SUBCUTANEOUS at 12:06

## 2021-05-20 RX ADMIN — SODIUM CHLORIDE, PRESERVATIVE FREE 10 ML: 5 INJECTION INTRAVENOUS at 08:06

## 2021-05-20 RX ADMIN — ACETAMINOPHEN 500 MG: 500 TABLET ORAL at 00:59

## 2021-05-20 RX ADMIN — BUDESONIDE 1000 MCG: 0.5 SUSPENSION RESPIRATORY (INHALATION) at 08:15

## 2021-05-20 RX ADMIN — PANCRELIPASE 72000 UNITS: 60000; 12000; 38000 CAPSULE, DELAYED RELEASE PELLETS ORAL at 12:13

## 2021-05-20 RX ADMIN — ACETAMINOPHEN 500 MG: 500 TABLET ORAL at 05:47

## 2021-05-20 RX ADMIN — OXYCODONE HYDROCHLORIDE 10 MG: 10 TABLET ORAL at 00:57

## 2021-05-20 RX ADMIN — GUAIFENESIN 300 MG: 200 SOLUTION ORAL at 08:01

## 2021-05-20 RX ADMIN — BENZONATATE 200 MG: 100 CAPSULE ORAL at 15:24

## 2021-05-20 RX ADMIN — PANCRELIPASE 72000 UNITS: 60000; 12000; 38000 CAPSULE, DELAYED RELEASE PELLETS ORAL at 08:00

## 2021-05-20 RX ADMIN — HEPARIN SODIUM 5000 UNITS: 10000 INJECTION INTRAVENOUS; SUBCUTANEOUS at 05:48

## 2021-05-20 RX ADMIN — INSULIN LISPRO 3 UNITS: 100 INJECTION, SOLUTION INTRAVENOUS; SUBCUTANEOUS at 01:00

## 2021-05-20 RX ADMIN — GUAIFENESIN 300 MG: 200 SOLUTION ORAL at 15:25

## 2021-05-20 RX ADMIN — PANTOPRAZOLE SODIUM 40 MG: 40 INJECTION, POWDER, FOR SOLUTION INTRAVENOUS at 08:01

## 2021-05-20 RX ADMIN — INSULIN GLARGINE 15 UNITS: 100 INJECTION, SOLUTION SUBCUTANEOUS at 08:05

## 2021-05-20 RX ADMIN — OXYCODONE HYDROCHLORIDE 10 MG: 10 TABLET ORAL at 10:20

## 2021-05-20 ASSESSMENT — PAIN SCALES - GENERAL
PAINLEVEL_OUTOF10: 10
PAINLEVEL_OUTOF10: 8
PAINLEVEL_OUTOF10: 8

## 2021-05-20 NOTE — PROGRESS NOTES
ENDOCRINOLOGY PROGRESS NOTE      Date of admission: 5/12/2021  Date of service: 5/20/2021  Admitting physician: Rhea Haro MD   Primary Care Physician: Vilma Worthington DO  Consultant physician: Dewayne Bowden MD     Reason for the consultation:  Uncontrolled DM    History of Present Illness: The history is provided by the patient. Accuracy of the patient data is excellent    Jase Reyna is a very pleasant 62 y.o. old male with PMH DM type 2, HTN, HLD and other listed below admitted to [facility] on 5/12/2021 because of growing pancreatic lesion, endocrine service was consulted for diabetes management.     Subjective   Seen  this AM, no acute issues overnight     Inpatient diet:   Carb Restricted diet     Point of care glucose monitoring   (Independently reviewed)   Recent Labs     05/19/21  0347 05/19/21  0929 05/19/21  1205 05/19/21  1731 05/19/21  2044 05/20/21  0103 05/20/21  0435 05/20/21  1202   GLUMET 178* 249* 267* 136* 175* 159* 128* 259*     Scheduled Meds:   insulin lispro  0-12 Units Subcutaneous TID WC    insulin lispro  0-6 Units Subcutaneous Nightly    insulin lispro  7 Units Subcutaneous TID WC    insulin glargine  15 Units Subcutaneous QAM    acetaminophen  500 mg Oral Q4H    lipase-protease-amylase  72,000 Units Oral TID WC    insulin glargine  20 Units Subcutaneous Nightly    benzonatate  200 mg Oral TID    guaiFENesin  300 mg Oral TID    budesonide  1 mg Nebulization BID    sodium chloride (Inhalant)  4 mL Nebulization TID    docusate sodium  100 mg Oral BID    heparin (porcine)  5,000 Units Subcutaneous 3 times per day    albuterol  2.5 mg Nebulization 4x daily    atorvastatin  20 mg Oral Daily    gabapentin  300 mg Oral BID    sodium chloride flush  5-40 mL Intravenous 2 times per day    methocarbamol IVPB  500 mg Intravenous Q8H    polyethylene glycol  17 g Oral Daily    pantoprazole  40 mg Intravenous Daily    And    sodium chloride (PF)  10 mL Intravenous Daily     PRN Meds:   oxyCODONE, 5 mg, Q4H PRN   Or  oxyCODONE, 10 mg, Q4H PRN  benzocaine-menthol, 1 lozenge, PRN  sodium chloride flush, 5-40 mL, PRN  sodium chloride, 25 mL, PRN  promethazine, 12.5 mg, Q6H PRN   Or  ondansetron, 4 mg, Q6H PRN  glucose, 15 g, PRN  dextrose, 12.5 g, PRN  glucagon (rDNA), 1 mg, PRN  dextrose, 100 mL/hr, PRN  labetalol, 10 mg, Q30 Min PRN  hydrALAZINE, 10 mg, Q30 Min PRN      Continuous Infusions:   sodium chloride      dextrose         Review of Systems  All systems reviewed. All negative except for symptoms mentioned in HPI     OBJECTIVE    /74   Pulse 74   Temp 97.6 °F (36.4 °C) (Temporal)   Resp 16   Ht 5' 8\" (1.727 m)   Wt 223 lb (101.2 kg)   SpO2 99%   BMI 33.91 kg/m²     Intake/Output Summary (Last 24 hours) at 5/20/2021 1436  Last data filed at 5/20/2021 1426  Gross per 24 hour   Intake 120 ml   Output 60 ml   Net 60 ml       Physical examination:  General: awake alert, oriented x3  HEENT: normocephalic non traumatic, no exophthalmos   Neck: supple, No thyroid tenderness,  Pulm: Clear equal air entry no added sounds  CVS: S1 + S2  Abd: s/p surgery   Skin: warm, no lesions, no rash.  No open wounds, no ulcers   Neuro: CN intact, sensation decreased bilateral , muscle power normal  Psych: normal mood, and affect    Review of Laboratory Data:  I personally reviewed the following labs:   Recent Labs     05/18/21  0545 05/19/21  1428 05/20/21  8407   WBC 9.9 8.4 8.3   RBC 3.30* 3.69* 3.46*   HGB 7.6* 8.4* 7.9*   HCT 25.5* 28.1* 26.2*   MCV 77.3* 76.2* 75.7*   MCH 23.0* 22.8* 22.8*   MCHC 29.8* 29.9* 30.2*   RDW 15.1* 15.3* 15.2*    566* 578*   MPV 11.3 10.6 10.6     Recent Labs     05/18/21  0545 05/19/21  1428 05/20/21  0457    139 140   K 3.5 3.6 3.4*    102 102   CO2 25 27 28   BUN 10 7 7   CREATININE 0.7 0.7 0.8   GLUCOSE 186* 166* 155*   CALCIUM 7.8* 8.5* 7.9*   PROT 5.7* 6.7 5.8*   LABALBU 2.9* 3.4* 2.9*   BILITOT 0.4 0.3 0.3 ALKPHOS 74 79 57   AST 48* 53* 60*   ALT 40 51* 48*     No results found for: BHYDRXBUT  Lab Results   Component Value Date    LABA1C 8.8 05/13/2021    LABA1C 7.1 02/23/2019    LABA1C 7.8 11/14/2018     No results found for: TSH, T4FREE, X2BFRAX, FT3, Q3YOAGX, TSI, TPOABS, THGAB  Lab Results   Component Value Date    LABA1C 8.8 05/13/2021    GLUCOSE 155 05/20/2021    MALBCR - 02/23/2019    LABMICR <12.0 02/23/2019    LABCREA 156 02/23/2019     Lab Results   Component Value Date    TRIG 117 11/14/2018    HDL 52 11/14/2018    LDLCALC 115 11/14/2018    CHOL 190 11/14/2018       Blood culture   No results found for: [facility]    Radiology:  [facility] FOR SURGICAL PROCEDURES   Final Result   Intraprocedural fluoroscopic spot images as above. See separate procedure   report for more information. that         XR CHEST PORTABLE   Final Result   1. Persistent patchy left perihilar and left lung base airspace disease. XR CHEST PORTABLE   Final Result   Mild increased aeration of left lower lung consolidation likely representing   atelectasis. XR CHEST PORTABLE   Final Result   1. Tip of the left IJ CVC overlies the junction of the right cephalic vein in   the SVC. No pneumothorax. 2. Left basilar pulmonary opacity may represent atelectasis or pneumonia.              Medical Records/Labs/Images review:   I personally reviewed and summarized previous records   All labs and imaging were reviewed independently     [address], a 62 y.o.-old male seen today for inpatient diabetes management     Diabetes Mellitus type 2  · Pt underwent distal pancreatectomy and splenectomy on 5/12/2021   · Will change diabetes regimen to:  · Lantus 15 U AM and 20U nightly   · Humalog 7 units with meals   · High dose sliding scale with meals and at night   · Continue glucose check with meals and at bedtime   · Will titrate insulin dose based on the blood glucose trend & insulin requirement  · Pt will follow with us after discharge. Endocrine follow up visit, Tuesday 5/25 at 9:45AM    Pancreatic lesion  · Pt s/p distal pancreatectomy and splenectomy on 5/12/2021     The above issues were reviewed with the patient who understood and agreed with the plan. Thank you for allowing us to participate in the care of this patient. Please do not hesitate to contact us with any additional questions. Paulette Monahan MD  Endocrinologist, [place] [id number] [facility]   [address] [place] [address]   Phone: [phone]  Fax: [phone]  --------------------------------------------  An electronic signature was used to authenticate this note.  Mirlande Jin MD on 5/20/2021 at 2:36 PM

## 2021-05-20 NOTE — PROGRESS NOTES
Occupational Therapy  OT SESSION ATTEMPT     Date:2021  Patient Name: Analia Moncada  MRN: [id number]  : 1960  Room: [address] [place] [address]-A     Attempted OT session this date:    [] unavailable due to other medical staff currently with pt   [] on hold per nursing staff   [] on hold per nursing staff secondary to lab / radiology results    [x] Tammi Perez declined treatment this date due to trying to get medication setup for when transferring home this date. Patient asking  for hip kit to increase independence at home. Therapist provided patient hip kit for patient with patient stating therapist from yesterday explained how to use equipment and does not need further education. [] off unit    [] Other:     Will reattempt OT session at a later time.       Adalid Vides [address], [address]

## 2021-05-20 NOTE — PROGRESS NOTES
HEPATOBILIARY AND PANCREATIC SURGERY  DAILY PROGRESS NOTE  5/20/2021    CC: Branch duct IPMN status post robotic converted to open distal pancreatectomy and splenectomy 5/12    Subjective:  Pain well controlled. Feeling more confident this morning. No nausea no vomiting. Tolerated diet. Having bowel movements. U: X3  S: X1  MAX:  125 dark red    Objective:  /68   Pulse 96   Temp 98.7 °F (37.1 °C) (Oral)   Resp 18   Ht 5' 8\" (1.727 m)   Wt 223 lb (101.2 kg)   SpO2 95%   BMI 33.91 kg/m²     GENERAL:  Laying in bed, awake, alert  HEAD: Normocephalic, atraumatic  EYES: No sclera icterus, pupils equal  LUNGS:  No increased work of breathing  CARDIOVASCULAR: RR and normotensive  ABDOMEN:  Soft, mildly distended, appropriately TTP incisions clean dry and intact, MAX drain left upper quadrant with serous drainage. EXTREMITIES: No edema or swelling  SKIN: Warm and dry    Assessment/Plan:  62 y.o. male with 1.4cm pancreatic neck BD-IPMN with rapid growth to now 2.9cm (nodule and septations) in 6 months.  Patient went for a robotic converted to open distal pancreatectomy and splenectomy on 5/12/2021    Pain and nausea control PRN  Low-fat diet  OOB/AAT/SMI  PT/OT  DVT ppx w/ heparin and scd's  Endocrinology consulted for glucose management  Bowel regimen  Hypoxia - pulm recs appreciated, improving  ERCP 5/17 pancreatic duct stent placement    Home today with home health care    Follow up in office in 1 week      Electronically signed by Vitaly Gibson MD on 5/20/2021 at 6:36 AM     Doing well  Drain with 125 out  Amylase needs checked today prior to discharge  Home today    Electronically signed by Alex Matta MD on 5/20/2021 at 9:10 AM

## 2021-05-20 NOTE — PROGRESS NOTES
exam:->bmp What reading provider will be dictating this exam?->CRC FINDINGS: ABDOMEN: The visualized lung bases reveal no signal abnormality. The liver is normal in morphology. No evidence for steatosis. No suspicious liver lesion identified. The hepatic veins and portal veins are appropriately opacified. The cystic lesion in the anterior body of the pancreas measures 2.8 x 1.4 x 1.7 cm and has thin internal septations. No discrete solid mass identified. This abuts the pancreatic duct however there is no duct dilatation. No surrounding inflammatory change. No new findings otherwise appreciated in the pancreas. The spleen, adrenals and kidneys reveal no significant findings. Subcentimeter renal cysts. The visualized bowel is normal in caliber. The aorta is normal in caliber. The visceral branches are patent. No lymphadenopathy. The IVC is appropriately opacified. Signal artifact from transpedicular fixation hardware in the lumbar spine. No suspicious signal abnormality identified. MRCP: Gallbladder: Unremarkable. Bile Ducts: Unremarkable. Pancreatic Duct: Normal caliber and contour. Interval increase in size of previously characterized side branch IPMN in the body of the pancreas now measuring 2.8 x 1.4 x 1.7 cm versus 1.9 x 1.5 x 1.5 cm. Internal complexity with septations appears unchanged. No duct enlargement. Cta Triphasic Pancreas    Result Date: 5/6/2021  EXAMINATION: CT ABDOMEN WITH AND WITHOUT CONTRAST 5/6/2021 8:42 am TECHNIQUE: CT of the abdomen was performed without and with the administration of intravenous contrast. Multiplanar reformatted images are provided for review. Dose modulation, iterative reconstruction, and/or weight based adjustment of the mA/kV was utilized to reduce the radiation dose to as low as reasonably achievable.  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 Minimal gravity dependent atelectasis in the bilateral lower lobes. Normal heart size. No pleural nor pericardial effusions. PANCREAS:  Typical duct configuration without dilation. Mild parenchymal atrophy. 2.0 cm x 1.5 cm x 2.2 cm complicated cystic lesion in the left paramedian body appearing to contain septation measuring up to 0.3 cm in thickness (1.8 cm x 1.0 cm x 1.6 cm). ORGANS:  Moderately enlarged right hemiliver suggestive of a normal variant Riedel's lobe. Minimal focal adenomyomatosis involving the gallbladder fundus. Homogeneously hypodense bilateral renal lesions measuring up to 0.9 cm, seen to be simple cysts on prior MRI (Bosniak category 1). 0.8 cm exophytic lesion of indeterminate density in the lateral interpolar left kidney, seen to be a hemorrhagic cyst on MRI (Bosniak category 2). Normal spleen and adrenal glands. GI/BOWEL:  Normal course caliber of stomach and of the included small bowel and colon without obstruction. PERITONEUM/RETROPERITONEUM:  Reflux of the contrast bolus into the inferior vena cava and hepatic veins. Minimal systemic atherosclerosis. No abdominal lymphadenopathy. No free intraperitoneal fluid nor gas. SOFT TISSUES/BONES:  Laxity of the anterior and lateral abdominal wall musculature. Changes of posterior fusion from T12 to L4 and bilateral laminectomies from L1 to L3 with no evident complication. Diffuse bony demineralization. No acute fractures nor suspicious osseous lesions.      1. Gradual increase in size of a 2.0 cm x 1.5 cm x 2.2 cm complicated cystic lesion in the pancreatic body, most likely a branch duct intraductal papillary mucinous neoplasm given proximity to the main pancreatic duct on prior MRIs. However, a macrocystic serous cystadenoma or mucinous cystadenoma could appear similar. Of note, there is no evident involvement or obstruction of the main pancreatic duct. Additionally, the lesion is largely intraparenchymal with no abutment or encasement of major named vessels. 2. Reflux of the contrast bolus can be seen as normal variation but could also be related to right heart dysfunction. Assessment:   1. 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 Plan:     1. Discharge planning  2. Off oxygen   3. Off abx  4. ERCP with pancreatic duct stent placement today,   5. Xopenex 0.63 TID  6.  Increase mucinex 300 QID  7. 3 % saline juarez Kaiser DO DO, MPH, FCCP, Margaret Hyatt  Professor of Internal Medicine  Pulmonary, Critical Care and Sleep Medicine

## 2021-05-20 NOTE — CARE COORDINATION
Transition of Care-Discharge cancelled by surgery yesterday per patient request. Maricarmen Grace and myself as well as RN Alexandra Kennedy addressed numerous concerns such as hip kit which we obtained, hospital bed which he did not qualify for, medication concerns and pen needles for insulin pens were addressed and sent to Dr. Deborah Lora to address when out of surgery. Patient verbalized clear understanding that he is discharged and all concerns have been addressed. If he does not have transportation he has benefits via his insurance [employer] [phone], if issues with that taxi voucher is in soft chart as a last resort .     Haleigh ADRIANN, RN  KALEB   [phone]

## 2021-05-20 NOTE — PROGRESS NOTES
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 Feeling more confident this morning. No nausea no vomiting. Tolerated diet. Having bowel movements. U: X3  S: X1  MAX:  125 dark red    Objective:  /68   Pulse 96   Temp 98.7 °F (37.1 °C) (Oral)   Resp 18   Ht 5' 8\" (1.727 m)   Wt 223 lb (101.2 kg)   SpO2 95%   BMI 33.91 kg/m²     GENERAL:  Laying in bed, awake, alert  HEAD: Normocephalic, atraumatic  EYES: No sclera icterus, pupils equal  LUNGS:  No increased work of breathing  CARDIOVASCULAR: RR and normotensive  ABDOMEN:  Soft, mildly distended, appropriately TTP incisions clean dry and intact, MAX drain left upper quadrant with serous drainage. EXTREMITIES: No edema or swelling  SKIN: Warm and dry    Assessment/Plan:  62 y.o. male with 1.4cm pancreatic neck BD-IPMN with rapid growth to now 2.9cm (nodule and septations) in 6 months.  Patient went for a robotic converted to open distal pancreatectomy and splenectomy on 5/12/2021    Pain and nausea control PRN  Low-fat diet  OOB/AAT/SMI  PT/OT  DVT ppx w/ heparin and scd's  Endocrinology consulted for glucose management  Bowel regimen  Hypoxia - pulm recs appreciated, improving  ERCP 5/17 pancreatic duct stent placement    Home today with home health care    Follow-up in 6 weeks with plans for stent      Electronically signed by [place] MD on 5/20/2021 at 6:37 AM

## 2021-05-20 NOTE — TELEPHONE ENCOUNTER
The patient called and stated that his rx for oxycodone 5mg is to soon to refill. He wanted to know if we could fill the 10mg oxy-ir. I spoke with dr Jan Escoto he said he would not fill the 10mg, that the patient Is to take the oxycodone 5mg on top of the oxycode 5mg tablets he has at home to equal 10mg total. I called [facility] and spoke with eliz the [facility], he said the insurance company will not refill them because the script is coming back refill to soon. He did say that since dr Jan Escoto is having the patient use it along with his oxycodone 5mg he has at home for pain, that he would fill it but it would cost the patient $13.81. I called the patient to let him know. He said to tell them to fill it.   Electronically signed by Cheryl Mendez MA on 5/20/2021 at 12:52 PM

## 2021-05-21 ENCOUNTER — CARE COORDINATION (OUTPATIENT)
Dept: CASE MANAGEMENT | Age: 61
End: 2021-05-21

## 2021-05-21 LAB
BLOOD BANK DISPENSE STATUS: NORMAL
BLOOD BANK DISPENSE STATUS: NORMAL
BLOOD BANK PRODUCT CODE: NORMAL
BLOOD BANK PRODUCT CODE: NORMAL
BPU ID: NORMAL
BPU ID: NORMAL
DESCRIPTION BLOOD BANK: NORMAL
DESCRIPTION BLOOD BANK: NORMAL

## 2021-05-21 NOTE — CARE COORDINATION
[facility] Transitions Initial Follow Up Call    Call within 2 business days of discharge: Yes    Patient: Samuel Guajardo Patient : 1960   MRN: [id number]  Reason for Admission: IPMN  Discharge Date: 21 RARS: Readmission Risk Score: 18      Last Discharge [facility]       Complaint Diagnosis Description Type Department Provider    21  IPMN (intraductal papillary mucinous neoplasm) . .. Admission (Discharged) [facility] P[facility] Cele García MD           Spoke with: Samuel Guajardo, patient    Facility: [facility]      Transitions of Care Initial Call      Challenges to be reviewed by the provider   Additional needs identified to be addressed with provider No  none             Method of communication with provider : none    Was this a readmission? No  Patient stated reason for admission: prescheduled surgery  Patients top risk factors for readmission: ineffective coping, medical condition-high cholesterol, hypertension, dm, post op and uncontrolled pain    Care Transition Nurse (CTN) contacted the patient by telephone to perform post hospital discharge assessment. Verified name and  with patient as identifiers. Provided introduction to self, and explanation of the CTN role. CTN reviewed discharge instructions, medical action plan and red flags with patient who verbalized understanding. Patient given an opportunity to ask questions and does not have any further questions or concerns at this time. CTN provided contact information. Plan for follow-up call in 3-5 days based on severity of symptoms and risk factors. Non-face-to-face services provided:  Obtained and reviewed discharge summary and/or continuity of care documents  Communication with home health agencies or other community services the patient is currently using-Per Edin Pantoja RN at [facility], [address] scheduled for 21. Patient updated.   This CTN notified Edin Pantoja RN at [facility], would be beneficial to patient to try to schedule soc in am.    Care Transitions 24 Hour Call    Do you have any ongoing symptoms?: Yes  Patient-reported symptoms: Pain, Other (Comment: POOR APPETITE)  Were you discharged with any Home Care or Post Acute Services: Yes  Post Acute Services: Home Health (Comment: [facility])  Care Transitions Interventions       Spoke with patient for initial care transition call post hospital discharge. Med review not completed; 1111F not entered. Patient declined med review at this time and states this CTN may contact patient on Monday. Patient presented to [facility] ([facility] for laparoscopic robotic distal pancreatectomy, splenectomy and cholecystectomy, intra-operative ultrasound, transitioned to open. Patient c/o pain, unsure if Oxycodone is helping. Patient states he is not doing real well, has not appetite and a sore tongue. Patient states he just ate chicken soup and his cousin just brought him a recliner to assist with comfort. Patient states he cannot find his glucometer and hasn't checked his sugar taken Lantus or Humalog      Patient instructed to bathe with dial soap and water and to pat incisions dry. Patient instructed to refrain from using lotions, ointments or creams and no lifting, pushing or pulling. Patient reports chills earlier, denies any s/s of infection at this time. Patient states his dressing feels wet; however, he can't see the dressing nor does he have a mirror in the home to utilize to visualize dressing. Patient reports KENDALL drain is draining brownish purple fluid and patient has not emptied it; states it's a quarter full. Patient instructed to empty drain bid as patient confirms he was provided education during admission on how to empty kendall drain. Patient states he doesn't feel well and just wants to rest.  Agreeable to this CTN following up with patient to complete initial interview and med review on Monday.       Follow Up  Future Appointments   Date Time Provider Pantera Cooper   5/25/2021 9:15 AM Kirit Daniel MD BDM ENDO [facility]   5/25/2021  3:00 PM [facility] [place] [address] JACK C[place]rena

## 2021-05-24 ENCOUNTER — CARE COORDINATION (OUTPATIENT)
Dept: CASE MANAGEMENT | Age: 61
End: 2021-05-24

## 2021-05-24 NOTE — CARE COORDINATION
[facility] Transitions Initial Follow Up Call    Call within 2 business days of discharge: Yes    Patient: Genaro Yung Patient : 1960   MRN: [id number]  Reason for Admission: IPMN  Discharge Date: 21 RARS: Readmission Risk Score: 18      Last Discharge [facility]       Complaint Diagnosis Description Type Department Provider    21  IPMN (intraductal papillary mucinous neoplasm) . .. Admission (Discharged) [facility] P[facility] Cory Solorio MD           Spoke with: Genaro Yung, patient    Facility: [facility] Transitions Follow Up Call    Needs to be reviewed by the provider   Additional needs identified to be addressed with provider No  none             Method of communication with provider : none      Care Transition Nurse (CTN) contacted the patient by telephone to follow up after admission on 21. Verified name and  with patient as identifiers. Addressed changes since last contact: home health care-[facility] soc performed on 21 and snv peformed today. Discussed follow-up appointments. If no appointment was previously scheduled, appointment scheduling offered: Patient encouraged to schedule follow up appt with Dr. Arash Onofre within 7-14 days of discharge from hospital.   Is follow up appointment scheduled within 7 days of discharge? Patient encouraged to schedule follow up appt with Dr. Arash Onofre within 7-14 days of discharge from hospital.    CTN reviewed discharge instructions, medical action plan and red flags with patient and discussed any barriers to care and/or understanding of plan of care after discharge. Discussed appropriate site of care based on symptoms and resources available to patient including: PCP, Specialist and Home health. The patient agrees to contact the PCP office for questions related to their healthcare.      Patients top risk factors for readmission: medical condition-post op, high chol, htn, dm, transportation and uncontrolled pain  Interventions to address risk factors: Scheduled appointment with PCP-Patient encouraged to schedule follow up appt with Dr. Chanelle Do within 7-14 days of discharge from hospital., Scheduled appointment with Specialist-Per patient, appt scheduled with Dr. Teo Garay on 5/27/21. Patient to schedule follow up with Dr. Tamir Moise. Patient states he doesn't have a ride to appt with Dr. Claire Hernandez and doesn't feel up to attending appt., Obtained and reviewed discharge summary and/or continuity of care documents and Communication with home health agencies or other community services the patient is currently using-[facility] soc performed on 5/22/21. Non-MH follow up appointment(s): Patient encouraged to schedule follow up appt with Dr. Chanelle Do within 7-14 days of discharge from hospital.    CTN provided contact information for future needs. No further follow-up call identified based on severity of symptoms and risk factors. Care Transitions 24 Hour Call    Do you have any ongoing symptoms?: Yes  Patient-reported symptoms: Pain  Do you have a copy of your discharge instructions?: Yes  Do you have all of your prescriptions and are they filled?: Yes  Have you been contacted by a [address] [address] [facility] Pharmacist?: No  Have you scheduled your follow up appointment?: No  Were you discharged with any Home Care or Post Acute Services: Yes  Post Acute Services: Home Health (Comment: [address] SOC)  Do you feel like you have everything you need to keep you well at home?: Yes  Care Transitions Interventions  No Identified Needs       Spoke with patient for follow up care transition call. Patient reports he is doing better, but pain is still 9/10. Patient encouraged to eat small frequent meals, states he has only eaten cream of wheat today. Patient states he is drinking Ensure x 3 and hydrating with water. Patient reports home care nurse performed snv today; cleaned and dressed incision. Patient states kendall draining \"bad chocolate milk\" drainage. Patient denies any fever or chills. Patient states home care reviewed all meds and declined full med review. Patient reports fbs today 440 mg/dl. Patient states he is eating whatever he can tolerate. Patient states Lantus 20 Units and Humalog 19 units administered this morning. Patient resistant to conversing with CTN. Patient denies any further needs, questions, or concerns at this time. No further outreach scheduled at this time. Episode to be resolved and CTN to sign off. Patient states \"This is only Monday and appt with Dr. Vasu Early is on Thursday\" when asked about transportation to appt. Patient is hoping he feels better and can drive to own appt.     Follow Up  Future Appointments   Date Time Provider Pantera Cooper   5/25/2021  9:15 AM Felton Frazier MD [facility]   5/25/2021  3:00 PM [facility] [address] [facility] [address] JACK Camarena

## 2021-05-25 ENCOUNTER — TELEPHONE (OUTPATIENT)
Dept: HEMATOLOGY | Age: 61
End: 2021-05-25

## 2021-05-25 NOTE — TELEPHONE ENCOUNTER
I received a call on 5/24/21 from [facility] health nurse and she stated the patient was ordered nebulizer medication but has no nebulizer or script for one. After looking into this further the patient was given a script for the nebulizer but he cannot locate it. I sent over script for the nebulizer to [facility] and I called this am and they will be delivering patient his nebulize this afternoon. I called and updated the patient and told him to answer his phone when they call so they can deliver his nebulizer. He verbalized understanding.     Electronically signed by Meera Duncan RN on 5/25/2021 at 12:30 PM

## 2021-05-27 ENCOUNTER — APPOINTMENT (OUTPATIENT)
Dept: GENERAL RADIOLOGY | Age: 61
DRG: 721 | End: 2021-05-27
Payer: MEDICAID

## 2021-05-27 ENCOUNTER — APPOINTMENT (OUTPATIENT)
Dept: CT IMAGING | Age: 61
DRG: 721 | End: 2021-05-27
Payer: MEDICAID

## 2021-05-27 ENCOUNTER — HOSPITAL ENCOUNTER (INPATIENT)
Age: 61
LOS: 9 days | Discharge: HOME | DRG: 721 | End: 2021-06-05
Attending: EMERGENCY MEDICINE | Admitting: TRANSPLANT SURGERY
Payer: MEDICAID

## 2021-05-27 ENCOUNTER — HOSPITAL ENCOUNTER (OUTPATIENT)
Dept: INFUSION THERAPY | Age: 61
Setting detail: INFUSION SERIES
Discharge: HOME | DRG: 721 | End: 2021-05-27
Payer: MEDICAID

## 2021-05-27 ENCOUNTER — OFFICE VISIT (OUTPATIENT)
Dept: HEMATOLOGY | Age: 61
DRG: 721 | End: 2021-05-27
Payer: MEDICAID

## 2021-05-27 VITALS
TEMPERATURE: 98 F | DIASTOLIC BLOOD PRESSURE: 66 MMHG | RESPIRATION RATE: 20 BRPM | SYSTOLIC BLOOD PRESSURE: 123 MMHG | OXYGEN SATURATION: 97 % | HEART RATE: 97 BPM

## 2021-05-27 VITALS
BODY MASS INDEX: 33.13 KG/M2 | OXYGEN SATURATION: 97 % | HEART RATE: 97 BPM | HEIGHT: 68 IN | TEMPERATURE: 98 F | SYSTOLIC BLOOD PRESSURE: 123 MMHG | DIASTOLIC BLOOD PRESSURE: 66 MMHG | WEIGHT: 218.6 LBS

## 2021-05-27 DIAGNOSIS — K86.89 PANCREATIC DUCT LEAK: Primary | ICD-10-CM

## 2021-05-27 DIAGNOSIS — D49.0 IPMN (INTRADUCTAL PAPILLARY MUCINOUS NEOPLASM): ICD-10-CM

## 2021-05-27 DIAGNOSIS — K86.89 PANCREATIC DUCT LEAK: ICD-10-CM

## 2021-05-27 DIAGNOSIS — G89.18 ABDOMINAL PAIN WITH FEVER AFTER SURGERY: Primary | ICD-10-CM

## 2021-05-27 DIAGNOSIS — R50.82 ABDOMINAL PAIN WITH FEVER AFTER SURGERY: Primary | ICD-10-CM

## 2021-05-27 DIAGNOSIS — D49.0 PANCREATIC NEOPLASM: ICD-10-CM

## 2021-05-27 DIAGNOSIS — B99.9 INTRA-ABDOMINAL INFECTION: ICD-10-CM

## 2021-05-27 DIAGNOSIS — R10.9 ABDOMINAL PAIN WITH FEVER AFTER SURGERY: Primary | ICD-10-CM

## 2021-05-27 DIAGNOSIS — D49.0 PANCREATIC NEOPLASM: Primary | ICD-10-CM

## 2021-05-27 LAB
ALBUMIN SERPL-MCNC: 3.2 G/DL (ref 3.5–5.2)
ALP BLD-CCNC: 72 U/L (ref 40–129)
ALT SERPL-CCNC: 78 U/L (ref 0–40)
ANION GAP SERPL CALCULATED.3IONS-SCNC: 10 MMOL/L (ref 7–16)
ANISOCYTOSIS: ABNORMAL
AST SERPL-CCNC: 73 U/L (ref 0–39)
BACTERIA: ABNORMAL /HPF
BASOPHILS ABSOLUTE: 0 E9/L (ref 0–0.2)
BASOPHILS RELATIVE PERCENT: 0.3 % (ref 0–2)
BILIRUB SERPL-MCNC: 0.6 MG/DL (ref 0–1.2)
BILIRUBIN URINE: NEGATIVE
BLOOD, URINE: ABNORMAL
BUN BLDV-MCNC: 11 MG/DL (ref 6–20)
CALCIUM SERPL-MCNC: 7.7 MG/DL (ref 8.6–10.2)
CHLORIDE BLD-SCNC: 96 MMOL/L (ref 98–107)
CHP ED QC CHECK: YES
CLARITY: CLEAR
CO2: 25 MMOL/L (ref 22–29)
COLOR: YELLOW
CREAT SERPL-MCNC: 0.7 MG/DL (ref 0.7–1.2)
EOSINOPHILS ABSOLUTE: 0 E9/L (ref 0.05–0.5)
EOSINOPHILS RELATIVE PERCENT: 0.6 % (ref 0–6)
GFR AFRICAN AMERICAN: >60
GFR NON-AFRICAN AMERICAN: >60 ML/MIN/1.73
GLUCOSE BLD-MCNC: 256 MG/DL
GLUCOSE BLD-MCNC: 256 MG/DL (ref 74–99)
GLUCOSE URINE: 250 MG/DL
HCT VFR BLD CALC: 27.4 % (ref 37–54)
HEMOGLOBIN: 8.5 G/DL (ref 12.5–16.5)
HYPOCHROMIA: ABNORMAL
KETONES, URINE: NEGATIVE MG/DL
LACTIC ACID, SEPSIS: 1.3 MMOL/L (ref 0.5–1.9)
LACTIC ACID, SEPSIS: 1.6 MMOL/L (ref 0.5–1.9)
LEUKOCYTE ESTERASE, URINE: NEGATIVE
LYMPHOCYTES ABSOLUTE: 1.52 E9/L (ref 1.5–4)
LYMPHOCYTES RELATIVE PERCENT: 15.8 % (ref 20–42)
MCH RBC QN AUTO: 23 PG (ref 26–35)
MCHC RBC AUTO-ENTMCNC: 31 % (ref 32–34.5)
MCV RBC AUTO: 74.1 FL (ref 80–99.9)
MONOCYTES ABSOLUTE: 0.76 E9/L (ref 0.1–0.95)
MONOCYTES RELATIVE PERCENT: 7.9 % (ref 2–12)
NEUTROPHILS ABSOLUTE: 7.22 E9/L (ref 1.8–7.3)
NEUTROPHILS RELATIVE PERCENT: 76.3 % (ref 43–80)
NITRITE, URINE: NEGATIVE
NUCLEATED RED BLOOD CELLS: 1.8 /100 WBC
PDW BLD-RTO: 15.1 FL (ref 11.5–15)
PH UA: 7 (ref 5–9)
PLATELET # BLD: 622 E9/L (ref 130–450)
PMV BLD AUTO: 11.3 FL (ref 7–12)
POIKILOCYTES: ABNORMAL
POLYCHROMASIA: ABNORMAL
POTASSIUM REFLEX MAGNESIUM: 4.1 MMOL/L (ref 3.5–5)
PROTEIN UA: ABNORMAL MG/DL
RBC # BLD: 3.7 E12/L (ref 3.8–5.8)
RBC UA: ABNORMAL /HPF (ref 0–2)
SARS-COV-2, NAAT: NOT DETECTED
SODIUM BLD-SCNC: 131 MMOL/L (ref 132–146)
SPECIFIC GRAVITY UA: 1.01 (ref 1–1.03)
TARGET CELLS: ABNORMAL
TOTAL PROTEIN: 6.9 G/DL (ref 6.4–8.3)
UROBILINOGEN, URINE: 4 E.U./DL
WBC # BLD: 9.5 E9/L (ref 4.5–11.5)
WBC UA: ABNORMAL /HPF (ref 0–5)

## 2021-05-27 PROCEDURE — 6360000002 HC RX W HCPCS: Performed by: STUDENT IN AN ORGANIZED HEALTH CARE EDUCATION/TRAINING PROGRAM

## 2021-05-27 PROCEDURE — 6360000004 HC RX CONTRAST MEDICATION: Performed by: RADIOLOGY

## 2021-05-27 PROCEDURE — 96374 THER/PROPH/DIAG INJ IV PUSH: CPT

## 2021-05-27 PROCEDURE — 99212 OFFICE O/P EST SF 10 MIN: CPT | Performed by: TRANSPLANT SURGERY

## 2021-05-27 PROCEDURE — 81001 URINALYSIS AUTO W/SCOPE: CPT

## 2021-05-27 PROCEDURE — 85025 COMPLETE CBC W/AUTO DIFF WBC: CPT

## 2021-05-27 PROCEDURE — 96375 TX/PRO/DX INJ NEW DRUG ADDON: CPT

## 2021-05-27 PROCEDURE — 71045 X-RAY EXAM CHEST 1 VIEW: CPT

## 2021-05-27 PROCEDURE — 99285 EMERGENCY DEPT VISIT HI MDM: CPT

## 2021-05-27 PROCEDURE — 90471 IMMUNIZATION ADMIN: CPT | Performed by: TRANSPLANT SURGERY

## 2021-05-27 PROCEDURE — 2580000003 HC RX 258: Performed by: STUDENT IN AN ORGANIZED HEALTH CARE EDUCATION/TRAINING PROGRAM

## 2021-05-27 PROCEDURE — 87040 BLOOD CULTURE FOR BACTERIA: CPT

## 2021-05-27 PROCEDURE — 87186 SC STD MICRODIL/AGAR DIL: CPT

## 2021-05-27 PROCEDURE — 87070 CULTURE OTHR SPECIMN AEROBIC: CPT

## 2021-05-27 PROCEDURE — 87635 SARS-COV-2 COVID-19 AMP PRB: CPT

## 2021-05-27 PROCEDURE — 74177 CT ABD & PELVIS W/CONTRAST: CPT

## 2021-05-27 PROCEDURE — 87077 CULTURE AEROBIC IDENTIFY: CPT

## 2021-05-27 PROCEDURE — 83605 ASSAY OF LACTIC ACID: CPT

## 2021-05-27 PROCEDURE — 90620 MENB-4C VACCINE IM: CPT | Performed by: TRANSPLANT SURGERY

## 2021-05-27 PROCEDURE — 6360000002 HC RX W HCPCS: Performed by: TRANSPLANT SURGERY

## 2021-05-27 PROCEDURE — 99024 POSTOP FOLLOW-UP VISIT: CPT | Performed by: TRANSPLANT SURGERY

## 2021-05-27 PROCEDURE — 87205 SMEAR GRAM STAIN: CPT

## 2021-05-27 PROCEDURE — 80053 COMPREHEN METABOLIC PANEL: CPT

## 2021-05-27 PROCEDURE — 6370000000 HC RX 637 (ALT 250 FOR IP): Performed by: STUDENT IN AN ORGANIZED HEALTH CARE EDUCATION/TRAINING PROGRAM

## 2021-05-27 PROCEDURE — 96372 THER/PROPH/DIAG INJ SC/IM: CPT

## 2021-05-27 PROCEDURE — 94640 AIRWAY INHALATION TREATMENT: CPT

## 2021-05-27 PROCEDURE — 2060000000 HC ICU INTERMEDIATE R&B

## 2021-05-27 RX ORDER — NICOTINE POLACRILEX 4 MG
15 LOZENGE BUCCAL PRN
Status: DISCONTINUED | OUTPATIENT
Start: 2021-05-27 | End: 2021-06-05 | Stop reason: HOSPADM

## 2021-05-27 RX ORDER — ALBUTEROL SULFATE 2.5 MG/3ML
2.5 SOLUTION RESPIRATORY (INHALATION) 4 TIMES DAILY
Status: DISCONTINUED | OUTPATIENT
Start: 2021-05-27 | End: 2021-06-05 | Stop reason: HOSPADM

## 2021-05-27 RX ORDER — SODIUM CHLORIDE 9 MG/ML
25 INJECTION, SOLUTION INTRAVENOUS PRN
Status: DISCONTINUED | OUTPATIENT
Start: 2021-05-27 | End: 2021-06-05 | Stop reason: HOSPADM

## 2021-05-27 RX ORDER — PANTOPRAZOLE SODIUM 40 MG/1
40 TABLET, DELAYED RELEASE ORAL
Status: DISCONTINUED | OUTPATIENT
Start: 2021-05-28 | End: 2021-06-05 | Stop reason: HOSPADM

## 2021-05-27 RX ORDER — OXYCODONE HYDROCHLORIDE 5 MG/1
5 TABLET ORAL EVERY 6 HOURS PRN
Status: ON HOLD | COMMUNITY
End: 2021-06-04 | Stop reason: HOSPADM

## 2021-05-27 RX ORDER — METHOCARBAMOL 500 MG/1
1000 TABLET, FILM COATED ORAL 4 TIMES DAILY
Status: DISCONTINUED | OUTPATIENT
Start: 2021-05-27 | End: 2021-06-05 | Stop reason: HOSPADM

## 2021-05-27 RX ORDER — DEXTROSE MONOHYDRATE 50 MG/ML
100 INJECTION, SOLUTION INTRAVENOUS PRN
Status: DISCONTINUED | OUTPATIENT
Start: 2021-05-27 | End: 2021-06-05 | Stop reason: HOSPADM

## 2021-05-27 RX ORDER — GABAPENTIN 300 MG/1
300 CAPSULE ORAL 2 TIMES DAILY
Status: DISCONTINUED | OUTPATIENT
Start: 2021-05-27 | End: 2021-06-05 | Stop reason: HOSPADM

## 2021-05-27 RX ORDER — ONDANSETRON 2 MG/ML
4 INJECTION INTRAMUSCULAR; INTRAVENOUS EVERY 6 HOURS PRN
Status: DISCONTINUED | OUTPATIENT
Start: 2021-05-27 | End: 2021-06-05 | Stop reason: HOSPADM

## 2021-05-27 RX ORDER — 0.9 % SODIUM CHLORIDE 0.9 %
1000 INTRAVENOUS SOLUTION INTRAVENOUS ONCE
Status: COMPLETED | OUTPATIENT
Start: 2021-05-27 | End: 2021-05-27

## 2021-05-27 RX ORDER — DEXTROSE MONOHYDRATE 25 G/50ML
12.5 INJECTION, SOLUTION INTRAVENOUS PRN
Status: DISCONTINUED | OUTPATIENT
Start: 2021-05-27 | End: 2021-06-05 | Stop reason: HOSPADM

## 2021-05-27 RX ORDER — SODIUM CHLORIDE 0.9 % (FLUSH) 0.9 %
10 SYRINGE (ML) INJECTION EVERY 12 HOURS SCHEDULED
Status: DISCONTINUED | OUTPATIENT
Start: 2021-05-27 | End: 2021-06-05 | Stop reason: HOSPADM

## 2021-05-27 RX ORDER — SODIUM CHLORIDE, SODIUM LACTATE, POTASSIUM CHLORIDE, CALCIUM CHLORIDE 600; 310; 30; 20 MG/100ML; MG/100ML; MG/100ML; MG/100ML
INJECTION, SOLUTION INTRAVENOUS CONTINUOUS
Status: DISCONTINUED | OUTPATIENT
Start: 2021-05-27 | End: 2021-05-29

## 2021-05-27 RX ORDER — ATORVASTATIN CALCIUM 20 MG/1
20 TABLET, FILM COATED ORAL DAILY
Status: DISCONTINUED | OUTPATIENT
Start: 2021-05-28 | End: 2021-06-05 | Stop reason: HOSPADM

## 2021-05-27 RX ORDER — SODIUM CHLORIDE 0.9 % (FLUSH) 0.9 %
10 SYRINGE (ML) INJECTION PRN
Status: DISCONTINUED | OUTPATIENT
Start: 2021-05-27 | End: 2021-06-05 | Stop reason: HOSPADM

## 2021-05-27 RX ORDER — ACETAMINOPHEN 325 MG/1
650 TABLET ORAL ONCE
Status: COMPLETED | OUTPATIENT
Start: 2021-05-27 | End: 2021-05-27

## 2021-05-27 RX ORDER — BENZONATATE 200 MG/1
200 CAPSULE ORAL 3 TIMES DAILY PRN
COMMUNITY
End: 2022-04-12 | Stop reason: ALTCHOICE

## 2021-05-27 RX ORDER — ACETAMINOPHEN 325 MG/1
650 TABLET ORAL EVERY 4 HOURS PRN
Status: DISCONTINUED | OUTPATIENT
Start: 2021-05-27 | End: 2021-05-28

## 2021-05-27 RX ORDER — DIPHENHYDRAMINE HYDROCHLORIDE 50 MG/ML
50 INJECTION INTRAMUSCULAR; INTRAVENOUS ONCE
Status: COMPLETED | OUTPATIENT
Start: 2021-05-27 | End: 2021-05-27

## 2021-05-27 RX ORDER — METHYLPREDNISOLONE SODIUM SUCCINATE 40 MG/ML
40 INJECTION, POWDER, LYOPHILIZED, FOR SOLUTION INTRAMUSCULAR; INTRAVENOUS ONCE
Status: COMPLETED | OUTPATIENT
Start: 2021-05-27 | End: 2021-05-27

## 2021-05-27 RX ORDER — BUDESONIDE 0.5 MG/2ML
1 INHALANT ORAL 2 TIMES DAILY
Status: DISCONTINUED | OUTPATIENT
Start: 2021-05-27 | End: 2021-06-05 | Stop reason: HOSPADM

## 2021-05-27 RX ORDER — POLYETHYLENE GLYCOL 3350 17 G/17G
17 POWDER, FOR SOLUTION ORAL DAILY PRN
Status: DISCONTINUED | OUTPATIENT
Start: 2021-05-27 | End: 2021-05-28

## 2021-05-27 RX ADMIN — NEISSERIA MENINGITIDIS SEROGROUP B NHBA FUSION PROTEIN ANTIGEN, NEISSERIA MENINGITIDIS SEROGROUP B FHBP FUSION PROTEIN ANTIGEN AND NEISSERIA MENINGITIDIS SEROGROUP B NADA PROTEIN ANTIGEN 0.5 ML: 50; 50; 50; 25 INJECTION, SUSPENSION INTRAMUSCULAR at 13:47

## 2021-05-27 RX ADMIN — ALBUTEROL SULFATE 2.5 MG: 2.5 SOLUTION RESPIRATORY (INHALATION) at 23:36

## 2021-05-27 RX ADMIN — SODIUM CHLORIDE 1000 ML: 9 INJECTION, SOLUTION INTRAVENOUS at 18:37

## 2021-05-27 RX ADMIN — DIPHENHYDRAMINE HYDROCHLORIDE 50 MG: 50 INJECTION, SOLUTION INTRAMUSCULAR; INTRAVENOUS at 19:14

## 2021-05-27 RX ADMIN — PIPERACILLIN AND TAZOBACTAM 3375 MG: 3; .375 INJECTION, POWDER, LYOPHILIZED, FOR SOLUTION INTRAVENOUS at 20:51

## 2021-05-27 RX ADMIN — ACETAMINOPHEN 650 MG: 325 TABLET ORAL at 19:01

## 2021-05-27 RX ADMIN — BUDESONIDE 1000 MCG: 0.5 SUSPENSION RESPIRATORY (INHALATION) at 23:36

## 2021-05-27 RX ADMIN — METHYLPREDNISOLONE SODIUM SUCCINATE 40 MG: 40 INJECTION, POWDER, FOR SOLUTION INTRAMUSCULAR; INTRAVENOUS at 19:14

## 2021-05-27 RX ADMIN — IOPAMIDOL 90 ML: 755 INJECTION, SOLUTION INTRAVENOUS at 20:08

## 2021-05-27 ASSESSMENT — PAIN SCALES - GENERAL
PAINLEVEL_OUTOF10: 8
PAINLEVEL_OUTOF10: 6
PAINLEVEL_OUTOF10: 5
PAINLEVEL_OUTOF10: 8

## 2021-05-27 ASSESSMENT — ENCOUNTER SYMPTOMS
CHEST TIGHTNESS: 0
VOMITING: 0
SORE THROAT: 0
DIARRHEA: 1
TROUBLE SWALLOWING: 0
ABDOMINAL DISTENTION: 0
RHINORRHEA: 0
SHORTNESS OF BREATH: 0
SHORTNESS OF BREATH: 1
ALLERGIC/IMMUNOLOGIC NEGATIVE: 1
BACK PAIN: 0
NAUSEA: 1
BLOOD IN STOOL: 0
EYES NEGATIVE: 1
ABDOMINAL PAIN: 1
COUGH: 0
WHEEZING: 0
COUGH: 1

## 2021-05-27 ASSESSMENT — PAIN DESCRIPTION - PAIN TYPE
TYPE: ACUTE PAIN
TYPE: ACUTE PAIN

## 2021-05-27 ASSESSMENT — PAIN DESCRIPTION - ORIENTATION: ORIENTATION: MID;LEFT

## 2021-05-27 ASSESSMENT — PAIN DESCRIPTION - LOCATION
LOCATION: ABDOMEN
LOCATION: ABDOMEN

## 2021-05-27 NOTE — ED PROVIDER NOTES
[facility]  Department of Emergency Medicine     Written by: Rachel Olmedo, DO  Patient Name: Arlette Yin  Attending Provider: Jeanie Roque Date: 2021  3:52 PM  MRN: [id number]                   : 1960        Chief Complaint   Patient presents with    Fatigue     sent by dr Sandra Pacheco after surgery follow up pt states that he has had fever and chills     - Chief complaint      Patient is a 64-year-old male with past medical history pancreatic neoplasm who follows with Dr. Deandre Frias, s/p splenectomy and partial pancreatectomy on , s/p pancreatic duct drain placement on , who presents to the ED from follow-up appointment at surgery outpatient office due to abdominal pain and concerns of a possible infection. Patient endorses abdominal pain, chills, fatigue, diarrhea, and generalized weakness for the past 1-2 weeks. Denies any fevers/measured temperatures. Also endorses a dry cough and exertional shortness of breath. His complaints are moderate with intermittent increases in severity, no specific aggravating or alleviating factors. Denies any neck pain or stiffness, sore throat, chest pain or palpitations,vomiting, black or bloody stools, urinary symptoms, numbness or tingling anywhere, lower extremity edema or tenderness. Review of Systems   Constitutional: Positive for chills, diaphoresis and fatigue. Negative for fever. HENT: Negative for rhinorrhea and sore throat. Eyes: Negative for visual disturbance. Respiratory: Positive for cough (dry) and shortness of breath. Negative for wheezing. Cardiovascular: Negative for chest pain and palpitations. Gastrointestinal: Positive for abdominal pain, diarrhea and nausea. Negative for blood in stool and vomiting. Endocrine: Negative for polydipsia and polyuria. Genitourinary: Negative for dysuria and frequency. Musculoskeletal: Negative for back pain and myalgias.    Skin: Positive for wound (surgical wound, abdominal). Negative for rash. Neurological: Negative for weakness and headaches. Psychiatric/Behavioral: Negative for confusion. All other systems reviewed and are negative. Physical Exam  Vitals and nursing note reviewed. Constitutional:       Appearance: He is ill-appearing. HENT:      Head: Normocephalic and atraumatic. Right Ear: External ear normal.      Left Ear: External ear normal.      Nose: Nose normal. No rhinorrhea. Mouth/Throat:      Mouth: Mucous membranes are moist.      Pharynx: Oropharynx is clear. Eyes:      Extraocular Movements: Extraocular movements intact. Conjunctiva/sclera: Conjunctivae normal.      Pupils: Pupils are equal, round, and reactive to light. Cardiovascular:      Rate and Rhythm: Regular rhythm. Tachycardia present. Pulses: Normal pulses. Heart sounds: Normal heart sounds. Pulmonary:      Effort: Pulmonary effort is normal.   Abdominal:      General: Bowel sounds are normal. There is distension. Palpations: Abdomen is soft. Tenderness: There is abdominal tenderness (over surgical wound and near pancreatic duct drain which is present). There is no right CVA tenderness, left CVA tenderness, guarding or rebound. Musculoskeletal:         General: No tenderness. Normal range of motion. Cervical back: Normal range of motion and neck supple. Right lower leg: No edema. Left lower leg: No edema. Skin:     General: Skin is warm and dry. Capillary Refill: Capillary refill takes less than 2 seconds. Neurological:      General: No focal deficit present. Mental Status: He is alert and oriented to person, place, and time.    Psychiatric:         Mood and Affect: Mood normal.         Behavior: Behavior normal.          Procedures       MDM     Presents due to abdominal pain and concerns over possible infection s/p splenectomy and partial pancreatectomy and s/p pancreatic duct drain. Patient alert and oriented. Vitals significant for oral temperature 100.6F; patient given PO tylenol. Sent here from outpatient surgery office follow-up appointment. Labs reviewed, significant for LFTs slightly above recent baselines; otherwise generally unremarkable. CT abd/pelv results pending at time of disposition; spoke with patient's surgeon who requests admission with IV abx and culture of pancreatic duct drain material. Patient started on zosyn. Updated on results and plan for admission, he is amenable and his questions are answered. I have discussed this patient with my attending, who has seen the patient and agrees with this disposition. Patient was seen and evaluated by myself and my attending Ila Alonzo MD. Assessment and Plan discussed with attending provider, please see attestation for final plan of care.          --------------------------------------------- PAST HISTORY ---------------------------------------------  Past Medical History:  has a past medical history of Arthritis, Back pain, Bell's palsy, Hyperlipidemia, Hypertension, Neuropathy, Poorly controlled type 2 diabetes mellitus with neuropathy ([address] Sacral radiculitis, Spondylosis of lumbar spine, and Ventral hernia. Past Surgical History:  has a past surgical history that includes cyst removal (Right); hernia repair (Bilateral); Colonoscopy; Spine surgery (06/05/2018); hernia repair (N/A, 12/13/2018); Carpal tunnel release (Right, 03/29/2019); Carpal tunnel release (Right, 03/29/2019); Nerve Block (Bilateral, 07/11/2019); Anesthesia Nerve Block (Bilateral, 07/11/2019); Nerve Block (Bilateral, 08/01/2019); Anesthesia Nerve Block (Bilateral, 08/01/2019); Nerve Block (Bilateral, 08/15/2019); Anesthesia Nerve Block (Bilateral, 08/15/2019); Nerve Block (10/17/2019); epidural steroid injection (N/A, 10/17/2019); Upper gastrointestinal endoscopy (N/A, 01/08/2021);  Upper gastrointestinal endoscopy (N/A, 01/08/2021); Urinalysis, reflex to microscopic   Result Value Ref Range    Color, UA Yellow Straw/Yellow    Clarity, UA Clear Clear    Glucose, Ur 250 (A) Negative mg/dL    Bilirubin Urine Negative Negative    Ketones, Urine Negative Negative mg/dL    Specific Gravity, UA 1.010 1.005 - 1.030    Blood, Urine SMALL (A) Negative    pH, UA 7.0 5.0 - 9.0    Protein, UA TRACE Negative mg/dL    Urobilinogen, Urine 4.0 (A) <2.0 E.U./dL    Nitrite, Urine Negative Negative    Leukocyte Esterase, Urine Negative Negative   Microscopic Urinalysis   Result Value Ref Range    WBC, UA 0-1 0 - 5 /HPF    RBC, UA 1-3 0 - 2 /HPF    Bacteria, UA FEW (A) None Seen /HPF   Comprehensive Metabolic Panel w/ Reflex to MG   Result Value Ref Range    Sodium 133 132 - 146 mmol/L    Potassium reflex Magnesium 4.6 3.5 - 5.0 mmol/L    Chloride 98 98 - 107 mmol/L    CO2 21 (L) 22 - 29 mmol/L    Anion Gap 14 7 - 16 mmol/L    Glucose 341 (H) 74 - 99 mg/dL    BUN 12 6 - 20 mg/dL    CREATININE 0.6 (L) 0.7 - 1.2 mg/dL    GFR Non-African American >60 >=60 mL/min/1.73    GFR African American >60     Calcium 7.9 (L) 8.6 - 10.2 mg/dL    Total Protein 6.0 (L) 6.4 - 8.3 g/dL    Albumin 2.6 (L) 3.5 - 5.2 g/dL    Total Bilirubin 0.5 0.0 - 1.2 mg/dL    Alkaline Phosphatase 54 40 - 129 U/L    ALT 73 (H) 0 - 40 U/L    AST 62 (H) 0 - 39 U/L   CBC auto differential   Result Value Ref Range    WBC 9.2 4.5 - 11.5 E9/L    RBC 3.98 3.80 - 5.80 E12/L    Hemoglobin 8.9 (L) 12.5 - 16.5 g/dL    Hematocrit 29.7 (L) 37.0 - 54.0 %    MCV 74.6 (L) 80.0 - 99.9 fL    MCH 22.4 (L) 26.0 - 35.0 pg    MCHC 30.0 (L) 32.0 - 34.5 %    RDW 15.5 (H) 11.5 - 15.0 fL    Platelets 036 (H) 270 - 450 E9/L    MPV 12.7 (H) 7.0 - 12.0 fL    Neutrophils % 84.5 (H) 43.0 - 80.0 %    Immature Granulocytes % 0.7 0.0 - 5.0 %    Lymphocytes % 7.6 (L) 20.0 - 42.0 %    Monocytes % 7.0 2.0 - 12.0 %    Eosinophils % 0.0 0.0 - 6.0 %    Basophils % 0.2 0.0 - 2.0 %    Neutrophils Absolute 7.77 (H) 1.80 - 7.30 E9/L Immature Granulocytes # 0.06 E9/L    Lymphocytes Absolute 0.70 (L) 1.50 - 4.00 E9/L    Monocytes Absolute 0.64 0.10 - 0.95 E9/L    Eosinophils Absolute 0.00 (L) 0.05 - 0.50 E9/L    Basophils Absolute 0.02 0.00 - 0.20 E9/L    Anisocytosis 2+     Polychromasia 1+     Hypochromia 2+     Poikilocytes 1+     Ovalocytes 1+    Protime-INR   Result Value Ref Range    Protime 19.2 (H) 9.3 - 12.4 sec    INR 1.7    Protime-INR   Result Value Ref Range    Protime 18.5 (H) 9.3 - 12.4 sec    INR 1.7    Comprehensive Metabolic Panel w/ Reflex to MG   Result Value Ref Range    Sodium 129 (L) 132 - 146 mmol/L    Potassium reflex Magnesium 4.3 3.5 - 5.0 mmol/L    Chloride 97 (L) 98 - 107 mmol/L    CO2 22 22 - 29 mmol/L    Anion Gap 10 7 - 16 mmol/L    Glucose 320 (H) 74 - 99 mg/dL    BUN 14 6 - 20 mg/dL    CREATININE 0.7 0.7 - 1.2 mg/dL    GFR Non-African American >60 >=60 mL/min/1.73    GFR African American >60     Calcium 8.0 (L) 8.6 - 10.2 mg/dL    Total Protein 6.4 6.4 - 8.3 g/dL    Albumin 2.7 (L) 3.5 - 5.2 g/dL    Total Bilirubin 0.5 0.0 - 1.2 mg/dL    Alkaline Phosphatase 56 40 - 129 U/L     (H) 0 - 40 U/L     (H) 0 - 39 U/L   CBC auto differential   Result Value Ref Range    WBC 7.6 4.5 - 11.5 E9/L    RBC 3.49 (L) 3.80 - 5.80 E12/L    Hemoglobin 7.8 (L) 12.5 - 16.5 g/dL    Hematocrit 25.4 (L) 37.0 - 54.0 %    MCV 72.8 (L) 80.0 - 99.9 fL    MCH 22.3 (L) 26.0 - 35.0 pg    MCHC 30.7 (L) 32.0 - 34.5 %    RDW 15.3 (H) 11.5 - 15.0 fL    Platelets 593 (H) 817 - 450 E9/L    MPV 11.7 7.0 - 12.0 fL    Neutrophils % 76.3 43.0 - 80.0 %    Lymphocytes % 10.5 (L) 20.0 - 42.0 %    Monocytes % 12.3 (H) 2.0 - 12.0 %    Eosinophils % 0.1 0.0 - 6.0 %    Basophils % 0.4 0.0 - 2.0 %    Neutrophils Absolute 5.85 1.80 - 7.30 E9/L    Lymphocytes Absolute 0.84 (L) 1.50 - 4.00 E9/L    Monocytes Absolute 0.91 0.10 - 0.95 E9/L    Eosinophils Absolute 0.00 (L) 0.05 - 0.50 E9/L    Basophils Absolute 0.00 0.00 - 0.20 E9/L    Metamyelocytes Relative 0.9 0.0 - 1.0 %    nRBC 1.8 /100 WBC    Anisocytosis 1+     Polychromasia 2+     Poikilocytes 2+     Meadow Cells 2+     Target Cells 1+    POCT Glucose   Result Value Ref Range    Glucose 256 mg/dL    QC OK? yes    POCT Glucose   Result Value Ref Range    Meter Glucose 378 (H) 74 - 99 mg/dL   POCT Glucose   Result Value Ref Range    Meter Glucose 317 (H) 74 - 99 mg/dL   POCT Glucose   Result Value Ref Range    Meter Glucose 273 (H) 74 - 99 mg/dL   POCT Glucose   Result Value Ref Range    Meter Glucose 224 (H) 74 - 99 mg/dL   POCT Glucose   Result Value Ref Range    Meter Glucose 232 (H) 74 - 99 mg/dL   POCT Glucose   Result Value Ref Range    Meter Glucose 343 (H) 74 - 99 mg/dL   POCT Glucose   Result Value Ref Range    Meter Glucose 218 (H) 74 - 99 mg/dL   TYPE AND SCREEN   Result Value Ref Range    ABO/Rh B POS     Antibody Screen NEG    PREPARE FRESH FROZEN PLASMA, 1 Units   Result Value Ref Range    Product Code Blood Bank [id number]     Description Blood Bank Plasma, 5 Day, Thawed     Unit Number [id number]     Dispense Status Blood Bank transfused     Product Code Blood Bank [id number]     Description Blood Bank Plasma, 5 Day, Thawed     Unit Number [id number]     Dispense Status Blood Bank transfused        RADIOLOGY:  CT ABDOMEN PELVIS W IV CONTRAST Additional Contrast? None   Final Result   Status post splenectomy and partial pancreatectomy. Very large complex fluid   collection in the upper abdomen extending to the retroperitoneum at the   surgical site with multiple locules of air most consistent with large abscess   measuring approximately 20 x 7.8 x 7.2 cm. There is a catheter extending into the collection centrally. Prominent stranding of the surrounding mesenteric fat which may be   postsurgical or inflammatory. Intraluminal filling defect within the portal vein consistent with portal   vein thrombosis.       Small partially loculated pleural effusions extending into the major ADDITIONAL PROVIDER NOTES ---------------------------------  Consultations:  Please see ED course    This patient's ED course included: a personal history and physicial examination, re-evaluation prior to disposition, multiple bedside re-evaluations, IV medications, cardiac monitoring, continuous pulse oximetry and complex medical decision making and emergency management    This patient has remained hemodynamically stable during their ED course. Diagnosis:  1. Abdominal pain with fever after surgery        Disposition:  Patient's disposition: Admit to telemetry  Patient's condition is stable.        Jackie Joshi DO  Resident  05/29/21 [id number]

## 2021-05-27 NOTE — PROGRESS NOTES
Hepatobiliary and Pancreatic Surgery Progress Note    CC: follow up from surgery    Subjective: Patient states that nothing tastes good. He is eating but has a lack of appetite. He states that his drain now has purulent output and is about 35mL a day. He states that he is fatigued. He also has some serous drainage from his midline. OBJECTIVE      Physical    /66   Pulse 97   Temp 98 °F (36.7 °C)   Ht 5' 8\" (1.727 m)   Wt 218 lb 9.6 oz (99.2 kg)   SpO2 97%   BMI 33.24 kg/m²       General appearance: appears in no acute distress  Lungs:respiratory effort normal without accessory numbers  Heart: no pedal edema  Abdomen: soft, nondistended, nontympanic, no guarding, no peritoneal signs, normoactive bowel sounds, MAX purulent. Mildine with serous drainage likely seroma  Extremities: ROM normal    ASSESSMENT: s/p robotic converted to open distal pancreatectomy with splenectomy 5/21 with pancreatic leak requiring pancreatic stenting now with pancreatic abscess    PLAN:    - we discussed his pathology - serous cystadenoma  - drain: purulent - needs drain cultures  - admit with blood cultures  - CT scan with IV contrast  - IVF    Thank you for the consultation and allowing me to take part in Mr. Cook' care.     Please send a copy of my note, operative note and pathology results to Dr.F. Fatoumata Jim    Electronically signed by Jim Carpio MD on 5/27/2021 at 2:53 PM

## 2021-05-27 NOTE — H&P
Hepatobiliary and Pancreatic Surgery History and Physical    Patient's Name/Date of Birth: Wesley Kelly /1960 (30 y.o.)    Date: May 27, 2021     CC: increasing drain output, abdominal pain    HPI:  This is a 62year old gentleman with a history of distal pancreatectomy and splenectomy on 5/12/21. He states he was doing well for about 3 days after discharge and then his pancreatic drain that remained in place began to have an increase in output. It then changed color from clear/serous to purulent yesterday. He has not had much of an appetite since discharge. He continues to have some drainage from his midline incision as well. He had a follow up visit with Dr. Jose Miguel Urias today and was sent directly to the ED. On arrival he is afebrile but tachycardic to 112. His WBC count is 9.5. he denies any significant abdominal pain but does have tenderness to palpation in LUQ.      Past Medical History:   Diagnosis Date    Arthritis     Back pain     5th finger & ring finger on right hand is numb    Bell's palsy     NEW ONSET 3-     Hyperlipidemia     Hypertension     Neuropathy     toes numb    Poorly controlled type 2 diabetes mellitus with neuropathy ([facility] [address] 5/8/2018    Sacral radiculitis 2/27/2019    Spondylosis of lumbar spine 3/9/2019    Advanced arthritis and degenerative disc disease by MRI 3/2019    Ventral hernia        Past Surgical History:   Procedure Laterality Date    ANESTHESIA NERVE BLOCK Bilateral 07/11/2019    BILATERAL L4-5 TRANSFORAMINAL EPIDURAL STEROID INJECTION performed by Oleksandr December, DO at [address] Bilateral 08/01/2019    BILATERAL MEDIAL BRANCH BLOCK L4-5 AND L5-S1 performed by Oleksandr December, DO at [address] Bilateral 08/15/2019    BILATERAL MEDIAL BRANCH BLOCK L4 - 5 AND L5 - S1 performed by Oleksandr December, DO at [facility] Right 03/29/2019 right wrist carpal tunnel release and right elbow cubital tunnel release    CARPAL TUNNEL RELEASE Right 03/29/2019    RIGHT WRIST CARPAL TUNNEL RELEASE AND RIGHT ELBOW CUBITAL TUNNEL RELEASE performed by Myriam Hamm DO at [place] [address]      at age 40 polyps    CYST REMOVAL Right     EPIDURAL STEROID INJECTION N/A 10/17/2019    LUMBAR EPIDURAL STEROID INJECTION UNDER FLUOROSCOPIC GUIDANCE AT L2-L3 WITHOUT SEDATION performed by Warden Hanna MD at [facility] ERCP N/A 05/17/2021    ERCP ENDOSCOPIC RETROGRADE CHOLANGIOPANCREATOGRAPHY SPHINCTER/PAPILLOTOMY performed by Katerin Bower MD at [address] [facility] ERCP N/A 05/17/2021    ERCP STENT INSERTION performed by Katerin Bower MD at [facility] Bilateral     groin and umbilical    HERNIA REPAIR N/A 12/13/2018    ROBOTIC ASSISTED LAPAROSCOPIC PRIMARY REPAIR OF RECURRENT VENTRAL HERNIA  REPAIR performed by Misha Santos MD at [address] [facility] [address] [facility] [address] Bilateral 07/11/2019    L4-5 transforaminal     NERVE BLOCK Bilateral 08/01/2019    medial branch block    NERVE BLOCK Bilateral 08/15/2019    bilateral medial branch block L4-S1    NERVE BLOCK  10/17/2019    lumbar epidural    PANCREATECTOMY N/A 05/12/2021    LAPAROSCOPIC ROBOTIC DISTAL PANCREATECTOMY AND SPLENECTOMY AND CHOLECYSTECTOMY, INTRA-OPERATIVE ULTRASOUND, TRANSITIONED TO OPEN -- EPIDURAL performed by Barbara Koenig MD at [facility]  06/05/2018    C5-7 fusion at [facility] in [address] [place] [address] Pkwy 01/08/2021    EGD W/EUS FNA performed by Marge Rolno DO at [facility] [address] [facility] 01/08/2021    EGD DIAGNOSTIC ONLY performed by Marge Rolon DO at [facility] ENDOSCOPY       Current Facility-Administered Medications   Medication Dose Route Frequency Provider Last Rate Last Admin    piperacillin-tazobactam (ZOSYN) 3,375 mg in dextrose 5 % 100 mL IVPB extended acetaminophen (APAP EXTRA STRENGTH) 500 MG tablet Take 1 tablet by mouth every 6 hours 120 tablet 3    gabapentin (NEURONTIN) 300 MG capsule Take 1 capsule by mouth 2 times daily. Allergies   Allergen Reactions    Iodine Swelling     Sea food    Metformin And Related Hives    Wheat Extract Swelling       Family History   Problem Relation Age of Onset    Diabetes Mother     Other Mother         parkinsons    Diabetes Father     Cancer Sister 79        unknown    Cancer Brother 64        stomach    Diabetes Brother        Social History     Socioeconomic History    Marital status: Single     Spouse name: Not on file    Number of children: Not on file    Years of education: Not on file    Highest education level: Not on file   Occupational History    Not on file   Tobacco Use    Smoking status: Never Smoker    Smokeless tobacco: Never Used   Vaping Use    Vaping Use: Never used   Substance and Sexual Activity    Alcohol use: Not Currently     Comment: occasional    Drug use: Never    Sexual activity: Not on file   Other Topics Concern    Not on file   Social History Narrative    Not on file     Social Determinants of Health     Financial Resource Strain:     Difficulty of Paying Living Expenses:    Food Insecurity:     Worried About Running Out of Food in the Last Year:     [address] in the Last Year:    Transportation Needs:     Lack of Transportation (Medical):      Lack of Transportation (Non-Medical):    Physical Activity:     Days of Exercise per Week:     Minutes of Exercise per Session:    Stress:     Feeling of Stress :    Social Connections:     Frequency of Communication with Friends and Family:     Frequency of Social Gatherings with Friends and Family:     Attends Buddhism Services:     Active Member of Clubs or Organizations:     Attends Club or Organization Meetings:     Marital Status:    Intimate Partner Violence:     Fear of Current or Ex-Partner:  Emotionally Abused:     Physically Abused:     Sexually Abused:        ROS:   Review of Systems   Constitutional: Positive for appetite change, chills, diaphoresis and fatigue. Negative for fever. HENT: Negative for postnasal drip, rhinorrhea, sneezing, sore throat and trouble swallowing. Eyes: Negative. Respiratory: Negative for cough, chest tightness, shortness of breath and wheezing. Cardiovascular: Negative for chest pain, palpitations and leg swelling. Gastrointestinal: Positive for abdominal pain and nausea. Negative for abdominal distention and vomiting. Endocrine: Negative. Genitourinary: Negative. Musculoskeletal: Negative. Allergic/Immunologic: Negative. Neurological: Negative. Hematological: Negative. Psychiatric/Behavioral: Negative. Physical Exam:  Vitals:    05/27/21 1905   BP: 130/82   Pulse: 109   Resp: 18   Temp:    SpO2: 95%       PSYCH: mood and affect normal, alert and oriented x 3: No apparent distress  EYES: Sclera white, pupils equal round and reactive to light  ENMT:  Hearing normal, trachea midline, ears externally intact  LYMPH: no obvious lympadenopathy in neck. RESP: Respiratory effort was normal with no retractions or use of accessory muscles. CV:  No pedal edema  GI/ Abdomen: Obese, Soft, nondistended, mildly tender in LUQ, no guarding, no peritoneal signs. LUQ drain in place with purulent output.  About 25cc present at time of exam.   MSK: no clubbing/ no cyanosis/ gaitnormal       Assessment/Plan:  62year old with history of distal pancreatectomy and splenectomy on 5/12 now with purulent drain output, likely pancreatic abscess formation    - admit  - NPO, IVFs  - Zosyn  - blood cultures  - drain cultures  - CT scan with contrast pending        Discussed with Dr. Shraddha Moyer     Electronically signed by Zahra Matthews MD on 5/27/21 at 8:49 PM EDT

## 2021-05-27 NOTE — H&P (VIEW-ONLY)
Hepatobiliary and Pancreatic Surgery History and Physical    Patient's Name/Date of Birth: Sima Maria /1960 (21 y.o.)    Date: May 27, 2021     CC: increasing drain output, abdominal pain    HPI:  This is a 62year old gentleman with a history of distal pancreatectomy and splenectomy on 5/12/21. He states he was doing well for about 3 days after discharge and then his pancreatic drain that remained in place began to have an increase in output. It then changed color from clear/serous to purulent yesterday. He has not had much of an appetite since discharge. He continues to have some drainage from his midline incision as well. He had a follow up visit with Dr. Shraddha Moyer today and was sent directly to the ED. On arrival he is afebrile but tachycardic to 112. His WBC count is 9.5. he denies any significant abdominal pain but does have tenderness to palpation in LUQ.      Past Medical History:   Diagnosis Date    Arthritis     Back pain     5th finger & ring finger on right hand is numb    Bell's palsy     NEW ONSET 3-     Hyperlipidemia     Hypertension     Neuropathy     toes numb    Poorly controlled type 2 diabetes mellitus with neuropathy ([facility] [address] 5/8/2018    Sacral radiculitis 2/27/2019    Spondylosis of lumbar spine 3/9/2019    Advanced arthritis and degenerative disc disease by MRI 3/2019    Ventral hernia        Past Surgical History:   Procedure Laterality Date    ANESTHESIA NERVE BLOCK Bilateral 07/11/2019    BILATERAL L4-5 TRANSFORAMINAL EPIDURAL STEROID INJECTION performed by Skyler Briceno DO at [address] Bilateral 08/01/2019    BILATERAL MEDIAL BRANCH BLOCK L4-5 AND L5-S1 performed by Skyler Briceno DO at  [address] Bilateral 08/15/2019    BILATERAL MEDIAL BRANCH BLOCK L4 - 5 AND L5 - S1 performed by Skyler Briceno DO at [facility] Right 03/29/2019 right wrist carpal tunnel release and right elbow cubital tunnel release    CARPAL TUNNEL RELEASE Right 03/29/2019    RIGHT WRIST CARPAL TUNNEL RELEASE AND RIGHT ELBOW CUBITAL TUNNEL RELEASE performed by Flaquito Swenson DO at [place] [address]      at age 40 polyps    CYST REMOVAL Right     EPIDURAL STEROID INJECTION N/A 10/17/2019    LUMBAR EPIDURAL STEROID INJECTION UNDER FLUOROSCOPIC GUIDANCE AT L2-L3 WITHOUT SEDATION performed by Jade Rdz MD at [facility] ERCP N/A 05/17/2021    ERCP ENDOSCOPIC RETROGRADE CHOLANGIOPANCREATOGRAPHY SPHINCTER/PAPILLOTOMY performed by Justine Giraldo MD at [address] ERCP N/A 05/17/2021    ERCP STENT INSERTION performed by Justine [facility]iraldo MD at [facility] Bilateral     groin and umbilical    HERNIA REPAIR N/A 12/13/2018    ROBOTIC ASSISTED LAPAROSCOPIC PRIMARY REPAIR OF RECURRENT VENTRAL HERNIA  REPAIR performed by James Potter MD at [place] [address] Bilateral 07/11/2019    L4-5 transforaminal     NERVE BLOCK Bilateral 08/01/2019    medial branch block    NERVE BLOCK Bilateral 08/15/2019    bilateral medial branch block L4-S1    NERVE BLOCK  10/17/2019    lumbar epidural    PANCREATECTOMY N/A 05/12/2021    LAPAROSCOPIC ROBOTIC DISTAL PANCREATECTOMY AND SPLENECTOMY AND CHOLECYSTECTOMY, INTRA-OPERATIVE ULTRASOUND, TRANSITIONED TO OPEN -- EPIDURAL performed by Lindsay Maguire MD at [facility]  06/05/2018    C5-7 fusion at [facility] in [address] [place] [address] Pkwy 01/08/2021    EGD W/EUS FNA performed by Juwan Fuchs DO at [address] [place] 01/08/2021    EGD DIAGNOSTIC ONLY performed by Juwan Fuchs DO at [facility] ENDOSCOPY       Current Facility-Administered Medications   Medication Dose Route Frequency Provider Last Rate Last Admin    piperacillin-tazobactam (ZOSYN) 3,375 mg in dextrose 5 % 100 mL IVPB extended infusion (mini-bag)  3,375 mg Intravenous Q8H Tyesha Kenyon MD         Current Outpatient Medications   Medication Sig Dispense Refill    insulin lispro, 1 Unit Dial, (HUMALOG KWIKPEN) 100 UNIT/ML SOPN Inject 0-12 Units into the skin 3 times daily (before meals) Glucose: Dose:  If <139             No Insulin  140-199 2 Units  200-249 4 Units  250-299 6 Units  300-349 8 Units  350-400 10 Units  Above 400       12 Units 10.8 mL 0    Insulin Pen Needle ([employer] PEN NEEDLES) 31G X 6 MM MISC 1 each by Does not apply route 5 times daily 100 each 3    losartan (COZAAR) 100 MG tablet take 1 tablet by mouth once daily 30 tablet 3    blood glucose test strips (TRUE METRIX BLOOD GLUCOSE TEST) strip As needed. 50 strip 5    hydroCHLOROthiazide (HYDRODIURIL) 12.5 MG tablet take 1 tablet by mouth once daily 30 tablet 3    methocarbamol (ROBAXIN) 750 MG tablet Take 1 tablet by mouth daily 30 tablet 0    albuterol (PROVENTIL) (2.5 MG/3ML) 0.083% nebulizer solution Take 3 mLs by nebulization 4 times daily 120 each 3    budesonide (PULMICORT) 0.5 MG/2ML nebulizer suspension Take 4 mLs by nebulization 2 times daily 60 ampule 3    guaiFENesin (ROBITUSSIN) 100 MG/5ML SOLN oral solution Take 15 mLs by mouth 3 times daily 1200 mL 0    lipase-protease-amylase (CREON) 97950 units CPEP delayed release capsule Take 2 capsules by mouth 3 times daily (with meals) 540 capsule 3    pantoprazole (PROTONIX) 40 MG tablet Take 1 tablet by mouth 2 times daily (before meals) 60 tablet 0    Respiratory Therapy Supplies (FULL KIT NEBULIZER SET) MISC Use as directed with nebulized medication.  1 each 0    atorvastatin (LIPITOR) 20 MG tablet Take 1 tablet by mouth daily 30 tablet 3    insulin glargine (LANTUS SOLOSTAR) 100 UNIT/ML injection pen Inject 20 Units into the skin 2 times daily 12 mL 3    insulin lispro, 1 Unit Dial, (HUMALOG KWIKPEN) 100 UNIT/ML SOPN Inject 7 Units into the skin 3 times daily (before meals) 6.3 mL 3    acetaminophen (APAP EXTRA STRENGTH) 500 MG tablet Take 1 tablet by mouth every 6 hours 120 tablet 3    gabapentin (NEURONTIN) 300 MG capsule Take 1 capsule by mouth 2 times daily. Allergies   Allergen Reactions    Iodine Swelling     Sea food    Metformin And Related Hives    Wheat Extract Swelling       Family History   Problem Relation Age of Onset    Diabetes Mother     Other Mother         parkinsons    Diabetes Father     Cancer Sister 79        unknown    Cancer Brother 64        stomach    Diabetes Brother        Social History     Socioeconomic History    Marital status: Single     Spouse name: Not on file    Number of children: Not on file    Years of education: Not on file    Highest education level: Not on file   Occupational History    Not on file   Tobacco Use    Smoking status: Never Smoker    Smokeless tobacco: Never Used   Vaping Use    Vaping Use: Never used   Substance and Sexual Activity    Alcohol use: Not Currently     Comment: occasional    Drug use: Never    Sexual activity: Not on file   Other Topics Concern    Not on file   Social History Narrative    Not on file     Social Determinants of Health     Financial Resource Strain:     Difficulty of Paying Living Expenses:    Food Insecurity:     Worried About Running Out of Food in the Last Year:     [address] in the Last Year:    Transportation Needs:     Lack of Transportation (Medical):      Lack of Transportation (Non-Medical):    Physical Activity:     Days of Exercise per Week:     Minutes of Exercise per Session:    Stress:     Feeling of Stress :    Social Connections:     Frequency of Communication with Friends and Family:     Frequency of Social Gatherings with Friends and Family:     Attends Jewish Services:     Active Member of Clubs or Organizations:     Attends Club or Organization Meetings:     Marital Status:    Intimate Partner Violence:     Fear of Current or Ex-Partner:

## 2021-05-28 ENCOUNTER — APPOINTMENT (OUTPATIENT)
Dept: CT IMAGING | Age: 61
DRG: 721 | End: 2021-05-28
Payer: MEDICAID

## 2021-05-28 LAB
ABO/RH: NORMAL
ALBUMIN SERPL-MCNC: 2.6 G/DL (ref 3.5–5.2)
ALP BLD-CCNC: 54 U/L (ref 40–129)
ALT SERPL-CCNC: 73 U/L (ref 0–40)
ANION GAP SERPL CALCULATED.3IONS-SCNC: 14 MMOL/L (ref 7–16)
ANISOCYTOSIS: ABNORMAL
ANTIBODY SCREEN: NORMAL
AST SERPL-CCNC: 62 U/L (ref 0–39)
BASOPHILS ABSOLUTE: 0.02 E9/L (ref 0–0.2)
BASOPHILS RELATIVE PERCENT: 0.2 % (ref 0–2)
BILIRUB SERPL-MCNC: 0.5 MG/DL (ref 0–1.2)
BLOOD BANK DISPENSE STATUS: NORMAL
BLOOD BANK DISPENSE STATUS: NORMAL
BLOOD BANK PRODUCT CODE: NORMAL
BLOOD BANK PRODUCT CODE: NORMAL
BPU ID: NORMAL
BPU ID: NORMAL
BUN BLDV-MCNC: 12 MG/DL (ref 6–20)
CALCIUM SERPL-MCNC: 7.9 MG/DL (ref 8.6–10.2)
CHLORIDE BLD-SCNC: 98 MMOL/L (ref 98–107)
CO2: 21 MMOL/L (ref 22–29)
CREAT SERPL-MCNC: 0.6 MG/DL (ref 0.7–1.2)
DESCRIPTION BLOOD BANK: NORMAL
DESCRIPTION BLOOD BANK: NORMAL
EOSINOPHILS ABSOLUTE: 0 E9/L (ref 0.05–0.5)
EOSINOPHILS RELATIVE PERCENT: 0 % (ref 0–6)
GFR AFRICAN AMERICAN: >60
GFR NON-AFRICAN AMERICAN: >60 ML/MIN/1.73
GLUCOSE BLD-MCNC: 341 MG/DL (ref 74–99)
HCT VFR BLD CALC: 29.7 % (ref 37–54)
HEMOGLOBIN: 8.9 G/DL (ref 12.5–16.5)
HYPOCHROMIA: ABNORMAL
IMMATURE GRANULOCYTES #: 0.06 E9/L
IMMATURE GRANULOCYTES %: 0.7 % (ref 0–5)
INR BLD: 1.7
INR BLD: 1.7
LYMPHOCYTES ABSOLUTE: 0.7 E9/L (ref 1.5–4)
LYMPHOCYTES RELATIVE PERCENT: 7.6 % (ref 20–42)
MCH RBC QN AUTO: 22.4 PG (ref 26–35)
MCHC RBC AUTO-ENTMCNC: 30 % (ref 32–34.5)
MCV RBC AUTO: 74.6 FL (ref 80–99.9)
METER GLUCOSE: 224 MG/DL (ref 74–99)
METER GLUCOSE: 232 MG/DL (ref 74–99)
METER GLUCOSE: 273 MG/DL (ref 74–99)
METER GLUCOSE: 317 MG/DL (ref 74–99)
METER GLUCOSE: 378 MG/DL (ref 74–99)
MONOCYTES ABSOLUTE: 0.64 E9/L (ref 0.1–0.95)
MONOCYTES RELATIVE PERCENT: 7 % (ref 2–12)
NEUTROPHILS ABSOLUTE: 7.77 E9/L (ref 1.8–7.3)
NEUTROPHILS RELATIVE PERCENT: 84.5 % (ref 43–80)
OVALOCYTES: ABNORMAL
PDW BLD-RTO: 15.5 FL (ref 11.5–15)
PLATELET # BLD: 542 E9/L (ref 130–450)
PMV BLD AUTO: 12.7 FL (ref 7–12)
POIKILOCYTES: ABNORMAL
POLYCHROMASIA: ABNORMAL
POTASSIUM REFLEX MAGNESIUM: 4.6 MMOL/L (ref 3.5–5)
PROTHROMBIN TIME: 18.5 SEC (ref 9.3–12.4)
PROTHROMBIN TIME: 19.2 SEC (ref 9.3–12.4)
RBC # BLD: 3.98 E12/L (ref 3.8–5.8)
SODIUM BLD-SCNC: 133 MMOL/L (ref 132–146)
TOTAL PROTEIN: 6 G/DL (ref 6.4–8.3)
WBC # BLD: 9.2 E9/L (ref 4.5–11.5)

## 2021-05-28 PROCEDURE — 6370000000 HC RX 637 (ALT 250 FOR IP): Performed by: STUDENT IN AN ORGANIZED HEALTH CARE EDUCATION/TRAINING PROGRAM

## 2021-05-28 PROCEDURE — 6360000002 HC RX W HCPCS: Performed by: STUDENT IN AN ORGANIZED HEALTH CARE EDUCATION/TRAINING PROGRAM

## 2021-05-28 PROCEDURE — 87205 SMEAR GRAM STAIN: CPT

## 2021-05-28 PROCEDURE — 6370000000 HC RX 637 (ALT 250 FOR IP): Performed by: SURGERY

## 2021-05-28 PROCEDURE — 2060000000 HC ICU INTERMEDIATE R&B

## 2021-05-28 PROCEDURE — 2709999900 CT ABSCESS DRAINAGE W CATH PLACEMENT S&I

## 2021-05-28 PROCEDURE — 86901 BLOOD TYPING SEROLOGIC RH(D): CPT

## 2021-05-28 PROCEDURE — 82962 GLUCOSE BLOOD TEST: CPT

## 2021-05-28 PROCEDURE — 75989 ABSCESS DRAINAGE UNDER X-RAY: CPT | Performed by: RADIOLOGY

## 2021-05-28 PROCEDURE — 6360000002 HC RX W HCPCS: Performed by: SURGERY

## 2021-05-28 PROCEDURE — 87186 SC STD MICRODIL/AGAR DIL: CPT

## 2021-05-28 PROCEDURE — P9059 PLASMA, FRZ BETWEEN 8-24HOUR: HCPCS

## 2021-05-28 PROCEDURE — 86900 BLOOD TYPING SEROLOGIC ABO: CPT

## 2021-05-28 PROCEDURE — 36430 TRANSFUSION BLD/BLD COMPNT: CPT

## 2021-05-28 PROCEDURE — 87070 CULTURE OTHR SPECIMN AEROBIC: CPT

## 2021-05-28 PROCEDURE — 80053 COMPREHEN METABOLIC PANEL: CPT

## 2021-05-28 PROCEDURE — 2500000003 HC RX 250 WO HCPCS: Performed by: RADIOLOGY

## 2021-05-28 PROCEDURE — 87075 CULTR BACTERIA EXCEPT BLOOD: CPT

## 2021-05-28 PROCEDURE — 94640 AIRWAY INHALATION TREATMENT: CPT

## 2021-05-28 PROCEDURE — 85025 COMPLETE CBC W/AUTO DIFF WBC: CPT

## 2021-05-28 PROCEDURE — 87077 CULTURE AEROBIC IDENTIFY: CPT

## 2021-05-28 PROCEDURE — 2580000003 HC RX 258: Performed by: STUDENT IN AN ORGANIZED HEALTH CARE EDUCATION/TRAINING PROGRAM

## 2021-05-28 PROCEDURE — 86850 RBC ANTIBODY SCREEN: CPT

## 2021-05-28 PROCEDURE — 36415 COLL VENOUS BLD VENIPUNCTURE: CPT

## 2021-05-28 PROCEDURE — 85610 PROTHROMBIN TIME: CPT

## 2021-05-28 PROCEDURE — 6360000002 HC RX W HCPCS: Performed by: RADIOLOGY

## 2021-05-28 PROCEDURE — 2580000003 HC RX 258: Performed by: SURGERY

## 2021-05-28 RX ORDER — INSULIN GLARGINE 100 [IU]/ML
10 INJECTION, SOLUTION SUBCUTANEOUS 2 TIMES DAILY
Status: DISCONTINUED | OUTPATIENT
Start: 2021-05-28 | End: 2021-05-29

## 2021-05-28 RX ORDER — LIDOCAINE HYDROCHLORIDE 20 MG/ML
INJECTION, SOLUTION INFILTRATION; PERINEURAL
Status: COMPLETED | OUTPATIENT
Start: 2021-05-28 | End: 2021-05-28

## 2021-05-28 RX ORDER — ACETAMINOPHEN 500 MG
1000 TABLET ORAL EVERY 6 HOURS PRN
Status: DISCONTINUED | OUTPATIENT
Start: 2021-05-28 | End: 2021-05-29

## 2021-05-28 RX ORDER — SODIUM CHLORIDE 9 MG/ML
INJECTION, SOLUTION INTRAVENOUS PRN
Status: DISCONTINUED | OUTPATIENT
Start: 2021-05-28 | End: 2021-06-02

## 2021-05-28 RX ORDER — POLYETHYLENE GLYCOL 3350 17 G/17G
17 POWDER, FOR SOLUTION ORAL DAILY
Status: DISCONTINUED | OUTPATIENT
Start: 2021-05-28 | End: 2021-06-05 | Stop reason: HOSPADM

## 2021-05-28 RX ORDER — FENTANYL CITRATE 50 UG/ML
INJECTION, SOLUTION INTRAMUSCULAR; INTRAVENOUS
Status: COMPLETED | OUTPATIENT
Start: 2021-05-28 | End: 2021-05-28

## 2021-05-28 RX ORDER — MIDAZOLAM HYDROCHLORIDE 1 MG/ML
INJECTION INTRAMUSCULAR; INTRAVENOUS
Status: COMPLETED | OUTPATIENT
Start: 2021-05-28 | End: 2021-05-28

## 2021-05-28 RX ORDER — OXYCODONE HYDROCHLORIDE 5 MG/1
5 TABLET ORAL EVERY 4 HOURS PRN
Status: DISCONTINUED | OUTPATIENT
Start: 2021-05-28 | End: 2021-05-29

## 2021-05-28 RX ADMIN — SODIUM CHLORIDE, POTASSIUM CHLORIDE, SODIUM LACTATE AND CALCIUM CHLORIDE: 600; 310; 30; 20 INJECTION, SOLUTION INTRAVENOUS at 00:49

## 2021-05-28 RX ADMIN — INSULIN LISPRO 9 UNITS: 100 INJECTION, SOLUTION INTRAVENOUS; SUBCUTANEOUS at 14:50

## 2021-05-28 RX ADMIN — METHOCARBAMOL TABLETS 1000 MG: 500 TABLET, COATED ORAL at 17:07

## 2021-05-28 RX ADMIN — HYDROMORPHONE HYDROCHLORIDE 0.5 MG: 1 INJECTION, SOLUTION INTRAMUSCULAR; INTRAVENOUS; SUBCUTANEOUS at 08:21

## 2021-05-28 RX ADMIN — PANCRELIPASE 72000 UNITS: 60000; 12000; 38000 CAPSULE, DELAYED RELEASE PELLETS ORAL at 08:20

## 2021-05-28 RX ADMIN — INSULIN LISPRO 6 UNITS: 100 INJECTION, SOLUTION INTRAVENOUS; SUBCUTANEOUS at 17:35

## 2021-05-28 RX ADMIN — SODIUM CHLORIDE, PRESERVATIVE FREE 10 ML: 5 INJECTION INTRAVENOUS at 17:15

## 2021-05-28 RX ADMIN — PIPERACILLIN AND TAZOBACTAM 3375 MG: 3; .375 INJECTION, POWDER, LYOPHILIZED, FOR SOLUTION INTRAVENOUS at 20:10

## 2021-05-28 RX ADMIN — BUDESONIDE 1000 MCG: 0.5 SUSPENSION RESPIRATORY (INHALATION) at 08:43

## 2021-05-28 RX ADMIN — FENTANYL CITRATE 50 MCG: 50 INJECTION, SOLUTION INTRAMUSCULAR; INTRAVENOUS at 16:09

## 2021-05-28 RX ADMIN — PHYTONADIONE 10 MG: 10 INJECTION, EMULSION INTRAMUSCULAR; INTRAVENOUS; SUBCUTANEOUS at 11:19

## 2021-05-28 RX ADMIN — INSULIN LISPRO 15 UNITS: 100 INJECTION, SOLUTION INTRAVENOUS; SUBCUTANEOUS at 06:45

## 2021-05-28 RX ADMIN — GABAPENTIN 300 MG: 300 CAPSULE ORAL at 20:10

## 2021-05-28 RX ADMIN — OXYCODONE 5 MG: 5 TABLET ORAL at 20:10

## 2021-05-28 RX ADMIN — HYDROMORPHONE HYDROCHLORIDE 0.5 MG: 1 INJECTION, SOLUTION INTRAMUSCULAR; INTRAVENOUS; SUBCUTANEOUS at 17:15

## 2021-05-28 RX ADMIN — PIPERACILLIN AND TAZOBACTAM 3375 MG: 3; .375 INJECTION, POWDER, LYOPHILIZED, FOR SOLUTION INTRAVENOUS at 05:50

## 2021-05-28 RX ADMIN — METHOCARBAMOL TABLETS 1000 MG: 500 TABLET, COATED ORAL at 12:18

## 2021-05-28 RX ADMIN — FENTANYL CITRATE 50 MCG: 50 INJECTION, SOLUTION INTRAMUSCULAR; INTRAVENOUS at 15:59

## 2021-05-28 RX ADMIN — ATORVASTATIN CALCIUM 20 MG: 20 TABLET, FILM COATED ORAL at 08:21

## 2021-05-28 RX ADMIN — ALBUTEROL SULFATE 2.5 MG: 2.5 SOLUTION RESPIRATORY (INHALATION) at 12:50

## 2021-05-28 RX ADMIN — INSULIN GLARGINE 10 UNITS: 100 INJECTION, SOLUTION SUBCUTANEOUS at 21:56

## 2021-05-28 RX ADMIN — PANCRELIPASE 72000 UNITS: 60000; 12000; 38000 CAPSULE, DELAYED RELEASE PELLETS ORAL at 17:08

## 2021-05-28 RX ADMIN — PIPERACILLIN AND TAZOBACTAM 3375 MG: 3; .375 INJECTION, POWDER, LYOPHILIZED, FOR SOLUTION INTRAVENOUS at 12:19

## 2021-05-28 RX ADMIN — PANTOPRAZOLE SODIUM 40 MG: 40 TABLET, DELAYED RELEASE ORAL at 17:08

## 2021-05-28 RX ADMIN — ALBUTEROL SULFATE 2.5 MG: 2.5 SOLUTION RESPIRATORY (INHALATION) at 08:43

## 2021-05-28 RX ADMIN — MIDAZOLAM 1 MG: 1 INJECTION INTRAMUSCULAR; INTRAVENOUS at 15:59

## 2021-05-28 RX ADMIN — LIDOCAINE HYDROCHLORIDE 2 ML: 20 INJECTION, SOLUTION INFILTRATION; PERINEURAL at 16:13

## 2021-05-28 RX ADMIN — Medication 10 ML: at 08:21

## 2021-05-28 RX ADMIN — METHOCARBAMOL TABLETS 1000 MG: 500 TABLET, COATED ORAL at 20:10

## 2021-05-28 RX ADMIN — HYDROMORPHONE HYDROCHLORIDE 0.5 MG: 1 INJECTION, SOLUTION INTRAMUSCULAR; INTRAVENOUS; SUBCUTANEOUS at 12:21

## 2021-05-28 RX ADMIN — INSULIN LISPRO 12 UNITS: 100 INJECTION, SOLUTION INTRAVENOUS; SUBCUTANEOUS at 10:19

## 2021-05-28 RX ADMIN — MIDAZOLAM 1 MG: 1 INJECTION INTRAMUSCULAR; INTRAVENOUS at 16:09

## 2021-05-28 RX ADMIN — ENOXAPARIN SODIUM 100 MG: 100 INJECTION SUBCUTANEOUS at 20:09

## 2021-05-28 RX ADMIN — SODIUM CHLORIDE, PRESERVATIVE FREE 10 ML: 5 INJECTION INTRAVENOUS at 12:21

## 2021-05-28 RX ADMIN — INSULIN GLARGINE 10 UNITS: 100 INJECTION, SOLUTION SUBCUTANEOUS at 08:21

## 2021-05-28 RX ADMIN — LIDOCAINE HYDROCHLORIDE 10 ML: 20 INJECTION, SOLUTION INFILTRATION; PERINEURAL at 16:01

## 2021-05-28 RX ADMIN — PANTOPRAZOLE SODIUM 40 MG: 40 TABLET, DELAYED RELEASE ORAL at 05:49

## 2021-05-28 RX ADMIN — METHOCARBAMOL TABLETS 1000 MG: 500 TABLET, COATED ORAL at 08:20

## 2021-05-28 RX ADMIN — GABAPENTIN 300 MG: 300 CAPSULE ORAL at 08:21

## 2021-05-28 RX ADMIN — BUDESONIDE 1000 MCG: 0.5 SUSPENSION RESPIRATORY (INHALATION) at 19:18

## 2021-05-28 RX ADMIN — ALBUTEROL SULFATE 2.5 MG: 2.5 SOLUTION RESPIRATORY (INHALATION) at 19:18

## 2021-05-28 ASSESSMENT — PAIN SCALES - GENERAL
PAINLEVEL_OUTOF10: 9
PAINLEVEL_OUTOF10: 4
PAINLEVEL_OUTOF10: 9
PAINLEVEL_OUTOF10: 9
PAINLEVEL_OUTOF10: 8
PAINLEVEL_OUTOF10: 4
PAINLEVEL_OUTOF10: 5
PAINLEVEL_OUTOF10: 8
PAINLEVEL_OUTOF10: 4

## 2021-05-28 ASSESSMENT — PAIN DESCRIPTION - LOCATION: LOCATION: ABDOMEN

## 2021-05-28 ASSESSMENT — PAIN DESCRIPTION - PAIN TYPE: TYPE: ACUTE PAIN

## 2021-05-28 ASSESSMENT — PAIN DESCRIPTION - ORIENTATION: ORIENTATION: RIGHT;LEFT

## 2021-05-28 NOTE — PROGRESS NOTES
[facility] SURGERY  DAILY PROGRESS NOTE    Date:2021       Room:[address] [place] [address]  Patient Name:Eliezer Cook     Date of Birth:     Age:60 y.o. Chief Complaint:  Chief Complaint   Patient presents with    Fatigue     sent by dr Rola Dunaway after surgery follow up pt states that he has had fever and chills         Subjective:  No change in pain. Continues to have chills/sweats. No nausea/vomiting. Had BM overnight. Objective:  BP (!) 114/59   Pulse 78   Temp 98 °F (36.7 °C) (Oral)   Resp 16   Ht 5' 8\" (1.727 m)   Wt 214 lb (97.1 kg)   SpO2 96%   BMI 32.54 kg/m²   Temp (24hrs), Av.2 °F (36.8 °C), Min:98 °F (36.7 °C), Max:99 °F (37.2 °C)      I/O (24Hr):  I/O last 3 completed shifts: In: 1800 [I.V.:600; IV Piggyback:1200]  Out: 605 [Urine:375; Drains:230]     GENERAL:  No acute distress. Alert and interactive. LUNGS:  No cough. Nonlabored breathing on room air. CARDIOVASC:  Normal rate, no cyanosis. ABDOMEN:  Soft, non-distended, severely-tender LUQ, mild to moderate generalized tenderness otherwise. MAX drain purulent. Midline intact except pinpoint serous draining opening inferiorly. No guarding / rigidity / rebound. EXTREMITIES:  No edema, no deformities. Assessment:  62 y.o. 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 Portal vein thrombosis.     Plan:  - npo ivf, diet ok after IR drain  - zosyn, consult to ID  - IR drain today  - drain & blood cx pending  - TLov for portal vein thrombosis  - home meds, added glargine, monitor glucose throughout today for dose    Electronically signed by Osiris Pichardo MD on 2021 at 7:29 AM

## 2021-05-28 NOTE — PROGRESS NOTES
Called and notified stat lab desk of stat labs needed on this patient in order for procedure to be done. 1247 - Notified RN of stat labs needing to be drawn in order for us to proceed with procedure. RN states she notified support services for stat labs to be drawn. 1323 - Called lab questioning if stat labs have been drawn yet,  states that she does not believe the labs were drawn yet. I requested that they be ran stat as soon as they are received.

## 2021-05-28 NOTE — BRIEF OP NOTE
Brief Postoperative Note    Pao Mendoza  YOB: 1960  [id number]    Pre-operative Diagnosis and Procedure: 61 yo M with an intraabdominal abscess. Here for CT guided drainage catheter placement. Post-operative Diagnosis: Same    Anesthesia: Local    Estimated Blood Loss: < 10 cc    Surgeon: Keyon Pike MD    Complications: none    Specimen obtained: purulent fluid    Findings: Successful CT guided placement of a drainage catheter into an intraabdominal fluid collection.      Keyon Pike MD   5/28/2021 5:26 PM

## 2021-05-28 NOTE — CONSULTS
[facility] CONSULT NOTE    Reason for Consult: Intraabdominal abscess   Requested by: Dr. Digna Cardona     Chief complaint: Purulent drain output    History Obtained From: Patient and EMR      HISTORY Rashaad              The patient is a 62 y.o. male with history of hypertension, hyperlipidemia, DM, IPMN of pancreatic tail status post robotic converted to open distal pancreatectomy with splenectomy on 48/97 complicated by pancreatic leak requiring stent placement on 05/17, presented on 05/27 with purulent intra-abdominal drain output first noted on 05/24 accompanied by abdominal discomfort. On admission, he was afebrile and hemodynamically stable with no leukocytosis. Urinalysis showed no pyuria. SARS-CoV-2 PCR was not detected. CT abdomen and pelvis showed status post splenectomy and partial pancreatectomy with very large complex fluid collection in the upper abdomen extending to the retroperitoneum at the surgical site with multiple locules of air consistent with large abscess measuring 21 x 7.8 x 7.2 cm, intraluminal filling defect within the portal vein consistent with portal vein thrombosis, small partially loculated pleural effusions extending into the major fissures bilaterally. Drain fluid Gram stain and culture showed no polymorphonuclear leukocytes, no epithelial cells, moderate gram-positive cocci in chains, abundant gram-negative rods, rare gram-positive diphtheroid-like rods, mixed gram-positive organisms and gram-negative rods. Piperacillin-tazobactam was started on admission. ID service was subsequently consulted for further recommendations.     Past Medical History  Past Medical History:   Diagnosis Date    Arthritis     Back pain     5th finger & ring finger on right hand is numb    Bell's palsy     NEW ONSET 3-     Hyperlipidemia     Hypertension     Neuropathy     toes numb    Poorly controlled type 2 diabetes mellitus with neuropathy ([address] 5/8/2018    Sacral radiculitis 2/27/2019    Spondylosis of lumbar spine 3/9/2019    Advanced arthritis and degenerative disc disease by MRI 3/2019    Ventral hernia        Current Facility-Administered Medications   Medication Dose Route Frequency Provider Last Rate Last Admin    insulin glargine (LANTUS) injection vial 10 Units  10 Units Subcutaneous BID James Li MD   10 Units at 05/28/21 8278    acetaminophen (TYLENOL) tablet 1,000 mg  1,000 mg Oral Q6H PRN James Li MD        HYDROmorphone (DILAUDID) injection 0.5 mg  0.5 mg Intravenous Q4H PRN James Li MD   0.5 mg at 05/28/21 1221    0.9 % sodium chloride infusion   Intravenous PRN James Li MD        piperacillin-tazobactam (ZOSYN) 3,375 mg in dextrose 5 % 100 mL IVPB extended infusion (mini-bag)  3,375 mg Intravenous Q8H Joseph Bello MD 25 mL/hr at 05/28/21 1219 3,375 mg at 05/28/21 1219    albuterol (PROVENTIL) nebulizer solution 2.5 mg  2.5 mg Nebulization 4x daily Joseph Bello MD   2.5 mg at 05/28/21 1250    atorvastatin (LIPITOR) tablet 20 mg  20 mg Oral Daily Joseph Bello MD   20 mg at 05/28/21 0198    budesonide (PULMICORT) nebulizer suspension 1,000 mcg  1 mg Nebulization BID Joseph Bello MD   1,000 mcg at 05/28/21 8758    gabapentin (NEURONTIN) capsule 300 mg  300 mg Oral BID Joseph Bello MD   300 mg at 05/28/21 2213    lipase-protease-amylase (CREON) delayed release capsule 72,000 Units  72,000 Units Oral TID WC Joseph Bello MD   72,000 Units at 05/28/21 0820    pantoprazole (PROTONIX) tablet 40 mg  40 mg Oral BID AC Joseph Bello MD   40 mg at 05/28/21 0549    sodium chloride flush 0.9 % injection 10 mL  10 mL Intravenous 2 times per day Joseph Bello MD   10 mL at 05/28/21 0821    sodium chloride flush 0.9 % injection 10 mL  10 mL Intravenous PRN Joseph Bello MD   10 mL at 05/28/21 1221    0.9 % sodium chloride infusion  25 mL Intravenous PRN Joseph Bello MD        polyethylene glycol [facility]) packet 17 g  17 g Oral Daily PRN Esme Arreola MD        methocarbamol (ROBAXIN) tablet 1,000 mg  1,000 mg Oral 4x Daily Esme Arreola MD   1,000 mg at 05/28/21 1218    ondansetron (ZOFRAN) injection 4 mg  4 mg Intravenous Q6H PRN Esme Arreola MD        lactated ringers infusion   Intravenous Continuous Osiris Pichardo MD   Paused at 05/28/21 1038    glucose (GLUTOSE) 40 % oral gel 15 g  15 g Oral PRN Esme Arreola MD        dextrose 50 % IV solution  12.5 g Intravenous PRN Esme Arreola MD        glucagon (rDNA) injection 1 mg  1 mg Intramuscular PRN Esme Arreola MD        dextrose 5 % solution  100 mL/hr Intravenous PRN Esme Arreola MD        insulin lispro (HUMALOG) injection vial 0-18 Units  0-18 Units Subcutaneous Q4H Esme Arreola MD   12 Units at 05/28/21 1019       Allergies   Allergen Reactions    Iodine Swelling     Sea food    Metformin And Related Hives    Wheat Extract Swelling       Surgical History  Past Surgical History:   Procedure Laterality Date    ANESTHESIA NERVE BLOCK Bilateral 07/11/2019    BILATERAL L4-5 TRANSFORAMINAL EPIDURAL STEROID [facility] Bilateral 08/01/2019    BILATERAL MEDIAL BRANCH BLOCK L4-5 AND L5-S1 performed by Zia Spivey DO at [address] [place] [address] Bilateral 08/15/2019    BILATERAL MEDIAL BRANCH BLOCK L4 - 5 AND L5 - S1 performed by Zia Spivey DO at [facility] Right 03/29/2019    right wrist carpal tunnel release and right elbow cubital tunnel release    CARPAL TUNNEL RELEASE Right 03/29/2019    RIGHT WRIST CARPAL TUNNEL RELEASE AND RIGHT ELBOW CUBITAL TUNNEL RELEASE performed by Kendell Gudino DO at [place] [address]      at age 40 polyps    CYST REMOVAL Right     EPIDURAL STEROID INJECTION N/A 10/17/2019    LUMBAR EPIDURAL STEROID INJECTION UNDER FLUOROSCOPIC GUIDANCE AT L2-L3 WITHOUT SEDATION performed by Nicho Uribe MD at [facility] ERCP N/A 05/17/2021    ERCP ENDOSCOPIC RETROGRADE CHOLANGIOPANCREATOGRAPHY SPHINCTER/PAPILLOTOMY performed by Margarett Cheadle, MD at [address] [facility] ERCP N/A 05/17/2021    ERCP STENT INSERTION performed by Margarett Cheadle, MD at [facility] Bilateral     groin and umbilical    HERNIA REPAIR N/A 12/13/2018    ROBOTIC ASSISTED LAPAROSCOPIC PRIMARY REPAIR OF RECURRENT VENTRAL HERNIA  REPAIR performed by Rhiannon Fuchs MD at [place] [address] Bilateral 07/11/2019    L4-5 transforaminal     NERVE BLOCK Bilateral 08/01/2019    medial branch block    NERVE BLOCK Bilateral 08/15/2019    bilateral medial branch block L4-S1    NERVE BLOCK  10/17/2019    lumbar epidural    PANCREATECTOMY N/A 05/12/2021    LAPAROSCOPIC ROBOTIC DISTAL PANCREATECTOMY AND SPLENECTOMY AND CHOLECYSTECTOMY, INTRA-OPERATIVE ULTRASOUND, TRANSITIONED TO OPEN -- EPIDURAL performed by Tianna Espinoza MD at [place] [address]  06/05/2018    C5-7 fusion at [facility] in [address] N/A 01/08/2021    EGD W/EUS FNA performed by Jessica Salter DO at [facility] N/A 01/08/2021    EGD DIAGNOSTIC ONLY performed by Jessica Salter DO at [address] History     Socioeconomic History    Marital status: Single   Tobacco Use    Smoking status: Never Smoker    Smokeless tobacco: Never Used   Vaping Use    Vaping Use: Never used   Substance and Sexual Activity    Alcohol use: Not Currently     Comment: occasional    Drug use: Never       Family Medical History  Family History   Problem Relation Age of Onset    Diabetes Mother     Other Mother         parkinsons    Diabetes Father     Cancer Sister 79        unknown    Cancer Brother 64        stomach    Diabetes Brother        Review of Systems:  Constitutional: No fever, has chills  Eyes: No vision changes, no retroorbital pain  ENT: No hearing changes, no ear pain  Respiratory: Has cough, has dyspnea  Cardiovascular: No chest pain, no palpitations  Gastrointestinal: Has abdominal pain, has diarrhea  Genitourinary: No dysuria, no hematuria  Integumentary: No rash, no itching  Musculoskeletal: Has muscle pain, has joint pain  Neurologic: No headache, has numbness in right hand    Physical Examination:  Vitals:    05/28/21 0045 05/28/21 0600 05/28/21 0757 05/28/21 1038   BP: (!) 99/47 (!) 114/59 (!) 116/56 (!) 107/51   Pulse: 78 78 83 82   Resp: 18 16 16 16   Temp: 98.1 °F (36.7 °C) 98 °F (36.7 °C) 98.1 °F (36.7 °C) 98 °F (36.7 °C)   TempSrc: Oral Oral Oral Oral   SpO2: 96% 96% 95% 96%   Weight: 214 lb (97.1 kg)      Height:         Constitutional: Alert, not in distress  Eyes: Sclerae anicteric, no conjunctival erythema  ENT: No buccal lesion, no pharyngeal exudates  Neck: No nuchal rigidity, no cervical adenopathy  Lungs: Clear breath sounds, no crackles, no wheezes  Heart: Regular rate and rhythm, no murmurs  Abdomen: Bowel sounds present, soft, tender, Right abdominal incision wound clean and dry, well-healed. Left abdominal MAX drain with malodorous greenish brown fluid output  Skin: Warm and dry, no active dermatoses  Musculoskeletal: No joint erythema, no joint swelling    Labs, imaging, and medical records/notes were personally reviewed. Assessment:  Intraabdominal abscess/surgical site infection  Recent distal pancreatectomy with splenectomy on 77/48 complicated by pancreatic leak s/p pancreatic stent placement on 05/17    Plan:  Continue piperacillin-tazobactam 3.375g q8h. Plan for percutaneous abscess drainage is noted. Follow up blood and fluid cultures. Continue local wound care. Case was discussed with Dr. Harris Do. Thank you for involving me in the care of Nadir An. Dr. Eleanor Monreal will continue to follow.  Please do not hesitate to call for any questions or concerns.     Electronically signed by Cheryl Monahan MD on 5/28/2021 at 2:26 PM

## 2021-05-28 NOTE — PRE SEDATION
albuterol  2.5 mg Nebulization 4x daily    atorvastatin  20 mg Oral Daily    budesonide  1 mg Nebulization BID    gabapentin  300 mg Oral BID    lipase-protease-amylase  72,000 Units Oral TID WC    pantoprazole  40 mg Oral BID AC    sodium chloride flush  10 mL Intravenous 2 times per day    methocarbamol  1,000 mg Oral 4x Daily    insulin lispro  0-18 Units Subcutaneous Q4H     Continuous Infusions:    sodium chloride      sodium chloride      sodium chloride      lactated ringers 75 mL/hr at 05/28/21 1715    dextrose       PRN Meds: acetaminophen, sodium chloride, sodium chloride, oxyCODONE, HYDROmorphone, sodium chloride flush, sodium chloride, ondansetron, glucose, dextrose, glucagon (rDNA), dextrose  Home Meds:   Prior to Admission medications    Medication Sig Start Date End Date Taking? Authorizing Provider   oxyCODONE (ROXICODONE) 5 MG immediate release tablet Take 5 mg by mouth every 6 hours as needed for Pain.    Yes Historical Provider, MD   benzonatate (TESSALON) 200 MG capsule Take 200 mg by mouth 3 times daily as needed for Cough   Yes Historical Provider, MD   insulin lispro, 1 Unit Dial, (HUMALOG KWIKPEN) 100 UNIT/ML SOPN Inject 0-12 Units into the skin 3 times daily (before meals) Glucose: Dose:  If <139             No Insulin  140-199 2 Units  200-249 4 Units  250-299 6 Units  300-349 8 Units  350-400 10 Units  Above 400       12 Units 5/20/21 6/19/21 Yes Mariza Kaufman MD   losartan (COZAAR) 100 MG tablet take 1 tablet by mouth once daily 5/19/21  Yes Mariza Kaufman MD   hydroCHLOROthiazide (HYDRODIURIL) 12.5 MG tablet take 1 tablet by mouth once daily 5/19/21  Yes Mariza Kaufman MD   methocarbamol (ROBAXIN) 750 MG tablet Take 1 tablet by mouth daily 5/19/21 6/18/21 Yes Mariza Kaufman MD   albuterol (PROVENTIL) (2.5 MG/3ML) 0.083% nebulizer solution Take 3 mLs by nebulization 4 times daily 5/19/21  Yes Mariza Kaufman MD   budesonide (PULMICORT) 0.5 MG/2ML nebulizer suspension Take 4 mLs by nebulization 2 times daily 5/19/21  Yes Cherry Zapata MD   guaiFENesin (ROBITUSSIN) 100 MG/5ML SOLN oral solution Take 15 mLs by mouth 3 times daily 5/19/21  Yes Cherry Zapata MD   lipase-protease-amylase (CREON) 01592 units CPEP delayed release capsule Take 2 capsules by mouth 3 times daily (with meals) 5/19/21 8/17/21 Yes Cherry Zapata MD   pantoprazole (PROTONIX) 40 MG tablet Take 1 tablet by mouth 2 times daily (before meals) 5/19/21  Yes Cherry Zapata MD   atorvastatin (LIPITOR) 20 MG tablet Take 1 tablet by mouth daily 5/20/21  Yes Cherry Zapata MD   insulin glargine (LANTUS SOLOSTAR) 100 UNIT/ML injection pen Inject 20 Units into the skin 2 times daily 5/19/21 6/18/21 Yes Cherry Zapata MD   insulin lispro, 1 Unit Dial, ([facility]) 100 UNIT/ML SOPN Inject 7 Units into the skin 3 times daily (before meals) 5/19/21 6/18/21 Yes Cherry Zapata MD   gabapentin (NEURONTIN) 300 MG capsule Take 1 capsule by mouth 2 times daily. 4/19/21  Yes Igor Corrigan MD     Coumadin Use Last 7 Days:  no  Antiplatelet drug therapy use last 7 days: no  Other anticoagulant use last 7 days: no  Additional Medication Information:  n/a      Pre-Sedation Documentation and Exam:   I have personally completed a history, physical exam & review of systems for this patient (see notes).     Mallampati Airway Assessment:  Mallampati Class II - (soft palate, fauces & uvula are visible)    Prior History of Anesthesia Complications:   none    ASA Classification:  Class 3 - A patient with severe systemic disease that limits activity but is not incapacitating    Sedation/ Anesthesia Plan:   intravenous sedation    Medications Planned:   midazolam (Versed) intravenously and fentanyl intravenously    Patient is an appropriate candidate for plan of sedation: yes    Electronically signed by Marcela Stevenson MD on 5/28/2021 at 5:24 PM

## 2021-05-28 NOTE — DISCHARGE SUMMARY
Physician Discharge Summary     Patient ID:  Gomez Tijerina  [id number]  89 y.o.  1960    Admit date: 5/27/2021  Discharge date and time: 6/5/2021    Admitting Physician: Kelvin Nelson MD     Admission Diagnoses: Intra-abdominal infection [B99.9]  Discharge Diagnoses: Active Problems:    Intra-abdominal infection    Abdominal pain with fever after surgery  Resolved Problems:    * No resolved hospital problems. 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 Started on zosyn. 5/28 had IR drain placed (in addition to existing MAX drain seen running through abscess but not adequately draining it). Started therapeutic lovenox for portal vein thrombus. Was improving and tolerating diet great but spiked fever and lost appetite again 6/1~6/2. CT shows decreased but persistently large abscess, and ID changed abx to ertapenem. Started feeling better again. ERCP was performed 6/4 to exchange clogged pancreatic stent (and diagnosed free-leaking distal panc stapleline) - MAX outputs subsequently improved. 6/5: Tolerating Diet and ambulating without difficulty    Surgeries/Procedures Performed:  IR drain placed 5/28  Pancreatic stent exchanged 6/4    Consults:   IP CONSULT TO GENERAL SURGERY  IP CONSULT TO INFECTIOUS DISEASES  IP CONSULT TO PHARMACY    Time Spent on Discharge:  30 minutes were spent in patient examination, evaluation, counseling as well as medication reconciliation, prescriptions for required medications, discharge plan and follow up.       Significant Diagnostic Studies:   Recent Labs:  CBC:   Lab Results   Component Value Date    WBC 8.7 06/05/2021    RBC 3.53 06/05/2021    HGB 7.9 06/05/2021    HCT 26.2 06/05/2021    MCV 74.2 06/05/2021    MCH 22.4 06/05/2021    MCHC 30.2 06/05/2021    RDW 16.2 06/05/2021     06/05/2021     BMP:    Lab Results   Component Value Date    GLUCOSE 151 06/05/2021     06/05/2021    K 4.1 06/05/2021     06/05/2021    CO2 24 06/05/2021    ANIONGAP 8 06/05/2021    BUN 7 06/05/2021    CREATININE 0.6 06/05/2021    CALCIUM 8.3 06/05/2021    LABGLOM >60 06/05/2021    GFRAA >60 06/05/2021     HFP:    Lab Results   Component Value Date    PROT 6.1 06/05/2021     Radiology:  CT ABDOMEN PELVIS W IV CONTRAST Additional Contrast? None    Result Date: 5/27/2021  EXAMINATION: CT OF THE ABDOMEN AND PELVIS WITH CONTRAST 5/27/2021 7:07 pm TECHNIQUE: CT of the abdomen and pelvis was performed with the administration of intravenous contrast. Multiplanar reformatted images are provided for review. Dose modulation, iterative reconstruction, and/or weight based adjustment of the mA/kV was utilized to reduce the radiation dose to as low as reasonably achievable. COMPARISON: May 6 HISTORY: ORDERING SYSTEM PROVIDED HISTORY: abdominal pain, s/p splenectomy and partical pancreatectomy, pancreatic duct drain placement TECHNOLOGIST PROVIDED HISTORY: Reason for exam:->abdominal pain, s/p splenectomy and partical pancreatectomy, pancreatic duct drain placement Additional Contrast?->None Decision Support Exception - unselect if not a suspected or confirmed emergency medical condition->Emergency Medical Condition (MA) What reading provider will be dictating this exam?->CRC FINDINGS: Lower Chest: Small partially loculated pleural effusions extending into the major fissures. Organs: Status post splenectomy and partial pancreatectomy. There is a very large complex fluid collection in the upper abdomen with multiple foci of air extending to the retroperitoneum, at the surgical site and consistent with large abscess. There is a large air-fluid level anteriorly in the mid abdomen. The complex collection measures approximately 20 x 7.8 x 7.2 cm.  There is a catheter extending into the collection centrally. There is prominent stranding of the surrounding mesenteric fat which may be postsurgical or inflammatory. Hepatic parenchyma within normal limits. Adrenals and kidneys within normal limits. Biliary stent extending into the duodenum. Intraluminal filling defect within the portal vein consistent with portal vein thrombosis. GI/Bowel: No evidence of bowel obstruction. Normal appendix. Pelvis: Urinary bladder within normal limits. Peritoneum/Retroperitoneum: No abdominal aortic aneurysm. No retroperitoneal adenopathy. Bones/Soft Tissues: Subcutaneous edema along the right flank, which may be postsurgical.     Status post splenectomy and partial pancreatectomy. Very large complex fluid collection in the upper abdomen extending to the retroperitoneum at the surgical site with multiple locules of air most consistent with large abscess measuring approximately 20 x 7.8 x 7.2 cm. There is a catheter extending into the collection centrally. Prominent stranding of the surrounding mesenteric fat which may be postsurgical or inflammatory. Intraluminal filling defect within the portal vein consistent with portal vein thrombosis. Small partially loculated pleural effusions extending into the major fissures bilaterally. Subcutaneous edema along the right flank, which may be postsurgical. Report called in at the time of interpretation. XR CHEST PORTABLE    Result Date: 5/27/2021  EXAMINATION: ONE XRAY VIEW OF THE CHEST 5/27/2021 5:49 pm COMPARISON: May 14, 2021 HISTORY: ORDERING SYSTEM PROVIDED HISTORY: SOB, dry cough TECHNOLOGIST PROVIDED HISTORY: Reason for exam:->SOB, dry cough What reading provider will be dictating this exam?->CRC FINDINGS: No airspace opacity or pleural effusion. The heart is normal size. No pneumothorax. No free air beneath the hemidiaphragms. No pneumonia or pleural effusion. Discharge Exam:       GENERAL:  No acute distress.  Alert and Refills: 0      oxyCODONE (ROXICODONE) 5 MG immediate release tablet Take 1 tablet by mouth every 6 hours as needed (take 1-2 pills every 4-6 hours for pain not controlled with tylenol/ibuprofen/robaxin/ice.) for up to 7 days. Qty: 40 tablet, Refills: 0    Comments: Reduce doses taken as pain becomes manageable  Associated Diagnoses: Pancreatic duct leak;  Intra-abdominal infection              Details   benzonatate (TESSALON) 200 MG capsule Take 200 mg by mouth 3 times daily as needed for Cough      !! insulin lispro, 1 Unit Dial, (HUMALOG KWIKPEN) 100 UNIT/ML SOPN Inject 0-12 Units into the skin 3 times daily (before meals) Glucose: Dose:  If <139             No Insulin  140-199 2 Units  200-249 4 Units  250-299 6 Units  300-349 8 Units  350-400 10 Units  Above 400       12 Units  Qty: 10.8 mL, Refills: 0      losartan (COZAAR) 100 MG tablet take 1 tablet by mouth once daily  Qty: 30 tablet, Refills: 3      hydroCHLOROthiazide (HYDRODIURIL) 12.5 MG tablet take 1 tablet by mouth once daily  Qty: 30 tablet, Refills: 3      albuterol (PROVENTIL) (2.5 MG/3ML) 0.083% nebulizer solution Take 3 mLs by nebulization 4 times daily  Qty: 120 each, Refills: 3      budesonide (PULMICORT) 0.5 MG/2ML nebulizer suspension Take 4 mLs by nebulization 2 times daily  Qty: 60 ampule, Refills: 3      guaiFENesin (ROBITUSSIN) 100 MG/5ML SOLN oral solution Take 15 mLs by mouth 3 times daily  Qty: 1200 mL, Refills: 0      lipase-protease-amylase (CREON) 43160 units CPEP delayed release capsule Take 2 capsules by mouth 3 times daily (with meals)  Qty: 540 capsule, Refills: 3      pantoprazole (PROTONIX) 40 MG tablet Take 1 tablet by mouth 2 times daily (before meals)  Qty: 60 tablet, Refills: 0      atorvastatin (LIPITOR) 20 MG tablet Take 1 tablet by mouth daily  Qty: 30 tablet, Refills: 3      insulin glargine (LANTUS SOLOSTAR) 100 UNIT/ML injection pen Inject 20 Units into the skin 2 times daily  Qty: 12 mL, Refills: 3      !! insulin lispro, 1 Unit Dial, (HUMALOG KWIKPEN) 100 UNIT/ML SOPN Inject 7 Units into the skin 3 times daily (before meals)  Qty: 6.3 mL, Refills: 3      gabapentin (NEURONTIN) 300 MG capsule Take 1 capsule by mouth 2 times daily. !! - Potential duplicate medications found. Please discuss with provider. Discharge instructions:  Please follow up with Dr Anushka Da Silva in 1-2 weeks (call the office to schedule an appointment). Make sure to continue flushing and emptying both drains as instructed, and bring a list of daily total measurements (record left and right drain outputs separately) to your clinic appointment so that we can know when it is ok to remove the drain(s). Continue using your creon and stick to a low fat diet (as well as low carb/sugar for your diabetes) to decrease the stimulation of your pancreas. Please call the office for any other questions as well, such as new or worsening symptoms, no longer tolerating food/drinks, increasingly severe abdominal pain, blood in the drains, new/increasing redness around drain site(s), fever above 100.4 Farenheit, chills/sweats, or other concerns.       Follow up:     Lakeshia Kumari MD  [address]  [phone]    Schedule an appointment as soon as possible for a visit in 1 week      [address] [facility] 227 [phone]      Please call to confirm antibiotic followup appointment for 6/17/21     Shad Chavez MD  [facility] [phone]    Schedule an appointment as soon as possible for a visit in 5 weeks  will need pancreatic stent either removed or replaced in 6 weeks from the time of replacement done on 6/5/21       Signed:  Basilia Valdez DO  6/5/2021  4:48 PM

## 2021-05-28 NOTE — PROGRESS NOTES
1635 - Patient returned from procedure. Dressing checked, clean, dry, and intact. Patient stable. No s/s of complications noted or reported. Vitals will be checked q 15min, see flow sheets. 1639 - Report called to [facility]. Sample taken to lab. Transport requested. 1650 - Patient back to room via transport in stable condition.

## 2021-05-28 NOTE — INTERVAL H&P NOTE
Update History & Physical    The patient's History and Physical of May 12, 2021 was reviewed with the patient and I examined the patient. There was no change. The surgical site was confirmed by the patient and me. Plan: The risks, benefits, expected outcome, and alternative to the recommended procedure have been discussed with the patient. Patient understands and wants to proceed with the procedure.      Electronically signed by Namita Caballero MD on 5/28/2021 at 5:24 PM

## 2021-05-29 LAB
ALBUMIN SERPL-MCNC: 2.7 G/DL (ref 3.5–5.2)
ALP BLD-CCNC: 56 U/L (ref 40–129)
ALT SERPL-CCNC: 104 U/L (ref 0–40)
ANION GAP SERPL CALCULATED.3IONS-SCNC: 10 MMOL/L (ref 7–16)
ANISOCYTOSIS: ABNORMAL
AST SERPL-CCNC: 125 U/L (ref 0–39)
BASOPHILS ABSOLUTE: 0 E9/L (ref 0–0.2)
BASOPHILS RELATIVE PERCENT: 0.4 % (ref 0–2)
BILIRUB SERPL-MCNC: 0.5 MG/DL (ref 0–1.2)
BUN BLDV-MCNC: 14 MG/DL (ref 6–20)
BURR CELLS: ABNORMAL
CALCIUM SERPL-MCNC: 8 MG/DL (ref 8.6–10.2)
CHLORIDE BLD-SCNC: 97 MMOL/L (ref 98–107)
CO2: 22 MMOL/L (ref 22–29)
CREAT SERPL-MCNC: 0.7 MG/DL (ref 0.7–1.2)
EOSINOPHILS ABSOLUTE: 0 E9/L (ref 0.05–0.5)
EOSINOPHILS RELATIVE PERCENT: 0.1 % (ref 0–6)
GFR AFRICAN AMERICAN: >60
GFR NON-AFRICAN AMERICAN: >60 ML/MIN/1.73
GLUCOSE BLD-MCNC: 320 MG/DL (ref 74–99)
GRAM STAIN ORDERABLE: NORMAL
HCT VFR BLD CALC: 25.4 % (ref 37–54)
HEMOGLOBIN: 7.8 G/DL (ref 12.5–16.5)
LYMPHOCYTES ABSOLUTE: 0.84 E9/L (ref 1.5–4)
LYMPHOCYTES RELATIVE PERCENT: 10.5 % (ref 20–42)
MCH RBC QN AUTO: 22.3 PG (ref 26–35)
MCHC RBC AUTO-ENTMCNC: 30.7 % (ref 32–34.5)
MCV RBC AUTO: 72.8 FL (ref 80–99.9)
METAMYELOCYTES RELATIVE PERCENT: 0.9 % (ref 0–1)
METER GLUCOSE: 190 MG/DL (ref 74–99)
METER GLUCOSE: 218 MG/DL (ref 74–99)
METER GLUCOSE: 226 MG/DL (ref 74–99)
METER GLUCOSE: 301 MG/DL (ref 74–99)
METER GLUCOSE: 343 MG/DL (ref 74–99)
MONOCYTES ABSOLUTE: 0.91 E9/L (ref 0.1–0.95)
MONOCYTES RELATIVE PERCENT: 12.3 % (ref 2–12)
NEUTROPHILS ABSOLUTE: 5.85 E9/L (ref 1.8–7.3)
NEUTROPHILS RELATIVE PERCENT: 76.3 % (ref 43–80)
NUCLEATED RED BLOOD CELLS: 1.8 /100 WBC
PDW BLD-RTO: 15.3 FL (ref 11.5–15)
PLATELET # BLD: 627 E9/L (ref 130–450)
PMV BLD AUTO: 11.7 FL (ref 7–12)
POIKILOCYTES: ABNORMAL
POLYCHROMASIA: ABNORMAL
POTASSIUM REFLEX MAGNESIUM: 4.3 MMOL/L (ref 3.5–5)
RBC # BLD: 3.49 E12/L (ref 3.8–5.8)
SODIUM BLD-SCNC: 129 MMOL/L (ref 132–146)
TARGET CELLS: ABNORMAL
TOTAL PROTEIN: 6.4 G/DL (ref 6.4–8.3)
WBC # BLD: 7.6 E9/L (ref 4.5–11.5)

## 2021-05-29 PROCEDURE — 6370000000 HC RX 637 (ALT 250 FOR IP): Performed by: STUDENT IN AN ORGANIZED HEALTH CARE EDUCATION/TRAINING PROGRAM

## 2021-05-29 PROCEDURE — 82962 GLUCOSE BLOOD TEST: CPT

## 2021-05-29 PROCEDURE — 85025 COMPLETE CBC W/AUTO DIFF WBC: CPT

## 2021-05-29 PROCEDURE — 2580000003 HC RX 258: Performed by: TRANSPLANT SURGERY

## 2021-05-29 PROCEDURE — 6360000002 HC RX W HCPCS: Performed by: SURGERY

## 2021-05-29 PROCEDURE — 6360000002 HC RX W HCPCS: Performed by: STUDENT IN AN ORGANIZED HEALTH CARE EDUCATION/TRAINING PROGRAM

## 2021-05-29 PROCEDURE — 80053 COMPREHEN METABOLIC PANEL: CPT

## 2021-05-29 PROCEDURE — 36415 COLL VENOUS BLD VENIPUNCTURE: CPT

## 2021-05-29 PROCEDURE — 2060000000 HC ICU INTERMEDIATE R&B

## 2021-05-29 PROCEDURE — 2580000003 HC RX 258: Performed by: STUDENT IN AN ORGANIZED HEALTH CARE EDUCATION/TRAINING PROGRAM

## 2021-05-29 PROCEDURE — 2580000003 HC RX 258

## 2021-05-29 PROCEDURE — 6370000000 HC RX 637 (ALT 250 FOR IP): Performed by: SURGERY

## 2021-05-29 PROCEDURE — 94640 AIRWAY INHALATION TREATMENT: CPT

## 2021-05-29 RX ORDER — OXYCODONE HYDROCHLORIDE 10 MG/1
10 TABLET ORAL EVERY 4 HOURS PRN
Status: DISCONTINUED | OUTPATIENT
Start: 2021-05-29 | End: 2021-06-05 | Stop reason: HOSPADM

## 2021-05-29 RX ORDER — ACETAMINOPHEN 500 MG
500 TABLET ORAL EVERY 6 HOURS
Status: DISCONTINUED | OUTPATIENT
Start: 2021-05-29 | End: 2021-06-01

## 2021-05-29 RX ORDER — SODIUM CHLORIDE 450 MG/100ML
INJECTION, SOLUTION INTRAVENOUS CONTINUOUS
Status: DISCONTINUED | OUTPATIENT
Start: 2021-05-29 | End: 2021-05-30

## 2021-05-29 RX ORDER — OXYCODONE HYDROCHLORIDE 5 MG/1
5 TABLET ORAL EVERY 4 HOURS PRN
Status: DISCONTINUED | OUTPATIENT
Start: 2021-05-29 | End: 2021-06-05 | Stop reason: HOSPADM

## 2021-05-29 RX ORDER — KETOROLAC TROMETHAMINE 30 MG/ML
15 INJECTION, SOLUTION INTRAMUSCULAR; INTRAVENOUS EVERY 8 HOURS
Status: DISCONTINUED | OUTPATIENT
Start: 2021-05-29 | End: 2021-06-01

## 2021-05-29 RX ORDER — INSULIN GLARGINE 100 [IU]/ML
15 INJECTION, SOLUTION SUBCUTANEOUS 2 TIMES DAILY
Status: DISCONTINUED | OUTPATIENT
Start: 2021-05-29 | End: 2021-06-01

## 2021-05-29 RX ADMIN — METHOCARBAMOL TABLETS 1000 MG: 500 TABLET, COATED ORAL at 09:08

## 2021-05-29 RX ADMIN — ENOXAPARIN SODIUM 100 MG: 100 INJECTION SUBCUTANEOUS at 20:49

## 2021-05-29 RX ADMIN — OXYCODONE HYDROCHLORIDE 10 MG: 10 TABLET ORAL at 13:05

## 2021-05-29 RX ADMIN — METHOCARBAMOL TABLETS 1000 MG: 500 TABLET, COATED ORAL at 20:49

## 2021-05-29 RX ADMIN — PIPERACILLIN AND TAZOBACTAM 3375 MG: 3; .375 INJECTION, POWDER, LYOPHILIZED, FOR SOLUTION INTRAVENOUS at 19:55

## 2021-05-29 RX ADMIN — GABAPENTIN 300 MG: 300 CAPSULE ORAL at 09:07

## 2021-05-29 RX ADMIN — PANTOPRAZOLE SODIUM 40 MG: 40 TABLET, DELAYED RELEASE ORAL at 05:25

## 2021-05-29 RX ADMIN — METHOCARBAMOL TABLETS 1000 MG: 500 TABLET, COATED ORAL at 16:51

## 2021-05-29 RX ADMIN — INSULIN LISPRO 12 UNITS: 100 INJECTION, SOLUTION INTRAVENOUS; SUBCUTANEOUS at 05:49

## 2021-05-29 RX ADMIN — OXYCODONE 5 MG: 5 TABLET ORAL at 05:02

## 2021-05-29 RX ADMIN — BUDESONIDE 1000 MCG: 0.5 SUSPENSION RESPIRATORY (INHALATION) at 08:41

## 2021-05-29 RX ADMIN — ALBUTEROL SULFATE 2.5 MG: 2.5 SOLUTION RESPIRATORY (INHALATION) at 12:09

## 2021-05-29 RX ADMIN — PANTOPRAZOLE SODIUM 40 MG: 40 TABLET, DELAYED RELEASE ORAL at 15:14

## 2021-05-29 RX ADMIN — KETOROLAC TROMETHAMINE 15 MG: 30 INJECTION, SOLUTION INTRAMUSCULAR; INTRAVENOUS at 09:06

## 2021-05-29 RX ADMIN — OXYCODONE 5 MG: 5 TABLET ORAL at 01:19

## 2021-05-29 RX ADMIN — PIPERACILLIN AND TAZOBACTAM 3375 MG: 3; .375 INJECTION, POWDER, LYOPHILIZED, FOR SOLUTION INTRAVENOUS at 05:02

## 2021-05-29 RX ADMIN — OXYCODONE HYDROCHLORIDE 10 MG: 10 TABLET ORAL at 09:07

## 2021-05-29 RX ADMIN — ACETAMINOPHEN 500 MG: 325 TABLET ORAL at 15:14

## 2021-05-29 RX ADMIN — Medication 10 ML: at 09:20

## 2021-05-29 RX ADMIN — INSULIN LISPRO 6 UNITS: 100 INJECTION, SOLUTION INTRAVENOUS; SUBCUTANEOUS at 20:50

## 2021-05-29 RX ADMIN — INSULIN LISPRO 3 UNITS: 100 INJECTION, SOLUTION INTRAVENOUS; SUBCUTANEOUS at 18:07

## 2021-05-29 RX ADMIN — INSULIN GLARGINE 15 UNITS: 100 INJECTION, SOLUTION SUBCUTANEOUS at 09:08

## 2021-05-29 RX ADMIN — OXYCODONE HYDROCHLORIDE 10 MG: 10 TABLET ORAL at 20:49

## 2021-05-29 RX ADMIN — WATER 10 ML: 1 INJECTION INTRAMUSCULAR; INTRAVENOUS; SUBCUTANEOUS at 13:38

## 2021-05-29 RX ADMIN — ENOXAPARIN SODIUM 100 MG: 100 INJECTION SUBCUTANEOUS at 09:07

## 2021-05-29 RX ADMIN — SODIUM CHLORIDE: 4.5 INJECTION, SOLUTION INTRAVENOUS at 11:51

## 2021-05-29 RX ADMIN — PANCRELIPASE 72000 UNITS: 60000; 12000; 38000 CAPSULE, DELAYED RELEASE PELLETS ORAL at 16:51

## 2021-05-29 RX ADMIN — ALBUTEROL SULFATE 2.5 MG: 2.5 SOLUTION RESPIRATORY (INHALATION) at 08:41

## 2021-05-29 RX ADMIN — ACETAMINOPHEN 500 MG: 325 TABLET ORAL at 20:49

## 2021-05-29 RX ADMIN — GABAPENTIN 300 MG: 300 CAPSULE ORAL at 20:49

## 2021-05-29 RX ADMIN — HYDROMORPHONE HYDROCHLORIDE 0.5 MG: 1 INJECTION, SOLUTION INTRAMUSCULAR; INTRAVENOUS; SUBCUTANEOUS at 19:05

## 2021-05-29 RX ADMIN — PANCRELIPASE 72000 UNITS: 60000; 12000; 38000 CAPSULE, DELAYED RELEASE PELLETS ORAL at 09:07

## 2021-05-29 RX ADMIN — SODIUM CHLORIDE, PRESERVATIVE FREE 10 ML: 5 INJECTION INTRAVENOUS at 23:33

## 2021-05-29 RX ADMIN — ALBUTEROL SULFATE 2.5 MG: 2.5 SOLUTION RESPIRATORY (INHALATION) at 15:56

## 2021-05-29 RX ADMIN — INSULIN GLARGINE 15 UNITS: 100 INJECTION, SOLUTION SUBCUTANEOUS at 20:49

## 2021-05-29 RX ADMIN — PANCRELIPASE 72000 UNITS: 60000; 12000; 38000 CAPSULE, DELAYED RELEASE PELLETS ORAL at 12:05

## 2021-05-29 RX ADMIN — HYDROMORPHONE HYDROCHLORIDE 0.5 MG: 1 INJECTION, SOLUTION INTRAMUSCULAR; INTRAVENOUS; SUBCUTANEOUS at 11:12

## 2021-05-29 RX ADMIN — ACETAMINOPHEN 500 MG: 325 TABLET ORAL at 09:06

## 2021-05-29 RX ADMIN — HYDROMORPHONE HYDROCHLORIDE 0.5 MG: 1 INJECTION, SOLUTION INTRAMUSCULAR; INTRAVENOUS; SUBCUTANEOUS at 15:10

## 2021-05-29 RX ADMIN — KETOROLAC TROMETHAMINE 15 MG: 30 INJECTION, SOLUTION INTRAMUSCULAR; INTRAVENOUS at 16:50

## 2021-05-29 RX ADMIN — OXYCODONE HYDROCHLORIDE 10 MG: 10 TABLET ORAL at 16:51

## 2021-05-29 RX ADMIN — METHOCARBAMOL TABLETS 1000 MG: 500 TABLET, COATED ORAL at 12:05

## 2021-05-29 RX ADMIN — INSULIN LISPRO 12 UNITS: 100 INJECTION, SOLUTION INTRAVENOUS; SUBCUTANEOUS at 13:33

## 2021-05-29 RX ADMIN — HYDROMORPHONE HYDROCHLORIDE 0.5 MG: 1 INJECTION, SOLUTION INTRAMUSCULAR; INTRAVENOUS; SUBCUTANEOUS at 23:30

## 2021-05-29 RX ADMIN — PIPERACILLIN AND TAZOBACTAM 3375 MG: 3; .375 INJECTION, POWDER, LYOPHILIZED, FOR SOLUTION INTRAVENOUS at 12:05

## 2021-05-29 RX ADMIN — INSULIN LISPRO 6 UNITS: 100 INJECTION, SOLUTION INTRAVENOUS; SUBCUTANEOUS at 09:10

## 2021-05-29 RX ADMIN — ATORVASTATIN CALCIUM 20 MG: 20 TABLET, FILM COATED ORAL at 09:08

## 2021-05-29 ASSESSMENT — PAIN SCALES - GENERAL
PAINLEVEL_OUTOF10: 7
PAINLEVEL_OUTOF10: 7
PAINLEVEL_OUTOF10: 4
PAINLEVEL_OUTOF10: 7
PAINLEVEL_OUTOF10: 5
PAINLEVEL_OUTOF10: 7
PAINLEVEL_OUTOF10: 4
PAINLEVEL_OUTOF10: 7

## 2021-05-29 ASSESSMENT — PAIN DESCRIPTION - FREQUENCY: FREQUENCY: CONTINUOUS

## 2021-05-29 ASSESSMENT — PAIN DESCRIPTION - PROGRESSION: CLINICAL_PROGRESSION: NOT CHANGED

## 2021-05-29 ASSESSMENT — PAIN DESCRIPTION - DESCRIPTORS: DESCRIPTORS: SHARP;BURNING;ACHING

## 2021-05-29 ASSESSMENT — PAIN DESCRIPTION - LOCATION: LOCATION: ABDOMEN

## 2021-05-29 ASSESSMENT — PAIN DESCRIPTION - ONSET: ONSET: ON-GOING

## 2021-05-29 ASSESSMENT — PAIN DESCRIPTION - ORIENTATION: ORIENTATION: MID;OUTER

## 2021-05-29 ASSESSMENT — PAIN DESCRIPTION - PAIN TYPE: TYPE: ACUTE PAIN

## 2021-05-29 NOTE — PROGRESS NOTES
[address] [place] [address] [facility]  [facility]  Progress Note      Chief Complaint   Patient presents with    Fatigue     sent by dr Erika Macedo after surgery follow up pt states that he has had fever and chills        SUBJECTIVE:  Patient is tolerating medications. No reported adverse drug reactions. No nausea, vomiting, diarrhea. Abdominal pain and weakness. No longer having chills. Review of systems:  As stated above in the chief complaint, otherwise negative. Medications:  Scheduled Meds:   insulin glargine  15 Units Subcutaneous BID    ketorolac  15 mg Intravenous Q8H    acetaminophen  500 mg Oral Q6H    polyethylene glycol  17 g Oral Daily    enoxaparin  100 mg Subcutaneous BID    piperacillin-tazobactam  3,375 mg Intravenous Q8H    albuterol  2.5 mg Nebulization 4x daily    atorvastatin  20 mg Oral Daily    budesonide  1 mg Nebulization BID    gabapentin  300 mg Oral BID    lipase-protease-amylase  72,000 Units Oral TID WC    pantoprazole  40 mg Oral BID AC    sodium chloride flush  10 mL Intravenous 2 times per day    methocarbamol  1,000 mg Oral 4x Daily    insulin lispro  0-18 Units Subcutaneous Q4H     Continuous Infusions:   sodium chloride      sodium chloride      sodium chloride      dextrose       PRN Meds:oxyCODONE **OR** oxyCODONE, sodium chloride, sodium chloride, HYDROmorphone, sodium chloride flush, sodium chloride, ondansetron, glucose, dextrose, glucagon (rDNA), dextrose    OBJECTIVE:  /69   Pulse 102   Temp 98.8 °F (37.1 °C) (Oral)   Resp 18   Ht 5' 8\" (1.727 m)   Wt 214 lb (97.1 kg)   SpO2 94%   BMI 32.54 kg/m²   Temp  Av.4 °F (36.9 °C)  Min: 97.8 °F (36.6 °C)  Max: 99.3 °F (37.4 °C)  Constitutional: The patient is awake, alert, and oriented. Skin: Warm and dry. No rashes were noted. HEENT: Round and reactive pupils. Moist mucous membranes. No ulcerations or thrush. Neck: Supple to movements.    Chest: No use of accessory muscles to No results found for: MPNEUMO, CLAMYDCU, LABLEGI, AFBCX, FUNGSM, LABFUNG  CULTURE, RESPIRATORY   Date Value Ref Range Status   05/15/2021 Oral Pharyngeal Aicha reduced  Final     No results found for: CXCATHTIP  Body Fluid Culture, Sterile   Date Value Ref Range Status   05/27/2021   Preliminary    Growth present, evaluating for:  Mixed Gram positive organisms  Mixed Gram negative rods       No results found for: CXSURG  No results found for: LABURIN  MRSA Culture Only   Date Value Ref Range Status   05/06/2021 Methicillin resistant Staph aureus not isolated  Final     5/28 abscess cx GNR GPC  5/27 body fluid cx GPO, GNR, GPR    Assessment:  Intraabdominal abscess/surgical site infection  Recent distal pancreatectomy with splenectomy on 06/29 complicated by pancreatic leak s/p pancreatic stent placement on 05/17     Plan:  Continue piperacillin-tazobactam 3.375g q8h.  s/p percutaneous abscess drainage is noted. Follow up blood and fluid cultures. Continue local wound care. Continue to follow cultures   From body fluid   Will be on alert for yeast      Thank you for involving me in the care of Samuel Miners.      Electronically signed by MARGAUX Fontana CNP on 5/29/2021 at 8:57 AM

## 2021-05-29 NOTE — PROGRESS NOTES
RUQ drain irrigated with 10cc sterile water per order. No resistance met, flushed easily and got good return.

## 2021-05-29 NOTE — PROGRESS NOTES
[facility]  DAILY PROGRESS NOTE    Date:2021       [id number]  Patient Name:Eliezer Cook     Date of Birth:     Age:60 y.o. Chief Complaint:  Chief Complaint   Patient presents with    Fatigue     sent by dr Rosi Rob after surgery follow up pt states that he has had fever and chills         Subjective:  Pain remarkably improved, + BM, tolerating food, ambulating    Objective:  /66   Pulse 94   Temp 98.2 °F (36.8 °C)   Resp 16   Ht 5' 8\" (1.727 m)   Wt 214 lb (97.1 kg)   SpO2 95%   BMI 32.54 kg/m²   Temp (24hrs), Av.4 °F (36.9 °C), Min:97.8 °F (36.6 °C), Max:99.3 °F (37.4 °C)      I/O (24Hr):  I/O last 3 completed shifts: In: 1460.9 [I.V.:1260.9; IV BCBVRIYRD:119]  Out: 4623 [Urine:950; Drains:555]     GENERAL:  No acute distress. Alert and interactive. LUNGS:  No cough. Nonlabored breathing on room air. CARDIOVASC:  Normal rate, no cyanosis. ABDOMEN:  Soft, non-distended, appropriate ciara-drain LUQ tenderness, drain with bile tinged purulent output 555 cc, mild to moderate generalized tenderness otherwise. MAX drain purulent. Midline intact except pinpoint serous draining opening inferiorly. No guarding / rigidity / rebound. EXTREMITIES:  No edema, no deformities. Assessment:  62 y.o. 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 Portal vein thrombosis. Plan:  - DC IVF  - zosyn, appreciate ID recs, f/u cultures  -  Monitor drain output  - drain & blood cx pending  - TLov for portal vein thrombosis-no signs of bleeding  - home meds, increased glargine, monitor glucose throughout today for dose, changed diet to carb control  - DC pending abx recs    Electronically signed by Asad Beaver MD on 2021 at 8:06 AM    Feeling much better with IR drain  Appreciate ID recommendations  Intra-abdominal abscess - known complication from pancreatic leak post surgery - leak rate post distal pancreatectomy and splenectomy is 50%.   PT/OT eval and treat  Ambulate  Therapeutic lovenox secondary to PV thrombosis from inflammation secondary to pancreatic leak    Electronically signed by Jennifer Thompson MD on 5/29/2021 at 11:38 AM

## 2021-05-30 LAB
ALBUMIN SERPL-MCNC: 2.7 G/DL (ref 3.5–5.2)
ALP BLD-CCNC: 53 U/L (ref 40–129)
ALT SERPL-CCNC: 84 U/L (ref 0–40)
ANION GAP SERPL CALCULATED.3IONS-SCNC: 12 MMOL/L (ref 7–16)
ANISOCYTOSIS: ABNORMAL
AST SERPL-CCNC: 68 U/L (ref 0–39)
BASOPHILS ABSOLUTE: 0 E9/L (ref 0–0.2)
BASOPHILS RELATIVE PERCENT: 0.5 % (ref 0–2)
BILIRUB SERPL-MCNC: 0.3 MG/DL (ref 0–1.2)
BODY FLUID CULTURE, STERILE: ABNORMAL
BUN BLDV-MCNC: 13 MG/DL (ref 6–20)
CALCIUM SERPL-MCNC: 8 MG/DL (ref 8.6–10.2)
CHLORIDE BLD-SCNC: 102 MMOL/L (ref 98–107)
CO2: 21 MMOL/L (ref 22–29)
CREAT SERPL-MCNC: 0.9 MG/DL (ref 0.7–1.2)
EOSINOPHILS ABSOLUTE: 0.22 E9/L (ref 0.05–0.5)
EOSINOPHILS RELATIVE PERCENT: 2.6 % (ref 0–6)
GFR AFRICAN AMERICAN: >60
GFR NON-AFRICAN AMERICAN: >60 ML/MIN/1.73
GLUCOSE BLD-MCNC: 137 MG/DL (ref 74–99)
GRAM STAIN RESULT: ABNORMAL
HCT VFR BLD CALC: 25.7 % (ref 37–54)
HEMOGLOBIN: 7.8 G/DL (ref 12.5–16.5)
LYMPHOCYTES ABSOLUTE: 1.03 E9/L (ref 1.5–4)
LYMPHOCYTES RELATIVE PERCENT: 12.3 % (ref 20–42)
MCH RBC QN AUTO: 22.5 PG (ref 26–35)
MCHC RBC AUTO-ENTMCNC: 30.4 % (ref 32–34.5)
MCV RBC AUTO: 74.1 FL (ref 80–99.9)
METER GLUCOSE: 168 MG/DL (ref 74–99)
METER GLUCOSE: 263 MG/DL (ref 74–99)
METER GLUCOSE: 308 MG/DL (ref 74–99)
METER GLUCOSE: 321 MG/DL (ref 74–99)
MONOCYTES ABSOLUTE: 1.46 E9/L (ref 0.1–0.95)
MONOCYTES RELATIVE PERCENT: 16.7 % (ref 2–12)
MYELOCYTE PERCENT: 0.9 % (ref 0–0)
NEUTROPHILS ABSOLUTE: 5.85 E9/L (ref 1.8–7.3)
NEUTROPHILS RELATIVE PERCENT: 67.5 % (ref 43–80)
NUCLEATED RED BLOOD CELLS: 2.6 /100 WBC
ORGANISM: ABNORMAL
OVALOCYTES: ABNORMAL
PDW BLD-RTO: 15.4 FL (ref 11.5–15)
PLATELET # BLD: 616 E9/L (ref 130–450)
PMV BLD AUTO: 11.7 FL (ref 7–12)
POIKILOCYTES: ABNORMAL
POLYCHROMASIA: ABNORMAL
POTASSIUM REFLEX MAGNESIUM: 4 MMOL/L (ref 3.5–5)
RBC # BLD: 3.47 E12/L (ref 3.8–5.8)
SCHISTOCYTES: ABNORMAL
SODIUM BLD-SCNC: 135 MMOL/L (ref 132–146)
TARGET CELLS: ABNORMAL
TOTAL PROTEIN: 6.2 G/DL (ref 6.4–8.3)
WBC # BLD: 8.6 E9/L (ref 4.5–11.5)

## 2021-05-30 PROCEDURE — 36415 COLL VENOUS BLD VENIPUNCTURE: CPT

## 2021-05-30 PROCEDURE — 6360000002 HC RX W HCPCS: Performed by: TRANSPLANT SURGERY

## 2021-05-30 PROCEDURE — 2580000003 HC RX 258: Performed by: STUDENT IN AN ORGANIZED HEALTH CARE EDUCATION/TRAINING PROGRAM

## 2021-05-30 PROCEDURE — 82962 GLUCOSE BLOOD TEST: CPT

## 2021-05-30 PROCEDURE — 94640 AIRWAY INHALATION TREATMENT: CPT

## 2021-05-30 PROCEDURE — 2060000000 HC ICU INTERMEDIATE R&B

## 2021-05-30 PROCEDURE — 80053 COMPREHEN METABOLIC PANEL: CPT

## 2021-05-30 PROCEDURE — 6370000000 HC RX 637 (ALT 250 FOR IP): Performed by: STUDENT IN AN ORGANIZED HEALTH CARE EDUCATION/TRAINING PROGRAM

## 2021-05-30 PROCEDURE — 6360000002 HC RX W HCPCS: Performed by: STUDENT IN AN ORGANIZED HEALTH CARE EDUCATION/TRAINING PROGRAM

## 2021-05-30 PROCEDURE — 6360000002 HC RX W HCPCS: Performed by: SURGERY

## 2021-05-30 PROCEDURE — 85025 COMPLETE CBC W/AUTO DIFF WBC: CPT

## 2021-05-30 RX ADMIN — INSULIN LISPRO 3 UNITS: 100 INJECTION, SOLUTION INTRAVENOUS; SUBCUTANEOUS at 21:45

## 2021-05-30 RX ADMIN — PANTOPRAZOLE SODIUM 40 MG: 40 TABLET, DELAYED RELEASE ORAL at 15:34

## 2021-05-30 RX ADMIN — INSULIN LISPRO 3 UNITS: 100 INJECTION, SOLUTION INTRAVENOUS; SUBCUTANEOUS at 01:37

## 2021-05-30 RX ADMIN — PIPERACILLIN AND TAZOBACTAM 3375 MG: 3; .375 INJECTION, POWDER, LYOPHILIZED, FOR SOLUTION INTRAVENOUS at 11:50

## 2021-05-30 RX ADMIN — PANTOPRAZOLE SODIUM 40 MG: 40 TABLET, DELAYED RELEASE ORAL at 06:58

## 2021-05-30 RX ADMIN — PANCRELIPASE 72000 UNITS: 60000; 12000; 38000 CAPSULE, DELAYED RELEASE PELLETS ORAL at 11:49

## 2021-05-30 RX ADMIN — OXYCODONE HYDROCHLORIDE 10 MG: 10 TABLET ORAL at 06:58

## 2021-05-30 RX ADMIN — OXYCODONE HYDROCHLORIDE 10 MG: 10 TABLET ORAL at 20:03

## 2021-05-30 RX ADMIN — ACETAMINOPHEN 500 MG: 325 TABLET ORAL at 15:34

## 2021-05-30 RX ADMIN — METHOCARBAMOL TABLETS 1000 MG: 500 TABLET, COATED ORAL at 11:48

## 2021-05-30 RX ADMIN — ALBUTEROL SULFATE 2.5 MG: 2.5 SOLUTION RESPIRATORY (INHALATION) at 19:50

## 2021-05-30 RX ADMIN — GABAPENTIN 300 MG: 300 CAPSULE ORAL at 09:12

## 2021-05-30 RX ADMIN — ALBUTEROL SULFATE 2.5 MG: 2.5 SOLUTION RESPIRATORY (INHALATION) at 08:44

## 2021-05-30 RX ADMIN — INSULIN GLARGINE 15 UNITS: 100 INJECTION, SOLUTION SUBCUTANEOUS at 09:13

## 2021-05-30 RX ADMIN — Medication 10 ML: at 21:00

## 2021-05-30 RX ADMIN — INSULIN GLARGINE 15 UNITS: 100 INJECTION, SOLUTION SUBCUTANEOUS at 21:45

## 2021-05-30 RX ADMIN — ATORVASTATIN CALCIUM 20 MG: 20 TABLET, FILM COATED ORAL at 09:12

## 2021-05-30 RX ADMIN — KETOROLAC TROMETHAMINE 15 MG: 30 INJECTION, SOLUTION INTRAMUSCULAR; INTRAVENOUS at 09:13

## 2021-05-30 RX ADMIN — KETOROLAC TROMETHAMINE 15 MG: 30 INJECTION, SOLUTION INTRAMUSCULAR; INTRAVENOUS at 16:32

## 2021-05-30 RX ADMIN — INSULIN LISPRO 15 UNITS: 100 INJECTION, SOLUTION INTRAVENOUS; SUBCUTANEOUS at 10:46

## 2021-05-30 RX ADMIN — Medication 10 ML: at 09:11

## 2021-05-30 RX ADMIN — ACETAMINOPHEN 500 MG: 325 TABLET ORAL at 21:38

## 2021-05-30 RX ADMIN — PANCRELIPASE 72000 UNITS: 60000; 12000; 38000 CAPSULE, DELAYED RELEASE PELLETS ORAL at 16:32

## 2021-05-30 RX ADMIN — ENOXAPARIN SODIUM 100 MG: 100 INJECTION SUBCUTANEOUS at 21:38

## 2021-05-30 RX ADMIN — HYDROMORPHONE HYDROCHLORIDE 0.5 MG: 1 INJECTION, SOLUTION INTRAMUSCULAR; INTRAVENOUS; SUBCUTANEOUS at 10:46

## 2021-05-30 RX ADMIN — HYDROMORPHONE HYDROCHLORIDE 0.5 MG: 1 INJECTION, SOLUTION INTRAMUSCULAR; INTRAVENOUS; SUBCUTANEOUS at 04:18

## 2021-05-30 RX ADMIN — HYDROMORPHONE HYDROCHLORIDE 0.5 MG: 1 INJECTION, SOLUTION INTRAMUSCULAR; INTRAVENOUS; SUBCUTANEOUS at 21:38

## 2021-05-30 RX ADMIN — METHOCARBAMOL TABLETS 1000 MG: 500 TABLET, COATED ORAL at 16:31

## 2021-05-30 RX ADMIN — ACETAMINOPHEN 500 MG: 325 TABLET ORAL at 09:11

## 2021-05-30 RX ADMIN — GABAPENTIN 300 MG: 300 CAPSULE ORAL at 21:38

## 2021-05-30 RX ADMIN — ENOXAPARIN SODIUM 100 MG: 100 INJECTION SUBCUTANEOUS at 09:12

## 2021-05-30 RX ADMIN — OXYCODONE HYDROCHLORIDE 10 MG: 10 TABLET ORAL at 01:29

## 2021-05-30 RX ADMIN — INSULIN LISPRO 9 UNITS: 100 INJECTION, SOLUTION INTRAVENOUS; SUBCUTANEOUS at 18:09

## 2021-05-30 RX ADMIN — METHOCARBAMOL TABLETS 1000 MG: 500 TABLET, COATED ORAL at 09:12

## 2021-05-30 RX ADMIN — PANCRELIPASE 72000 UNITS: 60000; 12000; 38000 CAPSULE, DELAYED RELEASE PELLETS ORAL at 09:11

## 2021-05-30 RX ADMIN — KETOROLAC TROMETHAMINE 15 MG: 30 INJECTION, SOLUTION INTRAMUSCULAR; INTRAVENOUS at 01:29

## 2021-05-30 RX ADMIN — BUDESONIDE 1000 MCG: 0.5 SUSPENSION RESPIRATORY (INHALATION) at 08:44

## 2021-05-30 RX ADMIN — METHOCARBAMOL TABLETS 1000 MG: 500 TABLET, COATED ORAL at 21:38

## 2021-05-30 RX ADMIN — OXYCODONE HYDROCHLORIDE 10 MG: 10 TABLET ORAL at 11:59

## 2021-05-30 RX ADMIN — ALBUTEROL SULFATE 2.5 MG: 2.5 SOLUTION RESPIRATORY (INHALATION) at 15:53

## 2021-05-30 RX ADMIN — OXYCODONE HYDROCHLORIDE 10 MG: 10 TABLET ORAL at 15:43

## 2021-05-30 RX ADMIN — ALBUTEROL SULFATE 2.5 MG: 2.5 SOLUTION RESPIRATORY (INHALATION) at 13:26

## 2021-05-30 RX ADMIN — BUDESONIDE 1000 MCG: 0.5 SUSPENSION RESPIRATORY (INHALATION) at 19:50

## 2021-05-30 RX ADMIN — PIPERACILLIN AND TAZOBACTAM 3375 MG: 3; .375 INJECTION, POWDER, LYOPHILIZED, FOR SOLUTION INTRAVENOUS at 20:03

## 2021-05-30 RX ADMIN — PIPERACILLIN AND TAZOBACTAM 3375 MG: 3; .375 INJECTION, POWDER, LYOPHILIZED, FOR SOLUTION INTRAVENOUS at 04:17

## 2021-05-30 RX ADMIN — INSULIN LISPRO 12 UNITS: 100 INJECTION, SOLUTION INTRAVENOUS; SUBCUTANEOUS at 15:35

## 2021-05-30 ASSESSMENT — PAIN SCALES - GENERAL
PAINLEVEL_OUTOF10: 7
PAINLEVEL_OUTOF10: 7
PAINLEVEL_OUTOF10: 8
PAINLEVEL_OUTOF10: 8
PAINLEVEL_OUTOF10: 7
PAINLEVEL_OUTOF10: 7
PAINLEVEL_OUTOF10: 9
PAINLEVEL_OUTOF10: 8
PAINLEVEL_OUTOF10: 9
PAINLEVEL_OUTOF10: 8
PAINLEVEL_OUTOF10: 9
PAINLEVEL_OUTOF10: 9

## 2021-05-30 NOTE — PROGRESS NOTES
[facility]  DAILY PROGRESS NOTE    Date:2021       [facility]/[id number]  Patient Name:Eliezer Cook     Date of Birth:     Age:60 y.o. Chief Complaint:  Chief Complaint   Patient presents with    Fatigue     sent by dr Geri Portillo after surgery follow up pt states that he has had fever and chills         Subjective:  Pain continues to improve, + BM, tolerating food, ambulating    Objective:  /73   Pulse 102   Temp 99 °F (37.2 °C) (Temporal)   Resp 18   Ht 5' 8\" (1.727 m)   Wt 214 lb (97.1 kg)   SpO2 100%   BMI 32.54 kg/m²   Temp (24hrs), Av.9 °F (37.2 °C), Min:98.8 °F (37.1 °C), Max:99 °F (37.2 °C)      I/O (24Hr):  I/O last 3 completed shifts:  In: -   Out: 7492 [Urine:1325; Drains:350]     GENERAL:  No acute distress. Alert and interactive. LUNGS:  No cough. Nonlabored breathing on room air. CARDIOVASC:  Normal rate, no cyanosis. ABDOMEN:  Soft, non-distended, appropriate ciara-drain LUQ tenderness, drain with bile tinged purulent output 225 cc, mild to moderate generalized tenderness otherwise. MAX drain purulent. Midline intact except pinpoint serous draining opening inferiorly. No guarding / rigidity / rebound. EXTREMITIES:  No edema, no deformities. Assessment:  62 y.o. 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 Portal vein thrombosis.     Plan:  - zosyn, appreciate ID recs, f/u cultures  -  Monitor drain output  - drain & blood cx pending  - TLov for portal vein thrombosis-no signs of bleeding  - home meds, increased glargine, monitor glucose throughout today for dose, changed diet to carb control, glucose now improved  - hypernatremia improved with PO intake and IVF  - DC pending abx recs  -encourage ambulation  -wean off IV pain meds    Electronically signed by Zahira Lorenzana MD on 2021 at 8:13 AM    Improving  Drain output 350 over 24/hr  appreicate ID's recommendations  Continue diet with supplements\  Ambulate  Continue lovenox, will need transition to eliquis upon discharge    Electronically signed by Jennifer Thompson MD on 5/30/2021 at 9:45 AM

## 2021-05-30 NOTE — PROGRESS NOTES
[address] [place] [address] [facility]  Progress Note      Chief Complaint   Patient presents with    Fatigue     sent by dr Rosi Rob after surgery follow up pt states that he has had fever and chills        SUBJECTIVE:  Patient is tolerating medications. No reported adverse drug reactions. No nausea, vomiting, diarrhea. Abdominal pain and weakness. No longer having chills. His hands feel shaky with activity. Feeling little better. Review of systems:  As stated above in the chief complaint, otherwise negative. Medications:  Scheduled Meds:   insulin glargine  15 Units Subcutaneous BID    ketorolac  15 mg Intravenous Q8H    acetaminophen  500 mg Oral Q6H    polyethylene glycol  17 g Oral Daily    enoxaparin  100 mg Subcutaneous BID    piperacillin-tazobactam  3,375 mg Intravenous Q8H    albuterol  2.5 mg Nebulization 4x daily    atorvastatin  20 mg Oral Daily    budesonide  1 mg Nebulization BID    gabapentin  300 mg Oral BID    lipase-protease-amylase  72,000 Units Oral TID WC    pantoprazole  40 mg Oral BID AC    sodium chloride flush  10 mL Intravenous 2 times per day    methocarbamol  1,000 mg Oral 4x Daily    insulin lispro  0-18 Units Subcutaneous Q4H     Continuous Infusions:   sodium chloride      sodium chloride      sodium chloride      dextrose       PRN Meds:oxyCODONE **OR** oxyCODONE, sodium chloride, sodium chloride, sodium chloride flush, sodium chloride, ondansetron, glucose, dextrose, glucagon (rDNA), dextrose    OBJECTIVE:  /71   Pulse 118   Temp 99.2 °F (37.3 °C) (Oral)   Resp 18   Ht 5' 8\" (1.727 m)   Wt 214 lb (97.1 kg)   SpO2 100%   BMI 32.54 kg/m²   Temp  Av °F (37.2 °C)  Min: 98.8 °F (37.1 °C)  Max: 99.2 °F (37.3 °C)  Constitutional: The patient is awake, alert, and oriented. Skin: Warm and dry. No rashes were noted. HEENT: Round and reactive pupils. Moist mucous membranes. No ulcerations or thrush. Neck: Supple to movements. Chest: No use of accessory muscles to breathe. Symmetrical expansion. No wheezing, crackles or rhonchi. Cardiovascular: S1 and S2 are rhythmic and regular. No murmurs appreciated. Abdomen: Positive bowel sounds to auscultation. Benign to palpation. No masses felt. No hepatosplenomegaly. bialt kendall drains, dry dressing  Genitourinary: mail  Extremities: No clubbing, no cyanosis, no edema.   Lines: peripheral    Laboratory and Tests Review:  Lab Results   Component Value Date    WBC 8.6 05/30/2021    WBC 7.6 05/29/2021    WBC 9.2 05/28/2021    HGB 7.8 (L) 05/30/2021    HCT 25.7 (L) 05/30/2021    MCV 74.1 (L) 05/30/2021     (H) 05/30/2021     Lab Results   Component Value Date    NEUTROABS 5.85 05/30/2021    NEUTROABS 5.85 05/29/2021    NEUTROABS 7.77 (H) 05/28/2021     No results found for: CRPHS  Lab Results   Component Value Date    ALT 84 (H) 05/30/2021    AST 68 (H) 05/30/2021    ALKPHOS 53 05/30/2021    BILITOT 0.3 05/30/2021     Lab Results   Component Value Date     05/30/2021    K 4.0 05/30/2021     05/30/2021    CO2 21 05/30/2021    BUN 13 05/30/2021    CREATININE 0.9 05/30/2021    CREATININE 0.7 05/29/2021    CREATININE 0.6 05/28/2021    GFRAA >60 05/30/2021    LABGLOM >60 05/30/2021    GLUCOSE 137 05/30/2021    PROT 6.2 05/30/2021    LABALBU 2.7 05/30/2021    CALCIUM 8.0 05/30/2021    BILITOT 0.3 05/30/2021    ALKPHOS 53 05/30/2021    AST 68 05/30/2021    ALT 84 05/30/2021     No results found for: CRP  No results found for: [address]  Radiology:      Microbiology:   Lab Results   Component Value Date    BC 24 Hours no growth 05/27/2021    ORG Enterobacter cloacae complex 05/28/2021    ORG Gram negative jason 05/28/2021    ORG Gram negative jason 05/28/2021    ORG Alpha hemolytic Streptococcus 05/28/2021     Lab Results   Component Value Date    BLOODCULT2 24 Hours no growth 05/27/2021    ORG Enterobacter cloacae complex 05/28/2021    ORG Gram negative jason 05/28/2021    ORG Gram negative jason 05/28/2021    ORG Alpha hemolytic Streptococcus 05/28/2021     No results found for: WNDABS  Smear, Respiratory   Date Value Ref Range Status   05/15/2021   Final    Group 6: <25 PMN's/LPF and <25 Epithelial cells/LPF  Rare Polymorphonuclear leukocytes  Epithelial cells not seen  Rare Gram positive cocci  Rare Gram positive cocci in pairs  Rare Gram positive rods       No results found for: MPNEUMO, CLAMYDCU, LABLEGI, AFBCX, FUNGSM, LABFUNG  CULTURE, RESPIRATORY   Date Value Ref Range Status   05/15/2021 Oral Pharyngeal Aicha reduced  Final     No results found for: CXCATHTIP  Body Fluid Culture, Sterile   Date Value Ref Range Status   05/28/2021 (A)  Preliminary    Additional growth present, also evaluating for;  Mixed Gram negative rods     05/28/2021 Moderate growth  Preliminary   05/28/2021   Preliminary    Moderate growth  Identification and sensitivity to follow     05/28/2021   Preliminary    Moderate growth  Identification and sensitivity to follow     05/28/2021   Preliminary    Moderate growth  Identification and sensitivity to follow       No results found for: CXSURG  No results found for: LABURIN  MRSA Culture Only   Date Value Ref Range Status   05/06/2021 Methicillin resistant Staph aureus not isolated  Final     5/28 abscess cx GNR GPC, enterobacter, alpha heme strep  5/27 body fluid cx-staph epi, enterobacter, ecoli, staph alpha hem    Assessment:  Intraabdominal abscess/surgical site infection  Recent distal pancreatectomy with splenectomy on 94/66 complicated by pancreatic leak s/p pancreatic stent placement on 05/17  WBC normal and afebrile      Plan:  Continue piperacillin-tazobactam 3.375g q8h.  s/p percutaneous abscess drainage is noted. Follow up blood and fluid cultures. Continue local wound care. Continue to follow cultures  From body fluid   Will be on alert for yeast   Checked all cultures today  Numerous      Thank you for involving me in the care of Rossy Krishna. Electronically signed by MARGAUX Fontana CNP on 5/30/2021 at 8:35 AM   Pt seen and examined. Above discussed agree with advanced practice nurse. Labs, cultures, and radiographs reviewed. Face to Face encounter occurred. Changes made as necessary.      Mary Ann Garcia MD

## 2021-05-30 NOTE — PROGRESS NOTES
Irrigated RUQ drain with 10cc sterile water per order. No resistance met, flushed and good return noted.

## 2021-05-31 LAB
ALBUMIN SERPL-MCNC: 2.8 G/DL (ref 3.5–5.2)
ALP BLD-CCNC: 53 U/L (ref 40–129)
ALT SERPL-CCNC: 60 U/L (ref 0–40)
ANAEROBIC CULTURE: ABNORMAL
ANION GAP SERPL CALCULATED.3IONS-SCNC: 10 MMOL/L (ref 7–16)
ANISOCYTOSIS: ABNORMAL
AST SERPL-CCNC: 46 U/L (ref 0–39)
BASOPHILS ABSOLUTE: 0 E9/L (ref 0–0.2)
BASOPHILS RELATIVE PERCENT: 0.6 % (ref 0–2)
BILIRUB SERPL-MCNC: 0.3 MG/DL (ref 0–1.2)
BODY FLUID CULTURE, STERILE: ABNORMAL
BUN BLDV-MCNC: 10 MG/DL (ref 6–20)
CALCIUM SERPL-MCNC: 8.2 MG/DL (ref 8.6–10.2)
CHLORIDE BLD-SCNC: 102 MMOL/L (ref 98–107)
CO2: 24 MMOL/L (ref 22–29)
CREAT SERPL-MCNC: 0.8 MG/DL (ref 0.7–1.2)
EOSINOPHILS ABSOLUTE: 0.31 E9/L (ref 0.05–0.5)
EOSINOPHILS RELATIVE PERCENT: 3.5 % (ref 0–6)
GFR AFRICAN AMERICAN: >60
GFR NON-AFRICAN AMERICAN: >60 ML/MIN/1.73
GLUCOSE BLD-MCNC: 175 MG/DL (ref 74–99)
GRAM STAIN RESULT: ABNORMAL
HCT VFR BLD CALC: 26.3 % (ref 37–54)
HEMOGLOBIN: 7.8 G/DL (ref 12.5–16.5)
HYPOCHROMIA: ABNORMAL
LYMPHOCYTES ABSOLUTE: 1.06 E9/L (ref 1.5–4)
LYMPHOCYTES RELATIVE PERCENT: 12.2 % (ref 20–42)
MCH RBC QN AUTO: 22.6 PG (ref 26–35)
MCHC RBC AUTO-ENTMCNC: 29.7 % (ref 32–34.5)
MCV RBC AUTO: 76.2 FL (ref 80–99.9)
METER GLUCOSE: 160 MG/DL (ref 74–99)
METER GLUCOSE: 204 MG/DL (ref 74–99)
METER GLUCOSE: 267 MG/DL (ref 74–99)
METER GLUCOSE: 299 MG/DL (ref 74–99)
METER GLUCOSE: 329 MG/DL (ref 74–99)
MONOCYTES ABSOLUTE: 2.11 E9/L (ref 0.1–0.95)
MONOCYTES RELATIVE PERCENT: 23.5 % (ref 2–12)
NEUTROPHILS ABSOLUTE: 5.37 E9/L (ref 1.8–7.3)
NEUTROPHILS RELATIVE PERCENT: 60.9 % (ref 43–80)
NUCLEATED RED BLOOD CELLS: 0.9 /100 WBC
ORGANISM: ABNORMAL
PDW BLD-RTO: 15.6 FL (ref 11.5–15)
PLATELET # BLD: 623 E9/L (ref 130–450)
PMV BLD AUTO: 11.5 FL (ref 7–12)
POIKILOCYTES: ABNORMAL
POLYCHROMASIA: ABNORMAL
POTASSIUM REFLEX MAGNESIUM: 4.2 MMOL/L (ref 3.5–5)
RBC # BLD: 3.45 E12/L (ref 3.8–5.8)
SODIUM BLD-SCNC: 136 MMOL/L (ref 132–146)
TARGET CELLS: ABNORMAL
TOTAL PROTEIN: 6.1 G/DL (ref 6.4–8.3)
WBC # BLD: 8.8 E9/L (ref 4.5–11.5)

## 2021-05-31 PROCEDURE — 2580000003 HC RX 258: Performed by: STUDENT IN AN ORGANIZED HEALTH CARE EDUCATION/TRAINING PROGRAM

## 2021-05-31 PROCEDURE — 6360000002 HC RX W HCPCS: Performed by: STUDENT IN AN ORGANIZED HEALTH CARE EDUCATION/TRAINING PROGRAM

## 2021-05-31 PROCEDURE — 2060000000 HC ICU INTERMEDIATE R&B

## 2021-05-31 PROCEDURE — 87449 NOS EACH ORGANISM AG IA: CPT

## 2021-05-31 PROCEDURE — 85025 COMPLETE CBC W/AUTO DIFF WBC: CPT

## 2021-05-31 PROCEDURE — 6360000002 HC RX W HCPCS: Performed by: SURGERY

## 2021-05-31 PROCEDURE — 94640 AIRWAY INHALATION TREATMENT: CPT

## 2021-05-31 PROCEDURE — 6370000000 HC RX 637 (ALT 250 FOR IP): Performed by: STUDENT IN AN ORGANIZED HEALTH CARE EDUCATION/TRAINING PROGRAM

## 2021-05-31 PROCEDURE — 82962 GLUCOSE BLOOD TEST: CPT

## 2021-05-31 PROCEDURE — 80053 COMPREHEN METABOLIC PANEL: CPT

## 2021-05-31 PROCEDURE — 2580000003 HC RX 258

## 2021-05-31 PROCEDURE — 6360000002 HC RX W HCPCS: Performed by: TRANSPLANT SURGERY

## 2021-05-31 PROCEDURE — 36415 COLL VENOUS BLD VENIPUNCTURE: CPT

## 2021-05-31 RX ADMIN — ALBUTEROL SULFATE 2.5 MG: 2.5 SOLUTION RESPIRATORY (INHALATION) at 08:09

## 2021-05-31 RX ADMIN — PANCRELIPASE 72000 UNITS: 60000; 12000; 38000 CAPSULE, DELAYED RELEASE PELLETS ORAL at 17:01

## 2021-05-31 RX ADMIN — WATER 10 ML: 1 INJECTION INTRAMUSCULAR; INTRAVENOUS; SUBCUTANEOUS at 13:48

## 2021-05-31 RX ADMIN — METHOCARBAMOL TABLETS 1000 MG: 500 TABLET, COATED ORAL at 08:04

## 2021-05-31 RX ADMIN — KETOROLAC TROMETHAMINE 15 MG: 30 INJECTION, SOLUTION INTRAMUSCULAR; INTRAVENOUS at 17:02

## 2021-05-31 RX ADMIN — BUDESONIDE 1000 MCG: 0.5 SUSPENSION RESPIRATORY (INHALATION) at 19:48

## 2021-05-31 RX ADMIN — ALBUTEROL SULFATE 2.5 MG: 2.5 SOLUTION RESPIRATORY (INHALATION) at 11:34

## 2021-05-31 RX ADMIN — INSULIN LISPRO 9 UNITS: 100 INJECTION, SOLUTION INTRAVENOUS; SUBCUTANEOUS at 10:03

## 2021-05-31 RX ADMIN — KETOROLAC TROMETHAMINE 15 MG: 30 INJECTION, SOLUTION INTRAMUSCULAR; INTRAVENOUS at 02:04

## 2021-05-31 RX ADMIN — ATORVASTATIN CALCIUM 20 MG: 20 TABLET, FILM COATED ORAL at 08:05

## 2021-05-31 RX ADMIN — ACETAMINOPHEN 500 MG: 325 TABLET ORAL at 03:48

## 2021-05-31 RX ADMIN — METHOCARBAMOL TABLETS 1000 MG: 500 TABLET, COATED ORAL at 17:01

## 2021-05-31 RX ADMIN — HYDROMORPHONE HYDROCHLORIDE 0.5 MG: 1 INJECTION, SOLUTION INTRAMUSCULAR; INTRAVENOUS; SUBCUTANEOUS at 13:48

## 2021-05-31 RX ADMIN — INSULIN LISPRO 3 UNITS: 100 INJECTION, SOLUTION INTRAVENOUS; SUBCUTANEOUS at 06:50

## 2021-05-31 RX ADMIN — METHOCARBAMOL TABLETS 1000 MG: 500 TABLET, COATED ORAL at 20:11

## 2021-05-31 RX ADMIN — OXYCODONE HYDROCHLORIDE 10 MG: 10 TABLET ORAL at 20:11

## 2021-05-31 RX ADMIN — ACETAMINOPHEN 500 MG: 325 TABLET ORAL at 09:16

## 2021-05-31 RX ADMIN — Medication 10 ML: at 08:14

## 2021-05-31 RX ADMIN — KETOROLAC TROMETHAMINE 15 MG: 30 INJECTION, SOLUTION INTRAMUSCULAR; INTRAVENOUS at 09:16

## 2021-05-31 RX ADMIN — HYDROMORPHONE HYDROCHLORIDE 0.5 MG: 1 INJECTION, SOLUTION INTRAMUSCULAR; INTRAVENOUS; SUBCUTANEOUS at 18:06

## 2021-05-31 RX ADMIN — PIPERACILLIN AND TAZOBACTAM 3375 MG: 3; .375 INJECTION, POWDER, LYOPHILIZED, FOR SOLUTION INTRAVENOUS at 11:49

## 2021-05-31 RX ADMIN — METHOCARBAMOL TABLETS 1000 MG: 500 TABLET, COATED ORAL at 13:51

## 2021-05-31 RX ADMIN — ACETAMINOPHEN 500 MG: 325 TABLET ORAL at 20:11

## 2021-05-31 RX ADMIN — GABAPENTIN 300 MG: 300 CAPSULE ORAL at 20:11

## 2021-05-31 RX ADMIN — BUDESONIDE 1000 MCG: 0.5 SUSPENSION RESPIRATORY (INHALATION) at 08:09

## 2021-05-31 RX ADMIN — HYDROMORPHONE HYDROCHLORIDE 0.5 MG: 1 INJECTION, SOLUTION INTRAMUSCULAR; INTRAVENOUS; SUBCUTANEOUS at 10:03

## 2021-05-31 RX ADMIN — OXYCODONE HYDROCHLORIDE 10 MG: 10 TABLET ORAL at 02:24

## 2021-05-31 RX ADMIN — INSULIN LISPRO 9 UNITS: 100 INJECTION, SOLUTION INTRAVENOUS; SUBCUTANEOUS at 13:50

## 2021-05-31 RX ADMIN — ALBUTEROL SULFATE 2.5 MG: 2.5 SOLUTION RESPIRATORY (INHALATION) at 15:41

## 2021-05-31 RX ADMIN — INSULIN LISPRO 12 UNITS: 100 INJECTION, SOLUTION INTRAVENOUS; SUBCUTANEOUS at 21:55

## 2021-05-31 RX ADMIN — PIPERACILLIN AND TAZOBACTAM 3375 MG: 3; .375 INJECTION, POWDER, LYOPHILIZED, FOR SOLUTION INTRAVENOUS at 03:47

## 2021-05-31 RX ADMIN — INSULIN LISPRO 6 UNITS: 100 INJECTION, SOLUTION INTRAVENOUS; SUBCUTANEOUS at 02:24

## 2021-05-31 RX ADMIN — PANCRELIPASE 72000 UNITS: 60000; 12000; 38000 CAPSULE, DELAYED RELEASE PELLETS ORAL at 08:05

## 2021-05-31 RX ADMIN — INSULIN GLARGINE 15 UNITS: 100 INJECTION, SOLUTION SUBCUTANEOUS at 08:05

## 2021-05-31 RX ADMIN — PANTOPRAZOLE SODIUM 40 MG: 40 TABLET, DELAYED RELEASE ORAL at 06:53

## 2021-05-31 RX ADMIN — ENOXAPARIN SODIUM 100 MG: 100 INJECTION SUBCUTANEOUS at 20:11

## 2021-05-31 RX ADMIN — ENOXAPARIN SODIUM 100 MG: 100 INJECTION SUBCUTANEOUS at 08:05

## 2021-05-31 RX ADMIN — PANCRELIPASE 72000 UNITS: 60000; 12000; 38000 CAPSULE, DELAYED RELEASE PELLETS ORAL at 11:49

## 2021-05-31 RX ADMIN — HYDROMORPHONE HYDROCHLORIDE 0.5 MG: 1 INJECTION, SOLUTION INTRAMUSCULAR; INTRAVENOUS; SUBCUTANEOUS at 05:57

## 2021-05-31 RX ADMIN — HYDROMORPHONE HYDROCHLORIDE 0.5 MG: 1 INJECTION, SOLUTION INTRAMUSCULAR; INTRAVENOUS; SUBCUTANEOUS at 21:56

## 2021-05-31 RX ADMIN — OXYCODONE HYDROCHLORIDE 10 MG: 10 TABLET ORAL at 11:50

## 2021-05-31 RX ADMIN — OXYCODONE HYDROCHLORIDE 10 MG: 10 TABLET ORAL at 15:55

## 2021-05-31 RX ADMIN — PIPERACILLIN AND TAZOBACTAM 3375 MG: 3; .375 INJECTION, POWDER, LYOPHILIZED, FOR SOLUTION INTRAVENOUS at 19:05

## 2021-05-31 RX ADMIN — PANTOPRAZOLE SODIUM 40 MG: 40 TABLET, DELAYED RELEASE ORAL at 15:55

## 2021-05-31 RX ADMIN — INSULIN GLARGINE 15 UNITS: 100 INJECTION, SOLUTION SUBCUTANEOUS at 20:11

## 2021-05-31 RX ADMIN — Medication 10 ML: at 21:56

## 2021-05-31 RX ADMIN — ACETAMINOPHEN 500 MG: 325 TABLET ORAL at 15:55

## 2021-05-31 RX ADMIN — OXYCODONE HYDROCHLORIDE 10 MG: 10 TABLET ORAL at 08:04

## 2021-05-31 RX ADMIN — ALBUTEROL SULFATE 2.5 MG: 2.5 SOLUTION RESPIRATORY (INHALATION) at 19:48

## 2021-05-31 RX ADMIN — INSULIN LISPRO 9 UNITS: 100 INJECTION, SOLUTION INTRAVENOUS; SUBCUTANEOUS at 18:09

## 2021-05-31 RX ADMIN — GABAPENTIN 300 MG: 300 CAPSULE ORAL at 08:04

## 2021-05-31 ASSESSMENT — PAIN SCALES - GENERAL
PAINLEVEL_OUTOF10: 0
PAINLEVEL_OUTOF10: 3
PAINLEVEL_OUTOF10: 7
PAINLEVEL_OUTOF10: 8
PAINLEVEL_OUTOF10: 7
PAINLEVEL_OUTOF10: 8
PAINLEVEL_OUTOF10: 7
PAINLEVEL_OUTOF10: 7
PAINLEVEL_OUTOF10: 8
PAINLEVEL_OUTOF10: 7

## 2021-05-31 ASSESSMENT — PAIN DESCRIPTION - LOCATION
LOCATION: ABDOMEN
LOCATION: ABDOMEN

## 2021-05-31 ASSESSMENT — PAIN DESCRIPTION - DESCRIPTORS: DESCRIPTORS: SHARP;BURNING

## 2021-05-31 ASSESSMENT — PAIN DESCRIPTION - ORIENTATION
ORIENTATION: MID
ORIENTATION: MID

## 2021-05-31 ASSESSMENT — PAIN DESCRIPTION - PAIN TYPE
TYPE: ACUTE PAIN
TYPE: ACUTE PAIN

## 2021-05-31 ASSESSMENT — PAIN DESCRIPTION - PROGRESSION: CLINICAL_PROGRESSION: NOT CHANGED

## 2021-05-31 NOTE — PROGRESS NOTES
signed by Jennie Royal DO on 5/31/2021 at 8:41 AM     Multiple organisms growing  Drain output 107 in 24 hours  Will need transition to eliquis upon discharge  Ambulate    Electronically signed by Jared Varela MD on 5/31/2021 at 11:28 AM

## 2021-05-31 NOTE — PROGRESS NOTES
[address] [facility]  ELIZABETHIDA  Progress Note      Chief Complaint   Patient presents with    Fatigue     sent by dr Marlena Sacks after surgery follow up pt states that he has had fever and chills        SUBJECTIVE:  Patient is tolerating medications. No reported adverse drug reactions. No nausea, vomiting, diarrhea. Abdominal pain and weakness. No longer having chills. His hands feel shaky with activity. Feeling little better. Review of systems:  As stated above in the chief complaint, otherwise negative. Medications:  Scheduled Meds:   insulin glargine  15 Units Subcutaneous BID    ketorolac  15 mg Intravenous Q8H    acetaminophen  500 mg Oral Q6H    polyethylene glycol  17 g Oral Daily    enoxaparin  100 mg Subcutaneous BID    piperacillin-tazobactam  3,375 mg Intravenous Q8H    albuterol  2.5 mg Nebulization 4x daily    atorvastatin  20 mg Oral Daily    budesonide  1 mg Nebulization BID    gabapentin  300 mg Oral BID    lipase-protease-amylase  72,000 Units Oral TID WC    pantoprazole  40 mg Oral BID AC    sodium chloride flush  10 mL Intravenous 2 times per day    methocarbamol  1,000 mg Oral 4x Daily    insulin lispro  0-18 Units Subcutaneous Q4H     Continuous Infusions:   sodium chloride      sodium chloride      sodium chloride      dextrose       PRN Meds:HYDROmorphone, oxyCODONE **OR** oxyCODONE, sodium chloride, sodium chloride, sodium chloride flush, sodium chloride, ondansetron, glucose, dextrose, glucagon (rDNA), dextrose    OBJECTIVE:  /68   Pulse 95   Temp 99.7 °F (37.6 °C) (Oral)   Resp 18   Ht 5' 8\" (1.727 m)   Wt 214 lb (97.1 kg)   SpO2 100%   BMI 32.54 kg/m²   Temp  Av.2 °F (36.8 °C)  Min: 97.1 °F (36.2 °C)  Max: 99.7 °F (37.6 °C)  Constitutional: The patient is awake, alert, and oriented. Skin: Warm and dry. No rashes were noted. HEENT: Round and reactive pupils. Moist mucous membranes. No ulcerations or thrush.   Neck: Supple to movements. Chest: No use of accessory muscles to breathe. Symmetrical expansion. No wheezing, crackles or rhonchi. Cardiovascular: S1 and S2 are rhythmic and regular. No murmurs appreciated. Abdomen: Positive bowel sounds to auscultation. Benign to palpation. No masses felt. No hepatosplenomegaly. bialt kendall drains, dry dressing  Genitourinary: mail  Extremities: No clubbing, no cyanosis, no edema.   Lines: peripheral    Laboratory and Tests Review:  Lab Results   Component Value Date    WBC 8.8 05/31/2021    WBC 8.6 05/30/2021    WBC 7.6 05/29/2021    HGB 7.8 (L) 05/31/2021    HCT 26.3 (L) 05/31/2021    MCV 76.2 (L) 05/31/2021     (H) 05/31/2021     Lab Results   Component Value Date    NEUTROABS 5.37 05/31/2021    NEUTROABS 5.85 05/30/2021    NEUTROABS 5.85 05/29/2021     No results found for: CRPHS  Lab Results   Component Value Date    ALT 60 (H) 05/31/2021    AST 46 (H) 05/31/2021    ALKPHOS 53 05/31/2021    BILITOT 0.3 05/31/2021     Lab Results   Component Value Date     05/31/2021    K 4.2 05/31/2021     05/31/2021    CO2 24 05/31/2021    BUN 10 05/31/2021    CREATININE 0.8 05/31/2021    CREATININE 0.9 05/30/2021    CREATININE 0.7 05/29/2021    GFRAA >60 05/31/2021    LABGLOM >60 05/31/2021    GLUCOSE 175 05/31/2021    PROT 6.1 05/31/2021    LABALBU 2.8 05/31/2021    CALCIUM 8.2 05/31/2021    BILITOT 0.3 05/31/2021    ALKPHOS 53 05/31/2021    AST 46 05/31/2021    ALT 60 05/31/2021     No results found for: CRP  No results found for: [address]  Radiology:      Microbiology:   Lab Results   Component Value Date    BC 24 Hours no growth 05/27/2021    ORG Staphylococcus coagulase-negative 05/28/2021    ORG Enterobacter cloacae complex 05/28/2021    ORG Cronobacter sakazakii 05/28/2021    ORG Klebsiella oxytoca 05/28/2021    ORG Alpha hemolytic Streptococcus 05/28/2021    ORG Anaerobic gram negative jason 05/28/2021     Lab Results   Component Value Date    BLOODCULT2 24 Hours no growth 05/27/2021    ORG Staphylococcus coagulase-negative 05/28/2021    ORG Enterobacter cloacae complex 05/28/2021    ORG Cronobacter sakazakii 05/28/2021    ORG Klebsiella oxytoca 05/28/2021    ORG Alpha hemolytic Streptococcus 05/28/2021    ORG Anaerobic gram negative jason 05/28/2021     No results found for: WNDABS  Smear, Respiratory   Date Value Ref Range Status   05/15/2021   Final    Group 6: <25 PMN's/LPF and <25 Epithelial cells/LPF  Rare Polymorphonuclear leukocytes  Epithelial cells not seen  Rare Gram positive cocci  Rare Gram positive cocci in pairs  Rare Gram positive rods       No results found for: MPNEUMO, CLAMYDCU, LABLEGI, AFBCX, FUNGSM, LABFUNG  CULTURE, RESPIRATORY   Date Value Ref Range Status   05/15/2021 Oral Pharyngeal Aicha reduced  Final     No results found for: CXCATHTIP  Body Fluid Culture, Sterile   Date Value Ref Range Status   05/28/2021 Light growth  Final   05/28/2021 Moderate growth  Final   05/28/2021 Moderate growth  Final   05/28/2021 Moderate growth  Final   05/28/2021 Moderate growth  Mixed Morphologies    Final     No results found for: CXSURG  No results found for: LABURIN  MRSA Culture Only   Date Value Ref Range Status   05/06/2021 Methicillin resistant Staph aureus not isolated  Final     5/28 abscess cx GNR GPC, enterobacter, alpha heme strep  5/27 body fluid cx-staph epi, enterobacter, ecoli, staph alpha hem    Assessment:  Intraabdominal abscess/surgical site infection  Recent distal pancreatectomy with splenectomy on 78/92 complicated by pancreatic leak s/p pancreatic stent placement on 05/17  WBC normal and afebrile      Plan:  Continue piperacillin-tazobactam 3.375g q8h.  s/p percutaneous abscess drainage is noted. Follow up blood and fluid cultures. Continue local wound care.   Continue to follow cultures  From body fluid   Will be on alert for yeast   Checked all cultures today  Numerous ]  Not sure that staph epi is significant pathogen at this juncture  WBC normal   Reviewed all cultures today  May 31   Check fungitell      Thank you for involving me in the care of Genaro Yung. Electronically signed by April Floyd MD on 5/31/2021 at 2:20 PM   Pt seen and examined. Above discussed agree with advanced practice nurse. Labs, cultures, and radiographs reviewed. Face to Face encounter occurred. Changes made as necessary.      Roderick Waldron MD

## 2021-06-01 LAB
ALBUMIN SERPL-MCNC: 2.7 G/DL (ref 3.5–5.2)
ALP BLD-CCNC: 54 U/L (ref 40–129)
ALT SERPL-CCNC: 47 U/L (ref 0–40)
ANION GAP SERPL CALCULATED.3IONS-SCNC: 10 MMOL/L (ref 7–16)
ANISOCYTOSIS: ABNORMAL
AST SERPL-CCNC: 41 U/L (ref 0–39)
BASOPHILS ABSOLUTE: 0 E9/L (ref 0–0.2)
BASOPHILS RELATIVE PERCENT: 0.8 % (ref 0–2)
BILIRUB SERPL-MCNC: 0.2 MG/DL (ref 0–1.2)
BLOOD CULTURE, ROUTINE: NORMAL
BUN BLDV-MCNC: 12 MG/DL (ref 6–20)
BURR CELLS: ABNORMAL
CALCIUM SERPL-MCNC: 8.2 MG/DL (ref 8.6–10.2)
CHLORIDE BLD-SCNC: 100 MMOL/L (ref 98–107)
CO2: 24 MMOL/L (ref 22–29)
CREAT SERPL-MCNC: 0.8 MG/DL (ref 0.7–1.2)
CULTURE, BLOOD 2: NORMAL
EOSINOPHILS ABSOLUTE: 0.2 E9/L (ref 0.05–0.5)
EOSINOPHILS RELATIVE PERCENT: 2.6 % (ref 0–6)
GFR AFRICAN AMERICAN: >60
GFR NON-AFRICAN AMERICAN: >60 ML/MIN/1.73
GLUCOSE BLD-MCNC: 230 MG/DL (ref 74–99)
HCT VFR BLD CALC: 24 % (ref 37–54)
HEMOGLOBIN: 7.2 G/DL (ref 12.5–16.5)
HYPOCHROMIA: ABNORMAL
LYMPHOCYTES ABSOLUTE: 0.84 E9/L (ref 1.5–4)
LYMPHOCYTES RELATIVE PERCENT: 10.5 % (ref 20–42)
MCH RBC QN AUTO: 22.9 PG (ref 26–35)
MCHC RBC AUTO-ENTMCNC: 30 % (ref 32–34.5)
MCV RBC AUTO: 76.2 FL (ref 80–99.9)
METER GLUCOSE: 138 MG/DL (ref 74–99)
METER GLUCOSE: 179 MG/DL (ref 74–99)
METER GLUCOSE: 191 MG/DL (ref 74–99)
METER GLUCOSE: 211 MG/DL (ref 74–99)
METER GLUCOSE: 262 MG/DL (ref 74–99)
METER GLUCOSE: 287 MG/DL (ref 74–99)
METER GLUCOSE: 341 MG/DL (ref 74–99)
METER GLUCOSE: 355 MG/DL (ref 74–99)
MONOCYTES ABSOLUTE: 1.52 E9/L (ref 0.1–0.95)
MONOCYTES RELATIVE PERCENT: 20.2 % (ref 2–12)
MYELOCYTE PERCENT: 0.9 % (ref 0–0)
NEUTROPHILS ABSOLUTE: 5.09 E9/L (ref 1.8–7.3)
NEUTROPHILS RELATIVE PERCENT: 65.8 % (ref 43–80)
NUCLEATED RED BLOOD CELLS: 5.3 /100 WBC
OVALOCYTES: ABNORMAL
PDW BLD-RTO: 15.9 FL (ref 11.5–15)
PLATELET # BLD: 608 E9/L (ref 130–450)
PMV BLD AUTO: 11.3 FL (ref 7–12)
POIKILOCYTES: ABNORMAL
POLYCHROMASIA: ABNORMAL
POTASSIUM REFLEX MAGNESIUM: 4.2 MMOL/L (ref 3.5–5)
RBC # BLD: 3.15 E12/L (ref 3.8–5.8)
SODIUM BLD-SCNC: 134 MMOL/L (ref 132–146)
TARGET CELLS: ABNORMAL
TOTAL PROTEIN: 6 G/DL (ref 6.4–8.3)
WBC # BLD: 7.6 E9/L (ref 4.5–11.5)

## 2021-06-01 PROCEDURE — 36415 COLL VENOUS BLD VENIPUNCTURE: CPT

## 2021-06-01 PROCEDURE — 97165 OT EVAL LOW COMPLEX 30 MIN: CPT

## 2021-06-01 PROCEDURE — 6360000002 HC RX W HCPCS: Performed by: STUDENT IN AN ORGANIZED HEALTH CARE EDUCATION/TRAINING PROGRAM

## 2021-06-01 PROCEDURE — 80053 COMPREHEN METABOLIC PANEL: CPT

## 2021-06-01 PROCEDURE — 97535 SELF CARE MNGMENT TRAINING: CPT

## 2021-06-01 PROCEDURE — 85025 COMPLETE CBC W/AUTO DIFF WBC: CPT

## 2021-06-01 PROCEDURE — 6370000000 HC RX 637 (ALT 250 FOR IP): Performed by: STUDENT IN AN ORGANIZED HEALTH CARE EDUCATION/TRAINING PROGRAM

## 2021-06-01 PROCEDURE — 6360000002 HC RX W HCPCS: Performed by: SURGERY

## 2021-06-01 PROCEDURE — 82962 GLUCOSE BLOOD TEST: CPT

## 2021-06-01 PROCEDURE — 6360000002 HC RX W HCPCS: Performed by: TRANSPLANT SURGERY

## 2021-06-01 PROCEDURE — 2060000000 HC ICU INTERMEDIATE R&B

## 2021-06-01 PROCEDURE — 94640 AIRWAY INHALATION TREATMENT: CPT

## 2021-06-01 PROCEDURE — 6370000000 HC RX 637 (ALT 250 FOR IP): Performed by: SURGERY

## 2021-06-01 PROCEDURE — 2580000003 HC RX 258: Performed by: STUDENT IN AN ORGANIZED HEALTH CARE EDUCATION/TRAINING PROGRAM

## 2021-06-01 RX ORDER — ACETAMINOPHEN 500 MG
1000 TABLET ORAL EVERY 6 HOURS
Status: DISCONTINUED | OUTPATIENT
Start: 2021-06-01 | End: 2021-06-05 | Stop reason: HOSPADM

## 2021-06-01 RX ORDER — INSULIN GLARGINE 100 [IU]/ML
20 INJECTION, SOLUTION SUBCUTANEOUS 2 TIMES DAILY
Status: DISCONTINUED | OUTPATIENT
Start: 2021-06-01 | End: 2021-06-05 | Stop reason: HOSPADM

## 2021-06-01 RX ADMIN — INSULIN GLARGINE 20 UNITS: 100 INJECTION, SOLUTION SUBCUTANEOUS at 21:19

## 2021-06-01 RX ADMIN — PIPERACILLIN AND TAZOBACTAM 3375 MG: 3; .375 INJECTION, POWDER, LYOPHILIZED, FOR SOLUTION INTRAVENOUS at 11:56

## 2021-06-01 RX ADMIN — HYDROMORPHONE HYDROCHLORIDE 0.5 MG: 1 INJECTION, SOLUTION INTRAMUSCULAR; INTRAVENOUS; SUBCUTANEOUS at 01:59

## 2021-06-01 RX ADMIN — INSULIN LISPRO 12 UNITS: 100 INJECTION, SOLUTION INTRAVENOUS; SUBCUTANEOUS at 02:04

## 2021-06-01 RX ADMIN — INSULIN LISPRO 9 UNITS: 100 INJECTION, SOLUTION INTRAVENOUS; SUBCUTANEOUS at 11:57

## 2021-06-01 RX ADMIN — INSULIN LISPRO 6 UNITS: 100 INJECTION, SOLUTION INTRAVENOUS; SUBCUTANEOUS at 06:12

## 2021-06-01 RX ADMIN — INSULIN LISPRO 3 UNITS: 100 INJECTION, SOLUTION INTRAVENOUS; SUBCUTANEOUS at 17:47

## 2021-06-01 RX ADMIN — METHOCARBAMOL TABLETS 1000 MG: 500 TABLET, COATED ORAL at 21:17

## 2021-06-01 RX ADMIN — METHOCARBAMOL TABLETS 1000 MG: 500 TABLET, COATED ORAL at 09:24

## 2021-06-01 RX ADMIN — ALBUTEROL SULFATE 2.5 MG: 2.5 SOLUTION RESPIRATORY (INHALATION) at 08:56

## 2021-06-01 RX ADMIN — GABAPENTIN 300 MG: 300 CAPSULE ORAL at 09:25

## 2021-06-01 RX ADMIN — BUDESONIDE 1000 MCG: 0.5 SUSPENSION RESPIRATORY (INHALATION) at 08:55

## 2021-06-01 RX ADMIN — OXYCODONE HYDROCHLORIDE 10 MG: 10 TABLET ORAL at 17:47

## 2021-06-01 RX ADMIN — METHOCARBAMOL TABLETS 1000 MG: 500 TABLET, COATED ORAL at 16:27

## 2021-06-01 RX ADMIN — PANCRELIPASE 72000 UNITS: 60000; 12000; 38000 CAPSULE, DELAYED RELEASE PELLETS ORAL at 09:24

## 2021-06-01 RX ADMIN — PANTOPRAZOLE SODIUM 40 MG: 40 TABLET, DELAYED RELEASE ORAL at 06:13

## 2021-06-01 RX ADMIN — GABAPENTIN 300 MG: 300 CAPSULE ORAL at 21:17

## 2021-06-01 RX ADMIN — ACETAMINOPHEN 500 MG: 325 TABLET ORAL at 03:48

## 2021-06-01 RX ADMIN — OXYCODONE 5 MG: 5 TABLET ORAL at 21:34

## 2021-06-01 RX ADMIN — ALBUTEROL SULFATE 2.5 MG: 2.5 SOLUTION RESPIRATORY (INHALATION) at 12:14

## 2021-06-01 RX ADMIN — ACETAMINOPHEN 1000 MG: 500 TABLET ORAL at 09:26

## 2021-06-01 RX ADMIN — OXYCODONE HYDROCHLORIDE 10 MG: 10 TABLET ORAL at 09:33

## 2021-06-01 RX ADMIN — PANTOPRAZOLE SODIUM 40 MG: 40 TABLET, DELAYED RELEASE ORAL at 16:27

## 2021-06-01 RX ADMIN — ALBUTEROL SULFATE 2.5 MG: 2.5 SOLUTION RESPIRATORY (INHALATION) at 20:21

## 2021-06-01 RX ADMIN — BUDESONIDE 1000 MCG: 0.5 SUSPENSION RESPIRATORY (INHALATION) at 20:21

## 2021-06-01 RX ADMIN — METHOCARBAMOL TABLETS 1000 MG: 500 TABLET, COATED ORAL at 13:40

## 2021-06-01 RX ADMIN — INSULIN GLARGINE 20 UNITS: 100 INJECTION, SOLUTION SUBCUTANEOUS at 09:22

## 2021-06-01 RX ADMIN — PIPERACILLIN AND TAZOBACTAM 3375 MG: 3; .375 INJECTION, POWDER, LYOPHILIZED, FOR SOLUTION INTRAVENOUS at 03:47

## 2021-06-01 RX ADMIN — OXYCODONE HYDROCHLORIDE 10 MG: 10 TABLET ORAL at 00:01

## 2021-06-01 RX ADMIN — POLYETHYLENE GLYCOL 3350 17 G: 17 POWDER, FOR SOLUTION ORAL at 09:22

## 2021-06-01 RX ADMIN — Medication 10 ML: at 09:37

## 2021-06-01 RX ADMIN — INSULIN LISPRO 6 UNITS: 100 INJECTION, SOLUTION INTRAVENOUS; SUBCUTANEOUS at 21:19

## 2021-06-01 RX ADMIN — PANCRELIPASE 72000 UNITS: 60000; 12000; 38000 CAPSULE, DELAYED RELEASE PELLETS ORAL at 11:56

## 2021-06-01 RX ADMIN — Medication 10 ML: at 21:18

## 2021-06-01 RX ADMIN — ACETAMINOPHEN 1000 MG: 500 TABLET ORAL at 21:18

## 2021-06-01 RX ADMIN — PANCRELIPASE 72000 UNITS: 60000; 12000; 38000 CAPSULE, DELAYED RELEASE PELLETS ORAL at 16:26

## 2021-06-01 RX ADMIN — ALBUTEROL SULFATE 2.5 MG: 2.5 SOLUTION RESPIRATORY (INHALATION) at 16:55

## 2021-06-01 RX ADMIN — ENOXAPARIN SODIUM 100 MG: 100 INJECTION SUBCUTANEOUS at 09:23

## 2021-06-01 RX ADMIN — OXYCODONE HYDROCHLORIDE 10 MG: 10 TABLET ORAL at 13:41

## 2021-06-01 RX ADMIN — ATORVASTATIN CALCIUM 20 MG: 20 TABLET, FILM COATED ORAL at 09:25

## 2021-06-01 RX ADMIN — ACETAMINOPHEN 1000 MG: 500 TABLET ORAL at 13:41

## 2021-06-01 RX ADMIN — ENOXAPARIN SODIUM 100 MG: 100 INJECTION SUBCUTANEOUS at 21:17

## 2021-06-01 RX ADMIN — HYDROMORPHONE HYDROCHLORIDE 0.5 MG: 1 INJECTION, SOLUTION INTRAMUSCULAR; INTRAVENOUS; SUBCUTANEOUS at 06:13

## 2021-06-01 RX ADMIN — OXYCODONE HYDROCHLORIDE 10 MG: 10 TABLET ORAL at 03:47

## 2021-06-01 RX ADMIN — PIPERACILLIN AND TAZOBACTAM 3375 MG: 3; .375 INJECTION, POWDER, LYOPHILIZED, FOR SOLUTION INTRAVENOUS at 21:18

## 2021-06-01 RX ADMIN — KETOROLAC TROMETHAMINE 15 MG: 30 INJECTION, SOLUTION INTRAMUSCULAR; INTRAVENOUS at 00:59

## 2021-06-01 ASSESSMENT — PAIN SCALES - GENERAL
PAINLEVEL_OUTOF10: 6
PAINLEVEL_OUTOF10: 9
PAINLEVEL_OUTOF10: 5
PAINLEVEL_OUTOF10: 8
PAINLEVEL_OUTOF10: 8
PAINLEVEL_OUTOF10: 0
PAINLEVEL_OUTOF10: 7
PAINLEVEL_OUTOF10: 8
PAINLEVEL_OUTOF10: 5
PAINLEVEL_OUTOF10: 7

## 2021-06-01 ASSESSMENT — PAIN DESCRIPTION - PROGRESSION
CLINICAL_PROGRESSION: NOT CHANGED
CLINICAL_PROGRESSION: NOT CHANGED

## 2021-06-01 ASSESSMENT — PAIN DESCRIPTION - PAIN TYPE: TYPE: ACUTE PAIN

## 2021-06-01 ASSESSMENT — PAIN DESCRIPTION - ORIENTATION: ORIENTATION: MID

## 2021-06-01 ASSESSMENT — PAIN DESCRIPTION - LOCATION: LOCATION: ABDOMEN

## 2021-06-01 NOTE — PROGRESS NOTES
Right MAX drain irrigated with 10cc sterile water per order. Flushed easily with no resistance, good return noted.

## 2021-06-01 NOTE — CARE COORDINATION
Patient concerned to return home with IV antibiotics, uncertain if he can care for them on his own. We discussed [facility] and choiced for [facility], we discussed that they may not be in network with his [address] [place] [address] Way. Referral pending. He is reviewing [facility] list for alternate options. For possible PICC. Q8 zosyn continues. For questions I can be reached at [phone]. [facility]    The Plan for Transition of Care is related to the following treatment goals: discharge planning when stable     The Patient and/or patient representative Shruthimary ellen Wong was provided with a choice of provider and agrees   with the discharge plan. [x] Yes [] No    Freedom of choice list was provided with basic dialogue that supports the patient's individualized plan of care/goals, treatment preferences and shares the quality data associated with the providers.  [x] Yes [] No

## 2021-06-01 NOTE — PROGRESS NOTES
[address] [place] [address] [facility]  [facility]  Progress Note      Chief Complaint   Patient presents with    Fatigue     sent by dr Danya Sorenson after surgery follow up pt states that he has had fever and chills        SUBJECTIVE:  Patient is tolerating medications. No reported adverse drug reactions. No nausea, vomiting, diarrhea. Abdominal pain and weakness. No longer having chills. His hands feel shaky with activity. Feeling little better. Review of systems:  As stated above in the chief complaint, otherwise negative. Medications:  Scheduled Meds:   insulin glargine  20 Units Subcutaneous BID    acetaminophen  1,000 mg Oral Q6H    ibuprofen  600 mg Oral TID WC    polyethylene glycol  17 g Oral Daily    enoxaparin  100 mg Subcutaneous BID    piperacillin-tazobactam  3,375 mg Intravenous Q8H    albuterol  2.5 mg Nebulization 4x daily    atorvastatin  20 mg Oral Daily    budesonide  1 mg Nebulization BID    gabapentin  300 mg Oral BID    lipase-protease-amylase  72,000 Units Oral TID WC    pantoprazole  40 mg Oral BID AC    sodium chloride flush  10 mL Intravenous 2 times per day    methocarbamol  1,000 mg Oral 4x Daily    insulin lispro  0-18 Units Subcutaneous Q4H     Continuous Infusions:   sodium chloride      sodium chloride      sodium chloride      dextrose       PRN Meds:oxyCODONE **OR** oxyCODONE, sodium chloride, sodium chloride, sodium chloride flush, sodium chloride, ondansetron, glucose, dextrose, glucagon (rDNA), dextrose    OBJECTIVE:  /65   Pulse 112   Temp 98.5 °F (36.9 °C) (Oral)   Resp 20   Ht 5' 8\" (1.727 m)   Wt 214 lb (97.1 kg)   SpO2 100%   BMI 32.54 kg/m²   Temp  Av.6 °F (37 °C)  Min: 98.4 °F (36.9 °C)  Max: 98.9 °F (37.2 °C)  Constitutional: The patient is awake, alert, and oriented. Skin: Warm and dry. No rashes were noted. HEENT: Round and reactive pupils. Moist mucous membranes. No ulcerations or thrush. Neck: Supple to movements. Chest: No use of accessory muscles to breathe. Symmetrical expansion. No wheezing, crackles or rhonchi. Cardiovascular: S1 and S2 are rhythmic and regular. No murmurs appreciated. Abdomen: Positive bowel sounds to auscultation. Benign to palpation. No masses felt. No hepatosplenomegaly. bialt kendall drains, dry dressing  Genitourinary: mail  Extremities: No clubbing, no cyanosis, no edema.   Lines: peripheral    Laboratory and Tests Review:  Lab Results   Component Value Date    WBC 7.6 06/01/2021    WBC 8.8 05/31/2021    WBC 8.6 05/30/2021    HGB 7.2 (L) 06/01/2021    HCT 24.0 (L) 06/01/2021    MCV 76.2 (L) 06/01/2021     (H) 06/01/2021     Lab Results   Component Value Date    NEUTROABS 5.09 06/01/2021    NEUTROABS 5.37 05/31/2021    NEUTROABS 5.85 05/30/2021     No results found for: CRPHS  Lab Results   Component Value Date    ALT 47 (H) 06/01/2021    AST 41 (H) 06/01/2021    ALKPHOS 54 06/01/2021    BILITOT 0.2 06/01/2021     Lab Results   Component Value Date     06/01/2021    K 4.2 06/01/2021     06/01/2021    CO2 24 06/01/2021    BUN 12 06/01/2021    CREATININE 0.8 06/01/2021    CREATININE 0.8 05/31/2021    CREATININE 0.9 05/30/2021    GFRAA >60 06/01/2021    LABGLOM >60 06/01/2021    GLUCOSE 230 06/01/2021    PROT 6.0 06/01/2021    LABALBU 2.7 06/01/2021    CALCIUM 8.2 06/01/2021    BILITOT 0.2 06/01/2021    ALKPHOS 54 06/01/2021    AST 41 06/01/2021    ALT 47 06/01/2021     No results found for: CRP  No results found for: [address]  Radiology:      Microbiology:   Lab Results   Component Value Date    BC 24 Hours no growth 05/27/2021    ORG Staphylococcus coagulase-negative 05/28/2021    ORG Enterobacter cloacae complex 05/28/2021    ORG Cronobacter sakazakii 05/28/2021    ORG Klebsiella oxytoca 05/28/2021    ORG Alpha hemolytic Streptococcus 05/28/2021    ORG Anaerobic gram negative jason 05/28/2021     Lab Results   Component Value Date    BLOODCULT2 24 Hours no growth 05/27/2021    ORG Staphylococcus coagulase-negative 05/28/2021    ORG Enterobacter cloacae complex 05/28/2021    ORG Cronobacter sakazakii 05/28/2021    ORG Klebsiella oxytoca 05/28/2021    ORG Alpha hemolytic Streptococcus 05/28/2021    ORG Anaerobic gram negative jason 05/28/2021     No results found for: WNDABS  Smear, Respiratory   Date Value Ref Range Status   05/15/2021   Final    Group 6: <25 PMN's/LPF and <25 Epithelial cells/LPF  Rare Polymorphonuclear leukocytes  Epithelial cells not seen  Rare Gram positive cocci  Rare Gram positive cocci in pairs  Rare Gram positive rods       No results found for: MPNEUMO, CLAMYDCU, LABLEGI, AFBCX, FUNGSM, LABFUNG  CULTURE, RESPIRATORY   Date Value Ref Range Status   05/15/2021 Oral Pharyngeal Aicha reduced  Final     No results found for: CXCATHTIP  Body Fluid Culture, Sterile   Date Value Ref Range Status   05/28/2021 Light growth  Final   05/28/2021 Moderate growth  Final   05/28/2021 Moderate growth  Final   05/28/2021 Moderate growth  Final   05/28/2021 Moderate growth  Mixed Morphologies    Final     No results found for: CXSURG  No results found for: LABURIN  MRSA Culture Only   Date Value Ref Range Status   05/06/2021 Methicillin resistant Staph aureus not isolated  Final     5/28 abscess cx GNR GPC, enterobacter, alpha heme strep  5/27 body fluid cx-staph epi, enterobacter, ecoli, staph alpha hem    Assessment:  Intraabdominal abscess/surgical site infection  Recent distal pancreatectomy with splenectomy on 32/78 complicated by pancreatic leak s/p pancreatic stent placement on 05/17  WBC normal and afebrile      Plan:  Continue piperacillin-tazobactam 3.375g q8h.  s/p percutaneous abscess drainage is noted. Follow up blood and fluid cultures. Continue local wound care.   Continue to follow cultures  From body fluid   Will be on alert for yeast   Checked all cultures today  Numerous ]  Not sure that staph epi is significant pathogen at this juncture  WBC normal   Reviewed all

## 2021-06-01 NOTE — PLAN OF CARE
Problem: Pain:  Goal: Pain level will decrease  Description: Pain level will decrease  6/1/2021 0133 by [facility]  Outcome: Met This Shift  5/31/2021 2259 by [facility]  Outcome: Met This Shift  Goal: Control of acute pain  Description: Control of acute pain  6/1/2021 0133 by [facility]  Outcome: Met This Shift  5/31/2021 2259 by [facility]  Outcome: Met This Shift  Goal: Control of chronic pain  Description: Control of chronic pain  6/1/2021 0133 by [facility]  Outcome: Met This Shift  5/31/2021 2259 by [facility]  Outcome: Met This Shift     Problem: Falls - Risk of:  Goal: Will remain free from falls  Description: Will remain free from falls  6/1/2021 0133 by [facility]  Outcome: Met This Shift  5/31/2021 2259 by [facility]  Outcome: Met This Shift  Goal: Absence of physical injury  Description: Absence of physical injury  [facility]2021 [facility]  Outcome: Met This Shift  5/31/2021 2259 by [facility]  Outcome: Met This Shift     Problem: Skin Integrity:  Goal: Will show no infection signs and symptoms  Description: Will show no infection signs and symptoms  6/1/2021 0133 by [place] [facility]  Outcome: Met This Shift  5/31/2021 2259 by [facility]  Outcome: Met This Shift  Goal: Absence of new skin breakdown  Description: Absence of new skin breakdown  6/1/2021 0133 by [facility]  Outcome: Met This Shift  [facility]31/2021 [facility]  Outcome: Met This Shift

## 2021-06-01 NOTE — PROGRESS NOTES
[facility]  DAILY PROGRESS NOTE    Date:2021       AOQO:[id number]  Patient Name:Eliezer Cook     Date of Birth:     Age:60 y.o. Chief Complaint:  Chief Complaint   Patient presents with    Fatigue     sent by dr Nidhi Hernandez after surgery follow up pt states that he has had fever and chills         Subjective:  Continues to feel well. Afebrile and tolerating a low-fat low-carb diet 3 times a day. Having bowel function and ambulating. Objective:  /66   Pulse 88   Temp 98.4 °F (36.9 °C) (Temporal)   Resp 16   Ht 5' 8\" (1.727 m)   Wt 214 lb (97.1 kg)   SpO2 95%   BMI 32.54 kg/m²   Temp (24hrs), Av.9 °F (37.2 °C), Min:98.4 °F (36.9 °C), Max:99.7 °F (37.6 °C)      I/O (24Hr):  I/O last 3 completed shifts: In: 840 [P.O.:840]  Out: 0163 [Urine:1425; Drains:185]     GENERAL:  No acute distress. Alert and interactive. LUNGS:  No cough. Nonlabored breathing on room air. CARDIOVASC:  Normal rate, no cyanosis. ABDOMEN:  Soft, non-distended, mild to moderate generalized tenderness, left upper quadrant MAX and right upper midline IR MAX drains drains both with wendi pus. Midline intact except pinpoint serous draining opening inferiorly. No guarding / rigidity / rebound. EXTREMITIES:  No edema, no deformities. Assessment:  62 y.o. 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 Portal vein thrombosis.     Plan:  - zosyn (multiorganism sensitivities resulted, blood cx NG4D, fungitel pending)  - appreciate ID recs (ok for discharge when ID says ok)  - continue both drains (70 + 115 in last 24hrs)  - TLov for portal vein thrombosis; transition to eliquis on discharge  - home meds, increased glargine to 20 BID  - low fat general diet w/ creon  - ambulate  - wean IV pain meds    Electronically signed by Jennifer aMrrero MD on 2021 at 6:28 AM     Much improved  MAX drains with purulent drainage  Will need converted to eliquis  Agree with above    Electronically signed by Jim Carpio MD on 6/1/2021 at 8:51 AM

## 2021-06-01 NOTE — PROGRESS NOTES
[address]                                                  Patient Name: Pao Mendoza  MRN: [id number]  : 1960  Room: [address] [place] [address]    Evaluating OT: Avel Goldberg, MOT, OTR/L  # [id number]    Referring Provider:  Emmie Kirkland MD  Specific Provider Orders:  Charm Constant and Treat\"      Diagnosis:   1.  Abdominal pain with fever after surgery         Surgeries this admission: 21:  IR Drain Placed R UQ     Pertinent Medical History: Arthritis, HTN, Neuropathy, DM, Sacral Radiculitis, DDD Lumbar Spine,  Cervical Fusion 2018, Pancreatectomy 21     Precautions:  Fall Risk  Abdominal Precautions  IR Drain R UQ, L UQ    Assessment of current deficits   [x] Functional mobility   [x]ADLs  [x] Strength               [x]Cognition   [x] Functional transfers   [x] IADLs         [x] Safety Awareness   [x]Endurance   [] Fine Coordination              [x] Balance      [] Vision/perception   [x]Sensation    []Gross Motor Coordination  [] ROM  [] Delirium                   [] Motor Control       OT PLAN OF CARE   OT POC based on physician orders, patient diagnosis and results of clinical assessment    Frequency/Duration 1-3 days/wk for 2 weeks PRN   Specific OT Treatment to include:   * Instruction/training on adapted ADL techniques and AE recommendations to increase functional independence within        precautions  * Training on energy conservation strategies, correct breathing pattern and techniques to improve independence/tolerance for self-care routine  * Functional transfer/mobility training/DME recommendations for increased independence, safety, and fall prevention  * Patient/Family education to increase follow through with safety techniques and functional independence  * Recommendation of environmental modifications for increased safety with functional transfers/mobility and ADLs  * Cognitive retraining/development of therapeutic activities to improve problem solving, judgement, memory, and attention for increased safety/participation in ADL/IADL tasks  * Splinting/positioning for increased function, prevention of contractures, and improve skin integrity  * Therapeutic exercise to improve motor endurance, ROM, and functional strength for ADLs/functional transfers  * Therapeutic activities to facilitate/challenge dynamic balance, stand tolerance for increased safety and independence with ADLs  * Therapeutic activities to facilitate gross/fine motor skills for increased independence with ADLs  * Neuro-muscular re-education: facilitation of righting/equilibrium reactions, midline orientation, scapular stability/mobility, normalization of muscle tone, and facilitation of volitional active controled movement  * Positioning to improve skin integrity, interaction with environment and functional independence    Pt was admitted w/ Fever and Chills post-op    Recommended Adaptive Equipment: TBD as pt progresses - Toilet Aid      Home Living:  Pt lives alone in a single-level apartment, level entry, elevator access. Bathroom setup:  Walk-in-Shower w/ Built-in Seat, High Commode, Grab bars throughout   Equipment owned:  [facility], [employer], [employer], [employer]    Available Family Assist:  Cousin and brother live locally - can assist PRN    Prior Level of Function:  Prior to Surgery in May, Pt was IND with all ADLs, IADLs, Transfers and Mobility using No AD for ambulation.  Post-op at home, pt reported having some difficulty w/ self-care - specifically toilet hygiene/bathing  Driving:  Yes - not recently  Occupation:  None reported    Pain Level:  Denied Pain;  Relief w/ Rest and Repositioning, Nsg Notified   Additional Complaints:  General Fatigue, Continued fever    Cognition: A & O x 4   Able to Follow Multi-Step Commands INDly   Memory:  good    Sequencing: good    Problem solving:  good    Judgement/safety:  good (-)  Additional Comments:  Pt was pleasant and cooperative. Frustrated w/ recent medical status    Vitals/Lab Values:  WFL Room air       Functional Assessment:  AM-PAC Daily Activity Raw Score: 16/24     Initial Eval Status  Date: 6/1/21   Treatment Status  Date: STGs = LTGs  Time frame: 10-14 days   Feeding IND        NA   Grooming Close SUP/Set up    Able to complete tasks after set up standing at the sink, Mod VCs to improve safety/safety awareness, standing extended time     Mod I  Standing at the [employer] A/Set up    Min A to don bathrobe d/t lines, drains, mild pulling/stretching of abdominal wounds/site of drains w/ ax, VCs for safety    Mod I     LB Dressing Mod A    Min-Mod A to don garments(Simulated) + min a for safety w/ dynamic standing balance, extended time/rest breaks - was issued adaptive equip last admission - reviewed use/benefits of AE. Mod I     Bathing NT    Pt declined  Pt ed re: Benefits of use of Shower chair/built-in seat for safety/EC techs    Mod I      Toileting NT    Pt declined  Pt ed re: use of adaptive equip/techs for bowel hygiene - pt reported having difficulty after last d/c     Mod I     Bed Mobility  Supine to sit: SUP   Sit to supine:  SUP     VCs for abdominal bracing, use of [employer] tech    Supine to sit: Mod I  Sit to supine: Mod I     Functional Transfers Close SUP    EOB 2x  VCs for safety/hand placement    Mod I     Functional Mobility Min A w/ WW    Short distances at b/s  VCs for safety/improved safety awareness, walker safety    Mod I     Balance Sitting:     Static:  Remote SUP EOB    Dynamic:  Close SUP w/ functional ax EOB    Standing:     Static:  Close SUP w/ [employer]    Dynamic:  Close SUP w/ functional ax/mobility w/ [employer]       Activity Tolerance Fair    Limited by general weakness, general discomfort    Good(-)   Visual/  Perceptual    Hearing:  WNL   Glasses: Yes    WFL  Hearing Aids: environment, maximize respiratory status   Activity tolerance - Sitting/Standing to improve endurance w/ functional ax    Cognitive retraining -  Oriented pt to current Date, Place and Situation; Cues for safety/safety awareness, sequencing, problem solving     Skilled monitoring of pt's response to tx ax       Consulted RN1     Made all appropriate Environmental Modifications to facilitate pt's level of IND and safety.  Recommendations for Continued Participation in OT services during Hospitalization and at D/C - SNF vs  OT    Pt and/or Family verbalized/demonstrated a Good understanding of education provided. Will Review PRN. Rehab Potential: Good for established goals     Patient / Family Goal: Not stated at this time      Patient and/or family were instructed on functional diagnosis, prognosis/goals and OT plan of care. Demonstrated Good understanding. Eval Complexity: Low    Time In: 1025  Time Out: 1130  Total Treatment Time: 45 minutes    Min Units   OT Eval Low 97165  X  1   OT Eval Medium 21410      OT Eval High 59653      OT Re-Eval [id number]       Therapeutic Ex 91285       Therapeutic Activities 67560       ADL/Self Care 61394  45  3   Orthotic Management 00061       Manual 12896     Neuro Re-Ed 10057       Non-Billable Time              Evaluation Time additionally includes thorough review of current medical information, gathering information on past medical history/social history and prior level of function, completion of standardized testing/informal observation of tasks, assessment of data and education on plan of care and goals.             HOA Bullard, OTR/L  # [id number]

## 2021-06-02 ENCOUNTER — APPOINTMENT (OUTPATIENT)
Dept: CT IMAGING | Age: 61
DRG: 721 | End: 2021-06-02
Payer: MEDICAID

## 2021-06-02 LAB
(1,3)-BETA-D-GLUCAN (FUNGITELL) INTERPRETATION: NEGATIVE
(1,3)-BETA-D-GLUCAN (FUNGITELL): <31 PG/ML
ALBUMIN SERPL-MCNC: 2.8 G/DL (ref 3.5–5.2)
ALP BLD-CCNC: 54 U/L (ref 40–129)
ALT SERPL-CCNC: 40 U/L (ref 0–40)
ANION GAP SERPL CALCULATED.3IONS-SCNC: 9 MMOL/L (ref 7–16)
AST SERPL-CCNC: 34 U/L (ref 0–39)
BASOPHILS ABSOLUTE: 0 E9/L (ref 0–0.2)
BASOPHILS RELATIVE PERCENT: 0.6 % (ref 0–2)
BILIRUB SERPL-MCNC: 0.3 MG/DL (ref 0–1.2)
BUN BLDV-MCNC: 8 MG/DL (ref 6–20)
CALCIUM SERPL-MCNC: 8.3 MG/DL (ref 8.6–10.2)
CHLORIDE BLD-SCNC: 97 MMOL/L (ref 98–107)
CO2: 24 MMOL/L (ref 22–29)
CREAT SERPL-MCNC: 0.7 MG/DL (ref 0.7–1.2)
EOSINOPHILS ABSOLUTE: 0.65 E9/L (ref 0.05–0.5)
EOSINOPHILS RELATIVE PERCENT: 7 % (ref 0–6)
GFR AFRICAN AMERICAN: >60
GFR NON-AFRICAN AMERICAN: >60 ML/MIN/1.73
GLUCOSE BLD-MCNC: 171 MG/DL (ref 74–99)
HCT VFR BLD CALC: 25.1 % (ref 37–54)
HEMOGLOBIN: 7.6 G/DL (ref 12.5–16.5)
HYPOCHROMIA: ABNORMAL
LYMPHOCYTES ABSOLUTE: 1.4 E9/L (ref 1.5–4)
LYMPHOCYTES RELATIVE PERCENT: 14.8 % (ref 20–42)
MCH RBC QN AUTO: 22.4 PG (ref 26–35)
MCHC RBC AUTO-ENTMCNC: 30.3 % (ref 32–34.5)
MCV RBC AUTO: 74 FL (ref 80–99.9)
METER GLUCOSE: 178 MG/DL (ref 74–99)
METER GLUCOSE: 182 MG/DL (ref 74–99)
METER GLUCOSE: 182 MG/DL (ref 74–99)
METER GLUCOSE: 259 MG/DL (ref 74–99)
MONOCYTES ABSOLUTE: 2.14 E9/L (ref 0.1–0.95)
MONOCYTES RELATIVE PERCENT: 22.6 % (ref 2–12)
NEUTROPHILS ABSOLUTE: 5.21 E9/L (ref 1.8–7.3)
NEUTROPHILS RELATIVE PERCENT: 55.7 % (ref 43–80)
NUCLEATED RED BLOOD CELLS: 1.7 /100 WBC
OVALOCYTES: ABNORMAL
PDW BLD-RTO: 15.7 FL (ref 11.5–15)
PLATELET # BLD: 670 E9/L (ref 130–450)
PMV BLD AUTO: 11.4 FL (ref 7–12)
POIKILOCYTES: ABNORMAL
POLYCHROMASIA: ABNORMAL
POTASSIUM REFLEX MAGNESIUM: 4.1 MMOL/L (ref 3.5–5)
RBC # BLD: 3.39 E12/L (ref 3.8–5.8)
SODIUM BLD-SCNC: 130 MMOL/L (ref 132–146)
TARGET CELLS: ABNORMAL
TOTAL PROTEIN: 6.4 G/DL (ref 6.4–8.3)
WBC # BLD: 9.3 E9/L (ref 4.5–11.5)

## 2021-06-02 PROCEDURE — 94640 AIRWAY INHALATION TREATMENT: CPT

## 2021-06-02 PROCEDURE — 74177 CT ABD & PELVIS W/CONTRAST: CPT

## 2021-06-02 PROCEDURE — 6360000002 HC RX W HCPCS: Performed by: INTERNAL MEDICINE

## 2021-06-02 PROCEDURE — 97161 PT EVAL LOW COMPLEX 20 MIN: CPT

## 2021-06-02 PROCEDURE — 97530 THERAPEUTIC ACTIVITIES: CPT

## 2021-06-02 PROCEDURE — 6370000000 HC RX 637 (ALT 250 FOR IP): Performed by: TRANSPLANT SURGERY

## 2021-06-02 PROCEDURE — 2580000003 HC RX 258: Performed by: RADIOLOGY

## 2021-06-02 PROCEDURE — 36415 COLL VENOUS BLD VENIPUNCTURE: CPT

## 2021-06-02 PROCEDURE — 6370000000 HC RX 637 (ALT 250 FOR IP): Performed by: STUDENT IN AN ORGANIZED HEALTH CARE EDUCATION/TRAINING PROGRAM

## 2021-06-02 PROCEDURE — 82962 GLUCOSE BLOOD TEST: CPT

## 2021-06-02 PROCEDURE — 6360000002 HC RX W HCPCS: Performed by: SURGERY

## 2021-06-02 PROCEDURE — 2580000003 HC RX 258: Performed by: STUDENT IN AN ORGANIZED HEALTH CARE EDUCATION/TRAINING PROGRAM

## 2021-06-02 PROCEDURE — 6360000004 HC RX CONTRAST MEDICATION: Performed by: RADIOLOGY

## 2021-06-02 PROCEDURE — 2580000003 HC RX 258: Performed by: INTERNAL MEDICINE

## 2021-06-02 PROCEDURE — 6360000002 HC RX W HCPCS: Performed by: STUDENT IN AN ORGANIZED HEALTH CARE EDUCATION/TRAINING PROGRAM

## 2021-06-02 PROCEDURE — 80053 COMPREHEN METABOLIC PANEL: CPT

## 2021-06-02 PROCEDURE — 2580000003 HC RX 258: Performed by: TRANSPLANT SURGERY

## 2021-06-02 PROCEDURE — 2580000003 HC RX 258: Performed by: SURGERY

## 2021-06-02 PROCEDURE — 6360000002 HC RX W HCPCS: Performed by: TRANSPLANT SURGERY

## 2021-06-02 PROCEDURE — 85025 COMPLETE CBC W/AUTO DIFF WBC: CPT

## 2021-06-02 PROCEDURE — 6370000000 HC RX 637 (ALT 250 FOR IP): Performed by: SURGERY

## 2021-06-02 PROCEDURE — 2060000000 HC ICU INTERMEDIATE R&B

## 2021-06-02 RX ORDER — SODIUM CHLORIDE 0.9 % (FLUSH) 0.9 %
10 SYRINGE (ML) INJECTION PRN
Status: DISCONTINUED | OUTPATIENT
Start: 2021-06-02 | End: 2021-06-05 | Stop reason: HOSPADM

## 2021-06-02 RX ORDER — DIPHENHYDRAMINE HCL 25 MG
50 TABLET ORAL ONCE
Status: COMPLETED | OUTPATIENT
Start: 2021-06-02 | End: 2021-06-02

## 2021-06-02 RX ORDER — SODIUM CHLORIDE, SODIUM LACTATE, POTASSIUM CHLORIDE, AND CALCIUM CHLORIDE .6; .31; .03; .02 G/100ML; G/100ML; G/100ML; G/100ML
500 INJECTION, SOLUTION INTRAVENOUS ONCE
Status: COMPLETED | OUTPATIENT
Start: 2021-06-02 | End: 2021-06-02

## 2021-06-02 RX ORDER — METRONIDAZOLE 500 MG/1
500 TABLET ORAL 3 TIMES DAILY
Qty: 69 TABLET | Refills: 0 | Status: SHIPPED | OUTPATIENT
Start: 2021-06-02 | End: 2021-06-25

## 2021-06-02 RX ORDER — CIPROFLOXACIN 500 MG/1
500 TABLET, FILM COATED ORAL 2 TIMES DAILY
Qty: 46 TABLET | Refills: 0 | Status: SHIPPED | OUTPATIENT
Start: 2021-06-02 | End: 2021-06-25

## 2021-06-02 RX ORDER — SODIUM CHLORIDE, SODIUM LACTATE, POTASSIUM CHLORIDE, CALCIUM CHLORIDE 600; 310; 30; 20 MG/100ML; MG/100ML; MG/100ML; MG/100ML
INJECTION, SOLUTION INTRAVENOUS CONTINUOUS
Status: DISCONTINUED | OUTPATIENT
Start: 2021-06-02 | End: 2021-06-03

## 2021-06-02 RX ADMIN — OXYCODONE HYDROCHLORIDE 10 MG: 10 TABLET ORAL at 14:29

## 2021-06-02 RX ADMIN — HYDROMORPHONE HYDROCHLORIDE 0.5 MG: 1 INJECTION, SOLUTION INTRAMUSCULAR; INTRAVENOUS; SUBCUTANEOUS at 20:31

## 2021-06-02 RX ADMIN — DIPHENHYDRAMINE HYDROCHLORIDE 50 MG: 25 TABLET ORAL at 16:24

## 2021-06-02 RX ADMIN — OXYCODONE HYDROCHLORIDE 10 MG: 10 TABLET ORAL at 06:07

## 2021-06-02 RX ADMIN — OXYCODONE HYDROCHLORIDE 10 MG: 10 TABLET ORAL at 10:14

## 2021-06-02 RX ADMIN — ACETAMINOPHEN 1000 MG: 500 TABLET ORAL at 03:35

## 2021-06-02 RX ADMIN — PIPERACILLIN AND TAZOBACTAM 3375 MG: 3; .375 INJECTION, POWDER, LYOPHILIZED, FOR SOLUTION INTRAVENOUS at 03:35

## 2021-06-02 RX ADMIN — ENOXAPARIN SODIUM 100 MG: 100 INJECTION SUBCUTANEOUS at 20:05

## 2021-06-02 RX ADMIN — PANTOPRAZOLE SODIUM 40 MG: 40 TABLET, DELAYED RELEASE ORAL at 05:41

## 2021-06-02 RX ADMIN — OXYCODONE HYDROCHLORIDE 10 MG: 10 TABLET ORAL at 02:42

## 2021-06-02 RX ADMIN — ACETAMINOPHEN 1000 MG: 500 TABLET ORAL at 16:30

## 2021-06-02 RX ADMIN — HYDROCORTISONE SODIUM SUCCINATE 200 MG: 100 INJECTION, POWDER, FOR SOLUTION INTRAMUSCULAR; INTRAVENOUS at 13:19

## 2021-06-02 RX ADMIN — PANCRELIPASE 72000 UNITS: 60000; 12000; 38000 CAPSULE, DELAYED RELEASE PELLETS ORAL at 11:15

## 2021-06-02 RX ADMIN — ACETAMINOPHEN 1000 MG: 500 TABLET ORAL at 22:03

## 2021-06-02 RX ADMIN — ENOXAPARIN SODIUM 100 MG: 100 INJECTION SUBCUTANEOUS at 07:35

## 2021-06-02 RX ADMIN — SODIUM CHLORIDE, POTASSIUM CHLORIDE, SODIUM LACTATE AND CALCIUM CHLORIDE 500 ML: 600; 310; 30; 20 INJECTION, SOLUTION INTRAVENOUS at 13:46

## 2021-06-02 RX ADMIN — BUDESONIDE 1000 MCG: 0.5 SUSPENSION RESPIRATORY (INHALATION) at 20:39

## 2021-06-02 RX ADMIN — METHOCARBAMOL TABLETS 1000 MG: 500 TABLET, COATED ORAL at 07:33

## 2021-06-02 RX ADMIN — INSULIN LISPRO 6 UNITS: 100 INJECTION, SOLUTION INTRAVENOUS; SUBCUTANEOUS at 07:36

## 2021-06-02 RX ADMIN — ALBUTEROL SULFATE 2.5 MG: 2.5 SOLUTION RESPIRATORY (INHALATION) at 09:25

## 2021-06-02 RX ADMIN — METHOCARBAMOL TABLETS 1000 MG: 500 TABLET, COATED ORAL at 16:31

## 2021-06-02 RX ADMIN — POLYETHYLENE GLYCOL 3350 17 G: 17 POWDER, FOR SOLUTION ORAL at 07:35

## 2021-06-02 RX ADMIN — GABAPENTIN 300 MG: 300 CAPSULE ORAL at 07:35

## 2021-06-02 RX ADMIN — INSULIN LISPRO 3 UNITS: 100 INJECTION, SOLUTION INTRAVENOUS; SUBCUTANEOUS at 12:47

## 2021-06-02 RX ADMIN — INSULIN LISPRO 9 UNITS: 100 INJECTION, SOLUTION INTRAVENOUS; SUBCUTANEOUS at 19:35

## 2021-06-02 RX ADMIN — PIPERACILLIN AND TAZOBACTAM 3375 MG: 3; .375 INJECTION, POWDER, LYOPHILIZED, FOR SOLUTION INTRAVENOUS at 11:14

## 2021-06-02 RX ADMIN — INSULIN GLARGINE 20 UNITS: 100 INJECTION, SOLUTION SUBCUTANEOUS at 20:05

## 2021-06-02 RX ADMIN — HYDROCORTISONE SODIUM SUCCINATE 200 MG: 100 INJECTION, POWDER, FOR SOLUTION INTRAMUSCULAR; INTRAVENOUS at 16:23

## 2021-06-02 RX ADMIN — ATORVASTATIN CALCIUM 20 MG: 20 TABLET, FILM COATED ORAL at 07:35

## 2021-06-02 RX ADMIN — IOHEXOL 50 ML: 240 INJECTION, SOLUTION INTRATHECAL; INTRAVASCULAR; INTRAVENOUS; ORAL at 17:43

## 2021-06-02 RX ADMIN — ERTAPENEM SODIUM 1000 MG: 1 INJECTION, POWDER, LYOPHILIZED, FOR SOLUTION INTRAMUSCULAR; INTRAVENOUS at 15:44

## 2021-06-02 RX ADMIN — PANCRELIPASE 72000 UNITS: 60000; 12000; 38000 CAPSULE, DELAYED RELEASE PELLETS ORAL at 07:34

## 2021-06-02 RX ADMIN — INSULIN GLARGINE 20 UNITS: 100 INJECTION, SOLUTION SUBCUTANEOUS at 07:36

## 2021-06-02 RX ADMIN — HYDROMORPHONE HYDROCHLORIDE 0.25 MG: 1 INJECTION, SOLUTION INTRAMUSCULAR; INTRAVENOUS; SUBCUTANEOUS at 11:15

## 2021-06-02 RX ADMIN — SODIUM CHLORIDE, POTASSIUM CHLORIDE, SODIUM LACTATE AND CALCIUM CHLORIDE: 600; 310; 30; 20 INJECTION, SOLUTION INTRAVENOUS at 14:53

## 2021-06-02 RX ADMIN — PANTOPRAZOLE SODIUM 40 MG: 40 TABLET, DELAYED RELEASE ORAL at 16:31

## 2021-06-02 RX ADMIN — INSULIN LISPRO 3 UNITS: 100 INJECTION, SOLUTION INTRAVENOUS; SUBCUTANEOUS at 03:38

## 2021-06-02 RX ADMIN — ALBUTEROL SULFATE 2.5 MG: 2.5 SOLUTION RESPIRATORY (INHALATION) at 20:39

## 2021-06-02 RX ADMIN — METHOCARBAMOL TABLETS 1000 MG: 500 TABLET, COATED ORAL at 20:04

## 2021-06-02 RX ADMIN — IOPAMIDOL 90 ML: 755 INJECTION, SOLUTION INTRAVENOUS at 17:43

## 2021-06-02 RX ADMIN — BUDESONIDE 1000 MCG: 0.5 SUSPENSION RESPIRATORY (INHALATION) at 09:24

## 2021-06-02 RX ADMIN — GABAPENTIN 300 MG: 300 CAPSULE ORAL at 20:05

## 2021-06-02 RX ADMIN — Medication 10 ML: at 17:43

## 2021-06-02 RX ADMIN — HYDROMORPHONE HYDROCHLORIDE 0.5 MG: 1 INJECTION, SOLUTION INTRAMUSCULAR; INTRAVENOUS; SUBCUTANEOUS at 16:30

## 2021-06-02 RX ADMIN — OXYCODONE HYDROCHLORIDE 10 MG: 10 TABLET ORAL at 19:33

## 2021-06-02 ASSESSMENT — PAIN SCALES - GENERAL
PAINLEVEL_OUTOF10: 8
PAINLEVEL_OUTOF10: 9
PAINLEVEL_OUTOF10: 3
PAINLEVEL_OUTOF10: 0
PAINLEVEL_OUTOF10: 9
PAINLEVEL_OUTOF10: 6
PAINLEVEL_OUTOF10: 8

## 2021-06-02 ASSESSMENT — PAIN DESCRIPTION - PAIN TYPE
TYPE: ACUTE PAIN

## 2021-06-02 ASSESSMENT — PAIN DESCRIPTION - LOCATION
LOCATION: ABDOMEN

## 2021-06-02 ASSESSMENT — PAIN DESCRIPTION - ORIENTATION
ORIENTATION: MID;RIGHT
ORIENTATION: MID

## 2021-06-02 ASSESSMENT — PAIN DESCRIPTION - PROGRESSION
CLINICAL_PROGRESSION: NOT CHANGED
CLINICAL_PROGRESSION: NOT CHANGED

## 2021-06-02 NOTE — CARE COORDINATION
Transition of Care-Met with patient to update that [facility] is out of network, his second choice is [facility], his third choice is [facility]. Referral sent to liaison with [facility] to hear if he is accepted, will require a pre-cert and covid testing prior to transfer to facility. CM following. Update-[facility] unable to accept, sister facility [facility] has a bed, checked with [facility] to accept-patient updated, unsure if he wants to go to [facility], provided [facility] list , patient aware that as of now [facility] can accept if no other choices provided and return home is not an option, patient scored 19/24 with PT today. CM following.     Elia Stoner BSN, RN  [facility]   [phone]

## 2021-06-02 NOTE — DISCHARGE SUMMARY
Physician Discharge Summary     Patient ID:  Myrene Cheadle  [id number]  42 y.o.  1960    Admit date: 5/12/2021    Discharge date and time: 5/20/2021  6:18 PM     Admitting Physician: Macrina Manley MD     Admission Diagnoses: IPMN (intraductal papillary mucinous neoplasm) [D49.0]    Discharge Diagnoses: Active Problems:    Poorly controlled type 2 diabetes mellitus (HCC)    IPMN (intraductal papillary mucinous neoplasm)    Pancreatic duct leak  Resolved Problems:    * No resolved hospital problems. *      Admission Condition: stable    Discharged Condition: stable    Indication for Admission: Distal pancreatectomy and splenectomy    Hospital Course/Procedures/Operation/treatments:     Patient was taken to the operating room for laparoscopic robotic assisted converted to open distal pancreatectomy and splenectomy. Patient's postop course was complicated by hypertriglyceridemia, acute respiratory failure, and pancreatic leak controlled by MAX drain. Patient was discharged when tolerating pain on p.o. medications, drain output slowed significantly, stable infection counts, and up ambulating with PT and OT. Follow-up in 1 week for drain check in the office. Consults:   IP CONSULT TO CASE MANAGEMENT  IP CONSULT TO ENDOCRINOLOGY  IP CONSULT TO PULMONOLOGY  IP CONSULT TO PHYSICAL MEDICINE REHAB  IP CONSULT TO HOME CARE NEEDS  IP CONSULT TO DIABETES EDUCATOR    Significant Diagnostic Studies:   CT ABSCESS DRAINAGE W CATH PLACEMENT S&I    Result Date: 5/31/2021  Order Date:  5/28/2021 4:00 PM EXAM: CT ABSCESS DRAINAGE W CATH PLACEMENT S&I INDICATION:  drain abdominal abscess drain abdominal abscess What reading provider will be dictating this exam?->[facility] Findings: After obtaining informed consent, following the routine sterile prep and drape and after the administration of local anesthesia a needle was inserted into the heterogenous fluid anterior fluid collection.  . Purulent fluid was aspirated. Subsequently a guidewire was passed through the needle. Subsequently the needle was removed and over the guidewire the tract was dilated. Following dilatation a 16Fr locking loop catheter was placed. Post procedure CT scan reveals the tube to be in good position. Specimen was sent to the laboratory. The patient tolerated the procedure well. There were no complications. Prior to the procedure a timeout occurred at 16:00 PM hours. The patient received 2mg of Versed IV and 100 mcg of fentanyl IV. The patient was monitored for 60 minutes by a registered nurse. Successful uncomplicated placement of a drainage catheter into a heterogenous fluid anterior fluid collection. CT ABDOMEN PELVIS W IV CONTRAST Additional Contrast? None    Result Date: 5/27/2021  EXAMINATION: CT OF THE ABDOMEN AND PELVIS WITH CONTRAST 5/27/2021 7:07 pm TECHNIQUE: CT of the abdomen and pelvis was performed with the administration of intravenous contrast. Multiplanar reformatted images are provided for review. Dose modulation, iterative reconstruction, and/or weight based adjustment of the mA/kV was utilized to reduce the radiation dose to as low as reasonably achievable. COMPARISON: May 6 HISTORY: ORDERING SYSTEM PROVIDED HISTORY: abdominal pain, s/p splenectomy and partical pancreatectomy, pancreatic duct drain placement TECHNOLOGIST PROVIDED HISTORY: Reason for exam:->abdominal pain, s/p splenectomy and partical pancreatectomy, pancreatic duct drain placement Additional Contrast?->None Decision Support Exception - unselect if not a suspected or confirmed emergency medical condition->Emergency Medical Condition (MA) What reading provider will be dictating this exam?->[facility] FINDINGS: Lower Chest: Small partially loculated pleural effusions extending into the major fissures. Organs: Status post splenectomy and partial pancreatectomy.  There is a very large complex fluid collection in the upper abdomen with multiple foci of air airspace opacity or pleural effusion. The heart is normal size. No pneumothorax. No free air beneath the hemidiaphragms. No pneumonia or pleural effusion. Discharge Exam:  GENERAL:  Laying in bed, awake, alert  HEAD: Normocephalic, atraumatic  EYES: No sclera icterus, pupils equal  LUNGS:  No increased work of breathing  CARDIOVASCULAR: RR and normotensive  ABDOMEN:  Soft, mildly distended, appropriately TTP incisions clean dry and intact, MAX drain left upper quadrant with serous drainage. EXTREMITIES: No edema or swelling  SKIN: Warm and dry    Disposition: home    In process/preliminary results:  Outstanding Order Results     No orders found from 4/13/2021 to 5/13/2021.           Patient Instructions:   Discharge Medication List as of 5/20/2021  3:07 PM           Details   !! insulin lispro, 1 Unit Dial, (HUMALOG KWIKPEN) 100 UNIT/ML SOPN Inject 0-12 Units into the skin 3 times daily (before meals) Glucose: Dose:  If <139             No Insulin  140-199 2 Units  200-249 4 Units  250-299 6 Units  300-349 8 Units  350-400 10 Units  Above 400       12 Units, Disp-10.8 mL, R-0Normal      albuterol (PROVENTIL) (2.5 MG/3ML) 0.083% nebulizer solution Take 3 mLs by nebulization 4 times daily, Disp-120 each, R-3Normal      budesonide (PULMICORT) 0.5 MG/2ML nebulizer suspension Take 4 mLs by nebulization 2 times daily, Disp-60 ampule, R-3Normal      benzonatate (TESSALON) 200 MG capsule Take 1 capsule by mouth 3 times daily for 7 days, Disp-21 capsule, R-0Normal      guaiFENesin (ROBITUSSIN) 100 MG/5ML SOLN oral solution Take 15 mLs by mouth 3 times daily, Disp-1200 mL, R-0Normal      lipase-protease-amylase (CREON) 89141 units CPEP delayed release capsule Take 2 capsules by mouth 3 times daily (with meals), Disp-540 capsule, R-3Normal      pantoprazole (PROTONIX) 40 MG tablet Take 1 tablet by mouth 2 times daily (before meals), Disp-60 tablet, R-0Normal      senna-docusate (PERICOLACE) 8.6-50 MG per tablet Take 2 tablets by mouth daily for 7 days, Disp-14 tablet, R-0Normal      insulin glargine (LANTUS SOLOSTAR) 100 UNIT/ML injection pen Inject 20 Units into the skin 2 times daily, Disp-12 mL, R-3Normal      !! insulin lispro, 1 Unit Dial, (HUMALOG KWIKPEN) 100 UNIT/ML SOPN Inject 7 Units into the skin 3 times daily (before meals), Disp-6.3 mL, R-3Normal      Respiratory Therapy Supplies (FULL KIT NEBULIZER SET) MISC Disp-1 each, R-0, PrintUse as directed with nebulized medication. acetaminophen (APAP EXTRA STRENGTH) 500 MG tablet Take 1 tablet by mouth every 6 hours, Disp-120 tablet, R-3Normal       !! - Potential duplicate medications found. Please discuss with provider. Details   losartan (COZAAR) 100 MG tablet take 1 tablet by mouth once daily, Disp-30 tablet, R-3Normal      hydroCHLOROthiazide (HYDRODIURIL) 12.5 MG tablet take 1 tablet by mouth once daily, Disp-30 tablet, R-3Normal      methocarbamol (ROBAXIN) 750 MG tablet Take 1 tablet by mouth daily, Disp-30 tablet, R-0Normal      atorvastatin (LIPITOR) 20 MG tablet Take 1 tablet by mouth daily, Disp-30 tablet, R-3Normal      oxyCODONE (ROXICODONE) 5 MG immediate release tablet Take 1 tablet by mouth every 6 hours as needed for Pain for up to 7 days. Intended supply: 7 days. Take lowest dose possible to manage pain, Disp-28 tablet, R-0Normal      blood glucose test strips (TRUE METRIX BLOOD GLUCOSE TEST) strip As needed. , Disp-50 strip, R-5Normal              Details   gabapentin (NEURONTIN) 300 MG capsule Take 1 capsule by mouth 2 times daily. Historical Med                 Follow up:   Barak Kumari MD  [address] [phone]    On 5/25/2021  Endocrine follow up visit, Tuesday 5/25 at 9:45AM    Saeid Encarnacion MD  [facility] [address]  [facility] 27 [phone]    In 6 weeks      Markie Stovall 812 [phone]    Schedule an appointment as soon as

## 2021-06-02 NOTE — PROGRESS NOTES
Physical Therapy  Physical Therapy Initial Assessment     Name: Catalina Bain  : 1960  MRN: [id number]      Date of Service: 2021    Evaluating PT:  Sushma Cook, PT, DPT [id number]     Room #:  3636/2939-V  Diagnosis:  Intra-abdominal infection [B99.9]  Reason for admission: fever and chills after recent surgery   Precautions:  Falls, x2 MAX drains  Procedure/Surgery:   pancreatic abscess drainage and drain placement  Equipment Recommendations:  none    SUBJECTIVE:  Pt lives alone in an apartment with elevator access. Ambulating with no device. OBJECTIVE:   Initial Evaluation  Date:  Treatment   Short Term/ Long Term   Goals   AM-PAC 6 Clicks 67/05     Was pt agreeable to Eval/treatment? Yes      Does pt have pain?  9/10 abdomen     Bed Mobility  Rolling: supervision  Supine to sit: supervision  Sit to supine: NT  Scooting: Supervision  Independent    Transfers Sit to stand: Supervision  Stand to sit: Supervision  Stand pivot: NT  Independent    Ambulation    400 feet with no AD supervision    >400 feet with no AD independent   Stair negotiation: ascended and descended  NT  NA   ROM BUE:  See OT eval   BLE:  WFL     Strength BUE:  See OT eval   BLE:  knee ext 5/5  Ankle DF 5/5  Increase by 1/3 MMT grade    Balance Sitting EOB:  Independent  Dynamic Standing:  Supervision  Sitting EOB:  Independent  Dynamic Standing:  Independent     -Pt is A & O x 4  -Sensation:  Pt denies numbness and tingling to extremities  -Edema:  unremarkable     Therapeutic Exercises:  Functional activity     Patient education  Pt educated on safety, sequencing of transfers, and role of PT    Patient response to education:   Pt verbalized understanding Pt demonstrated skill Pt requires further education in this area   Yes  Yes  Yes      ASSESSMENT:  Conditions Requiring Skilled Therapeutic Intervention:  [x]Decreased strength     []Decreased ROM  [x]Decreased functional mobility  [x]Decreased balance   [x]Decreased endurance   []Decreased posture  []Decreased sensation  []Decreased coordination   []Decreased vision  []Decreased safety awareness   [x]Increased pain       Comments:  Pt received supine in bed and agreeable to PT session  Pt limited by c/o abdominal pain. Moving slowly needing extended time for all transfers. Pt ambulated to toilet and was given time to void. Pt ambulating in michael with use of walker -- slow, casual pace shown with fair steadiness. Pt did require x 1 rest break and demonstrated flexed posture holding abdomen d/t pain. Pt SOB with activity but demonstrated fair endurance. Pt with all needs met and call light in reach. Pt would benefit from continued PT POC to address functional deficits described above. Treatment:  Patient practiced and was instructed in the following treatment:     Patient education provided continuously throughout session for sequencing, safety maintenance, and improving any deficits found during the evaluation.  Bed mobility training - pt given verbal and tactile cues to facilitate proper sequencing and safety during rolling and supine>sit     STS and pivot transfer training - pt educated on proper hand and foot placement, safety and sequencing, and use of no device to safely complete sit<>stand   · Gait training- pt was given verbal and tactile cues to facilitate improved speed and endurance during ambulation     Pt's/ family goals   1. Return home     Patient and or family understand(s) diagnosis, prognosis, and plan of care. Yes     Prognosis is good for reaching above PT goals. PHYSICAL THERAPY PLAN OF CARE:    PT POC is established based on physician order and patient diagnosis     Referring provider/PT Order:    05/29/21 1145  PT eval and treat     Inessa Mendenhall III, MD     Diagnosis:  Intra-abdominal infection [B99.9]  Specific instructions for next treatment:  Increase ambulation distance with improved speed.      Current Treatment

## 2021-06-02 NOTE — PROGRESS NOTES
[facility] SURGERY  DAILY PROGRESS NOTE    Date:2021       [facility]:[id number]  Patient Name:Eliezer Cook     Date of Birth:     Age:60 y.o. Chief Complaint:  Chief Complaint   Patient presents with    Fatigue     sent by dr Rola Dunaway after surgery follow up pt states that he has had fever and chills         Subjective:  Complains of pain poorly controlled without dilaudid - sharp pains at the new IR drain entry site. Had some chills overnight, and T max 100.7 yesterday at 3pm. Otherwise still eating well without nausea/vomiting, having bowel function. Objective:  BP (!) 108/46   Pulse 78   Temp 98.1 °F (36.7 °C) (Oral)   Resp 18   Ht 5' 8\" (1.727 m)   Wt 214 lb (97.1 kg)   SpO2 97%   BMI 32.54 kg/m²   Temp (24hrs), Av.9 °F (37.2 °C), Min:98.1 °F (36.7 °C), Max:100.7 °F (38.2 °C)      I/O (24Hr):  I/O last 3 completed shifts: In: 339 [I.V.:339]  Out: 1620 [Urine:1400; Drains:220]     GENERAL:  No acute distress. Alert and interactive. LUNGS:  No cough. Nonlabored breathing on room air. CARDIOVASC:  Normal rate, no cyanosis. ABDOMEN:  Soft, non-distended, mild to moderate generalized tenderness, left upper quadrant MAX and right upper midline IR MAX drains drains both with wendi pus. Midline intact except pinpoint serous draining opening inferiorly. No guarding / rigidity / rebound. EXTREMITIES:  No edema, no deformities. Assessment:  62 y.o. 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 Portal vein thrombosis.     Plan:  - zosyn (multiorganism sensitivities resulted, blood cx negative, fungitel pending)  - appreciate ID recs (ok for discharge when ID says ok)  - continue both drains (9 + 370 in last 24hrs)  - TLov for portal vein thrombosis; transition to eliquis on discharge  - home meds, glargine 20 BID  - low fat general diet w/ creon  - ambulate  - wean IV pain meds (reordered prn dilaudid at half dose)    Electronically signed by Osiris Pichardo MD on 2021 at 6:30 AM     Restart IVF  States that he is having more abdominal pain today  Right IR drain looks feculent  Left drain looks pancreatic  STAT CT with IV and PO contrast - prep medication ordered    Electronically signed by Manuela Jerome MD on 6/2/2021 at 12:59 PM

## 2021-06-02 NOTE — PROGRESS NOTES
[facility] PROGRESS NOTE      Chief complaint: Follow-up of intraabdominal abscess    The patient is a 62 y.o. 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 On admission, he was afebrile and hemodynamically stable with no leukocytosis. Urinalysis showed no pyuria. SARS-CoV-2 PCR was not detected. CT abdomen and pelvis showed status post splenectomy and partial pancreatectomy with very large complex fluid collection in the upper abdomen extending to the retroperitoneum at the surgical site with multiple locules of air consistent with large abscess measuring 21 x 7.8 x 7.2 cm, intraluminal filling defect within the portal vein consistent with portal vein thrombosis, small partially loculated pleural effusions extending into the major fissures bilaterally. He underwent percutaneous abscess drain placement on 05/28. Drain fluid Gram stain and culture showed no polymorphonuclear leukocytes, no epithelial cells, moderate gram-positive cocci in chains, abundant gram-negative rods, rare gram-positive diphtheroid-like rods, mixed gram-positive organisms and gram-negative rods, moderate growth of Enterobacter cloacae (susceptible to fluoroquinolones and cotrimoxazole), Cronobacter sakazakii (susceptible to fluoroquinolones and cotrimoxazole), Klebsiella oxytoca (susceptible to fluoroquinolones and cotrimoxazole), alpha-hemolytic Streptococcus, light growth of coagulase-negative Staphylococcus, heavy growth of E coli (non-ESBL), anaerobic Gram-negative jason. Piperacillin-tazobactam was started on admission. Subjective: Patient was seen and examined.  Has chills, has abdominal pain, no diarrhea, no rash, no itching. Objective:    Vitals:    06/02/21 1341   BP: 131/69   Pulse: 101   Resp: 20   Temp: 99.8 °F (37.7 °C)   SpO2:      Constitutional: Alert, not in distress  Respiratory: Clear breath sounds, no crackles, no wheezes  Cardiovascular: Regular rate and rhythm, no murmurs  Gastrointestinal: Bowel sounds present, soft, nontender. Left abdominal MAX drain with creamy tan fluid output  Skin: Warm and dry, no active dermatoses  Musculoskeletal: No joint swelling, no joint erythema    Labs, imaging, and medical records/notes were personally reviewed. Assessment:  Intraabdominal abscess/surgical site infection, s/p percutaneous draini placement on 05/28. Recent distal pancreatectomy with splenectomy on 71/13 complicated by pancreatic leak s/p pancreatic stent placement on 05/17    Recommendations:  Change piperacillin-tazobactam to ertapenem 1g q24h. Plan to switch to oral ciprofloxacin 500 mg q12h and metronidazole 500 mg q8h on discharge to complete 4 weeks of antibiotic therapy from 05/28-06/25. Follow up repeat CT abdomen and pelvis. Follow up with me at [address] [place] [address] on 06/17. Thank you for involving me in the care of Jase Reyna. I will continue to follow. Please do not hesitate to call for any questions or concerns.       Electronically signed by Marge Virgen MD on 6/2/2021 at 10:49 AM

## 2021-06-03 LAB
ALBUMIN SERPL-MCNC: 2.7 G/DL (ref 3.5–5.2)
ALP BLD-CCNC: 60 U/L (ref 40–129)
ALT SERPL-CCNC: 37 U/L (ref 0–40)
ANION GAP SERPL CALCULATED.3IONS-SCNC: 11 MMOL/L (ref 7–16)
ANISOCYTOSIS: ABNORMAL
AST SERPL-CCNC: 40 U/L (ref 0–39)
BASOPHILS ABSOLUTE: 0 E9/L (ref 0–0.2)
BASOPHILS RELATIVE PERCENT: 0.2 % (ref 0–2)
BILIRUB SERPL-MCNC: 0.4 MG/DL (ref 0–1.2)
BUN BLDV-MCNC: 13 MG/DL (ref 6–20)
CALCIUM SERPL-MCNC: 8.2 MG/DL (ref 8.6–10.2)
CHLORIDE BLD-SCNC: 95 MMOL/L (ref 98–107)
CO2: 25 MMOL/L (ref 22–29)
CREAT SERPL-MCNC: 0.6 MG/DL (ref 0.7–1.2)
EOSINOPHILS ABSOLUTE: 0 E9/L (ref 0.05–0.5)
EOSINOPHILS RELATIVE PERCENT: 0 % (ref 0–6)
GFR AFRICAN AMERICAN: >60
GFR NON-AFRICAN AMERICAN: >60 ML/MIN/1.73
GLUCOSE BLD-MCNC: 427 MG/DL (ref 74–99)
HCT VFR BLD CALC: 28.7 % (ref 37–54)
HEMOGLOBIN: 8.6 G/DL (ref 12.5–16.5)
HYPOCHROMIA: ABNORMAL
LYMPHOCYTES ABSOLUTE: 0.71 E9/L (ref 1.5–4)
LYMPHOCYTES RELATIVE PERCENT: 7.8 % (ref 20–42)
MCH RBC QN AUTO: 22.4 PG (ref 26–35)
MCHC RBC AUTO-ENTMCNC: 30 % (ref 32–34.5)
MCV RBC AUTO: 74.7 FL (ref 80–99.9)
METER GLUCOSE: 201 MG/DL (ref 74–99)
METER GLUCOSE: 203 MG/DL (ref 74–99)
METER GLUCOSE: 271 MG/DL (ref 74–99)
METER GLUCOSE: 310 MG/DL (ref 74–99)
METER GLUCOSE: 371 MG/DL (ref 74–99)
METER GLUCOSE: 373 MG/DL (ref 74–99)
METER GLUCOSE: 395 MG/DL (ref 74–99)
METER GLUCOSE: 404 MG/DL (ref 74–99)
MONOCYTES ABSOLUTE: 0.53 E9/L (ref 0.1–0.95)
MONOCYTES RELATIVE PERCENT: 6.1 % (ref 2–12)
NEUTROPHILS ABSOLUTE: 7.65 E9/L (ref 1.8–7.3)
NEUTROPHILS RELATIVE PERCENT: 86.1 % (ref 43–80)
NUCLEATED RED BLOOD CELLS: 0.9 /100 WBC
OVALOCYTES: ABNORMAL
PDW BLD-RTO: 15.9 FL (ref 11.5–15)
PLATELET # BLD: 794 E9/L (ref 130–450)
PMV BLD AUTO: 11.5 FL (ref 7–12)
POIKILOCYTES: ABNORMAL
POLYCHROMASIA: ABNORMAL
POTASSIUM REFLEX MAGNESIUM: 4.9 MMOL/L (ref 3.5–5)
RBC # BLD: 3.84 E12/L (ref 3.8–5.8)
SODIUM BLD-SCNC: 131 MMOL/L (ref 132–146)
TOTAL PROTEIN: 6.7 G/DL (ref 6.4–8.3)
WBC # BLD: 8.9 E9/L (ref 4.5–11.5)

## 2021-06-03 PROCEDURE — 2060000000 HC ICU INTERMEDIATE R&B

## 2021-06-03 PROCEDURE — 36415 COLL VENOUS BLD VENIPUNCTURE: CPT

## 2021-06-03 PROCEDURE — 80053 COMPREHEN METABOLIC PANEL: CPT

## 2021-06-03 PROCEDURE — 82962 GLUCOSE BLOOD TEST: CPT

## 2021-06-03 PROCEDURE — 85025 COMPLETE CBC W/AUTO DIFF WBC: CPT

## 2021-06-03 PROCEDURE — 2580000003 HC RX 258: Performed by: INTERNAL MEDICINE

## 2021-06-03 PROCEDURE — 6360000002 HC RX W HCPCS: Performed by: STUDENT IN AN ORGANIZED HEALTH CARE EDUCATION/TRAINING PROGRAM

## 2021-06-03 PROCEDURE — 2580000003 HC RX 258: Performed by: STUDENT IN AN ORGANIZED HEALTH CARE EDUCATION/TRAINING PROGRAM

## 2021-06-03 PROCEDURE — 6370000000 HC RX 637 (ALT 250 FOR IP): Performed by: STUDENT IN AN ORGANIZED HEALTH CARE EDUCATION/TRAINING PROGRAM

## 2021-06-03 PROCEDURE — 94640 AIRWAY INHALATION TREATMENT: CPT

## 2021-06-03 PROCEDURE — 6370000000 HC RX 637 (ALT 250 FOR IP): Performed by: SURGERY

## 2021-06-03 PROCEDURE — 6360000002 HC RX W HCPCS: Performed by: INTERNAL MEDICINE

## 2021-06-03 PROCEDURE — 99254 IP/OBS CNSLTJ NEW/EST MOD 60: CPT | Performed by: SURGERY

## 2021-06-03 PROCEDURE — 6360000002 HC RX W HCPCS: Performed by: SURGERY

## 2021-06-03 RX ORDER — INSULIN GLARGINE 100 [IU]/ML
10 INJECTION, SOLUTION SUBCUTANEOUS ONCE
Status: COMPLETED | OUTPATIENT
Start: 2021-06-03 | End: 2021-06-03

## 2021-06-03 RX ADMIN — GABAPENTIN 300 MG: 300 CAPSULE ORAL at 21:32

## 2021-06-03 RX ADMIN — PANTOPRAZOLE SODIUM 40 MG: 40 TABLET, DELAYED RELEASE ORAL at 17:02

## 2021-06-03 RX ADMIN — OXYCODONE HYDROCHLORIDE 10 MG: 10 TABLET ORAL at 09:11

## 2021-06-03 RX ADMIN — INSULIN GLARGINE 20 UNITS: 100 INJECTION, SOLUTION SUBCUTANEOUS at 08:56

## 2021-06-03 RX ADMIN — INSULIN LISPRO 15 UNITS: 100 INJECTION, SOLUTION INTRAVENOUS; SUBCUTANEOUS at 05:11

## 2021-06-03 RX ADMIN — ALBUTEROL SULFATE 2.5 MG: 2.5 SOLUTION RESPIRATORY (INHALATION) at 13:39

## 2021-06-03 RX ADMIN — INSULIN LISPRO 15 UNITS: 100 INJECTION, SOLUTION INTRAVENOUS; SUBCUTANEOUS at 08:56

## 2021-06-03 RX ADMIN — OXYCODONE HYDROCHLORIDE 10 MG: 10 TABLET ORAL at 18:12

## 2021-06-03 RX ADMIN — INSULIN GLARGINE 20 UNITS: 100 INJECTION, SOLUTION SUBCUTANEOUS at 21:35

## 2021-06-03 RX ADMIN — OXYCODONE HYDROCHLORIDE 10 MG: 10 TABLET ORAL at 23:16

## 2021-06-03 RX ADMIN — HYDROMORPHONE HYDROCHLORIDE 0.25 MG: 1 INJECTION, SOLUTION INTRAMUSCULAR; INTRAVENOUS; SUBCUTANEOUS at 17:03

## 2021-06-03 RX ADMIN — ALBUTEROL SULFATE 2.5 MG: 2.5 SOLUTION RESPIRATORY (INHALATION) at 21:54

## 2021-06-03 RX ADMIN — BUDESONIDE 1000 MCG: 0.5 SUSPENSION RESPIRATORY (INHALATION) at 09:44

## 2021-06-03 RX ADMIN — HYDROMORPHONE HYDROCHLORIDE 0.25 MG: 1 INJECTION, SOLUTION INTRAMUSCULAR; INTRAVENOUS; SUBCUTANEOUS at 08:13

## 2021-06-03 RX ADMIN — INSULIN LISPRO 15 UNITS: 100 INJECTION, SOLUTION INTRAVENOUS; SUBCUTANEOUS at 21:41

## 2021-06-03 RX ADMIN — ALBUTEROL SULFATE 2.5 MG: 2.5 SOLUTION RESPIRATORY (INHALATION) at 16:11

## 2021-06-03 RX ADMIN — ENOXAPARIN SODIUM 100 MG: 100 INJECTION SUBCUTANEOUS at 08:51

## 2021-06-03 RX ADMIN — METHOCARBAMOL TABLETS 1000 MG: 500 TABLET, COATED ORAL at 08:54

## 2021-06-03 RX ADMIN — Medication 10 ML: at 08:52

## 2021-06-03 RX ADMIN — INSULIN LISPRO 12 UNITS: 100 INJECTION, SOLUTION INTRAVENOUS; SUBCUTANEOUS at 12:41

## 2021-06-03 RX ADMIN — IBUPROFEN 600 MG: 400 TABLET, FILM COATED ORAL at 11:16

## 2021-06-03 RX ADMIN — PANCRELIPASE 72000 UNITS: 60000; 12000; 38000 CAPSULE, DELAYED RELEASE PELLETS ORAL at 12:44

## 2021-06-03 RX ADMIN — HYDROMORPHONE HYDROCHLORIDE 0.5 MG: 1 INJECTION, SOLUTION INTRAMUSCULAR; INTRAVENOUS; SUBCUTANEOUS at 02:28

## 2021-06-03 RX ADMIN — METHOCARBAMOL TABLETS 1000 MG: 500 TABLET, COATED ORAL at 21:30

## 2021-06-03 RX ADMIN — ACETAMINOPHEN 1000 MG: 500 TABLET ORAL at 14:18

## 2021-06-03 RX ADMIN — ALBUTEROL SULFATE 2.5 MG: 2.5 SOLUTION RESPIRATORY (INHALATION) at 09:45

## 2021-06-03 RX ADMIN — INSULIN GLARGINE 10 UNITS: 100 INJECTION, SOLUTION SUBCUTANEOUS at 21:41

## 2021-06-03 RX ADMIN — OXYCODONE HYDROCHLORIDE 10 MG: 10 TABLET ORAL at 05:06

## 2021-06-03 RX ADMIN — ATORVASTATIN CALCIUM 20 MG: 20 TABLET, FILM COATED ORAL at 08:51

## 2021-06-03 RX ADMIN — Medication 10 ML: at 21:33

## 2021-06-03 RX ADMIN — METHOCARBAMOL TABLETS 1000 MG: 500 TABLET, COATED ORAL at 17:00

## 2021-06-03 RX ADMIN — HYDROMORPHONE HYDROCHLORIDE 0.25 MG: 1 INJECTION, SOLUTION INTRAMUSCULAR; INTRAVENOUS; SUBCUTANEOUS at 12:46

## 2021-06-03 RX ADMIN — IBUPROFEN 600 MG: 400 TABLET, FILM COATED ORAL at 17:02

## 2021-06-03 RX ADMIN — ACETAMINOPHEN 1000 MG: 500 TABLET ORAL at 08:51

## 2021-06-03 RX ADMIN — GABAPENTIN 300 MG: 300 CAPSULE ORAL at 08:53

## 2021-06-03 RX ADMIN — PANCRELIPASE 72000 UNITS: 60000; 12000; 38000 CAPSULE, DELAYED RELEASE PELLETS ORAL at 17:01

## 2021-06-03 RX ADMIN — METHOCARBAMOL TABLETS 1000 MG: 500 TABLET, COATED ORAL at 12:45

## 2021-06-03 RX ADMIN — INSULIN LISPRO 15 UNITS: 100 INJECTION, SOLUTION INTRAVENOUS; SUBCUTANEOUS at 16:58

## 2021-06-03 RX ADMIN — BUDESONIDE 1000 MCG: 0.5 SUSPENSION RESPIRATORY (INHALATION) at 21:55

## 2021-06-03 RX ADMIN — PANCRELIPASE 72000 UNITS: 60000; 12000; 38000 CAPSULE, DELAYED RELEASE PELLETS ORAL at 08:54

## 2021-06-03 RX ADMIN — ERTAPENEM SODIUM 1000 MG: 1 INJECTION, POWDER, LYOPHILIZED, FOR SOLUTION INTRAMUSCULAR; INTRAVENOUS at 14:14

## 2021-06-03 RX ADMIN — ACETAMINOPHEN 1000 MG: 500 TABLET ORAL at 02:29

## 2021-06-03 RX ADMIN — ACETAMINOPHEN 1000 MG: 500 TABLET ORAL at 21:31

## 2021-06-03 RX ADMIN — PANTOPRAZOLE SODIUM 40 MG: 40 TABLET, DELAYED RELEASE ORAL at 05:06

## 2021-06-03 RX ADMIN — HYDROMORPHONE HYDROCHLORIDE 0.25 MG: 1 INJECTION, SOLUTION INTRAMUSCULAR; INTRAVENOUS; SUBCUTANEOUS at 21:32

## 2021-06-03 RX ADMIN — INSULIN GLARGINE 10 UNITS: 100 INJECTION, SOLUTION SUBCUTANEOUS at 12:40

## 2021-06-03 ASSESSMENT — PAIN SCALES - GENERAL
PAINLEVEL_OUTOF10: 8
PAINLEVEL_OUTOF10: 7
PAINLEVEL_OUTOF10: 8
PAINLEVEL_OUTOF10: 7
PAINLEVEL_OUTOF10: 7
PAINLEVEL_OUTOF10: 0
PAINLEVEL_OUTOF10: 7
PAINLEVEL_OUTOF10: 8
PAINLEVEL_OUTOF10: 7

## 2021-06-03 ASSESSMENT — PAIN DESCRIPTION - LOCATION
LOCATION: ABDOMEN

## 2021-06-03 ASSESSMENT — PAIN DESCRIPTION - PAIN TYPE: TYPE: ACUTE PAIN

## 2021-06-03 ASSESSMENT — PAIN DESCRIPTION - ORIENTATION: ORIENTATION: MID

## 2021-06-03 NOTE — PROGRESS NOTES
[facility] SURGERY  DAILY PROGRESS NOTE    Date:6/3/2021       IZBN:[id number]  Patient Name:Eliezer Cook     Date of Birth:     Age:60 y.o. Chief Complaint:  Chief Complaint   Patient presents with    Fatigue     sent by dr Sandra Pacheco after surgery follow up pt states that he has had fever and chills         Subjective:  Zosyn changed to ertapenem yesterday and CT scan repeated showing improved but persistently large abscess. Pain is greatly improved and he has more energy today. He did eat his full dinner last night. And tolerated well. No nausea or vomiting this morning. MAX drains stopped putting out much of anything starting late last night. They did flush the IR drain last night. Objective:  /66   Pulse 78   Temp 98 °F (36.7 °C) (Oral)   Resp 18   Ht 5' 8\" (1.727 m)   Wt 214 lb (97.1 kg)   SpO2 96%   BMI 32.54 kg/m²   Temp (24hrs), Av.4 °F (36.9 °C), Min:98 °F (36.7 °C), Max:99.8 °F (37.7 °C)      I/O (24Hr):  I/O last 3 completed shifts: In: 9388 [P.O.:700; I.V.:2600; IV Piggyback:250]  Out: 2855 [Urine:3625; Drains:167]     GENERAL:  No acute distress. Alert and interactive. LUNGS:  No cough. Nonlabored breathing on room air. CARDIOVASC:  Normal rate, no cyanosis. ABDOMEN:  Soft, non-distended, mild generalized tenderness much improved from yesterday, left upper quadrant MAX and right upper midline IR MAX drains drains both with scant pus. Midline intact except pinpoint opening inferiorly. No guarding / rigidity / rebound. EXTREMITIES:  No edema, no deformities. Assessment:  62 y.o. 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 Portal vein thrombosis.     Plan:  - Ertapenem (multiorganism sensitivities resulted, blood cx negative, fungitel pending) per infectious disease  - continue both drains, flush both daily, empty every 2 hrs  - TLov for portal vein thrombosis; transition to eliquis on discharge  - home meds, glargine 20 BID --> onetime extra dose today, expect decrease as steroid effect wears off  - low fat carb ctrl diet w/ creon  - dc IVF  - ambulate  - wean IV pain meds (reordered prn dilaudid at half dose)  - consulted Dr Reba Espana for pancreatic stent exchange, scheduled 6/4    Electronically signed by Kain Leal MD on 6/3/2021 at 7:57 AM     Doing better today  Pancreatic stent likely clogged based on CT yesterday    Electronically signed by Candy Jung MD on 6/3/2021 at 12:05 PM

## 2021-06-03 NOTE — PROGRESS NOTES
Comprehensive Nutrition Assessment    Type and Reason for Visit:  Initial (LOS)    Nutrition Recommendations/Plan: Continue current diet and ONS. No further recommendations at this time. Nutrition Assessment:  Pt adm w/ Intra-abdominal abscess/surgical site infection s/p distal panc/splenectomy 5/12 w/ POPF. S/p ERCP w/ stent placement 5/17 2/2 pancreatic leakage. Pending pancreatic stent exchange 6/4. Continue current ONS. Malnutrition Assessment:  Malnutrition Status:  No malnutrition    Context:  Acute Illness     Findings of the 6 clinical characteristics of malnutrition:  Energy Intake:  No significant decrease in energy intake (per pt during assessment)  Weight Loss:  No significant weight loss     Body Fat Loss:  No significant body fat loss     Muscle Mass Loss:  No significant muscle mass loss    Fluid Accumulation:  No significant fluid accumulation     Strength:  Not Performed    Estimated Daily Nutrient Needs:  Energy (kcal):  5386-1668; Weight Used for Energy Requirements:  Current     Protein (g):  ; Weight Used for Protein Requirements:  Ideal (1.3-1.5)        Fluid (ml/day):  2506-6871; Method Used for Fluid Requirements:  1 ml/kcal      Nutrition Related Findings:  O&Ax4, distend abd, present BS, no edema, +I/Os      Wounds:  Surgical Incision (incision & trocar site to abd)       Current Nutrition Therapies:    DIET LOW FAT; Carb Control: 4 carb choices (60 gms)/meal  Dietary Nutrition Supplements: Diabetic Oral Supplement  Diet NPO Exceptions are: Sips of Water with Meds, Ice Chips    Anthropometric Measures:  · Height: 5' 8\" (172.7 cm)  · Current Body Weight: 218 lb (98.9 kg) (6/3 bed scale)   · Admission Body Weight: 218 lb (98.9 kg) (5/27 stated)    · Usual Body Weight: 210 lb (95.3 kg) (per pt during assessment)     · Ideal Body Weight: 154 lbs; % Ideal Body Weight 141.6 %   · BMI: 33.2  · BMI Categories: Obese Class 1 (BMI 30.0-34. 9)       Nutrition Diagnosis:   · Increased nutrient needs related to increase demand for energy/nutrients as evidenced by wounds      Nutrition Interventions:   Food and/or Nutrient Delivery:  Continue Current Diet, Continue Oral Nutrition Supplement  Nutrition Education/Counseling:  Education completed (Low fat diet)   Coordination of Nutrition Care:  Continue to monitor while inpatient    Goals:  pt consume % meals/ONS       Nutrition Monitoring and Evaluation:   Behavioral-Environmental Outcomes:  None Identified   Food/Nutrient Intake Outcomes:  Food and Nutrient Intake, Supplement Intake  Physical Signs/Symptoms Outcomes:  Biochemical Data, Nutrition Focused Physical Findings, Skin, Weight, GI Status, Fluid Status or Edema     Discharge Planning:     Too soon to determine     Electronically signed by Avery Lowery on 6/3/21 at 2:22 PM EDT    Contact: 2010

## 2021-06-03 NOTE — PLAN OF CARE
Problem: Falls - Risk of:  Goal: Will remain free from falls  Description: Will remain free from falls  6/3/2021 1733 by Tim Erwin RN  Outcome: Met This Shift  6/3/2021 1210 by Tim Erwin RN  Outcome: Met This Shift     Problem: Pain:  Description: Pain management should include both nonpharmacologic and pharmacologic interventions.   Goal: Pain level will decrease  Description: Pain level will decrease  6/3/2021 1733 by Tim Erwin RN  Outcome: Ongoing  6/3/2021 1210 by Tim Erwin RN  Outcome: Met This Shift

## 2021-06-03 NOTE — CARE COORDINATION
Transition of Care-Updated patient Infectious disease is prescribing oral antibiotics at discharge. Patient is agreeable to resume [facility] with [facility] and return home, will need CL order prior to discharge, his cousin or other family will be able to transport home at discharge. Discharge pending surg team plan of care.      Wendee Collet BSN, RN  [facility]   [phone]

## 2021-06-03 NOTE — CONSULTS
GENERAL SURGERY  CONSULT NOTE            Date: 6/3/2021        Patient Name: Kayleen South     YOB: 1960      Age:  62 y.o. Consult by: Dr Maxx Swenson to: Dr Gloria Lozoya  Reason:  Pancreatic stent exchange    Chief Complaint     Chief Complaint   Patient presents with    Fatigue     sent by dr Moody Johnson after surgery follow up pt states that he has had fever and chills       History Obtained From   patient    History of Present Illness   Kayleen South is a 62 y.o. 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 He did have an ERCP sphincterotomy with pancreatic stent placement for pancreatic leak on 5/17. He kept intraoperative MAX drain but because of persistent large 20cm abscess, an IR drain was placed 5/28. He was improving but then had fever and lethargy again yesterday so a repeat CT scan was performed showing persistent but improved abscess. His antibiotic was changed from Zosyn to ertapenem yesterday by infectious disease as well. His appetite returned yesterday evening and his pain and energy level is much improved. His pancreatic stent may be obstructed, so we are consulted for possible exchange. He is on therapeutic Lovenox this admission for portal vein thrombus found.     Past Medical History     Past Medical History:   Diagnosis Date    Arthritis     Back pain     5th finger & ring finger on right hand is numb    Bell's palsy     NEW ONSET 3-     Hyperlipidemia     Hypertension     Neuropathy     toes numb    Poorly controlled type 2 diabetes mellitus with neuropathy ([facility] [address] 5/8/2018    Sacral radiculitis 2/27/2019    Spondylosis of lumbar spine 3/9/2019    Advanced arthritis and degenerative disc disease by MRI 3/2019    Ventral hernia         Past Surgical History     Past Surgical History:   Procedure Laterality Date    ANESTHESIA NERVE BLOCK Bilateral 07/11/2019    BILATERAL L4-5 TRANSFORAMINAL EPIDURAL STEROID INJECTION performed by Avelino Mcdonald DO at [address] Bilateral 08/01/2019    BILATERAL MEDIAL BRANCH BLOCK L4-5 AND L5-S1 performed by Avelino Mcdonald DO at [address] Bilateral 08/15/2019    BILATERAL MEDIAL BRANCH BLOCK L4 - 5 AND L5 - S1 performed by Avelino Mcdonald DO at [facility] Right 03/29/2019    right wrist carpal tunnel release and right elbow cubital tunnel release    CARPAL TUNNEL RELEASE Right 03/29/2019    RIGHT WRIST CARPAL TUNNEL RELEASE AND RIGHT ELBOW CUBITAL TUNNEL RELEASE performed by Kevin Venegas DO at [place] [address]      at age 40 polyps    CYST REMOVAL Right     EPIDURAL STEROID INJECTION N/A 10/17/2019    LUMBAR EPIDURAL STEROID INJECTION UNDER FLUOROSCOPIC GUIDANCE AT L2-L3 WITHOUT SEDATION performed by Carlos Pond MD at [address] [facility] ERCP N/A 05/17/2021    ERCP ENDOSCOPIC RETROGRADE CHOLANGIOPANCREATOGRAPHY SPHINCTER/PAPILLOTOMY performed by Elayne Heck MD at [address] [facility] ERCP N/A 05/17/2021    ERCP STENT INSERTION performed by Elayne Heck MD at [facility] Bilateral     groin and umbilical    HERNIA REPAIR N/A 12/13/2018    ROBOTIC ASSISTED LAPAROSCOPIC PRIMARY REPAIR OF RECURRENT VENTRAL HERNIA  REPAIR performed by Shirley García MD at [address] [facility] Bilateral 07/11/2019    L4-5 transforaminal     NERVE BLOCK Bilateral 08/01/2019    medial branch block    NERVE BLOCK Bilateral 08/15/2019    bilateral medial branch block L4-S1    NERVE BLOCK  10/17/2019    lumbar epidural    PANCREATECTOMY N/A 05/12/2021    LAPAROSCOPIC ROBOTIC DISTAL PANCREATECTOMY AND SPLENECTOMY AND CHOLECYSTECTOMY, INTRA-OPERATIVE ULTRASOUND, TRANSITIONED TO OPEN -- EPIDURAL [facility]eb [facility]  06/05/2018    C5-7 fusion at [facility] in [place]  UPPER GASTROINTESTINAL ENDOSCOPY N/A 01/08/2021    EGD W/EUS FNA performed by Lilian Rodriguez DO at [address] [facility] 01/08/2021    EGD DIAGNOSTIC ONLY performed by Lilian Rodriguez DO at [facility] ENDOSCOPY        Medications - Prior to Admission and Current Meds     Prior to Admission medications    Medication Sig Start Date End Date Taking? Authorizing Provider   ciprofloxacin (CIPRO) 500 MG tablet Take 1 tablet by mouth 2 times daily for 23 days Avoid taking ciprofloxacin with milk, antacids, multivitamins, iron, zinc but if needed, take ciprofloxacin at least 2 hours before or 6 hours after milk, antacids, multivitamins, iron, zinc. 6/2/21 6/25/21 Yes Jaymie Kaiser MD   metroNIDAZOLE (FLAGYL) 500 MG tablet Take 1 tablet by mouth 3 times daily for 23 days 6/2/21 6/25/21 Yes Jaymie Kaiser MD   oxyCODONE (ROXICODONE) 5 MG immediate release tablet Take 5 mg by mouth every 6 hours as needed for Pain.    Yes Historical Provider, MD   benzonatate (TESSALON) 200 MG capsule Take 200 mg by mouth 3 times daily as needed for Cough   Yes Historical Provider, MD   insulin lispro, 1 Unit Dial, (HUMALOG KWIKPEN) 100 UNIT/ML SOPN Inject 0-12 Units into the skin 3 times daily (before meals) Glucose: Dose:  If <139             No Insulin  140-199 2 Units  200-249 4 Units  250-299 6 Units  300-349 8 Units  350-400 10 Units  Above 400       12 Units 5/20/21 6/19/21 Yes Dimitry Kyle MD   losartan (COZAAR) 100 MG tablet take 1 tablet by mouth once daily 5/19/21  Yes Dimitry Kyle MD   hydroCHLOROthiazide (HYDRODIURIL) 12.5 MG tablet take 1 tablet by mouth once daily 5/19/21  Yes Dmiitry Kyle MD   methocarbamol (ROBAXIN) 750 MG tablet Take 1 tablet by mouth daily 5/19/21 6/18/21 Yes Dimitry Kyle MD   albuterol (PROVENTIL) (2.5 MG/3ML) 0.083% nebulizer solution Take 3 mLs by nebulization 4 times daily 5/19/21  Yes Dimitry Kyle MD   budesonide (PULMICORT) 0.5 MG/2ML nebulizer suspension Take 4 mLs by nebulization 2 times daily 5/19/21  Yes Stephanie Mcdonald MD   guaiFENesin (ROBITUSSIN) 100 MG/5ML SOLN oral solution Take 15 mLs by mouth 3 times daily 5/19/21  Yes Stephanie Mcdonald MD   lipase-protease-amylase (CREON) 46814 units CPEP delayed release capsule Take 2 capsules by mouth 3 times daily (with meals) 5/19/21 8/17/21 Yes Stephanie Mcdonald MD   pantoprazole (PROTONIX) 40 MG tablet Take 1 tablet by mouth 2 times daily (before meals) 5/19/21  Yes Stephanie Mcdonald MD   atorvastatin (LIPITOR) 20 MG tablet Take 1 tablet by mouth daily 5/20/21  Yes Stephanie Mcdonald MD   insulin glargine (LANTUS SOLOSTAR) 100 UNIT/ML injection pen Inject 20 Units into the skin 2 times daily 5/19/21 6/18/21 Yes Stephanie Mcdonald MD   insulin lispro, 1 Unit Dial, ([facility]) 100 UNIT/ML SOPN Inject 7 Units into the skin 3 times daily (before meals) 5/19/21 6/18/21 Yes Stephanie Mcdonald MD   gabapentin (NEURONTIN) 300 MG capsule Take 1 capsule by mouth 2 times daily.  4/19/21  Yes Historical Provider, MD        Inpatient:  HYDROmorphone (DILAUDID) injection 0.25 mg, Q4H PRN  ertapenem (INVanz) 1000 mg IVPB minibag, Q24H  sodium chloride flush 0.9 % injection 10 mL, PRN  insulin glargine (LANTUS) injection vial 20 Units, BID  acetaminophen (TYLENOL) tablet 1,000 mg, Q6H  ibuprofen (ADVIL;MOTRIN) tablet 600 mg, TID WC  oxyCODONE (ROXICODONE) immediate release tablet 5 mg, Q4H PRN   Or  oxyCODONE HCl (OXY-IR) immediate release tablet 10 mg, Q4H PRN  polyethylene glycol (GLYCOLAX) packet 17 g, Daily  enoxaparin (LOVENOX) injection 100 mg, BID  albuterol (PROVENTIL) nebulizer solution 2.5 mg, 4x daily  atorvastatin (LIPITOR) tablet 20 mg, Daily  budesonide (PULMICORT) nebulizer suspension 1,000 mcg, BID  gabapentin (NEURONTIN) capsule 300 mg, BID  lipase-protease-amylase (CREON) delayed release capsule 72,000 Units, TID WC  pantoprazole (PROTONIX) tablet 40 mg, BID AC  sodium chloride flush 0.9 % injection 10 mL, 2 times per day  sodium chloride flush 0.9 % injection 10 mL, PRN  0.9 % sodium chloride infusion, PRN  methocarbamol (ROBAXIN) tablet 1,000 mg, 4x Daily  ondansetron (ZOFRAN) injection 4 mg, Q6H PRN  glucose (GLUTOSE) 40 % oral gel 15 g, PRN  dextrose 50 % IV solution, PRN  glucagon (rDNA) injection 1 mg, PRN  dextrose 5 % solution, PRN  insulin lispro (HUMALOG) injection vial 0-18 Units, Q4H        Allergies   Iodine, Metformin and related, and Wheat extract    Social History     Social History     Tobacco History     Smoking Status  Never Smoker    Smokeless Tobacco Use  Never Used          Alcohol History     Alcohol Use Status  Not Currently Comment  occasional          Drug Use     Drug Use Status  Never          Sexual Activity     Sexually Active  Not Asked                Family History     Family History   Problem Relation Age of Onset    Diabetes Mother     Other Mother         parkinsons    Diabetes Father     Cancer Sister 79        unknown    Cancer Brother 64        stomach    Diabetes Brother        Review of Systems   Pertinent ROS listed in HPI, all others negative    Physical Exam   /66   Pulse 78   Temp 98 °F (36.7 °C) (Oral)   Resp 18   Ht 5' 8\" (1.727 m)   Wt 214 lb (97.1 kg)   SpO2 96%   BMI 32.54 kg/m²     GENERAL:  No acute distress. Alert and oriented. HEAD:  Normocephalic, atraumatic. EYES:  No scleral icterus. Conjugate gaze. ENT/NECK:  Trachea midline, mucous membranes moist.   LUNGS:  No cough. Nonlabored breathing on room air. CARDIOVASC:  Normal rate, no cyanosis. ABDOMEN:  Soft, non-distended, mild generalized tenderness much improved from yesterday, left upper quadrant MAX and right upper midline IR MAX drains drains both with scant pus. Midline intact except pinpoint opening inferiorly. No guarding / rigidity / rebound. LIMBS:  No deformities, no edema. NEURO:  Face symmetric, moves all extremities  SKIN:  Warm, dry. intravenous contrast. Multiplanar reformatted images are provided for review. Dose modulation, iterative reconstruction, and/or weight based adjustment of the mA/kV was utilized to reduce the radiation dose to as low as reasonably achievable. COMPARISON: May 6 HISTORY: ORDERING SYSTEM PROVIDED HISTORY: abdominal pain, s/p splenectomy and partical pancreatectomy, pancreatic duct drain placement TECHNOLOGIST PROVIDED HISTORY: Reason for exam:->abdominal pain, s/p splenectomy and partical pancreatectomy, pancreatic duct drain placement Additional Contrast?->None Decision Support Exception - unselect if not a suspected or confirmed emergency medical condition->Emergency Medical Condition (MA) What reading provider will be dictating this exam?->CRC FINDINGS: Lower Chest: Small partially loculated pleural effusions extending into the major fissures. Organs: Status post splenectomy and partial pancreatectomy. There is a very large complex fluid collection in the upper abdomen with multiple foci of air extending to the retroperitoneum, at the surgical site and consistent with large abscess. There is a large air-fluid level anteriorly in the mid abdomen. The complex collection measures approximately 20 x 7.8 x 7.2 cm. There is a catheter extending into the collection centrally. There is prominent stranding of the surrounding mesenteric fat which may be postsurgical or inflammatory. Hepatic parenchyma within normal limits. Adrenals and kidneys within normal limits. Biliary stent extending into the duodenum. Intraluminal filling defect within the portal vein consistent with portal vein thrombosis. GI/Bowel: No evidence of bowel obstruction. Normal appendix. Pelvis: Urinary bladder within normal limits. Peritoneum/Retroperitoneum: No abdominal aortic aneurysm. No retroperitoneal adenopathy.  Bones/Soft Tissues: Subcutaneous edema along the right flank, which may be postsurgical.     Status post splenectomy and partial pancreatectomy. Very large complex fluid collection in the upper abdomen extending to the retroperitoneum at the surgical site with multiple locules of air most consistent with large abscess measuring approximately 20 x 7.8 x 7.2 cm. There is a catheter extending into the collection centrally. Prominent stranding of the surrounding mesenteric fat which may be postsurgical or inflammatory. Intraluminal filling defect within the portal vein consistent with portal vein thrombosis. Small partially loculated pleural effusions extending into the major fissures bilaterally. Subcutaneous edema along the right flank, which may be postsurgical. Report called in at the time of interpretation. XR CHEST PORTABLE    Result Date: 5/27/2021  EXAMINATION: ONE XRAY VIEW OF THE CHEST 5/27/2021 5:49 pm COMPARISON: May 14, 2021 HISTORY: ORDERING SYSTEM PROVIDED HISTORY: SOB, dry cough TECHNOLOGIST PROVIDED HISTORY: Reason for exam:->SOB, dry cough What reading provider will be dictating this exam?->CRC FINDINGS: No airspace opacity or pleural effusion. The heart is normal size. No pneumothorax. No free air beneath the hemidiaphragms. No pneumonia or pleural effusion. Assessment      Hospital Problems         Last Modified POA    Intra-abdominal infection 5/27/2021 Yes          62 y.o. 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 Portal vein thrombosis. Plan   - pancreatic stent exchange tomorrow 6/4  - hold TLovenox starting tonight    Plan was discussed with Dr. Tamir Moise.     Electronically signed by Karla Samuel MD on 6/3/21 at 7:56 AM EDT    General Surgery Progress Note  [facility]    Patient's Name/Date of Birth: Arlette Yin / 1960    Date: June 4, 2021     Surgeon: Tamir Moise MD    Chief Complaint: Pancreatic leak    Patient Active Problem List   Diagnosis    Mixed hyperlipidemia    Neck pain    Cervical radiculopathy at C7    Essential hypertension    Chronic seasonal allergic rhinitis    Ventral hernia without obstruction or gangrene    Poorly controlled type 2 diabetes mellitus (HCC)    Chronic bilateral low back pain with right-sided sciatica    Gynecomastia    Panic disorder    Cubital tunnel syndrome on right    Right carpal tunnel syndrome    Spondylosis of lumbar spine    Bell's palsy    Other bursal cyst, left elbow    Sacral radiculitis    Lumbar radiculitis    Spondylosis of cervical region without myelopathy or radiculopathy    Cervical facet joint syndrome    Cervical disc disorder    Lumbar facet arthropathy    Spinal stenosis of lumbar region with neurogenic claudication    Lumbar disc disorder    Pancreatic neoplasm    IPMN (intraductal papillary mucinous neoplasm)    Pancreatic duct leak    Intra-abdominal infection       Subjective: HPI as above, no new complaints    Objective:  BP (!) 113/59   Pulse 85   Temp 97.6 °F (36.4 °C) (Oral)   Resp 18   Ht 5' 8\" (1.727 m)   Wt 218 lb (98.9 kg)   SpO2 99%   BMI 33.15 kg/m²   Labs:  Recent Labs     06/02/21  0433 06/03/21  0500 06/04/21  0437   WBC 9.3 8.9 9.7   HGB 7.6* 8.6* 8.1*   HCT 25.1* 28.7* 26.8*     Lab Results   Component Value Date    CREATININE 0.7 06/04/2021    BUN 13 06/04/2021     06/04/2021    K 3.8 06/04/2021     06/04/2021    CO2 24 06/04/2021     No results for input(s): LIPASE, AMYLASE in the last 72 hours.   CBC with Differential:    Lab Results   Component Value Date    WBC 9.7 06/04/2021    RBC 3.62 06/04/2021    HGB 8.1 06/04/2021    HCT 26.8 06/04/2021     06/04/2021    MCV 74.0 06/04/2021    MCH 22.4 06/04/2021    MCHC 30.2 06/04/2021    RDW 15.6 06/04/2021    NRBC 0.9 06/03/2021    METASPCT 0.9 05/29/2021    LYMPHOPCT 7.8 06/03/2021    MONOPCT 6.1 06/03/2021    MYELOPCT 0.9 06/01/2021    BASOPCT 0.2 06/03/2021    MONOSABS 0.53 06/03/2021    LYMPHSABS 0.71 06/03/2021    EOSABS 0.00 06/03/2021    BASOSABS 0.00 06/03/2021     CMP:    Lab Results Component Value Date     06/04/2021    K 3.8 06/04/2021     06/04/2021    CO2 24 06/04/2021    BUN 13 06/04/2021    CREATININE 0.7 06/04/2021    GFRAA >60 06/04/2021    LABGLOM >60 06/04/2021    GLUCOSE 129 06/04/2021    PROT 6.2 06/04/2021    LABALBU 2.9 06/04/2021    CALCIUM 8.2 06/04/2021    BILITOT 0.2 06/04/2021    ALKPHOS 53 06/04/2021    AST 37 06/04/2021    ALT 36 06/04/2021       General appearance:  NAD  Head: NCAT, PERRLA, EOMI, red conjunctiva  Neck: supple, no masses  Lungs: CTAB, equal chest rise bilateral  Heart: Reg rate  Abdomen: soft, nondistended, minimal tenderness. MAX drains with purulent material  Skin; no lesions  Gu: no cva tenderness  Extremities: extremities normal, atraumatic, no cyanosis or edema      Assessment/Plan:  Thanh Amaro is a 62 y.o. male with pancreatic leak after distal pancreatectomy for neuroendocrine tumor, prior ERCP with pancreatic duct stent placement and IR drain of peripancreatic abscess    Proceed with ERCP with exchange of pancreatic duct stent  -The procedure, risks, benefits and alternatives were discussed with patient. he   agrees to proceed.     Vy Guevara MD  6/4/2021  7:53 AM

## 2021-06-03 NOTE — PROGRESS NOTES
Patient ambulated around floor with one stand by assist, tolerated well, returned to bed without difficulty.

## 2021-06-03 NOTE — PROGRESS NOTES
[facility] PROGRESS NOTE      Chief complaint: Follow-up of intraabdominal abscess    The patient is a 62 y.o. male with history of hypertension, hyperlipidemia, DM, IPMN of pancreatic tail status post robotic converted to open distal pancreatectomy with splenectomy on 02/35 complicated by pancreatic leak requiring stent placement on 05/17, presented on 05/27 with purulent intra-abdominal drain output first noted on 05/24 accompanied by abdominal discomfort. On admission, he was afebrile and hemodynamically stable with no leukocytosis. Urinalysis showed no pyuria. SARS-CoV-2 PCR was not detected. CT abdomen and pelvis showed status post splenectomy and partial pancreatectomy with very large complex fluid collection in the upper abdomen extending to the retroperitoneum at the surgical site with multiple locules of air consistent with large abscess measuring 21 x 7.8 x 7.2 cm, intraluminal filling defect within the portal vein consistent with portal vein thrombosis, small partially loculated pleural effusions extending into the major fissures bilaterally. He underwent percutaneous abscess drain placement on 05/28. Drain fluid Gram stain and culture showed no polymorphonuclear leukocytes, no epithelial cells, moderate gram-positive cocci in chains, abundant gram-negative rods, rare gram-positive diphtheroid-like rods, mixed gram-positive organisms and gram-negative rods, moderate growth of Enterobacter cloacae (susceptible to fluoroquinolones and cotrimoxazole), Cronobacter sakazakii (susceptible to fluoroquinolones and cotrimoxazole), Klebsiella oxytoca (susceptible to fluoroquinolones and cotrimoxazole), alpha-hemolytic Streptococcus, light growth of coagulase-negative Staphylococcus, heavy growth of E coli (non-ESBL), anaerobic Gram-negative jason. Serum beta-D-glucan was negative. Piperacillin-tazobactam was started on admission then switched to ertapenem on 06/02.  Repeat CT abdomen and pelvis on 06/02 showed interval placement of a 2nd drainage catheter into the left upper quadrant abscess, with mild decrease in its volume. Subjective: Patient was seen and examined. He reports feeling better, no abdominal pain, no diarrhea, no rash, no itching. Objective:    Vitals:    06/03/21 0800   BP: 119/71   Pulse: 87   Resp: 18   Temp: 97.8 °F (36.6 °C)   SpO2:      Constitutional: Alert, not in distress  Respiratory: Clear breath sounds, no crackles, no wheezes  Cardiovascular: Regular rate and rhythm, no murmurs  Gastrointestinal: Bowel sounds present, soft, nontender. Left abdominal MAX drain with creamy tan fluid output. Right abdominal MAX drain with minimal serosanguinous fluid output  Skin: Warm and dry, no active dermatoses  Musculoskeletal: No joint swelling, no joint erythema    Labs, imaging, and medical records/notes were personally reviewed. Assessment:  Intraabdominal abscess/surgical site infection, s/p percutaneous draini placement on 05/28. Recent distal pancreatectomy with splenectomy on 71/41 complicated by pancreatic leak s/p pancreatic stent placement on 05/17    Recommendations:  Continue ertapenem 1g q24h. Plan to switch to oral ciprofloxacin 500 mg q12h and metronidazole 500 mg q8h on discharge if clinically improved to complete 4 weeks of antibiotic therapy from 05/28-06/25. Follow up with me at [address] [place] [address] on 06/17. Plan for pancreatic stent exchange is noted. Thank you for involving me in the care of Wesley Kelly. I will continue to follow. Please do not hesitate to call for any questions or concerns.       Electronically signed by Felicita Copeland MD on 6/3/2021 at 10:09 AM

## 2021-06-04 ENCOUNTER — ANESTHESIA EVENT (OUTPATIENT)
Dept: ENDOSCOPY | Age: 61
DRG: 721 | End: 2021-06-04
Payer: MEDICAID

## 2021-06-04 ENCOUNTER — ANESTHESIA (OUTPATIENT)
Dept: ENDOSCOPY | Age: 61
DRG: 721 | End: 2021-06-04
Payer: MEDICAID

## 2021-06-04 ENCOUNTER — APPOINTMENT (OUTPATIENT)
Dept: GENERAL RADIOLOGY | Age: 61
DRG: 721 | End: 2021-06-04
Payer: MEDICAID

## 2021-06-04 VITALS — DIASTOLIC BLOOD PRESSURE: 64 MMHG | OXYGEN SATURATION: 97 % | SYSTOLIC BLOOD PRESSURE: 110 MMHG

## 2021-06-04 LAB
ALBUMIN SERPL-MCNC: 2.9 G/DL (ref 3.5–5.2)
ALP BLD-CCNC: 53 U/L (ref 40–129)
ALT SERPL-CCNC: 36 U/L (ref 0–40)
ANION GAP SERPL CALCULATED.3IONS-SCNC: 11 MMOL/L (ref 7–16)
ANISOCYTOSIS: ABNORMAL
AST SERPL-CCNC: 37 U/L (ref 0–39)
BASOPHILS ABSOLUTE: 0 E9/L (ref 0–0.2)
BASOPHILS RELATIVE PERCENT: 0.4 % (ref 0–2)
BILIRUB SERPL-MCNC: 0.2 MG/DL (ref 0–1.2)
BUN BLDV-MCNC: 13 MG/DL (ref 6–20)
CALCIUM SERPL-MCNC: 8.2 MG/DL (ref 8.6–10.2)
CHLORIDE BLD-SCNC: 101 MMOL/L (ref 98–107)
CO2: 24 MMOL/L (ref 22–29)
CREAT SERPL-MCNC: 0.7 MG/DL (ref 0.7–1.2)
EOSINOPHILS ABSOLUTE: 0.16 E9/L (ref 0.05–0.5)
EOSINOPHILS RELATIVE PERCENT: 1.7 % (ref 0–6)
GFR AFRICAN AMERICAN: >60
GFR NON-AFRICAN AMERICAN: >60 ML/MIN/1.73
GLUCOSE BLD-MCNC: 129 MG/DL (ref 74–99)
HCT VFR BLD CALC: 26.8 % (ref 37–54)
HEMOGLOBIN: 8.1 G/DL (ref 12.5–16.5)
HYPOCHROMIA: ABNORMAL
LYMPHOCYTES ABSOLUTE: 0.97 E9/L (ref 1.5–4)
LYMPHOCYTES RELATIVE PERCENT: 10.4 % (ref 20–42)
MCH RBC QN AUTO: 22.4 PG (ref 26–35)
MCHC RBC AUTO-ENTMCNC: 30.2 % (ref 32–34.5)
MCV RBC AUTO: 74 FL (ref 80–99.9)
METER GLUCOSE: 131 MG/DL (ref 74–99)
METER GLUCOSE: 137 MG/DL (ref 74–99)
METER GLUCOSE: 145 MG/DL (ref 74–99)
METER GLUCOSE: 256 MG/DL (ref 74–99)
METER GLUCOSE: 272 MG/DL (ref 74–99)
METER GLUCOSE: 273 MG/DL (ref 74–99)
MONOCYTES ABSOLUTE: 1.55 E9/L (ref 0.1–0.95)
MONOCYTES RELATIVE PERCENT: 15.7 % (ref 2–12)
NEUTROPHILS ABSOLUTE: 6.98 E9/L (ref 1.8–7.3)
NEUTROPHILS RELATIVE PERCENT: 72.2 % (ref 43–80)
NUCLEATED RED BLOOD CELLS: 0.9 /100 WBC
OVALOCYTES: ABNORMAL
PDW BLD-RTO: 15.6 FL (ref 11.5–15)
PLATELET # BLD: 814 E9/L (ref 130–450)
PMV BLD AUTO: 10.8 FL (ref 7–12)
POIKILOCYTES: ABNORMAL
POLYCHROMASIA: ABNORMAL
POTASSIUM REFLEX MAGNESIUM: 3.8 MMOL/L (ref 3.5–5)
RBC # BLD: 3.62 E12/L (ref 3.8–5.8)
SODIUM BLD-SCNC: 136 MMOL/L (ref 132–146)
TARGET CELLS: ABNORMAL
TOTAL PROTEIN: 6.2 G/DL (ref 6.4–8.3)
WBC # BLD: 9.7 E9/L (ref 4.5–11.5)

## 2021-06-04 PROCEDURE — 2580000003 HC RX 258: Performed by: SURGERY

## 2021-06-04 PROCEDURE — 6370000000 HC RX 637 (ALT 250 FOR IP): Performed by: SURGERY

## 2021-06-04 PROCEDURE — 6360000002 HC RX W HCPCS: Performed by: SURGERY

## 2021-06-04 PROCEDURE — 2060000000 HC ICU INTERMEDIATE R&B

## 2021-06-04 PROCEDURE — 2580000003 HC RX 258: Performed by: ANESTHESIOLOGIST ASSISTANT

## 2021-06-04 PROCEDURE — C2625 STENT, NON-COR, TEM W/DEL SY: HCPCS | Performed by: SURGERY

## 2021-06-04 PROCEDURE — 7100000000 HC PACU RECOVERY - FIRST 15 MIN: Performed by: SURGERY

## 2021-06-04 PROCEDURE — 2709999900 HC NON-CHARGEABLE SUPPLY: Performed by: SURGERY

## 2021-06-04 PROCEDURE — 6360000002 HC RX W HCPCS: Performed by: ANESTHESIOLOGY

## 2021-06-04 PROCEDURE — 0F9G80Z DRAINAGE OF PANCREAS WITH DRAINAGE DEVICE, VIA NATURAL OR ARTIFICIAL OPENING ENDOSCOPIC: ICD-10-PCS | Performed by: SURGERY

## 2021-06-04 PROCEDURE — 43274 ERCP DUCT STENT PLACEMENT: CPT | Performed by: SURGERY

## 2021-06-04 PROCEDURE — 6360000002 HC RX W HCPCS: Performed by: ANESTHESIOLOGIST ASSISTANT

## 2021-06-04 PROCEDURE — 80053 COMPREHEN METABOLIC PANEL: CPT

## 2021-06-04 PROCEDURE — 3700000000 HC ANESTHESIA ATTENDED CARE: Performed by: SURGERY

## 2021-06-04 PROCEDURE — 3700000001 HC ADD 15 MINUTES (ANESTHESIA): Performed by: SURGERY

## 2021-06-04 PROCEDURE — 85025 COMPLETE CBC W/AUTO DIFF WBC: CPT

## 2021-06-04 PROCEDURE — 6370000000 HC RX 637 (ALT 250 FOR IP): Performed by: STUDENT IN AN ORGANIZED HEALTH CARE EDUCATION/TRAINING PROGRAM

## 2021-06-04 PROCEDURE — 6360000002 HC RX W HCPCS

## 2021-06-04 PROCEDURE — 3609015100 HC ERCP STENT PLACEMENT BILIARY/PANCREATIC DUCT: Performed by: SURGERY

## 2021-06-04 PROCEDURE — 7100000001 HC PACU RECOVERY - ADDTL 15 MIN: Performed by: SURGERY

## 2021-06-04 PROCEDURE — 94640 AIRWAY INHALATION TREATMENT: CPT

## 2021-06-04 PROCEDURE — BF181ZZ FLUOROSCOPY OF PANCREATIC DUCTS USING LOW OSMOLAR CONTRAST: ICD-10-PCS | Performed by: SURGERY

## 2021-06-04 PROCEDURE — 0W9F30Z DRAINAGE OF ABDOMINAL WALL WITH DRAINAGE DEVICE, PERCUTANEOUS APPROACH: ICD-10-PCS | Performed by: SURGERY

## 2021-06-04 PROCEDURE — 3209999900 FLUORO FOR SURGICAL PROCEDURES

## 2021-06-04 PROCEDURE — 82962 GLUCOSE BLOOD TEST: CPT

## 2021-06-04 PROCEDURE — 6360000004 HC RX CONTRAST MEDICATION: Performed by: SURGERY

## 2021-06-04 PROCEDURE — 0F7D8ZZ DILATION OF PANCREATIC DUCT, VIA NATURAL OR ARTIFICIAL OPENING ENDOSCOPIC: ICD-10-PCS | Performed by: SURGERY

## 2021-06-04 PROCEDURE — 36415 COLL VENOUS BLD VENIPUNCTURE: CPT

## 2021-06-04 PROCEDURE — C1769 GUIDE WIRE: HCPCS | Performed by: SURGERY

## 2021-06-04 DEVICE — BILIARY STENT WITH RX DELIVERY SYSTEM
Type: IMPLANTABLE DEVICE | Status: FUNCTIONAL
Brand: RX BILIARY

## 2021-06-04 RX ORDER — PROPOFOL 10 MG/ML
INJECTION, EMULSION INTRAVENOUS CONTINUOUS PRN
Status: DISCONTINUED | OUTPATIENT
Start: 2021-06-04 | End: 2021-06-04 | Stop reason: SDUPTHER

## 2021-06-04 RX ORDER — SODIUM CHLORIDE 9 MG/ML
INJECTION, SOLUTION INTRAVENOUS CONTINUOUS PRN
Status: DISCONTINUED | OUTPATIENT
Start: 2021-06-04 | End: 2021-06-04 | Stop reason: SDUPTHER

## 2021-06-04 RX ORDER — ACETAMINOPHEN 500 MG
1000 TABLET ORAL EVERY 6 HOURS
Qty: 120 TABLET | Refills: 0 | Status: SHIPPED | OUTPATIENT
Start: 2021-06-04

## 2021-06-04 RX ORDER — MIDAZOLAM HYDROCHLORIDE 1 MG/ML
INJECTION INTRAMUSCULAR; INTRAVENOUS PRN
Status: DISCONTINUED | OUTPATIENT
Start: 2021-06-04 | End: 2021-06-04 | Stop reason: SDUPTHER

## 2021-06-04 RX ORDER — METHOCARBAMOL 500 MG/1
1000 TABLET, FILM COATED ORAL 4 TIMES DAILY
Qty: 80 TABLET | Refills: 0 | Status: SHIPPED | OUTPATIENT
Start: 2021-06-04 | End: 2021-06-10 | Stop reason: ALTCHOICE

## 2021-06-04 RX ORDER — POTASSIUM CHLORIDE 20 MEQ/1
20 TABLET, EXTENDED RELEASE ORAL ONCE
Status: COMPLETED | OUTPATIENT
Start: 2021-06-04 | End: 2021-06-04

## 2021-06-04 RX ORDER — OXYCODONE HYDROCHLORIDE 5 MG/1
5 TABLET ORAL EVERY 6 HOURS PRN
Qty: 28 TABLET | Refills: 0 | Status: SHIPPED | OUTPATIENT
Start: 2021-06-04 | End: 2021-06-04

## 2021-06-04 RX ORDER — OXYCODONE HYDROCHLORIDE 5 MG/1
5 TABLET ORAL EVERY 6 HOURS PRN
Qty: 40 TABLET | Refills: 0 | Status: SHIPPED | OUTPATIENT
Start: 2021-06-04 | End: 2021-06-11

## 2021-06-04 RX ORDER — IBUPROFEN 600 MG/1
600 TABLET ORAL
Qty: 120 TABLET | Refills: 0 | Status: SHIPPED | OUTPATIENT
Start: 2021-06-04 | End: 2021-09-01

## 2021-06-04 RX ORDER — POLYETHYLENE GLYCOL 3350 17 G/17G
17 POWDER, FOR SOLUTION ORAL DAILY
Qty: 527 G | Refills: 1 | Status: ON HOLD | OUTPATIENT
Start: 2021-06-05 | End: 2021-07-15 | Stop reason: HOSPADM

## 2021-06-04 RX ORDER — SODIUM CHLORIDE 0.9 % (FLUSH) 0.9 %
10 SYRINGE (ML) INJECTION PRN
Status: DISCONTINUED | OUTPATIENT
Start: 2021-06-04 | End: 2021-06-05 | Stop reason: HOSPADM

## 2021-06-04 RX ORDER — MORPHINE SULFATE 2 MG/ML
2 INJECTION, SOLUTION INTRAMUSCULAR; INTRAVENOUS EVERY 6 HOURS PRN
Status: DISCONTINUED | OUTPATIENT
Start: 2021-06-04 | End: 2021-06-05 | Stop reason: HOSPADM

## 2021-06-04 RX ADMIN — HYDROMORPHONE HYDROCHLORIDE 0.25 MG: 1 INJECTION, SOLUTION INTRAMUSCULAR; INTRAVENOUS; SUBCUTANEOUS at 09:20

## 2021-06-04 RX ADMIN — METHOCARBAMOL TABLETS 1000 MG: 500 TABLET, COATED ORAL at 16:56

## 2021-06-04 RX ADMIN — INSULIN GLARGINE 20 UNITS: 100 INJECTION, SOLUTION SUBCUTANEOUS at 20:52

## 2021-06-04 RX ADMIN — ATORVASTATIN CALCIUM 20 MG: 20 TABLET, FILM COATED ORAL at 10:48

## 2021-06-04 RX ADMIN — PANCRELIPASE 72000 UNITS: 60000; 12000; 38000 CAPSULE, DELAYED RELEASE PELLETS ORAL at 10:47

## 2021-06-04 RX ADMIN — ACETAMINOPHEN 1000 MG: 500 TABLET ORAL at 20:48

## 2021-06-04 RX ADMIN — PANCRELIPASE 72000 UNITS: 60000; 12000; 38000 CAPSULE, DELAYED RELEASE PELLETS ORAL at 16:56

## 2021-06-04 RX ADMIN — SODIUM CHLORIDE: 9 INJECTION, SOLUTION INTRAVENOUS at 08:05

## 2021-06-04 RX ADMIN — OXYCODONE HYDROCHLORIDE 10 MG: 10 TABLET ORAL at 15:47

## 2021-06-04 RX ADMIN — MIDAZOLAM 2 MG: 1 INJECTION INTRAMUSCULAR; INTRAVENOUS at 08:20

## 2021-06-04 RX ADMIN — HYDROMORPHONE HYDROCHLORIDE 0.25 MG: 1 INJECTION, SOLUTION INTRAMUSCULAR; INTRAVENOUS; SUBCUTANEOUS at 06:51

## 2021-06-04 RX ADMIN — ACETAMINOPHEN 1000 MG: 500 TABLET ORAL at 04:17

## 2021-06-04 RX ADMIN — Medication 10 ML: at 10:45

## 2021-06-04 RX ADMIN — Medication 10 ML: at 14:27

## 2021-06-04 RX ADMIN — IBUPROFEN 600 MG: 400 TABLET, FILM COATED ORAL at 10:48

## 2021-06-04 RX ADMIN — INSULIN LISPRO 9 UNITS: 100 INJECTION, SOLUTION INTRAVENOUS; SUBCUTANEOUS at 00:17

## 2021-06-04 RX ADMIN — HYDROMORPHONE HYDROCHLORIDE 0.25 MG: 1 INJECTION, SOLUTION INTRAMUSCULAR; INTRAVENOUS; SUBCUTANEOUS at 09:26

## 2021-06-04 RX ADMIN — GABAPENTIN 300 MG: 300 CAPSULE ORAL at 20:48

## 2021-06-04 RX ADMIN — METHOCARBAMOL TABLETS 1000 MG: 500 TABLET, COATED ORAL at 20:48

## 2021-06-04 RX ADMIN — OXYCODONE HYDROCHLORIDE 10 MG: 10 TABLET ORAL at 04:17

## 2021-06-04 RX ADMIN — HYDROMORPHONE HYDROCHLORIDE 0.25 MG: 1 INJECTION, SOLUTION INTRAMUSCULAR; INTRAVENOUS; SUBCUTANEOUS at 14:39

## 2021-06-04 RX ADMIN — ALBUTEROL SULFATE 2.5 MG: 2.5 SOLUTION RESPIRATORY (INHALATION) at 15:31

## 2021-06-04 RX ADMIN — POTASSIUM CHLORIDE 20 MEQ: 1500 TABLET, EXTENDED RELEASE ORAL at 06:51

## 2021-06-04 RX ADMIN — OXYCODONE HYDROCHLORIDE 10 MG: 10 TABLET ORAL at 20:09

## 2021-06-04 RX ADMIN — ALBUTEROL SULFATE 2.5 MG: 2.5 SOLUTION RESPIRATORY (INHALATION) at 19:30

## 2021-06-04 RX ADMIN — ACETAMINOPHEN 1000 MG: 500 TABLET ORAL at 10:52

## 2021-06-04 RX ADMIN — INSULIN LISPRO 9 UNITS: 100 INJECTION, SOLUTION INTRAVENOUS; SUBCUTANEOUS at 20:52

## 2021-06-04 RX ADMIN — METHOCARBAMOL TABLETS 1000 MG: 500 TABLET, COATED ORAL at 10:47

## 2021-06-04 RX ADMIN — BUDESONIDE 1000 MCG: 0.5 SUSPENSION RESPIRATORY (INHALATION) at 19:30

## 2021-06-04 RX ADMIN — HYDROMORPHONE HYDROCHLORIDE 0.25 MG: 1 INJECTION, SOLUTION INTRAMUSCULAR; INTRAVENOUS; SUBCUTANEOUS at 09:15

## 2021-06-04 RX ADMIN — OXYCODONE HYDROCHLORIDE 10 MG: 10 TABLET ORAL at 11:01

## 2021-06-04 RX ADMIN — IBUPROFEN 600 MG: 400 TABLET, FILM COATED ORAL at 16:56

## 2021-06-04 RX ADMIN — PANTOPRAZOLE SODIUM 40 MG: 40 TABLET, DELAYED RELEASE ORAL at 15:48

## 2021-06-04 RX ADMIN — Medication 10 ML: at 20:49

## 2021-06-04 RX ADMIN — PROPOFOL 150 MCG/KG/MIN: 10 INJECTION, EMULSION INTRAVENOUS at 08:20

## 2021-06-04 RX ADMIN — PANTOPRAZOLE SODIUM 40 MG: 40 TABLET, DELAYED RELEASE ORAL at 05:09

## 2021-06-04 RX ADMIN — GABAPENTIN 300 MG: 300 CAPSULE ORAL at 10:48

## 2021-06-04 RX ADMIN — ERTAPENEM SODIUM 1000 MG: 1 INJECTION, POWDER, LYOPHILIZED, FOR SOLUTION INTRAMUSCULAR; INTRAVENOUS at 14:27

## 2021-06-04 RX ADMIN — ENOXAPARIN SODIUM 100 MG: 100 INJECTION SUBCUTANEOUS at 15:48

## 2021-06-04 RX ADMIN — INSULIN LISPRO 9 UNITS: 100 INJECTION, SOLUTION INTRAVENOUS; SUBCUTANEOUS at 16:55

## 2021-06-04 ASSESSMENT — PAIN SCALES - GENERAL
PAINLEVEL_OUTOF10: 6
PAINLEVEL_OUTOF10: 8
PAINLEVEL_OUTOF10: 6
PAINLEVEL_OUTOF10: 4
PAINLEVEL_OUTOF10: 5
PAINLEVEL_OUTOF10: 6
PAINLEVEL_OUTOF10: 0
PAINLEVEL_OUTOF10: 8
PAINLEVEL_OUTOF10: 7
PAINLEVEL_OUTOF10: 7
PAINLEVEL_OUTOF10: 0
PAINLEVEL_OUTOF10: 6
PAINLEVEL_OUTOF10: 7
PAINLEVEL_OUTOF10: 8
PAINLEVEL_OUTOF10: 7

## 2021-06-04 ASSESSMENT — PAIN DESCRIPTION - LOCATION
LOCATION: ABDOMEN

## 2021-06-04 ASSESSMENT — PAIN DESCRIPTION - DESCRIPTORS
DESCRIPTORS: NAGGING;ACHING;CONSTANT
DESCRIPTORS: NAGGING;ACHING;DISCOMFORT
DESCRIPTORS: CONSTANT;DISCOMFORT
DESCRIPTORS: CONSTANT;DISCOMFORT

## 2021-06-04 ASSESSMENT — PAIN DESCRIPTION - PAIN TYPE
TYPE: ACUTE PAIN
TYPE: ACUTE PAIN
TYPE: ACUTE PAIN;SURGICAL PAIN

## 2021-06-04 ASSESSMENT — PAIN SCALES - WONG BAKER: WONGBAKER_NUMERICALRESPONSE: 0

## 2021-06-04 ASSESSMENT — LIFESTYLE VARIABLES: SMOKING_STATUS: 0

## 2021-06-04 NOTE — ANESTHESIA PRE PROCEDURE
Deborah Lora MD   budesonide (PULMICORT) 0.5 MG/2ML nebulizer suspension Take 4 mLs by nebulization 2 times daily 5/19/21  Yes Jason Do MD   guaiFENesin (ROBITUSSIN) 100 MG/5ML SOLN oral solution Take 15 mLs by mouth 3 times daily 5/19/21  Yes Jason Do MD   lipase-protease-amylase (CREON) 76612 units CPEP delayed release capsule Take 2 capsules by mouth 3 times daily (with meals) 5/19/21 8/17/21 Yes Jason Do MD   pantoprazole (PROTONIX) 40 MG tablet Take 1 tablet by mouth 2 times daily (before meals) 5/19/21  Yes Jason Do MD   atorvastatin (LIPITOR) 20 MG tablet Take 1 tablet by mouth daily 5/20/21  Yes Jason Do MD   insulin glargine (LANTUS SOLOSTAR) 100 UNIT/ML injection pen Inject 20 Units into the skin 2 times daily 5/19/21 6/18/21 Yes Jason Do MD   insulin lispro, 1 Unit Dial, ([facility]) 100 UNIT/ML SOPN Inject 7 Units into the skin 3 times daily (before meals) 5/19/21 6/18/21 Yes Jason Do MD   gabapentin (NEURONTIN) 300 MG capsule Take 1 capsule by mouth 2 times daily.  4/19/21  Yes Historical Provider, MD       Current medications:    Current Facility-Administered Medications   Medication Dose Route Frequency Provider Last Rate Last Admin    HYDROmorphone (DILAUDID) injection 0.25 mg  0.25 mg Intravenous Q4H PRN Osiris Pichardo MD   0.25 mg at 06/04/21 0651    ertapenem (INVanz) 1000 mg IVPB minibag  1,000 mg Intravenous Q24H Steve Sr MD   Stopped at 06/03/21 1653    sodium chloride flush 0.9 % injection 10 mL  10 mL Intravenous PRN Monster Carlton, DO   10 mL at 43/48/65 1743    insulin glargine (LANTUS) injection vial 20 Units  20 Units Subcutaneous BID Osiris Pichardo MD   20 Units at 06/03/21 2135    acetaminophen (TYLENOL) tablet 1,000 mg  1,000 mg Oral Q6H Osiris Pichardo MD   1,000 mg at 06/04/21 0417    ibuprofen (ADVIL;MOTRIN) tablet 600 mg  600 mg Oral TID  Osiris Pichardo MD   600 mg at 06/03/21 1702    oxyCODONE (Rowan Rolon) immediate release tablet 5 mg  5 mg Oral Q4H PRN Rosamaria Baig MD   5 mg at 06/01/21 2134    Or    oxyCODONE HCl (OXY-IR) immediate release tablet 10 mg  10 mg Oral Q4H PRN Rosamaria Baig MD   10 mg at 06/04/21 0417    polyethylene glycol (GLYCOLAX) packet 17 g  17 g Oral Daily James Li MD   17 g at 06/02/21 0735    [Held by provider] enoxaparin (LOVENOX) injection 100 mg  100 mg Subcutaneous BID James Li MD   100 mg at 06/03/21 0851    albuterol (PROVENTIL) nebulizer solution 2.5 mg  2.5 mg Nebulization 4x daily Joseph Bello MD   2.5 mg at 06/03/21 2154    atorvastatin (LIPITOR) tablet 20 mg  20 mg Oral Daily Joseph Bello MD   20 mg at 06/03/21 0851    budesonide (PULMICORT) nebulizer suspension 1,000 mcg  1 mg Nebulization BID Joseph Bello MD   1,000 mcg at 06/03/21 2155    gabapentin (NEURONTIN) capsule 300 mg  300 mg Oral BID Joseph Bello MD   300 mg at 06/03/21 2132    lipase-protease-amylase (CREON) delayed release capsule 72,000 Units  72,000 Units Oral TID WC Joseph Bello MD   72,000 Units at 06/03/21 1701    pantoprazole (PROTONIX) tablet 40 mg  40 mg Oral BID AC Joseph Bello MD   40 mg at 06/04/21 0509    sodium chloride flush 0.9 % injection 10 mL  10 mL Intravenous 2 times per day Joseph Bello MD   10 mL at 06/03/21 2133    sodium chloride flush 0.9 % injection 10 mL  10 mL Intravenous PRN Joseph Bello MD   10 mL at 05/29/21 2333    0.9 % sodium chloride infusion  25 mL Intravenous PRN Joseph Bello MD        methocarbamol (ROBAXIN) tablet 1,000 mg  1,000 mg Oral 4x Daily Joseph Bello MD   1,000 mg at 06/03/21 2130    ondansetron (ZOFRAN) injection 4 mg  4 mg Intravenous Q6H PRN Joseph Bello MD        glucose (GLUTOSE) 40 % oral gel 15 g  15 g Oral PRN Joseph Bello MD        dextrose 50 % IV solution  12.5 g Intravenous PRN Joseph Bello MD        glucagon (rDNA) injection 1 mg  1 mg Intramuscular PRN Delores Mccann arthritis and degenerative disc disease by MRI 3/2019    Ventral hernia        Past Surgical History:        Procedure Laterality Date    ANESTHESIA NERVE BLOCK Bilateral 07/11/2019    BILATERAL L4-5 TRANSFORAMINAL EPIDURAL STEROID INJECTION performed by Cheryle Ends, DO at [address] Bilateral 08/01/2019    BILATERAL MEDIAL BRANCH BLOCK L4-5 AND L5-S1 performed by Cheryle Ends, DO at [address] Bilateral 08/15/2019    BILATERAL MEDIAL BRANCH BLOCK L4 - 5 AND L5 - S1 performed by Cheryle Ends, DO at [facility] Right 03/29/2019    right wrist carpal tunnel release and right elbow cubital tunnel release    CARPAL TUNNEL RELEASE Right 03/29/2019    RIGHT WRIST CARPAL TUNNEL RELEASE AND RIGHT ELBOW CUBITAL TUNNEL RELEASE performed by Francesco Patrick DO at [place] [address]      at age 40 polyps    CYST REMOVAL Right     EPIDURAL STEROID INJECTION N/A 10/17/2019    LUMBAR EPIDURAL STEROID INJECTION UNDER FLUOROSCOPIC GUIDANCE AT L2-L3 WITHOUT SEDATION performed by Meredith French MD at [address] [facility] ERCP N/A 05/17/2021    ERCP ENDOSCOPIC RETROGRADE CHOLANGIOPANCREATOGRAPHY SPHINCTER/PAPILLOTOMY performed by Dina Wan MD at [address] [facility] ERCP N/A 05/17/2021    ERCP STENT INSERTION performed by Dina Wan MD at [facility] Bilateral     groin and umbilical    HERNIA REPAIR N/A 12/13/2018    ROBOTIC ASSISTED LAPAROSCOPIC PRIMARY REPAIR OF RECURRENT VENTRAL HERNIA  REPAIR performed by Iban Gaspar MD at [address] [facility] [address] Bilateral 07/11/2019    L4-5 transforaminal     NERVE BLOCK Bilateral 08/01/2019    medial branch block    NERVE BLOCK Bilateral 08/15/2019    bilateral medial branch block L4-S1    NERVE BLOCK  10/17/2019    lumbar epidural    PANCREATECTOMY N/A 05/12/2021    LAPAROSCOPIC ROBOTIC DISTAL PANCREATECTOMY  06/04/2021    CO2 24 06/04/2021    BUN 13 06/04/2021    CREATININE 0.7 06/04/2021    GFRAA >60 06/04/2021    LABGLOM >60 06/04/2021    GLUCOSE 129 06/04/2021    PROT 6.2 06/04/2021    CALCIUM 8.2 06/04/2021    BILITOT 0.2 06/04/2021    ALKPHOS 53 06/04/2021    AST 37 06/04/2021    ALT 36 06/04/2021       POC Tests: No results for input(s): POCGLU, POCNA, POCK, POCCL, POCBUN, POCHEMO, POCHCT in the last 72 hours. Coags:   Lab Results   Component Value Date    PROTIME 18.5 05/28/2021    INR 1.7 05/28/2021       HCG (If Applicable): No results found for: PREGTESTUR, PREGSERUM, HCG, HCGQUANT     ABGs:   Lab Results   Component Value Date    PO2ART 118.2 05/12/2021    QUN0IAC 56.0 05/12/2021    ORP9PEW 22.9 05/12/2021        Type & Screen (If Applicable):  No results found for: LABABO, LABRH    Drug/Infectious Status (If Applicable):  No results found for: HIV, HEPCAB    COVID-19 Screening (If Applicable):   Lab Results   Component Value Date    COVID19 Not Detected 05/27/2021    COVID19 Not Detected 05/06/2021     Location: Anterior body of the pancreas   Size: 1.9 x 1.5 x 1.5 cm   Change: Slight interval increase in size since 11/22/2019 MRI. Solid: No solid or enhancing features. Cystic: Overall appearance appears to represent a cluster of cysts.  Thin   septations may be present. Pancreatic Duct: Above findings have previously been characterized as side   branch IPMN.  On the current study, communication with the pancreatic duct is   established for at least 1 cystic component.  No ductal dilatation otherwise. Vascular: No vascular abnormality.    Other: None         Anesthesia Evaluation  Patient summary reviewed and Nursing notes reviewed no history of anesthetic complications:   Airway: Mallampati: III  TM distance: <3 FB   Neck ROM: limited  Mouth opening: > = 3 FB Dental:          Pulmonary: breath sounds clear to auscultation  (+) sleep apnea: on noncompliant,      (-) not a current smoker                           Cardiovascular:    (+) hypertension:, hyperlipidemia    (-) past MI, CAD and CABG/stent    ECG reviewed  Rhythm: regular  Rate: normal           Beta Blocker:  Not on Beta Blocker      ROS comment: Narrative & Impression    Sinus tachycardia with occasional premature ventricular complexes  Nonspecific T wave abnormality  Abnormal ECG  When compared with ECG of 06-MAY-2021 10:43,  Significant changes have occurred  Confirmed by Afia Eid ([id number]) on 5/13/2021 8:47:40 AM         Neuro/Psych:   (+) neuromuscular disease (lumbar spine spondylosis; sacral radiculitis; cervical radiculopathy (C5-7 fusion in 2018); s/p T10-L2 fusion (Jan 2020)):, psychiatric history:depression/anxiety              ROS comment: Bell's palsy - March 2019 (left face); No issues currently    Lower extremity neuropathy    Right hand - ring finger and 5th finger are numb GI/Hepatic/Renal:   (+) GERD: poorly controlled,          ROS comment: S/p pancreaticectomy. Endo/Other:    (+) Diabetes (A1C 7.1% in 2019. AM  mg/dL. )Type II DM, , blood dyscrasia (Lovenox last on 6/3 @0830): anemia and anticoagulation therapy, arthritis: OA., .                  ROS comment: OA, DJD, DDD, cervical radiculopathy, chronic low back pain with right sciatica Abdominal:           Vascular: negative vascular ROS. Anesthesia Plan      MAC     ASA 3       Induction: intravenous. Anesthetic plan and risks discussed with patient. Use of blood products discussed with patient whom consented to blood products. Plan discussed with CRNA and attending. Aldair Schilling RN   6/4/2021      Agree with above assessment. Physical exam unchanged. Spoke to patient about anesthetic plan.   Patient understands and wishes to proceed.  (this addendum was done preop but unable to be filed electronically at that time)

## 2021-06-04 NOTE — ADT AUTH CERT
Gastroenterology [address] Day 4 (2021) by Sourav Green RN       Review Status Review Entered   Completed 2021 10:56      Criteria Review      Care Day: 4 Care Date: 2021 Level of Care: Intermediate Care    Guideline Day 2    Level Of Care    ( ) Floor    2021 10:56 AM EDT by José Miguel Lindo      [facility]    Clinical Status    ( ) * No ICU or intermediate care needs    2021 10:56 AM EDT by Nany Cleaning [facility]    Interventions    (X) Inpatient interventions continue    2021 10:56 AM EDT by José Miguel Lindo      see notes    * Milestone   Additional Notes    [facility]  ir drain       Gen surg      Patient presents with   · Fatigue       sent by dr Ernesto Gaytan after surgery follow up pt states that he has had fever and chills            Subjective:   Pain continues to improve, + BM, tolerating food, ambulating       Objective:   /73   Pulse 102   Temp 99 °F (37.2 °C) (Temporal)   Resp 18   Ht 5' 8\" (1.727 m)   Wt 214 lb (97.1 kg)   SpO2 100%   BMI 32.54 kg/m²    Temp (24hrs), Av.9 °F (37.2 °C), Min:98.8 °F (37.1 °C), Max:99 °F (37.2 °C)           I/O (24Hr):   I/O last 3 completed shifts:   In: -    Out: 1675 [Urine:1325; Drains:350]        GENERAL:  No acute distress. Alert and interactive. LUNGS:  No cough. Nonlabored breathing on room air. CARDIOVASC:  Normal rate, no cyanosis. ABDOMEN:  Soft, non-distended, appropriate ciara-drain LUQ tenderness, drain with bile tinged purulent output 225 cc, mild to moderate generalized tenderness otherwise. MAX drain purulent. Midline intact except pinpoint serous draining opening inferiorly. No guarding / rigidity / rebound. EXTREMITIES:  No edema, no deformities.       Assessment:   62 y.o. 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  Portal vein thrombosis.       Plan:   - zosyn, appreciate ID recs, f/u cultures   -  Monitor drain output   - drain & blood cx pending   - TLov for portal vein thrombosis-no signs of bleeding   - home meds, increased glargine, monitor glucose throughout today for dose, changed diet to carb control, glucose now improved   - hypernatremia improved with PO intake and IVF   - DC pending abx recs   -encourage ambulation   -wean off IV pain meds       Electronically signed by Carly Sawyer MD on 5/30/2021 at 8:13 AM       Improving   Drain output 350 over 24/hr   appreicate ID's recommendations   Continue diet with supplements\   Ambulate   Continue lovenox, will need transition to eliquis upon discharge             Id notes      SUBJECTIVE:   Patient is tolerating medications. No reported adverse drug reactions. No nausea, vomiting, diarrhea. Abdominal pain and weakness. No longer having chills. His hands feel shaky with activity.  Feeling little better.        Review of systems:   As stated above in the chief complaint, otherwise negative.       Medications:   Scheduled Meds:   Scheduled MedicationsExpand by Default   · insulin glargine 15 Units Subcutaneous BID   · ketorolac 15 mg Intravenous Q8H   · acetaminophen 500 mg Oral Q6H   · polyethylene glycol 17 g Oral Daily   · enoxaparin 100 mg Subcutaneous BID   · piperacillin-tazobactam 3,375 mg Intravenous Q8H   · albuterol 2.5 mg Nebulization 4x daily   · atorvastatin 20 mg Oral Daily   · budesonide 1 mg Nebulization BID   · gabapentin 300 mg Oral BID   · lipase-protease-amylase 72,000 Units Oral TID WC   · pantoprazole 40 mg Oral BID AC   · sodium chloride flush 10 mL Intravenous 2 times per day   · methocarbamol 1,000 mg Oral 4x Daily   · insulin lispro 0-18 Units Subcutaneous Q4H             PRN Meds:   PRN Medications   oxyCODONE **OR** oxyCODONE, sodium chloride, sodium chloride, sodium chloride flush, sodium chloride, ondansetron, glucose, dextrose, glucagon (rDNA), dextrose      Iv dilaudid x3 and oxycodone x5          OBJECTIVE:   /71   Pulse 118   Temp 99.2 °F (37.3 °C) (Oral)   Resp 18   Ht 5' 8\" (1.727 m)   Wt 214 lb (97.1 kg)   SpO2 100%   BMI 32.54 kg/m²    Temp  Av °F (37.2 °C)  Min: 98.8 °F (37.1 °C)  Max: 99.2 °F (37.3 °C)   Constitutional: The patient is awake, alert, and oriented. Skin: Warm and dry. No rashes were noted. HEENT: Round and reactive pupils.  Moist mucous membranes.  No ulcerations or thrush. Neck: Supple to movements. Chest: No use of accessory muscles to breathe. Symmetrical expansion.  No wheezing, crackles or rhonchi. Cardiovascular: S1 and S2 are rhythmic and regular. No murmurs appreciated. Abdomen: Positive bowel sounds to auscultation. Benign to palpation. No masses felt. No hepatosplenomegaly. bialt kendall drains, dry dressing   Genitourinary: mail   Extremities: No clubbing, no cyanosis, no edema.    Lines: peripheral       Laboratory and Tests Review:   Lab Results   Component Value Date     WBC 8.6 2021     WBC 7.6 2021     WBC 9.2 2021     HGB 7.8 (L) 2021     HCT 25.7 (L) 2021     MCV 74.1 (L) 2021      (H) 2021       Lab Results   Component Value Date     NEUTROABS 5.85 2021     NEUTROABS 5.85 2021     NEUTROABS 7.77 (H) 2021       No results found for: [facility]   Lab Results   Component Value Date     ALT 84 (H) 2021     AST 68 (H) 2021     ALKPHOS 53 2021     BILITOT 0.3 2021       Lab Results   Component Value Date      2021     K 4.0 2021      2021     CO2 21 2021     BUN 13 2021     CREATININE 0.9 2021     CREATININE 0.7 2021     CREATININE 0.6 2021     GFRAA >60 2021     LABGLOM >60 2021     GLUCOSE 137 2021     PROT 6.2 2021     LABALBU 2.7 2021     CALCIUM 8.0 2021     BILITOT 0.3 2021     ALKPHOS 53 2021     AST 68 2021     ALT 84 2021 (A)   Preliminary     Additional growth present, also evaluating for;   Mixed Gram negative rods tenderness, drain with bile tinged purulent output 555 cc, mild to moderate generalized tenderness otherwise. MAX drain purulent. Midline intact except pinpoint serous draining opening inferiorly. No guarding / rigidity / rebound. EXTREMITIES:  No edema, no deformities.       Assessment:   62 y.o. 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 Portal vein thrombosis.       Plan:   - DC IVF   - zosyn, appreciate ID recs, f/u cultures   -  Monitor drain output   - drain & blood cx pending   - TLov for portal vein thrombosis-no signs of bleeding   - home meds, increased glargine, monitor glucose throughout today for dose, changed diet to carb control   - DC pending abx recs       Electronically signed by Willie Fox MD on 5/29/2021 at 8:06 AM       Feeling much better with IR drain   Appreciate ID recommendations   Intra-abdominal abscess - known complication from pancreatic leak post surgery - leak rate post distal pancreatectomy and splenectomy is 50%. PT/OT eval and treat   Ambulate   Therapeutic lovenox secondary to PV thrombosis from inflammation secondary to pancreatic leak         ID notes      Patient is tolerating medications. No reported adverse drug reactions. No nausea, vomiting, diarrhea. Abdominal pain and weakness.  No longer having chills.        Review of systems:   As stated above in the chief complaint, otherwise negative.       Medications:   Scheduled Meds:   Scheduled MedicationsExpand by Default   · insulin glargine 15 Units Subcutaneous BID   · ketorolac 15 mg Intravenous Q8H   · acetaminophen 500 mg Oral Q6H   · polyethylene glycol 17 g Oral Daily   · enoxaparin 100 mg Subcutaneous BID   · piperacillin-tazobactam 3,375 mg Intravenous Q8H   · albuterol 2.5 mg Nebulization 4x daily   · atorvastatin 20 mg Oral Daily   · budesonide 1 mg Nebulization BID   · gabapentin 300 mg Oral BID   · lipase-protease-amylase 72,000 Units Oral TID WC   · pantoprazole 40 mg Oral BID AC · sodium chloride flush 10 mL Intravenous 2 times per day   · methocarbamol 1,000 mg Oral 4x Daily   · insulin lispro 0-18 Units Subcutaneous Q4H          Continuous Infusions:   Infusions Meds   Iv 0.45 nss at 75 hr       PRN Meds:   PRN Medications   oxyCODONE **OR** oxyCODONE, sodium chloride, sodium chloride, HYDROmorphone, sodium chloride flush, sodium chloride, ondansetron, glucose, dextrose, glucagon (rDNA), dextrose      Iv dilaudid x4 and oxycodone x6          OBJECTIVE:   /69   Pulse 102   Temp 98.8 °F (37.1 °C) (Oral)   Resp 18   Ht 5' 8\" (1.727 m)   Wt 214 lb (97.1 kg)   SpO2 94%   BMI 32.54 kg/m²    Temp  Av.4 °F (36.9 °C)  Min: 97.8 °F (36.6 °C)  Max: 99.3 °F (37.4 °C)   Constitutional: The patient is awake, alert, and oriented. Skin: Warm and dry. No rashes were noted. HEENT: Round and reactive pupils.  Moist mucous membranes.  No ulcerations or thrush. Neck: Supple to movements. Chest: No use of accessory muscles to breathe. Symmetrical expansion.  No wheezing, crackles or rhonchi. Cardiovascular: S1 and S2 are rhythmic and regular. No murmurs appreciated. Abdomen: Positive bowel sounds to auscultation. Benign to palpation. No masses felt. No hepatosplenomegaly. bialt kendall drains, dry dressing   Genitourinary: mail   Extremities: No clubbing, no cyanosis, no edema.    Lines: peripheral       Laboratory and Tests Review:   Lab Results   Component Value Date     WBC 7.6 2021     WBC 9.2 2021     WBC 9.5 2021     HGB 7.8 (L) 2021     HCT 25.4 (L) 2021     MCV 72.8 (L) 2021      (H) 2021       Lab Results   Component Value Date     NEUTROABS 5.85 2021     NEUTROABS 7.77 (H) 2021     NEUTROABS 7.22 2021       No results found for: [facility]   Lab Results   Component Value Date      (H) 2021      (H) 2021     ALKPHOS 56 2021     BILITOT 0.5 2021       Lab Results   Component Value Date      05/29/2021     K 4.3 05/29/2021     CL 97 05/29/2021     CO2 22 05/29/2021     BUN 14 05/29/2021     CREATININE 0.7 05/29/2021     CREATININE 0.6 05/28/2021     CREATININE 0.7 05/27/2021     GFRAA >60 05/29/2021     LABGLOM >60 05/29/2021     GLUCOSE 320 05/29/2021     PROT 6.4 05/29/2021     LABALBU 2.7 05/29/2021     CALCIUM 8.0 05/29/2021     BILITOT 0.5 05/29/2021     ALKPHOS 56 05/29/2021      05/29/2021      05/29/2021       No results found for: CRP   No results found for: [address]   Radiology:           Microbiology:    Lab Results   Component Value Date     BC 24 Hours no growth 05/27/2021       Lab Results   Component Value Date     BLOODCULT2 24 Hours no growth 05/27/2021       Assessment:   Intraabdominal abscess/surgical site infection   Recent distal pancreatectomy with splenectomy FT 07/57 complicated by pancreatic leak s/p pancreatic stent placement on 05/17       Plan:   Continue piperacillin-tazobactam 3.375g q8h.   s/p percutaneous abscess drainage is noted. Follow up blood and fluid cultures. Continue local wound care.    Continue to follow cultures   From body fluid    Will be on alert for yeast

## 2021-06-04 NOTE — PROGRESS NOTES
B SURGERY  DAILY PROGRESS NOTE    Date:2021       YSIV:[id number]  Patient Name:Eliezer Cook     Date of Birth:     Age:60 y.o. Chief Complaint:  Chief Complaint   Patient presents with    Fatigue     sent by dr Fabi Romo after surgery follow up pt states that he has had fever and chills         Subjective:  No nausea/vomiting. Tolerating diet and having daily BM. Ambulating. Pain well controlled (still uses dilaudid 0.25)    Objective:  /70   Pulse 80   Temp 98 °F (36.7 °C) (Oral)   Resp 19   Ht 5' 8\" (1.727 m)   Wt 218 lb (98.9 kg)   SpO2 96%   BMI 33.15 kg/m²   Temp (24hrs), Av.9 °F (36.6 °C), Min:97.6 °F (36.4 °C), Max:98 °F (36.7 °C)      I/O (24Hr):  I/O last 3 completed shifts: In: 2860 [P.O.:960; I.V.:1900]  Out: 1046 [Urine:950; Drains:96]     GENERAL:  No acute distress. Alert and interactive. LUNGS:  No cough. Nonlabored breathing on room air. CARDIOVASC:  Normal rate, no cyanosis. ABDOMEN:  Soft, non-distended, mild generalized tenderness, left upper quadrant MAX and right upper midline IR MAX drains drains both with scant pus. Midline intact except pinpoint opening inferiorly. No guarding / rigidity / rebound. EXTREMITIES:  No edema, no deformities. Assessment:  62 y.o. 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 Portal vein thrombosis.     Plan:  - Ertapenem (multiorganism sensitivities resulted, blood cx & fungus negative) to be changed to PO upon discharge per infectious disease  - continue both drains, flush both daily, empty every 2 hrs  - (held for procedure) TLov for portal vein thrombosis; to eliquis @discharge  - home meds, glargine 20 BID  - NPO for procedure, then low fat carb ctrl diet w/ creon  - ambulate  - wean IV pain meds as able  - Dr America Vásquez for pancreatic stent exchange, today     Patient considering dc to home, will f/u MAX teaching and pain ctrl    Electronically signed by Cici Mclaughlin MD on 2021 at 6:37 AM Appreciate Dr. Van Andrade pancreatic stent placement today  MAX drains look much better  Pancreatic in nature now.   Diet when okay with Dr. Kathy Camp once everything is set up or to a SNF    Electronically signed by Stan Lane MD on 6/4/2021 at 12:41 PM

## 2021-06-04 NOTE — OP NOTE
second stent in the pancreatic duct with hopes of draining the pancreatic fluid collection through the ampulla. A 10 [place] by 10 cm duodenal band biliary stent was advanced into the pancreatic duct and into the pancreatic fluid collection without difficulty. There was free drainage of purulent material through that stent at the level of the ampulla. The stent was in good position endoscopically and fluoroscopically. The scope was then withdrawn. The patient tolerated the procedure well.       Levi Jefferson MD

## 2021-06-04 NOTE — CARE COORDINATION
Transition of Care-discharge plan is home with [facility], patient was active with [facility] prior to admission, will need [facility] orders for PT/OT, skilled nursing and home health care aide. Patient will discharge on PO antibiotics, scripts on chart. Patient went for ERCP with stent placement today, discharge pending plan of care from surgical team. Family to transport home.     Emilia ADRIANN, RN  [facility]   [phone]

## 2021-06-04 NOTE — PROGRESS NOTES
[facility] PROGRESS NOTE      Chief complaint: Follow-up of intraabdominal abscess    The patient is a 62 y.o. male with history of hypertension, hyperlipidemia, DM, IPMN of pancreatic tail status post robotic converted to open distal pancreatectomy with splenectomy on 72/04 complicated by pancreatic leak requiring stent placement on 05/17, presented on 05/27 with purulent intra-abdominal drain output first noted on 05/24 accompanied by abdominal discomfort. On admission, he was afebrile and hemodynamically stable with no leukocytosis. Urinalysis showed no pyuria. SARS-CoV-2 PCR was not detected. CT abdomen and pelvis showed status post splenectomy and partial pancreatectomy with very large complex fluid collection in the upper abdomen extending to the retroperitoneum at the surgical site with multiple locules of air consistent with large abscess measuring 21 x 7.8 x 7.2 cm, intraluminal filling defect within the portal vein consistent with portal vein thrombosis, small partially loculated pleural effusions extending into the major fissures bilaterally. He underwent percutaneous abscess drain placement on 05/28. Drain fluid Gram stain and culture showed no polymorphonuclear leukocytes, no epithelial cells, moderate gram-positive cocci in chains, abundant gram-negative rods, rare gram-positive diphtheroid-like rods, mixed gram-positive organisms and gram-negative rods, moderate growth of Enterobacter cloacae (susceptible to fluoroquinolones and cotrimoxazole), Cronobacter sakazakii (susceptible to fluoroquinolones and cotrimoxazole), Klebsiella oxytoca (susceptible to fluoroquinolones and cotrimoxazole), alpha-hemolytic Streptococcus, light growth of coagulase-negative Staphylococcus, heavy growth of E coli (non-ESBL), anaerobic Gram-negative jason. Serum beta-D-glucan was negative. Piperacillin-tazobactam was started on admission then switched to ertapenem on 06/02.  Repeat CT abdomen and pelvis on 06/02 showed interval placement of a 2nd drainage catheter into the left upper quadrant abscess, with mild decrease in its volume. He underwent ERCP with pancreatic duct stent placement on 06/04 during which there was free drainage of pancreatic fluid and purulent material through the stent and a second stent in the pancreatic duct with hopes of draining the pancreatic fluid collection through the ampulla. Subjective: Patient was seen and examined. He reports feeling well, no abdominal pain, no diarrhea, no rash, no itching. Objective:    Vitals:    06/04/21 1033   BP: (!) 124/59   Pulse: 89   Resp: 18   Temp: 97.9 °F (36.6 °C)   SpO2: 98%     Constitutional: Alert, not in distress  Respiratory: Clear breath sounds, no crackles, no wheezes  Cardiovascular: Regular rate and rhythm, no murmurs  Gastrointestinal: Bowel sounds present, soft, nontender. Left abdominal MAX drain with creamy tan fluid output. Right abdominal MAX drain with minimal serosanguinous fluid output  Skin: Warm and dry, no active dermatoses  Musculoskeletal: No joint swelling, no joint erythema    Labs, imaging, and medical records/notes were personally reviewed. Assessment:  Intraabdominal abscess/surgical site infection, s/p percutaneous draini placement on 05/28. Recent distal pancreatectomy with splenectomy on 42/16 complicated by pancreatic leak s/p pancreatic stent placement on 05/17    Recommendations:  Continue ertapenem 1g q24h. Plan to switch to oral ciprofloxacin 500 mg q12h and metronidazole 500 mg q8h on discharge if clinically improved to complete 4 weeks of antibiotic therapy from 05/28-06/25. Follow up with me at [address] [place] [address] on 06/17. Thank you for involving me in the care of Mic Prim. I will continue to follow. Please do not hesitate to call for any questions or concerns.       Electronically signed by Cory Leo MD on 6/4/2021 at 10:36 AM

## 2021-06-05 VITALS
HEIGHT: 68 IN | DIASTOLIC BLOOD PRESSURE: 73 MMHG | TEMPERATURE: 97.7 F | BODY MASS INDEX: 33.04 KG/M2 | OXYGEN SATURATION: 98 % | WEIGHT: 218 LBS | RESPIRATION RATE: 16 BRPM | HEART RATE: 97 BPM | SYSTOLIC BLOOD PRESSURE: 128 MMHG

## 2021-06-05 LAB
ALBUMIN SERPL-MCNC: 2.6 G/DL (ref 3.5–5.2)
ALP BLD-CCNC: 51 U/L (ref 40–129)
ALT SERPL-CCNC: 28 U/L (ref 0–40)
ANION GAP SERPL CALCULATED.3IONS-SCNC: 8 MMOL/L (ref 7–16)
AST SERPL-CCNC: 26 U/L (ref 0–39)
BASOPHILS ABSOLUTE: 0.04 E9/L (ref 0–0.2)
BASOPHILS RELATIVE PERCENT: 0.5 % (ref 0–2)
BILIRUB SERPL-MCNC: 0.2 MG/DL (ref 0–1.2)
BUN BLDV-MCNC: 7 MG/DL (ref 6–20)
CALCIUM SERPL-MCNC: 8.3 MG/DL (ref 8.6–10.2)
CHLORIDE BLD-SCNC: 103 MMOL/L (ref 98–107)
CO2: 24 MMOL/L (ref 22–29)
CREAT SERPL-MCNC: 0.6 MG/DL (ref 0.7–1.2)
EOSINOPHILS ABSOLUTE: 0.27 E9/L (ref 0.05–0.5)
EOSINOPHILS RELATIVE PERCENT: 3.1 % (ref 0–6)
GFR AFRICAN AMERICAN: >60
GFR NON-AFRICAN AMERICAN: >60 ML/MIN/1.73
GLUCOSE BLD-MCNC: 151 MG/DL (ref 74–99)
HCT VFR BLD CALC: 26.2 % (ref 37–54)
HEMOGLOBIN: 7.9 G/DL (ref 12.5–16.5)
IMMATURE GRANULOCYTES #: 0.07 E9/L
IMMATURE GRANULOCYTES %: 0.8 % (ref 0–5)
LYMPHOCYTES ABSOLUTE: 1.69 E9/L (ref 1.5–4)
LYMPHOCYTES RELATIVE PERCENT: 19.5 % (ref 20–42)
MCH RBC QN AUTO: 22.4 PG (ref 26–35)
MCHC RBC AUTO-ENTMCNC: 30.2 % (ref 32–34.5)
MCV RBC AUTO: 74.2 FL (ref 80–99.9)
METER GLUCOSE: 134 MG/DL (ref 74–99)
METER GLUCOSE: 279 MG/DL (ref 74–99)
METER GLUCOSE: 314 MG/DL (ref 74–99)
MONOCYTES ABSOLUTE: 1.31 E9/L (ref 0.1–0.95)
MONOCYTES RELATIVE PERCENT: 15.1 % (ref 2–12)
NEUTROPHILS ABSOLUTE: 5.29 E9/L (ref 1.8–7.3)
NEUTROPHILS RELATIVE PERCENT: 61 % (ref 43–80)
PDW BLD-RTO: 16.2 FL (ref 11.5–15)
PLATELET # BLD: 750 E9/L (ref 130–450)
PMV BLD AUTO: 10.8 FL (ref 7–12)
POTASSIUM REFLEX MAGNESIUM: 4.1 MMOL/L (ref 3.5–5)
RBC # BLD: 3.53 E12/L (ref 3.8–5.8)
SODIUM BLD-SCNC: 135 MMOL/L (ref 132–146)
TOTAL PROTEIN: 6.1 G/DL (ref 6.4–8.3)
WBC # BLD: 8.7 E9/L (ref 4.5–11.5)

## 2021-06-05 PROCEDURE — 80053 COMPREHEN METABOLIC PANEL: CPT

## 2021-06-05 PROCEDURE — 6360000002 HC RX W HCPCS: Performed by: SURGERY

## 2021-06-05 PROCEDURE — 2580000003 HC RX 258: Performed by: SURGERY

## 2021-06-05 PROCEDURE — 94640 AIRWAY INHALATION TREATMENT: CPT

## 2021-06-05 PROCEDURE — 82962 GLUCOSE BLOOD TEST: CPT

## 2021-06-05 PROCEDURE — 6370000000 HC RX 637 (ALT 250 FOR IP): Performed by: SURGERY

## 2021-06-05 PROCEDURE — 99232 SBSQ HOSP IP/OBS MODERATE 35: CPT | Performed by: SURGERY

## 2021-06-05 PROCEDURE — 85025 COMPLETE CBC W/AUTO DIFF WBC: CPT

## 2021-06-05 PROCEDURE — 36415 COLL VENOUS BLD VENIPUNCTURE: CPT

## 2021-06-05 RX ADMIN — ALBUTEROL SULFATE 2.5 MG: 2.5 SOLUTION RESPIRATORY (INHALATION) at 12:31

## 2021-06-05 RX ADMIN — PANTOPRAZOLE SODIUM 40 MG: 40 TABLET, DELAYED RELEASE ORAL at 17:16

## 2021-06-05 RX ADMIN — ACETAMINOPHEN 1000 MG: 500 TABLET ORAL at 03:46

## 2021-06-05 RX ADMIN — BUDESONIDE 1000 MCG: 0.5 SUSPENSION RESPIRATORY (INHALATION) at 08:47

## 2021-06-05 RX ADMIN — ERTAPENEM SODIUM 1000 MG: 1 INJECTION, POWDER, LYOPHILIZED, FOR SOLUTION INTRAMUSCULAR; INTRAVENOUS at 14:41

## 2021-06-05 RX ADMIN — IBUPROFEN 600 MG: 400 TABLET, FILM COATED ORAL at 08:53

## 2021-06-05 RX ADMIN — INSULIN LISPRO 12 UNITS: 100 INJECTION, SOLUTION INTRAVENOUS; SUBCUTANEOUS at 16:34

## 2021-06-05 RX ADMIN — OXYCODONE HYDROCHLORIDE 10 MG: 10 TABLET ORAL at 00:01

## 2021-06-05 RX ADMIN — Medication 10 ML: at 08:53

## 2021-06-05 RX ADMIN — ALBUTEROL SULFATE 2.5 MG: 2.5 SOLUTION RESPIRATORY (INHALATION) at 08:47

## 2021-06-05 RX ADMIN — IBUPROFEN 600 MG: 400 TABLET, FILM COATED ORAL at 17:17

## 2021-06-05 RX ADMIN — PANTOPRAZOLE SODIUM 40 MG: 40 TABLET, DELAYED RELEASE ORAL at 06:36

## 2021-06-05 RX ADMIN — METHOCARBAMOL TABLETS 1000 MG: 500 TABLET, COATED ORAL at 17:16

## 2021-06-05 RX ADMIN — ENOXAPARIN SODIUM 100 MG: 100 INJECTION SUBCUTANEOUS at 03:30

## 2021-06-05 RX ADMIN — OXYCODONE HYDROCHLORIDE 10 MG: 10 TABLET ORAL at 10:59

## 2021-06-05 RX ADMIN — PANCRELIPASE 72000 UNITS: 60000; 12000; 38000 CAPSULE, DELAYED RELEASE PELLETS ORAL at 17:16

## 2021-06-05 RX ADMIN — ACETAMINOPHEN 1000 MG: 500 TABLET ORAL at 08:53

## 2021-06-05 RX ADMIN — INSULIN GLARGINE 20 UNITS: 100 INJECTION, SOLUTION SUBCUTANEOUS at 08:54

## 2021-06-05 RX ADMIN — ENOXAPARIN SODIUM 100 MG: 100 INJECTION SUBCUTANEOUS at 14:37

## 2021-06-05 RX ADMIN — METHOCARBAMOL TABLETS 1000 MG: 500 TABLET, COATED ORAL at 08:52

## 2021-06-05 RX ADMIN — METHOCARBAMOL TABLETS 1000 MG: 500 TABLET, COATED ORAL at 11:57

## 2021-06-05 RX ADMIN — INSULIN LISPRO 9 UNITS: 100 INJECTION, SOLUTION INTRAVENOUS; SUBCUTANEOUS at 11:57

## 2021-06-05 RX ADMIN — ACETAMINOPHEN 1000 MG: 500 TABLET ORAL at 14:36

## 2021-06-05 RX ADMIN — OXYCODONE HYDROCHLORIDE 10 MG: 10 TABLET ORAL at 06:36

## 2021-06-05 RX ADMIN — INSULIN LISPRO 3 UNITS: 100 INJECTION, SOLUTION INTRAVENOUS; SUBCUTANEOUS at 08:54

## 2021-06-05 RX ADMIN — PANCRELIPASE 72000 UNITS: 60000; 12000; 38000 CAPSULE, DELAYED RELEASE PELLETS ORAL at 08:52

## 2021-06-05 RX ADMIN — ATORVASTATIN CALCIUM 20 MG: 20 TABLET, FILM COATED ORAL at 08:53

## 2021-06-05 RX ADMIN — ALBUTEROL SULFATE 2.5 MG: 2.5 SOLUTION RESPIRATORY (INHALATION) at 17:12

## 2021-06-05 RX ADMIN — PANCRELIPASE 72000 UNITS: 60000; 12000; 38000 CAPSULE, DELAYED RELEASE PELLETS ORAL at 11:56

## 2021-06-05 RX ADMIN — GABAPENTIN 300 MG: 300 CAPSULE ORAL at 08:53

## 2021-06-05 RX ADMIN — IBUPROFEN 600 MG: 400 TABLET, FILM COATED ORAL at 11:56

## 2021-06-05 ASSESSMENT — PAIN SCALES - GENERAL
PAINLEVEL_OUTOF10: 7
PAINLEVEL_OUTOF10: 5
PAINLEVEL_OUTOF10: 8
PAINLEVEL_OUTOF10: 9
PAINLEVEL_OUTOF10: 7

## 2021-06-05 NOTE — PROGRESS NOTES
ENDOSCOPIC SURGERY  DAILY PROGRESS NOTE    Date:2021       SNKT:[id number]/-[id number]  Patient Name:Eliezer Cook     Date of Birth:     Age:60 y.o. Chief Complaint:  Chief Complaint   Patient presents with    Fatigue     sent by dr Deluna Russian after surgery follow up pt states that he has had fever and chills         Subjective:  No acute events overnight. Tolerating diet well. Ambulating. Endorsing bowel function    Objective:  /73   Pulse 97   Temp 97.7 °F (36.5 °C) (Oral)   Resp 16   Ht 5' 8\" (1.727 m)   Wt 218 lb (98.9 kg)   SpO2 97%   BMI 33.15 kg/m²   Temp (24hrs), Av.9 °F (36.6 °C), Min:97.7 °F (36.5 °C), Max:98.1 °F (36.7 °C)      I/O (24Hr):  I/O last 3 completed shifts: In: 180 [I.V.:180]  Out: 1720 [Urine:1650; Drains:70]     GENERAL:  No acute distress. Alert and interactive. LUNGS:  No cough. Nonlabored breathing on room air. CARDIOVASC:  Normal rate, no cyanosis. ABDOMEN:  Soft, non-distended, mild generalized tenderness, left upper quadrant MAX and right upper midline IR MAX drains drains both with scant pus versus pancreatic output. Midline intact except pinpoint opening inferiorly. No guarding / rigidity / rebound. EXTREMITIES:  No edema, no deformities. Assessment:  62 y.o. 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 Portal vein thrombosis.  Dr Robbin Syed pancreatic stent exchange     Plan:  - Ertapenem (multiorganism sensitivities resulted, blood cx & fungus negative) to be changed to PO upon discharge per infectious disease  - continue both drains, flush both daily, empty every 2 hrs  - TLov for portal vein thrombosis; to eliquis @discharge  - home meds, glargine 20 BID  - low fat carb ctrl diet w/ creon  - ambulate  - wean IV pain meds as able  - Discharge planning was to SNF, likely today but patient considering dc to home, will f/u MAX teaching and pain ctrl  -We will discharge later today patient continues to eat well and ambulate    Electronically signed by Margarita Maria DO on 6/5/2021 at 8:40 AM

## 2021-06-05 NOTE — PROGRESS NOTES
[facility] PROGRESS NOTE      Chief complaint: Follow-up of intraabdominal abscess    The patient is a 62 y.o. male with history of hypertension, hyperlipidemia, DM, IPMN of pancreatic tail status post robotic converted to open distal pancreatectomy with splenectomy on 02/96 complicated by pancreatic leak requiring stent placement on 05/17, presented on 05/27 with purulent intra-abdominal drain output first noted on 05/24 accompanied by abdominal discomfort. On admission, he was afebrile and hemodynamically stable with no leukocytosis. Urinalysis showed no pyuria. SARS-CoV-2 PCR was not detected. CT abdomen and pelvis showed status post splenectomy and partial pancreatectomy with very large complex fluid collection in the upper abdomen extending to the retroperitoneum at the surgical site with multiple locules of air consistent with large abscess measuring 21 x 7.8 x 7.2 cm, intraluminal filling defect within the portal vein consistent with portal vein thrombosis, small partially loculated pleural effusions extending into the major fissures bilaterally. He underwent percutaneous abscess drain placement on 05/28. Drain fluid Gram stain and culture showed no polymorphonuclear leukocytes, no epithelial cells, moderate gram-positive cocci in chains, abundant gram-negative rods, rare gram-positive diphtheroid-like rods, mixed gram-positive organisms and gram-negative rods, moderate growth of Enterobacter cloacae (susceptible to fluoroquinolones and cotrimoxazole), Cronobacter sakazakii (susceptible to fluoroquinolones and cotrimoxazole), Klebsiella oxytoca (susceptible to fluoroquinolones and cotrimoxazole), alpha-hemolytic Streptococcus, light growth of coagulase-negative Staphylococcus, heavy growth of E coli (non-ESBL), anaerobic Gram-negative jason. Serum beta-D-glucan was negative. Piperacillin-tazobactam was started on admission then switched to ertapenem on 06/02.  Repeat CT abdomen and pelvis on 06/02 showed interval placement of a 2nd drainage catheter into the left upper quadrant abscess, with mild decrease in its volume. He underwent ERCP with pancreatic duct stent placement on 06/04 during which there was free drainage of pancreatic fluid and purulent material through the stent and a second stent in the pancreatic duct with hopes of draining the pancreatic fluid collection through the ampulla. Subjective: Patient was seen and examined. He reports feeling tired, no abdominal pain, no diarrhea, no rash, no itching. Objective:  /73   Pulse 97   Temp 97.7 °F (36.5 °C) (Oral)   Resp 16   Ht 5' 8\" (1.727 m)   Wt 218 lb (98.9 kg)   SpO2 98%   BMI 33.15 kg/m²   Constitutional: Alert, not in distress  Respiratory: Clear breath sounds, no crackles, no wheezes  Cardiovascular: Regular rate and rhythm, no murmurs  Gastrointestinal: Bowel sounds present, soft, nontender. Bilateral abdominal MAX drain with minimal creamy tan fluid output. Skin: Warm and dry, no active dermatoses  Musculoskeletal: No joint swelling, no joint erythema    Labs, imaging, and medical records/notes were personally reviewed. Assessment:  Intraabdominal abscess/surgical site infection, s/p percutaneous draini placement on 05/28. Recent distal pancreatectomy with splenectomy on 42/43 complicated by pancreatic leak s/p pancreatic stent placement on 05/17    Recommendations:  Continue ertapenem 1g q24h. Plan to switch to oral ciprofloxacin 500 mg q12h and metronidazole 500 mg q8h on discharge to complete 4 weeks of antibiotic therapy from 05/28-06/25. Follow up with me at [address] [place] [address] on 06/17. Thank you for involving me in the care of Nikia Burch. I will continue to follow. Please do not hesitate to call for any questions or concerns.       Electronically signed by Filippo Silva MD on 6/5/2021 at 10:12 AM

## 2021-06-08 ENCOUNTER — TELEPHONE (OUTPATIENT)
Dept: HEMATOLOGY | Age: 61
End: 2021-06-08

## 2021-06-08 DIAGNOSIS — T81.43XA ABSCESS, INTRA-ABDOMINAL, POSTOPERATIVE: Primary | ICD-10-CM

## 2021-06-08 NOTE — TELEPHONE ENCOUNTER
I received a call from [address] [place] [address] who stated that patients drain is only draining small amount into bag but it has copious drainage around insertion site. I did question if the tube is being flushed which it is and she stated she flushed it herself today and it flushes easily with no resistance and drains into the bag. I put order in for a drain check and I did reach out to IR scheduling who is going to work on getting him scheduled prior to his appt with Dr. Turner Mendez on 6/10/21.       Electronically signed by Akhil Vargas RN on 6/8/2021 at 1:49 PM

## 2021-06-10 ENCOUNTER — OFFICE VISIT (OUTPATIENT)
Dept: HEMATOLOGY | Age: 61
End: 2021-06-10
Payer: MEDICAID

## 2021-06-10 ENCOUNTER — HOSPITAL ENCOUNTER (OUTPATIENT)
Dept: CT IMAGING | Age: 61
Discharge: HOME | End: 2021-06-12
Payer: MEDICAID

## 2021-06-10 VITALS
BODY MASS INDEX: 30.02 KG/M2 | TEMPERATURE: 97.9 F | HEART RATE: 108 BPM | HEIGHT: 68 IN | WEIGHT: 198.1 LBS | RESPIRATION RATE: 18 BRPM | DIASTOLIC BLOOD PRESSURE: 73 MMHG | SYSTOLIC BLOOD PRESSURE: 119 MMHG

## 2021-06-10 DIAGNOSIS — T81.43XA ABSCESS, INTRA-ABDOMINAL, POSTOPERATIVE: ICD-10-CM

## 2021-06-10 DIAGNOSIS — B99.9 INTRA-ABDOMINAL INFECTION: ICD-10-CM

## 2021-06-10 PROCEDURE — 99024 POSTOP FOLLOW-UP VISIT: CPT | Performed by: TRANSPLANT SURGERY

## 2021-06-10 PROCEDURE — 99212 OFFICE O/P EST SF 10 MIN: CPT | Performed by: TRANSPLANT SURGERY

## 2021-06-10 PROCEDURE — 76080 X-RAY EXAM OF FISTULA: CPT

## 2021-06-10 PROCEDURE — 76080 X-RAY EXAM OF FISTULA: CPT | Performed by: RADIOLOGY

## 2021-06-10 NOTE — PROGRESS NOTES
Hepatobiliary and Pancreatic Surgery Progress Note    CC: follow up from surgery    Subjective: Patient was recently re-admitted secondary to his pancreatic leak causing an intra-abdominal abscess. He had a second drain placed. His pancreatic stent was found to be clogged and he underwent removal and replacement. His drains were more serous after the stent replacement and less pancreatic. He is having 2 shakes a day and only ate a ham sandwich and crackers though over the course of 4 days  His sugrs are high with 220 being the lowest.  Both drains are putting out about 30mL a day. OBJECTIVE      Physical    /73 (Site: Right Upper Arm, Position: Sitting, Cuff Size: Medium Adult)   Pulse 108   Temp 97.9 °F (36.6 °C) (Temporal)   Resp 18   Ht 5' 8\" (1.727 m)   Wt 198 lb 1.6 oz (89.9 kg)   BMI 30.12 kg/m²       General appearance: appears in no acute distress  Lungs:respiratory effort normal without accessory numbers  Heart: no pedal edema  Abdomen: soft, nondistended, nontympanic, no guarding, no peritoneal signs, normoactive bowel sounds, MAX and right upper quadrant drain are pancreatic in nature  Extremities: ROM normal    ASSESSMENT: s/p robotic converted to open distal pancreatectomy with splenectomy 5/21 with pancreatic leak requiring pancreatic stenting with resolving pancreatic abscess and post operative pancreatic fistula    PLAN:    - I reviewed his images and drain check images and we also reviewed them together today  - again we discussed that 50% of patients after this procedure end up with a pancreatic leak  - he has shown improvement over the past week  - go back to daily dose of robaxin  - encouraged him to eat small meals a day and drink shakes 5-6 a day  -will see him on Tuesday in [facility],  if he is unable to take 5-6 shakes a day he will need admitted and a post pyloric feeding tube placed. We discussed this.       Thank you for the consultation and allowing me to take part in Mr. Cammy Yost' care.     Please send a copy of my note to Dr.F. Charli Hook    Electronically signed by Jarocho Ortiz MD on 6/13/2021 at 8:45 AM

## 2021-06-10 NOTE — CONSULTS
Patient reviewed and case discussed with rehab admissions coordinator. Please see the rehab coordinators chart for details.       Thanks    Vladimir Soto MD

## 2021-06-10 NOTE — PROGRESS NOTES
Patient was identified by 2 identifiers and taken to CT scan for scheduled right abdominal tube check. The procedure was reviewed, all questions were answered, and the patient verbalized understanding. Past medical history, medications, and allergies were reviewed. Patient denies fever, chills or redness. States there is an increase in drainage from the drain site. Upon arrival, the patient's drainage dressing is soiled. Patient states home health care never changed the dressing. The patient's drain log shows minimal output which is why the patient is here today for a check. The right abdomen insertion site was imaged and flushed. Dressing to the site was placed with gauze and transparent dressing. Drain remains sutured in place. Patient was educated on flushes and how to empty drainage bag. Park explained to the patient that the site is going to leak due to the incision being larger than the drain size. Patient was notified to follow up with Dr Luisito Royal and if he would like a drain exchange ordered to let the office know. Patient was discharged ambulatory in stable condition.

## 2021-06-14 ENCOUNTER — TELEPHONE (OUTPATIENT)
Dept: HEMATOLOGY | Age: 61
End: 2021-06-14

## 2021-06-15 ENCOUNTER — TELEPHONE (OUTPATIENT)
Dept: HEMATOLOGY | Age: 61
End: 2021-06-15

## 2021-06-15 ENCOUNTER — OFFICE VISIT (OUTPATIENT)
Dept: SURGERY | Age: 61
End: 2021-06-15

## 2021-06-15 VITALS
BODY MASS INDEX: 29.86 KG/M2 | TEMPERATURE: 96 F | DIASTOLIC BLOOD PRESSURE: 73 MMHG | HEIGHT: 68 IN | RESPIRATION RATE: 18 BRPM | WEIGHT: 197 LBS | SYSTOLIC BLOOD PRESSURE: 119 MMHG | HEART RATE: 120 BPM

## 2021-06-15 DIAGNOSIS — D13.6 CYSTADENOMA OF PANCREAS: Primary | ICD-10-CM

## 2021-06-15 PROCEDURE — 99024 POSTOP FOLLOW-UP VISIT: CPT | Performed by: TRANSPLANT SURGERY

## 2021-06-15 NOTE — TELEPHONE ENCOUNTER
[facility] and they working on getting it to the patient  Electronically signed by Kenan Marley MA on 6/15/2021 at 9:16 AM

## 2021-06-15 NOTE — PROGRESS NOTES
Hepatobiliary and Pancreatic Surgery Progress Note    CC: follow up from surgery    Subjective: Patient was recently re-admitted secondary to his pancreatic leak causing an intra-abdominal abscess. He had a second drain placed. Both drains are putting out about 30mL a day. The IR drain is more serous than the pancreatic drain placed at the time of surgery. He is still having high sugars but his abdominal pain is much better and he is eating much more. He is drinking 5 shakes a day plus eating 4 meals a day      OBJECTIVE      Physical    /73 (Site: Left Upper Arm, Position: Sitting, Cuff Size: Medium Adult)   Pulse 120   Temp 96 °F (35.6 °C) (Temporal)   Resp 18   Ht 5' 8\" (1.727 m)   Wt 197 lb (89.4 kg)   BMI 29.95 kg/m²       General appearance: appears in no acute distress  Lungs:respiratory effort normal without accessory numbers  Heart: no pedal edema  Abdomen: soft, nondistended, nontympanic, no guarding, no peritoneal signs, normoactive bowel sounds, MAX and right upper quadrant drain are pancreatic in nature  Extremities: ROM normal    ASSESSMENT: s/p robotic converted to open distal pancreatectomy with splenectomy 5/21 with pancreatic leak requiring pancreatic stenting with resolving pancreatic abscess and post operative pancreatic fistula    PLAN:    - daily dose of robaxin  - encouraged him to eat small meals a day and drink shakes 5-6 a day  -will see him next week in the office after his drain check  - continue to keep track of drain output. Thank you for the consultation and allowing me to take part in Mr. Cook' care.     Please send a copy of my note to Dr.F. Arash Onofre    Electronically signed by Remington Bautista MD on 6/15/2021 at 1:32 PM     Still no shakes from insurance  Check drain next week  continuue eating and shakes  No pain with eating

## 2021-06-16 ENCOUNTER — TELEPHONE (OUTPATIENT)
Dept: HEMATOLOGY | Age: 61
End: 2021-06-16

## 2021-06-16 DIAGNOSIS — T81.43XA ABSCESS, INTRA-ABDOMINAL, POSTOPERATIVE: Primary | ICD-10-CM

## 2021-06-16 NOTE — TELEPHONE ENCOUNTER
I called patient to give him his appt information. He is scheduled for his drain check on 6/24/21 arrival at 11:30am at [facility] and he will follow up with Dr. Christi Moyer after this appt. I was able to move his vaccines to the same day at 11:00am per Vela Patient in the infusion center and patient is aware to be here at that time at [facility] infusion center. I instructed patient to remain NPO after midnight, hold his Eliquis for 2 days and his Ibuprofen for 5 days, he will need to go get covid tested before Monday and order is already in epic and he is aware of where to go. I also made him aware that he will need a  per IR request.  He verbalized understanding and confirmed these appts.     Electronically signed by Oliver Varghese RN on 6/16/2021 at 9:00 AM

## 2021-06-21 ENCOUNTER — TELEPHONE (OUTPATIENT)
Dept: HEMATOLOGY | Age: 61
End: 2021-06-21

## 2021-06-21 DIAGNOSIS — T81.43XA ABSCESS, INTRA-ABDOMINAL, POSTOPERATIVE: Primary | ICD-10-CM

## 2021-06-21 RX ORDER — DIPHENHYDRAMINE HCL 25 MG/1
TABLET ORAL
Qty: 2 TABLET | Refills: 0 | Status: SHIPPED
Start: 2021-06-21 | End: 2021-09-01

## 2021-06-21 RX ORDER — PREDNISONE 50 MG/1
50 TABLET ORAL DAILY
Qty: 5 TABLET | Refills: 0 | Status: SHIPPED | OUTPATIENT
Start: 2021-06-21 | End: 2021-06-26

## 2021-06-21 NOTE — TELEPHONE ENCOUNTER
I called patient and made him aware that I called in the prednisone premedication that need to be taken prior to his drain check on 6/23/21. He is aware of drain check appt being changed and that he will not need covid tested today that they will rapid covid test him on the day of drain check. I also moved his follow up since he is already coming in to get vaccines at 11:00am on 6/24/21 so he will come see us at 12:30pm at [facility] clinic at [facility]. He verbalized understanding and confirmed these appts.     ,Electronically signed by Rigoberto Potts RN on 6/21/2021 at 9:25 AM

## 2021-06-22 ENCOUNTER — TELEPHONE (OUTPATIENT)
Dept: HEMATOLOGY | Age: 61
End: 2021-06-22

## 2021-06-22 NOTE — TELEPHONE ENCOUNTER
Patient still has not heard from [address] for his Boost.  I called [address] and spoke to [facility] and she is going to call patient today.       Electronically signed by Domi Cordero RN on 6/22/2021 at 11:53 AM

## 2021-06-23 ENCOUNTER — HOSPITAL ENCOUNTER (OUTPATIENT)
Dept: CT IMAGING | Age: 61
Discharge: HOME | End: 2021-06-25
Payer: MEDICAID

## 2021-06-23 ENCOUNTER — ANESTHESIA EVENT (OUTPATIENT)
Dept: CT IMAGING | Age: 61
End: 2021-06-23

## 2021-06-23 ENCOUNTER — ANESTHESIA (OUTPATIENT)
Dept: CT IMAGING | Age: 61
End: 2021-06-23

## 2021-06-23 VITALS
RESPIRATION RATE: 16 BRPM | OXYGEN SATURATION: 100 % | DIASTOLIC BLOOD PRESSURE: 73 MMHG | TEMPERATURE: 98.2 F | SYSTOLIC BLOOD PRESSURE: 133 MMHG | HEART RATE: 88 BPM

## 2021-06-23 DIAGNOSIS — B99.9 INTRA-ABDOMINAL INFECTION: ICD-10-CM

## 2021-06-23 LAB
HCT VFR BLD CALC: 30.3 % (ref 37–54)
HEMOGLOBIN: 9.3 G/DL (ref 12.5–16.5)
INR BLD: 1.4
MCH RBC QN AUTO: 22.2 PG (ref 26–35)
MCHC RBC AUTO-ENTMCNC: 30.7 % (ref 32–34.5)
MCV RBC AUTO: 72.5 FL (ref 80–99.9)
METER GLUCOSE: 285 MG/DL (ref 74–99)
PDW BLD-RTO: 16.5 FL (ref 11.5–15)
PLATELET # BLD: 852 E9/L (ref 130–450)
PMV BLD AUTO: 10.1 FL (ref 7–12)
PROTHROMBIN TIME: 15.6 SEC (ref 9.3–12.4)
RBC # BLD: 4.18 E12/L (ref 3.8–5.8)
WBC # BLD: 10.1 E9/L (ref 4.5–11.5)

## 2021-06-23 PROCEDURE — 76080 X-RAY EXAM OF FISTULA: CPT

## 2021-06-23 PROCEDURE — 36415 COLL VENOUS BLD VENIPUNCTURE: CPT

## 2021-06-23 PROCEDURE — 76080 X-RAY EXAM OF FISTULA: CPT | Performed by: RADIOLOGY

## 2021-06-23 PROCEDURE — 82962 GLUCOSE BLOOD TEST: CPT

## 2021-06-23 PROCEDURE — 85027 COMPLETE CBC AUTOMATED: CPT

## 2021-06-23 PROCEDURE — 6360000004 HC RX CONTRAST MEDICATION: Performed by: RADIOLOGY

## 2021-06-23 PROCEDURE — 3700000000 HC ANESTHESIA ATTENDED CARE

## 2021-06-23 PROCEDURE — 85610 PROTHROMBIN TIME: CPT

## 2021-06-23 RX ORDER — OXYCODONE AND ACETAMINOPHEN 7.5; 325 MG/1; MG/1
1 TABLET ORAL EVERY 4 HOURS PRN
COMMUNITY
End: 2021-09-01

## 2021-06-23 RX ADMIN — IOPAMIDOL 1 ML: 612 INJECTION, SOLUTION INTRAVENOUS at 15:44

## 2021-06-23 ASSESSMENT — LIFESTYLE VARIABLES: SMOKING_STATUS: 0

## 2021-06-23 NOTE — PROGRESS NOTES
1145: Patient arrived via car  With family   to Radiology department for ct abscess tube check. Allergies, home medications, H&P and fasting instructions reviewed with patient. Vital signs taken. IV placed  1230: blood obtained, IV flushed and prn adapter attached. Blood sample sent to lab for ordered tests. 1413:      1430: Procedural instructions given, questions answered, understanding expressed and consent signed. Patient given fluoroscopy education, no questions at this time  1530: patient returned to angio recovery. Patient tolerating PO intake. 1546: patient signed discharge instructions. Vitals recorded on flow sheet.

## 2021-06-23 NOTE — BRIEF OP NOTE
Brief Postoperative Note    Tessie Or  YOB: 1960  [id number]    Pre-operative Diagnosis and Procedure: 63 yo M with a drainage catheter in place. Here for imaging evaluation and possible exchange and/or removal.     Post-operative Diagnosis: Same    Anesthesia: Local    Estimated Blood Loss: < 10 cc    Surgeon: Arin Rodriges MD    Complications: none    Specimen obtained: none     Findings:   CT scan was performed demonstrating the drainage catheter in stable   position. No residual fluid collection is identified in the region of   the pigtail catheter tip. The patient reports less than 10 mL of fluid   from the drain daily for 1 week. Therefore the drainage catheter was   removed.        Arin Rodriges MD   6/23/2021 1:05 PM

## 2021-06-23 NOTE — PROGRESS NOTES
Brought patient back for his rapid covid. He stated that he had been vaccinated and told the person who called. He did not bring his card but I was able to find the record in his chart. No rapid needed today.

## 2021-06-23 NOTE — ANESTHESIA PRE PROCEDURE
Department of Anesthesiology  Preprocedure Note       Name:  Katie Oquendo   Age:  62 y.o.  :  1960                                          MRN:  [id number]         Date:  2021      Surgeon: * Surgery not found *    Procedure:     Medications prior to admission:   Prior to Admission medications    Medication Sig Start Date End Date Taking? Authorizing Provider   oxyCODONE-acetaminophen (PERCOCET) 7.5-325 MG per tablet Take 1 tablet by mouth every 4 hours as needed for Pain. Yes Historical Provider, MD   predniSONE (DELTASONE) 50 MG tablet Take 1 tablet by mouth daily for 5 days 21 Yes Courtney Crooks III, MD   BANOPHEN 25 MG tablet take 2 tablets by mouth 1 HOUR PRIOR TO CONTRAST MEDIA ADMINISTRATION 21  Yes Courtney Crooks III, MD   acetaminophen (TYLENOL) 500 MG tablet Take 2 tablets by mouth every 6 hours 21  Yes Nabil Colvin MD   ibuprofen (ADVIL;MOTRIN) 600 MG tablet Take 1 tablet by mouth 3 times daily (with meals) 21  Yes Nabil Colvin MD   apixaban (ELIQUIS) 5 MG TABS tablet Take 1 tablet by mouth 2 times daily . The first dose of this Eliquis should be taken approximately 12 hours after you received your last dose of Lovenox (the blood thinner injection into your skin) given in the hospital. Continue to take the Eliquis approximately every 12 hours. 21  Yes Nabil Colvin MD   polyethylene glycol (GLYCOLAX) 17 g packet Take 17 g by mouth daily .   This helps to prevent constipation caused by taking high dose narcotics (Oxycodone) 21 Yes Nabil Colvin MD   ciprofloxacin (CIPRO) 500 MG tablet Take 1 tablet by mouth 2 times daily for 23 days Avoid taking ciprofloxacin with milk, antacids, multivitamins, iron, zinc but if needed, take ciprofloxacin at least 2 hours before or 6 hours after milk, antacids, multivitamins, iron, zinc. 21 Yes Navdeep Dumas MD   metroNIDAZOLE (FLAGYL) 500 MG tablet Take 1 tablet by mouth 3 times daily for 23 days 6/2/21 6/25/21 Yes Abraham Rodriguez MD   benzonatate (TESSALON) 200 MG capsule Take 200 mg by mouth 3 times daily as needed for Cough   Yes Historical Provider, MD   losartan (COZAAR) 100 MG tablet take 1 tablet by mouth once daily 5/19/21  Yes Mitch Dmuas MD   hydroCHLOROthiazide (HYDRODIURIL) 12.5 MG tablet take 1 tablet by mouth once daily 5/19/21  Yes Mitch Dumas MD   albuterol (PROVENTIL) (2.5 MG/3ML) 0.083% nebulizer solution Take 3 mLs by nebulization 4 times daily 5/19/21  Yes Mitch Dumas MD   budesonide (PULMICORT) 0.5 MG/2ML nebulizer suspension Take 4 mLs by nebulization 2 times daily 5/19/21  Yes Mitch Dumas MD   guaiFENesin (ROBITUSSIN) 100 MG/5ML SOLN oral solution Take 15 mLs by mouth 3 times daily 5/19/21  Yes Mitch Dumas MD   lipase-protease-amylase (CREON) 41590 units CPEP delayed release capsule Take 2 capsules by mouth 3 times daily (with meals) 5/19/21 8/17/21 Yes Mitch Dumas MD   pantoprazole (PROTONIX) 40 MG tablet Take 1 tablet by mouth 2 times daily (before meals) 5/19/21  Yes Mitch Dumas MD   atorvastatin (LIPITOR) 20 MG tablet Take 1 tablet by mouth daily 5/20/21  Yes Mitch Dumas MD   gabapentin (NEURONTIN) 300 MG capsule Take 1 capsule by mouth 2 times daily.  4/19/21  Yes Historical Provider, MD   insulin lispro, 1 Unit Dial, (HUMALOG KWIKPEN) 100 UNIT/ML SOPN Inject 0-12 Units into the skin 3 times daily (before meals) Glucose: Dose:  If <139             No Insulin  140-199 2 Units  200-249 4 Units  250-299 6 Units  300-349 8 Units  350-400 10 Units  Above 400       12 Units 5/20/21 6/19/21  Mitch Dumas MD   insulin glargine (LANTUS SOLOSTAR) 100 UNIT/ML injection pen Inject 20 Units into the skin 2 times daily 5/19/21 6/18/21  Mitch Dumas MD   insulin lispro, 1 Unit Dial, ([facility]) 100 UNIT/ML SOPN Inject 7 Units into the skin 3 times daily (before meals) 5/19/21 6/18/21  Ammy Le Cristóbal Clements MD       Current medications:    Current Outpatient Medications   Medication Sig Dispense Refill    oxyCODONE-acetaminophen (PERCOCET) 7.5-325 MG per tablet Take 1 tablet by mouth every 4 hours as needed for Pain.  predniSONE (DELTASONE) 50 MG tablet Take 1 tablet by mouth daily for 5 days 5 tablet 0    BANOPHEN 25 MG tablet take 2 tablets by mouth 1 HOUR PRIOR TO CONTRAST MEDIA ADMINISTRATION 2 tablet 0    acetaminophen (TYLENOL) 500 MG tablet Take 2 tablets by mouth every 6 hours 120 tablet 0    ibuprofen (ADVIL;MOTRIN) 600 MG tablet Take 1 tablet by mouth 3 times daily (with meals) 120 tablet 0    apixaban (ELIQUIS) 5 MG TABS tablet Take 1 tablet by mouth 2 times daily . The first dose of this Eliquis should be taken approximately 12 hours after you received your last dose of Lovenox (the blood thinner injection into your skin) given in the hospital. Continue to take the Eliquis approximately every 12 hours. 60 tablet 3    polyethylene glycol (GLYCOLAX) 17 g packet Take 17 g by mouth daily .   This helps to prevent constipation caused by taking high dose narcotics (Oxycodone) 527 g 1    ciprofloxacin (CIPRO) 500 MG tablet Take 1 tablet by mouth 2 times daily for 23 days Avoid taking ciprofloxacin with milk, antacids, multivitamins, iron, zinc but if needed, take ciprofloxacin at least 2 hours before or 6 hours after milk, antacids, multivitamins, iron, zinc. 46 tablet 0    metroNIDAZOLE (FLAGYL) 500 MG tablet Take 1 tablet by mouth 3 times daily for 23 days 69 tablet 0    benzonatate (TESSALON) 200 MG capsule Take 200 mg by mouth 3 times daily as needed for Cough      losartan (COZAAR) 100 MG tablet take 1 tablet by mouth once daily 30 tablet 3    hydroCHLOROthiazide (HYDRODIURIL) 12.5 MG tablet take 1 tablet by mouth once daily 30 tablet 3    albuterol (PROVENTIL) (2.5 MG/3ML) 0.083% nebulizer solution Take 3 mLs by nebulization 4 times daily 120 each 3    budesonide (PULMICORT) 0.5 MG/2ML nebulizer suspension Take 4 mLs by nebulization 2 times daily 60 ampule 3    guaiFENesin (ROBITUSSIN) 100 MG/5ML SOLN oral solution Take 15 mLs by mouth 3 times daily 1200 mL 0    lipase-protease-amylase (CREON) 91987 units CPEP delayed release capsule Take 2 capsules by mouth 3 times daily (with meals) 540 capsule 3    pantoprazole (PROTONIX) 40 MG tablet Take 1 tablet by mouth 2 times daily (before meals) 60 tablet 0    atorvastatin (LIPITOR) 20 MG tablet Take 1 tablet by mouth daily 30 tablet 3    gabapentin (NEURONTIN) 300 MG capsule Take 1 capsule by mouth 2 times daily.  insulin lispro, 1 Unit Dial, (HUMALOG KWIKPEN) 100 UNIT/ML SOPN Inject 0-12 Units into the skin 3 times daily (before meals) Glucose: Dose:  If <139             No Insulin  140-199 2 Units  200-249 4 Units  250-299 6 Units  300-349 8 Units  350-400 10 Units  Above 400       12 Units 10.8 mL 0    insulin glargine (LANTUS SOLOSTAR) 100 UNIT/ML injection pen Inject 20 Units into the skin 2 times daily 12 mL 3    insulin lispro, 1 Unit Dial, (HUMALOG KWIKPEN) 100 UNIT/ML SOPN Inject 7 Units into the skin 3 times daily (before meals) 6.3 mL 3     No current facility-administered medications for this encounter. Allergies:     Allergies   Allergen Reactions    Iodine Swelling     Sea food    Metformin And Related Hives    Wheat Extract Swelling       Problem List:    Patient Active Problem List   Diagnosis Code    Mixed hyperlipidemia E78.2    Neck pain M54.2    Cervical radiculopathy at C7 M54.12    Essential hypertension I10    Chronic seasonal allergic rhinitis J30.2    Ventral hernia without obstruction or gangrene K43.9    Poorly controlled type 2 diabetes mellitus (HCC) E11.65    Chronic bilateral low back pain with right-sided sciatica M54.41, G89.29    Gynecomastia N62    Panic disorder F41.0    Cubital tunnel syndrome on right G56.21    Right carpal tunnel syndrome G56.01    Spondylosis of lumbar spine 1213 06/23/21 1330   BP: (!) 149/75 130/71 130/75   Pulse: 76 72 72   Resp: 18 16 16   Temp: 98.2 °F (36.8 °C)     SpO2: 100% 97%                                               BP Readings from Last 3 Encounters:   06/23/21 130/75   06/15/21 119/73   06/10/21 119/73       NPO Status:                                                                                 BMI:   Wt Readings from Last 3 Encounters:   06/15/21 197 lb (89.4 kg)   06/10/21 198 lb 1.6 oz (89.9 kg)   06/03/21 218 lb (98.9 kg)     There is no height or weight on file to calculate BMI.    CBC:   Lab Results   Component Value Date    WBC 10.1 06/23/2021    RBC 4.18 06/23/2021    HGB 9.3 06/23/2021    HCT 30.3 06/23/2021    MCV 72.5 06/23/2021    RDW 16.5 06/23/2021     06/23/2021       CMP:   Lab Results   Component Value Date     06/05/2021    K 4.1 06/05/2021     06/05/2021    CO2 24 06/05/2021    BUN 7 06/05/2021    CREATININE 0.6 06/05/2021    GFRAA >60 06/05/2021    LABGLOM >60 06/05/2021    GLUCOSE 151 06/05/2021    PROT 6.1 06/05/2021    CALCIUM 8.3 06/05/2021    BILITOT 0.2 06/05/2021    ALKPHOS 51 06/05/2021    AST 26 06/05/2021    ALT 28 06/05/2021       POC Tests: No results for input(s): POCGLU, POCNA, POCK, POCCL, POCBUN, POCHEMO, POCHCT in the last 72 hours.     Coags:   Lab Results   Component Value Date    PROTIME 15.6 06/23/2021    INR 1.4 06/23/2021       HCG (If Applicable): No results found for: PREGTESTUR, PREGSERUM, HCG, HCGQUANT     ABGs:   Lab Results   Component Value Date    PO2ART 118.2 05/12/2021    DCH0HPZ 56.0 05/12/2021    VEV6MPI 22.9 05/12/2021        Type & Screen (If Applicable):  No results found for: LABABO, LABRH    Drug/Infectious Status (If Applicable):  No results found for: HIV, HEPCAB    COVID-19 Screening (If Applicable):   Lab Results   Component Value Date    COVID19 Not Detected 05/27/2021    COVID19 Not Detected 05/06/2021     Location: Anterior body of the pancreas   Size: 1.9 x 1.5 x 1.5 cm   Change: Slight interval increase in size since 11/22/2019 MRI. Solid: No solid or enhancing features. Cystic: Overall appearance appears to represent a cluster of cysts.  Thin   septations may be present. Pancreatic Duct: Above findings have previously been characterized as side   branch IPMN.  On the current study, communication with the pancreatic duct is   established for at least 1 cystic component.  No ductal dilatation otherwise. Vascular: No vascular abnormality. Other: None         Anesthesia Evaluation  Patient summary reviewed and Nursing notes reviewed no history of anesthetic complications:   Airway: Mallampati: III  TM distance: <3 FB   Neck ROM: limited  Mouth opening: > = 3 FB Dental:          Pulmonary: breath sounds clear to auscultation  (+) sleep apnea: on noncompliant,      (-) not a current smoker                           Cardiovascular:    (+) hypertension:, hyperlipidemia    (-) past MI, CAD and CABG/stent    ECG reviewed  Rhythm: regular  Rate: normal           Beta Blocker:  Not on Beta Blocker      ROS comment: Narrative & Impression    Sinus tachycardia with occasional premature ventricular complexes  Nonspecific T wave abnormality  Abnormal ECG  When compared with ECG of 06-MAY-2021 10:43,  Significant changes have occurred  Confirmed by Wejo Check ([id number]) on 5/13/2021 8:47:40 AM         Neuro/Psych:   (+) neuromuscular disease (lumbar spine spondylosis; sacral radiculitis; cervical radiculopathy (C5-7 fusion in 2018); s/p T10-L2 fusion (Jan 2020)):, psychiatric history:depression/anxiety              ROS comment: Bell's palsy - March 2019 (left face); No issues currently    Lower extremity neuropathy    Right hand - ring finger and 5th finger are numb GI/Hepatic/Renal:   (+) GERD: poorly controlled,          ROS comment: S/p pancreaticectomy. Endo/Other:    (+) Diabetes (A1C 7.1% in 2019. AM  mg/dL. )Type II DM, , blood dyscrasia (Lovenox last on 6/3 @7723): anemia and anticoagulation therapy, arthritis: OA., .                  ROS comment: OA, DJD, DDD, cervical radiculopathy, chronic low back pain with right sciatica Abdominal:           Vascular: negative vascular ROS. Anesthesia Plan      MAC     ASA 3       Induction: intravenous. Anesthetic plan and risks discussed with patient. Plan discussed with CRNA. Kostas Fall MD   6/23/2021      Agree with above assessment. Physical exam unchanged. Spoke to patient about anesthetic plan.   Patient understands and wishes to proceed.  (this addendum was done preop but unable to be filed electronically at that time)

## 2021-06-23 NOTE — INTERVAL H&P NOTE
Update History & Physical    The patient's History and Physical of May 27, 2021 was reviewed with the patient and I examined the patient. There was no change. The surgical site was confirmed by the patient and me. Plan: The risks, benefits, expected outcome, and alternative to the recommended procedure have been discussed with the patient. Patient understands and wants to proceed with the procedure.      Electronically signed by Portillo Hernandez MD on 6/23/2021 at 1:02 PM

## 2021-06-24 ENCOUNTER — OFFICE VISIT (OUTPATIENT)
Dept: HEMATOLOGY | Age: 61
End: 2021-06-24
Payer: MEDICAID

## 2021-06-24 ENCOUNTER — HOSPITAL ENCOUNTER (OUTPATIENT)
Dept: INFUSION THERAPY | Age: 61
Setting detail: INFUSION SERIES
Discharge: HOME | End: 2021-06-24
Payer: MEDICAID

## 2021-06-24 VITALS
SYSTOLIC BLOOD PRESSURE: 134 MMHG | WEIGHT: 199 LBS | TEMPERATURE: 97.4 F | OXYGEN SATURATION: 97 % | HEART RATE: 106 BPM | BODY MASS INDEX: 30.16 KG/M2 | DIASTOLIC BLOOD PRESSURE: 81 MMHG | HEIGHT: 68 IN | RESPIRATION RATE: 18 BRPM

## 2021-06-24 VITALS
OXYGEN SATURATION: 99 % | DIASTOLIC BLOOD PRESSURE: 70 MMHG | RESPIRATION RATE: 18 BRPM | HEART RATE: 90 BPM | SYSTOLIC BLOOD PRESSURE: 131 MMHG | TEMPERATURE: 98.1 F

## 2021-06-24 DIAGNOSIS — D49.0 PANCREATIC NEOPLASM: Primary | ICD-10-CM

## 2021-06-24 DIAGNOSIS — B99.9 INTRA-ABDOMINAL INFECTION: ICD-10-CM

## 2021-06-24 DIAGNOSIS — K86.89 PANCREATIC DUCT LEAK: Primary | ICD-10-CM

## 2021-06-24 PROCEDURE — G0009 ADMIN PNEUMOCOCCAL VACCINE: HCPCS | Performed by: TRANSPLANT SURGERY

## 2021-06-24 PROCEDURE — 99024 POSTOP FOLLOW-UP VISIT: CPT | Performed by: TRANSPLANT SURGERY

## 2021-06-24 PROCEDURE — 6360000002 HC RX W HCPCS: Performed by: TRANSPLANT SURGERY

## 2021-06-24 PROCEDURE — 90732 PPSV23 VACC 2 YRS+ SUBQ/IM: CPT | Performed by: TRANSPLANT SURGERY

## 2021-06-24 PROCEDURE — 90471 IMMUNIZATION ADMIN: CPT | Performed by: TRANSPLANT SURGERY

## 2021-06-24 PROCEDURE — 99212 OFFICE O/P EST SF 10 MIN: CPT | Performed by: TRANSPLANT SURGERY

## 2021-06-24 PROCEDURE — 90734 MENACWYD/MENACWYCRM VACC IM: CPT | Performed by: TRANSPLANT SURGERY

## 2021-06-24 PROCEDURE — 96372 THER/PROPH/DIAG INJ SC/IM: CPT

## 2021-06-24 RX ADMIN — Medication 0.5 ML: at 12:08

## 2021-06-24 RX ADMIN — PNEUMOCOCCAL VACCINE POLYVALENT 0.5 ML
25; 25; 25; 25; 25; 25; 25; 25; 25; 25; 25; 25; 25; 25; 25; 25; 25; 25; 25; 25; 25; 25; 25 INJECTION, SOLUTION INTRAMUSCULAR; SUBCUTANEOUS at 12:11

## 2021-06-28 ENCOUNTER — TELEPHONE (OUTPATIENT)
Dept: HEMATOLOGY | Age: 61
End: 2021-06-28

## 2021-06-28 DIAGNOSIS — Z88.8 ALLERGY TO IODINE: Primary | ICD-10-CM

## 2021-06-28 RX ORDER — PREDNISONE 50 MG/1
50 TABLET ORAL DAILY
Qty: 3 TABLET | Refills: 0 | Status: ON HOLD | OUTPATIENT
Start: 2021-06-28 | End: 2021-07-15 | Stop reason: HOSPADM

## 2021-06-28 NOTE — TELEPHONE ENCOUNTER
I called patient and told him IR is working on getting him scheduled for his drain placement. I did sent order over for his prednisone and benadryl pre-meds. He verbalized understanding. Electronically signed by Cortney Luz RN on 6/28/2021 at 8:46 AM      I called patient back to let him know IR is going to be calling and they are working on getting him scheduled for 6/30/21. I also told him he will not need to take the prednisone and benadryl pre meds per Dr. Terrance Jones. I made him a follow up on 7/2/21 at 1:45pm at [facility] clinic at [facility]. He verbalized understanding and confirmed this appt.     Electronically signed by Cortney Luz RN on 6/28/2021 at 8:53 AM

## 2021-06-30 ENCOUNTER — TELEPHONE (OUTPATIENT)
Dept: SURGERY | Age: 61
End: 2021-06-30

## 2021-06-30 ENCOUNTER — ANESTHESIA (OUTPATIENT)
Dept: CT IMAGING | Age: 61
End: 2021-06-30

## 2021-06-30 ENCOUNTER — ANESTHESIA EVENT (OUTPATIENT)
Dept: CT IMAGING | Age: 61
End: 2021-06-30

## 2021-06-30 ENCOUNTER — HOSPITAL ENCOUNTER (OUTPATIENT)
Dept: CT IMAGING | Age: 61
Discharge: HOME | DRG: 711 | End: 2021-07-02
Attending: TRANSPLANT SURGERY | Admitting: TRANSPLANT SURGERY
Payer: MEDICAID

## 2021-06-30 ENCOUNTER — APPOINTMENT (OUTPATIENT)
Dept: GENERAL RADIOLOGY | Age: 61
DRG: 711 | End: 2021-06-30
Payer: MEDICAID

## 2021-06-30 ENCOUNTER — HOSPITAL ENCOUNTER (INPATIENT)
Age: 61
LOS: 15 days | Discharge: NURSING, REHAB OR LONG-TERM CARE | DRG: 711 | End: 2021-07-15
Attending: EMERGENCY MEDICINE | Admitting: TRANSPLANT SURGERY
Payer: MEDICAID

## 2021-06-30 VITALS
OXYGEN SATURATION: 97 % | SYSTOLIC BLOOD PRESSURE: 117 MMHG | DIASTOLIC BLOOD PRESSURE: 73 MMHG | RESPIRATION RATE: 57 BRPM

## 2021-06-30 VITALS
OXYGEN SATURATION: 99 % | HEART RATE: 95 BPM | SYSTOLIC BLOOD PRESSURE: 112 MMHG | RESPIRATION RATE: 18 BRPM | DIASTOLIC BLOOD PRESSURE: 70 MMHG | TEMPERATURE: 97.5 F

## 2021-06-30 DIAGNOSIS — B99.9 INTRA-ABDOMINAL INFECTION: ICD-10-CM

## 2021-06-30 DIAGNOSIS — K65.1 INTRA-ABDOMINAL ABSCESS (HCC): ICD-10-CM

## 2021-06-30 DIAGNOSIS — A41.9 SEPSIS WITH ACUTE HYPOXIC RESPIRATORY FAILURE WITHOUT SEPTIC SHOCK, DUE TO UNSPECIFIED ORGANISM (HCC): Primary | ICD-10-CM

## 2021-06-30 DIAGNOSIS — R65.20 SEPSIS WITH ACUTE HYPOXIC RESPIRATORY FAILURE WITHOUT SEPTIC SHOCK, DUE TO UNSPECIFIED ORGANISM (HCC): Primary | ICD-10-CM

## 2021-06-30 DIAGNOSIS — K86.89 PANCREATIC DUCT LEAK: ICD-10-CM

## 2021-06-30 DIAGNOSIS — J96.01 SEPSIS WITH ACUTE HYPOXIC RESPIRATORY FAILURE WITHOUT SEPTIC SHOCK, DUE TO UNSPECIFIED ORGANISM (HCC): Primary | ICD-10-CM

## 2021-06-30 LAB
ALBUMIN SERPL-MCNC: 3.2 G/DL (ref 3.5–5.2)
ALP BLD-CCNC: 59 U/L (ref 40–129)
ALT SERPL-CCNC: 22 U/L (ref 0–40)
ANION GAP SERPL CALCULATED.3IONS-SCNC: 15 MMOL/L (ref 7–16)
ANISOCYTOSIS: ABNORMAL
AST SERPL-CCNC: 51 U/L (ref 0–39)
B.E.: -4.5 MMOL/L (ref -3–3)
BASOPHILS ABSOLUTE: 0 E9/L (ref 0–0.2)
BASOPHILS RELATIVE PERCENT: 0.5 % (ref 0–2)
BILIRUB SERPL-MCNC: 0.4 MG/DL (ref 0–1.2)
BILIRUBIN DIRECT: <0.2 MG/DL (ref 0–0.3)
BILIRUBIN, INDIRECT: ABNORMAL MG/DL (ref 0–1)
BUN BLDV-MCNC: 10 MG/DL (ref 6–20)
BURR CELLS: ABNORMAL
CALCIUM SERPL-MCNC: 8.6 MG/DL (ref 8.6–10.2)
CHLORIDE BLD-SCNC: 98 MMOL/L (ref 98–107)
CO2: 19 MMOL/L (ref 22–29)
COHB: 0.6 % (ref 0–1.5)
CREAT SERPL-MCNC: 0.7 MG/DL (ref 0.7–1.2)
CRITICAL: ABNORMAL
DATE ANALYZED: ABNORMAL
DATE OF COLLECTION: ABNORMAL
EOSINOPHILS ABSOLUTE: 0.08 E9/L (ref 0.05–0.5)
EOSINOPHILS RELATIVE PERCENT: 0.9 % (ref 0–6)
GFR AFRICAN AMERICAN: >60
GFR NON-AFRICAN AMERICAN: >60 ML/MIN/1.73
GLUCOSE BLD-MCNC: 267 MG/DL (ref 74–99)
HCO3: 17.8 MMOL/L (ref 22–26)
HCT VFR BLD CALC: 35.8 % (ref 37–54)
HEMOGLOBIN: 10.4 G/DL (ref 12.5–16.5)
HHB: 1.2 % (ref 0–5)
HOWELL-JOLLY BODIES: ABNORMAL
LAB: ABNORMAL
LACTIC ACID: 3.2 MMOL/L (ref 0.5–2.2)
LIPASE: 41 U/L (ref 13–60)
LYMPHOCYTES ABSOLUTE: 1.56 E9/L (ref 1.5–4)
LYMPHOCYTES RELATIVE PERCENT: 16.7 % (ref 20–42)
Lab: ABNORMAL
MAGNESIUM: 1.9 MG/DL (ref 1.6–2.6)
MCH RBC QN AUTO: 21.5 PG (ref 26–35)
MCHC RBC AUTO-ENTMCNC: 29.1 % (ref 32–34.5)
MCV RBC AUTO: 74 FL (ref 80–99.9)
METER GLUCOSE: 346 MG/DL (ref 74–99)
METHB: 0.4 % (ref 0–1.5)
MODE: ABNORMAL
MONOCYTES ABSOLUTE: 0.64 E9/L (ref 0.1–0.95)
MONOCYTES RELATIVE PERCENT: 7 % (ref 2–12)
NEUTROPHILS ABSOLUTE: 6.9 E9/L (ref 1.8–7.3)
NEUTROPHILS RELATIVE PERCENT: 75.4 % (ref 43–80)
O2 CONTENT: 14.6 ML/DL
O2 SATURATION: 98.8 % (ref 92–98.5)
O2HB: 97.8 % (ref 94–97)
OPERATOR ID: 366
OVALOCYTES: ABNORMAL
PATIENT TEMP: 37 C
PCO2: 24.5 MMHG (ref 35–45)
PDW BLD-RTO: 17.3 FL (ref 11.5–15)
PH BLOOD GAS: 7.48 (ref 7.35–7.45)
PH VENOUS: 7.33 (ref 7.35–7.45)
PLATELET # BLD: 576 E9/L (ref 130–450)
PMV BLD AUTO: 10.9 FL (ref 7–12)
PO2: 139.6 MMHG (ref 75–100)
POIKILOCYTES: ABNORMAL
POLYCHROMASIA: ABNORMAL
POTASSIUM SERPL-SCNC: 4.3 MMOL/L (ref 3.5–5)
POTASSIUM SERPL-SCNC: 6.7 MMOL/L (ref 3.5–5)
RBC # BLD: 4.84 E12/L (ref 3.8–5.8)
SODIUM BLD-SCNC: 132 MMOL/L (ref 132–146)
SOURCE, BLOOD GAS: ABNORMAL
TARGET CELLS: ABNORMAL
THB: 10.4 G/DL (ref 11.5–16.5)
TIME ANALYZED: 1749
TOTAL PROTEIN: 8.3 G/DL (ref 6.4–8.3)
WBC # BLD: 9.2 E9/L (ref 4.5–11.5)

## 2021-06-30 PROCEDURE — 83036 HEMOGLOBIN GLYCOSYLATED A1C: CPT

## 2021-06-30 PROCEDURE — 82805 BLOOD GASES W/O2 SATURATION: CPT

## 2021-06-30 PROCEDURE — 80076 HEPATIC FUNCTION PANEL: CPT

## 2021-06-30 PROCEDURE — 83605 ASSAY OF LACTIC ACID: CPT

## 2021-06-30 PROCEDURE — 87205 SMEAR GRAM STAIN: CPT

## 2021-06-30 PROCEDURE — 82800 BLOOD PH: CPT

## 2021-06-30 PROCEDURE — 6360000002 HC RX W HCPCS: Performed by: EMERGENCY MEDICINE

## 2021-06-30 PROCEDURE — 2580000003 HC RX 258: Performed by: NURSE ANESTHETIST, CERTIFIED REGISTERED

## 2021-06-30 PROCEDURE — 93005 ELECTROCARDIOGRAM TRACING: CPT | Performed by: EMERGENCY MEDICINE

## 2021-06-30 PROCEDURE — 87075 CULTR BACTERIA EXCEPT BLOOD: CPT

## 2021-06-30 PROCEDURE — 75989 ABSCESS DRAINAGE UNDER X-RAY: CPT | Performed by: RADIOLOGY

## 2021-06-30 PROCEDURE — 83735 ASSAY OF MAGNESIUM: CPT

## 2021-06-30 PROCEDURE — 2709999900 CT ABSCESS DRAINAGE W CATH PLACEMENT S&I

## 2021-06-30 PROCEDURE — 87070 CULTURE OTHR SPECIMN AEROBIC: CPT

## 2021-06-30 PROCEDURE — 99283 EMERGENCY DEPT VISIT LOW MDM: CPT

## 2021-06-30 PROCEDURE — 6360000002 HC RX W HCPCS: Performed by: NURSE ANESTHETIST, CERTIFIED REGISTERED

## 2021-06-30 PROCEDURE — 6370000000 HC RX 637 (ALT 250 FOR IP): Performed by: STUDENT IN AN ORGANIZED HEALTH CARE EDUCATION/TRAINING PROGRAM

## 2021-06-30 PROCEDURE — 75989 ABSCESS DRAINAGE UNDER X-RAY: CPT

## 2021-06-30 PROCEDURE — 6360000002 HC RX W HCPCS: Performed by: STUDENT IN AN ORGANIZED HEALTH CARE EDUCATION/TRAINING PROGRAM

## 2021-06-30 PROCEDURE — 2500000003 HC RX 250 WO HCPCS: Performed by: RADIOLOGY

## 2021-06-30 PROCEDURE — 87186 SC STD MICRODIL/AGAR DIL: CPT

## 2021-06-30 PROCEDURE — 94664 DEMO&/EVAL PT USE INHALER: CPT

## 2021-06-30 PROCEDURE — 36415 COLL VENOUS BLD VENIPUNCTURE: CPT

## 2021-06-30 PROCEDURE — 84132 ASSAY OF SERUM POTASSIUM: CPT

## 2021-06-30 PROCEDURE — 96374 THER/PROPH/DIAG INJ IV PUSH: CPT

## 2021-06-30 PROCEDURE — 87081 CULTURE SCREEN ONLY: CPT

## 2021-06-30 PROCEDURE — 82962 GLUCOSE BLOOD TEST: CPT

## 2021-06-30 PROCEDURE — 94640 AIRWAY INHALATION TREATMENT: CPT

## 2021-06-30 PROCEDURE — 0W9F30Z DRAINAGE OF ABDOMINAL WALL WITH DRAINAGE DEVICE, PERCUTANEOUS APPROACH: ICD-10-PCS | Performed by: RADIOLOGY

## 2021-06-30 PROCEDURE — 2580000003 HC RX 258: Performed by: STUDENT IN AN ORGANIZED HEALTH CARE EDUCATION/TRAINING PROGRAM

## 2021-06-30 PROCEDURE — 2000000000 HC ICU R&B

## 2021-06-30 PROCEDURE — 87077 CULTURE AEROBIC IDENTIFY: CPT

## 2021-06-30 PROCEDURE — 87040 BLOOD CULTURE FOR BACTERIA: CPT

## 2021-06-30 PROCEDURE — 2580000003 HC RX 258: Performed by: EMERGENCY MEDICINE

## 2021-06-30 PROCEDURE — 80048 BASIC METABOLIC PNL TOTAL CA: CPT

## 2021-06-30 PROCEDURE — 3700000001 HC ADD 15 MINUTES (ANESTHESIA)

## 2021-06-30 PROCEDURE — 85025 COMPLETE CBC W/AUTO DIFF WBC: CPT

## 2021-06-30 PROCEDURE — 3700000000 HC ANESTHESIA ATTENDED CARE

## 2021-06-30 PROCEDURE — 71045 X-RAY EXAM CHEST 1 VIEW: CPT

## 2021-06-30 PROCEDURE — 87106 FUNGI IDENTIFICATION YEAST: CPT

## 2021-06-30 PROCEDURE — 2700000000 HC OXYGEN THERAPY PER DAY

## 2021-06-30 PROCEDURE — 83690 ASSAY OF LIPASE: CPT

## 2021-06-30 RX ORDER — SODIUM CHLORIDE 0.9 % (FLUSH) 0.9 %
10 SYRINGE (ML) INJECTION EVERY 12 HOURS SCHEDULED
Status: DISCONTINUED | OUTPATIENT
Start: 2021-06-30 | End: 2021-07-03 | Stop reason: HOSPADM

## 2021-06-30 RX ORDER — ACETAMINOPHEN 325 MG/1
650 TABLET ORAL EVERY 4 HOURS PRN
Status: DISCONTINUED | OUTPATIENT
Start: 2021-06-30 | End: 2021-06-30

## 2021-06-30 RX ORDER — SODIUM CHLORIDE 9 MG/ML
25 INJECTION, SOLUTION INTRAVENOUS PRN
Status: DISCONTINUED | OUTPATIENT
Start: 2021-06-30 | End: 2021-07-03 | Stop reason: HOSPADM

## 2021-06-30 RX ORDER — ONDANSETRON 4 MG/1
4 TABLET, ORALLY DISINTEGRATING ORAL EVERY 8 HOURS PRN
Status: DISCONTINUED | OUTPATIENT
Start: 2021-06-30 | End: 2021-07-15 | Stop reason: HOSPADM

## 2021-06-30 RX ORDER — SODIUM CHLORIDE 0.9 % (FLUSH) 0.9 %
5-40 SYRINGE (ML) INJECTION EVERY 12 HOURS SCHEDULED
Status: DISCONTINUED | OUTPATIENT
Start: 2021-06-30 | End: 2021-07-15 | Stop reason: HOSPADM

## 2021-06-30 RX ORDER — SODIUM CHLORIDE 9 MG/ML
INJECTION, SOLUTION INTRAVENOUS CONTINUOUS PRN
Status: DISCONTINUED | OUTPATIENT
Start: 2021-06-30 | End: 2021-06-30 | Stop reason: SDUPTHER

## 2021-06-30 RX ORDER — SODIUM CHLORIDE, SODIUM LACTATE, POTASSIUM CHLORIDE, CALCIUM CHLORIDE 600; 310; 30; 20 MG/100ML; MG/100ML; MG/100ML; MG/100ML
INJECTION, SOLUTION INTRAVENOUS CONTINUOUS
Status: DISCONTINUED | OUTPATIENT
Start: 2021-06-30 | End: 2021-07-06

## 2021-06-30 RX ORDER — SODIUM CHLORIDE 0.9 % (FLUSH) 0.9 %
10 SYRINGE (ML) INJECTION PRN
Status: DISCONTINUED | OUTPATIENT
Start: 2021-06-30 | End: 2021-07-03 | Stop reason: HOSPADM

## 2021-06-30 RX ORDER — OXYCODONE HYDROCHLORIDE 5 MG/1
5 TABLET ORAL EVERY 4 HOURS PRN
Status: DISCONTINUED | OUTPATIENT
Start: 2021-06-30 | End: 2021-07-15 | Stop reason: HOSPADM

## 2021-06-30 RX ORDER — OXYCODONE HYDROCHLORIDE 10 MG/1
10 TABLET ORAL EVERY 4 HOURS PRN
Status: DISCONTINUED | OUTPATIENT
Start: 2021-06-30 | End: 2021-06-30 | Stop reason: HOSPADM

## 2021-06-30 RX ORDER — DEXTROSE MONOHYDRATE 50 MG/ML
100 INJECTION, SOLUTION INTRAVENOUS PRN
Status: DISCONTINUED | OUTPATIENT
Start: 2021-06-30 | End: 2021-07-15 | Stop reason: HOSPADM

## 2021-06-30 RX ORDER — SODIUM CHLORIDE 9 MG/ML
25 INJECTION, SOLUTION INTRAVENOUS PRN
Status: DISCONTINUED | OUTPATIENT
Start: 2021-06-30 | End: 2021-07-14 | Stop reason: SDUPTHER

## 2021-06-30 RX ORDER — OXYCODONE HYDROCHLORIDE 10 MG/1
10 TABLET ORAL EVERY 4 HOURS PRN
Status: DISCONTINUED | OUTPATIENT
Start: 2021-06-30 | End: 2021-07-15 | Stop reason: HOSPADM

## 2021-06-30 RX ORDER — LIDOCAINE HYDROCHLORIDE 20 MG/ML
INJECTION, SOLUTION INFILTRATION; PERINEURAL
Status: COMPLETED | OUTPATIENT
Start: 2021-06-30 | End: 2021-06-30

## 2021-06-30 RX ORDER — FENTANYL CITRATE 50 UG/ML
75 INJECTION, SOLUTION INTRAMUSCULAR; INTRAVENOUS ONCE
Status: COMPLETED | OUTPATIENT
Start: 2021-06-30 | End: 2021-06-30

## 2021-06-30 RX ORDER — OXYCODONE HYDROCHLORIDE 5 MG/1
5 TABLET ORAL EVERY 4 HOURS PRN
Status: DISCONTINUED | OUTPATIENT
Start: 2021-06-30 | End: 2021-06-30 | Stop reason: HOSPADM

## 2021-06-30 RX ORDER — POLYETHYLENE GLYCOL 3350 17 G/17G
17 POWDER, FOR SOLUTION ORAL DAILY
Status: DISCONTINUED | OUTPATIENT
Start: 2021-07-01 | End: 2021-07-04

## 2021-06-30 RX ORDER — GABAPENTIN 300 MG/1
300 CAPSULE ORAL 2 TIMES DAILY
Status: DISCONTINUED | OUTPATIENT
Start: 2021-06-30 | End: 2021-07-15 | Stop reason: HOSPADM

## 2021-06-30 RX ORDER — PROPOFOL 10 MG/ML
INJECTION, EMULSION INTRAVENOUS CONTINUOUS PRN
Status: DISCONTINUED | OUTPATIENT
Start: 2021-06-30 | End: 2021-06-30 | Stop reason: SDUPTHER

## 2021-06-30 RX ORDER — ONDANSETRON 2 MG/ML
4 INJECTION INTRAMUSCULAR; INTRAVENOUS EVERY 6 HOURS PRN
Status: DISCONTINUED | OUTPATIENT
Start: 2021-06-30 | End: 2021-07-15 | Stop reason: HOSPADM

## 2021-06-30 RX ORDER — FLUCONAZOLE 2 MG/ML
400 INJECTION, SOLUTION INTRAVENOUS EVERY 24 HOURS
Status: DISCONTINUED | OUTPATIENT
Start: 2021-07-01 | End: 2021-07-07

## 2021-06-30 RX ORDER — POLYETHYLENE GLYCOL 3350 17 G/17G
17 POWDER, FOR SOLUTION ORAL DAILY
Status: DISCONTINUED | OUTPATIENT
Start: 2021-06-30 | End: 2021-06-30 | Stop reason: HOSPADM

## 2021-06-30 RX ORDER — CEFAZOLIN SODIUM 1 G/3ML
INJECTION, POWDER, FOR SOLUTION INTRAMUSCULAR; INTRAVENOUS PRN
Status: DISCONTINUED | OUTPATIENT
Start: 2021-06-30 | End: 2021-06-30 | Stop reason: SDUPTHER

## 2021-06-30 RX ORDER — ACETAMINOPHEN 325 MG/1
650 TABLET ORAL EVERY 4 HOURS PRN
Status: DISCONTINUED | OUTPATIENT
Start: 2021-06-30 | End: 2021-07-10

## 2021-06-30 RX ORDER — 0.9 % SODIUM CHLORIDE 0.9 %
1000 INTRAVENOUS SOLUTION INTRAVENOUS ONCE
Status: COMPLETED | OUTPATIENT
Start: 2021-06-30 | End: 2021-06-30

## 2021-06-30 RX ORDER — MIDAZOLAM HYDROCHLORIDE 1 MG/ML
INJECTION INTRAMUSCULAR; INTRAVENOUS PRN
Status: DISCONTINUED | OUTPATIENT
Start: 2021-06-30 | End: 2021-06-30 | Stop reason: SDUPTHER

## 2021-06-30 RX ORDER — INSULIN GLARGINE 100 [IU]/ML
10 INJECTION, SOLUTION SUBCUTANEOUS 2 TIMES DAILY
Status: DISCONTINUED | OUTPATIENT
Start: 2021-06-30 | End: 2021-07-01

## 2021-06-30 RX ORDER — NICOTINE POLACRILEX 4 MG
15 LOZENGE BUCCAL PRN
Status: DISCONTINUED | OUTPATIENT
Start: 2021-06-30 | End: 2021-07-15 | Stop reason: HOSPADM

## 2021-06-30 RX ORDER — DEXTROSE MONOHYDRATE 25 G/50ML
12.5 INJECTION, SOLUTION INTRAVENOUS PRN
Status: DISCONTINUED | OUTPATIENT
Start: 2021-06-30 | End: 2021-07-15 | Stop reason: HOSPADM

## 2021-06-30 RX ORDER — BUDESONIDE 0.5 MG/2ML
1 INHALANT ORAL 2 TIMES DAILY
Status: DISCONTINUED | OUTPATIENT
Start: 2021-06-30 | End: 2021-07-05

## 2021-06-30 RX ORDER — SODIUM CHLORIDE 0.9 % (FLUSH) 0.9 %
5-40 SYRINGE (ML) INJECTION PRN
Status: DISCONTINUED | OUTPATIENT
Start: 2021-06-30 | End: 2021-07-15 | Stop reason: HOSPADM

## 2021-06-30 RX ORDER — ACETAMINOPHEN 325 MG/1
650 TABLET ORAL EVERY 6 HOURS
Status: DISCONTINUED | OUTPATIENT
Start: 2021-06-30 | End: 2021-06-30 | Stop reason: HOSPADM

## 2021-06-30 RX ORDER — PANTOPRAZOLE SODIUM 40 MG/1
40 TABLET, DELAYED RELEASE ORAL
Status: DISCONTINUED | OUTPATIENT
Start: 2021-07-01 | End: 2021-07-15 | Stop reason: HOSPADM

## 2021-06-30 RX ORDER — FLUCONAZOLE 2 MG/ML
800 INJECTION, SOLUTION INTRAVENOUS EVERY 24 HOURS
Status: COMPLETED | OUTPATIENT
Start: 2021-06-30 | End: 2021-06-30

## 2021-06-30 RX ORDER — FENTANYL CITRATE 50 UG/ML
INJECTION, SOLUTION INTRAMUSCULAR; INTRAVENOUS PRN
Status: DISCONTINUED | OUTPATIENT
Start: 2021-06-30 | End: 2021-06-30 | Stop reason: SDUPTHER

## 2021-06-30 RX ORDER — BENZONATATE 100 MG/1
200 CAPSULE ORAL 3 TIMES DAILY PRN
Status: DISCONTINUED | OUTPATIENT
Start: 2021-06-30 | End: 2021-07-15 | Stop reason: HOSPADM

## 2021-06-30 RX ORDER — ALBUTEROL SULFATE 2.5 MG/3ML
2.5 SOLUTION RESPIRATORY (INHALATION) 4 TIMES DAILY
Status: DISCONTINUED | OUTPATIENT
Start: 2021-06-30 | End: 2021-07-15 | Stop reason: HOSPADM

## 2021-06-30 RX ORDER — ONDANSETRON 2 MG/ML
4 INJECTION INTRAMUSCULAR; INTRAVENOUS EVERY 6 HOURS PRN
Status: DISCONTINUED | OUTPATIENT
Start: 2021-06-30 | End: 2021-06-30

## 2021-06-30 RX ADMIN — PHENYLEPHRINE HYDROCHLORIDE 100 MCG: 10 INJECTION INTRAVENOUS at 14:25

## 2021-06-30 RX ADMIN — PROPOFOL 100 MCG/KG/MIN: 10 INJECTION, EMULSION INTRAVENOUS at 13:46

## 2021-06-30 RX ADMIN — INSULIN LISPRO 8 UNITS: 100 INJECTION, SOLUTION INTRAVENOUS; SUBCUTANEOUS at 21:51

## 2021-06-30 RX ADMIN — GABAPENTIN 300 MG: 300 CAPSULE ORAL at 21:11

## 2021-06-30 RX ADMIN — INSULIN GLARGINE 10 UNITS: 100 INJECTION, SOLUTION SUBCUTANEOUS at 21:51

## 2021-06-30 RX ADMIN — OXYCODONE HYDROCHLORIDE 10 MG: 10 TABLET ORAL at 20:13

## 2021-06-30 RX ADMIN — BUDESONIDE 1000 MCG: 0.5 SUSPENSION RESPIRATORY (INHALATION) at 20:47

## 2021-06-30 RX ADMIN — PIPERACILLIN SODIUM AND TAZOBACTAM SODIUM 4500 MG: 4; .5 INJECTION, POWDER, LYOPHILIZED, FOR SOLUTION INTRAVENOUS at 18:25

## 2021-06-30 RX ADMIN — CEFAZOLIN 2000 MG: 1 INJECTION, POWDER, FOR SOLUTION INTRAMUSCULAR; INTRAVENOUS at 14:01

## 2021-06-30 RX ADMIN — FENTANYL CITRATE 50 MCG: 50 INJECTION, SOLUTION INTRAMUSCULAR; INTRAVENOUS at 14:00

## 2021-06-30 RX ADMIN — SODIUM CHLORIDE 1000 ML: 9 INJECTION, SOLUTION INTRAVENOUS at 16:55

## 2021-06-30 RX ADMIN — SODIUM CHLORIDE, POTASSIUM CHLORIDE, SODIUM LACTATE AND CALCIUM CHLORIDE: 600; 310; 30; 20 INJECTION, SOLUTION INTRAVENOUS at 19:51

## 2021-06-30 RX ADMIN — LIDOCAINE HYDROCHLORIDE 6 ML: 20 INJECTION, SOLUTION INFILTRATION; PERINEURAL at 14:39

## 2021-06-30 RX ADMIN — MIDAZOLAM 2 MG: 1 INJECTION INTRAMUSCULAR; INTRAVENOUS at 13:46

## 2021-06-30 RX ADMIN — FLUCONAZOLE IN SODIUM CHLORIDE 800 MG: 2 INJECTION, SOLUTION INTRAVENOUS at 21:52

## 2021-06-30 RX ADMIN — VANCOMYCIN HYDROCHLORIDE 1750 MG: 1 INJECTION, POWDER, LYOPHILIZED, FOR SOLUTION INTRAVENOUS at 19:56

## 2021-06-30 RX ADMIN — ALBUTEROL SULFATE 2.5 MG: 2.5 SOLUTION RESPIRATORY (INHALATION) at 20:47

## 2021-06-30 RX ADMIN — LIDOCAINE HYDROCHLORIDE 3 ML: 20 INJECTION, SOLUTION INFILTRATION; PERINEURAL at 14:14

## 2021-06-30 RX ADMIN — HYDROMORPHONE HYDROCHLORIDE 0.5 MG: 1 INJECTION, SOLUTION INTRAMUSCULAR; INTRAVENOUS; SUBCUTANEOUS at 20:13

## 2021-06-30 RX ADMIN — SODIUM CHLORIDE: 9 INJECTION, SOLUTION INTRAVENOUS at 13:40

## 2021-06-30 RX ADMIN — FENTANYL CITRATE 75 MCG: 50 INJECTION, SOLUTION INTRAMUSCULAR; INTRAVENOUS at 16:53

## 2021-06-30 RX ADMIN — SODIUM CHLORIDE, PRESERVATIVE FREE 10 ML: 5 INJECTION INTRAVENOUS at 21:52

## 2021-06-30 RX ADMIN — PHENYLEPHRINE HYDROCHLORIDE 200 MCG: 10 INJECTION INTRAVENOUS at 14:32

## 2021-06-30 RX ADMIN — FENTANYL CITRATE 50 MCG: 50 INJECTION, SOLUTION INTRAMUSCULAR; INTRAVENOUS at 13:46

## 2021-06-30 ASSESSMENT — ENCOUNTER SYMPTOMS
COUGH: 0
SHORTNESS OF BREATH: 1
EYE PAIN: 0
CONSTIPATION: 0
TROUBLE SWALLOWING: 0
ABDOMINAL PAIN: 0
RHINORRHEA: 0
CHEST TIGHTNESS: 0
SORE THROAT: 0
BACK PAIN: 1
NAUSEA: 1
VOMITING: 1
EYE DISCHARGE: 0
ABDOMINAL PAIN: 1
BLOOD IN STOOL: 0
COLOR CHANGE: 0
DIARRHEA: 0

## 2021-06-30 ASSESSMENT — PAIN SCALES - GENERAL
PAINLEVEL_OUTOF10: 10
PAINLEVEL_OUTOF10: 9
PAINLEVEL_OUTOF10: 10
PAINLEVEL_OUTOF10: 10

## 2021-06-30 ASSESSMENT — PAIN DESCRIPTION - ORIENTATION: ORIENTATION: LEFT

## 2021-06-30 ASSESSMENT — PAIN DESCRIPTION - PAIN TYPE: TYPE: ACUTE PAIN;SURGICAL PAIN

## 2021-06-30 ASSESSMENT — PAIN DESCRIPTION - FREQUENCY: FREQUENCY: CONTINUOUS

## 2021-06-30 ASSESSMENT — LIFESTYLE VARIABLES: SMOKING_STATUS: 0

## 2021-06-30 ASSESSMENT — PAIN DESCRIPTION - ONSET: ONSET: ON-GOING

## 2021-06-30 ASSESSMENT — PAIN DESCRIPTION - PROGRESSION
CLINICAL_PROGRESSION: NOT CHANGED
CLINICAL_PROGRESSION: NOT CHANGED

## 2021-06-30 ASSESSMENT — PAIN DESCRIPTION - LOCATION: LOCATION: ABDOMEN;RIB CAGE

## 2021-06-30 ASSESSMENT — PAIN - FUNCTIONAL ASSESSMENT: PAIN_FUNCTIONAL_ASSESSMENT: PREVENTS OR INTERFERES WITH MANY ACTIVE NOT PASSIVE ACTIVITIES

## 2021-06-30 NOTE — PROGRESS NOTES
1502 - Patient returned from procedure. Dressings checked, clean, dry, and intact. Patient stable. No s/s of complications noted or reported. Vitals will be checked q 15min, see flow sheets. 1530 - Patient taken over to ER with all belongings, report given to Leonel Lynch RN at bedside, patient awake and alert in stable condition, call light given to patient.

## 2021-06-30 NOTE — H&P
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 Postop course complicated by hypertriglyceridemia, acute respiratory failure, and pancreatic leak controlled by MAX drain. Discharged on 5/20. Patient readmitted on 5/27 for 20 cm abscess from pancreatic leak. Patient had IR drain placed in addition to the pre-existing MAX drain. Repeat CT demonstrating persistent large abscess. Patient underwent ERCP on 6/4 to exchange clogged pancreatic stent at which point he was diagnosed with free leaking distal pancreatic staple line. Patient also started on therapeutic Lovenox for portal venous thrombus later discharged on Eliquis. Patient had IR drain removed on 6/23 by IR. States he cannot keep anything down. He was seen by Dr. Lorena Dukes 6/24 and was planned to undergo manipulation of the pancreatic stent Dr. Warden Rene. Since that time patient has had increasing nausea and vomiting. Patient underwent CT guided placement of left upper quadrant and epigastric IR drains with total of 135 purulence cc out. During the procedure patient had tachycardia, hypotension and tachypnea. This reportedly resolved after the procedure. Patient sent to the emergency department for admission. When evaluated he was again tachycardic in the 140s, normotensive, and tachypneic on 15 L nonrebreather. Reporting significant pain in the left upper quadrant with a new drainage. Old MAX drain in the left lower quadrant still in place with milky light gray output consistent with pancreatic and purulent output. Patient shaking which appears to be rigors but he says it is from the pain.   Denies fevers or chills. Denies constipation or diarrhea.       Past Medical History:   Diagnosis Date    Arthritis     Back pain     5th finger & ring finger on right hand is numb    Bell's palsy     NEW ONSET 3-     Hyperlipidemia     Hypertension     Neuropathy     toes numb    Poorly controlled type 2 diabetes mellitus with neuropathy ([facility] [address] 5/8/2018    Sacral radiculitis 2/27/2019    Spondylosis of lumbar spine 3/9/2019    Advanced arthritis and degenerative disc disease by MRI 3/2019    Ventral hernia        Past Surgical History:   Procedure Laterality Date    ANESTHESIA NERVE BLOCK Bilateral 07/11/2019    BILATERAL L4-5 TRANSFORAMINAL EPIDURAL STEROID INJECTION performed by Dena Royal DO at [address] Bilateral 08/01/2019    BILATERAL MEDIAL BRANCH BLOCK L4-5 AND L5-S1 performed by Dena Royal DO at [address] Bilateral 08/15/2019    BILATERAL MEDIAL BRANCH BLOCK L4 - 5 AND L5 - S1 performed by Dena Royal DO at [facility] Right 03/29/2019    right wrist carpal tunnel release and right elbow cubital tunnel release    CARPAL TUNNEL RELEASE Right 03/29/2019    RIGHT WRIST CARPAL TUNNEL RELEASE AND RIGHT ELBOW CUBITAL TUNNEL RELEASE performed by Chele Núñez DO at [place] [id number]      at age 40 polyps    CYST REMOVAL Right     EPIDURAL STEROID INJECTION N/A 10/17/2019    LUMBAR EPIDURAL STEROID INJECTION UNDER FLUOROSCOPIC GUIDANCE AT L2-L3 WITHOUT SEDATION performed by Lennox Shaffer MD at [address] [facility] ERCP N/A 05/17/2021    ERCP ENDOSCOPIC RETROGRADE CHOLANGIOPANCREATOGRAPHY SPHINCTER/PAPILLOTOMY performed by Jamarcus Monroe MD at [address] ERCP N/A 05/17/2021    ERCP STENT INSERTION performed by Jamarcus Monroe MD at [address] ERCP N/A 6/4/2021    ERCP STENT INSERTION performed by Jamarcus Monroe MD at [address] HERNIA REPAIR Bilateral     groin and umbilical    HERNIA REPAIR N/A 12/13/2018    ROBOTIC ASSISTED LAPAROSCOPIC PRIMARY REPAIR OF RECURRENT VENTRAL HERNIA  REPAIR performed by Helder Chau MD at [facility] Bilateral 07/11/2019    L4-5 transforaminal     NERVE BLOCK Bilateral 08/01/2019    medial branch block    NERVE BLOCK Bilateral 08/15/2019    bilateral medial branch block L4-S1    NERVE BLOCK  10/17/2019    lumbar epidural    PANCREATECTOMY N/A 05/12/2021    LAPAROSCOPIC ROBOTIC DISTAL PANCREATECTOMY AND SPLENECTOMY AND CHOLECYSTECTOMY, INTRA-OPERATIVE ULTRASOUND, TRANSITIONED TO OPEN -- [facility]IDURAL [facility]  06/05/2018    C5-7 fusion at [facility] in [address] [place] [address] Pkwy 01/08/2021    EGD W/EUS FNA performed by Jackie Holguin DO at [address] [place] [address],[address] 01/08/2021    EGD DIAGNOSTIC ONLY performed by Jackie Holguin DO at [facility] ENDOSCOPY       Current Facility-Administered Medications   Medication Dose Route Frequency Provider Last Rate Last Admin    0.9 % sodium chloride bolus  1,000 mL Intravenous Once Radha Valdez MD 2,000 mL/hr at 06/30/21 1655 1,000 mL at 06/30/21 1655     Current Outpatient Medications   Medication Sig Dispense Refill    predniSONE (DELTASONE) 50 MG tablet Take 1 tablet by mouth daily for 3 doses Take 1 tab 13 hours, 7 hours and 1 hour prior to contrast.  Also take 50mg Benadryl 1 hour prior to contrast dye. 3 tablet 0    oxyCODONE-acetaminophen (PERCOCET) 7.5-325 MG per tablet Take 1 tablet by mouth every 4 hours as needed for Pain.       BANOPHEN 25 MG tablet take 2 tablets by mouth 1 HOUR PRIOR TO CONTRAST MEDIA ADMINISTRATION 2 tablet 0    acetaminophen (TYLENOL) 500 MG tablet Take 2 tablets by mouth every 6 hours 120 tablet 0    ibuprofen (ADVIL;MOTRIN) 600 MG tablet Take 1 tablet by mouth 3 times daily (with meals) 120 tablet 0    apixaban (ELIQUIS) 5 MG TABS tablet Take 1 tablet by mouth 2 times daily . The first dose of this Eliquis should be taken approximately 12 hours after you received your last dose of Lovenox (the blood thinner injection into your skin) given in the hospital. Continue to take the Eliquis approximately every 12 hours. 60 tablet 3    polyethylene glycol (GLYCOLAX) 17 g packet Take 17 g by mouth daily .   This helps to prevent constipation caused by taking high dose narcotics (Oxycodone) 527 g 1    benzonatate (TESSALON) 200 MG capsule Take 200 mg by mouth 3 times daily as needed for Cough      insulin lispro, 1 Unit Dial, (HUMALOG KWIKPEN) 100 UNIT/ML SOPN Inject 0-12 Units into the skin 3 times daily (before meals) Glucose: Dose:  If <139             No Insulin  140-199 2 Units  200-249 4 Units  250-299 6 Units  300-349 8 Units  350-400 10 Units  Above 400       12 Units 10.8 mL 0    losartan (COZAAR) 100 MG tablet take 1 tablet by mouth once daily 30 tablet 3    hydroCHLOROthiazide (HYDRODIURIL) 12.5 MG tablet take 1 tablet by mouth once daily 30 tablet 3    albuterol (PROVENTIL) (2.5 MG/3ML) 0.083% nebulizer solution Take 3 mLs by nebulization 4 times daily 120 each 3    budesonide (PULMICORT) 0.5 MG/2ML nebulizer suspension Take 4 mLs by nebulization 2 times daily 60 ampule 3    guaiFENesin (ROBITUSSIN) 100 MG/5ML SOLN oral solution Take 15 mLs by mouth 3 times daily 1200 mL 0    lipase-protease-amylase (CREON) 84443 units CPEP delayed release capsule Take 2 capsules by mouth 3 times daily (with meals) 540 capsule 3    pantoprazole (PROTONIX) 40 MG tablet Take 1 tablet by mouth 2 times daily (before meals) 60 tablet 0    atorvastatin (LIPITOR) 20 MG tablet Take 1 tablet by mouth daily 30 tablet 3    insulin glargine (LANTUS SOLOSTAR) 100 UNIT/ML injection pen Inject 20 Units into the skin 2 times daily 12 mL 3    insulin lispro, 1 Unit Dial, (HUMALOG KWIKPEN) 100 UNIT/ML SOPN Inject 7 Units into the skin 3 times daily (before meals) 6.3 mL 3    gabapentin (NEURONTIN) 300 MG capsule Take 1 capsule by mouth 2 times daily.        Facility-Administered Medications Ordered in Other Encounters   Medication Dose Route Frequency Provider Last Rate Last Admin    sodium chloride flush 0.9 % injection 10 mL  10 mL Intravenous 2 times per day Crystal Valverde MD        sodium chloride flush 0.9 % injection 10 mL  10 mL Intravenous PRN Crystal Valverde MD        0.9 % sodium chloride infusion  25 mL Intravenous PRN Crystal Valverde MD        polyethylene glycol Russella Homes) packet 17 g  17 g Oral Daily Crystal Valverde MD        acetaminophen (TYLENOL) tablet 650 mg  650 mg Oral Q6H Crystal Valverde MD        oxyCODONE (ROXICODONE) immediate release tablet 5 mg  5 mg Oral Q4H PRN Crystal Valverde MD        Or   [facility] oxyCODONE HCl (OXY-IR) immediate release tablet 10 mg  10 mg Oral Q4H PRN Crystal Valverde MD        HYDROmorphone (DILAUDID) injection 0.25 mg  0.25 mg Intravenous Q4H PRN Crystal Valverde MD        Or   [facility] HYDROmorphone (DILAUDID) injection 0.5 mg  0.5 mg Intravenous Q4H PRN Crystal Valverde MD        ondansetron [facility]) injection 4 mg  4 mg Intravenous Q6H PRN Crystal Valverde MD           Allergies   Allergen Reactions    Iodine Swelling     Sea food    Metformin And Related Hives    Wheat Extract Swelling       Family History   Problem Relation Age of Onset    Diabetes Mother     Other Mother         parkinsons    Diabetes Father     Cancer Sister 79        unknown    Cancer Brother 64        stomach    Diabetes Brother        Social History     Socioeconomic History    Marital status: Single     Spouse name: Not on file    Number of children: Not on file    Years of education: Not on file    Highest education level: Not on file   Occupational History    Not on file   Tobacco Use    Smoking status: Never Smoker    Smokeless tobacco: Never Used   Vaping Use    Vaping Use: Never used Substance and Sexual Activity    Alcohol use: Not Currently     Comment: occasional    Drug use: Never    Sexual activity: Not on file   Other Topics Concern    Not on file   Social History Narrative    Not on file     Social Determinants of Health     Financial Resource Strain:     Difficulty of Paying Living Expenses:    Food Insecurity:     Worried About Running Out of Food in the Last Year:     [address] in the Last Year:    Transportation Needs:     Lack of Transportation (Medical):  Lack of Transportation (Non-Medical):    Physical Activity:     Days of Exercise per Week:     Minutes of Exercise per Session:    Stress:     Feeling of Stress :    Social Connections:     Frequency of Communication with Friends and Family:     Frequency of Social Gatherings with Friends and Family:     Attends Mu-ism Services:     Active Member of Clubs or Organizations:     Attends Club or Organization Meetings:     Marital Status:    Intimate Partner Violence:     Fear of Current or Ex-Partner:     Emotionally Abused:     Physically Abused:     Sexually Abused:        ROS:   Review of Systems   Constitutional: Positive for appetite change and fatigue. Negative for chills and fever. HENT: Negative for congestion and sore throat. Eyes: Negative for pain and discharge. Respiratory: Positive for shortness of breath. Negative for chest tightness. Cardiovascular: Negative for leg swelling. Gastrointestinal: Positive for abdominal pain, nausea and vomiting. Negative for constipation and diarrhea. Genitourinary: Negative for difficulty urinating and dysuria. Musculoskeletal: Positive for back pain and neck pain. Skin: Negative for color change and rash. Hematological: Does not bruise/bleed easily. Physical Exam:  Vitals:    06/30/21 1706   BP: (!) 148/88   Pulse: 137   Resp: (!) 40   Temp:    SpO2: 96%       PSYCH: mood and affect normal, alert and oriented x 3.   In mild to moderate acute distress. EYES: Sclera white, pupils equal round and reactive to light  ENMT:  Hearing normal, trachea midline, ears externally intact  LYMPH: no obvious lympadenopathy in neck. RESP: Tachypneic with rapid shallow breaths, on 15 L nonrebreather mask  CV: Tachycardic in the 130s 140s and hypertensive, no pedal edema  GI/ Abdomen: Mild to moderate distention, soft, left lower quadrant MAX drain with light green milky output, no epigastric and left upper quadrant IR drains in place with minor light green milky output. Abdomen tender to palpation particularly over the left upper quadrant. No guarding rigidity. MSK: no clubbing/ no cyanosis       Assessment/Plan:  Patient is a 71-year-old male with postoperative development of intra-abdominal abscess status post distal pancreatectomy, splenectomy, cholecystectomy on 5/12.   ERCP with pancreatic stent placement on 6/4    -Admit SICU  -Start on vancomycin and Zosyn  -Consult infectious disease  -Obtain blood cultures  -Fluid resuscitation  -Check lactic acid, lipase, magnesium, hepatic panel, BMP, CBC    Plan discussed with Dr. Katheryn Tracy      Electronically signed by Michell Ruiz DO on 6/30/2021 at 5:21 PM

## 2021-06-30 NOTE — TELEPHONE ENCOUNTER
Prior Authorization Form:      DEMOGRAPHICS:                     Patient Name:  Wandy Paez  Patient :  1960            Insurance:  Payor: [facility] / Plan: [facility] / Product Type: *No Product type* /   Insurance ID Number:    Payor/Plan Subscr  Sex Relation Sub.  Ins. ID Effective Group Num   1. [facility] 1960 Male Self [id number]                                   [address]         DIAGNOSIS & PROCEDURE:                       Procedure/Operation: ERCP           CPT Code: 21158    Diagnosis:  Pancreatic duct leak    ICD10 Code: K86.89    Location:  [address] [place] [address]    Surgeon:  Radha Amador    SCHEDULING INFORMATION:                          Date: 2021    Time: TBD              Anesthesia:  MAC/TIVA                                                       Status:  Outpatient        Special Comments:         Electronically signed by Terrance Wang on 2021 at 8:39 AM

## 2021-06-30 NOTE — H&P
Interventional Radiology  Attending Pre-operative History and Physical    DIAGNOSIS:    Patient Active Problem List   Diagnosis    Mixed hyperlipidemia    Neck pain    Cervical radiculopathy at C7    Essential hypertension    Chronic seasonal allergic rhinitis    Ventral hernia without obstruction or gangrene    Poorly controlled type 2 diabetes mellitus (HCC)    Chronic bilateral low back pain with right-sided sciatica    Gynecomastia    Panic disorder    Cubital tunnel syndrome on right    Right carpal tunnel syndrome    Spondylosis of lumbar spine    Bell's palsy    Other bursal cyst, left elbow    Sacral radiculitis    Lumbar radiculitis    Spondylosis of cervical region without myelopathy or radiculopathy    Cervical facet joint syndrome    Cervical disc disorder    Lumbar facet arthropathy    Spinal stenosis of lumbar region with neurogenic claudication    Lumbar disc disorder    Pancreatic neoplasm    IPMN (intraductal papillary mucinous neoplasm)    Pancreatic duct leak    Intra-abdominal infection    Abdominal pain with fever after surgery       CHIEF COMPLAINT: <principal problem not specified>          Current Outpatient Medications:     predniSONE (DELTASONE) 50 MG tablet, Take 1 tablet by mouth daily for 3 doses Take 1 tab 13 hours, 7 hours and 1 hour prior to contrast.  Also take 50mg Benadryl 1 hour prior to contrast dye., Disp: 3 tablet, Rfl: 0    oxyCODONE-acetaminophen (PERCOCET) 7.5-325 MG per tablet, Take 1 tablet by mouth every 4 hours as needed for Pain., Disp: , Rfl:     acetaminophen (TYLENOL) 500 MG tablet, Take 2 tablets by mouth every 6 hours, Disp: 120 tablet, Rfl: 0    ibuprofen (ADVIL;MOTRIN) 600 MG tablet, Take 1 tablet by mouth 3 times daily (with meals), Disp: 120 tablet, Rfl: 0    apixaban (ELIQUIS) 5 MG TABS tablet, Take 1 tablet by mouth 2 times daily .  The first dose of this Eliquis should be taken approximately 12 hours after you received your last dose of Lovenox (the blood thinner injection into your skin) given in the hospital. Continue to take the Eliquis approximately every 12 hours. , Disp: 60 tablet, Rfl: 3    insulin lispro, 1 Unit Dial, (HUMALOG KWIKPEN) 100 UNIT/ML SOPN, Inject 0-12 Units into the skin 3 times daily (before meals) Glucose: Dose: If <139             No Insulin 140-199 2 Units 200-249 4 Units 250-299 6 Units 300-349 8 Units 350-400 10 Units Above 400       12 Units, Disp: 10.8 mL, Rfl: 0    losartan (COZAAR) 100 MG tablet, take 1 tablet by mouth once daily, Disp: 30 tablet, Rfl: 3    hydroCHLOROthiazide (HYDRODIURIL) 12.5 MG tablet, take 1 tablet by mouth once daily, Disp: 30 tablet, Rfl: 3    albuterol (PROVENTIL) (2.5 MG/3ML) 0.083% nebulizer solution, Take 3 mLs by nebulization 4 times daily, Disp: 120 each, Rfl: 3    budesonide (PULMICORT) 0.5 MG/2ML nebulizer suspension, Take 4 mLs by nebulization 2 times daily, Disp: 60 ampule, Rfl: 3    lipase-protease-amylase (CREON) 22308 units CPEP delayed release capsule, Take 2 capsules by mouth 3 times daily (with meals), Disp: 540 capsule, Rfl: 3    pantoprazole (PROTONIX) 40 MG tablet, Take 1 tablet by mouth 2 times daily (before meals), Disp: 60 tablet, Rfl: 0    atorvastatin (LIPITOR) 20 MG tablet, Take 1 tablet by mouth daily, Disp: 30 tablet, Rfl: 3    insulin glargine (LANTUS SOLOSTAR) 100 UNIT/ML injection pen, Inject 20 Units into the skin 2 times daily, Disp: 12 mL, Rfl: 3    insulin lispro, 1 Unit Dial, (HUMALOG KWIKPEN) 100 UNIT/ML SOPN, Inject 7 Units into the skin 3 times daily (before meals), Disp: 6.3 mL, Rfl: 3    gabapentin (NEURONTIN) 300 MG capsule, Take 1 capsule by mouth 2 times daily. , Disp: , Rfl:     BANOPHEN 25 MG tablet, take 2 tablets by mouth 1 HOUR PRIOR TO CONTRAST MEDIA ADMINISTRATION, Disp: 2 tablet, Rfl: 0    polyethylene glycol (GLYCOLAX) 17 g packet, Take 17 g by mouth daily .   This helps to prevent constipation caused by taking high dose narcotics (Oxycodone), Disp: 527 g, Rfl: 1    benzonatate (TESSALON) 200 MG capsule, Take 200 mg by mouth 3 times daily as needed for Cough, Disp: , Rfl:     guaiFENesin (ROBITUSSIN) 100 MG/5ML SOLN oral solution, Take 15 mLs by mouth 3 times daily, Disp: 1200 mL, Rfl: 0  No current facility-administered medications for this encounter.     Facility-Administered Medications Ordered in Other Encounters:     0.9 % sodium chloride infusion, , Intravenous, Continuous PRN, MARGAUX Nagel - CRNA, New Bag at 06/30/21 1340    midazolam (VERSED) injection, , Intravenous, PRN, MARGAUX Nagel - CRNA, 2 mg at 06/30/21 1346    fentaNYL (SUBLIMAZE) injection, , Intravenous, PRN, MARGAUX Nagel - CRNA, 50 mcg at 06/30/21 1400    propofol injection, , Intravenous, Continuous PRN, MARGAUX Nagel - CRNA, Last Rate: 64.8 mL/hr at 06/30/21 1401, 120 mcg/kg/min at 06/30/21 1401    ceFAZolin (ANCEF) injection, , Intravenous, PRN, Skye Stevenson APRN - CRNA, 2,000 mg at 06/30/21 1401    Allergies   Allergen Reactions    Iodine Swelling     Sea food    Metformin And Related Hives    Wheat Extract Swelling       Past Medical History:   Diagnosis Date    Arthritis     Back pain     5th finger & ring finger on right hand is numb    Bell's palsy     NEW ONSET 3-     Hyperlipidemia     Hypertension     Neuropathy     toes numb    Poorly controlled type 2 diabetes mellitus with neuropathy ([facility] [address] 5/8/2018    Sacral radiculitis 2/27/2019    Spondylosis of lumbar spine 3/9/2019    Advanced arthritis and degenerative disc disease by MRI 3/2019    Ventral hernia        Past Surgical History:   Procedure Laterality Date    ANESTHESIA NERVE BLOCK Bilateral 07/11/2019    BILATERAL L4-5 TRANSFORAMINAL EPIDURAL STEROID INJECTION performed by Dena Royal DO at [address] Bilateral 08/01/2019    BILATERAL MEDIAL BRANCH BLOCK L4-5 AND L5-S1 performed by Trey Wakefield DO at [address] [place] [address] Bilateral 08/15/2019    BILATERAL MEDIAL BRANCH BLOCK L4 - 5 AND L5 - S1 performed by Trey Wakefield DO at [facility] Right 03/29/2019    right wrist carpal tunnel release and right elbow cubital tunnel release    CARPAL TUNNEL RELEASE Right 03/29/2019    RIGHT WRIST CARPAL TUNNEL RELEASE AND RIGHT ELBOW CUBITAL TUNNEL RELEASE performed by Ross Malin DO at [place] [address]      at age 40 polyps    CYST REMOVAL Right     EPIDURAL STEROID INJECTION N/A 10/17/2019    LUMBAR EPIDURAL STEROID INJECTION UNDER FLUOROSCOPIC GUIDANCE AT L2-L3 WITHOUT SEDATION performed by Florence Pressley MD at [facility] ERCP N/A 05/17/2021    ERCP ENDOSCOPIC RETROGRADE CHOLANGIOPANCREATOGRAPHY SPHINCTER/PAPILLOTOMY performed by Jayesh Jackson MD at [address] ERCP N/A 05/17/2021    ERCP STENT INSERTION performed by Jayesh Jackson MD at [address] ERCP N/A 6/4/2021    ERCP STENT INSERTION performed by Jayesh [facility] MD at [facility] Bilateral     groin and umbilical    HERNIA REPAIR N/A 12/13/2018    ROBOTIC ASSISTED LAPAROSCOPIC PRIMARY REPAIR OF RECURRENT VENTRAL HERNIA  REPAIR performed by Laury Gaucher, MD at [facility] Bilateral 07/11/2019    L4-5 transforaminal     NERVE BLOCK Bilateral 08/01/2019    medial branch block    NERVE BLOCK Bilateral 08/15/2019    bilateral medial branch block L4-S1    NERVE BLOCK  10/17/2019    lumbar epidural    PANCREATECTOMY N/A 05/12/2021    LAPAROSCOPIC ROBOTIC DISTAL PANCREATECTOMY AND SPLENECTOMY AND CHOLECYSTECTOMY, INTRA-OPERATIVE ULTRASOUND, TRANSITIONED TO OPEN -- EPIDURAL performed by Dixon Lin MD at [facility]  06/05/2018    C5-7 fusion at [address] [place] [address] Pkwy 01/08/2021    EGD W/EUS FNA performed by Stef Rubio DO at [address] [place] [address] N/A 01/08/2021    EGD DIAGNOSTIC ONLY performed by Stef Rubio DO at [facility] ENDOSCOPY       Family History   Problem Relation Age of Onset    Diabetes Mother     Other Mother         parkinsons    Diabetes Father     Cancer Sister 79        unknown    Cancer Brother 64        stomach    Diabetes Brother        Social History     Socioeconomic History    Marital status: Single     Spouse name: Not on file    Number of children: Not on file    Years of education: Not on file    Highest education level: Not on file   Occupational History    Not on file   Tobacco Use    Smoking status: Never Smoker    Smokeless tobacco: Never Used   Vaping Use    Vaping Use: Never used   Substance and Sexual Activity    Alcohol use: Not Currently     Comment: occasional    Drug use: Never    Sexual activity: Not on file   Other Topics Concern    Not on file   Social History Narrative    Not on file     Social Determinants of Health     Financial Resource Strain:     Difficulty of Paying Living Expenses:    Food Insecurity:     Worried About Running Out of Food in the Last Year:     [address] in the Last Year:    Transportation Needs:     Lack of Transportation (Medical):      Lack of Transportation (Non-Medical):    Physical Activity:     Days of Exercise per Week:     Minutes of Exercise per Session:    Stress:     Feeling of Stress :    Social Connections:     Frequency of Communication with Friends and Family:     Frequency of Social Gatherings with Friends and Family:     Attends Manda[employer] Services:     Active Member of Clubs or Organizations:     Attends Club or Organization Meetings:     Marital Status:    Intimate Partner Violence:     Fear of Current or Ex-Partner:     Emotionally Abused:     Physically Abused:     Sexually Abused:        ROS: Non-contributory other than as noted above    PHYSICAL EXAM: Heart and Lungs:  demonstrate no contraindications to proceed    DATA:  CBC:   Lab Results   Component Value Date    WBC 10.1 06/23/2021    RBC 4.18 06/23/2021    HGB 9.3 06/23/2021    HCT 30.3 06/23/2021    MCV 72.5 06/23/2021    MCH 22.2 06/23/2021    MCHC 30.7 06/23/2021    RDW 16.5 06/23/2021     06/23/2021    MPV 10.1 06/23/2021     CBC with Differential:    Lab Results   Component Value Date    WBC 10.1 06/23/2021    RBC 4.18 06/23/2021    HGB 9.3 06/23/2021    HCT 30.3 06/23/2021     06/23/2021    MCV 72.5 06/23/2021    MCH 22.2 06/23/2021    MCHC 30.7 06/23/2021    RDW 16.5 06/23/2021    NRBC 0.9 06/04/2021    METASPCT 0.9 05/29/2021    LYMPHOPCT 19.5 06/05/2021    MONOPCT 15.1 06/05/2021    MYELOPCT 0.9 06/01/2021    BASOPCT 0.5 06/05/2021    MONOSABS 1.31 06/05/2021    LYMPHSABS 1.69 06/05/2021    EOSABS 0.27 06/05/2021    BASOSABS 0.04 06/05/2021     Platelets:    Lab Results   Component Value Date     06/23/2021     BUN/Creatinine:    Lab Results   Component Value Date    BUN 7 06/05/2021    CREATININE 0.6 06/05/2021       ASSESSMENT AND PLAN:  1. Abscess plan drainage  2. Procedure options, risks and benefits reviewed with patient. Patient expresses understanding.     Electronically signed by Florina Leija II, MD on 6/30/2021 at 2:07 PM

## 2021-06-30 NOTE — ANESTHESIA POSTPROCEDURE EVALUATION
Department of Anesthesiology  Postprocedure Note    Patient: Wandy Paez  MRN: [id number]  YOB: 1960  Date of evaluation: 6/30/2021  Time:  3:12 PM     Procedure Summary     Date: 06/30/21 Room / Location: [address] Scan; [address] Procedures; [address] Radiology    Anesthesia Start: 1340 Anesthesia Stop: 6256    Procedure: CT GUIDED ABSCESS FLUID DRAIN Diagnosis:       Intra-abdominal infection      Sepsis ([address]      (placement of IR drain into left sided fluid collection)    Scheduled Providers: [facility] Radiologist Responsible Provider: Leon Huff MD    Anesthesia Type: MAC ASA Status: 3          Anesthesia Type: MAC    Dominic Phase I:      Dominic Phase II:      Last vitals: Reviewed and per EMR flowsheets.        Anesthesia Post Evaluation    Patient location during evaluation: PACU  Patient participation: complete - patient participated  Level of consciousness: awake  Pain score: 0  Airway patency: patent  Nausea & Vomiting: no nausea  Complications: no  Cardiovascular status: hemodynamically stable  Respiratory status: acceptable  Hydration status: stable

## 2021-06-30 NOTE — ED PROVIDER NOTES
ED PROVIDER NOTE    Chief Complaint   Patient presents with    Other     Pt sent over from angio after having 2 abdominal drains inserted. HPI:  6/30/21,   Time: 4:53 PM EDT       Amee Eric is a 62 y.o. male presenting to the ED for abdominal pain, chest pain, shortness of breath. Patient was sent from IR s/p insertion of 2 drains for intraabdominal abscess. Patient reports leading up to the procedure for the past 4 days he has had nausea, dry heaves, and decreased PO intake. He had LAPAROSCOPIC ROBOTIC DISTAL PANCREATECTOMY AND SPLENECTOMY AND CHOLECYSTECTOMY by Dr Nazia Hill on 5/12/21. Dr Nazia Hill recommended the patient come to the ED for evaluation after the IR procedure. His main complaint since the procedure is severe left lateral chest pain near one of the drain insertion sites, constant, sharp, worse w/ inspiration, associated shortness of breath. No fever/chills. Review of Systems:     Review of Systems   Constitutional: Positive for appetite change. Negative for chills and fever. HENT: Negative for congestion, rhinorrhea and trouble swallowing. Eyes: Negative for visual disturbance. Respiratory: Positive for shortness of breath. Negative for cough. Cardiovascular: Positive for chest pain. Negative for leg swelling. Gastrointestinal: Positive for nausea and vomiting. Negative for abdominal pain, blood in stool and diarrhea. Genitourinary: Negative for decreased urine volume, difficulty urinating, dysuria, frequency, hematuria and urgency. Musculoskeletal: Negative for myalgias, neck pain and neck stiffness. Skin: Negative for color change.    Neurological: Negative for dizziness, syncope, weakness, light-headedness, numbness and headaches.         --------------------------------------------- PAST HISTORY ---------------------------------------------  Past Medical History:   Past Medical History:   Diagnosis Date    Arthritis     Back pain     5th finger & ring finger on right hand is numb    Bell's palsy     NEW ONSET 3-     Hyperlipidemia     Hypertension     Neuropathy     toes numb    Poorly controlled type 2 diabetes mellitus with neuropathy ([facility] [address] 5/8/2018    Sacral radiculitis 2/27/2019    Spondylosis of lumbar spine 3/9/2019    Advanced arthritis and degenerative disc disease by MRI 3/2019    Ventral hernia        Past Surgical History:   Past Surgical History:   Procedure Laterality Date    ANESTHESIA NERVE BLOCK Bilateral 07/11/2019    BILATERAL L4-5 TRANSFORAMINAL EPIDURAL STEROID INJECTION performed by Barber Corrales DO at [address] Bilateral 08/01/2019    BILATERAL MEDIAL BRANCH BLOCK L4-5 AND L5-S1 performed by Barber Corrales DO at [address] Bilateral 08/15/2019    BILATERAL MEDIAL BRANCH BLOCK L4 - 5 AND L5 - S1 performed by Barber Corrales DO at [facility] Right 03/29/2019    right wrist carpal tunnel release and right elbow cubital tunnel release    CARPAL TUNNEL RELEASE Right 03/29/2019    RIGHT WRIST CARPAL TUNNEL RELEASE AND RIGHT ELBOW CUBITAL TUNNEL RELEASE performed by Ashanti Ibarra DO at [place] [id number]      at age 40 polyps    CYST REMOVAL Right     EPIDURAL STEROID INJECTION N/A 10/17/2019    LUMBAR EPIDURAL STEROID INJECTION UNDER FLUOROSCOPIC GUIDANCE AT L2-L3 WITHOUT SEDATION performed by Edson Tinajero MD at [address] [facility] ERCP N/A 05/17/2021    ERCP ENDOSCOPIC RETROGRADE CHOLANGIOPANCREATOGRAPHY SPHINCTER/PAPILLOTOMY performed by Saroj Marino MD at [address] ERCP N/A 05/17/2021    ERCP STENT INSERTION performed by Saroj Marino MD at [address] ERCP N/A 6/4/2021    ERCP STENT INSERTION performed by Saroj Marino MD at [facility] Bilateral     groin and umbilical    HERNIA REPAIR N/A 12/13/2018    ROBOTIC ASSISTED LAPAROSCOPIC PRIMARY REPAIR OF RECURRENT VENTRAL HERNIA  REPAIR performed by Rosamond Scheuermann, MD at [address] Bilateral 07/11/2019    L4-5 transforaminal     NERVE BLOCK Bilateral 08/01/2019    medial branch block    NERVE BLOCK Bilateral 08/15/2019    bilateral medial branch block L4-S1    NERVE BLOCK  10/17/2019    lumbar epidural    PANCREATECTOMY N/A 05/12/2021    LAPAROSCOPIC ROBOTIC DISTAL PANCREATECTOMY AND SPLENECTOMY AND CHOLECYSTECTOMY, INTRA-OPERATIVE ULTRASOUND, TRANSITIONED TO OPEN -- EPIDURAL performed by Stephanie Smith MD at [place] [address] SURGERY  06/05/2018    C5-7 fusion at [facility] in [facility] N/A 01/08/2021    EGD W/EUS FNA performed by Agnieszka Fry DO at [facility] N/A 01/08/2021    EGD DIAGNOSTIC ONLY performed by Agnieszka Fry DO at [facility] History:   Social History     Socioeconomic History    Marital status: Single     Spouse name: None    Number of children: None    Years of education: None    Highest education level: None   Occupational History    None   Tobacco Use    Smoking status: Never Smoker    Smokeless tobacco: Never Used   Vaping Use    Vaping Use: Never used   Substance and Sexual Activity    Alcohol use: Not Currently     Comment: occasional    Drug use: Never    Sexual activity: None   Other Topics Concern    None   Social History Narrative    None     Social Determinants of Health     Financial Resource Strain:     Difficulty of Paying Living Expenses:    Food Insecurity:     Worried About Running Out of Food in the Last Year:     Ran Out of Food in the Last Year:    Transportation Needs:     Lack of Transportation (Medical):      Lack of Transportation (Non-Medical):    Physical Activity:     Days of Exercise per Week:     Minutes of Exercise per Session:    Stress:     Feeling of Stress :    Social Connections:     Frequency of Communication with Friends and Family:     Frequency of Social Gatherings with Friends and Family:     Attends [employer] Services:     Active Member of Clubs or Organizations:     Attends Club or Organization Meetings:     Marital Status:    Intimate Partner Violence:     Fear of Current or Ex-Partner:     Emotionally Abused:     Physically Abused:     Sexually Abused:        Family History:   Family History   Problem Relation Age of Onset    Diabetes Mother     Other Mother         parkinsons    Diabetes Father     Cancer Sister 79        unknown    Cancer Brother 64        stomach    Diabetes Brother        The patients home medications have been reviewed. Allergies: Allergies   Allergen Reactions    Iodine Swelling     Sea food    Metformin And Related Hives    Wheat Extract Swelling           ---------------------------------------------------PHYSICAL EXAM--------------------------------------    /78   Pulse 96   Temp 97 °F (36.1 °C) (Temporal)   Resp 19   Wt 199 lb (90.3 kg)   SpO2 97%   BMI 30.26 kg/m²     Physical Exam  Vitals and nursing note reviewed. Constitutional:       Appearance: He is toxic-appearing. HENT:      Mouth/Throat:      Mouth: Mucous membranes are moist.   Eyes:      General: No scleral icterus. Extraocular Movements: Extraocular movements intact. Pupils: Pupils are equal, round, and reactive to light. Neck:      Comments: No JVD  Cardiovascular:      Rate and Rhythm: Regular rhythm. Tachycardia present. Pulses: Normal pulses. Heart sounds: Normal heart sounds. No murmur heard. Pulmonary:      Breath sounds: No rales. Comments: Tachypnea, increased work of breathing. Good air exchange bilaterally w/ end expiratory wheezing. Abdominal:      General: There is no distension. Palpations: Abdomen is soft. Tenderness: There is abdominal tenderness (diffuse nonfocal). There is guarding. There is no rebound. Abnormal; Notable for the following components:    pH, Blood Gas 7.479 (*)     PCO2 24.5 (*)     PO2 139.6 (*)     HCO3 17.8 (*)     B.E. -4.5 (*)     O2 Sat 98.8 (*)     O2Hb 97.8 (*)     tHb (est) 10.4 (*)     All other components within normal limits   HEMOGLOBIN A1C - Abnormal; Notable for the following components:    Hemoglobin A1C 9.4 (*)     All other components within normal limits   POCT GLUCOSE - Abnormal; Notable for the following components:    Meter Glucose 346 (*)     All other components within normal limits   CULTURE, BLOOD 1   CULTURE, BLOOD 2   CULTURE, MRSA, SCREENING   MAGNESIUM    Narrative:     CALL  [place]  H34 tel. ,  Chemistry results called to and read back by Javan Sullivan RN, 06/30/2021  18:01, by Tyesha Mir 66    Narrative:     CALL  [place]  H34 tel. ,  Chemistry results called to and read back by Javan Sullivan RN, 06/30/2021  18:01, by Edgar Davis   POTASSIUM   LACTIC ACID, PLASMA   ARTERIAL BLOOD GAS, RESPIRATORY ONLY   CBC WITH AUTO DIFFERENTIAL   COMPREHENSIVE METABOLIC PANEL   MAGNESIUM   PHOSPHORUS   CALCIUM, IONIZED   POCT GLUCOSE   POCT GLUCOSE   POCT GLUCOSE   POCT GLUCOSE   POCT GLUCOSE       RADIOLOGY:  Interpreted personally and by Radiologist.  XR CHEST PORTABLE   Final Result   No acute process. EKG:  This EKG is signed and interpreted by the EP. Sinus tachycardia, vent rate 132bpm, normal axis and intervals, poor quality due to tremors, significantly changed compared w/ prior EKG      ------------------------- NURSING NOTES AND VITALS REVIEWED ---------------------------   The nursing notes within the ED encounter and vital signs as below have been reviewed by myself. /78   Pulse 96   Temp 97 °F (36.1 °C) (Temporal)   Resp 19   Wt 199 lb (90.3 kg)   SpO2 97%   BMI 30.26 kg/m²   Oxygen Saturation Interpretation: Abnormal    The patients available past medical records and past encounters were reviewed.         ------------------------------ ED COURSE/MEDICAL DECISION MAKING----------------------  Medications   sodium chloride flush 0.9 % injection 5-40 mL (10 mLs Intravenous Given 6/30/21 2152)   sodium chloride flush 0.9 % injection 5-40 mL (has no administration in time range)   0.9 % sodium chloride infusion (has no administration in time range)   ondansetron (ZOFRAN-ODT) disintegrating tablet 4 mg (has no administration in time range)     Or   ondansetron (ZOFRAN) injection 4 mg (has no administration in time range)   enoxaparin (LOVENOX) injection 40 mg (40 mg Subcutaneous Not Given 6/30/21 2013)   lactated ringers infusion ( Intravenous New Bag 6/30/21 1951)   acetaminophen (TYLENOL) tablet 650 mg (has no administration in time range)   albuterol (PROVENTIL) nebulizer solution 2.5 mg (2.5 mg Nebulization Given 6/30/21 2047)   budesonide (PULMICORT) nebulizer suspension 1,000 mcg (1,000 mcg Nebulization Given 6/30/21 2047)   benzonatate (TESSALON) capsule 200 mg (has no administration in time range)   gabapentin (NEURONTIN) capsule 300 mg (300 mg Oral Given 6/30/21 2111)   lipase-protease-amylase (CREON) delayed release capsule 72,000 Units (72,000 Units Oral Not Given 6/30/21 2156)   pantoprazole (PROTONIX) tablet 40 mg (has no administration in time range)   oxyCODONE (ROXICODONE) immediate release tablet 5 mg ( Oral See Alternative 7/1/21 0035)     Or   oxyCODONE HCl (OXY-IR) immediate release tablet 10 mg (10 mg Oral Given 7/1/21 0035)   HYDROmorphone (DILAUDID) injection 0.5 mg (0.5 mg Intravenous Given 6/30/21 2013)   fluconazole (DIFLUCAN) in 0.9 % sodium chloride IVPB 800 mg (800 mg Intravenous New Bag 6/30/21 2152)     Followed by   fluconazole (DIFLUCAN) in 0.9 % sodium chloride IVPB 400 mg (has no administration in time range)   piperacillin-tazobactam (ZOSYN) 3,375 mg in dextrose 5 % 100 mL IVPB extended infusion (mini-bag) (3,375 mg Intravenous New Bag 7/1/21 0127)     And   0.9 % sodium chloride infusion admixture (has no administration in time range) glucose (GLUTOSE) 40 % oral gel 15 g (has no administration in time range)   dextrose 50 % IV solution (has no administration in time range)   glucagon (rDNA) injection 1 mg (has no administration in time range)   dextrose 5 % solution (has no administration in time range)   insulin glargine (LANTUS) injection vial 10 Units (10 Units Subcutaneous Given 21 2151)   insulin lispro (HUMALOG) injection vial 0-12 Units (4 Units Subcutaneous Given 21 0142)   vancomycin (VANCOCIN) 1,250 mg in dextrose 5 % 250 mL IVPB (has no administration in time range)   polyethylene glycol (GLYCOLAX) packet 17 g (has no administration in time range)   fentaNYL (SUBLIMAZE) injection 75 mcg (75 mcg Intravenous Given 21 1653)   0.9 % sodium chloride bolus (0 mLs Intravenous Stopped 21 1725)   vancomycin (VANCOCIN) 1,750 mg in dextrose 5 % 500 mL IVPB (0 mg Intravenous Stopped 21 2207)   piperacillin-tazobactam (ZOSYN) 4,500 mg in sodium chloride 0.9 % 100 mL IVPB (mini-bag) (0 mg Intravenous Stopped 21 1913)       Consultations:             General surgery    Critical Care: Please note that the withdrawal or failure to initiate urgent interventions for this patient would likely result in a life threatening deterioration or permanent disability. Accordingly this patient received 30 minutes of critical care time, excluding separately billable procedures. Counseling: The emergency provider has spoken with the patient and discussed todays results, in addition to providing specific details for the plan of care and counseling regarding the diagnosis and prognosis. Questions are answered at this time and they are agreeable with the plan. ED Course/Medical Decision Makin y.o. male here with sepsis s/p IR drainage of intraabdominal abscess. On arrival vitals stable however shortly afterwards became tachycardic, tachypnic, hypoxic, toxic appearing.  Complaining of pleuritic chest pain at IR drain insertion site. Started IV fluids and broad spectrum abx in ED. General surgery emergently consulted and admitted patient to SICU for further management.        --------------------------------- IMPRESSION AND DISPOSITION ---------------------------------    IMPRESSION  1. Sepsis with acute hypoxic respiratory failure without septic shock, due to unspecified organism ([facility] [address]    2. Intra-abdominal abscess ([facility] [address]        DISPOSITION  Disposition: Admit to CCU/ICU  Patient condition is critical    NOTE: This report was transcribed using voice recognition software.  Every effort was made to ensure accuracy; however, inadvertent computerized transcription errors may be present    Jackie Santos MD  Attending Emergency Physician         Jackie Snatos MD  07/01/21 [id number]

## 2021-06-30 NOTE — ED NOTES
Bed: 16  Expected date:   Expected time:   Means of arrival:   Comments:  [facility]io patient     Antonina Aguilar RN  06/30/21 0157

## 2021-06-30 NOTE — ANESTHESIA PRE PROCEDURE
Department of Anesthesiology  Preprocedure Note       Name:  Jase Reyna   Age:  62 y.o.  :  1960                                          MRN:  [id number]         Date:  2021      Surgeon: * No surgeons listed *    Procedure:     Medications prior to admission:   Prior to Admission medications    Medication Sig Start Date End Date Taking? Authorizing Provider   predniSONE (DELTASONE) 50 MG tablet Take 1 tablet by mouth daily for 3 doses Take 1 tab 13 hours, 7 hours and 1 hour prior to contrast.  Also take 50mg Benadryl 1 hour prior to contrast dye. 21 Yes Nita Anthony III, MD   oxyCODONE-acetaminophen (PERCOCET) 7.5-325 MG per tablet Take 1 tablet by mouth every 4 hours as needed for Pain. Yes Historical Provider, MD   acetaminophen (TYLENOL) 500 MG tablet Take 2 tablets by mouth every 6 hours 21  Yes Delphine Pearson MD   ibuprofen (ADVIL;MOTRIN) 600 MG tablet Take 1 tablet by mouth 3 times daily (with meals) 21  Yes Delphine Pearson MD   apixaban (ELIQUIS) 5 MG TABS tablet Take 1 tablet by mouth 2 times daily . The first dose of this Eliquis should be taken approximately 12 hours after you received your last dose of Lovenox (the blood thinner injection into your skin) given in the hospital. Continue to take the Eliquis approximately every 12 hours.  21  Yes Delphine Pearson MD   insulin lispro, 1 Unit Dial, (HUMALOG KWIKPEN) 100 UNIT/ML SOPN Inject 0-12 Units into the skin 3 times daily (before meals) Glucose: Dose:  If <139             No Insulin  140-199 2 Units  200-249 4 Units  250-299 6 Units  300-349 8 Units  350-400 10 Units  Above 400       12 Units 21 Yes Addi Daigle MD   losartan (COZAAR) 100 MG tablet take 1 tablet by mouth once daily 21  Yes Addi Daigle MD   hydroCHLOROthiazide (HYDRODIURIL) 12.5 MG tablet take 1 tablet by mouth once daily 21  Yes Addi Daigle MD   albuterol (PROVENTIL) (2.5 MG/3ML) 0.083% nebulizer solution Take 3 mLs by nebulization 4 times daily 5/19/21  Yes Ila Phelps MD   budesonide (PULMICORT) 0.5 MG/2ML nebulizer suspension Take 4 mLs by nebulization 2 times daily 5/19/21  Yes Ila Phelps MD   lipase-protease-amylase (CREON) 15140 units CPEP delayed release capsule Take 2 capsules by mouth 3 times daily (with meals) 5/19/21 8/17/21 Yes Ila Phelps MD   pantoprazole (PROTONIX) 40 MG tablet Take 1 tablet by mouth 2 times daily (before meals) 5/19/21  Yes Ila Phelps MD   atorvastatin (LIPITOR) 20 MG tablet Take 1 tablet by mouth daily 5/20/21  Yes Ila Phelps MD   insulin glargine (LANTUS SOLOSTAR) 100 UNIT/ML injection pen Inject 20 Units into the skin 2 times daily 5/19/21 6/30/21 Yes Ila Phelps MD   insulin lispro, 1 Unit Dial, ([facility]) 100 UNIT/ML SOPN Inject 7 Units into the skin 3 times daily (before meals) 5/19/21 6/30/21 Yes Ila Phelps MD   gabapentin (NEURONTIN) 300 MG capsule Take 1 capsule by mouth 2 times daily. 4/19/21  Yes Historical Provider, MD   BANOPHEN 25 MG tablet take 2 tablets by mouth 1 HOUR PRIOR TO CONTRAST MEDIA ADMINISTRATION 6/21/21   Tor Kelly III, MD   polyethylene glycol (GLYCOLAX) 17 g packet Take 17 g by mouth daily . This helps to prevent constipation caused by taking high dose narcotics (Oxycodone) 6/5/21 7/5/21  Miko Sanchez MD   benzonatate (TESSALON) 200 MG capsule Take 200 mg by mouth 3 times daily as needed for Cough    Historical Provider, MD   guaiFENesin (ROBITUSSIN) 100 MG/5ML SOLN oral solution Take 15 mLs by mouth 3 times daily 5/19/21   Ila Phelps MD       Current medications:    Current Outpatient Medications   Medication Sig Dispense Refill    predniSONE (DELTASONE) 50 MG tablet Take 1 tablet by mouth daily for 3 doses Take 1 tab 13 hours, 7 hours and 1 hour prior to contrast.  Also take 50mg Benadryl 1 hour prior to contrast dye.  3 tablet 0    oxyCODONE-acetaminophen (PERCOCET) 1 capsule by mouth 2 times daily.  BANOPHEN 25 MG tablet take 2 tablets by mouth 1 HOUR PRIOR TO CONTRAST MEDIA ADMINISTRATION 2 tablet 0    polyethylene glycol (GLYCOLAX) 17 g packet Take 17 g by mouth daily . This helps to prevent constipation caused by taking high dose narcotics (Oxycodone) 527 g 1    benzonatate (TESSALON) 200 MG capsule Take 200 mg by mouth 3 times daily as needed for Cough      guaiFENesin (ROBITUSSIN) 100 MG/5ML SOLN oral solution Take 15 mLs by mouth 3 times daily 1200 mL 0     No current facility-administered medications for this encounter. Allergies:     Allergies   Allergen Reactions    Iodine Swelling     Sea food    Metformin And Related Hives    Wheat Extract Swelling       Problem List:    Patient Active Problem List   Diagnosis Code    Mixed hyperlipidemia E78.2    Neck pain M54.2    Cervical radiculopathy at C7 M54.12    Essential hypertension I10    Chronic seasonal allergic rhinitis J30.2    Ventral hernia without obstruction or gangrene K43.9    Poorly controlled type 2 diabetes mellitus (HCC) E11.65    Chronic bilateral low back pain with right-sided sciatica M54.41, G89.29    Gynecomastia N62    Panic disorder F41.0    Cubital tunnel syndrome on right G56.21    Right carpal tunnel syndrome G56.01    Spondylosis of lumbar spine M47.816    Bell's palsy G51.0    Other bursal cyst, left elbow M71.322    Sacral radiculitis M54.18    Lumbar radiculitis M54.16    Spondylosis of cervical region without myelopathy or radiculopathy M47.812    Cervical facet joint syndrome M47.812    Cervical disc disorder M50.90    Lumbar facet arthropathy M47.816    Spinal stenosis of lumbar region with neurogenic claudication M48.062    Lumbar disc disorder M51.9    Pancreatic neoplasm D49.0    IPMN (intraductal papillary mucinous neoplasm) D49.0    Pancreatic duct leak K86.89    Intra-abdominal infection B99.9    Abdominal pain with fever after surgery or weight on file to calculate BMI.    CBC:   Lab Results   Component Value Date    WBC 10.1 06/23/2021    RBC 4.18 06/23/2021    HGB 9.3 06/23/2021    HCT 30.3 06/23/2021    MCV 72.5 06/23/2021    RDW 16.5 06/23/2021     06/23/2021       CMP:   Lab Results   Component Value Date     06/05/2021    K 4.1 06/05/2021     06/05/2021    CO2 24 06/05/2021    BUN 7 06/05/2021    CREATININE 0.6 06/05/2021    GFRAA >60 06/05/2021    LABGLOM >60 06/05/2021    GLUCOSE 151 06/05/2021    PROT 6.1 06/05/2021    CALCIUM 8.3 06/05/2021    BILITOT 0.2 06/05/2021    ALKPHOS 51 06/05/2021    AST 26 06/05/2021    ALT 28 06/05/2021       POC Tests: No results for input(s): POCGLU, POCNA, POCK, POCCL, POCBUN, POCHEMO, POCHCT in the last 72 hours. Coags:   Lab Results   Component Value Date    PROTIME 15.6 06/23/2021    INR 1.4 06/23/2021       HCG (If Applicable): No results found for: PREGTESTUR, PREGSERUM, HCG, HCGQUANT     ABGs:   Lab Results   Component Value Date    PO2ART 118.2 05/12/2021    KXU3PWZ 56.0 05/12/2021    XYV6JUO 22.9 05/12/2021        Type & Screen (If Applicable):  No results found for: LABABO, LABRH    Drug/Infectious Status (If Applicable):  No results found for: HIV, HEPCAB    COVID-19 Screening (If Applicable):   Lab Results   Component Value Date    COVID19 Not Detected 05/27/2021    COVID19 Not Detected 05/06/2021     Location: Anterior body of the pancreas   Size: 1.9 x 1.5 x 1.5 cm   Change: Slight interval increase in size since 11/22/2019 MRI. Solid: No solid or enhancing features. Cystic: Overall appearance appears to represent a cluster of cysts.  Thin   septations may be present. Pancreatic Duct: Above findings have previously been characterized as side   branch IPMN.  On the current study, communication with the pancreatic duct is   established for at least 1 cystic component.  No ductal dilatation otherwise. Vascular: No vascular abnormality.    Other: None Anesthesia Evaluation  Patient summary reviewed and Nursing notes reviewed no history of anesthetic complications:   Airway: Mallampati: III  TM distance: <3 FB   Neck ROM: limited  Mouth opening: > = 3 FB Dental:          Pulmonary: breath sounds clear to auscultation  (+) sleep apnea: on noncompliant,      (-) not a current smoker                           Cardiovascular:    (+) hypertension:, hyperlipidemia    (-) past MI, CAD and CABG/stent    ECG reviewed  Rhythm: regular  Rate: normal           Beta Blocker:  Not on Beta Blocker      ROS comment: Narrative & Impression    Sinus tachycardia with occasional premature ventricular complexes  Nonspecific T wave abnormality  Abnormal ECG  When compared with ECG of 06-MAY-2021 10:43,  Significant changes have occurred  Confirmed by Marisa Georges ([id number]) on 5/13/2021 8:47:40 AM         Neuro/Psych:   (+) neuromuscular disease (lumbar spine spondylosis; sacral radiculitis; cervical radiculopathy (C5-7 fusion in 2018); s/p T10-L2 fusion (Jan 2020)):, psychiatric history:depression/anxiety              ROS comment: Bell's palsy - March 2019 (left face); No issues currently    Lower extremity neuropathy    Right hand - ring finger and 5th finger are numb GI/Hepatic/Renal:   (+) GERD: poorly controlled,          ROS comment: S/p pancreaticectomy. Endo/Other:    (+) Diabetes ()Type II DM, , blood dyscrasia (Lovenox last on 6/3 @0830): anemia and anticoagulation therapy, arthritis: OA., .                  ROS comment: OA, DJD, DDD, cervical radiculopathy, chronic low back pain with right sciatica Abdominal:             Vascular: negative vascular ROS. Other Findings:             Anesthesia Plan      MAC     ASA 3       Induction: intravenous. Anesthetic plan and risks discussed with patient. Plan discussed with attending.                 MARGAUX Watters - CRNA   6/30/2021

## 2021-06-30 NOTE — PROGRESS NOTES
Patient arrived to Radiology department for abdominal drain placement. Allergies, home medications, H&P and fasting instructions reviewed with patient. Vital signs taken. IV placed,  IV flushed and prn adapter attached. Procedural instructions given, questions answered, understanding expressed and consent signed.  Patient given fluoroscopy education, no questions at this time

## 2021-06-30 NOTE — BRIEF OP NOTE
Brief Postoperative Note    Monisha Hansen  YOB: 1960  [id number]    Pre-operative Diagnosis and Procedure: Abscess plan drainage    Post-operative Diagnosis: Same    Anesthesia: Local    Estimated Blood Loss: < 10 cc    Surgeon: Tian SANDOVAL     Complications: none    Specimen obtained: none     Findings: none     Kartik Cooper II, MD   6/30/2021 2:08 PM

## 2021-07-01 ENCOUNTER — ANESTHESIA EVENT (OUTPATIENT)
Dept: ENDOSCOPY | Age: 61
DRG: 711 | End: 2021-07-01
Payer: MEDICAID

## 2021-07-01 PROBLEM — J96.01 ACUTE RESPIRATORY FAILURE WITH HYPOXIA (HCC): Status: ACTIVE | Noted: 2021-07-01

## 2021-07-01 LAB
ALBUMIN SERPL-MCNC: 2.8 G/DL (ref 3.5–5.2)
ALP BLD-CCNC: 47 U/L (ref 40–129)
ALT SERPL-CCNC: 16 U/L (ref 0–40)
ANION GAP SERPL CALCULATED.3IONS-SCNC: 12 MMOL/L (ref 7–16)
ANISOCYTOSIS: ABNORMAL
AST SERPL-CCNC: 27 U/L (ref 0–39)
BASOPHILS ABSOLUTE: 0.03 E9/L (ref 0–0.2)
BASOPHILS RELATIVE PERCENT: 0.4 % (ref 0–2)
BILIRUB SERPL-MCNC: 0.4 MG/DL (ref 0–1.2)
BUN BLDV-MCNC: 7 MG/DL (ref 6–20)
CALCIUM IONIZED: 1.16 MMOL/L (ref 1.15–1.33)
CALCIUM SERPL-MCNC: 7.7 MG/DL (ref 8.6–10.2)
CHLORIDE BLD-SCNC: 100 MMOL/L (ref 98–107)
CO2: 22 MMOL/L (ref 22–29)
CREAT SERPL-MCNC: 0.8 MG/DL (ref 0.7–1.2)
EKG ATRIAL RATE: 111 BPM
EKG Q-T INTERVAL: 234 MS
EKG QRS DURATION: 64 MS
EKG QTC CALCULATION (BAZETT): 357 MS
EKG R AXIS: 42 DEGREES
EKG T AXIS: 28 DEGREES
EKG VENTRICULAR RATE: 140 BPM
EOSINOPHILS ABSOLUTE: 0.03 E9/L (ref 0.05–0.5)
EOSINOPHILS RELATIVE PERCENT: 0.4 % (ref 0–6)
GFR AFRICAN AMERICAN: >60
GFR NON-AFRICAN AMERICAN: >60 ML/MIN/1.73
GLUCOSE BLD-MCNC: 180 MG/DL (ref 74–99)
GRAM STAIN ORDERABLE: NORMAL
GRAM STAIN ORDERABLE: NORMAL
HBA1C MFR BLD: 9.4 % (ref 4–5.6)
HCT VFR BLD CALC: 27.6 % (ref 37–54)
HEMOGLOBIN: 8.3 G/DL (ref 12.5–16.5)
HYPOCHROMIA: ABNORMAL
IMMATURE GRANULOCYTES #: 0.05 E9/L
IMMATURE GRANULOCYTES %: 0.7 % (ref 0–5)
LACTIC ACID: 1.9 MMOL/L (ref 0.5–2.2)
LYMPHOCYTES ABSOLUTE: 0.93 E9/L (ref 1.5–4)
LYMPHOCYTES RELATIVE PERCENT: 12.6 % (ref 20–42)
MAGNESIUM: 1.6 MG/DL (ref 1.6–2.6)
MCH RBC QN AUTO: 21.7 PG (ref 26–35)
MCHC RBC AUTO-ENTMCNC: 30.1 % (ref 32–34.5)
MCV RBC AUTO: 72.1 FL (ref 80–99.9)
METER GLUCOSE: 172 MG/DL (ref 74–99)
METER GLUCOSE: 199 MG/DL (ref 74–99)
METER GLUCOSE: 201 MG/DL (ref 74–99)
METER GLUCOSE: 203 MG/DL (ref 74–99)
METER GLUCOSE: 207 MG/DL (ref 74–99)
MONOCYTES ABSOLUTE: 1.26 E9/L (ref 0.1–0.95)
MONOCYTES RELATIVE PERCENT: 17 % (ref 2–12)
NEUTROPHILS ABSOLUTE: 5.11 E9/L (ref 1.8–7.3)
NEUTROPHILS RELATIVE PERCENT: 68.9 % (ref 43–80)
PDW BLD-RTO: 16.6 FL (ref 11.5–15)
PHOSPHORUS: 2.7 MG/DL (ref 2.5–4.5)
PLATELET # BLD: 470 E9/L (ref 130–450)
PMV BLD AUTO: 10.6 FL (ref 7–12)
POLYCHROMASIA: ABNORMAL
POTASSIUM SERPL-SCNC: 3.7 MMOL/L (ref 3.5–5)
RBC # BLD: 3.83 E12/L (ref 3.8–5.8)
SODIUM BLD-SCNC: 134 MMOL/L (ref 132–146)
TOTAL PROTEIN: 7 G/DL (ref 6.4–8.3)
WBC # BLD: 7.4 E9/L (ref 4.5–11.5)

## 2021-07-01 PROCEDURE — 94640 AIRWAY INHALATION TREATMENT: CPT

## 2021-07-01 PROCEDURE — 36415 COLL VENOUS BLD VENIPUNCTURE: CPT

## 2021-07-01 PROCEDURE — 6360000002 HC RX W HCPCS: Performed by: STUDENT IN AN ORGANIZED HEALTH CARE EDUCATION/TRAINING PROGRAM

## 2021-07-01 PROCEDURE — 80053 COMPREHEN METABOLIC PANEL: CPT

## 2021-07-01 PROCEDURE — 6370000000 HC RX 637 (ALT 250 FOR IP): Performed by: STUDENT IN AN ORGANIZED HEALTH CARE EDUCATION/TRAINING PROGRAM

## 2021-07-01 PROCEDURE — 82962 GLUCOSE BLOOD TEST: CPT

## 2021-07-01 PROCEDURE — 93010 ELECTROCARDIOGRAM REPORT: CPT | Performed by: INTERNAL MEDICINE

## 2021-07-01 PROCEDURE — 6360000002 HC RX W HCPCS: Performed by: SURGERY

## 2021-07-01 PROCEDURE — 82330 ASSAY OF CALCIUM: CPT

## 2021-07-01 PROCEDURE — 99291 CRITICAL CARE FIRST HOUR: CPT | Performed by: SURGERY

## 2021-07-01 PROCEDURE — 2000000000 HC ICU R&B

## 2021-07-01 PROCEDURE — 99254 IP/OBS CNSLTJ NEW/EST MOD 60: CPT | Performed by: SURGERY

## 2021-07-01 PROCEDURE — 6370000000 HC RX 637 (ALT 250 FOR IP): Performed by: SURGERY

## 2021-07-01 PROCEDURE — 84100 ASSAY OF PHOSPHORUS: CPT

## 2021-07-01 PROCEDURE — 85025 COMPLETE CBC W/AUTO DIFF WBC: CPT

## 2021-07-01 PROCEDURE — 6360000002 HC RX W HCPCS: Performed by: INTERNAL MEDICINE

## 2021-07-01 PROCEDURE — 2580000003 HC RX 258: Performed by: INTERNAL MEDICINE

## 2021-07-01 PROCEDURE — 2580000003 HC RX 258: Performed by: STUDENT IN AN ORGANIZED HEALTH CARE EDUCATION/TRAINING PROGRAM

## 2021-07-01 PROCEDURE — 83735 ASSAY OF MAGNESIUM: CPT

## 2021-07-01 PROCEDURE — 2700000000 HC OXYGEN THERAPY PER DAY

## 2021-07-01 RX ORDER — INSULIN GLARGINE 100 [IU]/ML
20 INJECTION, SOLUTION SUBCUTANEOUS 2 TIMES DAILY
Status: DISCONTINUED | OUTPATIENT
Start: 2021-07-01 | End: 2021-07-10

## 2021-07-01 RX ORDER — POTASSIUM CHLORIDE 20 MEQ/1
20 TABLET, EXTENDED RELEASE ORAL ONCE
Status: COMPLETED | OUTPATIENT
Start: 2021-07-01 | End: 2021-07-01

## 2021-07-01 RX ORDER — MAGNESIUM SULFATE IN WATER 40 MG/ML
2000 INJECTION, SOLUTION INTRAVENOUS ONCE
Status: COMPLETED | OUTPATIENT
Start: 2021-07-01 | End: 2021-07-01

## 2021-07-01 RX ORDER — ATORVASTATIN CALCIUM 20 MG/1
20 TABLET, FILM COATED ORAL DAILY
Status: DISCONTINUED | OUTPATIENT
Start: 2021-07-01 | End: 2021-07-15 | Stop reason: HOSPADM

## 2021-07-01 RX ADMIN — SODIUM CHLORIDE: 9 INJECTION, SOLUTION INTRAVENOUS at 05:30

## 2021-07-01 RX ADMIN — PIPERACILLIN AND TAZOBACTAM 3375 MG: 3; .375 INJECTION, POWDER, LYOPHILIZED, FOR SOLUTION INTRAVENOUS at 08:59

## 2021-07-01 RX ADMIN — POTASSIUM CHLORIDE 20 MEQ: 1500 TABLET, EXTENDED RELEASE ORAL at 12:04

## 2021-07-01 RX ADMIN — INSULIN GLARGINE 10 UNITS: 100 INJECTION, SOLUTION SUBCUTANEOUS at 09:50

## 2021-07-01 RX ADMIN — OXYCODONE HYDROCHLORIDE 10 MG: 10 TABLET ORAL at 05:08

## 2021-07-01 RX ADMIN — INSULIN LISPRO 3 UNITS: 100 INJECTION, SOLUTION INTRAVENOUS; SUBCUTANEOUS at 21:36

## 2021-07-01 RX ADMIN — OXYCODONE HYDROCHLORIDE 10 MG: 10 TABLET ORAL at 22:09

## 2021-07-01 RX ADMIN — SODIUM CHLORIDE, POTASSIUM CHLORIDE, SODIUM LACTATE AND CALCIUM CHLORIDE: 600; 310; 30; 20 INJECTION, SOLUTION INTRAVENOUS at 20:28

## 2021-07-01 RX ADMIN — HYDROMORPHONE HYDROCHLORIDE 0.5 MG: 1 INJECTION, SOLUTION INTRAMUSCULAR; INTRAVENOUS; SUBCUTANEOUS at 06:17

## 2021-07-01 RX ADMIN — INSULIN LISPRO 6 UNITS: 100 INJECTION, SOLUTION INTRAVENOUS; SUBCUTANEOUS at 17:17

## 2021-07-01 RX ADMIN — SODIUM CHLORIDE, PRESERVATIVE FREE 10 ML: 5 INJECTION INTRAVENOUS at 21:23

## 2021-07-01 RX ADMIN — INSULIN LISPRO 4 UNITS: 100 INJECTION, SOLUTION INTRAVENOUS; SUBCUTANEOUS at 01:42

## 2021-07-01 RX ADMIN — ALBUTEROL SULFATE 2.5 MG: 2.5 SOLUTION RESPIRATORY (INHALATION) at 08:27

## 2021-07-01 RX ADMIN — PANTOPRAZOLE SODIUM 40 MG: 40 TABLET, DELAYED RELEASE ORAL at 16:40

## 2021-07-01 RX ADMIN — GABAPENTIN 300 MG: 300 CAPSULE ORAL at 08:59

## 2021-07-01 RX ADMIN — ACETAMINOPHEN 650 MG: 325 TABLET ORAL at 21:00

## 2021-07-01 RX ADMIN — ERTAPENEM SODIUM 1000 MG: 1 INJECTION, POWDER, LYOPHILIZED, FOR SOLUTION INTRAMUSCULAR; INTRAVENOUS at 16:42

## 2021-07-01 RX ADMIN — ALBUTEROL SULFATE 2.5 MG: 2.5 SOLUTION RESPIRATORY (INHALATION) at 12:11

## 2021-07-01 RX ADMIN — ALBUTEROL SULFATE 2.5 MG: 2.5 SOLUTION RESPIRATORY (INHALATION) at 20:02

## 2021-07-01 RX ADMIN — OXYCODONE HYDROCHLORIDE 10 MG: 10 TABLET ORAL at 16:46

## 2021-07-01 RX ADMIN — HYDROMORPHONE HYDROCHLORIDE 0.5 MG: 1 INJECTION, SOLUTION INTRAMUSCULAR; INTRAVENOUS; SUBCUTANEOUS at 10:03

## 2021-07-01 RX ADMIN — SODIUM CHLORIDE, POTASSIUM CHLORIDE, SODIUM LACTATE AND CALCIUM CHLORIDE: 600; 310; 30; 20 INJECTION, SOLUTION INTRAVENOUS at 12:13

## 2021-07-01 RX ADMIN — PIPERACILLIN AND TAZOBACTAM 3375 MG: 3; .375 INJECTION, POWDER, LYOPHILIZED, FOR SOLUTION INTRAVENOUS at 01:27

## 2021-07-01 RX ADMIN — PANTOPRAZOLE SODIUM 40 MG: 40 TABLET, DELAYED RELEASE ORAL at 06:08

## 2021-07-01 RX ADMIN — MAGNESIUM SULFATE HEPTAHYDRATE 2000 MG: 40 INJECTION, SOLUTION INTRAVENOUS at 12:04

## 2021-07-01 RX ADMIN — SODIUM CHLORIDE: 9 INJECTION, SOLUTION INTRAVENOUS at 13:00

## 2021-07-01 RX ADMIN — INSULIN LISPRO 2 UNITS: 100 INJECTION, SOLUTION INTRAVENOUS; SUBCUTANEOUS at 09:49

## 2021-07-01 RX ADMIN — ATORVASTATIN CALCIUM 20 MG: 20 TABLET, FILM COATED ORAL at 21:23

## 2021-07-01 RX ADMIN — INSULIN GLARGINE 20 UNITS: 100 INJECTION, SOLUTION SUBCUTANEOUS at 21:38

## 2021-07-01 RX ADMIN — OXYCODONE HYDROCHLORIDE 10 MG: 10 TABLET ORAL at 00:35

## 2021-07-01 RX ADMIN — VANCOMYCIN HYDROCHLORIDE 1250 MG: 10 INJECTION, POWDER, LYOPHILIZED, FOR SOLUTION INTRAVENOUS at 09:05

## 2021-07-01 RX ADMIN — FLUCONAZOLE IN SODIUM CHLORIDE 400 MG: 2 INJECTION, SOLUTION INTRAVENOUS at 19:58

## 2021-07-01 RX ADMIN — OXYCODONE HYDROCHLORIDE 10 MG: 10 TABLET ORAL at 09:05

## 2021-07-01 RX ADMIN — BUDESONIDE 1000 MCG: 0.5 SUSPENSION RESPIRATORY (INHALATION) at 08:27

## 2021-07-01 RX ADMIN — ALBUTEROL SULFATE 2.5 MG: 2.5 SOLUTION RESPIRATORY (INHALATION) at 15:35

## 2021-07-01 RX ADMIN — SODIUM CHLORIDE, PRESERVATIVE FREE 10 ML: 5 INJECTION INTRAVENOUS at 08:59

## 2021-07-01 RX ADMIN — GABAPENTIN 300 MG: 300 CAPSULE ORAL at 21:00

## 2021-07-01 RX ADMIN — HYDROMORPHONE HYDROCHLORIDE 0.5 MG: 1 INJECTION, SOLUTION INTRAMUSCULAR; INTRAVENOUS; SUBCUTANEOUS at 20:08

## 2021-07-01 RX ADMIN — BUDESONIDE 1000 MCG: 0.5 SUSPENSION RESPIRATORY (INHALATION) at 20:02

## 2021-07-01 RX ADMIN — INSULIN LISPRO 2 UNITS: 100 INJECTION, SOLUTION INTRAVENOUS; SUBCUTANEOUS at 05:26

## 2021-07-01 RX ADMIN — POLYETHYLENE GLYCOL 3350 17 G: 17 POWDER, FOR SOLUTION ORAL at 08:59

## 2021-07-01 ASSESSMENT — PAIN DESCRIPTION - ORIENTATION
ORIENTATION: LEFT
ORIENTATION: LEFT;UPPER
ORIENTATION: LEFT

## 2021-07-01 ASSESSMENT — PAIN SCALES - GENERAL
PAINLEVEL_OUTOF10: 4
PAINLEVEL_OUTOF10: 0
PAINLEVEL_OUTOF10: 8
PAINLEVEL_OUTOF10: 9
PAINLEVEL_OUTOF10: 8
PAINLEVEL_OUTOF10: 8
PAINLEVEL_OUTOF10: 7
PAINLEVEL_OUTOF10: 5
PAINLEVEL_OUTOF10: 10
PAINLEVEL_OUTOF10: 9
PAINLEVEL_OUTOF10: 10
PAINLEVEL_OUTOF10: 7
PAINLEVEL_OUTOF10: 7
PAINLEVEL_OUTOF10: 9
PAINLEVEL_OUTOF10: 10

## 2021-07-01 ASSESSMENT — PAIN DESCRIPTION - ONSET
ONSET: ON-GOING

## 2021-07-01 ASSESSMENT — PAIN DESCRIPTION - PAIN TYPE
TYPE: ACUTE PAIN;SURGICAL PAIN

## 2021-07-01 ASSESSMENT — PAIN DESCRIPTION - PROGRESSION
CLINICAL_PROGRESSION: NOT CHANGED
CLINICAL_PROGRESSION: RAPIDLY WORSENING
CLINICAL_PROGRESSION: NOT CHANGED
CLINICAL_PROGRESSION: RAPIDLY WORSENING
CLINICAL_PROGRESSION: NOT CHANGED

## 2021-07-01 ASSESSMENT — PAIN - FUNCTIONAL ASSESSMENT
PAIN_FUNCTIONAL_ASSESSMENT: PREVENTS OR INTERFERES WITH MANY ACTIVE NOT PASSIVE ACTIVITIES
PAIN_FUNCTIONAL_ASSESSMENT: PREVENTS OR INTERFERES SOME ACTIVE ACTIVITIES AND ADLS

## 2021-07-01 ASSESSMENT — PAIN DESCRIPTION - LOCATION
LOCATION: ABDOMEN
LOCATION: ABDOMEN;RIB CAGE
LOCATION: ABDOMEN
LOCATION: ABDOMEN

## 2021-07-01 ASSESSMENT — PAIN DESCRIPTION - DESCRIPTORS
DESCRIPTORS: ACHING;CONSTANT;SHARP
DESCRIPTORS: ACHING;PENETRATING;DISCOMFORT
DESCRIPTORS: CONSTANT;ACHING;SHARP
DESCRIPTORS: ACHING;PENETRATING;DISCOMFORT
DESCRIPTORS: ACHING;PENETRATING;DISCOMFORT

## 2021-07-01 ASSESSMENT — PAIN DESCRIPTION - FREQUENCY
FREQUENCY: CONTINUOUS

## 2021-07-01 NOTE — CONSULTS
[facility] CONSULT NOTE    Reason for Consult: Follow-up of intra-abdominal abscess  Requested by: Dr. Tom Rubin    Chief complaint: Nausea and vomiting    History Obtained From: Patient and EMR    HISTORY Rashaad              The patient is a 62 y.o. 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 coli, anaerobic gram-negative rods, coagulase-negative Staphylococcus, Cronobacter sakazakii for which he was given a course of ciprofloxacin and metronidazole for 4 weeks until 06/25, presented on 06/30 with nausea and vomiting for 4 days. He had percutaneous drain removed on 06/23 with resolution of anterior fluid collection but other surgical drain continued to have more purulent fluid collection, prompting another percutaneous drain placement on 06/30 and subsequent admission for possible possible ERCP with stent repositioning. Abscess fluid Gram stain and culture showed abundant polymorphonuclear leukocytes, no epithelial cells, abundant gram variable rods. On admission, he was afebrile and hemodynamically stable with no leukocytosis. Piperacillin-tazobactam, fluconazole and vancomycin were started on admission. ID service was subsequently consulted for further recommendations.     Past Medical History  Past Medical History:   Diagnosis Date    Arthritis     Back pain     5th finger & ring finger on right hand is numb    Bell's palsy     NEW ONSET 3-     Hyperlipidemia     Hypertension     Neuropathy     toes numb    Poorly controlled type 2 diabetes mellitus with neuropathy ([facility] [address] 5/8/2018    Sacral radiculitis 2/27/2019    Spondylosis of lumbar spine 3/9/2019    Advanced arthritis and degenerative disc disease by MRI 3/2019    Ventral hernia        Current Facility-Administered Medications   Medication Dose Route Frequency Provider Last Rate Last Admin    sodium chloride flush 0.9 % injection 5-40 mL  5-40 mL Intravenous 2 times per day Shani Oviedo MD   10 mL at 07/01/21 0859    sodium chloride flush 0.9 % injection 5-40 mL  5-40 mL Intravenous PRN Shani Oviedo MD        0.9 % sodium chloride infusion  25 mL Intravenous PRN Shani Oviedo MD        ondansetron (ZOFRAN-ODT) disintegrating tablet 4 mg  4 mg Oral Q8H PRN Shani Oviedo MD        Or    ondansetron [facility]) injection 4 mg  4 mg Intravenous Q6H PRN Shani Oviedo MD        enoxaparin (LOVENOX) injection 40 mg  40 mg Subcutaneous Daily Shani Oviedo MD        lactated ringers infusion   Intravenous Continuous Shani Oviedo  mL/hr at 06/30/21 1951 New Bag at 06/30/21 1951    acetaminophen (TYLENOL) tablet 650 mg  650 mg Oral Q4H PRN Shnai Oviedo MD        albuterol (PROVENTIL) nebulizer solution 2.5 mg  2.5 mg Nebulization 4x daily Shani Oviedo MD   2.5 mg at 07/01/21 0827    budesonide (PULMICORT) nebulizer suspension 1,000 mcg  1 mg Nebulization BID Shani Oviedo MD   1,000 mcg at 07/01/21 0827    benzonatate (TESSALON) capsule 200 mg  200 mg Oral TID PRN Shani Oviedo MD        gabapentin (NEURONTIN) capsule 300 mg  300 mg Oral BID Shani Oviedo MD   300 mg at 07/01/21 0859    lipase-protease-amylase (CREON) delayed release capsule 72,000 Units  72,000 Units Oral TID WC Shani Oviedo MD        pantoprazole (PROTONIX) tablet 40 mg  40 mg Oral BID AC Shani Oviedo MD   40 mg at 07/01/21 5862    oxyCODONE (ROXICODONE) immediate release tablet 5 mg  5 mg Oral Q4H PRN Shani Oviedo MD        Or   Ganesh Bain oxyCODONE HCl (OXY-IR) immediate release tablet 10 mg  10 mg Oral Q4H PRN Raffi Grider Georgi Dimas MD   10 mg at 07/01/21 0905    HYDROmorphone (DILAUDID) injection 0.5 mg  0.5 mg Intravenous Q3H PRN Tom Rubin MD   0.5 mg at 07/01/21 1003    fluconazole (DIFLUCAN) in 0.9 % sodium chloride IVPB 400 mg  400 mg Intravenous Q24H Tom Rubin MD        piperacillin-tazobactam (ZOSYN) 3,375 mg in dextrose 5 % 100 mL IVPB extended infusion (mini-bag)  3,375 mg Intravenous Kasi Jin MD 25 mL/hr at 07/01/21 0859 3,375 mg at 07/01/21 0859    And    0.9 % sodium chloride infusion admixture   Intravenous Kasi Jin MD   Stopped at 07/01/21 0730    glucose (GLUTOSE) 40 % oral gel 15 g  15 g Oral PRN Tom Rubin MD        dextrose 50 % IV solution  12.5 g Intravenous PRN Tom Rubin MD        glucagon (rDNA) injection 1 mg  1 mg Intramuscular PRN Tom Rubin MD        dextrose 5 % solution  100 mL/hr Intravenous PRN Tom Rubin MD        insulin glargine (LANTUS) injection vial 10 Units  10 Units Subcutaneous BID Tom Rubin MD   10 Units at 07/01/21 0950    insulin lispro (HUMALOG) injection vial 0-12 Units  0-12 Units Subcutaneous Q4H Tom Rubin MD   2 Units at 07/01/21 0949    vancomycin (VANCOCIN) 1,250 mg in dextrose 5 % 250 mL IVPB  15 mg/kg Intravenous Q12H Tom Rubin .7 mL/hr at 07/01/21 0905 1,250 mg at 07/01/21 0905    polyethylene glycol (GLYCOLAX) packet 17 g  17 g Oral Daily Tom Rubin MD   17 g at 07/01/21 7500     Facility-Administered Medications Ordered in Other Encounters   Medication Dose Route Frequency Provider Last Rate Last Admin    sodium chloride flush 0.9 % injection 10 mL  10 mL Intravenous 2 times per day Damari Brooks MD        sodium chloride flush 0.9 % injection 10 mL  10 mL Intravenous PRN Damari Brooks MD        0.9 % sodium chloride infusion  25 mL Intravenous PRN Damari Brooks MD           Allergies   Allergen Reactions    Iodine Swelling     Sea food    Metformin  PANCREATECTOMY N/A 05/12/2021    LAPAROSCOPIC ROBOTIC DISTAL PANCREATECTOMY AND SPLENECTOMY AND CHOLECYSTECTOMY, INTRA-OPERATIVE ULTRASOUND, TRANSITIONED TO OPEN -- EPIDURAL performed by Catherine Umanzor MD at [place] [address] SURGERY  06/05/2018    C5-7 fusion at [facility] in [address] [facility] N/A 01/08/2021    EGD W/EUS FNA performed by Matthew Ramírez DO at [address] [facility] N/A 01/08/2021    EGD DIAGNOSTIC ONLY performed by Matthew Ramírez DO at [address] [facility] History     Socioeconomic History    Marital status: Single   Tobacco Use    Smoking status: Never Smoker    Smokeless tobacco: Never Used   Vaping Use    Vaping Use: Never used   Substance and Sexual Activity    Alcohol use: Not Currently     Comment: occasional    Drug use: Never     Family Medical History  Family History   Problem Relation Age of Onset    Diabetes Mother     Other Mother         parkinsons    Diabetes Father     Cancer Sister 79        unknown    Cancer Brother 64        stomach    Diabetes Brother        Review of Systems:  Constitutional: No fever, no chills  Eyes: No vision changes, no retroorbital pain  ENT: No hearing changes, no ear pain  Respiratory: No cough, no dyspnea  Cardiovascular: No chest pain, no palpitations  Gastrointestinal: No abdominal pain, no diarrhea  Genitourinary: No dysuria, no hematuria  Integumentary: No rash, no itching  Musculoskeletal: No muscle pain, no joint pain  Neurologic: No headache, no numbness in extremities    Physical Examination:  Vitals:    07/01/21 0600 07/01/21 0700 07/01/21 0829 07/01/21 0830   BP: 111/81 126/68     Pulse: 112 113     Resp: 20 18 23 19   Temp: 98 °F (36.7 °C)      TempSrc: Oral      SpO2: 98% 98% 98% 97%   Weight:       Height:         Constitutional: Alert, not in distress  Eyes: Sclerae anicteric, no conjunctival erythema  ENT: No buccal lesion, no pharyngeal exudates  Neck: No nuchal rigidity, no cervical adenopathy  Lungs: Clear breath sounds, no crackles, no wheezes  Heart: Regular rate and rhythm, no murmurs  Abdomen: Bowel sounds present, soft, nontender  Skin: Warm and dry, no active dermatoses  Musculoskeletal: No joint erythema, no joint swelling    Labs, imaging, and medical records/notes were personally reviewed. Assessment:  Intraabdominal abscess/surgical site infection, s/p percutaneous drain placement on 05/28, inadequate drainage prompting additional percutaneous drain placements on 06/30  Recent distal pancreatectomy with splenectomy UE 32/90 complicated by pancreatic leak s/p pancreatic stent placement on 05/17    Plan:  Stop vancomycin. Change piperacillin-tazobactam to ertapenem 1 g every 24 hours pending abscess fluid culture results. Continue fluconazole 400 mg every 24 hours pending abscess fluid culture results. Follow-up blood and abscess fluid culture results from 06/30. Plan for ERCP with pancreatic stent replacement in the distal pancreatic duct across the ampulla is noted. Continue supportive care. Case was discussed with Dr. Ángel Ellsworth. Thank you for involving me in the care of Pradip West. I will continue to follow. Please do not hesitate to call for any questions or concerns.     Electronically signed by Cathy Martins MD on 7/1/2021 at 10:06 AM

## 2021-07-01 NOTE — CONSULTS
ENDOSCOPIC SURGERY  CONSULT NOTE  7/1/2021    Physician Consulted: Dr. Sim Jacinto  Reason for Consult: ERCP  Referring Physician: Dr. Salvatore SHI  Sonia Blackmon is a 62 y.o. 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 Postop course complicated by hypertriglyceridemia, acute respiratory failure, and pancreatic leak controlled by MAX drain. Discharged on 5/20.     Patient readmitted on 5/27 for 20 cm abscess from pancreatic leak. Patient had IR drain placed in addition to the pre-existing MAX drain. Repeat CT demonstrating persistent large abscess. Patient underwent ERCP on 6/4 to exchange clogged pancreatic stent at which point he was diagnosed with free leaking distal pancreatic staple line. Patient also started on therapeutic Lovenox for portal venous thrombus later discharged on Eliquis.      Patient had IR drain removed on 6/23 by IR. States he cannot keep anything down. He was seen by Dr. Salvatore Anaya 6/24 and was planned to undergo manipulation of the pancreatic stent Dr. Sim Jacinto. Since that time patient has had increasing nausea and vomiting.       Patient underwent CT guided placement of left upper quadrant and epigastric IR drains with total of 135 purulence cc out. During the procedure patient had tachycardia, hypotension and tachypnea. This reportedly resolved after the procedure. Patient sent to the emergency department for admission. When evaluated he was again tachycardic in the 140s, normotensive, and tachypneic on 15 L nonrebreather. Reporting significant pain in the left upper quadrant with a new drainage. Old MAX drain in the left lower quadrant still in place with milky light gray output consistent with pancreatic and purulent output. Patient shaking which appears to be rigors but he says it is from the pain. Denies fevers or chills.   Denies constipation or diarrhea.       Past Medical History:   Diagnosis Date    Arthritis     Back pain     5th finger & ring finger on right hand is numb    Bell's palsy     NEW ONSET 3-     Hyperlipidemia     Hypertension     Neuropathy     toes numb    Poorly controlled type 2 diabetes mellitus with neuropathy ([facility] [address] 5/8/2018    Sacral radiculitis 2/27/2019    Spondylosis of lumbar spine 3/9/2019    Advanced arthritis and degenerative disc disease by MRI 3/2019    Ventral hernia        Past Surgical History:   Procedure Laterality Date    ANESTHESIA NERVE BLOCK Bilateral 07/11/2019    BILATERAL L4-5 TRANSFORAMINAL EPIDURAL STEROID INJECTION performed by Diana Durbin DO at [address] Bilateral 08/01/2019    BILATERAL MEDIAL BRANCH BLOCK L4-5 AND L5-S1 performed by Diana Durbin DO at [address] Bilateral 08/15/2019    BILATERAL MEDIAL BRANCH BLOCK L4 - 5 AND L5 - S1 performed by Diana Durbin DO at [facility] Right 03/29/2019    right wrist carpal tunnel release and right elbow cubital tunnel release    CARPAL TUNNEL RELEASE Right 03/29/2019    RIGHT WRIST CARPAL TUNNEL RELEASE AND RIGHT ELBOW CUBITAL TUNNEL RELEASE performed by Kevin Jackson DO at [place] [address]      at age 40 polyps    CYST REMOVAL Right     EPIDURAL STEROID INJECTION N/A 10/17/2019    LUMBAR EPIDURAL STEROID INJECTION UNDER FLUOROSCOPIC GUIDANCE AT L2-L3 WITHOUT SEDATION performed by Ephraim Saleh MD at [address] [facility] ERCP N/A 05/17/2021    ERCP ENDOSCOPIC RETROGRADE CHOLANGIOPANCREATOGRAPHY SPHINCTER/PAPILLOTOMY performed by Vicky Modi MD at [address] ERCP N/A 05/17/2021    ERCP STENT INSERTION performed by Vicky Modi MD at [address] ERCP N/A 6/4/2021    ERCP STENT INSERTION performed by Vicky Modi MD at [facility] Bilateral     groin and umbilical    HERNIA REPAIR N/A 12/13/2018    ROBOTIC ASSISTED LAPAROSCOPIC PRIMARY REPAIR OF RECURRENT VENTRAL HERNIA  REPAIR performed by Francois Law MD at [address] Bilateral 07/11/2019    L4-5 transforaminal     NERVE BLOCK Bilateral 08/01/2019    medial branch block    NERVE BLOCK Bilateral 08/15/2019    bilateral medial branch block L4-S1    NERVE BLOCK  10/17/2019    lumbar epidural    PANCREATECTOMY N/A 05/12/2021    LAPAROSCOPIC ROBOTIC DISTAL PANCREATECTOMY AND SPLENECTOMY AND CHOLECYSTECTOMY, INTRA-OPERATIVE ULTRASOUND, TRANSITIONED TO OPEN -- EPIDURAL performed by Zayra Fam MD at [place] [address] SURGERY  06/05/2018    C5-7 fusion at [address] in [address] 01/08/2021    EGD W/EUS FNA performed by Sourav Pretty DO at [address] [facility] 01/08/2021    EGD DIAGNOSTIC ONLY performed by Sourav Pretty DO at [facility] ENDOSCOPY       Medications Prior to Admission:    Prior to Admission medications    Medication Sig Start Date End Date Taking? Authorizing Provider   predniSONE (DELTASONE) 50 MG tablet Take 1 tablet by mouth daily for 3 doses Take 1 tab 13 hours, 7 hours and 1 hour prior to contrast.  Also take 50mg Benadryl 1 hour prior to contrast dye. 6/28/21 7/1/21  Maribell Dalal III, MD   oxyCODONE-acetaminophen (PERCOCET) 7.5-325 MG per tablet Take 1 tablet by mouth every 4 hours as needed for Pain. Historical Provider, MD   BANOPHEN 25 MG tablet take 2 tablets by mouth 1 HOUR PRIOR TO CONTRAST MEDIA ADMINISTRATION 6/21/21   Maribell Dalal III, MD   acetaminophen (TYLENOL) 500 MG tablet Take 2 tablets by mouth every 6 hours 6/4/21   Akhil Rodríguez MD   ibuprofen (ADVIL;MOTRIN) 600 MG tablet Take 1 tablet by mouth 3 times daily (with meals) 6/4/21   Akhil Rodríguez MD   apixaban (ELIQUIS) 5 MG TABS tablet Take 1 tablet by mouth 2 times daily .  The first dose of this Eliquis should be taken approximately 12 hours after you received your last dose of Lovenox (the blood thinner injection into your skin) given in the hospital. Continue to take the Eliquis approximately every 12 hours. 6/4/21   Ely Gordon MD   polyethylene glycol (GLYCOLAX) 17 g packet Take 17 g by mouth daily .   This helps to prevent constipation caused by taking high dose narcotics (Oxycodone) 6/5/21 7/5/21  Ely Gordon MD   benzonatate (TESSALON) 200 MG capsule Take 200 mg by mouth 3 times daily as needed for Cough    Historical MD Meenu   insulin lispro, 1 Unit Dial, (HUMALOG KWIKPEN) 100 UNIT/ML SOPN Inject 0-12 Units into the skin 3 times daily (before meals) Glucose: Dose:  If <139             No Insulin  140-199 2 Units  200-249 4 Units  250-299 6 Units  300-349 8 Units  350-400 10 Units  Above 400       12 Units 5/20/21 6/30/21  Darryle Plenty, MD   losartan (COZAAR) 100 MG tablet take 1 tablet by mouth once daily 5/19/21   Darryle Plenty, MD   hydroCHLOROthiazide (HYDRODIURIL) 12.5 MG tablet take 1 tablet by mouth once daily 5/19/21   Darryle Plenty, MD   albuterol (PROVENTIL) (2.5 MG/3ML) 0.083% nebulizer solution Take 3 mLs by nebulization 4 times daily 5/19/21   Darryle Plenty, MD   budesonide (PULMICORT) 0.5 MG/2ML nebulizer suspension Take 4 mLs by nebulization 2 times daily 5/19/21   Darryle Plenty, MD   guaiFENesin (ROBITUSSIN) 100 MG/5ML SOLN oral solution Take 15 mLs by mouth 3 times daily 5/19/21   Darryle Plenty, MD   lipase-protease-amylase (CREON) 69714 units CPEP delayed release capsule Take 2 capsules by mouth 3 times daily (with meals) 5/19/21 8/17/21  Darryle Plenty, MD   pantoprazole (PROTONIX) 40 MG tablet Take 1 tablet by mouth 2 times daily (before meals) 5/19/21   Darryle Plenty, MD   atorvastatin (LIPITOR) 20 MG tablet Take 1 tablet by mouth daily 5/20/21   Darryle Plenty, MD   insulin glargine (LANTUS SOLOSTAR) 100 UNIT/ML injection pen Inject 20 Units into the skin 2 times daily 5/19/21 6/30/21  Jacek Kelly MD   insulin lispro, 1 Unit Dial, ([facility]) 100 UNIT/ML SOPN Inject 7 Units into the skin 3 times daily (before meals) 5/19/21 6/30/21  Jacek Kelly MD   gabapentin (NEURONTIN) 300 MG capsule Take 1 capsule by mouth 2 times daily. 4/19/21   Historical Provider, MD       Allergies   Allergen Reactions    Iodine Swelling     Sea food    Metformin And Related Hives    Wheat Extract Swelling       Family History   Problem Relation Age of Onset    Diabetes Mother     Other Mother         parkinsons    Diabetes Father     Cancer Sister 79        unknown    Cancer Brother 64        stomach    Diabetes Brother        Social History     Tobacco Use    Smoking status: Never Smoker    Smokeless tobacco: Never Used   Vaping Use    Vaping Use: Never used   Substance Use Topics    Alcohol use: Not Currently     Comment: occasional    Drug use: Never         Review of Systems   General ROS: positive for  - chills and fatigue  Hematological and Lymphatic ROS: negative  Respiratory ROS: positive for - shortness of breath  Cardiovascular ROS: negative  Gastrointestinal ROS: positive for - abdominal pain and appetite loss  Genito-Urinary ROS: negative  Musculoskeletal ROS: negative      PHYSICAL EXAM:    Vitals:    07/01/21 0600   BP: 111/81   Pulse: 112   Resp: 20   Temp: 98 °F (36.7 °C)   SpO2: 98%       General Appearance:  awake, alert, oriented, in no acute distress  Skin:  Skin color, texture, turgor normal. No rashes or lesions. Head/face:  NCAT  Eyes:  No gross abnormalities. Lungs:  Normal expansion. On 4L NC  Heart: tachycardic, normotensive   Abdomen:  Soft, tender around drain sites, non-distended, no guarding, no peritoneal signs. Extremities: Extremities warm to touch, pink, with no edema.       LABS:    CBC  Recent Labs     07/01/21  0515   WBC 7.4   HGB 8.3*   HCT 27.6*   *     BMP  Recent Labs     07/01/21  0515      K 3.7      CO2 22   BUN 7   CREATININE 0.8   CALCIUM 7.7*     Liver Function  Recent Labs     06/30/21  1710 06/30/21  1710 07/01/21  0515   LIPASE 41  --   --    BILITOT 0.4   < > 0.4   BILIDIR <0.2  --   --    AST 51*   < > 27   ALT 22   < > 16   ALKPHOS 59   < > 47   PROT 8.3   < > 7.0   LABALBU 3.2*   < > 2.8*    < > = values in this interval not displayed. No results for input(s): LACTATE in the last 72 hours. No results for input(s): INR, PTT in the last 72 hours. Invalid input(s): PT    RADIOLOGY    XR CHEST PORTABLE    Result Date: 6/30/2021  EXAMINATION: ONE XRAY VIEW OF THE CHEST 6/30/2021 4:50 pm COMPARISON: 05/27/2021 HISTORY: ORDERING SYSTEM PROVIDED HISTORY: chest pain and shortness of breath s/p IR abdominal abscess drainage TECHNOLOGIST PROVIDED HISTORY: Reason for exam:->chest pain and shortness of breath s/p IR abdominal abscess drainage What reading provider will be dictating this exam?->CRC FINDINGS: The lungs are without acute focal process. There is no effusion or pneumothorax. The cardiomediastinal silhouette is without acute process. The osseous structures are without acute process. Percutaneous drainage tube noted in the left upper quadrant. No acute process.          ASSESSMENT:  62 y.o. male with with postoperative development of intra-abdominal abscess status post distal pancreatectomy, splenectomy, cholecystectomy on 5/12.  ERCP with pancreatic stent placement on 6/4, s/p IR drain placement x2 6/30    PLAN:  - Appreciate ICU care  - Continue IV antibiotics, appreciate ID input  - NPO, IVFs  - Will need ERCP to reposition stent, timing TBD - likely tomorrow either here or at [address] [facility] as he was already scheduled for this procedure there as an outpatient prior to this admission    Will discuss with Dr. Raquel Valentino    Electronically signed by Beckie Lord MD on 7/1/21 at 6:30 AM EDT      General Surgery Progress Note  [facility]    Patient's Name/Date of Birth: Asif Guzman / 1960    Date: July 1, 2021     Surgeon: Warden Anju MD    Chief Complaint: Intra-abdominal abscess    Patient Active Problem List   Diagnosis    Mixed hyperlipidemia    Neck pain    Cervical radiculopathy at C7    Essential hypertension    Chronic seasonal allergic rhinitis    Ventral hernia without obstruction or gangrene    Poorly controlled type 2 diabetes mellitus ([address]    Chronic bilateral low back pain with right-sided sciatica    Gynecomastia    Panic disorder    Cubital tunnel syndrome on right    Right carpal tunnel syndrome    Spondylosis of lumbar spine    Bell's palsy    Other bursal cyst, left elbow    Sacral radiculitis    Lumbar radiculitis    Spondylosis of cervical region without myelopathy or radiculopathy    Cervical facet joint syndrome    Cervical disc disorder    Lumbar facet arthropathy    Spinal stenosis of lumbar region with neurogenic claudication    Lumbar disc disorder    Pancreatic neoplasm    IPMN (intraductal papillary mucinous neoplasm)    Pancreatic duct leak    Intra-abdominal infection    Abdominal pain with fever after surgery    Sepsis ([address]    Acute respiratory failure with hypoxia ([address]       Subjective: HPI as above, no new complaints    Objective:  /70   Pulse 122   Temp 99.3 °F (37.4 °C)   Resp 22   Ht 5' 8\" (1.727 m)   Wt 192 lb 10.9 oz (87.4 kg)   SpO2 94%   BMI 29.30 kg/m²   Labs:  Recent Labs     06/30/21  1710 07/01/21  0515   WBC 9.2 7.4   HGB 10.4* 8.3*   HCT 35.8* 27.6*     Lab Results   Component Value Date    CREATININE 0.8 07/01/2021    BUN 7 07/01/2021     07/01/2021    K 3.7 07/01/2021     07/01/2021    CO2 22 07/01/2021     Recent Labs     06/30/21  1710   LIPASE 41     CBC with Differential:    Lab Results   Component Value Date    WBC 7.4 07/01/2021    RBC 3.83 07/01/2021    HGB 8.3 07/01/2021    HCT 27.6 07/01/2021     07/01/2021    MCV 72.1 07/01/2021    MCH 21.7 07/01/2021    MCHC 30.1 07/01/2021    RDW 16.6 07/01/2021    NRBC 0.9 06/04/2021    METASPCT 0.9 05/29/2021    LYMPHOPCT 12.6 07/01/2021    MONOPCT 17.0 07/01/2021    MYELOPCT 0.9 06/01/2021    BASOPCT 0.4 07/01/2021    MONOSABS 1.26 07/01/2021    LYMPHSABS 0.93 07/01/2021    EOSABS 0.03 07/01/2021    BASOSABS 0.03 07/01/2021     CMP:    Lab Results   Component Value Date     07/01/2021    K 3.7 07/01/2021    K 4.1 06/05/2021     07/01/2021    CO2 22 07/01/2021    BUN 7 07/01/2021    CREATININE 0.8 07/01/2021    GFRAA >60 07/01/2021    LABGLOM >60 07/01/2021    GLUCOSE 180 07/01/2021    PROT 7.0 07/01/2021    LABALBU 2.8 07/01/2021    CALCIUM 7.7 07/01/2021    BILITOT 0.4 07/01/2021    ALKPHOS 47 07/01/2021    AST 27 07/01/2021    ALT 16 07/01/2021       General appearance:  NAD  Head: NCAT, PERRLA, EOMI, red conjunctiva  Neck: supple, no masses  Lungs: CTAB, equal chest rise bilateral  Heart: Reg rate  Abdomen: soft, nondistended, tender appropriately. Drains purulent  Skin; no lesions  Gu: no cva tenderness  Extremities: extremities normal, atraumatic, no cyanosis or edema      Assessment/Plan:  Stuart Garrett is a 62 y.o. male with pancreatic duct leak status post distal pancreatectomy, prior ERCP with pancreatic stent placement into the pancreatic tail collection, now with persistent abscess    We will proceed with ERCP with pancreatic stent replacement in the distal pancreatic duct across the ampulla 7/02/21 AM  Remainder of management per infectious disease, ICU, Dr. Radha Maldonado  -The procedure, risks, benefits and alternatives were discussed with patient. he   agrees to proceed.     Gene Rodríguez MD  7/1/2021  1:17 PM

## 2021-07-01 NOTE — PROGRESS NOTES
Pharmacy Consultation Note  (Antibiotic Dosing and Monitoring)    Initial consult date: 6/30/2021  Consulting physician/provider: Dr Be Nielsen  Drug(s): Vancomycin  Indication: Intraabdominal abscess    Vancomycin was discontinued today per ID. Therefore, will sign off at this time. Please re-consult if needed.     Cheryl Yang PharmD, BCPS, BCCCP  7/1/2021  2:17 PM

## 2021-07-01 NOTE — PROGRESS NOTES
Pharmacy Consultation Note  (Antibiotic Dosing and Monitoring)    Initial consult date: 6/30/21  Consulting physician: Dr. Caleb Marino  Drug: Vancomycin  Indication: Intra-abdominal infection    Age/  Gender Height Weight IBW Dosing weight  Allergy Information   60 y.o./male 5' 8\" 87.4 kg     Ideal body weight: 68.4 kg (150 lb 12.7 oz)  Adjusted ideal body weight: 76 kg (167 lb 8.8 oz)  87.4 kg  Iodine, Metformin and related, and Wheat extract           Date  WBC BUN SCr CrCl  (mL/min) Drug/Dose Time   Given Level(s)   (Time) Comments   6/30 9.2 10 0.7  110 Vancomycin 1750 mg IV once 1956 7/1     Vancomycin 1250 mg IV q12h <0800>                                 Intake/Output Summary (Last 24 hours) at 6/30/2021 2045  Last data filed at 6/30/2021 1913  Gross per 24 hour   Intake 1160 ml   Output --   Net 1160 ml       Historical Cultures:  Organism   Date Value Ref Range Status   05/28/2021 Staphylococcus coagulase-negative (A)  Final   05/28/2021 Enterobacter cloacae complex (A)  Final   05/28/2021 Cronobacter sakazakii (A)  Final   05/28/2021 Klebsiella oxytoca (A)  Final   05/28/2021 Alpha hemolytic Streptococcus (A)  Final   05/28/2021 Anaerobic gram negative jason (A)  Final     No results for input(s): BC in the last 72 hours. Cultures:  available culture and sensitivity results were reviewed in EPIC    Assessment:  62 y.o. male has been initiated Vancomycin. Estimated CrCl = 110 mL/min  Goal trough level = 15-20 mcg/mL    Plan:   Will initiate vancomycin at a dose of 1250 mg IV q12h  Monitor renal function   Clinical pharmacy to follow      CLARISA Stewart [facility] 6/30/2021 8:45 PM

## 2021-07-01 NOTE — PROGRESS NOTES
[facility]  SURGICAL INTENSIVE CARE UNIT (SICU)  ATTENDING PHYSICIAN CRITICAL CARE PROGRESS NOTE     I have examined the patient, reviewed the record,and discussed the case with the APN/  Resident. I have reviewed all relevant labs and imaging data. The following summarizes my clinical findings and independent assessment. Date of admission:  6/30/2021    CC: intra-abdominal abscess s/p Distal pancreatectomy     HOSPITAL COURSE:   6/30 Came in outpatient for IR drainage intra abdominal abscesses , SIRS response after procedure Tachycardia and hypotension , came to ICU for management   7/1 Tachycardia overnight , 4L Oxygen     New Imaging Reviewed:   No imaging today      Physical Exam:  Physical Exam  HENT:      Head: Normocephalic. Eyes:      Extraocular Movements: Extraocular movements intact. Pupils: Pupils are equal, round, and reactive to light. Cardiovascular:      Rate and Rhythm: Normal rate. Pulmonary:      Effort: Pulmonary effort is normal. No respiratory distress. Abdominal:      General: There is distension. Tenderness: There is abdominal tenderness ( LUQ tenderness  and epigastric region ). There is guarding ( Epigastric and LUQ ). There is no rebound. Musculoskeletal:         General: Normal range of motion. Cervical back: Neck supple. Neurological:      Mental Status: He is alert. Assessment   Active Problems:    Intra-abdominal infection    Acute respiratory failure with hypoxia (HCC)  Resolved Problems:    * No resolved hospital problems.  *      Plan   GI: NPO,  Creon , Was scheduled for ERCP with Dr. Douglas Nash for tomorrow at [facility] will see if he can do it tomorrow here   Neuro:  Pain control, Gabapentin , dilaudid and oxycodone   Renal: 125cc LR, Replace K , Monitor Urine Output, Daily CBC,BMP, Mg,Phos, ionized Ca , Lactic acid normalized   Musculoskeletal: WBAT all extremities ,   AM-PAC Score pending   Pulmonary: Aggressive pulmonary hygiene , SMI , Monitor RR and Maintain SpO2 > 88% Albuterol, pulmicort wean oxygen as able   ID: Metronidazole, Vancomycin, Zosyn Abscess Culture pending  and   Monitor leukocytosis and Monitor Fever Curve  Heme: No indication for Transfusion ,   Monitor Hb   Cardiac: Monitor Hemodynamics Sinus tachycardia from inflammatory response , Restart statin Hold eliquis , losartan, hydrochlorothiazide  Endocrine: continue SSI , Lantus 10U BID ( Home dose 20U)     DVT Prophylaxis: PCDs, SQ Lovenox   Ulcer Prophylaxis: Home PPI   Tubes and Lines: Continue PIV and IR drain x2 MAX x1    Seizure proph:     No Indication  Ancillary consults:   Infectious Disease  and HPB   Family Update:         As available   CODE Status:       Full Code    Dispo: JARED Cevallos MD    Critical Care: 32 minutes evaluating and managing patient with Sepsis

## 2021-07-01 NOTE — CARE COORDINATION
Transition of Care-Met with patient at bedside, known to me from previous admissions. Patient resides alone in a 9th floor apartment, elevator access, no steps to enter building. Patient reports being  Independent of all ADL's prior to admission. Patient was active with [facility], until earlier this week, just completed therapy. , if needed at discharge will need new home health care care orders. Patient came in for outpatient for IR drainage intra abdominal abscesses and was admitted to SICU d/t tachycardia and hypotension. Patient does not wear oxygen at home, currently on 4L NC. Cousin or other family can likely transport home . CM following.     Margo ADRIANN, RN  KALEB   [phone]

## 2021-07-01 NOTE — CONSULTS
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 Postop course complicated by hypertriglyceridemia, acute respiratory failure, and pancreatic leak controlled by MAX drain. Discharged on 5/20.     Patient readmitted on 5/27 for 20 cm abscess from pancreatic leak. Patient had IR drain placed in addition to the pre-existing MAX drain. Repeat CT demonstrating persistent large abscess. Patient underwent ERCP on 6/4 to exchange clogged pancreatic stent at which point he was diagnosed with free leaking distal pancreatic staple line. Patient also started on therapeutic Lovenox for portal venous thrombus later discharged on Eliquis.      Patient had IR drain removed on 6/23 by IR. States he cannot keep anything down. He was seen by Dr. Joel Whitt 6/24 and was planned to undergo manipulation of the pancreatic stent Dr. Jas Barry. Since that time patient has had increasing nausea and vomiting.       Patient underwent CT guided placement of left upper quadrant and epigastric IR drains with total of 135 purulence cc out. During the procedure patient had tachycardia, hypotension and tachypnea. This reportedly resolved after the procedure. Patient sent to the emergency department for admission. When evaluated he was again tachycardic in the 140s, normotensive, and tachypneic on 15 L nonrebreather. Reporting significant pain in the left upper quadrant with a new drainage. Old MAX drain in the left lower quadrant still in place with milky light gray output consistent with pancreatic and purulent output. Patient shaking which appears to be rigors but he says it is from the pain. Denies fevers or chills.   Denies constipation or diarrhea. HOSPITAL COURSE:  6/30/21  - Transferred to ICU for close monitoring. Tachycardic, normotensive, SpO2 97% on 6LNC. Complaining of LUQ pain at site of new IR drains      PHYSICAL EXAM:  /74   Pulse 130   Temp 98.4 °F (36.9 °C) (Oral)   Resp 23   Ht 5' 8\" (1.727 m)   Wt 192 lb 10.9 oz (87.4 kg)   SpO2 98%   BMI 29.30 kg/m²     GENERAL:  Mild distress. A&Ox3. HEAD:  Normocephalic, atraumatic. EYES:   No scleral icterus. PERRLA. LUNGS:  No increased work of breathing. CTAB. 6LNC  CARDIOVASCULAR: RR  ABDOMEN:  Soft, non-distended, tender LUQ and periumbilical. No guarding, rigidity, rebound. LLQ MAX drain w/ purulent, pancreatic output. LUQ MAX drains x2 with purulent pancreatic output  EXTREMITIES:   MAEx4. Atraumatic. No LE edema. SKIN:  Cool, pale  NEUROLOGIC:  GCS 15    ASSESSMENT / PLAN:  Neuro:  GCS 15. Pain control. Corinna panel prn, dilaudid prn. Restart home gabapentin. Nausea control prn. CV: Tachycardia, likely 2/2 septic shock from IR drain placement. Monitor hemodynamics. Pain control, fluid resuscitation. Pulm: Acute hypoxic respiratory failure. Weaned down to 6 LNC, continue to wean as able. Duonebs ordered. Encourage IS/SMI. GI: s/p robotic converted to open distal pancreatectomy with splenectomy 5/21 with pancreatic leak requiring pancreatic stenting with pancreatic abscess and post operative pancreatic fistula. Continue NPO, IVFs. OK for ice chips. S/p IR drains x2 today. Monitor drain output. Zosyn, Vanc, diflucan started ID consulted. Zofran PRN. Renal: Lactic acidosis, repeat LA q6 hours. Monitor UOP & Electrolytes. Endocrine: No acute issues. Monitor glucose. MSK: No acute issues. PT/OT. Heme: No acute issues. ID: Pancreatic abscess, s/p IR drainsx2 today, Started zosyn, vanc, diflucan. Consulted ID, awaiting recommendations.      Pain/Analgesia: Corinna, dilaudid, gabapentin  Total fluid rate: LR @ 125  Bowel regimen: glyxolax  DVT proph:  lovenox  GI proph:  Protonix   Glucose protocol: SSI    Mouth/eye care: As needed per patient  Justice:   Not in place at this time.   CVC sites:  Peripheral IVs    Ancillary consults: ID, HPB surg, IM  Family Update: As available    Disposition: Continue ICU    Lenin Lagunas MD  Resident, PGY-2  6/30/2021  8:26 PM

## 2021-07-02 ENCOUNTER — ANESTHESIA (OUTPATIENT)
Dept: ENDOSCOPY | Age: 61
DRG: 711 | End: 2021-07-02
Payer: MEDICAID

## 2021-07-02 ENCOUNTER — APPOINTMENT (OUTPATIENT)
Dept: GENERAL RADIOLOGY | Age: 61
DRG: 711 | End: 2021-07-02
Payer: MEDICAID

## 2021-07-02 VITALS — SYSTOLIC BLOOD PRESSURE: 86 MMHG | DIASTOLIC BLOOD PRESSURE: 58 MMHG | OXYGEN SATURATION: 94 %

## 2021-07-02 LAB
ALBUMIN SERPL-MCNC: 2.9 G/DL (ref 3.5–5.2)
ALP BLD-CCNC: 51 U/L (ref 40–129)
ALT SERPL-CCNC: 28 U/L (ref 0–40)
ANAEROBIC CULTURE: NORMAL
ANAEROBIC CULTURE: NORMAL
ANION GAP SERPL CALCULATED.3IONS-SCNC: 10 MMOL/L (ref 7–16)
ANISOCYTOSIS: ABNORMAL
AST SERPL-CCNC: 58 U/L (ref 0–39)
BASOPHILS ABSOLUTE: 0.1 E9/L (ref 0–0.2)
BASOPHILS RELATIVE PERCENT: 0.9 % (ref 0–2)
BILIRUB SERPL-MCNC: 0.3 MG/DL (ref 0–1.2)
BUN BLDV-MCNC: 4 MG/DL (ref 6–20)
CALCIUM IONIZED: 1.17 MMOL/L (ref 1.15–1.33)
CALCIUM SERPL-MCNC: 8 MG/DL (ref 8.6–10.2)
CHLORIDE BLD-SCNC: 97 MMOL/L (ref 98–107)
CO2: 26 MMOL/L (ref 22–29)
CREAT SERPL-MCNC: 0.7 MG/DL (ref 0.7–1.2)
EOSINOPHILS ABSOLUTE: 0.39 E9/L (ref 0.05–0.5)
EOSINOPHILS RELATIVE PERCENT: 3.5 % (ref 0–6)
GFR AFRICAN AMERICAN: >60
GFR NON-AFRICAN AMERICAN: >60 ML/MIN/1.73
GLUCOSE BLD-MCNC: 131 MG/DL (ref 74–99)
HCT VFR BLD CALC: 27.2 % (ref 37–54)
HEMOGLOBIN: 8.2 G/DL (ref 12.5–16.5)
HYPOCHROMIA: ABNORMAL
LYMPHOCYTES ABSOLUTE: 1.23 E9/L (ref 1.5–4)
LYMPHOCYTES RELATIVE PERCENT: 11.3 % (ref 20–42)
MAGNESIUM: 1.9 MG/DL (ref 1.6–2.6)
MCH RBC QN AUTO: 21.8 PG (ref 26–35)
MCHC RBC AUTO-ENTMCNC: 30.1 % (ref 32–34.5)
MCV RBC AUTO: 72.1 FL (ref 80–99.9)
METER GLUCOSE: 122 MG/DL (ref 74–99)
METER GLUCOSE: 200 MG/DL (ref 74–99)
METER GLUCOSE: 217 MG/DL (ref 74–99)
MONOCYTES ABSOLUTE: 1.01 E9/L (ref 0.1–0.95)
MONOCYTES RELATIVE PERCENT: 8.7 % (ref 2–12)
MRSA CULTURE ONLY: NORMAL
NEUTROPHILS ABSOLUTE: 8.51 E9/L (ref 1.8–7.3)
NEUTROPHILS RELATIVE PERCENT: 75.7 % (ref 43–80)
OVALOCYTES: ABNORMAL
PDW BLD-RTO: 16.5 FL (ref 11.5–15)
PHOSPHORUS: 2.9 MG/DL (ref 2.5–4.5)
PLATELET # BLD: 424 E9/L (ref 130–450)
PMV BLD AUTO: 10.9 FL (ref 7–12)
POIKILOCYTES: ABNORMAL
POLYCHROMASIA: ABNORMAL
POTASSIUM SERPL-SCNC: 3.9 MMOL/L (ref 3.5–5)
RBC # BLD: 3.77 E12/L (ref 3.8–5.8)
SODIUM BLD-SCNC: 133 MMOL/L (ref 132–146)
TARGET CELLS: ABNORMAL
TOTAL PROTEIN: 6.8 G/DL (ref 6.4–8.3)
WBC # BLD: 11.2 E9/L (ref 4.5–11.5)

## 2021-07-02 PROCEDURE — 2580000003 HC RX 258: Performed by: INTERNAL MEDICINE

## 2021-07-02 PROCEDURE — 3209999900 FLUORO FOR SURGICAL PROCEDURES

## 2021-07-02 PROCEDURE — 82962 GLUCOSE BLOOD TEST: CPT

## 2021-07-02 PROCEDURE — 7100000001 HC PACU RECOVERY - ADDTL 15 MIN

## 2021-07-02 PROCEDURE — 6360000002 HC RX W HCPCS: Performed by: STUDENT IN AN ORGANIZED HEALTH CARE EDUCATION/TRAINING PROGRAM

## 2021-07-02 PROCEDURE — 84100 ASSAY OF PHOSPHORUS: CPT

## 2021-07-02 PROCEDURE — 3700000000 HC ANESTHESIA ATTENDED CARE: Performed by: SURGERY

## 2021-07-02 PROCEDURE — 6370000000 HC RX 637 (ALT 250 FOR IP): Performed by: SURGERY

## 2021-07-02 PROCEDURE — 80053 COMPREHEN METABOLIC PANEL: CPT

## 2021-07-02 PROCEDURE — 0F7D7DZ DILATION OF PANCREATIC DUCT WITH INTRALUMINAL DEVICE, VIA NATURAL OR ARTIFICIAL OPENING: ICD-10-PCS | Performed by: SURGERY

## 2021-07-02 PROCEDURE — 3609015000 HC ERCP REMOVE FOREIGN BODY/STENT BILIARY/PANC DUCT: Performed by: SURGERY

## 2021-07-02 PROCEDURE — 3609015100 HC ERCP STENT PLACEMENT BILIARY/PANCREATIC DUCT: Performed by: SURGERY

## 2021-07-02 PROCEDURE — C1769 GUIDE WIRE: HCPCS | Performed by: SURGERY

## 2021-07-02 PROCEDURE — 6360000004 HC RX CONTRAST MEDICATION: Performed by: SURGERY

## 2021-07-02 PROCEDURE — 83735 ASSAY OF MAGNESIUM: CPT

## 2021-07-02 PROCEDURE — 2720000010 HC SURG SUPPLY STERILE: Performed by: SURGERY

## 2021-07-02 PROCEDURE — 2700000000 HC OXYGEN THERAPY PER DAY

## 2021-07-02 PROCEDURE — 43264 ERCP REMOVE DUCT CALCULI: CPT | Performed by: SURGERY

## 2021-07-02 PROCEDURE — 6360000002 HC RX W HCPCS: Performed by: SURGERY

## 2021-07-02 PROCEDURE — 7100000000 HC PACU RECOVERY - FIRST 15 MIN

## 2021-07-02 PROCEDURE — 6370000000 HC RX 637 (ALT 250 FOR IP): Performed by: STUDENT IN AN ORGANIZED HEALTH CARE EDUCATION/TRAINING PROGRAM

## 2021-07-02 PROCEDURE — 43276 ERCP STENT EXCHANGE W/DILATE: CPT | Performed by: SURGERY

## 2021-07-02 PROCEDURE — 2500000003 HC RX 250 WO HCPCS: Performed by: SURGERY

## 2021-07-02 PROCEDURE — 6360000002 HC RX W HCPCS: Performed by: INTERNAL MEDICINE

## 2021-07-02 PROCEDURE — 6360000002 HC RX W HCPCS: Performed by: ANESTHESIOLOGIST ASSISTANT

## 2021-07-02 PROCEDURE — 2580000003 HC RX 258: Performed by: STUDENT IN AN ORGANIZED HEALTH CARE EDUCATION/TRAINING PROGRAM

## 2021-07-02 PROCEDURE — 85025 COMPLETE CBC W/AUTO DIFF WBC: CPT

## 2021-07-02 PROCEDURE — 2140000000 HC CCU INTERMEDIATE R&B

## 2021-07-02 PROCEDURE — 3609015200 HC ERCP REMOVE CALCULI/DEBRIS BILIARY/PANCREAS DUCT: Performed by: SURGERY

## 2021-07-02 PROCEDURE — 3700000001 HC ADD 15 MINUTES (ANESTHESIA): Performed by: SURGERY

## 2021-07-02 PROCEDURE — C2617 STENT, NON-COR, TEM W/O DEL: HCPCS | Performed by: SURGERY

## 2021-07-02 PROCEDURE — 82330 ASSAY OF CALCIUM: CPT

## 2021-07-02 PROCEDURE — 0FPD7DZ REMOVAL OF INTRALUMINAL DEVICE FROM PANCREATIC DUCT, VIA NATURAL OR ARTIFICIAL OPENING: ICD-10-PCS | Performed by: SURGERY

## 2021-07-02 PROCEDURE — 99291 CRITICAL CARE FIRST HOUR: CPT | Performed by: SURGERY

## 2021-07-02 PROCEDURE — 94640 AIRWAY INHALATION TREATMENT: CPT

## 2021-07-02 PROCEDURE — 2580000003 HC RX 258: Performed by: SURGERY

## 2021-07-02 PROCEDURE — 2709999900 HC NON-CHARGEABLE SUPPLY: Performed by: SURGERY

## 2021-07-02 PROCEDURE — 36415 COLL VENOUS BLD VENIPUNCTURE: CPT

## 2021-07-02 DEVICE — PANCREATIC STENT
Type: IMPLANTABLE DEVICE | Site: PANCREAS | Status: FUNCTIONAL
Brand: ADVANIX™ PANCREATIC STENT

## 2021-07-02 RX ORDER — MIDAZOLAM HYDROCHLORIDE 1 MG/ML
INJECTION INTRAMUSCULAR; INTRAVENOUS PRN
Status: DISCONTINUED | OUTPATIENT
Start: 2021-07-02 | End: 2021-07-02 | Stop reason: SDUPTHER

## 2021-07-02 RX ORDER — ALBUTEROL SULFATE 2.5 MG/3ML
2.5 SOLUTION RESPIRATORY (INHALATION)
Status: DISCONTINUED | OUTPATIENT
Start: 2021-07-02 | End: 2021-07-02

## 2021-07-02 RX ORDER — ATROPINE SULFATE 0.1 MG/ML
0.5 INJECTION INTRAVENOUS
Status: DISCONTINUED | OUTPATIENT
Start: 2021-07-02 | End: 2021-07-02

## 2021-07-02 RX ORDER — NEOSTIGMINE METHYLSULFATE 1 MG/ML
2 INJECTION, SOLUTION INTRAVENOUS ONCE
Status: DISCONTINUED | OUTPATIENT
Start: 2021-07-02 | End: 2021-07-02

## 2021-07-02 RX ORDER — PROPOFOL 10 MG/ML
INJECTION, EMULSION INTRAVENOUS CONTINUOUS PRN
Status: DISCONTINUED | OUTPATIENT
Start: 2021-07-02 | End: 2021-07-02 | Stop reason: SDUPTHER

## 2021-07-02 RX ORDER — MAGNESIUM SULFATE IN WATER 40 MG/ML
2000 INJECTION, SOLUTION INTRAVENOUS ONCE
Status: COMPLETED | OUTPATIENT
Start: 2021-07-02 | End: 2021-07-02

## 2021-07-02 RX ORDER — PROPOFOL 10 MG/ML
INJECTION, EMULSION INTRAVENOUS PRN
Status: DISCONTINUED | OUTPATIENT
Start: 2021-07-02 | End: 2021-07-02 | Stop reason: SDUPTHER

## 2021-07-02 RX ADMIN — PROPOFOL 30 MG: 10 INJECTION, EMULSION INTRAVENOUS at 08:10

## 2021-07-02 RX ADMIN — OXYCODONE HYDROCHLORIDE 10 MG: 10 TABLET ORAL at 02:55

## 2021-07-02 RX ADMIN — ERTAPENEM SODIUM 1000 MG: 1 INJECTION, POWDER, LYOPHILIZED, FOR SOLUTION INTRAMUSCULAR; INTRAVENOUS at 14:35

## 2021-07-02 RX ADMIN — PANCRELIPASE 72000 UNITS: 60000; 12000; 38000 CAPSULE, DELAYED RELEASE PELLETS ORAL at 17:01

## 2021-07-02 RX ADMIN — OXYCODONE HYDROCHLORIDE 10 MG: 10 TABLET ORAL at 19:09

## 2021-07-02 RX ADMIN — MIDAZOLAM 1 MG: 1 INJECTION INTRAMUSCULAR; INTRAVENOUS at 08:02

## 2021-07-02 RX ADMIN — BUDESONIDE 1000 MCG: 0.5 SUSPENSION RESPIRATORY (INHALATION) at 19:39

## 2021-07-02 RX ADMIN — MIDAZOLAM 1 MG: 1 INJECTION INTRAMUSCULAR; INTRAVENOUS at 08:10

## 2021-07-02 RX ADMIN — ATORVASTATIN CALCIUM 20 MG: 20 TABLET, FILM COATED ORAL at 20:19

## 2021-07-02 RX ADMIN — SODIUM CHLORIDE, PRESERVATIVE FREE 10 ML: 5 INJECTION INTRAVENOUS at 20:17

## 2021-07-02 RX ADMIN — HYDROMORPHONE HYDROCHLORIDE 0.5 MG: 1 INJECTION, SOLUTION INTRAMUSCULAR; INTRAVENOUS; SUBCUTANEOUS at 20:16

## 2021-07-02 RX ADMIN — PANCRELIPASE 72000 UNITS: 60000; 12000; 38000 CAPSULE, DELAYED RELEASE PELLETS ORAL at 12:14

## 2021-07-02 RX ADMIN — FLUCONAZOLE IN SODIUM CHLORIDE 400 MG: 2 INJECTION, SOLUTION INTRAVENOUS at 20:16

## 2021-07-02 RX ADMIN — SODIUM CHLORIDE, POTASSIUM CHLORIDE, SODIUM LACTATE AND CALCIUM CHLORIDE: 600; 310; 30; 20 INJECTION, SOLUTION INTRAVENOUS at 19:08

## 2021-07-02 RX ADMIN — ALBUTEROL SULFATE 2.5 MG: 2.5 SOLUTION RESPIRATORY (INHALATION) at 16:18

## 2021-07-02 RX ADMIN — INDOMETHACIN 100 MG: 50 SUPPOSITORY RECTAL at 09:00

## 2021-07-02 RX ADMIN — OXYCODONE HYDROCHLORIDE 10 MG: 10 TABLET ORAL at 23:03

## 2021-07-02 RX ADMIN — OXYCODONE HYDROCHLORIDE 10 MG: 10 TABLET ORAL at 10:53

## 2021-07-02 RX ADMIN — PROPOFOL 30 MG: 10 INJECTION, EMULSION INTRAVENOUS at 08:13

## 2021-07-02 RX ADMIN — OXYCODONE HYDROCHLORIDE 10 MG: 10 TABLET ORAL at 06:58

## 2021-07-02 RX ADMIN — POTASSIUM PHOSPHATE, MONOBASIC AND POTASSIUM PHOSPHATE, DIBASIC 15 MMOL: 224; 236 INJECTION, SOLUTION, CONCENTRATE INTRAVENOUS at 10:42

## 2021-07-02 RX ADMIN — INSULIN LISPRO 6 UNITS: 100 INJECTION, SOLUTION INTRAVENOUS; SUBCUTANEOUS at 12:21

## 2021-07-02 RX ADMIN — INSULIN GLARGINE 20 UNITS: 100 INJECTION, SOLUTION SUBCUTANEOUS at 12:20

## 2021-07-02 RX ADMIN — MAGNESIUM SULFATE HEPTAHYDRATE 2000 MG: 2 INJECTION, SOLUTION INTRAVENOUS at 10:43

## 2021-07-02 RX ADMIN — ALBUTEROL SULFATE 2.5 MG: 2.5 SOLUTION RESPIRATORY (INHALATION) at 19:38

## 2021-07-02 RX ADMIN — GABAPENTIN 300 MG: 300 CAPSULE ORAL at 12:14

## 2021-07-02 RX ADMIN — ALBUTEROL SULFATE 2.5 MG: 2.5 SOLUTION RESPIRATORY (INHALATION) at 10:38

## 2021-07-02 RX ADMIN — OXYCODONE HYDROCHLORIDE 10 MG: 10 TABLET ORAL at 14:48

## 2021-07-02 RX ADMIN — PANTOPRAZOLE SODIUM 40 MG: 40 TABLET, DELAYED RELEASE ORAL at 12:13

## 2021-07-02 RX ADMIN — HYDROMORPHONE HYDROCHLORIDE 0.5 MG: 1 INJECTION, SOLUTION INTRAMUSCULAR; INTRAVENOUS; SUBCUTANEOUS at 17:01

## 2021-07-02 RX ADMIN — PANTOPRAZOLE SODIUM 40 MG: 40 TABLET, DELAYED RELEASE ORAL at 16:28

## 2021-07-02 RX ADMIN — SODIUM CHLORIDE, POTASSIUM CHLORIDE, SODIUM LACTATE AND CALCIUM CHLORIDE: 600; 310; 30; 20 INJECTION, SOLUTION INTRAVENOUS at 04:21

## 2021-07-02 RX ADMIN — PROPOFOL 100 MCG/KG/MIN: 10 INJECTION, EMULSION INTRAVENOUS at 08:10

## 2021-07-02 RX ADMIN — GABAPENTIN 300 MG: 300 CAPSULE ORAL at 20:16

## 2021-07-02 RX ADMIN — INSULIN LISPRO 3 UNITS: 100 INJECTION, SOLUTION INTRAVENOUS; SUBCUTANEOUS at 20:14

## 2021-07-02 RX ADMIN — BUDESONIDE 1000 MCG: 0.5 SUSPENSION RESPIRATORY (INHALATION) at 10:38

## 2021-07-02 RX ADMIN — INSULIN GLARGINE 20 UNITS: 100 INJECTION, SOLUTION SUBCUTANEOUS at 20:14

## 2021-07-02 ASSESSMENT — PAIN SCALES - GENERAL
PAINLEVEL_OUTOF10: 7
PAINLEVEL_OUTOF10: 7
PAINLEVEL_OUTOF10: 3
PAINLEVEL_OUTOF10: 8
PAINLEVEL_OUTOF10: 7
PAINLEVEL_OUTOF10: 7
PAINLEVEL_OUTOF10: 0
PAINLEVEL_OUTOF10: 8
PAINLEVEL_OUTOF10: 0
PAINLEVEL_OUTOF10: 7
PAINLEVEL_OUTOF10: 8
PAINLEVEL_OUTOF10: 0
PAINLEVEL_OUTOF10: 7
PAINLEVEL_OUTOF10: 1
PAINLEVEL_OUTOF10: 7
PAINLEVEL_OUTOF10: 7
PAINLEVEL_OUTOF10: 8

## 2021-07-02 ASSESSMENT — PAIN DESCRIPTION - DESCRIPTORS
DESCRIPTORS: ACHING;PENETRATING;DISCOMFORT
DESCRIPTORS: ACHING;DISCOMFORT;SORE
DESCRIPTORS: ACHING;PENETRATING;DISCOMFORT
DESCRIPTORS: ACHING;DISCOMFORT
DESCRIPTORS: ACHING;DISCOMFORT
DESCRIPTORS: ACHING;DISCOMFORT;SORE
DESCRIPTORS: ACHING;DISCOMFORT;SORE

## 2021-07-02 ASSESSMENT — PAIN DESCRIPTION - FREQUENCY
FREQUENCY: CONTINUOUS
FREQUENCY: INTERMITTENT
FREQUENCY: CONTINUOUS
FREQUENCY: INTERMITTENT
FREQUENCY: CONTINUOUS

## 2021-07-02 ASSESSMENT — PAIN DESCRIPTION - ORIENTATION
ORIENTATION: LEFT

## 2021-07-02 ASSESSMENT — PAIN DESCRIPTION - ONSET
ONSET: GRADUAL
ONSET: ON-GOING
ONSET: AWAKENED FROM SLEEP
ONSET: ON-GOING

## 2021-07-02 ASSESSMENT — PAIN DESCRIPTION - PROGRESSION
CLINICAL_PROGRESSION: GRADUALLY WORSENING
CLINICAL_PROGRESSION: GRADUALLY WORSENING
CLINICAL_PROGRESSION: RAPIDLY WORSENING
CLINICAL_PROGRESSION: GRADUALLY WORSENING
CLINICAL_PROGRESSION: RAPIDLY WORSENING
CLINICAL_PROGRESSION: NOT CHANGED
CLINICAL_PROGRESSION: GRADUALLY WORSENING
CLINICAL_PROGRESSION: RAPIDLY WORSENING

## 2021-07-02 ASSESSMENT — PAIN DESCRIPTION - PAIN TYPE
TYPE: ACUTE PAIN
TYPE: ACUTE PAIN;SURGICAL PAIN
TYPE: ACUTE PAIN

## 2021-07-02 ASSESSMENT — PAIN DESCRIPTION - LOCATION
LOCATION: ABDOMEN

## 2021-07-02 ASSESSMENT — LIFESTYLE VARIABLES: SMOKING_STATUS: 0

## 2021-07-02 NOTE — OP NOTE
SURGEON: Vicky Modi MD        PREOPERATIVE DIAGNOSIS:  Pancreatic duct leak      POSTOPERATIVE DIAGNOSES:  1. Pancreatic duct leak      OPERATION:  1. ERCP with exchange of pancreatic duct stent (7 [place] by 7 cm plastic single pigtail)  2. Balloon sweep of pancreatic duct with removal of large amount of debris (pus)      ANESTHESIA:  LMAC       ESTIMATED BLOOD LOSS: Minimal.         INDICATION: This is a  52-year-old male status post recent distal pancreatectomy and splenectomy for IPMN of the pancreatic tail. He was found to have a pancreatic leak postop with high amylase and his MAX drain. He underwent ERCP x 2 weeks ago with pancreatic stent placement. He however was found to have a large pancreatic tail fluid collection and underwent IR drain which did not resolve this. He was referred for repeat ERCP and possible stent exchange versus removal. The patient agreed to undergo the ERCP. The risks, benefits, and alternatives were explained to the patient for the procedure including bleeding, infection, perforation, and pancreatitis. The patient understood and agreed to proceed. DESCRIPTION OF PROCEDURE: The patient was brought to the operating room and  he underwent  [facility] anesthesia by the anesthesia team.   He was then placed in the  left lateral decubitus position. The flexible duodenoscope was inserted down  the oropharynx and passed down the esophagus into the stomach and into the  duodenum. The papilla was identified. 2 plastic stents were seen coming out of the ampulla. Those were grasped with a snare and removed without difficulty. A sphincterotome with a guidewire was then used to gain access into the pancreatic duct. Contrast injection revealed adequate cannulation of the pancreatic duct. There was evidence of a large pancreatic tail collection with 2 IR drains in that collection. Contrast did extravasate into the collection.  I proceeded to do a balloon sweep of the pancreatic duct and a large amount of purulent material was removed. I then decided to place a stent in the distal pancreatic duct just across the ampulla. A 7 [place] by 7 cm plastic single pigtail stent was placed into the pancreatic duct with good drainage in adequate position on fluoroscopy. The scope was then withdrawn. The patient tolerated the procedure well.       Yobani Tracy MD

## 2021-07-02 NOTE — ANESTHESIA PRE PROCEDURE
Department of Anesthesiology  Preprocedure Note       Name:  Guilherme Pinedo   Age:  62 y.o.  :  1960                                          MRN:  [id number]         Date:  2021      Surgeon: Jordin Aguilar):  Nelson Jerome MD    Procedure: ERCP ENDOSCOPIC RETROGRADE CHOLANGIOPANCREATOGRAPHY       Medications prior to admission:   Prior to Admission medications    Medication Sig Start Date End Date Taking? Authorizing Provider   oxyCODONE-acetaminophen (PERCOCET) 7.5-325 MG per tablet Take 1 tablet by mouth every 4 hours as needed for Pain. Historical Provider, MD   BANOPHEN 25 MG tablet take 2 tablets by mouth 1 HOUR PRIOR TO CONTRAST MEDIA ADMINISTRATION 21   Nicolasa Nicole III, MD   acetaminophen (TYLENOL) 500 MG tablet Take 2 tablets by mouth every 6 hours 21   Dominik Damon MD   ibuprofen (ADVIL;MOTRIN) 600 MG tablet Take 1 tablet by mouth 3 times daily (with meals) 21   Dominik Damon MD   apixaban (ELIQUIS) 5 MG TABS tablet Take 1 tablet by mouth 2 times daily . The first dose of this Eliquis should be taken approximately 12 hours after you received your last dose of Lovenox (the blood thinner injection into your skin) given in the hospital. Continue to take the Eliquis approximately every 12 hours. 21   Dominik Damon MD   polyethylene glycol (GLYCOLAX) 17 g packet Take 17 g by mouth daily .   This helps to prevent constipation caused by taking high dose narcotics (Oxycodone) 21  Dominik Damon MD   benzonatate (TESSALON) 200 MG capsule Take 200 mg by mouth 3 times daily as needed for Cough    Historical Provider, MD   insulin lispro, 1 Unit Dial, (HUMALOG KWIKPEN) 100 UNIT/ML SOPN Inject 0-12 Units into the skin 3 times daily (before meals) Glucose: Dose:  If <139             No Insulin  140-199 2 Units  200-249 4 Units  250-299 6 Units  300-349 8 Units  350-400 10 Units  Above 400       12 Units 21  Cindy Rolon MD   losartan (COZAAR) 100 MG Units Subcutaneous Nightly Bandar Flatter, DO   3 Units at 07/01/21 2136    insulin glargine (LANTUS) injection vial 20 Units  20 Units Subcutaneous BID Adriel Mcclure, DO   20 Units at 07/01/21 2138    ertapenem (INVanz) 1000 mg IVPB minibag  1,000 mg Intravenous Q24H Jackie Flores MD   Stopped at 07/01/21 1712    sodium chloride flush 0.9 % injection 10 mL  10 mL Intravenous 2 times per day Anjali Vera MD        sodium chloride flush 0.9 % injection 10 mL  10 mL Intravenous PRN Anjali Vera MD        0.9 % sodium chloride infusion  25 mL Intravenous PRN Anjali Vera MD        sodium chloride flush 0.9 % injection 5-40 mL  5-40 mL Intravenous 2 times per day Trey Ferguson MD   10 mL at 07/01/21 2123    sodium chloride flush 0.9 % injection 5-40 mL  5-40 mL Intravenous PRN Trey Ferguson MD        0.9 % sodium chloride infusion  25 mL Intravenous PRN Trey Ferguson MD        ondansetron (ZOFRAN-ODT) disintegrating tablet 4 mg  4 mg Oral Q8H PRN Trey Ferguson MD        Or    ondansetron [facility]) injection 4 mg  4 mg Intravenous Q6H PRN Trey Ferguson MD        enoxaparin (LOVENOX) injection 40 mg  40 mg Subcutaneous Daily Trey Ferguson MD        lactated ringers infusion   Intravenous Continuous Trey Ferguson  mL/hr at 07/02/21 0421 New Bag at 07/02/21 0421    acetaminophen (TYLENOL) tablet 650 mg  650 mg Oral Q4H PRN Trey Ferguson MD   650 mg at 07/01/21 2100    albuterol (PROVENTIL) nebulizer solution 2.5 mg  2.5 mg Nebulization 4x daily Trey Ferguson MD   2.5 mg at 07/01/21 2002    budesonide (PULMICORT) nebulizer suspension 1,000 mcg  1 mg Nebulization BID Trey Ferguson MD   1,000 mcg at 07/01/21 2002    benzonatate (TESSALON) capsule 200 mg  200 mg Oral TID PRN Trey Ferguson MD        gabapentin (NEURONTIN) capsule 300 mg  300 mg Oral BID Trey Ferguson MD   300 mg at 07/01/21 2100    lipase-protease-amylase (CREON) delayed release capsule 72,000 Units  72,000 Units Oral TID WC Maria Victoria Leiva MD        pantoprazole (PROTONIX) tablet 40 mg  40 mg Oral BID AC Maria Victoria Leiva MD   40 mg at 07/01/21 1640    oxyCODONE (ROXICODONE) immediate release tablet 5 mg  5 mg Oral Q4H PRN Maria Victoria Leiva MD        Or   Maricarmen Her oxyCODONE HCl (OXY-IR) immediate release tablet 10 mg  10 mg Oral Q4H PRN Maria Victoria Leiva MD   10 mg at 07/02/21 0658    HYDROmorphone (DILAUDID) injection 0.5 mg  0.5 mg Intravenous Q3H PRN Maria Victoria Leiva MD   0.5 mg at 07/01/21 2008    fluconazole (DIFLUCAN) in 0.9 % sodium chloride IVPB 400 mg  400 mg Intravenous Q24H Maria Victoria Leiva  mL/hr at 07/01/21 1958 400 mg at 07/01/21 1958    glucose (GLUTOSE) 40 % oral gel 15 g  15 g Oral PRN Maria Victoria Leiva MD        dextrose 50 % IV solution  12.5 g Intravenous PRN Maria Victoria Leiva MD        glucagon (rDNA) injection 1 mg  1 mg Intramuscular PRN Maria Victoria Leiva MD        dextrose 5 % solution  100 mL/hr Intravenous PRN Maria Victoria Leiva MD        polyethylene glycol (GLYCOLAX) packet 17 g  17 g Oral Daily Maria Victoria Leiva MD   17 g at 07/01/21 0859       Allergies:     Allergies   Allergen Reactions    Iodine Swelling     Sea food    Metformin And Related Hives    Wheat Extract Swelling       Problem List:    Patient Active Problem List   Diagnosis Code    Mixed hyperlipidemia E78.2    Neck pain M54.2    Cervical radiculopathy at C7 M54.12    Essential hypertension I10    Chronic seasonal allergic rhinitis J30.2    Ventral hernia without obstruction or gangrene K43.9    Poorly controlled type 2 diabetes mellitus (HCC) E11.65    Chronic bilateral low back pain with right-sided sciatica M54.41, G89.29    Gynecomastia N62    Panic disorder F41.0    Cubital tunnel syndrome on right G56.21    Right carpal tunnel syndrome G56.01    Spondylosis of lumbar spine M47.816    Bell's palsy G51.0    Other bursal cyst, left elbow X46.526    Sacral radiculitis M54.18    Lumbar radiculitis M54.16    Spondylosis of cervical region without myelopathy or radiculopathy M47.812    Cervical facet joint syndrome M47.812    Cervical disc disorder M50.90    Lumbar facet arthropathy M47.816    Spinal stenosis of lumbar region with neurogenic claudication M48.062    Lumbar disc disorder M51.9    Pancreatic neoplasm D49.0    IPMN (intraductal papillary mucinous neoplasm) D49.0    Pancreatic duct leak K86.89    Intra-abdominal infection B99.9    Abdominal pain with fever after surgery R10.9, G89.18, R50.82    Sepsis (HCC) A41.9    Acute respiratory failure with hypoxia (HCC) J96.01    Intra-abdominal abscess (HCC) K65.1       Past Medical History:        Diagnosis Date    Arthritis     Back pain     5th finger & ring finger on right hand is numb    Bell's palsy     NEW ONSET 3-     Hyperlipidemia     Hypertension     Neuropathy     toes numb    Poorly controlled type 2 diabetes mellitus with neuropathy ([address] 5/8/2018    Sacral radiculitis 2/27/2019    Spondylosis of lumbar spine 3/9/2019    Advanced arthritis and degenerative disc disease by MRI 3/2019    Ventral hernia        Past Surgical History:        Procedure Laterality Date    ANESTHESIA NERVE BLOCK Bilateral 07/11/2019    BILATERAL L4-5 TRANSFORAMINAL EPIDURAL STEROID INJECTION performed by Gabriel Nam DO at [facility] Bilateral 08/01/2019    BILATERAL MEDIAL BRANCH BLOCK L4-5 AND L5-S1 performed by Gabriel Nam DO at [facility] Bilateral 08/15/2019    BILATERAL MEDIAL BRANCH BLOCK L4 - 5 AND L5 - S1 performed by Gabriel Nam DO at [facility] Right 03/29/2019    right wrist carpal tunnel release and right elbow cubital tunnel release    CARPAL TUNNEL RELEASE Right 03/29/2019    RIGHT WRIST CARPAL TUNNEL RELEASE AND RIGHT ELBOW CUBITAL TUNNEL RELEASE performed by Patricia Tam DO at [facility]      at age 40 polyps    CYST REMOVAL Right     EPIDURAL STEROID INJECTION N/A 10/17/2019    LUMBAR EPIDURAL STEROID INJECTION UNDER FLUOROSCOPIC GUIDANCE AT L2-L3 WITHOUT SEDATION performed by Norm Reynaga MD at [facility] ERCP N/A 05/17/2021    ERCP ENDOSCOPIC RETROGRADE CHOLANGIOPANCREATOGRAPHY SPHINCTER/PAPILLOTOMY performed by Christiano Fajardo MD at [id number] [facility] ERCP N/A 05/17/2021    ERCP STENT INSERTION performed by Christiano Fajardo MD at [id number] [facility] ERCP N/A 6/4/2021    ERCP STENT INSERTION performed by Christiano Fajardo MD at [facility] Bilateral     groin and umbilical    HERNIA REPAIR N/A 12/13/2018    ROBOTIC ASSISTED LAPAROSCOPIC PRIMARY REPAIR OF RECURRENT VENTRAL HERNIA  REPAIR performed by James Limon MD at [facility] 07/11/2019    L4-5 transforaminal     NERVE BLOCK Bilateral 08/01/2019    medial branch block    NERVE BLOCK Bilateral 08/15/2019    bilateral medial branch block L4-S1    NERVE BLOCK  10/17/2019    lumbar epidural    PANCREATECTOMY N/A 05/12/2021    LAPAROSCOPIC ROBOTIC DISTAL PANCREATECTOMY AND SPLENECTOMY AND CHOLECYSTECTOMY, INTRA-OPERATIVE ULTRASOUND, TRANSITIONED TO OPEN -- EPIDURAL performed by Natalya Vera MD at [facility]  06/05/2018    C5-7 fusion at [facility] in [address] [place] [address] Pkwy 01/08/2021    EGD W/EUS FNA performed by Ruthann Reich DO at [place] [address] N/A 01/08/2021    EGD DIAGNOSTIC ONLY performed by Ruthann Reich DO at [address] [place] [address] History:    Social History     Tobacco Use    Smoking status: Never Smoker    Smokeless tobacco: Never Used   Substance Use Topics    Alcohol use: Not Currently     Comment: occasional                                Counseling given: Not Answered      Vital Signs (Current): There were no vitals filed for this visit. BP Readings from Last 3 Encounters:   21 108/78   21 117/73   21 112/70       NPO Status:  > 8 hours                                                                               BMI:   Wt Readings from Last 3 Encounters:   21 196 lb 9.6 oz (89.2 kg)   21 199 lb (90.3 kg)   06/15/21 197 lb (89.4 kg)     There is no height or weight on file to calculate BMI.    CBC:   Lab Results   Component Value Date    WBC 11.2 2021    RBC 3.77 2021    HGB 8.2 2021    HCT 27.2 2021    MCV 72.1 2021    RDW 16.5 2021     2021       CMP:   Lab Results   Component Value Date     2021    K 3.9 2021    K 4.1 2021    CL 97 2021    CO2 26 2021    BUN 4 2021    CREATININE 0.7 2021    GFRAA >60 2021    LABGLOM >60 2021    GLUCOSE 131 2021    PROT 6.8 2021    CALCIUM 8.0 2021    BILITOT 0.3 2021    ALKPHOS 51 2021    AST 58 2021    ALT 28 2021       POC Tests: No results for input(s): POCGLU, POCNA, POCK, POCCL, POCBUN, POCHEMO, POCHCT in the last 72 hours.     Coags:   Lab Results   Component Value Date    PROTIME 15.6 2021    INR 1.4 2021       HCG (If Applicable): No results found for: PREGTESTUR, PREGSERUM, HCG, HCGQUANT     ABGs:   Lab Results   Component Value Date    PO2ART 118.2 2021    ZDD3YAU 56.0 2021    GMF6IQM 22.9 2021        Type & Screen (If Applicable):  No results found for: LABABO, LABRH    Drug/Infectious Status (If Applicable):  No results found for: HIV, HEPCAB    COVID-19 Screening (If Applicable):   Lab Results   Component Value Date    COVID19 Not Detected 2021    COVID19 Not Detected 2021     EK2021  Ventricular Rate     Atrial Rate     QRS Duration ms 64 Q-T Interval ms 234    QTc Calculation (Bazett) ms 357    R Axis degrees 42    T Axis degrees 28    [facility]      Narrative & Impression    Supraventricular tachycardia  Low voltage QRS  Significant artifacts  Abnormal ECG  When compared with ECG of 13-MAY-2021 06:15,  Significant changes have occurred  Confirmed by Edwina Kahn ([id number]) on 7/1/2021 3:36:12 PM       CXR: 6/30/2021      FINDINGS:   The lungs are without acute focal process.  There is no effusion or   pneumothorax. The cardiomediastinal silhouette is without acute process. The   osseous structures are without acute process.  Percutaneous drainage tube   noted in the left upper quadrant.           Impression   No acute process. CT 6/30/2021  EXAM: CT ABSCESS DRAINAGE W CATH PLACEMENT S&I, CT ABSCESS   DRAINAGE W CATH PLACEMENT S&I   INDICATION: B99.9 Intra-abdominal infection    placement of IR drain into left sided fluid collection   What reading provider will be dictating this exam?->[facility]       After obtaining informed consent, following the routine sterile prep   and drape and after the administration of local anesthesia a needle   was inserted into the cavity   . Purulent fluid was aspirated.       Subsequently a guidewire was passed through the needle. Subsequently   the needle was removed and over the guidewire the tract was dilated. Following dilatation a locking loop catheter was placed. Post   procedure CT scan reveals the tube to be in good position. Specimen   was sent to the laboratory. After placement of the initial catheter and aspiration drainage and   flushing it was noted that there was a second fluid collection that   did not drain with or communicate with the first fluid collection   Subsequently following the routine sterile prep and drape and after   the administration of local anesthesia a needle was inserted into the   second cavity   .     Purulent fluid was aspirated.       Subsequently a guidewire was passed through the needle. Subsequently   the needle was removed and over the guidewire the tract was dilated. Following dilatation a locking loop catheter was placed. Post   procedure CT scan reveals the tube to be in good position. Specimen   was sent to the laboratory. The patient tolerated the procedure well. There were no complications.       Prior to the procedure a timeout occurred at  1412 hours. The patient   received 41 seconds  of  fluoroscopy. The patient received [facility]   anesthesia and local anesthesia. The patient was monitored for 60   minutes by a registered nurse.           Impression   Successful uncomplicated  abscess drainage. 2 separate abscess   cavities were drained 1 in the midline and one in the left upper   quadrant       Recommendations:   1. Follow-up tube check in 2 weeks   2. Flush tube daily with 5 to 10 mL of sterile saline               Anesthesia Evaluation  Patient summary reviewed and Nursing notes reviewed no history of anesthetic complications:   Airway: Mallampati: III  TM distance: <3 FB   Neck ROM: limited  Mouth opening: > = 3 FB Dental:          Pulmonary: breath sounds clear to auscultation  (+) sleep apnea: on noncompliant,      (-) not a current smoker                           Cardiovascular:    (+) hypertension:, hyperlipidemia    (-) past MI, CAD and CABG/stent    ECG reviewed  Rhythm: regular  Rate: abnormal           Beta Blocker:  Not on Beta Blocker        PE comment: Pt has been tachycardic   Neuro/Psych:   (+) neuromuscular disease (lumbar spine spondylosis; sacral radiculitis; cervical radiculopathy (C5-7 fusion in 2018); s/p T10-L2 fusion (Jan 2020)):, psychiatric history:depression/anxiety              ROS comment: Bell's palsy - March 2019 (left face);   No issues currently    Lower extremity neuropathy    Right hand - ring finger and 5th finger are numb      Cervical radiculopathy at C7 GI/Hepatic/Renal:   (+) GERD: poorly controlled,

## 2021-07-02 NOTE — PLAN OF CARE
Problem: Skin Integrity:  Goal: Absence of new skin breakdown  Description: Absence of new skin breakdown  Outcome: Met This Shift     Problem: Falls - Risk of:  Goal: Will remain free from falls  Description: Will remain free from falls  Outcome: Met This Shift     Problem: Falls - Risk of:  Goal: Absence of physical injury  Description: Absence of physical injury  Outcome: Met This Shift     Problem: Pain:  Goal: Control of acute pain  Description: Control of acute pain  Outcome: Met This Shift

## 2021-07-02 NOTE — PROGRESS NOTES
ENDOSCOPIC SURGERY  DAILY PROGRESS NOTE  7/2/2021  Chief Complaint   Patient presents with    Other     Pt sent over from angio after having 2 abdominal drains inserted. Subjective:  Pt states he is feeling better today. Reports having some abdominal discomfort. Denies any nausea or vomiting. Objective:  /71   Pulse 97   Temp 99 °F (37.2 °C) (Oral)   Resp 16   Ht 5' 8\" (1.727 m)   Wt 192 lb 10.9 oz (87.4 kg)   SpO2 96%   BMI 29.30 kg/m²     GENERAL:  Laying in bed, awake, alert, cooperative, no apparent distress  HEAD: Normocephalic, atraumatic  EYES: No sclera icterus, pupils equal  LUNGS:  On 2L NC  CARDIOVASCULAR:  RR and normotensive  ABDOMEN:  Soft, appropriate TTP around drain sites, non-distended. LUQ drain with 10cc purulent fluid, Superior midline drain with 78cc purulent fluid. No guarding, no peritoneal signs   EXTREMITIES: No edema or swelling  SKIN: Warm and dry    Assessment/Plan:  62 y.o. male with postoperative development of intra-abdominal abscess status post distal pancreatectomy, splenectomy, cholecystectomy on 5/12.  ERCP with pancreatic stent placement on 6/4, s/p IR drain placement x2 6/30    - Continue NPO, IVFs  - Plan for ERCP with stent repositioning 7/2  - Okay to restart Eliquis 7/3  - Monitor drain output  - Pain control PRN  - Continue IV antibiotics, appreciate ID recs  - Appreciate ICU care    Electronically signed by Cande Mazariegos MD on 7/2/2021 at 5:53 AM

## 2021-07-02 NOTE — PROGRESS NOTES
[facility] PROGRESS NOTE      Chief complaint: Follow-up of intra-abdominal abscess     The patient is a 62 y.o. 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 coli, anaerobic gram-negative rods, coagulase-negative Staphylococcus, Cronobacter sakazakii for which he was given a course of ciprofloxacin and metronidazole for 4 weeks until 06/25, presented on 06/30 with nausea and vomiting for 4 days. He had percutaneous drain removed on 06/23 with resolution of anterior fluid collection but other surgical drain continued to have more purulent fluid collection, prompting another percutaneous drain placement on 06/30 and subsequent admission for possible possible ERCP with stent repositioning. Abscess fluid Gram stain and culture showed abundant polymorphonuclear leukocytes, no epithelial cells, abundant gram variable rods, mixed alphahemolytic Streptococcus species, light growth of Klebsiella oxytoca (non-ESBL; resistant to ampicillin; susceptible to fluoroquinolones and co-trimoxazole), heavy growth of alphahemolytic Streptococcus and lactobacillus. On admission, he was afebrile and hemodynamically stable with no leukocytosis. Piperacillin-tazobactam, fluconazole and vancomycin were started on admission. He underwent ERCP with exchange of pancreatic duct stent and balloon sweep of pancreatic duct with removal of large amount of debris/pus on 07/02. Subjective: Patient was seen and examined. No chills, no abdominal pain, no diarrhea, no rash, no itching. Reports feeling better.   Had febrile episodes up to 102 °F yesterday. Objective:    Vitals:    07/02/21 0915   BP: 117/82   Pulse: 118   Resp: 25   Temp: 96.8 °F (36 °C)   SpO2: 97%     Constitutional: Alert, not in distress  Respiratory: Clear breath sounds, no crackles, no wheezes  Cardiovascular: Regular rate and rhythm, no murmurs  Gastrointestinal: Bowel sounds present, soft, nontender. Abdominal drains x3 on the left hemiabdomen  Skin: Warm and dry, no active dermatoses  Musculoskeletal: No joint swelling, no joint erythema    Labs, imaging, and medical records/notes were personally reviewed. Assessment:  Sepsis  Intraabdominal abscess/surgical site infection, s/p percutaneous drain placement on 05/28, inadequate drainage prompting additional percutaneous drain placements on 06/30  Pancreatic duct abscess status post ERCP with exchange of pancreatic duct stent and balloon sweep of pancreatic duct with removal of large amount of debris/pus on 07/02  Recent distal pancreatectomy with splenectomy VM 04/47 complicated by pancreatic leak s/p pancreatic stent placement on 05/17    Recommendations:  Continue ertapenem 1 g and fluconazole 400 mg every 24 hours pending abscess fluid culture results. Plan to stop fluconazole if abscess fluid culture showed no yeast.  Follow-up blood and abscess fluid culture results. Continue supportive care. Thank you for involving me in the care of Alexandre Mott. I will continue to follow. Please do not hesitate to call for any questions or concerns.     Electronically signed by Haylee Hernandez MD on 7/2/2021 at 10:41 AM

## 2021-07-02 NOTE — PROGRESS NOTES
B SURGERY  DAILY PROGRESS NOTE  7/2/2021  Chief Complaint   Patient presents with    Other     Pt sent over from angio after having 2 abdominal drains inserted. Subjective:  Pt states he is feeling better today. Reports having some abdominal discomfort. Denies any nausea or vomiting. Objective:  /71   Pulse 97   Temp 99 °F (37.2 °C) (Oral)   Resp 16   Ht 5' 8\" (1.727 m)   Wt 192 lb 10.9 oz (87.4 kg)   SpO2 96%   BMI 29.30 kg/m²     GENERAL:  Laying in bed, awake, alert, cooperative, no apparent distress  HEAD: Normocephalic, atraumatic  EYES: No sclera icterus, pupils equal  LUNGS:  On 2L NC  CARDIOVASCULAR:  RR and normotensive  ABDOMEN:  Soft, appropriate TTP around drain sites, non-distended. LUQ drain with 10cc purulent fluid, Superior midline drain with 78cc purulent fluid. No guarding, no peritoneal signs   EXTREMITIES: No edema or swelling  SKIN: Warm and dry    Assessment/Plan:  62 y.o. male with postoperative development of intra-abdominal abscess status post distal pancreatectomy, splenectomy, cholecystectomy on 5/12. ERCP with pancreatic stent placement on 6/4, s/p IR drain placement x2 6/30    - Continue NPO, IVFs  - Plan for ERCP with stent repositioning 7/2  - Okay to restart Eliquis 7/3  - Monitor drain output  - Pain control PRN  - Continue IV antibiotics, appreciate ID recs  - Appreciate ICU care    Electronically signed by Ninfa Aguirre MD on 7/2/2021 at 5:52 AM     Doing well  Will defer diet to Dr. Koffi Kraus output from drains.  Hopefully can remove surgical drain  Appreciate all consultants input    Electronically signed by Elder James MD on 7/2/2021 at 5:59 PM

## 2021-07-02 NOTE — PROGRESS NOTES
[facility]  SURGICAL INTENSIVE CARE UNIT (SICU)  ATTENDING PHYSICIAN CRITICAL CARE PROGRESS NOTE     I have examined the patient, reviewed the record,and discussed the case with the APN/  Resident. I have reviewed all relevant labs and imaging data. The following summarizes my clinical findings and independent assessment. Date of admission:  6/30/2021    CC: intra-abdominal abscess s/p Distal pancreatectomy     HOSPITAL COURSE:   6/30 Came in outpatient for IR drainage intra abdominal abscesses , SIRS response after procedure Tachycardia and hypotension , came to ICU for management   7/1 Tachycardia overnight , 4L Oxygen   7/2 No acute issues overnight for ERCP today       New Imaging Reviewed:   No imaging today      Physical Exam:  Physical Exam  HENT:      Head: Normocephalic. Eyes:      Extraocular Movements: Extraocular movements intact. Pupils: Pupils are equal, round, and reactive to light. Cardiovascular:      Rate and Rhythm: Normal rate. Pulmonary:      Effort: Pulmonary effort is normal. No respiratory distress. Abdominal:      General: There is distension. Tenderness: There is abdominal tenderness ( LUQ tenderness  and epigastric region- much improved   ). There is no guarding or rebound. Comments: All drains milky pancreatic/purulent    Musculoskeletal:         General: Normal range of motion. Cervical back: Neck supple. Neurological:      Mental Status: He is alert. Assessment   Active Problems:    Intra-abdominal infection    Acute respiratory failure with hypoxia (HCC)    Intra-abdominal abscess (HCC)  Resolved Problems:    * No resolved hospital problems.  *      Plan   GI: Low fat/DM diet when OK with HPB/ Surgical endoscopy ,  Creon , ERCP today , Indomethacin- decrease risk of post ERCP pancreatitis   Neuro:  Pain control, Gabapentin , dilaudid and oxycodone   Renal: 125cc LR, Replace K , Phos and Mg  Monitor Urine Output, Daily CBC,BMP, Mg,Phos, ionized Ca   Musculoskeletal: WBAT all extremities ,   AM-PAC Score pending   Pulmonary: Aggressive pulmonary hygiene , SMI , Monitor RR and Maintain SpO2 > 88% Albuterol, pulmicort wean oxygen as able   ID: Invanz, diflucan Abscess Culture pending  and   Monitor leukocytosis and Monitor Fever Curve  Heme: No indication for Transfusion ,   Monitor Hb   Cardiac: Monitor Hemodynamics Sinus tachycardia from inflammatory response , Restart statin Hold eliquis until tomorrow per surgical endoscopy , losartan, hydrochlorothiazide  Endocrine: continue SSI , Lantus 20U BID      DVT Prophylaxis: PCDs, SQ Lovenox   Ulcer Prophylaxis: Home PPI   Tubes and Lines: Continue PIV and IR drain x2 MAX x1    Seizure proph:     No Indication  Ancillary consults:   Infectious Disease  and HPB   Family Update:         As available   CODE Status:       Full Code    Dispo: SICU transfer later today if still stable post ERCP     Nellie Snellen, MD    Critical Care: 32 minutes evaluating and managing patient with Sepsis

## 2021-07-02 NOTE — PLAN OF CARE
Problem: Skin Integrity:  Goal: Absence of new skin breakdown  Description: Absence of new skin breakdown  7/2/2021 0046 by Alexandria Longoria RN  Outcome: Met This Shift     Problem: Falls - Risk of:  Goal: Will remain free from falls  Description: Will remain free from falls  7/2/2021 0046 by Alexandria Longoria RN  Outcome: Met This Shift     Problem: Falls - Risk of:  Goal: Absence of physical injury  Description: Absence of physical injury  7/2/2021 0046 by Alexandria Longoria RN  Outcome: Met This Shift     Problem: Pain:  Goal: Control of acute pain  Description: Control of acute pain  7/2/2021 0046 by Alexandria Longoria RN  Outcome: Met This Shift

## 2021-07-03 LAB
ALBUMIN SERPL-MCNC: 2.9 G/DL (ref 3.5–5.2)
ALP BLD-CCNC: 59 U/L (ref 40–129)
ALT SERPL-CCNC: 35 U/L (ref 0–40)
ANION GAP SERPL CALCULATED.3IONS-SCNC: 10 MMOL/L (ref 7–16)
ANISOCYTOSIS: ABNORMAL
AST SERPL-CCNC: 86 U/L (ref 0–39)
BASOPHILS ABSOLUTE: 0.04 E9/L (ref 0–0.2)
BASOPHILS RELATIVE PERCENT: 0.4 % (ref 0–2)
BILIRUB SERPL-MCNC: 0.3 MG/DL (ref 0–1.2)
BUN BLDV-MCNC: 3 MG/DL (ref 6–20)
BURR CELLS: ABNORMAL
CALCIUM IONIZED: 1.18 MMOL/L (ref 1.15–1.33)
CALCIUM SERPL-MCNC: 8.3 MG/DL (ref 8.6–10.2)
CHLORIDE BLD-SCNC: 98 MMOL/L (ref 98–107)
CO2: 26 MMOL/L (ref 22–29)
CREAT SERPL-MCNC: 0.6 MG/DL (ref 0.7–1.2)
EOSINOPHILS ABSOLUTE: 0.27 E9/L (ref 0.05–0.5)
EOSINOPHILS RELATIVE PERCENT: 2.7 % (ref 0–6)
GFR AFRICAN AMERICAN: >60
GFR NON-AFRICAN AMERICAN: >60 ML/MIN/1.73
GLUCOSE BLD-MCNC: 95 MG/DL (ref 74–99)
HCT VFR BLD CALC: 28.5 % (ref 37–54)
HEMOGLOBIN: 8.5 G/DL (ref 12.5–16.5)
HYPOCHROMIA: ABNORMAL
IMMATURE GRANULOCYTES #: 0.09 E9/L
IMMATURE GRANULOCYTES %: 0.9 % (ref 0–5)
LYMPHOCYTES ABSOLUTE: 1 E9/L (ref 1.5–4)
LYMPHOCYTES RELATIVE PERCENT: 10 % (ref 20–42)
MAGNESIUM: 1.9 MG/DL (ref 1.6–2.6)
MCH RBC QN AUTO: 22 PG (ref 26–35)
MCHC RBC AUTO-ENTMCNC: 29.8 % (ref 32–34.5)
MCV RBC AUTO: 73.6 FL (ref 80–99.9)
METER GLUCOSE: 114 MG/DL (ref 74–99)
METER GLUCOSE: 124 MG/DL (ref 74–99)
METER GLUCOSE: 126 MG/DL (ref 74–99)
METER GLUCOSE: 159 MG/DL (ref 74–99)
MONOCYTES ABSOLUTE: 1.22 E9/L (ref 0.1–0.95)
MONOCYTES RELATIVE PERCENT: 12.2 % (ref 2–12)
NEUTROPHILS ABSOLUTE: 7.4 E9/L (ref 1.8–7.3)
NEUTROPHILS RELATIVE PERCENT: 73.8 % (ref 43–80)
OVALOCYTES: ABNORMAL
PDW BLD-RTO: 16.8 FL (ref 11.5–15)
PHOSPHORUS: 2.8 MG/DL (ref 2.5–4.5)
PLATELET # BLD: 435 E9/L (ref 130–450)
PMV BLD AUTO: 11.4 FL (ref 7–12)
POIKILOCYTES: ABNORMAL
POLYCHROMASIA: ABNORMAL
POTASSIUM SERPL-SCNC: 3.9 MMOL/L (ref 3.5–5)
RBC # BLD: 3.87 E12/L (ref 3.8–5.8)
SCHISTOCYTES: ABNORMAL
SODIUM BLD-SCNC: 134 MMOL/L (ref 132–146)
TARGET CELLS: ABNORMAL
TOTAL PROTEIN: 7.1 G/DL (ref 6.4–8.3)
WBC # BLD: 10 E9/L (ref 4.5–11.5)

## 2021-07-03 PROCEDURE — 2580000003 HC RX 258: Performed by: STUDENT IN AN ORGANIZED HEALTH CARE EDUCATION/TRAINING PROGRAM

## 2021-07-03 PROCEDURE — 84100 ASSAY OF PHOSPHORUS: CPT

## 2021-07-03 PROCEDURE — 2580000003 HC RX 258: Performed by: INTERNAL MEDICINE

## 2021-07-03 PROCEDURE — 6360000002 HC RX W HCPCS: Performed by: STUDENT IN AN ORGANIZED HEALTH CARE EDUCATION/TRAINING PROGRAM

## 2021-07-03 PROCEDURE — 6370000000 HC RX 637 (ALT 250 FOR IP): Performed by: SURGERY

## 2021-07-03 PROCEDURE — 6370000000 HC RX 637 (ALT 250 FOR IP): Performed by: STUDENT IN AN ORGANIZED HEALTH CARE EDUCATION/TRAINING PROGRAM

## 2021-07-03 PROCEDURE — 83735 ASSAY OF MAGNESIUM: CPT

## 2021-07-03 PROCEDURE — 82330 ASSAY OF CALCIUM: CPT

## 2021-07-03 PROCEDURE — 36415 COLL VENOUS BLD VENIPUNCTURE: CPT

## 2021-07-03 PROCEDURE — 2700000000 HC OXYGEN THERAPY PER DAY

## 2021-07-03 PROCEDURE — 82962 GLUCOSE BLOOD TEST: CPT

## 2021-07-03 PROCEDURE — 6360000002 HC RX W HCPCS: Performed by: INTERNAL MEDICINE

## 2021-07-03 PROCEDURE — 80053 COMPREHEN METABOLIC PANEL: CPT

## 2021-07-03 PROCEDURE — 2140000000 HC CCU INTERMEDIATE R&B

## 2021-07-03 PROCEDURE — 94640 AIRWAY INHALATION TREATMENT: CPT

## 2021-07-03 PROCEDURE — 85025 COMPLETE CBC W/AUTO DIFF WBC: CPT

## 2021-07-03 RX ADMIN — HYDROMORPHONE HYDROCHLORIDE 0.5 MG: 1 INJECTION, SOLUTION INTRAMUSCULAR; INTRAVENOUS; SUBCUTANEOUS at 20:42

## 2021-07-03 RX ADMIN — ALBUTEROL SULFATE 2.5 MG: 2.5 SOLUTION RESPIRATORY (INHALATION) at 13:44

## 2021-07-03 RX ADMIN — PANCRELIPASE 72000 UNITS: 60000; 12000; 38000 CAPSULE, DELAYED RELEASE PELLETS ORAL at 16:13

## 2021-07-03 RX ADMIN — ACETAMINOPHEN 650 MG: 325 TABLET ORAL at 16:13

## 2021-07-03 RX ADMIN — INSULIN LISPRO 2 UNITS: 100 INJECTION, SOLUTION INTRAVENOUS; SUBCUTANEOUS at 20:59

## 2021-07-03 RX ADMIN — OXYCODONE HYDROCHLORIDE 10 MG: 10 TABLET ORAL at 16:57

## 2021-07-03 RX ADMIN — INSULIN GLARGINE 20 UNITS: 100 INJECTION, SOLUTION SUBCUTANEOUS at 21:00

## 2021-07-03 RX ADMIN — PANTOPRAZOLE SODIUM 40 MG: 40 TABLET, DELAYED RELEASE ORAL at 16:13

## 2021-07-03 RX ADMIN — OXYCODONE HYDROCHLORIDE 10 MG: 10 TABLET ORAL at 08:50

## 2021-07-03 RX ADMIN — PANTOPRAZOLE SODIUM 40 MG: 40 TABLET, DELAYED RELEASE ORAL at 08:49

## 2021-07-03 RX ADMIN — GABAPENTIN 300 MG: 300 CAPSULE ORAL at 08:50

## 2021-07-03 RX ADMIN — APIXABAN 5 MG: 5 TABLET, FILM COATED ORAL at 08:50

## 2021-07-03 RX ADMIN — SODIUM CHLORIDE, POTASSIUM CHLORIDE, SODIUM LACTATE AND CALCIUM CHLORIDE: 600; 310; 30; 20 INJECTION, SOLUTION INTRAVENOUS at 20:04

## 2021-07-03 RX ADMIN — INSULIN GLARGINE 20 UNITS: 100 INJECTION, SOLUTION SUBCUTANEOUS at 08:50

## 2021-07-03 RX ADMIN — SODIUM CHLORIDE, PRESERVATIVE FREE 10 ML: 5 INJECTION INTRAVENOUS at 05:31

## 2021-07-03 RX ADMIN — BUDESONIDE 1000 MCG: 0.5 SUSPENSION RESPIRATORY (INHALATION) at 21:53

## 2021-07-03 RX ADMIN — OXYCODONE HYDROCHLORIDE 10 MG: 10 TABLET ORAL at 04:34

## 2021-07-03 RX ADMIN — SODIUM CHLORIDE, POTASSIUM CHLORIDE, SODIUM LACTATE AND CALCIUM CHLORIDE: 600; 310; 30; 20 INJECTION, SOLUTION INTRAVENOUS at 02:26

## 2021-07-03 RX ADMIN — ERTAPENEM SODIUM 1000 MG: 1 INJECTION, POWDER, LYOPHILIZED, FOR SOLUTION INTRAMUSCULAR; INTRAVENOUS at 16:13

## 2021-07-03 RX ADMIN — OXYCODONE HYDROCHLORIDE 10 MG: 10 TABLET ORAL at 22:50

## 2021-07-03 RX ADMIN — SODIUM CHLORIDE, PRESERVATIVE FREE 10 ML: 5 INJECTION INTRAVENOUS at 20:43

## 2021-07-03 RX ADMIN — ALBUTEROL SULFATE 2.5 MG: 2.5 SOLUTION RESPIRATORY (INHALATION) at 16:59

## 2021-07-03 RX ADMIN — APIXABAN 5 MG: 5 TABLET, FILM COATED ORAL at 20:47

## 2021-07-03 RX ADMIN — POLYETHYLENE GLYCOL 3350 17 G: 17 POWDER, FOR SOLUTION ORAL at 08:49

## 2021-07-03 RX ADMIN — ALBUTEROL SULFATE 2.5 MG: 2.5 SOLUTION RESPIRATORY (INHALATION) at 21:53

## 2021-07-03 RX ADMIN — ATORVASTATIN CALCIUM 20 MG: 20 TABLET, FILM COATED ORAL at 20:47

## 2021-07-03 RX ADMIN — SODIUM CHLORIDE, PRESERVATIVE FREE 10 ML: 5 INJECTION INTRAVENOUS at 08:50

## 2021-07-03 RX ADMIN — PANCRELIPASE 72000 UNITS: 60000; 12000; 38000 CAPSULE, DELAYED RELEASE PELLETS ORAL at 11:42

## 2021-07-03 RX ADMIN — HYDROMORPHONE HYDROCHLORIDE 0.5 MG: 1 INJECTION, SOLUTION INTRAMUSCULAR; INTRAVENOUS; SUBCUTANEOUS at 10:53

## 2021-07-03 RX ADMIN — HYDROMORPHONE HYDROCHLORIDE 0.5 MG: 1 INJECTION, SOLUTION INTRAMUSCULAR; INTRAVENOUS; SUBCUTANEOUS at 05:31

## 2021-07-03 RX ADMIN — GABAPENTIN 300 MG: 300 CAPSULE ORAL at 20:47

## 2021-07-03 RX ADMIN — FLUCONAZOLE IN SODIUM CHLORIDE 400 MG: 2 INJECTION, SOLUTION INTRAVENOUS at 20:49

## 2021-07-03 ASSESSMENT — PAIN SCALES - GENERAL
PAINLEVEL_OUTOF10: 9
PAINLEVEL_OUTOF10: 0
PAINLEVEL_OUTOF10: 7
PAINLEVEL_OUTOF10: 0
PAINLEVEL_OUTOF10: 7
PAINLEVEL_OUTOF10: 9
PAINLEVEL_OUTOF10: 9

## 2021-07-03 ASSESSMENT — PAIN DESCRIPTION - LOCATION
LOCATION: ABDOMEN

## 2021-07-03 ASSESSMENT — PAIN DESCRIPTION - FREQUENCY
FREQUENCY: CONTINUOUS

## 2021-07-03 ASSESSMENT — PAIN DESCRIPTION - ORIENTATION
ORIENTATION: LEFT

## 2021-07-03 ASSESSMENT — PAIN DESCRIPTION - PAIN TYPE
TYPE: SURGICAL PAIN;ACUTE PAIN
TYPE: ACUTE PAIN;SURGICAL PAIN
TYPE: SURGICAL PAIN;ACUTE PAIN

## 2021-07-03 ASSESSMENT — PAIN DESCRIPTION - DESCRIPTORS
DESCRIPTORS: ACHING
DESCRIPTORS: ACHING;DISCOMFORT
DESCRIPTORS: ACHING;DISCOMFORT

## 2021-07-03 ASSESSMENT — PAIN DESCRIPTION - PROGRESSION
CLINICAL_PROGRESSION: GRADUALLY WORSENING

## 2021-07-03 ASSESSMENT — PAIN DESCRIPTION - ONSET
ONSET: ON-GOING

## 2021-07-03 ASSESSMENT — PAIN - FUNCTIONAL ASSESSMENT
PAIN_FUNCTIONAL_ASSESSMENT: PREVENTS OR INTERFERES SOME ACTIVE ACTIVITIES AND ADLS

## 2021-07-03 NOTE — PROGRESS NOTES
[facility] SURGERY  DAILY PROGRESS NOTE  7/3/2021  Chief Complaint   Patient presents with    Other     Pt sent over from angio after having 2 abdominal drains inserted. Subjective:  Pt feeling good this morning, had some increased pain but it was due to sleeping through 2 doses of pain medications. No n/v. Passing flatus, having BMs. 30cc out from MAX drain - purulent   No output from 2 IR drains    Objective:  /70   Pulse 116   Temp 99.9 °F (37.7 °C) (Temporal)   Resp 20   Ht 5' 8\" (1.727 m)   Wt 191 lb 3.2 oz (86.7 kg)   SpO2 97%   BMI 29.07 kg/m²     GENERAL:  Laying in bed, awake, alert, cooperative, no apparent distress  HEAD: Normocephalic, atraumatic  EYES: No sclera icterus, pupils equal  LUNGS:  On 3L NC  CARDIOVASCULAR:  Tachycardic   ABDOMEN:  Soft,  TTP around drain sites, non-distended. No guarding, no peritoneal signs   EXTREMITIES: No edema or swelling  SKIN: Warm and dry    Assessment/Plan:  62 y.o. male with postoperative development of intra-abdominal abscess status post distal pancreatectomy, splenectomy, cholecystectomy on 5/12. ERCP with pancreatic stent placement on 6/4, s/p IR drain placement x2 6/30. now s/p ERCP with stent exchange 7/2.     - Continue diet,, consider advancing   - Okay to restart Eliquis today  - Monitor drain output  - Pain control PRN  - Continue IV antibiotics, appreciate ID recs    Electronically signed by Frandy Fuentes MD on 7/3/2021 at 7:49 AM     Attending Physician Statement:    Chief Complaint:   Chief Complaint   Patient presents with    Other     Pt sent over from angio after having 2 abdominal drains inserted. I have examined the patient and performed the key aspects of physical exam, reviewed the record (including all pertinent and new radiology images and laboratory findings), and discussed the case with the surgical team.  I agree with the assessment and plan with the following additions, corrections, and changes.  14pt review of symptoms completed and negative except as mentioned. No acute issues. Continue course    Cassy Guajardo MD  07/03/21  3:07 PM    NOTE: This report, in part or full, may have been transcribed using voice recognition software. Every effort was made to ensure accuracy; however, inadvertent computerized transcription errors may be present. Please excuse any transcriptional grammatical or spelling errors that may have escaped my editorial review.

## 2021-07-03 NOTE — PROGRESS NOTES
ENDOSCOPIC SURGERY  DAILY PROGRESS NOTE  7/3/2021  Chief Complaint   Patient presents with    Other     Pt sent over from angio after having 2 abdominal drains inserted. Subjective:  Pt feeling good this morning, had some increased pain but it was due to sleeping through 2 doses of pain medications. No n/v. Passing flatus, having BMs. 30cc out from MAX drain - purulent   No output from 2 IR drains    Objective:  /70   Pulse 116   Temp 99.9 °F (37.7 °C) (Temporal)   Resp 20   Ht 5' 8\" (1.727 m)   Wt 191 lb 3.2 oz (86.7 kg)   SpO2 97%   BMI 29.07 kg/m²     GENERAL:  Laying in bed, awake, alert, cooperative, no apparent distress  HEAD: Normocephalic, atraumatic  EYES: No sclera icterus, pupils equal  LUNGS:  On 2L NC  CARDIOVASCULAR:  tachycardic  ABDOMEN:  Soft, TTP around drain sites, non-distended  EXTREMITIES: No edema or swelling  SKIN: Warm and dry    Assessment/Plan:  62 y.o. male with postoperative development of intra-abdominal abscess status post distal pancreatectomy, splenectomy, cholecystectomy on 5/12. ERCP with pancreatic stent placement on 6/4, s/p IR drain placement x2 6/30, now s/p ERCP w/ stent exchange 7/2    - Diet per primary  - Okay to restart Eliquis   - Continue IV antibiotics, appreciate ID recs    Electronically signed by Adonay Pizarro MD on 7/3/2021 at 7:54 AM     As above.

## 2021-07-03 NOTE — PROGRESS NOTES
[facility] PROGRESS NOTE      Chief complaint: Follow-up of intra-abdominal abscess     The patient is a 62 y.o. 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 coli, anaerobic gram-negative rods, coagulase-negative Staphylococcus, Cronobacter sakazakii for which he was given a course of ciprofloxacin and metronidazole for 4 weeks until 06/25, presented on 06/30 with nausea and vomiting for 4 days. He had percutaneous drain removed on 06/23 with resolution of anterior fluid collection but other surgical drain continued to have more purulent fluid collection, prompting another percutaneous drain placement on 06/30 and subsequent admission for possible possible ERCP with stent repositioning. Abscess fluid Gram stain and culture showed abundant polymorphonuclear leukocytes, no epithelial cells, abundant gram variable rods, mixed alphahemolytic Streptococcus species, light growth of Klebsiella oxytoca (non-ESBL; resistant to ampicillin; susceptible to fluoroquinolones and co-trimoxazole), heavy growth of alphahemolytic Streptococcus, MSSA and Lactobacillus. On admission, he was afebrile and hemodynamically stable with no leukocytosis. Piperacillin-tazobactam, fluconazole and vancomycin were started on admission. He underwent ERCP with exchange of pancreatic duct stent and balloon sweep of pancreatic duct with removal of large amount of debris/pus on 07/02. Subjective: Patient was seen and examined. No chills, no abdominal pain, has diarrhea, no rash, no itching. Reports feeling better. Afebrile. .    Objective:    Vitals:    07/03/21 0732   BP: 121/70   Pulse: 116   Resp: 20   Temp: 99.9 °F (37.7 °C)   SpO2: 97%     Constitutional: Alert, not in distress  Respiratory: Clear breath sounds, no crackles, no wheezes  Cardiovascular: Regular rate and rhythm, no murmurs  Gastrointestinal: Bowel sounds present, soft, nontender. Abdominal drains x3 on the left hemiabdomen with mucopurulent fluid output  Skin: Warm and dry, no active dermatoses  Musculoskeletal: No joint swelling, no joint erythema    Labs, imaging, and medical records/notes were personally reviewed. Assessment:  Sepsis, resolved  Intraabdominal abscess/surgical site infection, s/p percutaneous drain placement on 05/28, inadequate drainage prompting additional percutaneous drain placements on 06/30  Pancreatic duct abscess status post ERCP with exchange of pancreatic duct stent and balloon sweep of pancreatic duct with removal of large amount of debris/pus on 07/02  Recent distal pancreatectomy with splenectomy VO 03/24 complicated by pancreatic leak s/p pancreatic stent placement on 05/17    Recommendations:  Continue ertapenem 1 g and fluconazole 400 mg every 24 hours pending abscess fluid culture results. Plan to stop fluconazole if abscess fluid culture showed no yeast.  Follow-up blood and abscess fluid culture results. Continue supportive care. Thank you for involving me in the care of Melony Ríos. I will continue to follow. Please do not hesitate to call for any questions or concerns.     Electronically signed by Josiane Grimm MD on 7/3/2021 at 10:08 AM

## 2021-07-03 NOTE — PROGRESS NOTES
Pt has been sleeping most of the day during hourly rounding. Pt vitals taken per routine and pt had fever of 101.2. Pt is clammy but says he is not hot or have chills. Tylenol was given. Went back to patients room about an hour later to ask if he needed pain medication and to check temp. Pt temp has come down to 100.4. Hourly rounding continues.

## 2021-07-04 ENCOUNTER — APPOINTMENT (OUTPATIENT)
Dept: CT IMAGING | Age: 61
DRG: 711 | End: 2021-07-04
Payer: MEDICAID

## 2021-07-04 LAB
ALBUMIN SERPL-MCNC: 2.7 G/DL (ref 3.5–5.2)
ALP BLD-CCNC: 54 U/L (ref 40–129)
ALT SERPL-CCNC: 24 U/L (ref 0–40)
ANION GAP SERPL CALCULATED.3IONS-SCNC: 10 MMOL/L (ref 7–16)
ANISOCYTOSIS: ABNORMAL
AST SERPL-CCNC: 49 U/L (ref 0–39)
BASOPHILS ABSOLUTE: 0 E9/L (ref 0–0.2)
BASOPHILS RELATIVE PERCENT: 0.4 % (ref 0–2)
BILIRUB SERPL-MCNC: 0.3 MG/DL (ref 0–1.2)
BODY FLUID CULTURE, STERILE: ABNORMAL
BUN BLDV-MCNC: 3 MG/DL (ref 6–20)
CALCIUM IONIZED: 1.21 MMOL/L (ref 1.15–1.33)
CALCIUM SERPL-MCNC: 8 MG/DL (ref 8.6–10.2)
CHLORIDE BLD-SCNC: 101 MMOL/L (ref 98–107)
CO2: 28 MMOL/L (ref 22–29)
CREAT SERPL-MCNC: 0.6 MG/DL (ref 0.7–1.2)
EOSINOPHILS ABSOLUTE: 0.08 E9/L (ref 0.05–0.5)
EOSINOPHILS RELATIVE PERCENT: 0.9 % (ref 0–6)
GFR AFRICAN AMERICAN: >60
GFR NON-AFRICAN AMERICAN: >60 ML/MIN/1.73
GLUCOSE BLD-MCNC: 105 MG/DL (ref 74–99)
GRAM STAIN RESULT: ABNORMAL
GRAM STAIN RESULT: ABNORMAL
HCT VFR BLD CALC: 23.1 % (ref 37–54)
HEMOGLOBIN: 7 G/DL (ref 12.5–16.5)
HYPOCHROMIA: ABNORMAL
LYMPHOCYTES ABSOLUTE: 0.65 E9/L (ref 1.5–4)
LYMPHOCYTES RELATIVE PERCENT: 7 % (ref 20–42)
MAGNESIUM: 1.6 MG/DL (ref 1.6–2.6)
MCH RBC QN AUTO: 21.6 PG (ref 26–35)
MCHC RBC AUTO-ENTMCNC: 30.3 % (ref 32–34.5)
MCV RBC AUTO: 71.3 FL (ref 80–99.9)
METER GLUCOSE: 105 MG/DL (ref 74–99)
METER GLUCOSE: 148 MG/DL (ref 74–99)
METER GLUCOSE: 222 MG/DL (ref 74–99)
METER GLUCOSE: 79 MG/DL (ref 74–99)
METER GLUCOSE: 95 MG/DL (ref 74–99)
MONOCYTES ABSOLUTE: 0.84 E9/L (ref 0.1–0.95)
MONOCYTES RELATIVE PERCENT: 8.7 % (ref 2–12)
NEUTROPHILS ABSOLUTE: 7.81 E9/L (ref 1.8–7.3)
NEUTROPHILS RELATIVE PERCENT: 83.5 % (ref 43–80)
NUCLEATED RED BLOOD CELLS: 0.9 /100 WBC
ORGANISM: ABNORMAL
OVALOCYTES: ABNORMAL
PDW BLD-RTO: 16.2 FL (ref 11.5–15)
PHOSPHORUS: 2.4 MG/DL (ref 2.5–4.5)
PLATELET # BLD: 425 E9/L (ref 130–450)
PMV BLD AUTO: 11.3 FL (ref 7–12)
POIKILOCYTES: ABNORMAL
POLYCHROMASIA: ABNORMAL
POTASSIUM SERPL-SCNC: 3.4 MMOL/L (ref 3.5–5)
RBC # BLD: 3.24 E12/L (ref 3.8–5.8)
SCHISTOCYTES: ABNORMAL
SODIUM BLD-SCNC: 139 MMOL/L (ref 132–146)
TARGET CELLS: ABNORMAL
TOTAL PROTEIN: 6.4 G/DL (ref 6.4–8.3)
WBC # BLD: 9.3 E9/L (ref 4.5–11.5)

## 2021-07-04 PROCEDURE — 6370000000 HC RX 637 (ALT 250 FOR IP): Performed by: STUDENT IN AN ORGANIZED HEALTH CARE EDUCATION/TRAINING PROGRAM

## 2021-07-04 PROCEDURE — 85025 COMPLETE CBC W/AUTO DIFF WBC: CPT

## 2021-07-04 PROCEDURE — 82330 ASSAY OF CALCIUM: CPT

## 2021-07-04 PROCEDURE — 6360000004 HC RX CONTRAST MEDICATION: Performed by: RADIOLOGY

## 2021-07-04 PROCEDURE — 6360000002 HC RX W HCPCS: Performed by: STUDENT IN AN ORGANIZED HEALTH CARE EDUCATION/TRAINING PROGRAM

## 2021-07-04 PROCEDURE — 2700000000 HC OXYGEN THERAPY PER DAY

## 2021-07-04 PROCEDURE — 83735 ASSAY OF MAGNESIUM: CPT

## 2021-07-04 PROCEDURE — 82962 GLUCOSE BLOOD TEST: CPT

## 2021-07-04 PROCEDURE — 84100 ASSAY OF PHOSPHORUS: CPT

## 2021-07-04 PROCEDURE — 74177 CT ABD & PELVIS W/CONTRAST: CPT

## 2021-07-04 PROCEDURE — 2580000003 HC RX 258: Performed by: STUDENT IN AN ORGANIZED HEALTH CARE EDUCATION/TRAINING PROGRAM

## 2021-07-04 PROCEDURE — 2580000003 HC RX 258: Performed by: RADIOLOGY

## 2021-07-04 PROCEDURE — 36415 COLL VENOUS BLD VENIPUNCTURE: CPT

## 2021-07-04 PROCEDURE — 2580000003 HC RX 258

## 2021-07-04 PROCEDURE — 6370000000 HC RX 637 (ALT 250 FOR IP): Performed by: SURGERY

## 2021-07-04 PROCEDURE — 80053 COMPREHEN METABOLIC PANEL: CPT

## 2021-07-04 PROCEDURE — 94640 AIRWAY INHALATION TREATMENT: CPT

## 2021-07-04 PROCEDURE — 2140000000 HC CCU INTERMEDIATE R&B

## 2021-07-04 RX ORDER — DIPHENHYDRAMINE HCL 25 MG
50 TABLET ORAL ONCE
Status: COMPLETED | OUTPATIENT
Start: 2021-07-04 | End: 2021-07-04

## 2021-07-04 RX ORDER — SODIUM CHLORIDE 0.9 % (FLUSH) 0.9 %
10 SYRINGE (ML) INJECTION PRN
Status: DISCONTINUED | OUTPATIENT
Start: 2021-07-04 | End: 2021-07-15 | Stop reason: HOSPADM

## 2021-07-04 RX ORDER — DOCUSATE SODIUM 100 MG/1
100 CAPSULE, LIQUID FILLED ORAL 2 TIMES DAILY
Status: DISCONTINUED | OUTPATIENT
Start: 2021-07-04 | End: 2021-07-15 | Stop reason: HOSPADM

## 2021-07-04 RX ORDER — SENNA AND DOCUSATE SODIUM 50; 8.6 MG/1; MG/1
2 TABLET, FILM COATED ORAL DAILY
Status: DISCONTINUED | OUTPATIENT
Start: 2021-07-04 | End: 2021-07-15 | Stop reason: HOSPADM

## 2021-07-04 RX ORDER — POLYETHYLENE GLYCOL 3350 17 G/17G
17 POWDER, FOR SOLUTION ORAL 2 TIMES DAILY
Status: DISCONTINUED | OUTPATIENT
Start: 2021-07-04 | End: 2021-07-15 | Stop reason: HOSPADM

## 2021-07-04 RX ORDER — SODIUM CHLORIDE 9 MG/ML
25 INJECTION, SOLUTION INTRAVENOUS PRN
Status: DISCONTINUED | OUTPATIENT
Start: 2021-07-04 | End: 2021-07-14 | Stop reason: SDUPTHER

## 2021-07-04 RX ADMIN — BUDESONIDE 1000 MCG: 0.5 SUSPENSION RESPIRATORY (INHALATION) at 11:19

## 2021-07-04 RX ADMIN — PANCRELIPASE 72000 UNITS: 60000; 12000; 38000 CAPSULE, DELAYED RELEASE PELLETS ORAL at 08:25

## 2021-07-04 RX ADMIN — DIPHENHYDRAMINE HYDROCHLORIDE 50 MG: 25 TABLET ORAL at 14:06

## 2021-07-04 RX ADMIN — OXYCODONE HYDROCHLORIDE 10 MG: 10 TABLET ORAL at 20:56

## 2021-07-04 RX ADMIN — HYDROCORTISONE SODIUM SUCCINATE 200 MG: 100 INJECTION, POWDER, FOR SOLUTION INTRAMUSCULAR; INTRAVENOUS at 14:53

## 2021-07-04 RX ADMIN — OXYCODONE HYDROCHLORIDE 10 MG: 10 TABLET ORAL at 15:42

## 2021-07-04 RX ADMIN — DOCUSATE SODIUM 50 MG AND SENNOSIDES 8.6 MG 2 TABLET: 8.6; 5 TABLET, FILM COATED ORAL at 15:41

## 2021-07-04 RX ADMIN — INSULIN GLARGINE 20 UNITS: 100 INJECTION, SOLUTION SUBCUTANEOUS at 21:53

## 2021-07-04 RX ADMIN — HYDROCORTISONE SODIUM SUCCINATE 200 MG: 100 INJECTION, POWDER, FOR SOLUTION INTRAMUSCULAR; INTRAVENOUS at 11:09

## 2021-07-04 RX ADMIN — SODIUM CHLORIDE, POTASSIUM CHLORIDE, SODIUM LACTATE AND CALCIUM CHLORIDE: 600; 310; 30; 20 INJECTION, SOLUTION INTRAVENOUS at 11:48

## 2021-07-04 RX ADMIN — HYDROMORPHONE HYDROCHLORIDE 0.5 MG: 1 INJECTION, SOLUTION INTRAMUSCULAR; INTRAVENOUS; SUBCUTANEOUS at 21:59

## 2021-07-04 RX ADMIN — ALBUTEROL SULFATE 2.5 MG: 2.5 SOLUTION RESPIRATORY (INHALATION) at 20:36

## 2021-07-04 RX ADMIN — Medication 10 ML: at 15:10

## 2021-07-04 RX ADMIN — OXYCODONE HYDROCHLORIDE 10 MG: 10 TABLET ORAL at 05:13

## 2021-07-04 RX ADMIN — INSULIN LISPRO 3 UNITS: 100 INJECTION, SOLUTION INTRAVENOUS; SUBCUTANEOUS at 16:37

## 2021-07-04 RX ADMIN — GABAPENTIN 300 MG: 300 CAPSULE ORAL at 20:56

## 2021-07-04 RX ADMIN — ERTAPENEM SODIUM 1000 MG: 1 INJECTION, POWDER, LYOPHILIZED, FOR SOLUTION INTRAMUSCULAR; INTRAVENOUS at 15:41

## 2021-07-04 RX ADMIN — APIXABAN 5 MG: 5 TABLET, FILM COATED ORAL at 21:53

## 2021-07-04 RX ADMIN — HYDROMORPHONE HYDROCHLORIDE 0.5 MG: 1 INJECTION, SOLUTION INTRAMUSCULAR; INTRAVENOUS; SUBCUTANEOUS at 02:36

## 2021-07-04 RX ADMIN — OXYCODONE HYDROCHLORIDE 10 MG: 10 TABLET ORAL at 09:37

## 2021-07-04 RX ADMIN — ALBUTEROL SULFATE 2.5 MG: 2.5 SOLUTION RESPIRATORY (INHALATION) at 11:18

## 2021-07-04 RX ADMIN — ALBUTEROL SULFATE 2.5 MG: 2.5 SOLUTION RESPIRATORY (INHALATION) at 16:14

## 2021-07-04 RX ADMIN — INSULIN LISPRO 3 UNITS: 100 INJECTION, SOLUTION INTRAVENOUS; SUBCUTANEOUS at 21:53

## 2021-07-04 RX ADMIN — DOCUSATE SODIUM 100 MG: 100 CAPSULE ORAL at 20:57

## 2021-07-04 RX ADMIN — ACETAMINOPHEN 650 MG: 325 TABLET ORAL at 02:45

## 2021-07-04 RX ADMIN — WATER 10 ML: 1 INJECTION INTRAMUSCULAR; INTRAVENOUS; SUBCUTANEOUS at 14:53

## 2021-07-04 RX ADMIN — SODIUM CHLORIDE, PRESERVATIVE FREE 10 ML: 5 INJECTION INTRAVENOUS at 22:00

## 2021-07-04 RX ADMIN — PANTOPRAZOLE SODIUM 40 MG: 40 TABLET, DELAYED RELEASE ORAL at 08:25

## 2021-07-04 RX ADMIN — ACETAMINOPHEN 650 MG: 325 TABLET ORAL at 08:24

## 2021-07-04 RX ADMIN — DOCUSATE SODIUM 100 MG: 100 CAPSULE ORAL at 15:41

## 2021-07-04 RX ADMIN — APIXABAN 5 MG: 5 TABLET, FILM COATED ORAL at 08:25

## 2021-07-04 RX ADMIN — FLUCONAZOLE IN SODIUM CHLORIDE 400 MG: 2 INJECTION, SOLUTION INTRAVENOUS at 20:55

## 2021-07-04 RX ADMIN — BUDESONIDE 1000 MCG: 0.5 SUSPENSION RESPIRATORY (INHALATION) at 20:36

## 2021-07-04 RX ADMIN — GABAPENTIN 300 MG: 300 CAPSULE ORAL at 08:25

## 2021-07-04 RX ADMIN — INSULIN GLARGINE 20 UNITS: 100 INJECTION, SOLUTION SUBCUTANEOUS at 08:27

## 2021-07-04 RX ADMIN — ATORVASTATIN CALCIUM 20 MG: 20 TABLET, FILM COATED ORAL at 20:56

## 2021-07-04 RX ADMIN — SODIUM CHLORIDE, PRESERVATIVE FREE 10 ML: 5 INJECTION INTRAVENOUS at 02:36

## 2021-07-04 RX ADMIN — PANCRELIPASE 72000 UNITS: 60000; 12000; 38000 CAPSULE, DELAYED RELEASE PELLETS ORAL at 16:40

## 2021-07-04 RX ADMIN — HYDROMORPHONE HYDROCHLORIDE 0.5 MG: 1 INJECTION, SOLUTION INTRAMUSCULAR; INTRAVENOUS; SUBCUTANEOUS at 13:32

## 2021-07-04 RX ADMIN — PANTOPRAZOLE SODIUM 40 MG: 40 TABLET, DELAYED RELEASE ORAL at 15:41

## 2021-07-04 RX ADMIN — POLYETHYLENE GLYCOL 3350 17 G: 17 POWDER, FOR SOLUTION ORAL at 20:56

## 2021-07-04 RX ADMIN — IOPAMIDOL 90 ML: 755 INJECTION, SOLUTION INTRAVENOUS at 15:10

## 2021-07-04 ASSESSMENT — PAIN SCALES - GENERAL
PAINLEVEL_OUTOF10: 8
PAINLEVEL_OUTOF10: 9
PAINLEVEL_OUTOF10: 9
PAINLEVEL_OUTOF10: 7
PAINLEVEL_OUTOF10: 7
PAINLEVEL_OUTOF10: 8
PAINLEVEL_OUTOF10: 0
PAINLEVEL_OUTOF10: 8
PAINLEVEL_OUTOF10: 8

## 2021-07-04 NOTE — PATIENT CARE CONFERENCE
P Quality Flow/Interdisciplinary Rounds Progress Note        Quality Flow Rounds held on July 4, 2021    Disciplines Attending:  Bedside Nurse, ,  and Nursing Unit Leadership    Maryjane Kohli was admitted on 6/30/2021  3:47 PM    Anticipated Discharge Date:  Expected Discharge Date: 07/08/21    Disposition:    Mukund Score:  Mukund Scale Score: 16    Readmission Risk              Risk of Unplanned Readmission:  16           Discussed patient goal for the day, patient clinical progression, and barriers to discharge.   The following Goal(s) of the Day/Commitment(s) have been identified:  Diagnostics - Report Results      Kendy Warren RN  July 4, 2021

## 2021-07-04 NOTE — PROGRESS NOTES
[facility] PROGRESS NOTE      Chief complaint: Follow-up of intra-abdominal abscess     The patient is a 62 y.o. 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 coli, anaerobic gram-negative rods, coagulase-negative Staphylococcus, Cronobacter sakazakii for which he was given a course of ciprofloxacin and metronidazole for 4 weeks until 06/25, presented on 06/30 with nausea and vomiting for 4 days. He had percutaneous drain removed on 06/23 with resolution of anterior fluid collection but other surgical drain continued to have more purulent fluid collection, prompting another percutaneous drain placement on 06/30 and subsequent admission for possible possible ERCP with stent repositioning. Abscess fluid Gram stain and culture showed abundant polymorphonuclear leukocytes, no epithelial cells, abundant gram variable rods, mixed alphahemolytic Streptococcus species, light growth of Klebsiella oxytoca (non-ESBL; resistant to ampicillin; susceptible to fluoroquinolones and co-trimoxazole), heavy growth of alphahemolytic Streptococcus, MSSA and Lactobacillus, yeast. On admission, he was afebrile and hemodynamically stable with no leukocytosis. Piperacillin-tazobactam, fluconazole and vancomycin were started on admission. He underwent ERCP with exchange of pancreatic duct stent and balloon sweep of pancreatic duct with removal of large amount of debris/pus on 07/02. Subjective: Patient was seen and examined. No chills, no abdominal pain, has diarrhea, no rash, no itching. MAX drain was dislodged overnight.   Had fever of 101.2 F yesterday. Objective:  /64   Pulse 100   Temp 97.2 °F (36.2 °C) (Temporal)   Resp 18   Ht 5' 8\" (1.727 m)   Wt 193 lb (87.5 kg)   SpO2 96%   BMI 29.35 kg/m²   Constitutional: Alert, not in distress  Respiratory: Clear breath sounds, no crackles, no wheezes  Cardiovascular: Regular rate and rhythm, no murmurs  Gastrointestinal: Bowel sounds present, soft, nontender. Abdominal drains on the left hemiabdomen with mucopurulent fluid output  Skin: Warm and dry, no active dermatoses  Musculoskeletal: No joint swelling, no joint erythema    Labs, imaging, and medical records/notes were personally reviewed. Assessment:  Sepsis, resolved  Intraabdominal abscess/surgical site infection, s/p percutaneous drain placement on 05/28, inadequate drainage prompting additional percutaneous drain placements on 06/30  Pancreatic duct abscess status post ERCP with exchange of pancreatic duct stent and balloon sweep of pancreatic duct with removal of large amount of debris/pus on 07/02  Recent distal pancreatectomy with splenectomy VW 90/25 complicated by pancreatic leak s/p pancreatic stent placement on 05/17    Recommendations:  Continue ertapenem 1 g and fluconazole 400 mg every 24 hours pending abscess fluid culture results. Follow-up blood and abscess fluid culture results. Follow-up CT abdomen and pelvis. Continue supportive care. Thank you for involving me in the care of Mark Kearney. I will continue to follow. Please do not hesitate to call for any questions or concerns.     Electronically signed by Tianna Gordon MD on 7/4/2021 at 9:47 AM

## 2021-07-04 NOTE — PROGRESS NOTES
Pt called to get back into bed from the bathroom and found MAX bulb and tubing had been dislodged and was on the floor. Mod amt pink/white thick drainage coming out of the MAX site. Cleansed area and applied ostomy appliance to collect drainage. Dr. Rinku Alba notified of this occurrence and stated they would assess shortly on rounds. Vitals stable, . Medicated for pain/temp 100.2 with tylenol.

## 2021-07-04 NOTE — PROGRESS NOTES
B SURGERY  DAILY PROGRESS NOTE  7/4/2021  Chief Complaint   Patient presents with    Other     Pt sent over from angio after having 2 abdominal drains inserted. Subjective:  Distended this AM, MAX \" fell out\" overnight, no nausea or vomiting    Objective:  /64   Pulse 100   Temp 97.2 °F (36.2 °C) (Temporal)   Resp 18   Ht 5' 8\" (1.727 m)   Wt 193 lb (87.5 kg)   SpO2 96%   BMI 29.35 kg/m²     GENERAL:  Laying in bed, awake, alert, cooperative, no apparent distress  HEAD: Normocephalic, atraumatic  EYES: No sclera icterus, pupils equal  LUNGS:  On 3L NC  CARDIOVASCULAR:  Tachycardic   ABDOMEN:  Soft,  Distended, IR drain x2- thin pancreatic fluid, ostomy appliance around previous MAX site   EXTREMITIES: No edema or swelling  SKIN: Warm and dry    Assessment/Plan:  62 y.o. male with postoperative development of intra-abdominal abscess status post distal pancreatectomy, splenectomy, cholecystectomy on 5/12. ERCP with pancreatic stent placement on 6/4, s/p IR drain placement x2 6/30. now s/p ERCP with stent exchange 7/2.     - bowel regimen  - ambulate  - CT abd- needs prep for IV iodine allergy  - discussed liquids today  - monitor drain output     Electronically signed by Rubi Coffey DO on 7/4/2021 at 12:01 PM     Attending Physician Statement:    Chief Complaint:   Chief Complaint   Patient presents with    Other     Pt sent over from angio after having 2 abdominal drains inserted. I have examined the patient and performed the key aspects of physical exam, reviewed the record (including all pertinent and new radiology images and laboratory findings), and discussed the case with the surgical team.  I agree with the assessment and plan with the following additions, corrections, and changes. 14pt review of symptoms completed and negative except as mentioned. Surgical MAX dislodged overnight. There was some drainage but that is decreasing per nursing.   Fair amount of drainage from his initial drain site anteriorly. IR drains also in place with low amount of drainage. Says he is more distended and more uncomfortable today. Was febrile overnight. Will evaluate with CT. Melecio Felipe MD  07/04/21  1:34 PM    NOTE: This report, in part or full, may have been transcribed using voice recognition software. Every effort was made to ensure accuracy; however, inadvertent computerized transcription errors may be present. Please excuse any transcriptional grammatical or spelling errors that may have escaped my editorial review.

## 2021-07-05 LAB
ALBUMIN SERPL-MCNC: 2.6 G/DL (ref 3.5–5.2)
ALP BLD-CCNC: 56 U/L (ref 40–129)
ALT SERPL-CCNC: 26 U/L (ref 0–40)
ANION GAP SERPL CALCULATED.3IONS-SCNC: 9 MMOL/L (ref 7–16)
ANISOCYTOSIS: ABNORMAL
AST SERPL-CCNC: 60 U/L (ref 0–39)
BASOPHILS ABSOLUTE: 0 E9/L (ref 0–0.2)
BASOPHILS RELATIVE PERCENT: 0.2 % (ref 0–2)
BILIRUB SERPL-MCNC: 0.2 MG/DL (ref 0–1.2)
BLOOD CULTURE, ROUTINE: NORMAL
BUN BLDV-MCNC: 8 MG/DL (ref 6–20)
CALCIUM IONIZED: 1.23 MMOL/L (ref 1.15–1.33)
CALCIUM SERPL-MCNC: 8.2 MG/DL (ref 8.6–10.2)
CHLORIDE BLD-SCNC: 98 MMOL/L (ref 98–107)
CO2: 29 MMOL/L (ref 22–29)
CREAT SERPL-MCNC: 0.5 MG/DL (ref 0.7–1.2)
CULTURE, BLOOD 2: NORMAL
EOSINOPHILS ABSOLUTE: 0 E9/L (ref 0.05–0.5)
EOSINOPHILS RELATIVE PERCENT: 0 % (ref 0–6)
GFR AFRICAN AMERICAN: >60
GFR NON-AFRICAN AMERICAN: >60 ML/MIN/1.73
GLUCOSE BLD-MCNC: 281 MG/DL (ref 74–99)
HCT VFR BLD CALC: 24.7 % (ref 37–54)
HEMOGLOBIN: 7.5 G/DL (ref 12.5–16.5)
HYPOCHROMIA: ABNORMAL
LYMPHOCYTES ABSOLUTE: 0.36 E9/L (ref 1.5–4)
LYMPHOCYTES RELATIVE PERCENT: 4.3 % (ref 20–42)
MAGNESIUM: 2 MG/DL (ref 1.6–2.6)
MCH RBC QN AUTO: 22.1 PG (ref 26–35)
MCHC RBC AUTO-ENTMCNC: 30.4 % (ref 32–34.5)
MCV RBC AUTO: 72.9 FL (ref 80–99.9)
METER GLUCOSE: 254 MG/DL (ref 74–99)
METER GLUCOSE: 257 MG/DL (ref 74–99)
METER GLUCOSE: 276 MG/DL (ref 74–99)
MONOCYTES ABSOLUTE: 0.36 E9/L (ref 0.1–0.95)
MONOCYTES RELATIVE PERCENT: 4.3 % (ref 2–12)
NEUTROPHILS ABSOLUTE: 8.1 E9/L (ref 1.8–7.3)
NEUTROPHILS RELATIVE PERCENT: 91.3 % (ref 43–80)
OVALOCYTES: ABNORMAL
PDW BLD-RTO: 16.3 FL (ref 11.5–15)
PHOSPHORUS: 2.8 MG/DL (ref 2.5–4.5)
PLATELET # BLD: 494 E9/L (ref 130–450)
PMV BLD AUTO: 11.7 FL (ref 7–12)
POIKILOCYTES: ABNORMAL
POLYCHROMASIA: ABNORMAL
POTASSIUM SERPL-SCNC: 4.2 MMOL/L (ref 3.5–5)
RBC # BLD: 3.39 E12/L (ref 3.8–5.8)
SODIUM BLD-SCNC: 136 MMOL/L (ref 132–146)
TARGET CELLS: ABNORMAL
TOTAL PROTEIN: 6.8 G/DL (ref 6.4–8.3)
WBC # BLD: 8.9 E9/L (ref 4.5–11.5)

## 2021-07-05 PROCEDURE — 2580000003 HC RX 258: Performed by: INTERNAL MEDICINE

## 2021-07-05 PROCEDURE — 85025 COMPLETE CBC W/AUTO DIFF WBC: CPT

## 2021-07-05 PROCEDURE — 6360000002 HC RX W HCPCS: Performed by: STUDENT IN AN ORGANIZED HEALTH CARE EDUCATION/TRAINING PROGRAM

## 2021-07-05 PROCEDURE — 84100 ASSAY OF PHOSPHORUS: CPT

## 2021-07-05 PROCEDURE — 2140000000 HC CCU INTERMEDIATE R&B

## 2021-07-05 PROCEDURE — 6370000000 HC RX 637 (ALT 250 FOR IP): Performed by: INTERNAL MEDICINE

## 2021-07-05 PROCEDURE — 6360000002 HC RX W HCPCS: Performed by: INTERNAL MEDICINE

## 2021-07-05 PROCEDURE — 36415 COLL VENOUS BLD VENIPUNCTURE: CPT

## 2021-07-05 PROCEDURE — 80053 COMPREHEN METABOLIC PANEL: CPT

## 2021-07-05 PROCEDURE — 2700000000 HC OXYGEN THERAPY PER DAY

## 2021-07-05 PROCEDURE — 2580000003 HC RX 258: Performed by: STUDENT IN AN ORGANIZED HEALTH CARE EDUCATION/TRAINING PROGRAM

## 2021-07-05 PROCEDURE — 6370000000 HC RX 637 (ALT 250 FOR IP): Performed by: SURGERY

## 2021-07-05 PROCEDURE — 6370000000 HC RX 637 (ALT 250 FOR IP): Performed by: STUDENT IN AN ORGANIZED HEALTH CARE EDUCATION/TRAINING PROGRAM

## 2021-07-05 PROCEDURE — 82962 GLUCOSE BLOOD TEST: CPT

## 2021-07-05 PROCEDURE — 82330 ASSAY OF CALCIUM: CPT

## 2021-07-05 PROCEDURE — 94640 AIRWAY INHALATION TREATMENT: CPT

## 2021-07-05 PROCEDURE — 83735 ASSAY OF MAGNESIUM: CPT

## 2021-07-05 RX ORDER — BUDESONIDE 0.5 MG/2ML
0.5 INHALANT ORAL 2 TIMES DAILY
Status: DISCONTINUED | OUTPATIENT
Start: 2021-07-05 | End: 2021-07-15 | Stop reason: HOSPADM

## 2021-07-05 RX ORDER — CALCIUM POLYCARBOPHIL 625 MG 625 MG/1
625 TABLET ORAL DAILY
Status: DISCONTINUED | OUTPATIENT
Start: 2021-07-05 | End: 2021-07-15 | Stop reason: HOSPADM

## 2021-07-05 RX ORDER — SODIUM CHLORIDE FOR INHALATION 3 %
4 VIAL, NEBULIZER (ML) INHALATION 2 TIMES DAILY
Status: DISCONTINUED | OUTPATIENT
Start: 2021-07-05 | End: 2021-07-15 | Stop reason: HOSPADM

## 2021-07-05 RX ADMIN — CALCIUM POLYCARBOPHIL 625 MG: 625 TABLET, FILM COATED ORAL at 11:42

## 2021-07-05 RX ADMIN — SODIUM CHLORIDE, PRESERVATIVE FREE 10 ML: 5 INJECTION INTRAVENOUS at 05:10

## 2021-07-05 RX ADMIN — GABAPENTIN 300 MG: 300 CAPSULE ORAL at 20:50

## 2021-07-05 RX ADMIN — OXYCODONE HYDROCHLORIDE 10 MG: 10 TABLET ORAL at 20:51

## 2021-07-05 RX ADMIN — OXYCODONE HYDROCHLORIDE 10 MG: 10 TABLET ORAL at 06:53

## 2021-07-05 RX ADMIN — GABAPENTIN 300 MG: 300 CAPSULE ORAL at 08:32

## 2021-07-05 RX ADMIN — DOCUSATE SODIUM 100 MG: 100 CAPSULE ORAL at 08:32

## 2021-07-05 RX ADMIN — HYDROMORPHONE HYDROCHLORIDE 0.5 MG: 1 INJECTION, SOLUTION INTRAMUSCULAR; INTRAVENOUS; SUBCUTANEOUS at 23:16

## 2021-07-05 RX ADMIN — INSULIN GLARGINE 20 UNITS: 100 INJECTION, SOLUTION SUBCUTANEOUS at 08:35

## 2021-07-05 RX ADMIN — PANCRELIPASE 72000 UNITS: 60000; 12000; 38000 CAPSULE, DELAYED RELEASE PELLETS ORAL at 17:19

## 2021-07-05 RX ADMIN — BUDESONIDE 500 MCG: 0.5 SUSPENSION RESPIRATORY (INHALATION) at 20:02

## 2021-07-05 RX ADMIN — INSULIN LISPRO 9 UNITS: 100 INJECTION, SOLUTION INTRAVENOUS; SUBCUTANEOUS at 08:36

## 2021-07-05 RX ADMIN — ERTAPENEM SODIUM 1000 MG: 1 INJECTION, POWDER, LYOPHILIZED, FOR SOLUTION INTRAMUSCULAR; INTRAVENOUS at 15:47

## 2021-07-05 RX ADMIN — HYDROMORPHONE HYDROCHLORIDE 0.5 MG: 1 INJECTION, SOLUTION INTRAMUSCULAR; INTRAVENOUS; SUBCUTANEOUS at 13:48

## 2021-07-05 RX ADMIN — PANTOPRAZOLE SODIUM 40 MG: 40 TABLET, DELAYED RELEASE ORAL at 17:19

## 2021-07-05 RX ADMIN — BUDESONIDE 1000 MCG: 0.5 SUSPENSION RESPIRATORY (INHALATION) at 09:15

## 2021-07-05 RX ADMIN — INSULIN GLARGINE 20 UNITS: 100 INJECTION, SOLUTION SUBCUTANEOUS at 20:53

## 2021-07-05 RX ADMIN — OXYCODONE HYDROCHLORIDE 10 MG: 10 TABLET ORAL at 02:11

## 2021-07-05 RX ADMIN — SODIUM CHLORIDE, POTASSIUM CHLORIDE, SODIUM LACTATE AND CALCIUM CHLORIDE: 600; 310; 30; 20 INJECTION, SOLUTION INTRAVENOUS at 05:48

## 2021-07-05 RX ADMIN — INSULIN LISPRO 9 UNITS: 100 INJECTION, SOLUTION INTRAVENOUS; SUBCUTANEOUS at 11:43

## 2021-07-05 RX ADMIN — HYDROMORPHONE HYDROCHLORIDE 0.5 MG: 1 INJECTION, SOLUTION INTRAMUSCULAR; INTRAVENOUS; SUBCUTANEOUS at 05:11

## 2021-07-05 RX ADMIN — APIXABAN 5 MG: 5 TABLET, FILM COATED ORAL at 20:50

## 2021-07-05 RX ADMIN — APIXABAN 5 MG: 5 TABLET, FILM COATED ORAL at 08:32

## 2021-07-05 RX ADMIN — ALBUTEROL SULFATE 2.5 MG: 2.5 SOLUTION RESPIRATORY (INHALATION) at 09:16

## 2021-07-05 RX ADMIN — ALBUTEROL SULFATE 2.5 MG: 2.5 SOLUTION RESPIRATORY (INHALATION) at 20:02

## 2021-07-05 RX ADMIN — FLUCONAZOLE IN SODIUM CHLORIDE 400 MG: 2 INJECTION, SOLUTION INTRAVENOUS at 20:45

## 2021-07-05 RX ADMIN — POLYETHYLENE GLYCOL 3350 17 G: 17 POWDER, FOR SOLUTION ORAL at 08:32

## 2021-07-05 RX ADMIN — SODIUM CHLORIDE, PRESERVATIVE FREE 10 ML: 5 INJECTION INTRAVENOUS at 20:45

## 2021-07-05 RX ADMIN — DOCUSATE SODIUM 50 MG AND SENNOSIDES 8.6 MG 2 TABLET: 8.6; 5 TABLET, FILM COATED ORAL at 08:32

## 2021-07-05 RX ADMIN — OXYCODONE HYDROCHLORIDE 10 MG: 10 TABLET ORAL at 11:26

## 2021-07-05 RX ADMIN — ALBUTEROL SULFATE 2.5 MG: 2.5 SOLUTION RESPIRATORY (INHALATION) at 12:42

## 2021-07-05 RX ADMIN — OXYCODONE HYDROCHLORIDE 10 MG: 10 TABLET ORAL at 15:54

## 2021-07-05 RX ADMIN — SODIUM CHLORIDE, POTASSIUM CHLORIDE, SODIUM LACTATE AND CALCIUM CHLORIDE: 600; 310; 30; 20 INJECTION, SOLUTION INTRAVENOUS at 21:45

## 2021-07-05 RX ADMIN — SALINE NASAL SPRAY 1 SPRAY: 1.5 SOLUTION NASAL at 20:48

## 2021-07-05 RX ADMIN — SODIUM CHLORIDE SOLN NEBU 3% 4 ML: 3 NEBU SOLN at 16:00

## 2021-07-05 RX ADMIN — PANCRELIPASE 72000 UNITS: 60000; 12000; 38000 CAPSULE, DELAYED RELEASE PELLETS ORAL at 11:42

## 2021-07-05 RX ADMIN — ALBUTEROL SULFATE 2.5 MG: 2.5 SOLUTION RESPIRATORY (INHALATION) at 16:00

## 2021-07-05 RX ADMIN — INSULIN LISPRO 9 UNITS: 100 INJECTION, SOLUTION INTRAVENOUS; SUBCUTANEOUS at 16:48

## 2021-07-05 RX ADMIN — INSULIN LISPRO 5 UNITS: 100 INJECTION, SOLUTION INTRAVENOUS; SUBCUTANEOUS at 20:53

## 2021-07-05 RX ADMIN — PANTOPRAZOLE SODIUM 40 MG: 40 TABLET, DELAYED RELEASE ORAL at 08:32

## 2021-07-05 RX ADMIN — DOCUSATE SODIUM 100 MG: 100 CAPSULE ORAL at 20:49

## 2021-07-05 RX ADMIN — ATORVASTATIN CALCIUM 20 MG: 20 TABLET, FILM COATED ORAL at 20:50

## 2021-07-05 RX ADMIN — PANCRELIPASE 72000 UNITS: 60000; 12000; 38000 CAPSULE, DELAYED RELEASE PELLETS ORAL at 08:31

## 2021-07-05 ASSESSMENT — PAIN DESCRIPTION - ORIENTATION: ORIENTATION: LEFT

## 2021-07-05 ASSESSMENT — PAIN DESCRIPTION - ONSET: ONSET: ON-GOING

## 2021-07-05 ASSESSMENT — PAIN DESCRIPTION - PROGRESSION: CLINICAL_PROGRESSION: GRADUALLY WORSENING

## 2021-07-05 ASSESSMENT — PAIN SCALES - GENERAL
PAINLEVEL_OUTOF10: 10
PAINLEVEL_OUTOF10: 7
PAINLEVEL_OUTOF10: 8
PAINLEVEL_OUTOF10: 7
PAINLEVEL_OUTOF10: 9
PAINLEVEL_OUTOF10: 8
PAINLEVEL_OUTOF10: 10
PAINLEVEL_OUTOF10: 9
PAINLEVEL_OUTOF10: 8
PAINLEVEL_OUTOF10: 7

## 2021-07-05 ASSESSMENT — PAIN DESCRIPTION - LOCATION: LOCATION: ABDOMEN

## 2021-07-05 ASSESSMENT — PAIN - FUNCTIONAL ASSESSMENT: PAIN_FUNCTIONAL_ASSESSMENT: PREVENTS OR INTERFERES SOME ACTIVE ACTIVITIES AND ADLS

## 2021-07-05 ASSESSMENT — PAIN DESCRIPTION - FREQUENCY: FREQUENCY: CONTINUOUS

## 2021-07-05 ASSESSMENT — PAIN DESCRIPTION - PAIN TYPE: TYPE: SURGICAL PAIN

## 2021-07-05 ASSESSMENT — PAIN DESCRIPTION - DESCRIPTORS: DESCRIPTORS: ACHING;DISCOMFORT;DULL

## 2021-07-05 NOTE — CONSULTS
[facility]  [address], [address]  [facility], [address] [place] [address]    Pulmonary Consultation      Reason for Consult:  Pleural effusion    Requesting Physician:  Arin Stewart DO    CHIEF COMPLAINT:  Pleural effusion    History Obtained From:  patient    HISTORY OF PRESENT ILLNESS:                Pt is being seen for Dr Mat Blanc. The patient is a 62 y.o. male with significant past medical history of pancreatitis who presents with new pleural effusion. Admitted on 6/30 for CT placement of abdominal drains for abscess and developed tachypnea and hypoxia. Started on antibiotics by ID, eventually underwent ERCP with exchange of pancreatic duct stent and sweep for removal of pus/debris on the 2nd. Still with fevers, not complaining of problems with breathing, some complaints of nasal congestion and thick secretions when trying to cough up stuff for the past few days. Lying down in bed looking comfortable with breathing. On eliquis for (?) portal vein thrombosis the past month or 2.     Past Medical History:        Diagnosis Date    Arthritis     Back pain     5th finger & ring finger on right hand is numb    Bell's palsy     NEW ONSET 3-     Hyperlipidemia     Hypertension     Neuropathy     toes numb    Poorly controlled type 2 diabetes mellitus with neuropathy ([facility] [address] 5/8/2018    Sacral radiculitis 2/27/2019    Spondylosis of lumbar spine 3/9/2019    Advanced arthritis and degenerative disc disease by MRI 3/2019    Ventral hernia        Past Surgical History:        Procedure Laterality Date    ANESTHESIA NERVE BLOCK Bilateral 07/11/2019    BILATERAL L4-5 TRANSFORAMINAL EPIDURAL STEROID INJECTION performed by René Duarte DO at [address] Bilateral 08/01/2019    BILATERAL MEDIAL BRANCH BLOCK L4-5 AND L5-S1 performed by René Duarte DO at [facility] Bilateral 08/15/2019    BILATERAL MEDIAL BRANCH BLOCK L4 - 5 AND L5 - S1 performed by Lala Hill DO at [facility] Right 03/29/2019    right wrist carpal tunnel release and right elbow cubital tunnel release    CARPAL TUNNEL RELEASE Right 03/29/2019    RIGHT WRIST CARPAL TUNNEL RELEASE AND RIGHT ELBOW CUBITAL TUNNEL RELEASE performed by Wesly Kemp DO at [place] [address]      at age 40 polyps    CYST REMOVAL Right     EPIDURAL STEROID INJECTION N/A 10/17/2019    LUMBAR EPIDURAL STEROID INJECTION UNDER FLUOROSCOPIC GUIDANCE AT L2-L3 WITHOUT SEDATION performed by Wojciech Henao MD at [address] [facility] ERCP N/A 05/17/2021    ERCP ENDOSCOPIC RETROGRADE CHOLANGIOPANCREATOGRAPHY SPHINCTER/PAPILLOTOMY performed by Katheryn Bustamante MD at [address] [facility] ERCP N/A 05/17/2021    ERCP STENT INSERTION performed by Katheryn Bustamante MD at [address] [facility] ERCP N/A 6/4/2021    ERCP STENT INSERTION performed by Katheryn Bustamante MD at [facility] Bilateral     groin and umbilical    HERNIA REPAIR N/A 12/13/2018    ROBOTIC ASSISTED LAPAROSCOPIC PRIMARY REPAIR OF RECURRENT VENTRAL HERNIA  REPAIR performed by Keara Saavedra MD at [address] Bilateral 07/11/2019    L4-5 transforaminal     NERVE BLOCK Bilateral 08/01/2019    medial branch block    NERVE BLOCK Bilateral 08/15/2019    bilateral medial branch block L4-S1    NERVE BLOCK  10/17/2019    lumbar epidural    PANCREATECTOMY N/A 05/12/2021    LAPAROSCOPIC ROBOTIC DISTAL PANCREATECTOMY AND SPLENECTOMY AND CHOLECYSTECTOMY, INTRA-OPERATIVE ULTRASOUND, TRANSITIONED TO OPEN -- EPIDURAL performed by Shanda Mazariegos MD at [place] [address]  06/05/2018    C5-7 fusion at [employer] in [address] [place] [address] Pkwy 01/08/2021    EGD W/EUS FNA performed by Jose Guadalupe Isidro DO at [address] [facility] 5 mg, Oral, Q4H PRN **OR** oxyCODONE HCl (OXY-IR) immediate release tablet 10 mg, 10 mg, Oral, Q4H PRN  HYDROmorphone (DILAUDID) injection 0.5 mg, 0.5 mg, Intravenous, Q3H PRN  [COMPLETED] fluconazole (DIFLUCAN) in 0.9 % sodium chloride IVPB 800 mg, 800 mg, Intravenous, Q24H **FOLLOWED BY** fluconazole (DIFLUCAN) in 0.9 % sodium chloride IVPB 400 mg, 400 mg, Intravenous, Q24H  glucose (GLUTOSE) 40 % oral gel 15 g, 15 g, Oral, PRN  dextrose 50 % IV solution, 12.5 g, Intravenous, PRN  glucagon (rDNA) injection 1 mg, 1 mg, Intramuscular, PRN  dextrose 5 % solution, 100 mL/hr, Intravenous, PRN    Allergies:  Iodine, Metformin and related, and Wheat extract    Social History:    TOBACCO:   reports that he has never smoked. He has never used smokeless tobacco.    Family History:       Problem Relation Age of Onset    Diabetes Mother     Other Mother         parkinsons    Diabetes Father     Cancer Sister 79        unknown    Cancer Brother 64        stomach    Diabetes Brother        REVIEW OF SYSTEMS:    RESPIRATORY:  Minimal cough  GASTROINTESTINAL:  Abdominal pain as above  Remainder of complete ROS is negative. PHYSICAL EXAM:      Vitals:    /69   Pulse 83   Temp 96 °F (35.6 °C) (Temporal)   Resp 16   Ht 5' 8\" (1.727 m)   Wt 193 lb (87.5 kg)   SpO2 96%   BMI 29.35 kg/m²     EYES:  Lids and lashes normal, pupils equal, round and reactive to light, extra ocular muscles intact, sclera clear, conjunctiva normal  ENT:  Normocephalic, without obvious abnormality, atraumatic, sinuses nontender on palpation, external ears without lesions, oral pharynx with moist mucus membranes, tonsils without erythema or exudates, gums normal and good dentition.   NECK:  Supple, symmetrical, trachea midline, no adenopathy, thyroid symmetric, not enlarged and no tenderness, skin normal  LUNGS:  Minimal bibasal ronchi  CARDIOVASCULAR:  Normal apical impulse, regular rate and rhythm, normal S1 and S2, no S3 or S4, and no murmur noted  ABDOMEN:  (+) abdominal drains  MUSCULOSKELETAL:  There is no redness, warmth, or swelling of the joints. Full range of motion noted. NEUROLOGIC:  Awake, alert, oriented to name, place and time. Cranial nerves II-XII are grossly intact. DATA:    CBC:   Recent Labs     07/03/21 0444 07/04/21 0443 07/05/21  0455   WBC 10.0 9.3 8.9   HGB 8.5* 7.0* 7.5*   HCT 28.5* 23.1* 24.7*   MCV 73.6* 71.3* 72.9*    425 494*       BMP:  Recent Labs     07/03/21 0444 07/04/21 0443 07/05/21  0455    139 136   K 3.9 3.4* 4.2   CL 98 101 98   CO2 26 28 29   PHOS 2.8 2.4* 2.8   BUN 3* 3* 8   CREATININE 0.6* 0.6* 0.5*    ALB:3,BILIDIR:3,BILITOT:3,ALKPHOS:3)@    PT/INR: No results for input(s): PROTIME, INR in the last 72 hours. ABG:   No results for input(s): PH, PO2, PCO2, HCO3, BE, O2SAT, METHB, O2HB, COHB, O2CON, HHB, THB in the last 72 hours. Radiology Review:  CT abdomen lower lung cuts with left pleural effusion with congestion/atelectasis left lower lobe which appears increased from previous CT abdomen on 6/30    IMPRESSION/RECOMMENDATIONS:      Pleural effusion  Hypoxia  Pancreatitis    1. Cont with nebs, observe respiratory function, no history of lung problems, unclear if any need for pulmicort, will cut back on dose and see if can stop altogether. 2. Incentive spirometer, encourage use  3. Nasal saline tid and humidity bottle to see if can decongest nose and help with secretions and cough  4. Saline nebs to see if helps thin out secretions  5. Will see if can stop anticoagulation given history, not sure if will need thoracentesis as not having much of a problem with effusion for now. Will discuss with Dr Anca Trinh if wants to just observe for now. Time at the bedside, reviewing labs and radiographs, reviewing notes and consultations, discussing with staff and family was more than 55 minutes. Thanks for letting us see this patient in consultation.   Please contact us with any questions. Office [phone] or after hours through [id number], x [phone].

## 2021-07-05 NOTE — ANESTHESIA POSTPROCEDURE EVALUATION
Department of Anesthesiology  Postprocedure Note    Patient: Myron Mayfield  MRN: [id number]  YOB: 1960  Date of evaluation: 7/5/2021  Time:  11:56 AM     Procedure Summary     Date: 07/02/21 Room / Location: [facility] 03 / [facility]    Anesthesia Start: 2787 Anesthesia Stop: 0845    Procedures:       ERCP STENT REMOVAL (N/A )      ERCP STONE REMOVAL (N/A )      ERCP STENT INSERTION (N/A ) Diagnosis: (/)    Surgeons: Ghassan Laura MD Responsible Provider: Lois Salter MD    Anesthesia Type: MAC ASA Status: 4          Anesthesia Type: MAC    Dominic Phase I: Dominic Score: 9    Dominic Phase II:      Last vitals: Reviewed and per EMR flowsheets.        Anesthesia Post Evaluation    Patient location during evaluation: PACU  Patient participation: complete - patient participated  Level of consciousness: awake and alert  Airway patency: patent  Nausea & Vomiting: no nausea and no vomiting  Complications: no  Cardiovascular status: hemodynamically stable and blood pressure returned to baseline  Respiratory status: acceptable  Hydration status: euvolemic

## 2021-07-05 NOTE — PROGRESS NOTES
B SURGERY  DAILY PROGRESS NOTE  7/5/2021  Chief Complaint   Patient presents with    Other     Pt sent over from angio after having 2 abdominal drains inserted. Subjective:  Patient with no acute events overnight. Denying abdominal pain, tolerating diet. Would like to discuss CT scan results as he is frustrated that there has been no improvement since the last scan. Objective:  /64   Pulse 74   Temp 97.4 °F (36.3 °C) (Oral)   Resp 16   Ht 5' 8\" (1.727 m)   Wt 193 lb (87.5 kg)   SpO2 94%   BMI 29.35 kg/m²     GENERAL:  Laying in bed, awake, alert, cooperative, no apparent distress  HEAD: Normocephalic, atraumatic  EYES: No sclera icterus, pupils equal  LUNGS:  Normal respiratory effort    CARDIOVASCULAR:  Tachycardic   ABDOMEN:  Soft,  TTP around drain sites, non-distended. No guarding, no peritoneal signs. Midline and LUQ IR drains with murky output  EXTREMITIES: No edema or swelling  SKIN: Warm and dry    Assessment/Plan:  62 y.o. male with postoperative development of intra-abdominal abscess status post distal pancreatectomy, splenectomy, cholecystectomy on 5/12.  ERCP with pancreatic stent placement on 6/4, s/p IR drain placement x2 6/30. now s/p ERCP with stent exchange 7/2.     - Continue diet  - continue eliquis  - Monitor drain output  - Pain control PRN  - Continue IV antibiotics, appreciate ID recs    Electronically signed by Deacon Melo MD on 7/5/2021 at 6:01 AM This author contacted Katherine 

## 2021-07-06 LAB
ALBUMIN SERPL-MCNC: 2.6 G/DL (ref 3.5–5.2)
ALP BLD-CCNC: 52 U/L (ref 40–129)
ALT SERPL-CCNC: 37 U/L (ref 0–40)
ANION GAP SERPL CALCULATED.3IONS-SCNC: 9 MMOL/L (ref 7–16)
AST SERPL-CCNC: 88 U/L (ref 0–39)
BASOPHILS ABSOLUTE: 0.03 E9/L (ref 0–0.2)
BASOPHILS RELATIVE PERCENT: 0.3 % (ref 0–2)
BILIRUB SERPL-MCNC: <0.2 MG/DL (ref 0–1.2)
BUN BLDV-MCNC: 7 MG/DL (ref 6–20)
CALCIUM IONIZED: 1.27 MMOL/L (ref 1.15–1.33)
CALCIUM SERPL-MCNC: 8.3 MG/DL (ref 8.6–10.2)
CHLORIDE BLD-SCNC: 101 MMOL/L (ref 98–107)
CO2: 29 MMOL/L (ref 22–29)
CREAT SERPL-MCNC: 0.6 MG/DL (ref 0.7–1.2)
EOSINOPHILS ABSOLUTE: 0.08 E9/L (ref 0.05–0.5)
EOSINOPHILS RELATIVE PERCENT: 0.9 % (ref 0–6)
GFR AFRICAN AMERICAN: >60
GFR NON-AFRICAN AMERICAN: >60 ML/MIN/1.73
GLUCOSE BLD-MCNC: 57 MG/DL (ref 74–99)
HCT VFR BLD CALC: 24 % (ref 37–54)
HEMOGLOBIN: 7.3 G/DL (ref 12.5–16.5)
IMMATURE GRANULOCYTES #: 0.06 E9/L
IMMATURE GRANULOCYTES %: 0.7 % (ref 0–5)
LYMPHOCYTES ABSOLUTE: 0.96 E9/L (ref 1.5–4)
LYMPHOCYTES RELATIVE PERCENT: 10.5 % (ref 20–42)
MAGNESIUM: 2 MG/DL (ref 1.6–2.6)
MCH RBC QN AUTO: 22.1 PG (ref 26–35)
MCHC RBC AUTO-ENTMCNC: 30.4 % (ref 32–34.5)
MCV RBC AUTO: 72.5 FL (ref 80–99.9)
METER GLUCOSE: 146 MG/DL (ref 74–99)
METER GLUCOSE: 184 MG/DL (ref 74–99)
METER GLUCOSE: 58 MG/DL (ref 74–99)
METER GLUCOSE: 62 MG/DL (ref 74–99)
METER GLUCOSE: 76 MG/DL (ref 74–99)
METER GLUCOSE: 89 MG/DL (ref 74–99)
MONOCYTES ABSOLUTE: 0.96 E9/L (ref 0.1–0.95)
MONOCYTES RELATIVE PERCENT: 10.5 % (ref 2–12)
NEUTROPHILS ABSOLUTE: 7.04 E9/L (ref 1.8–7.3)
NEUTROPHILS RELATIVE PERCENT: 77.1 % (ref 43–80)
PDW BLD-RTO: 16.5 FL (ref 11.5–15)
PHOSPHORUS: 2.4 MG/DL (ref 2.5–4.5)
PLATELET # BLD: 518 E9/L (ref 130–450)
PMV BLD AUTO: 11.1 FL (ref 7–12)
POTASSIUM SERPL-SCNC: 3.5 MMOL/L (ref 3.5–5)
RBC # BLD: 3.31 E12/L (ref 3.8–5.8)
SODIUM BLD-SCNC: 139 MMOL/L (ref 132–146)
TOTAL PROTEIN: 6.6 G/DL (ref 6.4–8.3)
WBC # BLD: 9.1 E9/L (ref 4.5–11.5)

## 2021-07-06 PROCEDURE — 6370000000 HC RX 637 (ALT 250 FOR IP): Performed by: INTERNAL MEDICINE

## 2021-07-06 PROCEDURE — 2140000000 HC CCU INTERMEDIATE R&B

## 2021-07-06 PROCEDURE — 2580000003 HC RX 258: Performed by: INTERNAL MEDICINE

## 2021-07-06 PROCEDURE — 6370000000 HC RX 637 (ALT 250 FOR IP): Performed by: STUDENT IN AN ORGANIZED HEALTH CARE EDUCATION/TRAINING PROGRAM

## 2021-07-06 PROCEDURE — 6370000000 HC RX 637 (ALT 250 FOR IP): Performed by: SURGERY

## 2021-07-06 PROCEDURE — 6360000002 HC RX W HCPCS: Performed by: INTERNAL MEDICINE

## 2021-07-06 PROCEDURE — 85025 COMPLETE CBC W/AUTO DIFF WBC: CPT

## 2021-07-06 PROCEDURE — 80053 COMPREHEN METABOLIC PANEL: CPT

## 2021-07-06 PROCEDURE — 2500000003 HC RX 250 WO HCPCS: Performed by: STUDENT IN AN ORGANIZED HEALTH CARE EDUCATION/TRAINING PROGRAM

## 2021-07-06 PROCEDURE — 99233 SBSQ HOSP IP/OBS HIGH 50: CPT | Performed by: INTERNAL MEDICINE

## 2021-07-06 PROCEDURE — 84100 ASSAY OF PHOSPHORUS: CPT

## 2021-07-06 PROCEDURE — 6360000002 HC RX W HCPCS: Performed by: STUDENT IN AN ORGANIZED HEALTH CARE EDUCATION/TRAINING PROGRAM

## 2021-07-06 PROCEDURE — 2580000003 HC RX 258: Performed by: RADIOLOGY

## 2021-07-06 PROCEDURE — 2580000003 HC RX 258: Performed by: STUDENT IN AN ORGANIZED HEALTH CARE EDUCATION/TRAINING PROGRAM

## 2021-07-06 PROCEDURE — 36415 COLL VENOUS BLD VENIPUNCTURE: CPT

## 2021-07-06 PROCEDURE — 2700000000 HC OXYGEN THERAPY PER DAY

## 2021-07-06 PROCEDURE — 82962 GLUCOSE BLOOD TEST: CPT

## 2021-07-06 PROCEDURE — 94640 AIRWAY INHALATION TREATMENT: CPT

## 2021-07-06 PROCEDURE — 82330 ASSAY OF CALCIUM: CPT

## 2021-07-06 PROCEDURE — 83735 ASSAY OF MAGNESIUM: CPT

## 2021-07-06 RX ORDER — FENTANYL CITRATE 50 UG/ML
50 INJECTION, SOLUTION INTRAMUSCULAR; INTRAVENOUS ONCE
Status: COMPLETED | OUTPATIENT
Start: 2021-07-06 | End: 2021-07-06

## 2021-07-06 RX ORDER — LIDOCAINE HYDROCHLORIDE AND EPINEPHRINE 10; 10 MG/ML; UG/ML
20 INJECTION, SOLUTION INFILTRATION; PERINEURAL ONCE
Status: COMPLETED | OUTPATIENT
Start: 2021-07-06 | End: 2021-07-06

## 2021-07-06 RX ADMIN — ERTAPENEM SODIUM 1000 MG: 1 INJECTION, POWDER, LYOPHILIZED, FOR SOLUTION INTRAMUSCULAR; INTRAVENOUS at 15:41

## 2021-07-06 RX ADMIN — HYDROMORPHONE HYDROCHLORIDE 0.5 MG: 1 INJECTION, SOLUTION INTRAMUSCULAR; INTRAVENOUS; SUBCUTANEOUS at 05:51

## 2021-07-06 RX ADMIN — PANCRELIPASE 72000 UNITS: 60000; 12000; 38000 CAPSULE, DELAYED RELEASE PELLETS ORAL at 14:02

## 2021-07-06 RX ADMIN — FENTANYL CITRATE 50 MCG: 50 INJECTION, SOLUTION INTRAMUSCULAR; INTRAVENOUS at 09:02

## 2021-07-06 RX ADMIN — GABAPENTIN 300 MG: 300 CAPSULE ORAL at 20:35

## 2021-07-06 RX ADMIN — POLYETHYLENE GLYCOL 3350 17 G: 17 POWDER, FOR SOLUTION ORAL at 20:33

## 2021-07-06 RX ADMIN — ALBUTEROL SULFATE 2.5 MG: 2.5 SOLUTION RESPIRATORY (INHALATION) at 12:15

## 2021-07-06 RX ADMIN — SODIUM CHLORIDE, PRESERVATIVE FREE 10 ML: 5 INJECTION INTRAVENOUS at 22:47

## 2021-07-06 RX ADMIN — LIDOCAINE HYDROCHLORIDE AND EPINEPHRINE 20 ML: 10; 10 INJECTION, SOLUTION INFILTRATION; PERINEURAL at 10:44

## 2021-07-06 RX ADMIN — HYDROMORPHONE HYDROCHLORIDE 0.5 MG: 1 INJECTION, SOLUTION INTRAMUSCULAR; INTRAVENOUS; SUBCUTANEOUS at 14:02

## 2021-07-06 RX ADMIN — BUDESONIDE 500 MCG: 0.5 SUSPENSION RESPIRATORY (INHALATION) at 12:14

## 2021-07-06 RX ADMIN — INSULIN GLARGINE 20 UNITS: 100 INJECTION, SOLUTION SUBCUTANEOUS at 22:15

## 2021-07-06 RX ADMIN — SODIUM CHLORIDE, PRESERVATIVE FREE 10 ML: 5 INJECTION INTRAVENOUS at 20:33

## 2021-07-06 RX ADMIN — SALINE NASAL SPRAY 1 SPRAY: 1.5 SOLUTION NASAL at 14:03

## 2021-07-06 RX ADMIN — SALINE NASAL SPRAY 1 SPRAY: 1.5 SOLUTION NASAL at 20:34

## 2021-07-06 RX ADMIN — FLUCONAZOLE IN SODIUM CHLORIDE 400 MG: 2 INJECTION, SOLUTION INTRAVENOUS at 20:33

## 2021-07-06 RX ADMIN — SODIUM CHLORIDE, PRESERVATIVE FREE 10 ML: 5 INJECTION INTRAVENOUS at 14:03

## 2021-07-06 RX ADMIN — OXYCODONE HYDROCHLORIDE 10 MG: 10 TABLET ORAL at 01:00

## 2021-07-06 RX ADMIN — ALBUTEROL SULFATE 2.5 MG: 2.5 SOLUTION RESPIRATORY (INHALATION) at 17:07

## 2021-07-06 RX ADMIN — POLYETHYLENE GLYCOL 3350 17 G: 17 POWDER, FOR SOLUTION ORAL at 10:16

## 2021-07-06 RX ADMIN — HYDROMORPHONE HYDROCHLORIDE 0.5 MG: 1 INJECTION, SOLUTION INTRAMUSCULAR; INTRAVENOUS; SUBCUTANEOUS at 22:47

## 2021-07-06 RX ADMIN — OXYCODONE HYDROCHLORIDE 10 MG: 10 TABLET ORAL at 20:34

## 2021-07-06 RX ADMIN — CALCIUM POLYCARBOPHIL 625 MG: 625 TABLET, FILM COATED ORAL at 10:16

## 2021-07-06 RX ADMIN — INSULIN LISPRO 2 UNITS: 100 INJECTION, SOLUTION INTRAVENOUS; SUBCUTANEOUS at 22:14

## 2021-07-06 RX ADMIN — ATORVASTATIN CALCIUM 20 MG: 20 TABLET, FILM COATED ORAL at 20:34

## 2021-07-06 RX ADMIN — SODIUM CHLORIDE, PRESERVATIVE FREE 10 ML: 5 INJECTION INTRAVENOUS at 15:35

## 2021-07-06 RX ADMIN — DOCUSATE SODIUM 100 MG: 100 CAPSULE ORAL at 20:35

## 2021-07-06 RX ADMIN — GABAPENTIN 300 MG: 300 CAPSULE ORAL at 10:16

## 2021-07-06 RX ADMIN — OXYCODONE HYDROCHLORIDE 10 MG: 10 TABLET ORAL at 11:54

## 2021-07-06 RX ADMIN — PANTOPRAZOLE SODIUM 40 MG: 40 TABLET, DELAYED RELEASE ORAL at 10:16

## 2021-07-06 RX ADMIN — PANCRELIPASE 72000 UNITS: 60000; 12000; 38000 CAPSULE, DELAYED RELEASE PELLETS ORAL at 10:16

## 2021-07-06 RX ADMIN — SODIUM CHLORIDE, PRESERVATIVE FREE 10 ML: 5 INJECTION INTRAVENOUS at 09:02

## 2021-07-06 RX ADMIN — SODIUM CHLORIDE, PRESERVATIVE FREE 10 ML: 5 INJECTION INTRAVENOUS at 10:26

## 2021-07-06 RX ADMIN — ACETAMINOPHEN 650 MG: 325 TABLET ORAL at 20:34

## 2021-07-06 RX ADMIN — HYDROMORPHONE HYDROCHLORIDE 0.5 MG: 1 INJECTION, SOLUTION INTRAMUSCULAR; INTRAVENOUS; SUBCUTANEOUS at 10:26

## 2021-07-06 RX ADMIN — PANTOPRAZOLE SODIUM 40 MG: 40 TABLET, DELAYED RELEASE ORAL at 15:35

## 2021-07-06 RX ADMIN — OXYCODONE HYDROCHLORIDE 10 MG: 10 TABLET ORAL at 06:58

## 2021-07-06 RX ADMIN — Medication 10 ML: at 05:51

## 2021-07-06 RX ADMIN — SODIUM CHLORIDE SOLN NEBU 3% 4 ML: 3 NEBU SOLN at 12:15

## 2021-07-06 RX ADMIN — SALINE NASAL SPRAY 1 SPRAY: 1.5 SOLUTION NASAL at 10:17

## 2021-07-06 RX ADMIN — BENZONATATE 200 MG: 100 CAPSULE ORAL at 20:34

## 2021-07-06 ASSESSMENT — PAIN DESCRIPTION - DESCRIPTORS
DESCRIPTORS: ACHING;DISCOMFORT
DESCRIPTORS: ACHING;STABBING;DISCOMFORT
DESCRIPTORS: ACHING;DISCOMFORT;DULL

## 2021-07-06 ASSESSMENT — PAIN DESCRIPTION - ORIENTATION
ORIENTATION: LEFT
ORIENTATION: LEFT

## 2021-07-06 ASSESSMENT — PAIN SCALES - GENERAL
PAINLEVEL_OUTOF10: 0
PAINLEVEL_OUTOF10: 7
PAINLEVEL_OUTOF10: 7
PAINLEVEL_OUTOF10: 8
PAINLEVEL_OUTOF10: 7
PAINLEVEL_OUTOF10: 7
PAINLEVEL_OUTOF10: 5
PAINLEVEL_OUTOF10: 7
PAINLEVEL_OUTOF10: 7
PAINLEVEL_OUTOF10: 0
PAINLEVEL_OUTOF10: 7
PAINLEVEL_OUTOF10: 8
PAINLEVEL_OUTOF10: 8

## 2021-07-06 ASSESSMENT — PAIN DESCRIPTION - PAIN TYPE
TYPE: SURGICAL PAIN
TYPE: SURGICAL PAIN
TYPE: ACUTE PAIN

## 2021-07-06 ASSESSMENT — PAIN DESCRIPTION - FREQUENCY
FREQUENCY: CONTINUOUS

## 2021-07-06 ASSESSMENT — PAIN - FUNCTIONAL ASSESSMENT: PAIN_FUNCTIONAL_ASSESSMENT: PREVENTS OR INTERFERES SOME ACTIVE ACTIVITIES AND ADLS

## 2021-07-06 ASSESSMENT — PAIN DESCRIPTION - LOCATION
LOCATION: ABDOMEN

## 2021-07-06 ASSESSMENT — PAIN DESCRIPTION - ONSET
ONSET: ON-GOING
ONSET: ON-GOING

## 2021-07-06 ASSESSMENT — PAIN DESCRIPTION - PROGRESSION: CLINICAL_PROGRESSION: GRADUALLY IMPROVING

## 2021-07-06 NOTE — PROGRESS NOTES
Messaged [facility] to come and see if they could come and assess patient's drain sites. Pt has purulent drainage coming out around dressings not actual drains and patient concerned about drainage from old site contaminating midline MAX site. Also patient says it is worse when having BM; per patient \"when pushing \"globs of this stuff shooting out of here. \"

## 2021-07-06 NOTE — PROGRESS NOTES
[facility] PROGRESS NOTE      Chief complaint: Follow-up of intra-abdominal abscess     The patient is a 62 y.o. 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 coli, anaerobic gram-negative rods, coagulase-negative Staphylococcus, Cronobacter sakazakii for which he was given a course of ciprofloxacin and metronidazole for 4 weeks until 06/25, presented on 06/30 with nausea and vomiting for 4 days. He had percutaneous drain removed on 06/23 with resolution of anterior fluid collection but other surgical drain continued to have more purulent fluid collection, prompting another percutaneous drain placement on 06/30 and subsequent admission for possible possible ERCP with stent repositioning. Abscess fluid Gram stain and culture showed abundant polymorphonuclear leukocytes, no epithelial cells, abundant gram variable rods, mixed alphahemolytic Streptococcus species, light growth of Klebsiella oxytoca (non-ESBL; resistant to ampicillin; susceptible to fluoroquinolones and co-trimoxazole), heavy growth of alphahemolytic Streptococcus, MSSA and Lactobacillus, light growth of Candida albicans. On admission, he was afebrile and hemodynamically stable with no leukocytosis. Piperacillin-tazobactam, fluconazole and vancomycin were started on admission. He underwent ERCP with exchange of pancreatic duct stent and balloon sweep of pancreatic duct with removal of large amount of debris/pus on 07/02.   Repeat CT abdomen and pelvis on 07/04 showed abscess in mid and left upper abdomen with 2 pigtail catheters in the right and left aspect of the collection, similar in size compared to that from 07/02; new subtle loculated fluid collection in the left upper abdomen; multiple additional foci of containing gas located in the left upper abdomen similar to prior; new left pleural effusion with left lower lobe atelectasis; stable portal vein thrombosis. Subjective: Patient was seen and examined. No chills, no abdominal pain, has diarrhea, no rash, no itching. Afebrile. Had leakage of purulent drainage around drain insertion sites. Objective:  /67   Pulse 91   Temp 96.9 °F (36.1 °C) (Temporal)   Resp 20   Ht 5' 8\" (1.727 m)   Wt 193 lb (87.5 kg)   SpO2 98%   BMI 29.35 kg/m²   Constitutional: Alert, not in distress  Respiratory: Clear breath sounds, no crackles, no wheezes  Cardiovascular: Regular rate and rhythm, no murmurs  Gastrointestinal: Bowel sounds present, soft, nontender. Abdominal drains x2 on the left hemiabdomen with mucopurulent fluid output and wound VAC connected to one of the drains  Skin: Warm and dry, no active dermatoses  Musculoskeletal: No joint swelling, no joint erythema    Labs, imaging, and medical records/notes were personally reviewed. Assessment:  Sepsis, resolved  Intraabdominal abscess/surgical site infection, s/p percutaneous drain placement on 05/28, inadequate drainage prompting additional percutaneous drain placements on 06/30  Pancreatic duct abscess status post ERCP with exchange of pancreatic duct stent and balloon sweep of pancreatic duct with removal of large amount of debris/pus on 07/02  Recent distal pancreatectomy with splenectomy IV 09/93 complicated by pancreatic leak s/p pancreatic stent placement on 05/17    Recommendations:  Continue ertapenem 1 g and fluconazole 400 mg every 24 hours. Anticipate at least 4 weeks of IV antibiotic therapy. Insert midline. Continue supportive care. Thank you for involving me in the care of Manuel Esteban. I will continue to follow.  Please do not hesitate to call for any questions or concerns.     Electronically signed by Blanca Lim MD on 7/6/2021 at 10:35 AM

## 2021-07-06 NOTE — PROGRESS NOTES
Went to assess patient at bedside. Pt resting comfortably. Reports that drainage has been coming from the right side of his IR drain and in his LLQ where the previous drain was. He states it is the same consistency and appearance of the fluid that was being caught in the IR drain bags. He denies any abdominal pain, but feels bloated. On exam, he has some purulent drainage that is the same appearance of the IR drains coming from 2 sites on his abdomen. Abdomen is soft, mildly distended, nontender. IR drains are in place with minimal purulent fluid. A/P:  Seems that the IR drains are not putting out as much as previously and may be clogged causing the fluid to travel down the path of least resistance which are the two other puncture sites. Encouraged flushing of the IR drains at least 3x daily with 10cc of sterile normal saline to ensure they remain patent and collect the fluid. Will discuss with Dr. Rukhsana Mancilla in the morning.     Electronically signed by Kell Laura MD on 7/6/2021 at 1:15 AM

## 2021-07-06 NOTE — PROGRESS NOTES
Changed dressing on site of MAX drain (that was pulled accidentally on 7/3). Dressing had moderate purulent drainage, skin appeared reddened, intact. The site continuously leaks at said site and is leaking into drainage of Midline MAX surgical dressing. Cleaned with wound cleanser and applied new dry dressing.

## 2021-07-06 NOTE — PROGRESS NOTES
[facility] SURGERY  DAILY PROGRESS NOTE  7/6/2021  Chief Complaint   Patient presents with    Other     Pt sent over from angio after having 2 abdominal drains inserted. Subjective:  Feeling better this AM, had a BM, no nausea or vomiting and tolerated three meals yesterday, still draining at midline and LLQ previous MAX sites,    Objective:  /66   Pulse 77   Temp 97.7 °F (36.5 °C) (Temporal)   Resp 18   Ht 5' 8\" (1.727 m)   Wt 193 lb (87.5 kg)   SpO2 95%   BMI 29.35 kg/m²     GENERAL:  Laying in bed, awake, alert, cooperative, no apparent distress  HEAD: Normocephalic, atraumatic  EYES: No sclera icterus, pupils equal  LUNGS:  On 2L NC  CARDIOVASCULAR:  Tachycardic   ABDOMEN:  Soft, improved distention, IR drain x2- thin pancreatic fluid- minimal out, ostomy appliance around previous MAX site, midline with drainage  EXTREMITIES: No edema or swelling  SKIN: Warm and dry    Assessment/Plan:  62 y.o. male with postoperative development of intra-abdominal abscess status post distal pancreatectomy, splenectomy, cholecystectomy on 5/12.  ERCP with pancreatic stent placement on 6/4, s/p IR drain placement x2 6/30. now s/p ERCP with stent exchange 7/2.     - bowel regimen  - ambulate  - continue diet as tolerated   - CT showing drains in good position in fluid collection  - appreciate pulm recs for pleural effusion  - ostomy bag to previous MAX drain site    Electronically signed by Isabela Lane DO on 7/6/2021 at 6:02 AM

## 2021-07-06 NOTE — PATIENT CARE CONFERENCE
[facility] Quality Flow/Interdisciplinary Rounds Progress Note        Quality Flow Rounds held on July 6, 2021    Disciplines Attending:  Bedside Nurse, ,  and Nursing Unit Leadership    Melony Ríos was admitted on 6/30/2021  3:47 PM    Anticipated Discharge Date:  Expected Discharge Date: 07/08/21    Disposition:    Mukund Score:  Mukund Scale Score: 18    Readmission Risk              Risk of Unplanned Readmission:  17           Discussed patient goal for the day, patient clinical progression, and barriers to discharge.   The following Goal(s) of the Day/Commitment(s) have been identified:  Other  I&D at bedside, Wound Vac      Willis Choe RN  July 6, 2021

## 2021-07-06 NOTE — PROGRESS NOTES
Messaged [facility] regarding pt requesting to restart his 7u Humalog straight at meals. Also asking for [facility] to check his dressings/ drainage/ abdomen more distended. pt says abdomen more tight than before. Will await further orders. Also clarified whether they wanted the LR @75ml/hr to continue.

## 2021-07-06 NOTE — PROGRESS NOTES
B SURGERY  DAILY PROGRESS NOTE  7/6/2021  Chief Complaint   Patient presents with    Other     Pt sent over from angio after having 2 abdominal drains inserted. Subjective:  Patient states that he is having increased pain at his upper abdomianl incision site. Objective:  /67   Pulse 91   Temp 96.9 °F (36.1 °C) (Temporal)   Resp 20   Ht 5' 8\" (1.727 m)   Wt 193 lb (87.5 kg)   SpO2 98%   BMI 29.35 kg/m²     GENERAL:  Laying in bed, awake, alert, cooperative, no apparent distress  HEAD: Normocephalic, atraumatic  EYES: No sclera icterus, pupils equal  LUNGS:  Normal respiratory effort    CARDIOVASCULAR:  Tachycardic   ABDOMEN:  Soft,  TTP around drain sites, non-distended. No guarding, no peritoneal signs. Midline and LUQ IR drains with murky output, also has new upper midline redness where pancreatic fluid is trying to decompress  EXTREMITIES: No edema or swelling  SKIN: Warm and dry    Assessment/Plan:  62 y.o. male with postoperative development of intra-abdominal abscess status post distal pancreatectomy, splenectomy, cholecystectomy on 5/12. ERCP with pancreatic stent placement on 6/4, s/p IR drain placement x2 6/30. now s/p ERCP with stent exchange 7/2.     - Continue diet  - continue eliquis  - Monitor drain output  - Pain control PRN  - Continue IV antibiotics, appreciate ID recs    Electronically signed by Segun Mendez MD on 7/6/2021 at 8:40 AM     Remove upper midline drain as he is decompressing through his previous drain site  Open upper abdominal midline and drain collection - place wound vac. Will need to go home with wound vac. Continue ostomy appliance and left lateral drain. Home once Vac is set up.     Electronically signed by Segun Mendez MD on 7/6/2021 at 8:41 AM

## 2021-07-06 NOTE — PROGRESS NOTES
Pulmonary [facility]                      Pulmonary Consult Macario Godinez Note :      CC follow up of Pleural effusion     HPI   Doing better   Has SOB and can not take deep breath  Has abdomen drain   Has  fever or chills  On 2 L        PHYSICAL EXAM:      Vitals:    /67   Pulse 91   Temp 96.9 °F (36.1 °C) (Temporal)   Resp 20   Ht 5' 8\" (1.727 m)   Wt 193 lb (87.5 kg)   SpO2 98%   BMI 29.35 kg/m²     EYES:  Lids and lashes normal, pupils equal, round and reactive to light, extra ocular muscles intact, sclera clear, conjunctiva normal  ENT:  Normocephalic, without obvious abnormality, atraumatic, sinuses nontender on palpation, external ears without lesions, oral pharynx with moist mucus membranes, tonsils without erythema or exudates, gums normal and good dentition. NECK:  Supple, symmetrical, trachea midline, no adenopathy, thyroid symmetric, not enlarged and no tenderness, skin normal  LUNGS:  Minimal bibasal ronchi  CARDIOVASCULAR:  Normal apical impulse, regular rate and rhythm, normal S1 and S2, no S3 or S4, and no murmur noted  ABDOMEN:  (+) abdominal drains  MUSCULOSKELETAL:  There is no redness, warmth, or swelling of the joints. Full range of motion noted. NEUROLOGIC:  Awake, alert, oriented to name, place and time. Cranial nerves II-XII are grossly intact. DATA:    CBC:   Recent Labs     07/04/21 0443 07/05/21 0455 07/06/21  0623   WBC 9.3 8.9 9.1   HGB 7.0* 7.5* 7.3*   HCT 23.1* 24.7* 24.0*   MCV 71.3* 72.9* 72.5*    494* 518*       BMP:  Recent Labs     07/04/21 0443 07/05/21 0455 07/06/21  0623    136 139   K 3.4* 4.2 3.5    98 101   CO2 28 29 29   PHOS 2.4* 2.8 2.4*   BUN 3* 8 7   CREATININE 0.6* 0.5* 0.6*    ALB:3,BILIDIR:3,BILITOT:3,ALKPHOS:3)@    PT/INR: No results for input(s): PROTIME, INR in the last 72 hours.     ABG:   No results for input(s): PH, PO2, PCO2, HCO3, BE, O2SAT, METHB, O2HB, COHB, O2CON, HHB, THB in the last 72 hours. Radiology Review:  CT abdomen lower lung cuts with left pleural effusion with congestion/atelectasis left lower lobe which appears increased from previous CT abdomen on 6/30    IMPRESSION/RECOMMENDATIONS:      Pleural effusion  Hypoxia  Pancreatitis    1.will look with US tomorrow ,if fluid large enough ,will drain it   2- keep of Elquis ,  2. Incentive spirometer, encourage use  3. Nasal saline tid and humidity bottle to see if can decongest nose and help with secretions and cough  4- fever as per primary team /ID following ?     Chencho Gregorio MD,[facility]  Pulmonary&Critical Care Medicine   Director of [facility]  Medical Director of [address] [place] [address]    Fidencio Bearden

## 2021-07-07 ENCOUNTER — APPOINTMENT (OUTPATIENT)
Dept: ULTRASOUND IMAGING | Age: 61
DRG: 711 | End: 2021-07-07
Payer: MEDICAID

## 2021-07-07 ENCOUNTER — APPOINTMENT (OUTPATIENT)
Dept: GENERAL RADIOLOGY | Age: 61
DRG: 711 | End: 2021-07-07
Payer: MEDICAID

## 2021-07-07 PROBLEM — E44.0 MODERATE PROTEIN-CALORIE MALNUTRITION (HCC): Chronic | Status: ACTIVE | Noted: 2021-07-07

## 2021-07-07 LAB
ALBUMIN SERPL-MCNC: 2.4 G/DL (ref 3.5–5.2)
ALP BLD-CCNC: 55 U/L (ref 40–129)
ALT SERPL-CCNC: 34 U/L (ref 0–40)
ANION GAP SERPL CALCULATED.3IONS-SCNC: 10 MMOL/L (ref 7–16)
ANISOCYTOSIS: ABNORMAL
APPEARANCE FLUID: NORMAL
AST SERPL-CCNC: 57 U/L (ref 0–39)
BASOPHILS ABSOLUTE: 0 E9/L (ref 0–0.2)
BASOPHILS RELATIVE PERCENT: 0.3 % (ref 0–2)
BILIRUB SERPL-MCNC: 0.4 MG/DL (ref 0–1.2)
BUN BLDV-MCNC: 6 MG/DL (ref 6–20)
BURR CELLS: ABNORMAL
CALCIUM IONIZED: 1.14 MMOL/L (ref 1.15–1.33)
CALCIUM SERPL-MCNC: 8.4 MG/DL (ref 8.6–10.2)
CELL COUNT FLUID TYPE: NORMAL
CHLORIDE BLD-SCNC: 100 MMOL/L (ref 98–107)
CO2: 29 MMOL/L (ref 22–29)
COLOR FLUID: YELLOW
CREAT SERPL-MCNC: 0.6 MG/DL (ref 0.7–1.2)
EOSINOPHILS ABSOLUTE: 0 E9/L (ref 0.05–0.5)
EOSINOPHILS RELATIVE PERCENT: 1.6 % (ref 0–6)
FLUID TYPE: NORMAL
GFR AFRICAN AMERICAN: >60
GFR NON-AFRICAN AMERICAN: >60 ML/MIN/1.73
GLUCOSE BLD-MCNC: 188 MG/DL (ref 74–99)
GLUCOSE, FLUID: 169 MG/DL
HCT VFR BLD CALC: 27 % (ref 37–54)
HEMOGLOBIN: 8.2 G/DL (ref 12.5–16.5)
HYPOCHROMIA: ABNORMAL
LD, FLUID: 225 U/L
LYMPHOCYTES ABSOLUTE: 0.41 E9/L (ref 1.5–4)
LYMPHOCYTES RELATIVE PERCENT: 3.5 % (ref 20–42)
MAGNESIUM: 1.6 MG/DL (ref 1.6–2.6)
MCH RBC QN AUTO: 21.8 PG (ref 26–35)
MCHC RBC AUTO-ENTMCNC: 30.4 % (ref 32–34.5)
MCV RBC AUTO: 71.8 FL (ref 80–99.9)
METER GLUCOSE: 171 MG/DL (ref 74–99)
METER GLUCOSE: 176 MG/DL (ref 74–99)
METER GLUCOSE: 178 MG/DL (ref 74–99)
METER GLUCOSE: 201 MG/DL (ref 74–99)
MONOCYTE, FLUID: 28 %
MONOCYTES ABSOLUTE: 0.82 E9/L (ref 0.1–0.95)
MONOCYTES RELATIVE PERCENT: 7.9 % (ref 2–12)
NEUTROPHIL, FLUID: 72 %
NEUTROPHILS ABSOLUTE: 9.08 E9/L (ref 1.8–7.3)
NEUTROPHILS RELATIVE PERCENT: 88.6 % (ref 43–80)
NUCLEATED CELLS FLUID: 2216 /UL
NUCLEATED RED BLOOD CELLS: 1.8 /100 WBC
PDW BLD-RTO: 16.6 FL (ref 11.5–15)
PHOSPHORUS: 2.8 MG/DL (ref 2.5–4.5)
PLATELET # BLD: 532 E9/L (ref 130–450)
PMV BLD AUTO: 10.6 FL (ref 7–12)
POIKILOCYTES: ABNORMAL
POLYCHROMASIA: ABNORMAL
POTASSIUM SERPL-SCNC: 3.9 MMOL/L (ref 3.5–5)
PROTEIN FLUID: 4.3 G/DL
RBC # BLD: 3.76 E12/L (ref 3.8–5.8)
RBC FLUID: 7000 /UL
SCHISTOCYTES: ABNORMAL
SODIUM BLD-SCNC: 139 MMOL/L (ref 132–146)
TARGET CELLS: ABNORMAL
TOTAL PROTEIN: 6.7 G/DL (ref 6.4–8.3)
WBC # BLD: 10.2 E9/L (ref 4.5–11.5)

## 2021-07-07 PROCEDURE — 76937 US GUIDE VASCULAR ACCESS: CPT

## 2021-07-07 PROCEDURE — 82330 ASSAY OF CALCIUM: CPT

## 2021-07-07 PROCEDURE — 6360000002 HC RX W HCPCS: Performed by: STUDENT IN AN ORGANIZED HEALTH CARE EDUCATION/TRAINING PROGRAM

## 2021-07-07 PROCEDURE — 36415 COLL VENOUS BLD VENIPUNCTURE: CPT

## 2021-07-07 PROCEDURE — 2500000003 HC RX 250 WO HCPCS

## 2021-07-07 PROCEDURE — 83735 ASSAY OF MAGNESIUM: CPT

## 2021-07-07 PROCEDURE — 2580000003 HC RX 258: Performed by: STUDENT IN AN ORGANIZED HEALTH CARE EDUCATION/TRAINING PROGRAM

## 2021-07-07 PROCEDURE — 6370000000 HC RX 637 (ALT 250 FOR IP): Performed by: SURGERY

## 2021-07-07 PROCEDURE — 2580000003 HC RX 258: Performed by: INTERNAL MEDICINE

## 2021-07-07 PROCEDURE — 84157 ASSAY OF PROTEIN OTHER: CPT

## 2021-07-07 PROCEDURE — 2500000003 HC RX 250 WO HCPCS: Performed by: INTERNAL MEDICINE

## 2021-07-07 PROCEDURE — 87206 SMEAR FLUORESCENT/ACID STAI: CPT

## 2021-07-07 PROCEDURE — 87102 FUNGUS ISOLATION CULTURE: CPT

## 2021-07-07 PROCEDURE — 84100 ASSAY OF PHOSPHORUS: CPT

## 2021-07-07 PROCEDURE — 2140000000 HC CCU INTERMEDIATE R&B

## 2021-07-07 PROCEDURE — 87070 CULTURE OTHR SPECIMN AEROBIC: CPT

## 2021-07-07 PROCEDURE — 88305 TISSUE EXAM BY PATHOLOGIST: CPT

## 2021-07-07 PROCEDURE — C1751 CATH, INF, PER/CENT/MIDLINE: HCPCS

## 2021-07-07 PROCEDURE — 85025 COMPLETE CBC W/AUTO DIFF WBC: CPT

## 2021-07-07 PROCEDURE — 6370000000 HC RX 637 (ALT 250 FOR IP): Performed by: STUDENT IN AN ORGANIZED HEALTH CARE EDUCATION/TRAINING PROGRAM

## 2021-07-07 PROCEDURE — 87205 SMEAR GRAM STAIN: CPT

## 2021-07-07 PROCEDURE — 80053 COMPREHEN METABOLIC PANEL: CPT

## 2021-07-07 PROCEDURE — 94640 AIRWAY INHALATION TREATMENT: CPT

## 2021-07-07 PROCEDURE — 71045 X-RAY EXAM CHEST 1 VIEW: CPT

## 2021-07-07 PROCEDURE — 87176 TISSUE HOMOGENIZATION CULTR: CPT

## 2021-07-07 PROCEDURE — 89051 BODY FLUID CELL COUNT: CPT

## 2021-07-07 PROCEDURE — 83615 LACTATE (LD) (LDH) ENZYME: CPT

## 2021-07-07 PROCEDURE — 97605 NEG PRS WND THER DME<=50SQCM: CPT

## 2021-07-07 PROCEDURE — 87015 SPECIMEN INFECT AGNT CONCNTJ: CPT

## 2021-07-07 PROCEDURE — 6370000000 HC RX 637 (ALT 250 FOR IP): Performed by: INTERNAL MEDICINE

## 2021-07-07 PROCEDURE — 0W9B3ZZ DRAINAGE OF LEFT PLEURAL CAVITY, PERCUTANEOUS APPROACH: ICD-10-PCS | Performed by: RADIOLOGY

## 2021-07-07 PROCEDURE — 88112 CYTOPATH CELL ENHANCE TECH: CPT

## 2021-07-07 PROCEDURE — C1729 CATH, DRAINAGE: HCPCS

## 2021-07-07 PROCEDURE — 6360000002 HC RX W HCPCS: Performed by: INTERNAL MEDICINE

## 2021-07-07 PROCEDURE — 6360000002 HC RX W HCPCS

## 2021-07-07 PROCEDURE — 82150 ASSAY OF AMYLASE: CPT

## 2021-07-07 PROCEDURE — 32555 ASPIRATE PLEURA W/ IMAGING: CPT | Performed by: INTERNAL MEDICINE

## 2021-07-07 PROCEDURE — 99233 SBSQ HOSP IP/OBS HIGH 50: CPT | Performed by: INTERNAL MEDICINE

## 2021-07-07 PROCEDURE — 36410 VNPNXR 3YR/> PHY/QHP DX/THER: CPT

## 2021-07-07 PROCEDURE — 87116 MYCOBACTERIA CULTURE: CPT

## 2021-07-07 PROCEDURE — 94760 N-INVAS EAR/PLS OXIMETRY 1: CPT

## 2021-07-07 PROCEDURE — 82962 GLUCOSE BLOOD TEST: CPT

## 2021-07-07 PROCEDURE — 2700000000 HC OXYGEN THERAPY PER DAY

## 2021-07-07 PROCEDURE — 82947 ASSAY GLUCOSE BLOOD QUANT: CPT

## 2021-07-07 RX ORDER — FENTANYL CITRATE 50 UG/ML
50 INJECTION, SOLUTION INTRAMUSCULAR; INTRAVENOUS ONCE
Status: COMPLETED | OUTPATIENT
Start: 2021-07-07 | End: 2021-07-07

## 2021-07-07 RX ORDER — LIDOCAINE HYDROCHLORIDE 10 MG/ML
INJECTION, SOLUTION INFILTRATION; PERINEURAL
Status: COMPLETED
Start: 2021-07-07 | End: 2021-07-07

## 2021-07-07 RX ORDER — SODIUM CHLORIDE 9 MG/ML
25 INJECTION, SOLUTION INTRAVENOUS PRN
Status: DISCONTINUED | OUTPATIENT
Start: 2021-07-07 | End: 2021-07-15 | Stop reason: HOSPADM

## 2021-07-07 RX ORDER — LIDOCAINE HYDROCHLORIDE 10 MG/ML
5 INJECTION, SOLUTION INFILTRATION; PERINEURAL ONCE
Status: DISCONTINUED | OUTPATIENT
Start: 2021-07-07 | End: 2021-07-15 | Stop reason: HOSPADM

## 2021-07-07 RX ORDER — HEPARIN SODIUM (PORCINE) LOCK FLUSH IV SOLN 100 UNIT/ML 100 UNIT/ML
1 SOLUTION INTRAVENOUS EVERY 12 HOURS SCHEDULED
Status: DISCONTINUED | OUTPATIENT
Start: 2021-07-07 | End: 2021-07-15 | Stop reason: HOSPADM

## 2021-07-07 RX ORDER — FLUCONAZOLE 200 MG/1
400 TABLET ORAL DAILY
Qty: 42 TABLET | Refills: 0 | Status: ON HOLD | OUTPATIENT
Start: 2021-07-09 | End: 2021-08-05 | Stop reason: HOSPADM

## 2021-07-07 RX ORDER — SODIUM CHLORIDE 0.9 % (FLUSH) 0.9 %
5-40 SYRINGE (ML) INJECTION PRN
Status: DISCONTINUED | OUTPATIENT
Start: 2021-07-07 | End: 2021-07-15 | Stop reason: HOSPADM

## 2021-07-07 RX ORDER — SODIUM CHLORIDE 0.9 % (FLUSH) 0.9 %
5-40 SYRINGE (ML) INJECTION EVERY 12 HOURS SCHEDULED
Status: DISCONTINUED | OUTPATIENT
Start: 2021-07-07 | End: 2021-07-15 | Stop reason: HOSPADM

## 2021-07-07 RX ORDER — FENTANYL CITRATE 50 UG/ML
INJECTION, SOLUTION INTRAMUSCULAR; INTRAVENOUS
Status: COMPLETED
Start: 2021-07-07 | End: 2021-07-07

## 2021-07-07 RX ORDER — FLUCONAZOLE 100 MG/1
400 TABLET ORAL DAILY
Status: DISCONTINUED | OUTPATIENT
Start: 2021-07-07 | End: 2021-07-15 | Stop reason: HOSPADM

## 2021-07-07 RX ORDER — HEPARIN SODIUM (PORCINE) LOCK FLUSH IV SOLN 100 UNIT/ML 100 UNIT/ML
1 SOLUTION INTRAVENOUS PRN
Status: DISCONTINUED | OUTPATIENT
Start: 2021-07-07 | End: 2021-07-15 | Stop reason: HOSPADM

## 2021-07-07 RX ADMIN — OXYCODONE HYDROCHLORIDE 10 MG: 10 TABLET ORAL at 14:34

## 2021-07-07 RX ADMIN — SODIUM CHLORIDE, PRESERVATIVE FREE 10 ML: 5 INJECTION INTRAVENOUS at 08:54

## 2021-07-07 RX ADMIN — SALINE NASAL SPRAY 1 SPRAY: 1.5 SOLUTION NASAL at 08:53

## 2021-07-07 RX ADMIN — LIDOCAINE HYDROCHLORIDE 200 MG: 10 INJECTION, SOLUTION INFILTRATION; PERINEURAL at 05:54

## 2021-07-07 RX ADMIN — SALINE NASAL SPRAY 1 SPRAY: 1.5 SOLUTION NASAL at 14:45

## 2021-07-07 RX ADMIN — OXYCODONE HYDROCHLORIDE 10 MG: 10 TABLET ORAL at 06:22

## 2021-07-07 RX ADMIN — ACETAMINOPHEN 650 MG: 325 TABLET ORAL at 18:51

## 2021-07-07 RX ADMIN — OXYCODONE HYDROCHLORIDE 10 MG: 10 TABLET ORAL at 10:37

## 2021-07-07 RX ADMIN — INSULIN LISPRO 2 UNITS: 100 INJECTION, SOLUTION INTRAVENOUS; SUBCUTANEOUS at 20:14

## 2021-07-07 RX ADMIN — INSULIN LISPRO 6 UNITS: 100 INJECTION, SOLUTION INTRAVENOUS; SUBCUTANEOUS at 11:45

## 2021-07-07 RX ADMIN — SODIUM CHLORIDE, PRESERVATIVE FREE 10 ML: 5 INJECTION INTRAVENOUS at 20:55

## 2021-07-07 RX ADMIN — ERTAPENEM SODIUM 1000 MG: 1 INJECTION, POWDER, LYOPHILIZED, FOR SOLUTION INTRAMUSCULAR; INTRAVENOUS at 14:39

## 2021-07-07 RX ADMIN — INSULIN GLARGINE 20 UNITS: 100 INJECTION, SOLUTION SUBCUTANEOUS at 20:13

## 2021-07-07 RX ADMIN — FENTANYL CITRATE 50 MCG: 50 INJECTION, SOLUTION INTRAMUSCULAR; INTRAVENOUS at 05:52

## 2021-07-07 RX ADMIN — SODIUM CHLORIDE, PRESERVATIVE FREE 10 ML: 5 INJECTION INTRAVENOUS at 22:52

## 2021-07-07 RX ADMIN — FLUCONAZOLE 400 MG: 100 TABLET ORAL at 14:40

## 2021-07-07 RX ADMIN — POLYETHYLENE GLYCOL 3350 17 G: 17 POWDER, FOR SOLUTION ORAL at 20:56

## 2021-07-07 RX ADMIN — OXYCODONE HYDROCHLORIDE 10 MG: 10 TABLET ORAL at 22:51

## 2021-07-07 RX ADMIN — HYDROMORPHONE HYDROCHLORIDE 0.5 MG: 1 INJECTION, SOLUTION INTRAMUSCULAR; INTRAVENOUS; SUBCUTANEOUS at 20:55

## 2021-07-07 RX ADMIN — HEPARIN 100 UNITS: 100 SYRINGE at 20:55

## 2021-07-07 RX ADMIN — GABAPENTIN 300 MG: 300 CAPSULE ORAL at 08:54

## 2021-07-07 RX ADMIN — DOCUSATE SODIUM 50 MG AND SENNOSIDES 8.6 MG 2 TABLET: 8.6; 5 TABLET, FILM COATED ORAL at 08:54

## 2021-07-07 RX ADMIN — CALCIUM POLYCARBOPHIL 625 MG: 625 TABLET, FILM COATED ORAL at 08:54

## 2021-07-07 RX ADMIN — DOCUSATE SODIUM 100 MG: 100 CAPSULE ORAL at 20:55

## 2021-07-07 RX ADMIN — OXYCODONE HYDROCHLORIDE 10 MG: 10 TABLET ORAL at 18:49

## 2021-07-07 RX ADMIN — POLYETHYLENE GLYCOL 3350 17 G: 17 POWDER, FOR SOLUTION ORAL at 10:34

## 2021-07-07 RX ADMIN — ALBUTEROL SULFATE 2.5 MG: 2.5 SOLUTION RESPIRATORY (INHALATION) at 16:45

## 2021-07-07 RX ADMIN — BENZONATATE 200 MG: 100 CAPSULE ORAL at 20:54

## 2021-07-07 RX ADMIN — LIDOCAINE HYDROCHLORIDE 5 ML: 10 INJECTION, SOLUTION EPIDURAL; INFILTRATION; INTRACAUDAL; PERINEURAL at 17:52

## 2021-07-07 RX ADMIN — SODIUM CHLORIDE, PRESERVATIVE FREE 10 ML: 5 INJECTION INTRAVENOUS at 21:03

## 2021-07-07 RX ADMIN — INSULIN LISPRO 3 UNITS: 100 INJECTION, SOLUTION INTRAVENOUS; SUBCUTANEOUS at 17:59

## 2021-07-07 RX ADMIN — OXYCODONE HYDROCHLORIDE 10 MG: 10 TABLET ORAL at 00:54

## 2021-07-07 RX ADMIN — ALBUTEROL SULFATE 2.5 MG: 2.5 SOLUTION RESPIRATORY (INHALATION) at 11:19

## 2021-07-07 RX ADMIN — PANCRELIPASE 72000 UNITS: 60000; 12000; 38000 CAPSULE, DELAYED RELEASE PELLETS ORAL at 10:38

## 2021-07-07 RX ADMIN — PANCRELIPASE 72000 UNITS: 60000; 12000; 38000 CAPSULE, DELAYED RELEASE PELLETS ORAL at 17:58

## 2021-07-07 RX ADMIN — INSULIN LISPRO 3 UNITS: 100 INJECTION, SOLUTION INTRAVENOUS; SUBCUTANEOUS at 08:55

## 2021-07-07 RX ADMIN — PANTOPRAZOLE SODIUM 40 MG: 40 TABLET, DELAYED RELEASE ORAL at 17:58

## 2021-07-07 RX ADMIN — INSULIN GLARGINE 20 UNITS: 100 INJECTION, SOLUTION SUBCUTANEOUS at 08:55

## 2021-07-07 RX ADMIN — DOCUSATE SODIUM 100 MG: 100 CAPSULE ORAL at 08:54

## 2021-07-07 RX ADMIN — GABAPENTIN 300 MG: 300 CAPSULE ORAL at 20:55

## 2021-07-07 RX ADMIN — ATORVASTATIN CALCIUM 20 MG: 20 TABLET, FILM COATED ORAL at 20:56

## 2021-07-07 RX ADMIN — PANTOPRAZOLE SODIUM 40 MG: 40 TABLET, DELAYED RELEASE ORAL at 08:54

## 2021-07-07 RX ADMIN — BUDESONIDE 500 MCG: 0.5 SUSPENSION RESPIRATORY (INHALATION) at 11:20

## 2021-07-07 RX ADMIN — SODIUM CHLORIDE SOLN NEBU 3% 4 ML: 3 NEBU SOLN at 11:21

## 2021-07-07 RX ADMIN — SALINE NASAL SPRAY 1 SPRAY: 1.5 SOLUTION NASAL at 20:56

## 2021-07-07 ASSESSMENT — PAIN SCALES - GENERAL
PAINLEVEL_OUTOF10: 0
PAINLEVEL_OUTOF10: 6
PAINLEVEL_OUTOF10: 0
PAINLEVEL_OUTOF10: 8
PAINLEVEL_OUTOF10: 0
PAINLEVEL_OUTOF10: 9
PAINLEVEL_OUTOF10: 7
PAINLEVEL_OUTOF10: 0
PAINLEVEL_OUTOF10: 8
PAINLEVEL_OUTOF10: 0
PAINLEVEL_OUTOF10: 10
PAINLEVEL_OUTOF10: 10
PAINLEVEL_OUTOF10: 8

## 2021-07-07 NOTE — CARE COORDINATION
[address] [address] to update about possible discharge tomorrow and faxed over IV antibiotic script. Per [facility], patient will need to receive dose of antibiotics before discharge and start of care to begin on 7/9/21. Wound vac to be delivered tomorrow. Patient plans to discharge home and family to transport.     Toya Bonilla, MSW, LSW [phone]

## 2021-07-07 NOTE — PROGRESS NOTES
MIDLINE Placement 7/7/2021    Product number: [id number]   Lot Number: [id number]      Ultrasound: yes   Right Basilic vein:                Upper Arm Circumference: 27cm    Size: 45f    Exposed Length: 2cm    Internal Length: 13cm   Cut: 0   Vein Measurement: .50    Tameka Pradhan RN  7/7/2021  4:50 PM

## 2021-07-07 NOTE — PROGRESS NOTES
US Department called and message left regarding order from Pulmonary for US Thoracentesis for 1400 tap.

## 2021-07-07 NOTE — PROGRESS NOTES
B SURGERY  DAILY PROGRESS NOTE  7/7/2021  Chief Complaint   Patient presents with    Other     Pt sent over from angio after having 2 abdominal drains inserted. Subjective: This morning wound vac pulled- replaced at bedside and made opening wider for better suction, didn't eat dinner but feels better this morning, possible thora by pulm today, multiple BMs    Objective:  /81   Pulse 105   Temp 100.6 °F (38.1 °C) (Oral)   Resp 20   Ht 5' 8\" (1.727 m)   Wt 193 lb (87.5 kg)   SpO2 93%   BMI 29.35 kg/m²     GENERAL:  Laying in bed, awake, alert, cooperative, no apparent distress  HEAD: Normocephalic, atraumatic  EYES: No sclera icterus, pupils equal  LUNGS:  On 2L NC  CARDIOVASCULAR:  Tachycardic   ABDOMEN:  Soft,  Tender about wound vac and IR drain, erythema around wound vac decreasing, ostomy and IR drain with minimal output   EXTREMITIES: No edema or swelling  SKIN: Warm and dry    Assessment/Plan:  62 y.o. male with postoperative development of intra-abdominal abscess status post distal pancreatectomy, splenectomy, cholecystectomy on 5/12. ERCP with pancreatic stent placement on 6/4, s/p IR drain placement x2 6/30. now s/p ERCP with stent exchange 7/2.     - Continue diet  - hold Eliquis until pulm performs thora  - drain flushes BID  - Monitor drain output  - Pain control PRN  - Continue IV antibiotics, appreciate ID recs  - DC planning     Electronically signed by Michael Gilmore DO on 7/7/2021 at 6:22 AM     Attending Physician Statement:    Chief Complaint:   Chief Complaint   Patient presents with    Other     Pt sent over from angio after having 2 abdominal drains inserted.         I have examined the patient and performed the key aspects of physical exam, reviewed the record (including all pertinent and new radiology images and laboratory findings), and discussed the case with the surgical team.  I agree with the assessment and plan with the following additions, corrections, and changes. 14pt review of symptoms completed and negative except as mentioned. Febrile overnight. abd benign. Some leakage at [facility] from prior drain site - will replace sponge over this as well. Possible thoracentesis by pulm. Karina Vu MD  07/07/21  12:04 PM    NOTE: This report, in part or full, may have been transcribed using voice recognition software. Every effort was made to ensure accuracy; however, inadvertent computerized transcription errors may be present. Please excuse any transcriptional grammatical or spelling errors that may have escaped my editorial review.

## 2021-07-07 NOTE — FLOWSHEET NOTE
Pre-existing: No  Location: Abdomen  Wound Location Orientation: Mid   $ Standard NPWT <=50 sq cm PER TX $ Yes   Wound Type Surgical   Dressing Type Black foam   Number of pieces used 3   Cycle Continuous   Target Pressure (mmHg) 125   Canister changed?  No   Dressing Changed Changed/New   Dressing Change Due 07/09/21   Open Drain LUQ   Placement Date/Time: 07/04/21 0230   Location: LUQ   Site Description   (intact)   Dressing Status   (urostomy pouch applied)   Drainage Appearance Purulent   Output (ml) 30 ml     Plan:  Wound vac dressing changed to abdomen to accommodate a second site  Urostomy pouch changed to drain site  Will need continued preventative care- difficult moving up in bed      John Arguello RN 7/7/2021 3:49 PM

## 2021-07-07 NOTE — PROGRESS NOTES
Comprehensive Nutrition Assessment    Type and Reason for Visit:  Initial (LOS)    Nutrition Recommendations/Plan: Recommend and start Glucerna supplement BID and Brannon wound healing supplement BID to help meet increased nutritional needs from wound healing. Nutrition Assessment:  Patient at nutritional risk d/t overall decreased po intake x 1 week since admission (50-75%) ; pt meets criteria for moderate malnutrition AEB poor po intake >1 month, weight loss, and muscle/fat wasting ; noted pleural effusion ; possible thoracentesis ; hx of DM ; adm w/ intra-abdominal abscess s/p distal pancreatectomy/splenectomy/cholecystectomy on 5/12 ; s/p ERCP w/ pancreatic stent placement on 6/4 ;  s/p IR drain placement x 2 on 6/30 ; now s/p ERCP with stent exchange on 7/2 ; will start ONS    Malnutrition Assessment:  Malnutrition Status: Moderate malnutrition    Context:  Chronic Illness     Findings of the 6 clinical characteristics of malnutrition:  Energy Intake:  7 - 75% or less estimated energy requirements for 1 month or longer  Weight Loss:  7 - 7.5% over 3 months (13% in two months)     Body Fat Loss:  1 - Mild body fat loss Orbital, Triceps   Muscle Mass Loss:  1 - Mild muscle mass loss Temples (temporalis), Clavicles (pectoralis & deltoids)  Fluid Accumulation:  No significant fluid accumulation     Strength:  Not Performed    Estimated Daily Nutrient Needs:  Energy (kcal):  0986-8733 (REE 1667 x 1.3 SF); Weight Used for Energy Requirements:  Current     Protein (g):  105-205 (1.5-1.8g/kg IBW);  Weight Used for Protein Requirements:  Ideal        Fluid (ml/day):  1613-4814; Method Used for Fluid Requirements:  1 ml/kcal      Nutrition Related Findings:  +I&Os (+4.1 L), no edema, A&O x 4, active BS, distended/rounded/tender abd, open and closed drain, redness to heels/buttocks, wound vac, muscle/fat wasting      Wounds:  Surgical Incision, Wound Vac (Incision x 1)       Current Nutrition Therapies:    ADULT

## 2021-07-07 NOTE — PROGRESS NOTES
[facility] PROGRESS NOTE      Chief complaint: Follow-up of intra-abdominal abscess     The patient is a 62 y.o. 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 coli, anaerobic gram-negative rods, coagulase-negative Staphylococcus, Cronobacter sakazakii for which he was given a course of ciprofloxacin and metronidazole for 4 weeks until 06/25, presented on 06/30 with nausea and vomiting for 4 days. He had percutaneous drain removed on 06/23 with resolution of anterior fluid collection but other surgical drain continued to have more purulent fluid collection, prompting another percutaneous drain placement on 06/30 and subsequent admission for possible possible ERCP with stent repositioning. Abscess fluid Gram stain and culture showed abundant polymorphonuclear leukocytes, no epithelial cells, abundant gram variable rods, mixed alphahemolytic Streptococcus species, light growth of Klebsiella oxytoca (non-ESBL; resistant to ampicillin; susceptible to fluoroquinolones and co-trimoxazole), heavy growth of alphahemolytic Streptococcus, MSSA and Lactobacillus, light growth of Candida albicans. On admission, he was afebrile and hemodynamically stable with no leukocytosis. Piperacillin-tazobactam, fluconazole and vancomycin were started on admission. He underwent ERCP with exchange of pancreatic duct stent and balloon sweep of pancreatic duct with removal of large amount of debris/pus on 07/02.   Repeat CT abdomen and pelvis on 07/04 showed abscess in mid and left upper abdomen with 2 pigtail catheters in the right and left aspect of the collection, similar in size compared to that from 07/02; new subtle loculated fluid collection in the left upper abdomen; multiple additional foci of containing gas located in the left upper abdomen similar to prior; new left pleural effusion with left lower lobe atelectasis; stable portal vein thrombosis. Subjective: Patient was seen and examined. No chills, no abdominal pain, has diarrhea, no rash, no itching. Had a fever of 101.6 °F last night. Objective:  /77   Pulse 102   Temp 97 °F (36.1 °C) (Temporal)   Resp 18   Ht 5' 8\" (1.727 m)   Wt 193 lb (87.5 kg)   SpO2 97%   BMI 29.35 kg/m²   Constitutional: Alert, not in distress  Respiratory: Clear breath sounds, no crackles, no wheezes  Cardiovascular: Regular rate and rhythm, no murmurs  Gastrointestinal: Bowel sounds present, soft, nontender. Abdominal drains x2 on the left hemiabdomen with mucopurulent fluid output and wound VAC connected to one of the drains  Skin: Warm and dry, no active dermatoses  Musculoskeletal: No joint swelling, no joint erythema    Labs, imaging, and medical records/notes were personally reviewed. Assessment:  Sepsis, resolved  Intraabdominal abscess/surgical site infection, s/p percutaneous drain placement on 05/28, inadequate drainage prompting additional percutaneous drain placements on 06/30  Pancreatic duct abscess status post ERCP with exchange of pancreatic duct stent and balloon sweep of pancreatic duct with removal of large amount of debris/pus on 07/02  Recent distal pancreatectomy with splenectomy [facility] 67/01 complicated by pancreatic leak s/p pancreatic stent placement on 05/17    Recommendations:  Continue ertapenem 1 g and fluconazole 400 mg every 24 hours. Anticipate at least 4 weeks of IV antibiotic therapy from 07/02-07/30. Insert midline. Continue supportive care. Thank you for involving me in the care of Alexandre Mott. I will continue to follow.  Please do not hesitate to call for any questions or concerns.     Electronically signed by Tianna Gordon MD on 7/7/2021 at 9:59 AM

## 2021-07-08 LAB
ALBUMIN SERPL-MCNC: 2.6 G/DL (ref 3.5–5.2)
ALP BLD-CCNC: 58 U/L (ref 40–129)
ALT SERPL-CCNC: 38 U/L (ref 0–40)
AMYLASE FLUID: 21 U/L
ANION GAP SERPL CALCULATED.3IONS-SCNC: 10 MMOL/L (ref 7–16)
AST SERPL-CCNC: 68 U/L (ref 0–39)
BASOPHILS ABSOLUTE: 0.04 E9/L (ref 0–0.2)
BASOPHILS RELATIVE PERCENT: 0.4 % (ref 0–2)
BILIRUB SERPL-MCNC: 0.4 MG/DL (ref 0–1.2)
BUN BLDV-MCNC: 6 MG/DL (ref 6–20)
CALCIUM IONIZED: 1.23 MMOL/L (ref 1.15–1.33)
CALCIUM SERPL-MCNC: 8.3 MG/DL (ref 8.6–10.2)
CHLORIDE BLD-SCNC: 100 MMOL/L (ref 98–107)
CO2: 28 MMOL/L (ref 22–29)
CREAT SERPL-MCNC: 0.7 MG/DL (ref 0.7–1.2)
EOSINOPHILS ABSOLUTE: 0.37 E9/L (ref 0.05–0.5)
EOSINOPHILS RELATIVE PERCENT: 3.3 % (ref 0–6)
FLUID TYPE: NORMAL
GFR AFRICAN AMERICAN: >60
GFR NON-AFRICAN AMERICAN: >60 ML/MIN/1.73
GLUCOSE BLD-MCNC: 141 MG/DL (ref 74–99)
GRAM STAIN ORDERABLE: NORMAL
HCT VFR BLD CALC: 23.5 % (ref 37–54)
HEMOGLOBIN: 7.2 G/DL (ref 12.5–16.5)
IMMATURE GRANULOCYTES #: 0.06 E9/L
IMMATURE GRANULOCYTES %: 0.5 % (ref 0–5)
LYMPHOCYTES ABSOLUTE: 1.23 E9/L (ref 1.5–4)
LYMPHOCYTES RELATIVE PERCENT: 11 % (ref 20–42)
MAGNESIUM: 1.7 MG/DL (ref 1.6–2.6)
MCH RBC QN AUTO: 21.8 PG (ref 26–35)
MCHC RBC AUTO-ENTMCNC: 30.6 % (ref 32–34.5)
MCV RBC AUTO: 71 FL (ref 80–99.9)
METER GLUCOSE: 138 MG/DL (ref 74–99)
METER GLUCOSE: 162 MG/DL (ref 74–99)
METER GLUCOSE: 178 MG/DL (ref 74–99)
METER GLUCOSE: 210 MG/DL (ref 74–99)
MONOCYTES ABSOLUTE: 1.12 E9/L (ref 0.1–0.95)
MONOCYTES RELATIVE PERCENT: 10 % (ref 2–12)
NEUTROPHILS ABSOLUTE: 8.36 E9/L (ref 1.8–7.3)
NEUTROPHILS RELATIVE PERCENT: 74.8 % (ref 43–80)
PDW BLD-RTO: 16.2 FL (ref 11.5–15)
PHOSPHORUS: 3.2 MG/DL (ref 2.5–4.5)
PLATELET # BLD: 552 E9/L (ref 130–450)
PMV BLD AUTO: 10.6 FL (ref 7–12)
POTASSIUM SERPL-SCNC: 3.2 MMOL/L (ref 3.5–5)
RBC # BLD: 3.31 E12/L (ref 3.8–5.8)
SODIUM BLD-SCNC: 138 MMOL/L (ref 132–146)
TOTAL PROTEIN: 6.6 G/DL (ref 6.4–8.3)
WBC # BLD: 11.2 E9/L (ref 4.5–11.5)

## 2021-07-08 PROCEDURE — 85025 COMPLETE CBC W/AUTO DIFF WBC: CPT

## 2021-07-08 PROCEDURE — 94640 AIRWAY INHALATION TREATMENT: CPT

## 2021-07-08 PROCEDURE — 6360000002 HC RX W HCPCS: Performed by: INTERNAL MEDICINE

## 2021-07-08 PROCEDURE — 2140000000 HC CCU INTERMEDIATE R&B

## 2021-07-08 PROCEDURE — 2580000003 HC RX 258: Performed by: INTERNAL MEDICINE

## 2021-07-08 PROCEDURE — 6370000000 HC RX 637 (ALT 250 FOR IP): Performed by: STUDENT IN AN ORGANIZED HEALTH CARE EDUCATION/TRAINING PROGRAM

## 2021-07-08 PROCEDURE — 6370000000 HC RX 637 (ALT 250 FOR IP): Performed by: SURGERY

## 2021-07-08 PROCEDURE — 2580000003 HC RX 258: Performed by: STUDENT IN AN ORGANIZED HEALTH CARE EDUCATION/TRAINING PROGRAM

## 2021-07-08 PROCEDURE — 6370000000 HC RX 637 (ALT 250 FOR IP): Performed by: INTERNAL MEDICINE

## 2021-07-08 PROCEDURE — 99233 SBSQ HOSP IP/OBS HIGH 50: CPT | Performed by: INTERNAL MEDICINE

## 2021-07-08 PROCEDURE — 82330 ASSAY OF CALCIUM: CPT

## 2021-07-08 PROCEDURE — 80053 COMPREHEN METABOLIC PANEL: CPT

## 2021-07-08 PROCEDURE — 82962 GLUCOSE BLOOD TEST: CPT

## 2021-07-08 PROCEDURE — 36415 COLL VENOUS BLD VENIPUNCTURE: CPT

## 2021-07-08 PROCEDURE — 2700000000 HC OXYGEN THERAPY PER DAY

## 2021-07-08 PROCEDURE — 83735 ASSAY OF MAGNESIUM: CPT

## 2021-07-08 PROCEDURE — 36592 COLLECT BLOOD FROM PICC: CPT

## 2021-07-08 PROCEDURE — 6360000002 HC RX W HCPCS: Performed by: STUDENT IN AN ORGANIZED HEALTH CARE EDUCATION/TRAINING PROGRAM

## 2021-07-08 PROCEDURE — 84100 ASSAY OF PHOSPHORUS: CPT

## 2021-07-08 PROCEDURE — 99233 SBSQ HOSP IP/OBS HIGH 50: CPT | Performed by: SURGERY

## 2021-07-08 RX ORDER — POTASSIUM CHLORIDE 20 MEQ/1
40 TABLET, EXTENDED RELEASE ORAL 2 TIMES DAILY WITH MEALS
Status: COMPLETED | OUTPATIENT
Start: 2021-07-08 | End: 2021-07-09

## 2021-07-08 RX ADMIN — HYDROMORPHONE HYDROCHLORIDE 0.5 MG: 1 INJECTION, SOLUTION INTRAMUSCULAR; INTRAVENOUS; SUBCUTANEOUS at 05:52

## 2021-07-08 RX ADMIN — HYDROMORPHONE HYDROCHLORIDE 0.5 MG: 1 INJECTION, SOLUTION INTRAMUSCULAR; INTRAVENOUS; SUBCUTANEOUS at 21:05

## 2021-07-08 RX ADMIN — APIXABAN 5 MG: 5 TABLET, FILM COATED ORAL at 20:54

## 2021-07-08 RX ADMIN — HYDROMORPHONE HYDROCHLORIDE 0.5 MG: 1 INJECTION, SOLUTION INTRAMUSCULAR; INTRAVENOUS; SUBCUTANEOUS at 12:01

## 2021-07-08 RX ADMIN — DOCUSATE SODIUM 50 MG AND SENNOSIDES 8.6 MG 2 TABLET: 8.6; 5 TABLET, FILM COATED ORAL at 07:59

## 2021-07-08 RX ADMIN — PANCRELIPASE 72000 UNITS: 60000; 12000; 38000 CAPSULE, DELAYED RELEASE PELLETS ORAL at 16:25

## 2021-07-08 RX ADMIN — ALBUTEROL SULFATE 2.5 MG: 2.5 SOLUTION RESPIRATORY (INHALATION) at 17:02

## 2021-07-08 RX ADMIN — OXYCODONE HYDROCHLORIDE 10 MG: 10 TABLET ORAL at 08:12

## 2021-07-08 RX ADMIN — CALCIUM POLYCARBOPHIL 625 MG: 625 TABLET, FILM COATED ORAL at 08:00

## 2021-07-08 RX ADMIN — POTASSIUM CHLORIDE 40 MEQ: 1500 TABLET, EXTENDED RELEASE ORAL at 16:25

## 2021-07-08 RX ADMIN — SALINE NASAL SPRAY 1 SPRAY: 1.5 SOLUTION NASAL at 13:29

## 2021-07-08 RX ADMIN — OXYCODONE HYDROCHLORIDE 10 MG: 10 TABLET ORAL at 03:14

## 2021-07-08 RX ADMIN — POLYETHYLENE GLYCOL 3350 17 G: 17 POWDER, FOR SOLUTION ORAL at 08:02

## 2021-07-08 RX ADMIN — GABAPENTIN 300 MG: 300 CAPSULE ORAL at 20:54

## 2021-07-08 RX ADMIN — SODIUM CHLORIDE SOLN NEBU 3% 4 ML: 3 NEBU SOLN at 20:49

## 2021-07-08 RX ADMIN — SALINE NASAL SPRAY 1 SPRAY: 1.5 SOLUTION NASAL at 08:00

## 2021-07-08 RX ADMIN — PANCRELIPASE 72000 UNITS: 60000; 12000; 38000 CAPSULE, DELAYED RELEASE PELLETS ORAL at 11:41

## 2021-07-08 RX ADMIN — INSULIN GLARGINE 20 UNITS: 100 INJECTION, SOLUTION SUBCUTANEOUS at 20:55

## 2021-07-08 RX ADMIN — OXYCODONE HYDROCHLORIDE 10 MG: 10 TABLET ORAL at 23:35

## 2021-07-08 RX ADMIN — INSULIN GLARGINE 20 UNITS: 100 INJECTION, SOLUTION SUBCUTANEOUS at 09:56

## 2021-07-08 RX ADMIN — INSULIN LISPRO 3 UNITS: 100 INJECTION, SOLUTION INTRAVENOUS; SUBCUTANEOUS at 20:54

## 2021-07-08 RX ADMIN — HYDROMORPHONE HYDROCHLORIDE 0.5 MG: 1 INJECTION, SOLUTION INTRAMUSCULAR; INTRAVENOUS; SUBCUTANEOUS at 00:49

## 2021-07-08 RX ADMIN — ACETAMINOPHEN 650 MG: 325 TABLET ORAL at 13:28

## 2021-07-08 RX ADMIN — OXYCODONE HYDROCHLORIDE 10 MG: 10 TABLET ORAL at 19:30

## 2021-07-08 RX ADMIN — SODIUM CHLORIDE, PRESERVATIVE FREE 10 ML: 5 INJECTION INTRAVENOUS at 08:00

## 2021-07-08 RX ADMIN — HYDROMORPHONE HYDROCHLORIDE 0.5 MG: 1 INJECTION, SOLUTION INTRAMUSCULAR; INTRAVENOUS; SUBCUTANEOUS at 16:35

## 2021-07-08 RX ADMIN — APIXABAN 5 MG: 5 TABLET, FILM COATED ORAL at 07:59

## 2021-07-08 RX ADMIN — GABAPENTIN 300 MG: 300 CAPSULE ORAL at 07:59

## 2021-07-08 RX ADMIN — SODIUM CHLORIDE SOLN NEBU 3% 4 ML: 3 NEBU SOLN at 13:11

## 2021-07-08 RX ADMIN — INSULIN LISPRO 3 UNITS: 100 INJECTION, SOLUTION INTRAVENOUS; SUBCUTANEOUS at 17:39

## 2021-07-08 RX ADMIN — SALINE NASAL SPRAY 1 SPRAY: 1.5 SOLUTION NASAL at 20:53

## 2021-07-08 RX ADMIN — FLUCONAZOLE 400 MG: 100 TABLET ORAL at 07:59

## 2021-07-08 RX ADMIN — SODIUM CHLORIDE, PRESERVATIVE FREE 10 ML: 5 INJECTION INTRAVENOUS at 08:02

## 2021-07-08 RX ADMIN — DOCUSATE SODIUM 100 MG: 100 CAPSULE ORAL at 08:00

## 2021-07-08 RX ADMIN — ATORVASTATIN CALCIUM 20 MG: 20 TABLET, FILM COATED ORAL at 20:54

## 2021-07-08 RX ADMIN — ALBUTEROL SULFATE 2.5 MG: 2.5 SOLUTION RESPIRATORY (INHALATION) at 13:10

## 2021-07-08 RX ADMIN — ERTAPENEM SODIUM 1000 MG: 1 INJECTION, POWDER, LYOPHILIZED, FOR SOLUTION INTRAMUSCULAR; INTRAVENOUS at 13:29

## 2021-07-08 RX ADMIN — PANTOPRAZOLE SODIUM 40 MG: 40 TABLET, DELAYED RELEASE ORAL at 16:25

## 2021-07-08 RX ADMIN — SODIUM CHLORIDE, PRESERVATIVE FREE 10 ML: 5 INJECTION INTRAVENOUS at 20:53

## 2021-07-08 RX ADMIN — BUDESONIDE 500 MCG: 0.5 SUSPENSION RESPIRATORY (INHALATION) at 13:12

## 2021-07-08 RX ADMIN — HEPARIN 100 UNITS: 100 SYRINGE at 20:53

## 2021-07-08 RX ADMIN — POLYETHYLENE GLYCOL 3350 17 G: 17 POWDER, FOR SOLUTION ORAL at 20:54

## 2021-07-08 RX ADMIN — SODIUM CHLORIDE, PRESERVATIVE FREE 10 ML: 5 INJECTION INTRAVENOUS at 20:54

## 2021-07-08 RX ADMIN — BENZONATATE 200 MG: 100 CAPSULE ORAL at 20:54

## 2021-07-08 RX ADMIN — BUDESONIDE 500 MCG: 0.5 SUSPENSION RESPIRATORY (INHALATION) at 20:50

## 2021-07-08 RX ADMIN — PANTOPRAZOLE SODIUM 40 MG: 40 TABLET, DELAYED RELEASE ORAL at 08:00

## 2021-07-08 RX ADMIN — PANCRELIPASE 72000 UNITS: 60000; 12000; 38000 CAPSULE, DELAYED RELEASE PELLETS ORAL at 07:59

## 2021-07-08 RX ADMIN — OXYCODONE HYDROCHLORIDE 10 MG: 10 TABLET ORAL at 13:29

## 2021-07-08 RX ADMIN — INSULIN LISPRO 3 UNITS: 100 INJECTION, SOLUTION INTRAVENOUS; SUBCUTANEOUS at 11:44

## 2021-07-08 RX ADMIN — DOCUSATE SODIUM 100 MG: 100 CAPSULE ORAL at 20:54

## 2021-07-08 RX ADMIN — SODIUM CHLORIDE, PRESERVATIVE FREE 10 ML: 5 INJECTION INTRAVENOUS at 05:52

## 2021-07-08 RX ADMIN — HEPARIN 100 UNITS: 100 SYRINGE at 08:00

## 2021-07-08 RX ADMIN — ALBUTEROL SULFATE 2.5 MG: 2.5 SOLUTION RESPIRATORY (INHALATION) at 20:50

## 2021-07-08 ASSESSMENT — PAIN SCALES - GENERAL
PAINLEVEL_OUTOF10: 10
PAINLEVEL_OUTOF10: 0
PAINLEVEL_OUTOF10: 8
PAINLEVEL_OUTOF10: 0
PAINLEVEL_OUTOF10: 8
PAINLEVEL_OUTOF10: 7
PAINLEVEL_OUTOF10: 0
PAINLEVEL_OUTOF10: 8
PAINLEVEL_OUTOF10: 8
PAINLEVEL_OUTOF10: 9
PAINLEVEL_OUTOF10: 7
PAINLEVEL_OUTOF10: 0

## 2021-07-08 NOTE — PROGRESS NOTES
Patient seen and examined. Found to have purulent material expelling from around the drain not through the drain. IR consult for drain placement check ordered and appreciated.

## 2021-07-08 NOTE — PATIENT CARE CONFERENCE
P Quality Flow/Interdisciplinary Rounds Progress Note        Quality Flow Rounds held on July 8, 2021    Disciplines Attending:  Bedside Nurse, ,  and Nursing Unit Leadership    Mark Kearney was admitted on 6/30/2021  3:47 PM    Anticipated Discharge Date:  Expected Discharge Date: 07/08/21    Disposition:    Mukund Score:  Mukund Scale Score: 18    Readmission Risk              Risk of Unplanned Readmission:  19           Discussed patient goal for the day, patient clinical progression, and barriers to discharge.   The following Goal(s) of the Day/Commitment(s) have been identified:  to check with surgeon if can discharge today once wound vac here      Fanny Cazares RN  July 8, 2021

## 2021-07-08 NOTE — PROGRESS NOTES
Pulmonary [facility]                      Pulmonary Consult Farhan Aburto Note :      CC follow up of Pleural effusion     HPI   Doing better s/p thoracentesis  700 ml was removed  Has SOB and can not take deep breath  Has abdomen drain   Has  fever or chills  On 2 L        PHYSICAL EXAM:      Vitals:    BP (!) 119/57   Pulse 108   Temp 100.4 °F (38 °C) (Temporal)   Resp 18   Ht 5' 8\" (1.727 m)   Wt 193 lb (87.5 kg)   SpO2 95%   BMI 29.35 kg/m²     EYES:  Lids and lashes normal, pupils equal, round and reactive to light, extra ocular muscles intact, sclera clear, conjunctiva normal  ENT:  Normocephalic, without obvious abnormality, atraumatic, sinuses nontender on palpation, external ears without lesions, oral pharynx with moist mucus membranes, tonsils without erythema or exudates, gums normal and good dentition. NECK:  Supple, symmetrical, trachea midline, no adenopathy, thyroid symmetric, not enlarged and no tenderness, skin normal  LUNGS:  Minimal bibasal ronchi  CARDIOVASCULAR:  Normal apical impulse, regular rate and rhythm, normal S1 and S2, no S3 or S4, and no murmur noted  ABDOMEN:  (+) abdominal drains  MUSCULOSKELETAL:  There is no redness, warmth, or swelling of the joints. Full range of motion noted. NEUROLOGIC:  Awake, alert, oriented to name, place and time. Cranial nerves II-XII are grossly intact. DATA:    CBC:   Recent Labs     07/05/21 0455 07/06/21  0623 07/07/21  0654   WBC 8.9 9.1 10.2   HGB 7.5* 7.3* 8.2*   HCT 24.7* 24.0* 27.0*   MCV 72.9* 72.5* 71.8*   * 518* 532*       BMP:  Recent Labs     07/05/21 0455 07/06/21  0623 07/07/21  0654    139 139   K 4.2 3.5 3.9   CL 98 101 100   CO2 29 29 29   PHOS 2.8 2.4* 2.8   BUN 8 7 6   CREATININE 0.5* 0.6* 0.6*    ALB:3,BILIDIR:3,BILITOT:3,ALKPHOS:3)@    PT/INR: No results for input(s): PROTIME, INR in the last 72 hours.     ABG:   No results for input(s): PH, PO2, PCO2, HCO3, BE, O2SAT, METHB, O2HB, COHB, O2CON, HHB, THB in the last 72 hours. FiO2 : 21 %       Radiology Review:  CT abdomen lower lung cuts with left pleural effusion with congestion/atelectasis left lower lobe which appears increased from previous CT abdomen on 6/30    IMPRESSION/RECOMMENDATIONS:      Pleural effusion  Hypoxia  Pancreatitis    1.s/p thoracentesis ,left side,700 ml   .encourged incentive spirometry ,a lot of atelectasis   2-resume Elquis in am from my stand point  ,  2. Incentive spirometer, encourage use  3. Nasal saline tid and humidity bottle to see if can decongest nose and help with secretions and cough  4- fever as per primary team /ID following ?     Chencho Gregorio MD,FCCP  Pulmonary&Critical Care Medicine   Director of [facility]  Medical Director of [address] [place] [address]   Professor Torin Hagen

## 2021-07-08 NOTE — PROGRESS NOTES
B SURGERY  DAILY PROGRESS NOTE  7/8/2021  Chief Complaint   Patient presents with    Other     Pt sent over from angio after having 2 abdominal drains inserted. Subjective:  Reports feeling better overall. He is tolerating his diet. Objective:  BP (!) 102/55   Pulse 89   Temp 98.7 °F (37.1 °C) (Oral)   Resp 16   Ht 5' 8\" (1.727 m)   Wt 193 lb (87.5 kg)   SpO2 93%   BMI 29.35 kg/m²     GENERAL:  Laying in bed, awake, alert, cooperative, no apparent distress  HEAD: Normocephalic, atraumatic  EYES: No sclera icterus, pupils equal  LUNGS:  On 1L NC  CARDIOVASCULAR:  Tachycardic   ABDOMEN:  Soft, Tender about wound vac and IR drain, erythema around wound vac decreasing, IR drain containing pus  EXTREMITIES: No edema or swelling  SKIN: Warm and dry    Assessment/Plan:  62 y.o. male with postoperative development of intra-abdominal abscess status post distal pancreatectomy, splenectomy, cholecystectomy on 5/12. ERCP with pancreatic stent placement on 6/4, s/p IR drain placement x2 6/30. now s/p ERCP with stent exchange 7/2.     - Continue diet  - drain flushes BID  - Monitor drain output  - Pain control PRN  - Continue IV antibiotics, appreciate ID recs  - Resume Eliquis  - Wound vac  - Follow-up on AM labs  Electronically signed by Lala Marrero MD on 7/8/2021 at 6:00 AM       Attending Physician Statement:  I have examined the patient, reviewed the record, and discussed the case with the surgical team.  I agree with the assessment and plan with the following additions, corrections, and changes. Tolerating diet. Had thoracentesis. Not ambulating much. PT/OT. Anisha Gutierrez DO, FACS    NOTE: This report was transcribed using voice recognition software. Every effort was made to ensure accuracy; however, inadvertent computerized transcription errors may be present.

## 2021-07-08 NOTE — PROGRESS NOTES
[facility] PROGRESS NOTE      Chief complaint: Follow-up of intra-abdominal abscess     The patient is a 62 y.o. 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 coli, anaerobic gram-negative rods, coagulase-negative Staphylococcus, Cronobacter sakazakii for which he was given a course of ciprofloxacin and metronidazole for 4 weeks until 06/25, presented on 06/30 with nausea and vomiting for 4 days. He had percutaneous drain removed on 06/23 with resolution of anterior fluid collection but other surgical drain continued to have more purulent fluid collection, prompting another percutaneous drain placement on 06/30 and subsequent admission for possible possible ERCP with stent repositioning. Abscess fluid Gram stain and culture showed abundant polymorphonuclear leukocytes, no epithelial cells, abundant gram variable rods, mixed alphahemolytic Streptococcus species, light growth of Klebsiella oxytoca (non-ESBL; resistant to ampicillin; susceptible to fluoroquinolones and co-trimoxazole), heavy growth of alphahemolytic Streptococcus, MSSA and Lactobacillus, light growth of Candida albicans. On admission, he was afebrile and hemodynamically stable with no leukocytosis. Piperacillin-tazobactam, fluconazole and vancomycin were started on admission. He underwent ERCP with exchange of pancreatic duct stent and balloon sweep of pancreatic duct with removal of large amount of debris/pus on 07/02.   Repeat CT abdomen and pelvis on 07/04 showed abscess in mid and left upper abdomen with 2 pigtail catheters in the right and left aspect of the collection, similar in size compared to that from 07/02; new subtle loculated fluid collection in the left upper abdomen; multiple additional foci of containing gas located in the left upper abdomen similar to prior; new left pleural effusion with left lower lobe atelectasis; stable portal vein thrombosis. He underwent thoracentesis on 07/07, yielding 700 mL of exudative pleural fluid with Gram stain and culture showing few polymorphonuclear leukocytes, no epithelial cells, no organisms, no growth to date. Subjective: Patient was seen and examined. No chills, no abdominal pain, no diarrhea, no rash, no itching. Had a fever of 100.4 °F last night. Objective:  /66   Pulse 95   Temp 98 °F (36.7 °C) (Oral)   Resp 16   Ht 5' 8\" (1.727 m)   Wt 193 lb (87.5 kg)   SpO2 93%   BMI 29.35 kg/m²   Constitutional: Alert, not in distress  Respiratory: Clear breath sounds, no crackles, no wheezes  Cardiovascular: Regular rate and rhythm, no murmurs  Gastrointestinal: Bowel sounds present, soft, nontender. Abdominal drains x3 on the left hemiabdomen with mucopurulent fluid output and wound VAC connected to one of the drains  Skin: Warm and dry, no active dermatoses  Musculoskeletal: No joint swelling, no joint erythema    Labs, imaging, and medical records/notes were personally reviewed. Assessment:  Sepsis, resolved  Intraabdominal abscess/surgical site infection, s/p percutaneous drain placement on 05/28, inadequate drainage prompting additional percutaneous drain placements on 06/30  Pancreatic duct abscess status post ERCP with exchange of pancreatic duct stent and balloon sweep of pancreatic duct with removal of large amount of debris/pus on 07/02  Recent distal pancreatectomy with splenectomy IS 89/86 complicated by pancreatic leak s/p pancreatic stent placement on 05/17    Recommendations:  Continue ertapenem 1 g and fluconazole 400 mg every 24 hours.   Anticipate at least 4 weeks of IV antibiotic therapy from 07/02-07/30. Continue supportive care. Thank you for involving me in the care of Fidencio Arguello. I will continue to follow. Please do not hesitate to call for any questions or concerns.     Electronically signed by Yessy Emery MD on 7/8/2021 at 11:05 AM

## 2021-07-08 NOTE — PROGRESS NOTES
Pulmonary [address] [facility]                      Pulmonary Consult Macario Negri Note :      CC follow up of Pleural effusion     HPI   Doing better s/p thoracentesis  700 ml was removed  Stated that his shortness of breath has improved significantly after thoracentesis  Has abdomen drain   Has  fever or chills  On 2 L        PHYSICAL EXAM:      Vitals:    BP (!) 104/56   Pulse 110   Temp 99 °F (37.2 °C) (Temporal)   Resp 20   Ht 5' 8\" (1.727 m)   Wt 193 lb (87.5 kg)   SpO2 90%   BMI 29.35 kg/m²     EYES:  Lids and lashes normal, pupils equal, round and reactive to light, extra ocular muscles intact, sclera clear, conjunctiva normal  ENT:  Normocephalic, without obvious abnormality, atraumatic, sinuses nontender on palpation, external ears without lesions, oral pharynx with moist mucus membranes, tonsils without erythema or exudates, gums normal and good dentition. NECK:  Supple, symmetrical, trachea midline, no adenopathy, thyroid symmetric, not enlarged and no tenderness, skin normal  LUNGS:  Minimal bibasal ronchi  CARDIOVASCULAR:  Normal apical impulse, regular rate and rhythm, normal S1 and S2, no S3 or S4, and no murmur noted  ABDOMEN:  (+) abdominal drains  MUSCULOSKELETAL:  There is no redness, warmth, or swelling of the joints. Full range of motion noted. NEUROLOGIC:  Awake, alert, oriented to name, place and time. Cranial nerves II-XII are grossly intact. DATA:    CBC:   Recent Labs     07/06/21 0623 07/07/21  0654 07/08/21  0500   WBC 9.1 10.2 11.2   HGB 7.3* 8.2* 7.2*   HCT 24.0* 27.0* 23.5*   MCV 72.5* 71.8* 71.0*   * 532* 552*       BMP:  Recent Labs     07/06/21  0623 07/07/21  0654 07/08/21  0500    139 138   K 3.5 3.9 3.2*    100 100   CO2 29 29 28   PHOS 2.4* 2.8 3.2   BUN 7 6 6   CREATININE 0.6* 0.6* 0.7    ALB:3,BILIDIR:3,BILITOT:3,ALKPHOS:3)@    PT/INR: No results for input(s): PROTIME, INR in the last 72 hours.     ABG:   No results for input(s): PH, PO2, PCO2, HCO3, BE, O2SAT, METHB, O2HB, COHB, O2CON, HHB, THB in the last 72 hours. FiO2 : 21 %       Radiology Review:  CT abdomen lower lung cuts with left pleural effusion with congestion/atelectasis left lower lobe which appears increased from previous CT abdomen on 6/30    IMPRESSION/RECOMMENDATIONS:      Pleural effusion  Hypoxia  Pancreatitis    1.s/p thoracentesis ,left side,700 ml   2-fluid are exudative which can be seen in pancreatitis  . encourged incentive spirometry ,a lot of atelectasis   3-resume Elquis in am from my stand point  ,  2. Incentive spirometer, encourage use  3.  Nasal saline tid and humidity bottle to see if can decongest nose and help with secretions and cough  4-ID following    Chencho Gregorio MD,[facility]  Pulmonary&Critical Care Medicine   Director of [address] [place] [address] Director of [address] [place] [address]    Verona Cabot

## 2021-07-09 ENCOUNTER — APPOINTMENT (OUTPATIENT)
Dept: CT IMAGING | Age: 61
DRG: 711 | End: 2021-07-09
Payer: MEDICAID

## 2021-07-09 LAB
ALBUMIN SERPL-MCNC: 2.7 G/DL (ref 3.5–5.2)
ALP BLD-CCNC: 57 U/L (ref 40–129)
ALT SERPL-CCNC: 42 U/L (ref 0–40)
ANION GAP SERPL CALCULATED.3IONS-SCNC: 9 MMOL/L (ref 7–16)
AST SERPL-CCNC: 97 U/L (ref 0–39)
BASOPHILS ABSOLUTE: 0.04 E9/L (ref 0–0.2)
BASOPHILS RELATIVE PERCENT: 0.4 % (ref 0–2)
BILIRUB SERPL-MCNC: 0.4 MG/DL (ref 0–1.2)
BODY FLUID CULTURE, STERILE: NORMAL
BUN BLDV-MCNC: 8 MG/DL (ref 6–20)
CALCIUM IONIZED: 1.25 MMOL/L (ref 1.15–1.33)
CALCIUM SERPL-MCNC: 8.2 MG/DL (ref 8.6–10.2)
CHLORIDE BLD-SCNC: 100 MMOL/L (ref 98–107)
CO2: 27 MMOL/L (ref 22–29)
CREAT SERPL-MCNC: 0.7 MG/DL (ref 0.7–1.2)
EOSINOPHILS ABSOLUTE: 0.4 E9/L (ref 0.05–0.5)
EOSINOPHILS RELATIVE PERCENT: 4.3 % (ref 0–6)
GFR AFRICAN AMERICAN: >60
GFR NON-AFRICAN AMERICAN: >60 ML/MIN/1.73
GLUCOSE BLD-MCNC: 116 MG/DL (ref 74–99)
GRAM STAIN RESULT: NORMAL
HCT VFR BLD CALC: 23.2 % (ref 37–54)
HEMOGLOBIN: 7.3 G/DL (ref 12.5–16.5)
IMMATURE GRANULOCYTES #: 0.1 E9/L
IMMATURE GRANULOCYTES %: 1.1 % (ref 0–5)
INR BLD: 1.9
LYMPHOCYTES ABSOLUTE: 1.14 E9/L (ref 1.5–4)
LYMPHOCYTES RELATIVE PERCENT: 12.2 % (ref 20–42)
MAGNESIUM: 1.7 MG/DL (ref 1.6–2.6)
MCH RBC QN AUTO: 22.3 PG (ref 26–35)
MCHC RBC AUTO-ENTMCNC: 31.5 % (ref 32–34.5)
MCV RBC AUTO: 70.7 FL (ref 80–99.9)
METER GLUCOSE: 126 MG/DL (ref 74–99)
METER GLUCOSE: 140 MG/DL (ref 74–99)
METER GLUCOSE: 323 MG/DL (ref 74–99)
METER GLUCOSE: 97 MG/DL (ref 74–99)
MONOCYTES ABSOLUTE: 1.39 E9/L (ref 0.1–0.95)
MONOCYTES RELATIVE PERCENT: 14.8 % (ref 2–12)
NEUTROPHILS ABSOLUTE: 6.3 E9/L (ref 1.8–7.3)
NEUTROPHILS RELATIVE PERCENT: 67.2 % (ref 43–80)
PDW BLD-RTO: 16.5 FL (ref 11.5–15)
PHOSPHORUS: 2.7 MG/DL (ref 2.5–4.5)
PLATELET # BLD: 589 E9/L (ref 130–450)
PMV BLD AUTO: 10.5 FL (ref 7–12)
POTASSIUM SERPL-SCNC: 3.7 MMOL/L (ref 3.5–5)
PROTHROMBIN TIME: 20.8 SEC (ref 9.3–12.4)
RBC # BLD: 3.28 E12/L (ref 3.8–5.8)
SODIUM BLD-SCNC: 136 MMOL/L (ref 132–146)
TOTAL PROTEIN: 6.9 G/DL (ref 6.4–8.3)
WBC # BLD: 9.4 E9/L (ref 4.5–11.5)

## 2021-07-09 PROCEDURE — 2140000000 HC CCU INTERMEDIATE R&B

## 2021-07-09 PROCEDURE — 2580000003 HC RX 258: Performed by: STUDENT IN AN ORGANIZED HEALTH CARE EDUCATION/TRAINING PROGRAM

## 2021-07-09 PROCEDURE — 83735 ASSAY OF MAGNESIUM: CPT

## 2021-07-09 PROCEDURE — 36415 COLL VENOUS BLD VENIPUNCTURE: CPT

## 2021-07-09 PROCEDURE — 85610 PROTHROMBIN TIME: CPT

## 2021-07-09 PROCEDURE — 99232 SBSQ HOSP IP/OBS MODERATE 35: CPT | Performed by: SURGERY

## 2021-07-09 PROCEDURE — 6360000002 HC RX W HCPCS: Performed by: STUDENT IN AN ORGANIZED HEALTH CARE EDUCATION/TRAINING PROGRAM

## 2021-07-09 PROCEDURE — 6370000000 HC RX 637 (ALT 250 FOR IP): Performed by: STUDENT IN AN ORGANIZED HEALTH CARE EDUCATION/TRAINING PROGRAM

## 2021-07-09 PROCEDURE — 2700000000 HC OXYGEN THERAPY PER DAY

## 2021-07-09 PROCEDURE — 97605 NEG PRS WND THER DME<=50SQCM: CPT

## 2021-07-09 PROCEDURE — 2580000003 HC RX 258: Performed by: INTERNAL MEDICINE

## 2021-07-09 PROCEDURE — 6370000000 HC RX 637 (ALT 250 FOR IP): Performed by: INTERNAL MEDICINE

## 2021-07-09 PROCEDURE — 6360000002 HC RX W HCPCS: Performed by: RADIOLOGY

## 2021-07-09 PROCEDURE — 85025 COMPLETE CBC W/AUTO DIFF WBC: CPT

## 2021-07-09 PROCEDURE — 76080 X-RAY EXAM OF FISTULA: CPT

## 2021-07-09 PROCEDURE — 82962 GLUCOSE BLOOD TEST: CPT

## 2021-07-09 PROCEDURE — 2580000003 HC RX 258: Performed by: RADIOLOGY

## 2021-07-09 PROCEDURE — 80053 COMPREHEN METABOLIC PANEL: CPT

## 2021-07-09 PROCEDURE — 84100 ASSAY OF PHOSPHORUS: CPT

## 2021-07-09 PROCEDURE — 82330 ASSAY OF CALCIUM: CPT

## 2021-07-09 PROCEDURE — 6370000000 HC RX 637 (ALT 250 FOR IP): Performed by: SURGERY

## 2021-07-09 PROCEDURE — 36592 COLLECT BLOOD FROM PICC: CPT

## 2021-07-09 PROCEDURE — 6360000002 HC RX W HCPCS: Performed by: INTERNAL MEDICINE

## 2021-07-09 PROCEDURE — 76080 X-RAY EXAM OF FISTULA: CPT | Performed by: RADIOLOGY

## 2021-07-09 RX ORDER — DIPHENHYDRAMINE HYDROCHLORIDE 50 MG/ML
50 INJECTION INTRAMUSCULAR; INTRAVENOUS ONCE
Status: COMPLETED | OUTPATIENT
Start: 2021-07-09 | End: 2021-07-09

## 2021-07-09 RX ORDER — METHYLPREDNISOLONE SODIUM SUCCINATE 125 MG/2ML
125 INJECTION, POWDER, LYOPHILIZED, FOR SOLUTION INTRAMUSCULAR; INTRAVENOUS ONCE
Status: COMPLETED | OUTPATIENT
Start: 2021-07-09 | End: 2021-07-09

## 2021-07-09 RX ADMIN — HYDROMORPHONE HYDROCHLORIDE 0.5 MG: 1 INJECTION, SOLUTION INTRAMUSCULAR; INTRAVENOUS; SUBCUTANEOUS at 01:03

## 2021-07-09 RX ADMIN — ATORVASTATIN CALCIUM 20 MG: 20 TABLET, FILM COATED ORAL at 20:04

## 2021-07-09 RX ADMIN — SALINE NASAL SPRAY 1 SPRAY: 1.5 SOLUTION NASAL at 20:01

## 2021-07-09 RX ADMIN — GABAPENTIN 300 MG: 300 CAPSULE ORAL at 20:02

## 2021-07-09 RX ADMIN — CALCIUM POLYCARBOPHIL 625 MG: 625 TABLET, FILM COATED ORAL at 07:43

## 2021-07-09 RX ADMIN — METHYLPREDNISOLONE SODIUM SUCCINATE 125 MG: 125 INJECTION, POWDER, FOR SOLUTION INTRAMUSCULAR; INTRAVENOUS at 12:10

## 2021-07-09 RX ADMIN — HYDROMORPHONE HYDROCHLORIDE 0.5 MG: 1 INJECTION, SOLUTION INTRAMUSCULAR; INTRAVENOUS; SUBCUTANEOUS at 11:23

## 2021-07-09 RX ADMIN — INSULIN LISPRO 6 UNITS: 100 INJECTION, SOLUTION INTRAVENOUS; SUBCUTANEOUS at 20:04

## 2021-07-09 RX ADMIN — ERTAPENEM SODIUM 1000 MG: 1 INJECTION, POWDER, LYOPHILIZED, FOR SOLUTION INTRAMUSCULAR; INTRAVENOUS at 14:48

## 2021-07-09 RX ADMIN — SODIUM CHLORIDE, PRESERVATIVE FREE 10 ML: 5 INJECTION INTRAVENOUS at 07:43

## 2021-07-09 RX ADMIN — PANTOPRAZOLE SODIUM 40 MG: 40 TABLET, DELAYED RELEASE ORAL at 16:42

## 2021-07-09 RX ADMIN — SODIUM CHLORIDE, PRESERVATIVE FREE 10 ML: 5 INJECTION INTRAVENOUS at 05:07

## 2021-07-09 RX ADMIN — OXYCODONE HYDROCHLORIDE 10 MG: 10 TABLET ORAL at 18:49

## 2021-07-09 RX ADMIN — GABAPENTIN 300 MG: 300 CAPSULE ORAL at 07:43

## 2021-07-09 RX ADMIN — APIXABAN 5 MG: 5 TABLET, FILM COATED ORAL at 20:02

## 2021-07-09 RX ADMIN — OXYCODONE HYDROCHLORIDE 10 MG: 10 TABLET ORAL at 22:48

## 2021-07-09 RX ADMIN — DOCUSATE SODIUM 100 MG: 100 CAPSULE ORAL at 20:02

## 2021-07-09 RX ADMIN — POTASSIUM CHLORIDE 40 MEQ: 1500 TABLET, EXTENDED RELEASE ORAL at 07:50

## 2021-07-09 RX ADMIN — DIPHENHYDRAMINE HYDROCHLORIDE 50 MG: 50 INJECTION INTRAMUSCULAR; INTRAVENOUS at 12:10

## 2021-07-09 RX ADMIN — SODIUM CHLORIDE, PRESERVATIVE FREE 10 ML: 5 INJECTION INTRAVENOUS at 20:01

## 2021-07-09 RX ADMIN — HYDROMORPHONE HYDROCHLORIDE 0.5 MG: 1 INJECTION, SOLUTION INTRAMUSCULAR; INTRAVENOUS; SUBCUTANEOUS at 20:09

## 2021-07-09 RX ADMIN — PANCRELIPASE 72000 UNITS: 60000; 12000; 38000 CAPSULE, DELAYED RELEASE PELLETS ORAL at 16:42

## 2021-07-09 RX ADMIN — SALINE NASAL SPRAY 1 SPRAY: 1.5 SOLUTION NASAL at 07:44

## 2021-07-09 RX ADMIN — INSULIN GLARGINE 20 UNITS: 100 INJECTION, SOLUTION SUBCUTANEOUS at 20:04

## 2021-07-09 RX ADMIN — OXYCODONE HYDROCHLORIDE 10 MG: 10 TABLET ORAL at 07:50

## 2021-07-09 RX ADMIN — FLUCONAZOLE 400 MG: 100 TABLET ORAL at 07:43

## 2021-07-09 RX ADMIN — HEPARIN 100 UNITS: 100 SYRINGE at 20:04

## 2021-07-09 RX ADMIN — HEPARIN 100 UNITS: 100 SYRINGE at 07:43

## 2021-07-09 RX ADMIN — Medication 10 ML: at 05:07

## 2021-07-09 RX ADMIN — POLYETHYLENE GLYCOL 3350 17 G: 17 POWDER, FOR SOLUTION ORAL at 20:02

## 2021-07-09 RX ADMIN — HEPARIN 100 UNITS: 100 SYRINGE at 05:07

## 2021-07-09 RX ADMIN — SODIUM CHLORIDE, PRESERVATIVE FREE 10 ML: 5 INJECTION INTRAVENOUS at 20:02

## 2021-07-09 RX ADMIN — PANTOPRAZOLE SODIUM 40 MG: 40 TABLET, DELAYED RELEASE ORAL at 07:44

## 2021-07-09 RX ADMIN — HYDROMORPHONE HYDROCHLORIDE 0.5 MG: 1 INJECTION, SOLUTION INTRAMUSCULAR; INTRAVENOUS; SUBCUTANEOUS at 16:22

## 2021-07-09 RX ADMIN — OXYCODONE HYDROCHLORIDE 10 MG: 10 TABLET ORAL at 03:51

## 2021-07-09 RX ADMIN — HYDROMORPHONE HYDROCHLORIDE 0.5 MG: 1 INJECTION, SOLUTION INTRAMUSCULAR; INTRAVENOUS; SUBCUTANEOUS at 05:06

## 2021-07-09 ASSESSMENT — PAIN DESCRIPTION - PAIN TYPE: TYPE: ACUTE PAIN;SURGICAL PAIN

## 2021-07-09 ASSESSMENT — PAIN SCALES - GENERAL
PAINLEVEL_OUTOF10: 8
PAINLEVEL_OUTOF10: 0
PAINLEVEL_OUTOF10: 10
PAINLEVEL_OUTOF10: 8
PAINLEVEL_OUTOF10: 0
PAINLEVEL_OUTOF10: 10
PAINLEVEL_OUTOF10: 0
PAINLEVEL_OUTOF10: 10
PAINLEVEL_OUTOF10: 0
PAINLEVEL_OUTOF10: 0
PAINLEVEL_OUTOF10: 10
PAINLEVEL_OUTOF10: 6

## 2021-07-09 ASSESSMENT — PAIN DESCRIPTION - FREQUENCY: FREQUENCY: CONTINUOUS

## 2021-07-09 ASSESSMENT — PAIN DESCRIPTION - LOCATION: LOCATION: ABDOMEN

## 2021-07-09 ASSESSMENT — PAIN - FUNCTIONAL ASSESSMENT: PAIN_FUNCTIONAL_ASSESSMENT: ACTIVITIES ARE NOT PREVENTED

## 2021-07-09 ASSESSMENT — PAIN DESCRIPTION - ONSET: ONSET: AWAKENED FROM SLEEP

## 2021-07-09 ASSESSMENT — PAIN DESCRIPTION - DESCRIPTORS: DESCRIPTORS: ACHING;CRAMPING;DISCOMFORT;DULL

## 2021-07-09 ASSESSMENT — PAIN DESCRIPTION - PROGRESSION: CLINICAL_PROGRESSION: NOT CHANGED

## 2021-07-09 NOTE — PROGRESS NOTES
recognition software. Every effort was made to ensure accuracy; however, inadvertent computerized transcription errors may be present.

## 2021-07-09 NOTE — PATIENT CARE CONFERENCE
[facility] Quality Flow/Interdisciplinary Rounds Progress Note        Quality Flow Rounds held on July 9, 2021    Disciplines Attending:  Bedside Nurse, ,  and Nursing Unit Leadership    Aileen Slade was admitted on 6/30/2021  3:47 PM    Anticipated Discharge Date:  Expected Discharge Date: 07/09/21    Disposition:    Mukund Score:  Mukund Scale Score: 18    Readmission Risk              Risk of Unplanned Readmission:  19           Discussed patient goal for the day, patient clinical progression, and barriers to discharge.   The following Goal(s) of the Day/Commitment(s) have been identified:  to have drains checked and get to be discharged today      Sadaf Bautista RN  July 9, 2021

## 2021-07-09 NOTE — PROGRESS NOTES
Attempted to see patient twice but he was off the floor for IR procedure. Thank you for involving me in the care of Marlene Helm. Dr. Luis Urias will continue to follow. Please do not hesitate to call for any questions or concerns.     Electronically signed by Dary Adhikari MD on 7/9/2021 at 10:02 AM

## 2021-07-09 NOTE — PROGRESS NOTES
0900 Flush IR drain with 10ml of Normal saline as ordered. No resistance met. 1500  Flush IR drain with 10ml of Normal saline as ordered. No resistance met.

## 2021-07-09 NOTE — PROGRESS NOTES
Report called to [facility]. Patient tolerated well with no s/s of complications noted or reported. Some tenderness around site, mildly pink. Small amount of dried drainage noted around site. Flushed with 10 ml of contrast saline mix, no resistance and no leaking noted around site. Dr Zarina Shi reviewed images, see note, stated tube in good position. Any questions call IR at x [phone]. Transport requested for patient to return to room.

## 2021-07-09 NOTE — PROGRESS NOTES
2000 Flush IR drain with 10ml of NS as ordered. No resistance met.      0600  Flush IR drain with 10ml of NS as ordered. No resistance met.

## 2021-07-09 NOTE — FLOWSHEET NOTE
Inpatient Wound Care(follow up) 6521    Admit Date: 6/30/2021  3:47 PM    Reason for consult:  Wound vac management    Findings:     07/09/21 1641   Wound 07/06/21 Abdomen Proximal;Mid   Date First Assessed: 07/06/21   Present on Hospital Admission: No  Location: Abdomen  Wound Location Orientation: Proximal;Mid   Wound Image    Wound Etiology Surgical   Dressing Status New dressing applied   Wound Cleansed Cleansed with saline   Dressing/Treatment Negative pressure wound therapy   Dressing Change Due 07/12/21   Wound Length (cm) 2.1 cm   Wound Width (cm) 1.4 cm   Wound Depth (cm) 1 cm   Wound Surface Area (cm^2) 2.94 cm^2   Change in Wound Size % (l*w) 25.76   Wound Volume (cm^3) 2.94 cm^3   Wound Healing % 26   Wound Assessment Pink/red  (yellow)   Drainage Amount Scant   Drainage Description Yellow   Odor None   Gayle-wound Assessment   (painful)   Wound 07/06/21 Abdomen Right   Date First Assessed: 07/06/21   Present on Hospital Admission: No  Location: Abdomen  Wound Location Orientation: Right   Dressing Status New dressing applied   Wound Cleansed Cleansed with saline   Dressing/Treatment Negative pressure wound therapy   Wound Length (cm) 0.2 cm   Wound Width (cm) 0.3 cm   Wound Depth (cm) 0.1 cm   Wound Surface Area (cm^2) 0.06 cm^2   Change in Wound Size % (l*w) 94   Wound Volume (cm^3) 0.006 cm^3   Wound Healing % 94   Wound Assessment   (yellow)   Drainage Amount Scant   Drainage Description Yellow   Odor None   Gayle-wound Assessment   (painful)   Wound Thickness Description not for Pressure Injury Partial thickness   Wound 07/09/21 Abdomen Left   Date First Assessed/Time First Assessed: 07/09/21 1630   Present on Hospital Admission: No  Location: Abdomen  Wound Location Orientation: Left   Dressing Status New dressing applied   Wound Cleansed Cleansed with saline   Dressing/Treatment Alginate;Hydrocolloid   Wound Length (cm) 0.2 cm   Wound Width (cm) 0.6 cm   Wound Depth (cm) 0.1 cm   Wound Surface Area (cm^2) 0.12 cm^2   Wound Volume (cm^3) 0.012 cm^3   Wound Assessment Pink/red  (yellow)   Drainage Amount None   Gayle-wound Assessment   (painful)   Wound Thickness Description not for Pressure Injury Partial thickness   Negative Pressure Wound Therapy Abdomen Mid   Placement Date/Time: 07/06/21 0930   Pre-existing: No  Location: Abdomen  Wound Location Orientation: Mid   $ Standard NPWT <=50 sq cm PER TX $ Yes   Wound Type Surgical   Dressing Type Black foam   Number of pieces used 5   Cycle Continuous   Target Pressure (mmHg) 125   Canister changed? No   Dressing Changed Changed/New     **Informed Consent**    The patient has given verbal consent to have photos taken of wounds and inserted into their chart as part of their permanent medical record for purposes of documentation, treatment management and/or medical review. All Images taken on 7/9/21 of patient name: Todd Wilhelm were transmitted and stored on secured [employer] located within [employer] Tab by a registered [employer] Device.      Plan:  Wound vac dressing changed    Freddy Madsen RN 7/9/2021 4:46 PM

## 2021-07-10 LAB
ALBUMIN SERPL-MCNC: 2.9 G/DL (ref 3.5–5.2)
ALP BLD-CCNC: 63 U/L (ref 40–129)
ALT SERPL-CCNC: 48 U/L (ref 0–40)
ANION GAP SERPL CALCULATED.3IONS-SCNC: 10 MMOL/L (ref 7–16)
AST SERPL-CCNC: 81 U/L (ref 0–39)
BASOPHILS ABSOLUTE: 0.01 E9/L (ref 0–0.2)
BASOPHILS RELATIVE PERCENT: 0.1 % (ref 0–2)
BILIRUB SERPL-MCNC: 0.3 MG/DL (ref 0–1.2)
BUN BLDV-MCNC: 20 MG/DL (ref 6–20)
CALCIUM IONIZED: 1.26 MMOL/L (ref 1.15–1.33)
CALCIUM SERPL-MCNC: 8.6 MG/DL (ref 8.6–10.2)
CHLORIDE BLD-SCNC: 98 MMOL/L (ref 98–107)
CO2: 25 MMOL/L (ref 22–29)
CREAT SERPL-MCNC: 0.7 MG/DL (ref 0.7–1.2)
EOSINOPHILS ABSOLUTE: 0 E9/L (ref 0.05–0.5)
EOSINOPHILS RELATIVE PERCENT: 0 % (ref 0–6)
GFR AFRICAN AMERICAN: >60
GFR NON-AFRICAN AMERICAN: >60 ML/MIN/1.73
GLUCOSE BLD-MCNC: 384 MG/DL (ref 74–99)
HCT VFR BLD CALC: 26.7 % (ref 37–54)
HEMOGLOBIN: 8.1 G/DL (ref 12.5–16.5)
IMMATURE GRANULOCYTES #: 0.06 E9/L
IMMATURE GRANULOCYTES %: 0.6 % (ref 0–5)
LYMPHOCYTES ABSOLUTE: 0.71 E9/L (ref 1.5–4)
LYMPHOCYTES RELATIVE PERCENT: 7.3 % (ref 20–42)
MAGNESIUM: 2.4 MG/DL (ref 1.6–2.6)
MCH RBC QN AUTO: 21.7 PG (ref 26–35)
MCHC RBC AUTO-ENTMCNC: 30.3 % (ref 32–34.5)
MCV RBC AUTO: 71.4 FL (ref 80–99.9)
METER GLUCOSE: 198 MG/DL (ref 74–99)
METER GLUCOSE: 255 MG/DL (ref 74–99)
METER GLUCOSE: 273 MG/DL (ref 74–99)
METER GLUCOSE: 289 MG/DL (ref 74–99)
METER GLUCOSE: 326 MG/DL (ref 74–99)
METER GLUCOSE: 369 MG/DL (ref 74–99)
METER GLUCOSE: 388 MG/DL (ref 74–99)
MONOCYTES ABSOLUTE: 0.48 E9/L (ref 0.1–0.95)
MONOCYTES RELATIVE PERCENT: 5 % (ref 2–12)
NEUTROPHILS ABSOLUTE: 8.43 E9/L (ref 1.8–7.3)
NEUTROPHILS RELATIVE PERCENT: 87 % (ref 43–80)
PDW BLD-RTO: 16.5 FL (ref 11.5–15)
PHOSPHORUS: 2.7 MG/DL (ref 2.5–4.5)
PLATELET # BLD: 674 E9/L (ref 130–450)
PMV BLD AUTO: 10.1 FL (ref 7–12)
POTASSIUM SERPL-SCNC: 4.4 MMOL/L (ref 3.5–5)
RBC # BLD: 3.74 E12/L (ref 3.8–5.8)
SODIUM BLD-SCNC: 133 MMOL/L (ref 132–146)
TOTAL PROTEIN: 7.4 G/DL (ref 6.4–8.3)
WBC # BLD: 9.7 E9/L (ref 4.5–11.5)

## 2021-07-10 PROCEDURE — 6360000002 HC RX W HCPCS: Performed by: STUDENT IN AN ORGANIZED HEALTH CARE EDUCATION/TRAINING PROGRAM

## 2021-07-10 PROCEDURE — 80053 COMPREHEN METABOLIC PANEL: CPT

## 2021-07-10 PROCEDURE — 36592 COLLECT BLOOD FROM PICC: CPT

## 2021-07-10 PROCEDURE — 84100 ASSAY OF PHOSPHORUS: CPT

## 2021-07-10 PROCEDURE — 6370000000 HC RX 637 (ALT 250 FOR IP): Performed by: STUDENT IN AN ORGANIZED HEALTH CARE EDUCATION/TRAINING PROGRAM

## 2021-07-10 PROCEDURE — 82962 GLUCOSE BLOOD TEST: CPT

## 2021-07-10 PROCEDURE — 2580000003 HC RX 258: Performed by: RADIOLOGY

## 2021-07-10 PROCEDURE — 85025 COMPLETE CBC W/AUTO DIFF WBC: CPT

## 2021-07-10 PROCEDURE — 6360000002 HC RX W HCPCS: Performed by: SURGERY

## 2021-07-10 PROCEDURE — 2140000000 HC CCU INTERMEDIATE R&B

## 2021-07-10 PROCEDURE — 6370000000 HC RX 637 (ALT 250 FOR IP): Performed by: SURGERY

## 2021-07-10 PROCEDURE — 2580000003 HC RX 258: Performed by: INTERNAL MEDICINE

## 2021-07-10 PROCEDURE — 36415 COLL VENOUS BLD VENIPUNCTURE: CPT

## 2021-07-10 PROCEDURE — 2580000003 HC RX 258: Performed by: STUDENT IN AN ORGANIZED HEALTH CARE EDUCATION/TRAINING PROGRAM

## 2021-07-10 PROCEDURE — 6370000000 HC RX 637 (ALT 250 FOR IP): Performed by: INTERNAL MEDICINE

## 2021-07-10 PROCEDURE — 83735 ASSAY OF MAGNESIUM: CPT

## 2021-07-10 PROCEDURE — 82330 ASSAY OF CALCIUM: CPT

## 2021-07-10 PROCEDURE — 2700000000 HC OXYGEN THERAPY PER DAY

## 2021-07-10 PROCEDURE — 6360000002 HC RX W HCPCS: Performed by: INTERNAL MEDICINE

## 2021-07-10 PROCEDURE — 99232 SBSQ HOSP IP/OBS MODERATE 35: CPT | Performed by: INTERNAL MEDICINE

## 2021-07-10 RX ORDER — KETOROLAC TROMETHAMINE 30 MG/ML
15 INJECTION, SOLUTION INTRAMUSCULAR; INTRAVENOUS EVERY 6 HOURS SCHEDULED
Status: COMPLETED | OUTPATIENT
Start: 2021-07-10 | End: 2021-07-15

## 2021-07-10 RX ORDER — ACETAMINOPHEN 325 MG/1
650 TABLET ORAL
Status: DISCONTINUED | OUTPATIENT
Start: 2021-07-10 | End: 2021-07-15 | Stop reason: HOSPADM

## 2021-07-10 RX ORDER — INSULIN GLARGINE 100 [IU]/ML
25 INJECTION, SOLUTION SUBCUTANEOUS 2 TIMES DAILY
Status: DISCONTINUED | OUTPATIENT
Start: 2021-07-11 | End: 2021-07-15 | Stop reason: HOSPADM

## 2021-07-10 RX ADMIN — SODIUM CHLORIDE, PRESERVATIVE FREE 10 ML: 5 INJECTION INTRAVENOUS at 20:31

## 2021-07-10 RX ADMIN — PANCRELIPASE 72000 UNITS: 60000; 12000; 38000 CAPSULE, DELAYED RELEASE PELLETS ORAL at 07:37

## 2021-07-10 RX ADMIN — ACETAMINOPHEN 650 MG: 325 TABLET ORAL at 20:31

## 2021-07-10 RX ADMIN — INSULIN LISPRO 10 UNITS: 100 INJECTION, SOLUTION INTRAVENOUS; SUBCUTANEOUS at 08:21

## 2021-07-10 RX ADMIN — DOCUSATE SODIUM 100 MG: 100 CAPSULE ORAL at 20:32

## 2021-07-10 RX ADMIN — INSULIN GLARGINE 20 UNITS: 100 INJECTION, SOLUTION SUBCUTANEOUS at 20:32

## 2021-07-10 RX ADMIN — INSULIN LISPRO 3 UNITS: 100 INJECTION, SOLUTION INTRAVENOUS; SUBCUTANEOUS at 17:14

## 2021-07-10 RX ADMIN — CALCIUM POLYCARBOPHIL 625 MG: 625 TABLET, FILM COATED ORAL at 07:38

## 2021-07-10 RX ADMIN — KETOROLAC TROMETHAMINE 15 MG: 30 INJECTION, SOLUTION INTRAMUSCULAR; INTRAVENOUS at 11:16

## 2021-07-10 RX ADMIN — OXYCODONE HYDROCHLORIDE 10 MG: 10 TABLET ORAL at 13:19

## 2021-07-10 RX ADMIN — HEPARIN 100 UNITS: 100 SYRINGE at 20:32

## 2021-07-10 RX ADMIN — PANTOPRAZOLE SODIUM 40 MG: 40 TABLET, DELAYED RELEASE ORAL at 07:38

## 2021-07-10 RX ADMIN — SODIUM CHLORIDE, PRESERVATIVE FREE 10 ML: 5 INJECTION INTRAVENOUS at 20:32

## 2021-07-10 RX ADMIN — HYDROMORPHONE HYDROCHLORIDE 0.5 MG: 1 INJECTION, SOLUTION INTRAMUSCULAR; INTRAVENOUS; SUBCUTANEOUS at 04:38

## 2021-07-10 RX ADMIN — OXYCODONE HYDROCHLORIDE 10 MG: 10 TABLET ORAL at 03:10

## 2021-07-10 RX ADMIN — INSULIN LISPRO 5 UNITS: 100 INJECTION, SOLUTION INTRAVENOUS; SUBCUTANEOUS at 20:32

## 2021-07-10 RX ADMIN — POLYETHYLENE GLYCOL 3350 17 G: 17 POWDER, FOR SOLUTION ORAL at 20:32

## 2021-07-10 RX ADMIN — HYDROMORPHONE HYDROCHLORIDE 0.5 MG: 1 INJECTION, SOLUTION INTRAMUSCULAR; INTRAVENOUS; SUBCUTANEOUS at 00:15

## 2021-07-10 RX ADMIN — SALINE NASAL SPRAY 1 SPRAY: 1.5 SOLUTION NASAL at 20:31

## 2021-07-10 RX ADMIN — KETOROLAC TROMETHAMINE 15 MG: 30 INJECTION, SOLUTION INTRAMUSCULAR; INTRAVENOUS at 17:09

## 2021-07-10 RX ADMIN — INSULIN GLARGINE 20 UNITS: 100 INJECTION, SOLUTION SUBCUTANEOUS at 07:56

## 2021-07-10 RX ADMIN — SALINE NASAL SPRAY 1 SPRAY: 1.5 SOLUTION NASAL at 07:37

## 2021-07-10 RX ADMIN — ACETAMINOPHEN 650 MG: 325 TABLET ORAL at 07:42

## 2021-07-10 RX ADMIN — SODIUM CHLORIDE, PRESERVATIVE FREE 10 ML: 5 INJECTION INTRAVENOUS at 04:39

## 2021-07-10 RX ADMIN — OXYCODONE HYDROCHLORIDE 10 MG: 10 TABLET ORAL at 07:36

## 2021-07-10 RX ADMIN — SODIUM CHLORIDE, PRESERVATIVE FREE 10 ML: 5 INJECTION INTRAVENOUS at 07:42

## 2021-07-10 RX ADMIN — GABAPENTIN 300 MG: 300 CAPSULE ORAL at 20:31

## 2021-07-10 RX ADMIN — INSULIN LISPRO 10 UNITS: 100 INJECTION, SOLUTION INTRAVENOUS; SUBCUTANEOUS at 10:34

## 2021-07-10 RX ADMIN — HYDROMORPHONE HYDROCHLORIDE 0.5 MG: 1 INJECTION, SOLUTION INTRAMUSCULAR; INTRAVENOUS; SUBCUTANEOUS at 21:22

## 2021-07-10 RX ADMIN — OXYCODONE HYDROCHLORIDE 10 MG: 10 TABLET ORAL at 22:24

## 2021-07-10 RX ADMIN — ACETAMINOPHEN 650 MG: 325 TABLET ORAL at 13:19

## 2021-07-10 RX ADMIN — PANCRELIPASE 72000 UNITS: 60000; 12000; 38000 CAPSULE, DELAYED RELEASE PELLETS ORAL at 11:16

## 2021-07-10 RX ADMIN — HEPARIN 100 UNITS: 100 SYRINGE at 07:37

## 2021-07-10 RX ADMIN — INSULIN LISPRO 12 UNITS: 100 INJECTION, SOLUTION INTRAVENOUS; SUBCUTANEOUS at 12:33

## 2021-07-10 RX ADMIN — GABAPENTIN 300 MG: 300 CAPSULE ORAL at 07:38

## 2021-07-10 RX ADMIN — PANCRELIPASE 72000 UNITS: 60000; 12000; 38000 CAPSULE, DELAYED RELEASE PELLETS ORAL at 17:10

## 2021-07-10 RX ADMIN — ATORVASTATIN CALCIUM 20 MG: 20 TABLET, FILM COATED ORAL at 20:31

## 2021-07-10 RX ADMIN — HYDROMORPHONE HYDROCHLORIDE 0.5 MG: 1 INJECTION, SOLUTION INTRAMUSCULAR; INTRAVENOUS; SUBCUTANEOUS at 14:49

## 2021-07-10 RX ADMIN — KETOROLAC TROMETHAMINE 15 MG: 30 INJECTION, SOLUTION INTRAMUSCULAR; INTRAVENOUS at 07:43

## 2021-07-10 RX ADMIN — FLUCONAZOLE 400 MG: 100 TABLET ORAL at 07:38

## 2021-07-10 RX ADMIN — DOCUSATE SODIUM 50 MG AND SENNOSIDES 8.6 MG 2 TABLET: 8.6; 5 TABLET, FILM COATED ORAL at 07:38

## 2021-07-10 RX ADMIN — SALINE NASAL SPRAY 1 SPRAY: 1.5 SOLUTION NASAL at 13:19

## 2021-07-10 RX ADMIN — APIXABAN 5 MG: 5 TABLET, FILM COATED ORAL at 07:38

## 2021-07-10 RX ADMIN — Medication 10 ML: at 22:29

## 2021-07-10 RX ADMIN — PANTOPRAZOLE SODIUM 40 MG: 40 TABLET, DELAYED RELEASE ORAL at 17:10

## 2021-07-10 RX ADMIN — APIXABAN 5 MG: 5 TABLET, FILM COATED ORAL at 20:31

## 2021-07-10 RX ADMIN — ACETAMINOPHEN 650 MG: 325 TABLET ORAL at 17:13

## 2021-07-10 RX ADMIN — INSULIN LISPRO 15 UNITS: 100 INJECTION, SOLUTION INTRAVENOUS; SUBCUTANEOUS at 07:56

## 2021-07-10 RX ADMIN — OXYCODONE HYDROCHLORIDE 10 MG: 10 TABLET ORAL at 18:00

## 2021-07-10 RX ADMIN — HYDROMORPHONE HYDROCHLORIDE 0.5 MG: 1 INJECTION, SOLUTION INTRAMUSCULAR; INTRAVENOUS; SUBCUTANEOUS at 09:17

## 2021-07-10 RX ADMIN — ERTAPENEM SODIUM 1000 MG: 1 INJECTION, POWDER, LYOPHILIZED, FOR SOLUTION INTRAMUSCULAR; INTRAVENOUS at 15:09

## 2021-07-10 RX ADMIN — SODIUM CHLORIDE, PRESERVATIVE FREE 10 ML: 5 INJECTION INTRAVENOUS at 07:37

## 2021-07-10 RX ADMIN — DOCUSATE SODIUM 100 MG: 100 CAPSULE ORAL at 07:38

## 2021-07-10 ASSESSMENT — PAIN DESCRIPTION - FREQUENCY
FREQUENCY: CONTINUOUS
FREQUENCY: CONTINUOUS

## 2021-07-10 ASSESSMENT — PAIN SCALES - GENERAL
PAINLEVEL_OUTOF10: 0
PAINLEVEL_OUTOF10: 0
PAINLEVEL_OUTOF10: 8
PAINLEVEL_OUTOF10: 9
PAINLEVEL_OUTOF10: 8
PAINLEVEL_OUTOF10: 6
PAINLEVEL_OUTOF10: 7
PAINLEVEL_OUTOF10: 8
PAINLEVEL_OUTOF10: 9
PAINLEVEL_OUTOF10: 4
PAINLEVEL_OUTOF10: 0
PAINLEVEL_OUTOF10: 0
PAINLEVEL_OUTOF10: 7
PAINLEVEL_OUTOF10: 9
PAINLEVEL_OUTOF10: 8
PAINLEVEL_OUTOF10: 0
PAINLEVEL_OUTOF10: 8
PAINLEVEL_OUTOF10: 0
PAINLEVEL_OUTOF10: 0
PAINLEVEL_OUTOF10: 8
PAINLEVEL_OUTOF10: 8
PAINLEVEL_OUTOF10: 6

## 2021-07-10 ASSESSMENT — PAIN DESCRIPTION - ONSET
ONSET: AWAKENED FROM SLEEP
ONSET: AWAKENED FROM SLEEP

## 2021-07-10 ASSESSMENT — PAIN - FUNCTIONAL ASSESSMENT
PAIN_FUNCTIONAL_ASSESSMENT: ACTIVITIES ARE NOT PREVENTED
PAIN_FUNCTIONAL_ASSESSMENT: ACTIVITIES ARE NOT PREVENTED

## 2021-07-10 ASSESSMENT — PAIN DESCRIPTION - PROGRESSION
CLINICAL_PROGRESSION: NOT CHANGED
CLINICAL_PROGRESSION: NOT CHANGED

## 2021-07-10 ASSESSMENT — PAIN DESCRIPTION - LOCATION
LOCATION: ABDOMEN
LOCATION: ABDOMEN

## 2021-07-10 ASSESSMENT — PAIN DESCRIPTION - PAIN TYPE
TYPE: ACUTE PAIN;SURGICAL PAIN
TYPE: ACUTE PAIN;SURGICAL PAIN

## 2021-07-10 ASSESSMENT — PAIN DESCRIPTION - ORIENTATION
ORIENTATION: LEFT
ORIENTATION: LEFT

## 2021-07-10 ASSESSMENT — PAIN DESCRIPTION - DESCRIPTORS
DESCRIPTORS: ACHING;CRAMPING
DESCRIPTORS: ACHING;CRAMPING;DISCOMFORT;DULL

## 2021-07-10 NOTE — PROGRESS NOTES
[facility] SURGERY  DAILY PROGRESS NOTE  7/10/2021  Chief Complaint   Patient presents with    Other     Pt sent over from angio after having 2 abdominal drains inserted. Subjective:  Patient went for IR drainage yesterday, no complications. Patient states he has been sleeping better the past two nights. Denies nausea, vomiting, chest pain, or SOB. Objective:  BP (!) 120/56   Pulse 68   Temp 99.7 °F (37.6 °C) (Temporal)   Resp 20   Ht 5' 8\" (1.727 m)   Wt 186 lb 3.2 oz (84.5 kg)   SpO2 92%   BMI 28.31 kg/m²     GENERAL:  Laying in bed, awake, alert, cooperative, no apparent distress  HEAD: Normocephalic, atraumatic  EYES: No sclera icterus, pupils equal  LUNGS:  Normal work of breathing  CARDIOVASCULAR:  RR  ABDOMEN:  Soft, mildly distended, appropriately tender around midline wound vac with suction intact, LLQ drain with purulent drainage in bag, LUQ IR drain with similar purulent drainage  EXTREMITIES: No edema or swelling  SKIN: Warm and dry    Assessment/Plan:  62 y.o. male with postoperative development of intra-abdominal abscess status post distal pancreatectomy, splenectomy, cholecystectomy on 5/12.  ERCP with pancreatic stent placement on 6/4, s/p IR drain placement x2 6/30. now s/p ERCP with stent exchange 7/2 and s/p IR drainage 7/9.     - Continue diet + supplements  - ID consulted for drain placement check, appreciate their recommendations  - drain flushes BID  - Monitor drain output  - Pain control PRN, wean IV pain meds  - Continue IV antibiotics, appreciate ID recs  - Wound vac turned on and to suction  - Follow-up on AM labs    Electronically signed by Jaymie Luna MD on 7/10/2021 at 7:12 AM

## 2021-07-10 NOTE — PROGRESS NOTES
similar in size compared to that from 07/02; new subtle loculated fluid collection in the left upper abdomen; multiple additional foci of containing gas located in the left upper abdomen similar to prior; new left pleural effusion with left lower lobe atelectasis; stable portal vein thrombosis. He underwent thoracentesis on 07/07, yielding 700 mL of exudative pleural fluid with Gram stain and culture showing few polymorphonuclear leukocytes, no epithelial cells, no organisms, no growth to date. Subjective: Patient was seen and examined. No chills, + abdominal pain, no diarrhea, no rash, no itching. No fever. Tolerating diet. Objective:  /74   Pulse 87   Temp 97.1 °F (36.2 °C) (Temporal)   Resp 20   Ht 5' 8\" (1.727 m)   Wt 186 lb 3.2 oz (84.5 kg)   SpO2 95%   BMI 28.31 kg/m²   Constitutional: Alert, not in distress  Respiratory: Clear breath sounds, no crackles, no wheezes  Cardiovascular: Regular rate and rhythm, no murmurs  Gastrointestinal: Bowel sounds present, soft, nontender. Wound vac abdomen, LUQ dran  Skin: Warm and dry, no active dermatoses  Musculoskeletal: No joint swelling, no joint erythema    Labs, imaging, and medical records/notes were personally reviewed. Assessment:  Sepsis, resolved  Intraabdominal abscess/surgical site infection, s/p percutaneous drain placement on 05/28, inadequate drainage prompting additional percutaneous drain placements on 06/30  Pancreatic duct abscess status post ERCP with exchange of pancreatic duct stent and balloon sweep of pancreatic duct with removal of large amount of debris/pus on 07/02  Recent distal pancreatectomy with splenectomy [place] 35/09 complicated by pancreatic leak s/p pancreatic stent placement on 05/17    Recommendations:  Continue ertapenem 1 g and fluconazole 400 mg every 24 hours. Anticipate at least 4 weeks of IV antibiotic therapy from 07/02-07/30. Continue supportive care.     Thank you for involving me in the care of Lita Doherty ODILON Cook. I will continue to follow. Please do not hesitate to call for any questions or concerns.     Electronically signed by MARGAUX Méndez CNP on 7/10/2021 at 8:21 AM

## 2021-07-10 NOTE — PATIENT CARE CONFERENCE
[facility] Quality Flow/Interdisciplinary Rounds Progress Note        Quality Flow Rounds held on July 10, 2021    Disciplines Attending:  Bedside Nurse, ,  and Nursing Unit Leadership    Junaid Espinal was admitted on 6/30/2021  3:47 PM    Anticipated Discharge Date:  Expected Discharge Date: 07/09/21    Disposition:    Mukund Score:  Mukund Scale Score: 18    Readmission Risk              Risk of Unplanned Readmission:  17           Discussed patient goal for the day, patient clinical progression, and barriers to discharge.   The following Goal(s) of the Day/Commitment(s) have been identified:  trend BS, increase activity      Mitali Lua RN  July 10, 2021

## 2021-07-11 LAB
ALBUMIN SERPL-MCNC: 2.8 G/DL (ref 3.5–5.2)
ALP BLD-CCNC: 70 U/L (ref 40–129)
ALT SERPL-CCNC: 50 U/L (ref 0–40)
ANION GAP SERPL CALCULATED.3IONS-SCNC: 7 MMOL/L (ref 7–16)
AST SERPL-CCNC: 91 U/L (ref 0–39)
BASOPHILS ABSOLUTE: 0.01 E9/L (ref 0–0.2)
BASOPHILS RELATIVE PERCENT: 0.1 % (ref 0–2)
BILIRUB SERPL-MCNC: 0.2 MG/DL (ref 0–1.2)
BUN BLDV-MCNC: 27 MG/DL (ref 6–20)
CALCIUM IONIZED: 1.31 MMOL/L (ref 1.15–1.33)
CALCIUM SERPL-MCNC: 8.4 MG/DL (ref 8.6–10.2)
CHLORIDE BLD-SCNC: 103 MMOL/L (ref 98–107)
CO2: 29 MMOL/L (ref 22–29)
CREAT SERPL-MCNC: 0.7 MG/DL (ref 0.7–1.2)
EOSINOPHILS ABSOLUTE: 0.02 E9/L (ref 0.05–0.5)
EOSINOPHILS RELATIVE PERCENT: 0.2 % (ref 0–6)
GFR AFRICAN AMERICAN: >60
GFR NON-AFRICAN AMERICAN: >60 ML/MIN/1.73
GLUCOSE BLD-MCNC: 150 MG/DL (ref 74–99)
HCT VFR BLD CALC: 25.2 % (ref 37–54)
HEMOGLOBIN: 7.8 G/DL (ref 12.5–16.5)
IMMATURE GRANULOCYTES #: 0.05 E9/L
IMMATURE GRANULOCYTES %: 0.5 % (ref 0–5)
LYMPHOCYTES ABSOLUTE: 1.03 E9/L (ref 1.5–4)
LYMPHOCYTES RELATIVE PERCENT: 10.9 % (ref 20–42)
MAGNESIUM: 2.5 MG/DL (ref 1.6–2.6)
MCH RBC QN AUTO: 21.7 PG (ref 26–35)
MCHC RBC AUTO-ENTMCNC: 31 % (ref 32–34.5)
MCV RBC AUTO: 70.2 FL (ref 80–99.9)
METER GLUCOSE: 144 MG/DL (ref 74–99)
METER GLUCOSE: 152 MG/DL (ref 74–99)
METER GLUCOSE: 177 MG/DL (ref 74–99)
METER GLUCOSE: 227 MG/DL (ref 74–99)
METER GLUCOSE: 231 MG/DL (ref 74–99)
METER GLUCOSE: 233 MG/DL (ref 74–99)
METER GLUCOSE: 297 MG/DL (ref 74–99)
MONOCYTES ABSOLUTE: 1.05 E9/L (ref 0.1–0.95)
MONOCYTES RELATIVE PERCENT: 11.1 % (ref 2–12)
NEUTROPHILS ABSOLUTE: 7.31 E9/L (ref 1.8–7.3)
NEUTROPHILS RELATIVE PERCENT: 77.2 % (ref 43–80)
PDW BLD-RTO: 16.5 FL (ref 11.5–15)
PHOSPHORUS: 3 MG/DL (ref 2.5–4.5)
PLATELET # BLD: 715 E9/L (ref 130–450)
PMV BLD AUTO: 10.4 FL (ref 7–12)
POTASSIUM SERPL-SCNC: 5.2 MMOL/L (ref 3.5–5)
RBC # BLD: 3.59 E12/L (ref 3.8–5.8)
SODIUM BLD-SCNC: 139 MMOL/L (ref 132–146)
TOTAL PROTEIN: 6.6 G/DL (ref 6.4–8.3)
WBC # BLD: 9.5 E9/L (ref 4.5–11.5)

## 2021-07-11 PROCEDURE — 84100 ASSAY OF PHOSPHORUS: CPT

## 2021-07-11 PROCEDURE — 6370000000 HC RX 637 (ALT 250 FOR IP): Performed by: STUDENT IN AN ORGANIZED HEALTH CARE EDUCATION/TRAINING PROGRAM

## 2021-07-11 PROCEDURE — 2700000000 HC OXYGEN THERAPY PER DAY

## 2021-07-11 PROCEDURE — 6360000002 HC RX W HCPCS: Performed by: SURGERY

## 2021-07-11 PROCEDURE — 82962 GLUCOSE BLOOD TEST: CPT

## 2021-07-11 PROCEDURE — 99232 SBSQ HOSP IP/OBS MODERATE 35: CPT | Performed by: INTERNAL MEDICINE

## 2021-07-11 PROCEDURE — 2580000003 HC RX 258: Performed by: STUDENT IN AN ORGANIZED HEALTH CARE EDUCATION/TRAINING PROGRAM

## 2021-07-11 PROCEDURE — 2580000003 HC RX 258: Performed by: RADIOLOGY

## 2021-07-11 PROCEDURE — 83735 ASSAY OF MAGNESIUM: CPT

## 2021-07-11 PROCEDURE — 2140000000 HC CCU INTERMEDIATE R&B

## 2021-07-11 PROCEDURE — 36415 COLL VENOUS BLD VENIPUNCTURE: CPT

## 2021-07-11 PROCEDURE — 2580000003 HC RX 258: Performed by: INTERNAL MEDICINE

## 2021-07-11 PROCEDURE — 85025 COMPLETE CBC W/AUTO DIFF WBC: CPT

## 2021-07-11 PROCEDURE — 6360000002 HC RX W HCPCS: Performed by: INTERNAL MEDICINE

## 2021-07-11 PROCEDURE — 6370000000 HC RX 637 (ALT 250 FOR IP): Performed by: SURGERY

## 2021-07-11 PROCEDURE — 80053 COMPREHEN METABOLIC PANEL: CPT

## 2021-07-11 PROCEDURE — 6370000000 HC RX 637 (ALT 250 FOR IP): Performed by: INTERNAL MEDICINE

## 2021-07-11 PROCEDURE — 82330 ASSAY OF CALCIUM: CPT

## 2021-07-11 PROCEDURE — 36592 COLLECT BLOOD FROM PICC: CPT

## 2021-07-11 RX ADMIN — POLYETHYLENE GLYCOL 3350 17 G: 17 POWDER, FOR SOLUTION ORAL at 20:23

## 2021-07-11 RX ADMIN — INSULIN GLARGINE 25 UNITS: 100 INJECTION, SOLUTION SUBCUTANEOUS at 20:30

## 2021-07-11 RX ADMIN — ACETAMINOPHEN 650 MG: 325 TABLET ORAL at 20:23

## 2021-07-11 RX ADMIN — DOCUSATE SODIUM 50 MG AND SENNOSIDES 8.6 MG 2 TABLET: 8.6; 5 TABLET, FILM COATED ORAL at 07:53

## 2021-07-11 RX ADMIN — ACETAMINOPHEN 650 MG: 325 TABLET ORAL at 11:31

## 2021-07-11 RX ADMIN — HYDROMORPHONE HYDROCHLORIDE 0.5 MG: 1 INJECTION, SOLUTION INTRAMUSCULAR; INTRAVENOUS; SUBCUTANEOUS at 04:51

## 2021-07-11 RX ADMIN — SODIUM CHLORIDE, PRESERVATIVE FREE 10 ML: 5 INJECTION INTRAVENOUS at 06:01

## 2021-07-11 RX ADMIN — PANCRELIPASE 72000 UNITS: 60000; 12000; 38000 CAPSULE, DELAYED RELEASE PELLETS ORAL at 11:31

## 2021-07-11 RX ADMIN — HEPARIN 100 UNITS: 100 SYRINGE at 20:29

## 2021-07-11 RX ADMIN — INSULIN LISPRO 3 UNITS: 100 INJECTION, SOLUTION INTRAVENOUS; SUBCUTANEOUS at 04:02

## 2021-07-11 RX ADMIN — DOCUSATE SODIUM 100 MG: 100 CAPSULE ORAL at 07:53

## 2021-07-11 RX ADMIN — ACETAMINOPHEN 650 MG: 325 TABLET ORAL at 06:02

## 2021-07-11 RX ADMIN — SODIUM CHLORIDE, PRESERVATIVE FREE 10 ML: 5 INJECTION INTRAVENOUS at 04:51

## 2021-07-11 RX ADMIN — SALINE NASAL SPRAY 1 SPRAY: 1.5 SOLUTION NASAL at 20:24

## 2021-07-11 RX ADMIN — SODIUM CHLORIDE, PRESERVATIVE FREE 10 ML: 5 INJECTION INTRAVENOUS at 21:03

## 2021-07-11 RX ADMIN — OXYCODONE HYDROCHLORIDE 10 MG: 10 TABLET ORAL at 08:08

## 2021-07-11 RX ADMIN — INSULIN GLARGINE 25 UNITS: 100 INJECTION, SOLUTION SUBCUTANEOUS at 07:55

## 2021-07-11 RX ADMIN — SODIUM CHLORIDE, PRESERVATIVE FREE 10 ML: 5 INJECTION INTRAVENOUS at 00:05

## 2021-07-11 RX ADMIN — PANTOPRAZOLE SODIUM 40 MG: 40 TABLET, DELAYED RELEASE ORAL at 15:42

## 2021-07-11 RX ADMIN — KETOROLAC TROMETHAMINE 15 MG: 30 INJECTION, SOLUTION INTRAMUSCULAR; INTRAVENOUS at 11:31

## 2021-07-11 RX ADMIN — SALINE NASAL SPRAY 1 SPRAY: 1.5 SOLUTION NASAL at 07:55

## 2021-07-11 RX ADMIN — OXYCODONE HYDROCHLORIDE 10 MG: 10 TABLET ORAL at 16:59

## 2021-07-11 RX ADMIN — INSULIN LISPRO 3 UNITS: 100 INJECTION, SOLUTION INTRAVENOUS; SUBCUTANEOUS at 22:47

## 2021-07-11 RX ADMIN — KETOROLAC TROMETHAMINE 15 MG: 30 INJECTION, SOLUTION INTRAMUSCULAR; INTRAVENOUS at 17:00

## 2021-07-11 RX ADMIN — APIXABAN 5 MG: 5 TABLET, FILM COATED ORAL at 07:53

## 2021-07-11 RX ADMIN — ATORVASTATIN CALCIUM 20 MG: 20 TABLET, FILM COATED ORAL at 20:24

## 2021-07-11 RX ADMIN — OXYCODONE HYDROCHLORIDE 10 MG: 10 TABLET ORAL at 02:45

## 2021-07-11 RX ADMIN — INSULIN LISPRO 6 UNITS: 100 INJECTION, SOLUTION INTRAVENOUS; SUBCUTANEOUS at 00:04

## 2021-07-11 RX ADMIN — POLYETHYLENE GLYCOL 3350 17 G: 17 POWDER, FOR SOLUTION ORAL at 07:53

## 2021-07-11 RX ADMIN — SODIUM CHLORIDE, PRESERVATIVE FREE 10 ML: 5 INJECTION INTRAVENOUS at 20:24

## 2021-07-11 RX ADMIN — CALCIUM POLYCARBOPHIL 625 MG: 625 TABLET, FILM COATED ORAL at 07:53

## 2021-07-11 RX ADMIN — KETOROLAC TROMETHAMINE 15 MG: 30 INJECTION, SOLUTION INTRAMUSCULAR; INTRAVENOUS at 06:02

## 2021-07-11 RX ADMIN — PANCRELIPASE 72000 UNITS: 60000; 12000; 38000 CAPSULE, DELAYED RELEASE PELLETS ORAL at 16:59

## 2021-07-11 RX ADMIN — GABAPENTIN 300 MG: 300 CAPSULE ORAL at 20:24

## 2021-07-11 RX ADMIN — OXYCODONE HYDROCHLORIDE 10 MG: 10 TABLET ORAL at 12:41

## 2021-07-11 RX ADMIN — INSULIN LISPRO 6 UNITS: 100 INJECTION, SOLUTION INTRAVENOUS; SUBCUTANEOUS at 11:32

## 2021-07-11 RX ADMIN — SODIUM CHLORIDE, PRESERVATIVE FREE 10 ML: 5 INJECTION INTRAVENOUS at 04:02

## 2021-07-11 RX ADMIN — ERTAPENEM SODIUM 1000 MG: 1 INJECTION, POWDER, LYOPHILIZED, FOR SOLUTION INTRAMUSCULAR; INTRAVENOUS at 15:43

## 2021-07-11 RX ADMIN — APIXABAN 5 MG: 5 TABLET, FILM COATED ORAL at 20:23

## 2021-07-11 RX ADMIN — SODIUM CHLORIDE, PRESERVATIVE FREE 10 ML: 5 INJECTION INTRAVENOUS at 07:54

## 2021-07-11 RX ADMIN — OXYCODONE HYDROCHLORIDE 10 MG: 10 TABLET ORAL at 21:03

## 2021-07-11 RX ADMIN — HEPARIN 100 UNITS: 100 SYRINGE at 07:53

## 2021-07-11 RX ADMIN — HEPARIN 100 UNITS: 100 SYRINGE at 06:01

## 2021-07-11 RX ADMIN — SODIUM CHLORIDE, PRESERVATIVE FREE 10 ML: 5 INJECTION INTRAVENOUS at 20:23

## 2021-07-11 RX ADMIN — PANCRELIPASE 72000 UNITS: 60000; 12000; 38000 CAPSULE, DELAYED RELEASE PELLETS ORAL at 07:52

## 2021-07-11 RX ADMIN — GABAPENTIN 300 MG: 300 CAPSULE ORAL at 07:53

## 2021-07-11 RX ADMIN — SALINE NASAL SPRAY 1 SPRAY: 1.5 SOLUTION NASAL at 15:43

## 2021-07-11 RX ADMIN — KETOROLAC TROMETHAMINE 15 MG: 30 INJECTION, SOLUTION INTRAMUSCULAR; INTRAVENOUS at 00:05

## 2021-07-11 RX ADMIN — DOCUSATE SODIUM 100 MG: 100 CAPSULE ORAL at 20:23

## 2021-07-11 RX ADMIN — INSULIN LISPRO 9 UNITS: 100 INJECTION, SOLUTION INTRAVENOUS; SUBCUTANEOUS at 15:43

## 2021-07-11 RX ADMIN — ACETAMINOPHEN 650 MG: 325 TABLET ORAL at 15:42

## 2021-07-11 RX ADMIN — INSULIN LISPRO 3 UNITS: 100 INJECTION, SOLUTION INTRAVENOUS; SUBCUTANEOUS at 06:23

## 2021-07-11 RX ADMIN — FLUCONAZOLE 400 MG: 100 TABLET ORAL at 07:52

## 2021-07-11 RX ADMIN — PANTOPRAZOLE SODIUM 40 MG: 40 TABLET, DELAYED RELEASE ORAL at 07:53

## 2021-07-11 RX ADMIN — INSULIN LISPRO 9 UNITS: 100 INJECTION, SOLUTION INTRAVENOUS; SUBCUTANEOUS at 18:54

## 2021-07-11 RX ADMIN — Medication 10 ML: at 04:02

## 2021-07-11 ASSESSMENT — PAIN SCALES - GENERAL
PAINLEVEL_OUTOF10: 7
PAINLEVEL_OUTOF10: 9
PAINLEVEL_OUTOF10: 9
PAINLEVEL_OUTOF10: 10
PAINLEVEL_OUTOF10: 6
PAINLEVEL_OUTOF10: 0
PAINLEVEL_OUTOF10: 7
PAINLEVEL_OUTOF10: 0
PAINLEVEL_OUTOF10: 4
PAINLEVEL_OUTOF10: 6
PAINLEVEL_OUTOF10: 8
PAINLEVEL_OUTOF10: 0
PAINLEVEL_OUTOF10: 8
PAINLEVEL_OUTOF10: 4
PAINLEVEL_OUTOF10: 7
PAINLEVEL_OUTOF10: 6

## 2021-07-11 ASSESSMENT — PAIN DESCRIPTION - DESCRIPTORS: DESCRIPTORS: ACHING;DISCOMFORT

## 2021-07-11 ASSESSMENT — PAIN DESCRIPTION - FREQUENCY: FREQUENCY: CONTINUOUS

## 2021-07-11 ASSESSMENT — PAIN DESCRIPTION - LOCATION: LOCATION: ABDOMEN

## 2021-07-11 ASSESSMENT — PAIN - FUNCTIONAL ASSESSMENT: PAIN_FUNCTIONAL_ASSESSMENT: ACTIVITIES ARE NOT PREVENTED

## 2021-07-11 ASSESSMENT — PAIN DESCRIPTION - PROGRESSION: CLINICAL_PROGRESSION: NOT CHANGED

## 2021-07-11 ASSESSMENT — PAIN DESCRIPTION - ONSET: ONSET: AWAKENED FROM SLEEP

## 2021-07-11 ASSESSMENT — PAIN DESCRIPTION - ORIENTATION: ORIENTATION: LEFT

## 2021-07-11 ASSESSMENT — PAIN DESCRIPTION - PAIN TYPE: TYPE: ACUTE PAIN;SURGICAL PAIN

## 2021-07-11 NOTE — PATIENT CARE CONFERENCE
[facility] Quality Flow/Interdisciplinary Rounds Progress Note        Quality Flow Rounds held on July 11, 2021    Disciplines Attending:  Bedside Nurse, ,  and Nursing Unit Leadership    Gladis Phelps was admitted on 6/30/2021  3:47 PM    Anticipated Discharge Date:  Expected Discharge Date: 07/09/21    Disposition:    Mukund Score:  Mukund Scale Score: 19    Readmission Risk              Risk of Unplanned Readmission:  22           Discussed patient goal for the day, patient clinical progression, and barriers to discharge.   The following Goal(s) of the Day/Commitment(s) have been identified:  Labs - Report Results, discharge Ron Bustos RN  July 11, 2021

## 2021-07-11 NOTE — PLAN OF CARE
Problem: Falls - Risk of:  Goal: Will remain free from falls  Description: Will remain free from falls  7/11/2021 1555 by Elizabeth Alegria RN  Outcome: Met This Shift  7/11/2021 1551 by Elizabeth Alegria RN  Outcome: Met This Shift  Goal: Absence of physical injury  Description: Absence of physical injury  7/11/2021 1555 by Elizabeth Alegria RN  Outcome: Met This Shift  7/11/2021 1551 by Elizabeth Alegria RN  Outcome: Met This Shift

## 2021-07-11 NOTE — PROGRESS NOTES
Pulmonary [address] [facility]                      Pulmonary Consult Luh Kapadia Note :      CC follow up of Pleural effusion     HPI   Doing better s/p thoracentesis  700 ml was removed  Stated that his shortness of breath has improved significantly after thoracentesis  Has abdomen drain   Has  fever or chills  On 2 L        PHYSICAL EXAM:      Vitals:    /79   Pulse 93   Temp 96.8 °F (36 °C) (Temporal)   Resp 18   Ht 5' 8\" (1.727 m)   Wt 186 lb 3.2 oz (84.5 kg)   SpO2 95%   BMI 28.31 kg/m²     EYES:  Lids and lashes normal, pupils equal, round and reactive to light, extra ocular muscles intact, sclera clear, conjunctiva normal  ENT:  Normocephalic, without obvious abnormality, atraumatic, sinuses nontender on palpation, external ears without lesions, oral pharynx with moist mucus membranes, tonsils without erythema or exudates, gums normal and good dentition. NECK:  Supple, symmetrical, trachea midline, no adenopathy, thyroid symmetric, not enlarged and no tenderness, skin normal  LUNGS:  Minimal bibasal ronchi  CARDIOVASCULAR:  Normal apical impulse, regular rate and rhythm, normal S1 and S2, no S3 or S4, and no murmur noted  ABDOMEN:  (+) abdominal drains  MUSCULOSKELETAL:  There is no redness, warmth, or swelling of the joints. Full range of motion noted. NEUROLOGIC:  Awake, alert, oriented to name, place and time. Cranial nerves II-XII are grossly intact.     DATA:    CBC:   Recent Labs     07/08/21  0500 07/09/21  0500 07/10/21  0530   WBC 11.2 9.4 9.7   HGB 7.2* 7.3* 8.1*   HCT 23.5* 23.2* 26.7*   MCV 71.0* 70.7* 71.4*   * 589* 674*       BMP:  Recent Labs     07/08/21  0500 07/09/21  0500 07/10/21  0530    136 133   K 3.2* 3.7 4.4    100 98   CO2 28 27 25   PHOS 3.2 2.7 2.7   BUN 6 8 20   CREATININE 0.7 0.7 0.7    ALB:3,BILIDIR:3,BILITOT:3,ALKPHOS:3)@    PT/INR:   Recent Labs     07/09/21  0741   PROTIME 20.8*   INR 1.9       ABG:   No results for input(s): PH, PO2, PCO2, HCO3, BE, O2SAT, METHB, O2HB, COHB, O2CON, HHB, THB in the last 72 hours. FiO2 : 21 %       Radiology Review:  CT abdomen lower lung cuts with left pleural effusion with congestion/atelectasis left lower lobe which appears increased from previous CT abdomen on 6/30    IMPRESSION/RECOMMENDATIONS:      Pleural effusion  Hypoxia  Pancreatitis    1.s/p thoracentesis ,left side,700 ml ,done 2 days ago  2-fluid are exudative which can be seen in pancreatitis  3- on 1 L ,beathing better   CXR as needed  . encourged incentive spirometry ,a lot of atelectasis   3- Elquis   2. Incentive spirometer, encourage use  3.  Nasal saline tid and humidity bottle to see if can decongest nose and help with secretions and cough  4-ID following    Chencho Gregorio MD,[facility]  Pulmonary&Critical Care Medicine   Director of [address] [place] [address] Director of [address] [facility]    Sanjay Tijerina

## 2021-07-11 NOTE — PROGRESS NOTES
Pulmonary [address] [facility]                      Pulmonary Consult Jl Cabrales Note :      CC follow up of Pleural effusion     HPI   Doing better s/p thoracentesis  700 ml was removed  Stated that his shortness of breath has improved significantly after thoracentesis  Has abdomen drain   Has  fever or chills  On RA and at time 1 L        PHYSICAL EXAM:      Vitals:    /73   Pulse 90   Temp 95.9 °F (35.5 °C) (Temporal)   Resp 18   Ht 5' 8\" (1.727 m)   Wt 186 lb 3.2 oz (84.5 kg)   SpO2 94%   BMI 28.31 kg/m²     EYES:  Lids and lashes normal, pupils equal, round and reactive to light, extra ocular muscles intact, sclera clear, conjunctiva normal  ENT:  Normocephalic, without obvious abnormality, atraumatic, sinuses nontender on palpation, external ears without lesions, oral pharynx with moist mucus membranes, tonsils without erythema or exudates, gums normal and good dentition. NECK:  Supple, symmetrical, trachea midline, no adenopathy, thyroid symmetric, not enlarged and no tenderness, skin normal  LUNGS:  Minimal bibasal ronchi  CARDIOVASCULAR:  Normal apical impulse, regular rate and rhythm, normal S1 and S2, no S3 or S4, and no murmur noted  ABDOMEN:  (+) abdominal drains  MUSCULOSKELETAL:  There is no redness, warmth, or swelling of the joints. Full range of motion noted. NEUROLOGIC:  Awake, alert, oriented to name, place and time. Cranial nerves II-XII are grossly intact.     DATA:    CBC:   Recent Labs     07/09/21  0500 07/10/21  0530 07/11/21  0430   WBC 9.4 9.7 9.5   HGB 7.3* 8.1* 7.8*   HCT 23.2* 26.7* 25.2*   MCV 70.7* 71.4* 70.2*   * 674* 715*       BMP:  Recent Labs     07/09/21  0500 07/10/21  0530 07/11/21  0430    133 139   K 3.7 4.4 5.2*    98 103   CO2 27 25 29   PHOS 2.7 2.7 3.0   BUN 8 20 27*   CREATININE 0.7 0.7 0.7    ALB:3,BILIDIR:3,BILITOT:3,ALKPHOS:3)@    PT/INR:   Recent Labs     07/09/21  0741   PROTIME 20.8*   INR 1.9 ABG:   No results for input(s): PH, PO2, PCO2, HCO3, BE, O2SAT, METHB, O2HB, COHB, O2CON, HHB, THB in the last 72 hours. FiO2 : 21 %       Radiology Review:  CT abdomen lower lung cuts with left pleural effusion with congestion/atelectasis left lower lobe which appears increased from previous CT abdomen on 6/30    IMPRESSION/RECOMMENDATIONS:      Pleural effusion  Hypoxia  Pancreatitis    Plan  Continue with same plan  1.s/p thoracentesis ,left side,700 ml ,done 2 days ago  2-fluid are exudative which can be seen in pancreatitis  3- on 1 L ,beathing better   CXR as needed  . encourged incentive spirometry ,a lot of atelectasis   3- Elquis   2. Incentive spirometer, encourage use  3.  Nasal saline tid and humidity bottle to see if can decongest nose and help with secretions and cough  4-ID following    Chencho Gregorio MD,[facility]  Pulmonary&Critical Care Medicine   Director of [address] [place] [address] Director of [address] [facility]    August Berry

## 2021-07-11 NOTE — PROGRESS NOTES
[facility] PROGRESS NOTE      Chief complaint: Follow-up of intra-abdominal abscess     The patient is a 62 y.o. 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 coli, anaerobic gram-negative rods, coagulase-negative Staphylococcus, Cronobacter sakazakii for which he was given a course of ciprofloxacin and metronidazole for 4 weeks until 06/25, presented on 06/30 with nausea and vomiting for 4 days. He had percutaneous drain removed on 06/23 with resolution of anterior fluid collection but other surgical drain continued to have more purulent fluid collection, prompting another percutaneous drain placement on 06/30 and subsequent admission for possible possible ERCP with stent repositioning. Abscess fluid Gram stain and culture showed abundant polymorphonuclear leukocytes, no epithelial cells, abundant gram variable rods, mixed alphahemolytic Streptococcus species, light growth of Klebsiella oxytoca (non-ESBL; resistant to ampicillin; susceptible to fluoroquinolones and co-trimoxazole), heavy growth of alphahemolytic Streptococcus, MSSA and Lactobacillus, light growth of Candida albicans. On admission, he was afebrile and hemodynamically stable with no leukocytosis. Piperacillin-tazobactam, fluconazole and vancomycin were started on admission. He underwent ERCP with exchange of pancreatic duct stent and balloon sweep of pancreatic duct with removal of large amount of debris/pus on 07/02.   Repeat CT abdomen and pelvis on 07/04 showed abscess in mid and left upper abdomen with 2 pigtail catheters in the right and left aspect of the collection, similar in size compared to that from 07/02; new subtle loculated fluid collection in the left upper abdomen; multiple additional foci of containing gas located in the left upper abdomen similar to prior; new left pleural effusion with left lower lobe atelectasis; stable portal vein thrombosis. He underwent thoracentesis on 07/07, yielding 700 mL of exudative pleural fluid with Gram stain and culture showing few polymorphonuclear leukocytes, no epithelial cells, no organisms, no growth to date. Subjective: Patient was seen and examined. No chills, + abdominal pain, no diarrhea, no rash, no itching. No fever. Tolerating diet. Objective:  /69   Pulse 68   Temp 96.7 °F (35.9 °C) (Temporal)   Resp 20   Ht 5' 8\" (1.727 m)   Wt 186 lb 3.2 oz (84.5 kg)   SpO2 95%   BMI 28.31 kg/m²   Constitutional: Alert, not in distress  Respiratory: Clear breath sounds, no crackles, no wheezes  Cardiovascular: Regular rate and rhythm, no murmurs  Gastrointestinal: Bowel sounds present, soft, nontender. Wound vac abdomen, LUQ and LLQ drain  Skin: Warm and dry, no active dermatoses  Musculoskeletal: No joint swelling, no joint erythema  Right midline 7/7/21    Labs, imaging, and medical records/notes were personally reviewed. Assessment:  Sepsis, resolved  Intraabdominal abscess/surgical site infection, s/p percutaneous drain placement on 05/28, inadequate drainage prompting additional percutaneous drain placements on 06/30  Pancreatic duct abscess status post ERCP with exchange of pancreatic duct stent and balloon sweep of pancreatic duct with removal of large amount of debris/pus on 07/02  Recent distal pancreatectomy with splenectomy KZ 49/29 complicated by pancreatic leak s/p pancreatic stent placement on 05/17  WBC 9500     Recommendations:  Continue ertapenem 1 g and fluconazole 400 mg every 24 hours. Anticipate at least 4 weeks of IV antibiotic therapy from 07/02-07/30. Continue supportive care.     Thank you for involving me in the care of Melony Ríos. .    Electronically signed by MARGAUX Giang CNP on 7/11/2021 at 7:54 AM   Pt seen and examined. Above discussed agree with advanced practice nurse. Labs, cultures, and radiographs reviewed. Face to Face encounter occurred. Changes made as necessary.      Colby Coombs MD

## 2021-07-11 NOTE — PROGRESS NOTES
B SURGERY  DAILY PROGRESS NOTE  7/11/2021  Chief Complaint   Patient presents with    Other     Pt sent over from angio after having 2 abdominal drains inserted. Subjective:  Patient reports feeling better overall. No acute events overnight. He is endorsing no nausea or vomiting. He had a bowel movement overnight. He is tolerating his diet. There is still some drainage coming from around his drain site, but drain output has decreased overall. Objective:  /64   Pulse 68   Temp 98 °F (36.7 °C) (Oral)   Resp 16   Ht 5' 8\" (1.727 m)   Wt 186 lb 3.2 oz (84.5 kg)   SpO2 95%   BMI 28.31 kg/m²     GENERAL:  Laying in bed, awake, alert, cooperative, no apparent distress  HEAD: Normocephalic, atraumatic  EYES: No sclera icterus, pupils equal  LUNGS:  Normal work of breathing  CARDIOVASCULAR:  RR  ABDOMEN:  Soft, mildly distended, appropriately tender around midline wound vac with suction intact, LLQ drain with minimal drainage in bag, LUQ IR drain with similar purulent drainage  EXTREMITIES: No edema or swelling  SKIN: Warm and dry    Assessment/Plan:  62 y.o. male with postoperative development of intra-abdominal abscess status post distal pancreatectomy, splenectomy, cholecystectomy on 5/12. ERCP with pancreatic stent placement on 6/4, s/p IR drain placement x2 6/30. now s/p ERCP with stent exchange 7/2 and s/p IR drainage 7/9.     - Continue diet + supplements  - drain flushes BID  - Monitor drain output  - Stop Dilaudid, trial oral pain medications  - Continue IV antibiotics, appreciate ID recs  - Wound vac turned on and to suction  - Follow-up on AM labs    Electronically signed by Kev Caraballo MD on 7/11/2021 at 7:09 AM     I saw and examined the patient. I reviewed the above resident's note. I agree with the assessment and plan as outlined.     Dc planning - hopefully in AM    Anyi Rodriguez MD  General Surgery

## 2021-07-12 LAB
ALBUMIN SERPL-MCNC: 2.8 G/DL (ref 3.5–5.2)
ALP BLD-CCNC: 61 U/L (ref 40–129)
ALT SERPL-CCNC: 67 U/L (ref 0–40)
ANION GAP SERPL CALCULATED.3IONS-SCNC: 11 MMOL/L (ref 7–16)
ANISOCYTOSIS: ABNORMAL
AST SERPL-CCNC: 68 U/L (ref 0–39)
BASOPHILS ABSOLUTE: 0 E9/L (ref 0–0.2)
BASOPHILS RELATIVE PERCENT: 0.2 % (ref 0–2)
BILIRUB SERPL-MCNC: 0.2 MG/DL (ref 0–1.2)
BUN BLDV-MCNC: 18 MG/DL (ref 6–20)
CALCIUM IONIZED: 1.28 MMOL/L (ref 1.15–1.33)
CALCIUM SERPL-MCNC: 8.8 MG/DL (ref 8.6–10.2)
CHLORIDE BLD-SCNC: 101 MMOL/L (ref 98–107)
CO2: 27 MMOL/L (ref 22–29)
CREAT SERPL-MCNC: 0.8 MG/DL (ref 0.7–1.2)
EOSINOPHILS ABSOLUTE: 0.33 E9/L (ref 0.05–0.5)
EOSINOPHILS RELATIVE PERCENT: 2.6 % (ref 0–6)
GFR AFRICAN AMERICAN: >60
GFR NON-AFRICAN AMERICAN: >60 ML/MIN/1.73
GLUCOSE BLD-MCNC: 90 MG/DL (ref 74–99)
HCT VFR BLD CALC: 28.4 % (ref 37–54)
HEMOGLOBIN: 8.5 G/DL (ref 12.5–16.5)
HYPOCHROMIA: ABNORMAL
LYMPHOCYTES ABSOLUTE: 1.89 E9/L (ref 1.5–4)
LYMPHOCYTES RELATIVE PERCENT: 14.8 % (ref 20–42)
MAGNESIUM: 2.3 MG/DL (ref 1.6–2.6)
MCH RBC QN AUTO: 21.5 PG (ref 26–35)
MCHC RBC AUTO-ENTMCNC: 29.9 % (ref 32–34.5)
MCV RBC AUTO: 71.7 FL (ref 80–99.9)
METER GLUCOSE: 102 MG/DL (ref 74–99)
METER GLUCOSE: 102 MG/DL (ref 74–99)
METER GLUCOSE: 153 MG/DL (ref 74–99)
METER GLUCOSE: 194 MG/DL (ref 74–99)
METER GLUCOSE: 204 MG/DL (ref 74–99)
METER GLUCOSE: 71 MG/DL (ref 74–99)
MONOCYTES ABSOLUTE: 2.65 E9/L (ref 0.1–0.95)
MONOCYTES RELATIVE PERCENT: 20.9 % (ref 2–12)
NEUTROPHILS ABSOLUTE: 7.81 E9/L (ref 1.8–7.3)
NEUTROPHILS RELATIVE PERCENT: 61.7 % (ref 43–80)
PDW BLD-RTO: 16.8 FL (ref 11.5–15)
PHOSPHORUS: 2 MG/DL (ref 2.5–4.5)
PLATELET # BLD: 784 E9/L (ref 130–450)
PMV BLD AUTO: 10.9 FL (ref 7–12)
POIKILOCYTES: ABNORMAL
POLYCHROMASIA: ABNORMAL
POTASSIUM SERPL-SCNC: 5 MMOL/L (ref 3.5–5)
RBC # BLD: 3.96 E12/L (ref 3.8–5.8)
SODIUM BLD-SCNC: 139 MMOL/L (ref 132–146)
TARGET CELLS: ABNORMAL
TOTAL PROTEIN: 7.3 G/DL (ref 6.4–8.3)
WBC # BLD: 12.6 E9/L (ref 4.5–11.5)

## 2021-07-12 PROCEDURE — 2580000003 HC RX 258: Performed by: STUDENT IN AN ORGANIZED HEALTH CARE EDUCATION/TRAINING PROGRAM

## 2021-07-12 PROCEDURE — 99232 SBSQ HOSP IP/OBS MODERATE 35: CPT | Performed by: INTERNAL MEDICINE

## 2021-07-12 PROCEDURE — 2580000003 HC RX 258: Performed by: INTERNAL MEDICINE

## 2021-07-12 PROCEDURE — 6370000000 HC RX 637 (ALT 250 FOR IP): Performed by: STUDENT IN AN ORGANIZED HEALTH CARE EDUCATION/TRAINING PROGRAM

## 2021-07-12 PROCEDURE — 36592 COLLECT BLOOD FROM PICC: CPT

## 2021-07-12 PROCEDURE — 82962 GLUCOSE BLOOD TEST: CPT

## 2021-07-12 PROCEDURE — 85025 COMPLETE CBC W/AUTO DIFF WBC: CPT

## 2021-07-12 PROCEDURE — 2140000000 HC CCU INTERMEDIATE R&B

## 2021-07-12 PROCEDURE — 80053 COMPREHEN METABOLIC PANEL: CPT

## 2021-07-12 PROCEDURE — 6370000000 HC RX 637 (ALT 250 FOR IP): Performed by: INTERNAL MEDICINE

## 2021-07-12 PROCEDURE — 82330 ASSAY OF CALCIUM: CPT

## 2021-07-12 PROCEDURE — 2580000003 HC RX 258: Performed by: RADIOLOGY

## 2021-07-12 PROCEDURE — 97530 THERAPEUTIC ACTIVITIES: CPT

## 2021-07-12 PROCEDURE — 6370000000 HC RX 637 (ALT 250 FOR IP): Performed by: SURGERY

## 2021-07-12 PROCEDURE — 6360000002 HC RX W HCPCS: Performed by: SURGERY

## 2021-07-12 PROCEDURE — 84100 ASSAY OF PHOSPHORUS: CPT

## 2021-07-12 PROCEDURE — 36415 COLL VENOUS BLD VENIPUNCTURE: CPT

## 2021-07-12 PROCEDURE — 6360000002 HC RX W HCPCS: Performed by: INTERNAL MEDICINE

## 2021-07-12 PROCEDURE — 97605 NEG PRS WND THER DME<=50SQCM: CPT

## 2021-07-12 PROCEDURE — 97162 PT EVAL MOD COMPLEX 30 MIN: CPT

## 2021-07-12 PROCEDURE — 83735 ASSAY OF MAGNESIUM: CPT

## 2021-07-12 RX ORDER — DIAZEPAM 5 MG/1
5 TABLET ORAL EVERY 6 HOURS PRN
Status: DISCONTINUED | OUTPATIENT
Start: 2021-07-12 | End: 2021-07-15 | Stop reason: HOSPADM

## 2021-07-12 RX ORDER — DIPHENHYDRAMINE HCL 25 MG
50 TABLET ORAL ONCE
Status: COMPLETED | OUTPATIENT
Start: 2021-07-14 | End: 2021-07-14

## 2021-07-12 RX ADMIN — GABAPENTIN 300 MG: 300 CAPSULE ORAL at 08:03

## 2021-07-12 RX ADMIN — FLUCONAZOLE 400 MG: 100 TABLET ORAL at 08:03

## 2021-07-12 RX ADMIN — HEPARIN 100 UNITS: 100 SYRINGE at 21:22

## 2021-07-12 RX ADMIN — KETOROLAC TROMETHAMINE 15 MG: 30 INJECTION, SOLUTION INTRAMUSCULAR; INTRAVENOUS at 11:39

## 2021-07-12 RX ADMIN — INSULIN LISPRO 3 UNITS: 100 INJECTION, SOLUTION INTRAVENOUS; SUBCUTANEOUS at 21:29

## 2021-07-12 RX ADMIN — SODIUM CHLORIDE, PRESERVATIVE FREE 10 ML: 5 INJECTION INTRAVENOUS at 21:22

## 2021-07-12 RX ADMIN — SODIUM CHLORIDE, PRESERVATIVE FREE 10 ML: 5 INJECTION INTRAVENOUS at 21:28

## 2021-07-12 RX ADMIN — PANTOPRAZOLE SODIUM 40 MG: 40 TABLET, DELAYED RELEASE ORAL at 08:02

## 2021-07-12 RX ADMIN — SODIUM CHLORIDE, PRESERVATIVE FREE 10 ML: 5 INJECTION INTRAVENOUS at 08:02

## 2021-07-12 RX ADMIN — ACETAMINOPHEN 650 MG: 325 TABLET ORAL at 21:27

## 2021-07-12 RX ADMIN — ACETAMINOPHEN 650 MG: 325 TABLET ORAL at 17:18

## 2021-07-12 RX ADMIN — PANCRELIPASE 72000 UNITS: 60000; 12000; 38000 CAPSULE, DELAYED RELEASE PELLETS ORAL at 17:18

## 2021-07-12 RX ADMIN — POLYETHYLENE GLYCOL 3350 17 G: 17 POWDER, FOR SOLUTION ORAL at 21:27

## 2021-07-12 RX ADMIN — ATORVASTATIN CALCIUM 20 MG: 20 TABLET, FILM COATED ORAL at 21:27

## 2021-07-12 RX ADMIN — INSULIN LISPRO 6 UNITS: 100 INJECTION, SOLUTION INTRAVENOUS; SUBCUTANEOUS at 15:37

## 2021-07-12 RX ADMIN — HEPARIN 100 UNITS: 100 SYRINGE at 08:02

## 2021-07-12 RX ADMIN — KETOROLAC TROMETHAMINE 15 MG: 30 INJECTION, SOLUTION INTRAMUSCULAR; INTRAVENOUS at 17:19

## 2021-07-12 RX ADMIN — CALCIUM POLYCARBOPHIL 625 MG: 625 TABLET, FILM COATED ORAL at 08:04

## 2021-07-12 RX ADMIN — POLYETHYLENE GLYCOL 3350 17 G: 17 POWDER, FOR SOLUTION ORAL at 08:08

## 2021-07-12 RX ADMIN — OXYCODONE HYDROCHLORIDE 10 MG: 10 TABLET ORAL at 09:38

## 2021-07-12 RX ADMIN — DOCUSATE SODIUM 100 MG: 100 CAPSULE ORAL at 08:08

## 2021-07-12 RX ADMIN — INSULIN GLARGINE 25 UNITS: 100 INJECTION, SOLUTION SUBCUTANEOUS at 21:28

## 2021-07-12 RX ADMIN — KETOROLAC TROMETHAMINE 15 MG: 30 INJECTION, SOLUTION INTRAMUSCULAR; INTRAVENOUS at 05:55

## 2021-07-12 RX ADMIN — PANTOPRAZOLE SODIUM 40 MG: 40 TABLET, DELAYED RELEASE ORAL at 15:37

## 2021-07-12 RX ADMIN — OXYCODONE HYDROCHLORIDE 10 MG: 10 TABLET ORAL at 01:09

## 2021-07-12 RX ADMIN — ACETAMINOPHEN 650 MG: 325 TABLET ORAL at 14:00

## 2021-07-12 RX ADMIN — ERTAPENEM SODIUM 1000 MG: 1 INJECTION, POWDER, LYOPHILIZED, FOR SOLUTION INTRAMUSCULAR; INTRAVENOUS at 15:36

## 2021-07-12 RX ADMIN — SALINE NASAL SPRAY 1 SPRAY: 1.5 SOLUTION NASAL at 15:42

## 2021-07-12 RX ADMIN — DOCUSATE SODIUM 100 MG: 100 CAPSULE ORAL at 21:28

## 2021-07-12 RX ADMIN — OXYCODONE HYDROCHLORIDE 10 MG: 10 TABLET ORAL at 18:18

## 2021-07-12 RX ADMIN — GABAPENTIN 300 MG: 300 CAPSULE ORAL at 21:28

## 2021-07-12 RX ADMIN — PANCRELIPASE 72000 UNITS: 60000; 12000; 38000 CAPSULE, DELAYED RELEASE PELLETS ORAL at 10:52

## 2021-07-12 RX ADMIN — ACETAMINOPHEN 650 MG: 325 TABLET ORAL at 10:52

## 2021-07-12 RX ADMIN — OXYCODONE HYDROCHLORIDE 10 MG: 10 TABLET ORAL at 05:12

## 2021-07-12 RX ADMIN — DOCUSATE SODIUM 50 MG AND SENNOSIDES 8.6 MG 2 TABLET: 8.6; 5 TABLET, FILM COATED ORAL at 08:02

## 2021-07-12 RX ADMIN — OXYCODONE HYDROCHLORIDE 10 MG: 10 TABLET ORAL at 14:00

## 2021-07-12 RX ADMIN — DIAZEPAM 5 MG: 5 TABLET ORAL at 22:33

## 2021-07-12 RX ADMIN — SALINE NASAL SPRAY 1 SPRAY: 1.5 SOLUTION NASAL at 21:31

## 2021-07-12 RX ADMIN — KETOROLAC TROMETHAMINE 15 MG: 30 INJECTION, SOLUTION INTRAMUSCULAR; INTRAVENOUS at 00:00

## 2021-07-12 RX ADMIN — SODIUM CHLORIDE, PRESERVATIVE FREE 10 ML: 5 INJECTION INTRAVENOUS at 05:13

## 2021-07-12 RX ADMIN — Medication 10 ML: at 05:46

## 2021-07-12 RX ADMIN — SALINE NASAL SPRAY 1 SPRAY: 1.5 SOLUTION NASAL at 08:04

## 2021-07-12 RX ADMIN — ACETAMINOPHEN 650 MG: 325 TABLET ORAL at 05:46

## 2021-07-12 RX ADMIN — OXYCODONE HYDROCHLORIDE 10 MG: 10 TABLET ORAL at 22:33

## 2021-07-12 RX ADMIN — HEPARIN 100 UNITS: 100 SYRINGE at 05:12

## 2021-07-12 ASSESSMENT — PAIN SCALES - GENERAL
PAINLEVEL_OUTOF10: 0
PAINLEVEL_OUTOF10: 0
PAINLEVEL_OUTOF10: 7
PAINLEVEL_OUTOF10: 3
PAINLEVEL_OUTOF10: 7
PAINLEVEL_OUTOF10: 8
PAINLEVEL_OUTOF10: 7
PAINLEVEL_OUTOF10: 1
PAINLEVEL_OUTOF10: 7
PAINLEVEL_OUTOF10: 8
PAINLEVEL_OUTOF10: 0
PAINLEVEL_OUTOF10: 10
PAINLEVEL_OUTOF10: 10

## 2021-07-12 ASSESSMENT — PAIN DESCRIPTION - LOCATION: LOCATION: ABDOMEN

## 2021-07-12 ASSESSMENT — PAIN - FUNCTIONAL ASSESSMENT: PAIN_FUNCTIONAL_ASSESSMENT: ACTIVITIES ARE NOT PREVENTED

## 2021-07-12 ASSESSMENT — PAIN DESCRIPTION - FREQUENCY: FREQUENCY: CONTINUOUS

## 2021-07-12 ASSESSMENT — PAIN DESCRIPTION - PAIN TYPE: TYPE: SURGICAL PAIN

## 2021-07-12 ASSESSMENT — PAIN DESCRIPTION - ORIENTATION: ORIENTATION: MID

## 2021-07-12 ASSESSMENT — PAIN DESCRIPTION - PROGRESSION: CLINICAL_PROGRESSION: NOT CHANGED

## 2021-07-12 ASSESSMENT — PAIN DESCRIPTION - DESCRIPTORS: DESCRIPTORS: ACHING;DULL;DISCOMFORT

## 2021-07-12 ASSESSMENT — PAIN DESCRIPTION - ONSET: ONSET: AWAKENED FROM SLEEP

## 2021-07-12 NOTE — PROGRESS NOTES
Dr. Kyra Valladares notified that IR procedure will done on Wednesday due to needing Eliquis held for 48 hours. Also, notified that Lantus is held due to NPO status this am and Blood sugar is now 71.  Okay to hold lantus

## 2021-07-12 NOTE — PROGRESS NOTES
Went to bedside to assess the patient due to wound vac issues. Upon inspection, wound vac had some purulent drainage that was contained underneath the duoderm that is protecting the skin. Took off the inferior aspect of the wound vac to assess. Wiped away the fluid that was irritating the skin of the patient. Replaced duoderm and wound vac had a good seal. Pt states he feels better and there was no further drainage from the wound vac. Plan for full wound vac change tomorrow, 7/12.     Electronically signed by Magali Boyd MD on 7/11/2021 at 10:01 PM

## 2021-07-12 NOTE — PROGRESS NOTES
Physical Therapy  Physical Therapy Initial Assessment     Name: Alexandre Mott  : 1960  MRN: [id number]      Date of Service: 2021    Evaluating PT:  Alyssa Arnold, PT, DPT [id number]    Room #:  8111/3735-T  Diagnosis:  Intra-abdominal infection [B99.9]  PMHx/PSHx:  DM, arthritis, pancreatectomy, splenectomy, cholecystectomy, multiple ERCP  Procedure/Surgery:  ERCP with stent replacement  Precautions:  Falls, Wound Vac, Pig tail  Equipment Needs:  Owns ww at home    SUBJECTIVE:    Pt lives alone in apt on 9th floor, has elevator access. Pt previously independent with functional mobility and ADL activity prior to admission. OBJECTIVE:   Initial Evaluation  Date: 21 Treatment Short Term/ Long Term   Goals   AM-PAC 6 Clicks 76/05     Was pt agreeable to Eval/treatment? yes     Does pt have pain? Yes, 9/10 in L drain insertion site     Bed Mobility  Rolling: sup  Supine to sit: sup  Sit to supine: NT  Scooting: sup  Rolling: mod I  Supine to sit: mod I  Sit to supine: mod I  Scooting: Mod I   Transfers Sit to stand: sup  Stand to sit: sup  Stand pivot: SBA  Sit to stand: mod I  Stand to sit:  Mod I  Stand pivot: Mod I   Ambulation    200'x2 without AD, SBA  400', IND   Stair negotiation: ascended and descended NT  12 steps 1 HR , mod I   ROM BUE:  WFL  BLE:  WFL     Strength BUE:  WFL  BLE:  WFL  n/a   Balance Sitting EOB:  sup  Dynamic Standing:  SBA  Sitting EOB:  sup  Dynamic Standing:  SBA     Pt is A & O x 3, oriented to person, place, and time. Sensation:  Pt reports baseline paraesthesias in toes  Edema:  Abdominal distention noted    Vitals:  Blood Pressure at rest NT Blood Pressure post session NT   Heart Rate at rest 92 Heart Rate post session 94   SPO2 at rest 96 SPO2 post session 96         Patient education  Pt educated on role of PT, transfers, balance, gait, energy conservation, current status and POC, and d/c planning.      Patient response to education:   Pt verbalized understanding Pt demonstrated skill Pt requires further education in this area   yes partial all     ASSESSMENT:    Conditions Requiring Skilled Therapeutic Intervention:    []Decreased strength     []Decreased ROM  [x]Decreased functional mobility  [x]Decreased balance   [x]Decreased endurance   []Decreased posture  []Decreased sensation  []Decreased coordination   []Decreased vision  []Decreased safety awareness   [x]Increased pain       Comments:  Rn clearance prior to session. Pt supine with wound vac and pig tail intact, agreeable to functional assessment. Pt Completed bed mobility at sup, slow to move but steady. Pt amb to restroom with slowed pace, shuffled steps initially, pt reaching for external support of furniture. Pt voiding bowels in restroom and then completing self care activities such as rinsing mouth, washing face and changing brief at sink, SBA provided for safety. Pt tolerated amb in hallway without AD, occ reaching for external support at HR, but majority of bouts without UE support, PT providing SBA for safety and assist with wound vac. Pt continued to demo slowed pace but decreased shuffle step. Pt reporting sig pain in L drain insertion site during activity. Pt seated in bedside chair with all lines/drains intact and call light in reach. Pt presenting with deficits in balance, endurance, and functional strength, would benefit form continued therapy services to address deficits listed to help optimize independence with mobility prior to d/c home. Treatment:  Patient practiced and was instructed in the following treatment:     Transfers- supine > sit, STS, stand pivot; increased time and effort to complete, verbal cueing for hand and foot placement   sitting balance- x12 min, voiding bowel at toilet, increased effort.    Dynamic standing balance- x9 min, during self care activities in restroom at sink; SBA for safety, pt requiring increased time and effort to complete   Gait- 200'x2 without AD, SBA, assist with equipment provided, increased time and effort to complete, verbal cueing provided to increase pace and upright posture    Pt's/ family goals   1. Get stronger  2. Go home    Prognosis is good for reaching above PT goals. Patient and or family understand(s) diagnosis, prognosis, and plan of care. yes    PHYSICAL THERAPY PLAN OF CARE:    PT POC is established based on physician order and patient diagnosis     Referring provider/PT Order:    Start   Ordering Provider    07/08/21 1745  PT eval and treat Start: 07/08/21 1745, End: 07/08/21 1745, ONE TIME, Standing Count: 1 Occurrences, R      Vamshi Corrigan,         Diagnosis:  Intra-abdominal infection [B99.9]  Specific instructions for next treatment:  Progress balance with tx and amb, progressing amb distance. Current Treatment Recommendations:     [x] Strengthening to improve independence with functional mobility   [] ROM to improve independence with functional mobility   [x] Balance Training to improve static/dynamic balance and to reduce fall risk  [x] Endurance Training to improve activity tolerance during functional mobility   [x] Transfer Training to improve safety and independence with all functional transfers   [x] Gait Training to improve gait mechanics, endurance and assess need for appropriate assistive device  [x] Stair Training in preparation for safe discharge home and/or into the community   [] Positioning to prevent skin breakdown and contractures  [x] Safety and Education Training   [x] Patient/Caregiver Education   [] HEP  [] Other     PT long term treatment goals are located in above grid    Frequency of treatments: 2-5x/week x 1-2 weeks.     Time in  0845  Time out  0923    Total Treatment Time  28 minutes     Evaluation Time includes thorough review of current medical information, gathering information on past medical history/social history and prior level of function, completion of standardized testing/informal observation of tasks, assessment of data and education on plan of care and goals.     CPT codes:  [] Low Complexity PT evaluation 75139  [x] Moderate Complexity PT evaluation 73187  [] High Complexity PT evaluation 64395  [] PT Re-evaluation 20194  [] Gait training 06363 0 minutes  [] Manual therapy 62736 0 minutes  [x] Therapeutic activities 72643 28 minutes  [] Therapeutic exercises 48607 0 minutes  [] Neuromuscular reeducation 28363 0 minutes     Alyssa Balling., PT, DPT  [id number]

## 2021-07-12 NOTE — CARE COORDINATION
SOCIAL WORK/CASEMANAGEMENT TRANSITION OF CARE [id number] [facility], [address], [place] Perez, [phone]): pt to go on 7/14 for a CT guided drain up- sizing with anesthesia, eliquis has to be held that is why procedure is delayed. [facility] is setup,  need orders for [facility] for iv abx teaching and administration. We are to  call [facility] to let them know pt is leaving so that they can schedule to see pt the next day . rn is to Take wound vac off place wet/dry dressing and give pt his iv abx for the day. [address] [place] [address] will see the next day for iv abx and place wound vac on.  sw /cm to follow. abx is to run until 7/30/2021 . MUNA Singleton

## 2021-07-12 NOTE — FLOWSHEET NOTE
Inpatient Wound Care (follow up) 6521A    Admit Date: 6/30/2021  3:47 PM    Reason for consult:  Abdomen, wound vac        Findings:        07/12/21 1401   Open Drain LUQ   Placement Date/Time: 07/04/21 0230   Location: LUQ   Site Description Reddened   Dressing Status Changed   Drainage Appearance Purulent   Output (ml) 25 ml        07/12/21 1350   Wound 07/06/21 Abdomen Proximal;Mid   Date First Assessed: 07/06/21   Present on Hospital Admission: No  Location: Abdomen  Wound Location Orientation: Proximal;Mid   Wound Image    Wound Etiology Surgical   Dressing Status New dressing applied   Wound Cleansed Cleansed with saline   Dressing/Treatment Negative pressure wound therapy   Wound Length (cm) 2 cm   Wound Width (cm) 1.4 cm   Wound Depth (cm) 0.8 cm   Wound Surface Area (cm^2) 2.8 cm^2   Change in Wound Size % (l*w) 29.29   Wound Volume (cm^3) 2.24 cm^3   Wound Healing % 43   Wound Assessment Pink/red   Drainage Amount Scant   Drainage Description Yellow   Odor None   Gayle-wound Assessment Intact   Wound 07/06/21 Abdomen Right   Date First Assessed: 07/06/21   Present on Hospital Admission: No  Location: Abdomen  Wound Location Orientation: Right   Wound Image    Dressing Status New dressing applied   Wound Cleansed Cleansed with saline   Dressing/Treatment Negative pressure wound therapy   Wound Length (cm) 0.4 cm   Wound Width (cm) 0.6 cm   Wound Depth (cm) 0.2 cm   Wound Surface Area (cm^2) 0.24 cm^2   Change in Wound Size % (l*w) 76   Wound Volume (cm^3) 0.048 cm^3   Wound Healing % 52   Wound Assessment Pink/red   Drainage Amount Scant   Drainage Description Yellow   Odor None   Gayle-wound Assessment Intact   Wound Thickness Description not for Pressure Injury Partial thickness   Wound 07/09/21 Abdomen Left   Date First Assessed/Time First Assessed: 07/09/21 1630   Present on Hospital Admission: No  Location: Abdomen  Wound Location Orientation: Left   Wound Image    Dressing Status New dressing applied Wound Cleansed Cleansed with saline   Dressing/Treatment Alginate;Hydrocolloid   Wound Length (cm) 0.2 cm   Wound Width (cm) 0.3 cm   Wound Depth (cm) 0.1 cm   Wound Surface Area (cm^2) 0.06 cm^2   Change in Wound Size % (l*w) 50   Wound Volume (cm^3) 0.006 cm^3   Wound Healing % 50   Wound Assessment Pink/red   Drainage Amount None   Odor None   Gayle-wound Assessment Intact   Wound Thickness Description not for Pressure Injury Partial thickness   Negative Pressure Wound Therapy Abdomen Mid   Placement Date/Time: 07/06/21 0930   Pre-existing: No  Location: Abdomen  Wound Location Orientation: Mid   $ Standard NPWT <=50 sq cm PER TX $ Yes   Wound Type Surgical   Dressing Type Black foam   Number of pieces used 3   Cycle Continuous   Target Pressure (mmHg) 125   Canister changed? No   Dressing Change Due 07/14/21      **Informed Consent**    The patient has given verbal consent to have photos taken of wounds and inserted into their chart as part of their permanent medical record for purposes of documentation, treatment management and/or medical review. All Images taken on 7/12/21 of patient name: Manuel Esteban were transmitted and stored on secured [employer] located within [employer] Tab by a registered [employer] Mobile Application Device. Impression:  Mid abdomen: surgical  Left and right abdomen: partial thickness    Plan: Wound Vac dressing change, mid abdomen and right abdomen  Left abdomen: opticell, exuderm  Pouch change to left lower abdomen  Will follow, next wound vac dressing change; 7/14.        Leticia Hernandez RN 7/12/2021 1:56 PM

## 2021-07-12 NOTE — PROGRESS NOTES
Message sent to Ifrah regarding drainage around foam for wound vac sitting on abdomen, causing the patient to have irritation and discomfort at the site, awaiting orders.

## 2021-07-12 NOTE — PROGRESS NOTES
[facility] PROGRESS NOTE      Chief complaint: Follow-up of intra-abdominal abscess     The patient is a 62 y.o. 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 coli, anaerobic gram-negative rods, coagulase-negative Staphylococcus, Cronobacter sakazakii for which he was given a course of ciprofloxacin and metronidazole for 4 weeks until 06/25, presented on 06/30 with nausea and vomiting for 4 days. He had percutaneous drain removed on 06/23 with resolution of anterior fluid collection but other surgical drain continued to have more purulent fluid collection, prompting another percutaneous drain placement on 06/30 and subsequent admission for possible possible ERCP with stent repositioning. Abscess fluid Gram stain and culture showed abundant polymorphonuclear leukocytes, no epithelial cells, abundant gram variable rods, mixed alphahemolytic Streptococcus species, light growth of Klebsiella oxytoca (non-ESBL; resistant to ampicillin; susceptible to fluoroquinolones and co-trimoxazole), heavy growth of alphahemolytic Streptococcus, MSSA and Lactobacillus, light growth of Candida albicans. On admission, he was afebrile and hemodynamically stable with no leukocytosis. Piperacillin-tazobactam, fluconazole and vancomycin were started on admission. He underwent ERCP with exchange of pancreatic duct stent and balloon sweep of pancreatic duct with removal of large amount of debris/pus on 07/02.   Repeat CT abdomen and pelvis on 07/04 showed abscess in mid and left upper abdomen with 2 pigtail catheters in the right and left aspect of the collection, similar in size compared to that from 07/02; new subtle loculated fluid collection in the left upper abdomen; multiple additional foci of containing gas located in the left upper abdomen similar to prior; new left pleural effusion with left lower lobe atelectasis; stable portal vein thrombosis. He underwent thoracentesis on 07/07, yielding 700 mL of exudative pleural fluid with Gram stain and culture showing few polymorphonuclear leukocytes, no epithelial cells, no organisms, no growth to date. Subjective: Patient was seen and examined. No chills, + abdominal pain, no diarrhea, no rash, no itching. No fever. Tolerating diet. Objective:  /68   Pulse 99   Temp 95.8 °F (35.4 °C) (Temporal)   Resp 16   Ht 5' 8\" (1.727 m)   Wt 186 lb 3.2 oz (84.5 kg)   SpO2 95%   BMI 28.31 kg/m²   Constitutional: Alert, not in distress  Respiratory: Clear breath sounds, no crackles, no wheezes  Cardiovascular: Regular rate and rhythm, no murmurs  Gastrointestinal: Bowel sounds present, soft, nontender. Wound vac abdomen, LUQ and LLQ drain  Skin: Warm and dry, no active dermatoses  Musculoskeletal: No joint swelling, no joint erythema  Right midline 7/7/21    Labs, imaging, and medical records/notes were personally reviewed. Assessment:  Sepsis, resolved  Intraabdominal abscess/surgical site infection, s/p percutaneous drain placement on 05/28, inadequate drainage prompting additional percutaneous drain placements on 06/30  Pancreatic duct abscess status post ERCP with exchange of pancreatic duct stent and balloon sweep of pancreatic duct with removal of large amount of debris/pus on 07/02  Recent distal pancreatectomy with splenectomy XW 36/88 complicated by pancreatic leak s/p pancreatic stent placement on 05/17  WBC 9500     Recommendations:  Continue ertapenem 1 g and fluconazole 400 mg every 24 hours. Anticipate at least 4 weeks of IV antibiotic therapy from 07/02-07/30. Continue supportive care.   No new ID

## 2021-07-12 NOTE — PROGRESS NOTES
[facility] SURGERY  DAILY PROGRESS NOTE  7/12/2021  Chief Complaint   Patient presents with    Other     Pt sent over from [facility] after having 2 abdominal drains inserted. Subjective:  Issues with vac overnight but fixed, no new issues, tolerating diet but does get full fast    Objective:  /73   Pulse 83   Temp 95.6 °F (35.3 °C) (Temporal)   Resp 20   Ht 5' 8\" (1.727 m)   Wt 186 lb 3.2 oz (84.5 kg)   SpO2 96%   BMI 28.31 kg/m²     GENERAL:  Laying in bed, awake, alert, cooperative, no apparent distress  HEAD: Normocephalic, atraumatic  EYES: No sclera icterus, pupils equal  LUNGS:  Normal work of breathing  CARDIOVASCULAR:  RR  ABDOMEN:  Soft, mildly distended, appropriately tender around midline wound vac with suction intact, LLQ drain with minimal drainage in bag, LUQ IR drain with similar purulent drainage and drainage around tube   EXTREMITIES: minimal edema   SKIN: Warm and dry    Assessment/Plan:  62 y.o. male with postoperative development of intra-abdominal abscess status post distal pancreatectomy, splenectomy, cholecystectomy on 5/12. ERCP with pancreatic stent placement on 6/4, s/p IR drain placement x2 6/30. now s/p ERCP with stent exchange 7/2 and s/p IR drainage 7/9.     - Continue diet + supplements  - drain flushes BID  - Monitor drain output  - PO pain control  - Continue IV antibiotics, appreciate ID recs  - appreciate wound care with vac changes  - DC planning- if continues to drain around IR drain, possible discussion about upsizing drain   - thrombocytosis- monitor     Electronically signed by Juarez Mendenhall DO on 7/12/2021 at 5:53 AM       Attending Physician Statement:    Chief Complaint:   Chief Complaint   Patient presents with    Other     Pt sent over from [facility]io after having 2 abdominal drains inserted.         I have examined the patient and performed the key aspects of physical exam, reviewed the record (including all pertinent and new radiology images and laboratory findings), and discussed the case with the surgical team.  I agree with the assessment and plan with the following additions, corrections, and changes. 14pt review of symptoms completed and negative except as mentioned. Some leaking around IR drain site. Will have upsize Wednesday to better control. Suggested he think about SNF/LTACH due to the amount of care he may need at home. Complaining of pain at drain site, will add some valium. D/C planning. Rebecca Chan MD  07/12/21  8:56 PM    NOTE: This report, in part or full, may have been transcribed using voice recognition software. Every effort was made to ensure accuracy; however, inadvertent computerized transcription errors may be present. Please excuse any transcriptional grammatical or spelling errors that may have escaped my editorial review.

## 2021-07-13 LAB
ALBUMIN SERPL-MCNC: 2.7 G/DL (ref 3.5–5.2)
ALP BLD-CCNC: 63 U/L (ref 40–129)
ALT SERPL-CCNC: 44 U/L (ref 0–40)
ANION GAP SERPL CALCULATED.3IONS-SCNC: 10 MMOL/L (ref 7–16)
AST SERPL-CCNC: 31 U/L (ref 0–39)
BASOPHILS ABSOLUTE: 0.06 E9/L (ref 0–0.2)
BASOPHILS RELATIVE PERCENT: 0.4 % (ref 0–2)
BILIRUB SERPL-MCNC: <0.2 MG/DL (ref 0–1.2)
BUN BLDV-MCNC: 15 MG/DL (ref 6–20)
CALCIUM IONIZED: 1.25 MMOL/L (ref 1.15–1.33)
CALCIUM SERPL-MCNC: 8.5 MG/DL (ref 8.6–10.2)
CHLORIDE BLD-SCNC: 101 MMOL/L (ref 98–107)
CO2: 26 MMOL/L (ref 22–29)
CREAT SERPL-MCNC: 0.8 MG/DL (ref 0.7–1.2)
EOSINOPHILS ABSOLUTE: 0.28 E9/L (ref 0.05–0.5)
EOSINOPHILS RELATIVE PERCENT: 2 % (ref 0–6)
GFR AFRICAN AMERICAN: >60
GFR NON-AFRICAN AMERICAN: >60 ML/MIN/1.73
GLUCOSE BLD-MCNC: 169 MG/DL (ref 74–99)
HCT VFR BLD CALC: 25.4 % (ref 37–54)
HEMOGLOBIN: 7.8 G/DL (ref 12.5–16.5)
IMMATURE GRANULOCYTES #: 0.14 E9/L
IMMATURE GRANULOCYTES %: 1 % (ref 0–5)
LYMPHOCYTES ABSOLUTE: 1.26 E9/L (ref 1.5–4)
LYMPHOCYTES RELATIVE PERCENT: 9.2 % (ref 20–42)
MAGNESIUM: 1.8 MG/DL (ref 1.6–2.6)
MCH RBC QN AUTO: 21.7 PG (ref 26–35)
MCHC RBC AUTO-ENTMCNC: 30.7 % (ref 32–34.5)
MCV RBC AUTO: 70.6 FL (ref 80–99.9)
METER GLUCOSE: 109 MG/DL (ref 74–99)
METER GLUCOSE: 131 MG/DL (ref 74–99)
METER GLUCOSE: 191 MG/DL (ref 74–99)
METER GLUCOSE: 263 MG/DL (ref 74–99)
METER GLUCOSE: 278 MG/DL (ref 74–99)
METER GLUCOSE: 296 MG/DL (ref 74–99)
METER GLUCOSE: 91 MG/DL (ref 74–99)
MONOCYTES ABSOLUTE: 1.42 E9/L (ref 0.1–0.95)
MONOCYTES RELATIVE PERCENT: 10.4 % (ref 2–12)
NEUTROPHILS ABSOLUTE: 10.5 E9/L (ref 1.8–7.3)
NEUTROPHILS RELATIVE PERCENT: 77 % (ref 43–80)
PDW BLD-RTO: 16.8 FL (ref 11.5–15)
PHOSPHORUS: 2.7 MG/DL (ref 2.5–4.5)
PLATELET # BLD: 708 E9/L (ref 130–450)
PMV BLD AUTO: 10.6 FL (ref 7–12)
POTASSIUM SERPL-SCNC: 4.3 MMOL/L (ref 3.5–5)
RBC # BLD: 3.6 E12/L (ref 3.8–5.8)
SODIUM BLD-SCNC: 137 MMOL/L (ref 132–146)
TOTAL PROTEIN: 6.8 G/DL (ref 6.4–8.3)
WBC # BLD: 13.7 E9/L (ref 4.5–11.5)

## 2021-07-13 PROCEDURE — 6360000002 HC RX W HCPCS: Performed by: INTERNAL MEDICINE

## 2021-07-13 PROCEDURE — 6360000002 HC RX W HCPCS: Performed by: SURGERY

## 2021-07-13 PROCEDURE — 36415 COLL VENOUS BLD VENIPUNCTURE: CPT

## 2021-07-13 PROCEDURE — 6370000000 HC RX 637 (ALT 250 FOR IP): Performed by: STUDENT IN AN ORGANIZED HEALTH CARE EDUCATION/TRAINING PROGRAM

## 2021-07-13 PROCEDURE — 2580000003 HC RX 258: Performed by: INTERNAL MEDICINE

## 2021-07-13 PROCEDURE — 2580000003 HC RX 258: Performed by: STUDENT IN AN ORGANIZED HEALTH CARE EDUCATION/TRAINING PROGRAM

## 2021-07-13 PROCEDURE — 6360000002 HC RX W HCPCS: Performed by: STUDENT IN AN ORGANIZED HEALTH CARE EDUCATION/TRAINING PROGRAM

## 2021-07-13 PROCEDURE — 6370000000 HC RX 637 (ALT 250 FOR IP): Performed by: SURGERY

## 2021-07-13 PROCEDURE — 94760 N-INVAS EAR/PLS OXIMETRY 1: CPT

## 2021-07-13 PROCEDURE — 82330 ASSAY OF CALCIUM: CPT

## 2021-07-13 PROCEDURE — 83735 ASSAY OF MAGNESIUM: CPT

## 2021-07-13 PROCEDURE — 6370000000 HC RX 637 (ALT 250 FOR IP): Performed by: RADIOLOGY

## 2021-07-13 PROCEDURE — 2140000000 HC CCU INTERMEDIATE R&B

## 2021-07-13 PROCEDURE — 36592 COLLECT BLOOD FROM PICC: CPT

## 2021-07-13 PROCEDURE — 99231 SBSQ HOSP IP/OBS SF/LOW 25: CPT | Performed by: INTERNAL MEDICINE

## 2021-07-13 PROCEDURE — 94640 AIRWAY INHALATION TREATMENT: CPT

## 2021-07-13 PROCEDURE — 84100 ASSAY OF PHOSPHORUS: CPT

## 2021-07-13 PROCEDURE — 82962 GLUCOSE BLOOD TEST: CPT

## 2021-07-13 PROCEDURE — 6370000000 HC RX 637 (ALT 250 FOR IP): Performed by: INTERNAL MEDICINE

## 2021-07-13 PROCEDURE — 80053 COMPREHEN METABOLIC PANEL: CPT

## 2021-07-13 PROCEDURE — 85025 COMPLETE CBC W/AUTO DIFF WBC: CPT

## 2021-07-13 RX ADMIN — OXYCODONE HYDROCHLORIDE 10 MG: 10 TABLET ORAL at 20:30

## 2021-07-13 RX ADMIN — HEPARIN 100 UNITS: 100 SYRINGE at 20:31

## 2021-07-13 RX ADMIN — ACETAMINOPHEN 650 MG: 325 TABLET ORAL at 14:34

## 2021-07-13 RX ADMIN — POLYETHYLENE GLYCOL 3350 17 G: 17 POWDER, FOR SOLUTION ORAL at 20:31

## 2021-07-13 RX ADMIN — GABAPENTIN 300 MG: 300 CAPSULE ORAL at 08:24

## 2021-07-13 RX ADMIN — PREDNISONE 50 MG: 20 TABLET ORAL at 23:30

## 2021-07-13 RX ADMIN — PANTOPRAZOLE SODIUM 40 MG: 40 TABLET, DELAYED RELEASE ORAL at 16:10

## 2021-07-13 RX ADMIN — OXYCODONE HYDROCHLORIDE 10 MG: 10 TABLET ORAL at 11:43

## 2021-07-13 RX ADMIN — INSULIN LISPRO 9 UNITS: 100 INJECTION, SOLUTION INTRAVENOUS; SUBCUTANEOUS at 11:39

## 2021-07-13 RX ADMIN — INSULIN LISPRO 9 UNITS: 100 INJECTION, SOLUTION INTRAVENOUS; SUBCUTANEOUS at 14:36

## 2021-07-13 RX ADMIN — DIAZEPAM 5 MG: 5 TABLET ORAL at 17:49

## 2021-07-13 RX ADMIN — SODIUM CHLORIDE, PRESERVATIVE FREE 10 ML: 5 INJECTION INTRAVENOUS at 23:37

## 2021-07-13 RX ADMIN — DIAZEPAM 5 MG: 5 TABLET ORAL at 23:35

## 2021-07-13 RX ADMIN — ACETAMINOPHEN 650 MG: 325 TABLET ORAL at 21:45

## 2021-07-13 RX ADMIN — BUDESONIDE 500 MCG: 0.5 SUSPENSION RESPIRATORY (INHALATION) at 09:39

## 2021-07-13 RX ADMIN — ATORVASTATIN CALCIUM 20 MG: 20 TABLET, FILM COATED ORAL at 20:32

## 2021-07-13 RX ADMIN — ACETAMINOPHEN 650 MG: 325 TABLET ORAL at 17:49

## 2021-07-13 RX ADMIN — POLYETHYLENE GLYCOL 3350 17 G: 17 POWDER, FOR SOLUTION ORAL at 08:25

## 2021-07-13 RX ADMIN — FLUCONAZOLE 400 MG: 100 TABLET ORAL at 08:24

## 2021-07-13 RX ADMIN — HEPARIN 100 UNITS: 100 SYRINGE at 08:23

## 2021-07-13 RX ADMIN — PANCRELIPASE 72000 UNITS: 60000; 12000; 38000 CAPSULE, DELAYED RELEASE PELLETS ORAL at 08:24

## 2021-07-13 RX ADMIN — OXYCODONE HYDROCHLORIDE 10 MG: 10 TABLET ORAL at 06:58

## 2021-07-13 RX ADMIN — OXYCODONE HYDROCHLORIDE 10 MG: 10 TABLET ORAL at 16:10

## 2021-07-13 RX ADMIN — PANCRELIPASE 72000 UNITS: 60000; 12000; 38000 CAPSULE, DELAYED RELEASE PELLETS ORAL at 16:10

## 2021-07-13 RX ADMIN — ALBUTEROL SULFATE 2.5 MG: 2.5 SOLUTION RESPIRATORY (INHALATION) at 14:21

## 2021-07-13 RX ADMIN — DIAZEPAM 5 MG: 5 TABLET ORAL at 08:23

## 2021-07-13 RX ADMIN — PANTOPRAZOLE SODIUM 40 MG: 40 TABLET, DELAYED RELEASE ORAL at 08:25

## 2021-07-13 RX ADMIN — INSULIN GLARGINE 25 UNITS: 100 INJECTION, SOLUTION SUBCUTANEOUS at 08:27

## 2021-07-13 RX ADMIN — ACETAMINOPHEN 650 MG: 325 TABLET ORAL at 06:00

## 2021-07-13 RX ADMIN — ALBUTEROL SULFATE 2.5 MG: 2.5 SOLUTION RESPIRATORY (INHALATION) at 09:40

## 2021-07-13 RX ADMIN — DOCUSATE SODIUM 100 MG: 100 CAPSULE ORAL at 20:30

## 2021-07-13 RX ADMIN — ERTAPENEM SODIUM 1000 MG: 1 INJECTION, POWDER, LYOPHILIZED, FOR SOLUTION INTRAMUSCULAR; INTRAVENOUS at 14:34

## 2021-07-13 RX ADMIN — INSULIN LISPRO 3 UNITS: 100 INJECTION, SOLUTION INTRAVENOUS; SUBCUTANEOUS at 06:56

## 2021-07-13 RX ADMIN — SODIUM CHLORIDE, PRESERVATIVE FREE 10 ML: 5 INJECTION INTRAVENOUS at 20:31

## 2021-07-13 RX ADMIN — DOCUSATE SODIUM 100 MG: 100 CAPSULE ORAL at 08:25

## 2021-07-13 RX ADMIN — PANCRELIPASE 72000 UNITS: 60000; 12000; 38000 CAPSULE, DELAYED RELEASE PELLETS ORAL at 11:38

## 2021-07-13 RX ADMIN — CALCIUM POLYCARBOPHIL 625 MG: 625 TABLET, FILM COATED ORAL at 08:25

## 2021-07-13 RX ADMIN — SODIUM CHLORIDE, PRESERVATIVE FREE 10 ML: 5 INJECTION INTRAVENOUS at 23:30

## 2021-07-13 RX ADMIN — KETOROLAC TROMETHAMINE 15 MG: 30 INJECTION, SOLUTION INTRAMUSCULAR; INTRAVENOUS at 06:00

## 2021-07-13 RX ADMIN — OXYCODONE HYDROCHLORIDE 10 MG: 10 TABLET ORAL at 02:49

## 2021-07-13 RX ADMIN — KETOROLAC TROMETHAMINE 15 MG: 30 INJECTION, SOLUTION INTRAMUSCULAR; INTRAVENOUS at 11:38

## 2021-07-13 RX ADMIN — KETOROLAC TROMETHAMINE 15 MG: 30 INJECTION, SOLUTION INTRAMUSCULAR; INTRAVENOUS at 17:49

## 2021-07-13 RX ADMIN — SALINE NASAL SPRAY 1 SPRAY: 1.5 SOLUTION NASAL at 20:32

## 2021-07-13 RX ADMIN — SODIUM CHLORIDE SOLN NEBU 3% 4 ML: 3 NEBU SOLN at 09:39

## 2021-07-13 RX ADMIN — GABAPENTIN 300 MG: 300 CAPSULE ORAL at 20:40

## 2021-07-13 RX ADMIN — SODIUM CHLORIDE, PRESERVATIVE FREE 10 ML: 5 INJECTION INTRAVENOUS at 08:24

## 2021-07-13 RX ADMIN — DOCUSATE SODIUM 50 MG AND SENNOSIDES 8.6 MG 2 TABLET: 8.6; 5 TABLET, FILM COATED ORAL at 08:25

## 2021-07-13 RX ADMIN — INSULIN LISPRO 9 UNITS: 100 INJECTION, SOLUTION INTRAVENOUS; SUBCUTANEOUS at 18:57

## 2021-07-13 RX ADMIN — SODIUM CHLORIDE, PRESERVATIVE FREE 10 ML: 5 INJECTION INTRAVENOUS at 06:52

## 2021-07-13 RX ADMIN — INSULIN GLARGINE 25 UNITS: 100 INJECTION, SOLUTION SUBCUTANEOUS at 20:32

## 2021-07-13 RX ADMIN — ACETAMINOPHEN 650 MG: 325 TABLET ORAL at 10:36

## 2021-07-13 RX ADMIN — SALINE NASAL SPRAY 1 SPRAY: 1.5 SOLUTION NASAL at 14:35

## 2021-07-13 RX ADMIN — HEPARIN 100 UNITS: 100 SYRINGE at 06:52

## 2021-07-13 RX ADMIN — KETOROLAC TROMETHAMINE 15 MG: 30 INJECTION, SOLUTION INTRAMUSCULAR; INTRAVENOUS at 01:01

## 2021-07-13 ASSESSMENT — PAIN SCALES - GENERAL
PAINLEVEL_OUTOF10: 0
PAINLEVEL_OUTOF10: 7
PAINLEVEL_OUTOF10: 8
PAINLEVEL_OUTOF10: 8
PAINLEVEL_OUTOF10: 0
PAINLEVEL_OUTOF10: 8
PAINLEVEL_OUTOF10: 8
PAINLEVEL_OUTOF10: 7
PAINLEVEL_OUTOF10: 10
PAINLEVEL_OUTOF10: 0
PAINLEVEL_OUTOF10: 5
PAINLEVEL_OUTOF10: 4
PAINLEVEL_OUTOF10: 9
PAINLEVEL_OUTOF10: 8
PAINLEVEL_OUTOF10: 8
PAINLEVEL_OUTOF10: 0
PAINLEVEL_OUTOF10: 5
PAINLEVEL_OUTOF10: 0
PAINLEVEL_OUTOF10: 0

## 2021-07-13 ASSESSMENT — PAIN DESCRIPTION - ORIENTATION: ORIENTATION: MID

## 2021-07-13 ASSESSMENT — PAIN DESCRIPTION - LOCATION: LOCATION: ABDOMEN

## 2021-07-13 ASSESSMENT — PAIN DESCRIPTION - ONSET: ONSET: ON-GOING

## 2021-07-13 ASSESSMENT — PAIN DESCRIPTION - DESCRIPTORS: DESCRIPTORS: ACHING;CONSTANT;DISCOMFORT

## 2021-07-13 ASSESSMENT — PAIN DESCRIPTION - FREQUENCY: FREQUENCY: CONTINUOUS

## 2021-07-13 ASSESSMENT — PAIN DESCRIPTION - PAIN TYPE: TYPE: SURGICAL PAIN

## 2021-07-13 NOTE — PROGRESS NOTES
Pulmonary [facility]                      Pulmonary Consult Tracy Sherman Note :      CC follow up of Pleural effusion     HPI   On RA  Up to chair  Doing better s/p thoracentesis  Stated that his shortness of breath has improved significantly after thoracentesis  Has abdomen drain   Has  fever or chills          PHYSICAL EXAM:      Vitals:    /67   Pulse 101   Temp 95.2 °F (35.1 °C) (Temporal)   Resp 16   Ht 5' 8\" (1.727 m)   Wt 186 lb 3.2 oz (84.5 kg)   SpO2 94%   BMI 28.31 kg/m²     EYES:  Lids and lashes normal, pupils equal, round and reactive to light, extra ocular muscles intact, sclera clear, conjunctiva normal  ENT:  Normocephalic, without obvious abnormality, atraumatic, sinuses nontender on palpation, external ears without lesions, oral pharynx with moist mucus membranes, tonsils without erythema or exudates, gums normal and good dentition. NECK:  Supple, symmetrical, trachea midline, no adenopathy, thyroid symmetric, not enlarged and no tenderness, skin normal  LUNGS:  Minimal bibasal ronchi  CARDIOVASCULAR:  Normal apical impulse, regular rate and rhythm, normal S1 and S2, no S3 or S4, and no murmur noted  ABDOMEN:  (+) abdominal drains  MUSCULOSKELETAL:  There is no redness, warmth, or swelling of the joints. Full range of motion noted. NEUROLOGIC:  Awake, alert, oriented to name, place and time. Cranial nerves II-XII are grossly intact. DATA:    CBC:   Recent Labs     07/10/21  0530 07/11/21 0430 07/12/21  0630   WBC 9.7 9.5 12.6*   HGB 8.1* 7.8* 8.5*   HCT 26.7* 25.2* 28.4*   MCV 71.4* 70.2* 71.7*   * 715* 784*       BMP:  Recent Labs     07/10/21  0530 07/11/21  0430 07/12/21  0630    139 139   K 4.4 5.2* 5.0   CL 98 103 101   CO2 25 29 27   PHOS 2.7 3.0 2.0*   BUN 20 27* 18   CREATININE 0.7 0.7 0.8    ALB:3,BILIDIR:3,BILITOT:3,ALKPHOS:3)@    PT/INR:   No results for input(s): PROTIME, INR in the last 72 hours.     ABG:   No results for input(s): PH, PO2, PCO2, HCO3, BE, O2SAT, METHB, O2HB, COHB, O2CON, HHB, THB in the last 72 hours. FiO2 : 21 %       Radiology Review:  CT abdomen lower lung cuts with left pleural effusion with congestion/atelectasis left lower lobe which appears increased from previous CT abdomen on 6/30    IMPRESSION/RECOMMENDATIONS:      Pleural effusion  Hypoxia  Pancreatitis    Plan  Continue with same plan  1.s/p thoracentesis ,left side,700 ml ,done 2 days ago  2-fluid are exudative which can be seen in pancreatitis  3- on 1 L ,beathing better   CXR as needed  . encourged incentive spirometry ,a lot of atelectasis   3- Elquis   2. Incentive spirometer, encourage use  3.  Nasal saline tid and humidity bottle to see if can decongest nose and help with secretions and cough  4-ID following    Chencho Gregorio MD,[facility]  Pulmonary&Critical Care Medicine   Director of [facility] Director of [address] [facility]    Anisa Guaman

## 2021-07-13 NOTE — PATIENT CARE CONFERENCE
[facility] Quality Flow/Interdisciplinary Rounds Progress Note        Quality Flow Rounds held on July 13, 2021    Disciplines Attending:  Bedside Nurse, ,  and Nursing Unit Leadership    Guilherme Pinedo was admitted on 6/30/2021  3:47 PM    Anticipated Discharge Date:  Expected Discharge Date: 07/09/21    Disposition:    Mukund Score:  Mukund Scale Score: 20    Readmission Risk              Risk of Unplanned Readmission:  22           Discussed patient goal for the day, patient clinical progression, and barriers to discharge.   The following Goal(s) of the Day/Commitment(s) have been identified:  Diagnostics - Report Results      Josep Maki RN  July 13, 2021

## 2021-07-13 NOTE — PROGRESS NOTES
Comprehensive Nutrition Assessment    Type and Reason for Visit:  Reassess    Nutrition Recommendations/Plan: Recommend and start Glucerna supplement TID and Brannon wound healing supplement BID to help meet increased nutritional needs from wound healing. Nutrition Assessment:  Patients po intake remains sporadic, averaging 50-75% of meals served ; pt remains moderately malnourished ; noted pleural effusion ; s/p thoracentesis (700ml removed) ; hx of DM ; adm w/ intra-abdominal abscess s/p distal pancreatectomy/splenectomy/cholecystectomy on 5/12 ; s/p ERCP w/ pancreatic stent placement on 6/4 ;  s/p IR drain placement x 2 on 6/30 ; now s/p ERCP with stent exchange on 7/2 ; noted pancreatitis ; s/p IR drainage 7/9 ; will re-start ONS    Malnutrition Assessment:  Malnutrition Status: Moderate malnutrition    Context:  Chronic Illness     Findings of the 6 clinical characteristics of malnutrition:  Energy Intake:  7 - 75% or less estimated energy requirements for 1 month or longer  Weight Loss:  7 - 7.5% over 3 months (13% in two months)     Body Fat Loss:  1 - Mild body fat loss Orbital, Triceps   Muscle Mass Loss:  1 - Mild muscle mass loss Temples (temporalis), Clavicles (pectoralis & deltoids)  Fluid Accumulation:  No significant fluid accumulation     Strength:  Not Performed    Estimated Daily Nutrient Needs:  Energy (kcal):  3811-7732 (REE 1635 x 1.3 SF); Weight Used for Energy Requirements:  Current     Protein (g):  105-125 (1.5-1.8g/kg IBW); Weight Used for Protein Requirements:  Ideal        Fluid (ml/day):  6592-1832; Method Used for Fluid Requirements:  1 ml/kcal      Nutrition Related Findings:  I&Os WNL, no edema, A&O x 4, active BS, rounded/distended/tender abd, open and closed drain, redness to heels/buttocks, wound vac      Wounds:  Multiple, Open Wounds, Surgical Incision, Partial Thickness, Wound Vac (wounds/incisions x 3)       Current Nutrition Therapies:    ADULT DIET;  Regular; 5 carb choices (75 gm/meal); Low Fat/Low Chol/High Fiber/CHELY  Diet NPO Exceptions are: Sips of Water with Meds    Anthropometric Measures:  · Height: 5' 8\" (172.7 cm)  · Current Body Weight: 186 lb (84.4 kg) (7/9, standing scale)   · Admission Body Weight: 192 lb (87.1 kg) (6/30, bedscale)    · Usual Body Weight: 223 lb (101.2 kg) (5/6/21, actual ; EMR shows weight loss of 30# in the past 2 months (223# to 193#) (13%))     · Ideal Body Weight: 154 lbs; % Ideal Body Weight 120.8 %   · BMI: 28.3  · BMI Categories: Overweight (BMI 25.0-29. 9)       Nutrition Diagnosis:   · Moderate malnutrition, In context of chronic illness related to altered GI function (hx of multiple recent GI surgeries) as evidenced by poor intake prior to admission, weight loss, mild muscle loss, mild loss of subcutaneous fat      Nutrition Interventions:   Food and/or Nutrient Delivery:  Continue Current Diet, Start Oral Nutrition Supplement  Nutrition Education/Counseling:  Education not indicated   Coordination of Nutrition Care:  Continue to monitor while inpatient    Goals:  Pt will consume ~75% of meals served       Nutrition Monitoring and Evaluation:   Behavioral-Environmental Outcomes:  Beliefs and Attitutes   Food/Nutrient Intake Outcomes:  Food and Nutrient Intake, Supplement Intake  Physical Signs/Symptoms Outcomes:  Biochemical Data, Chewing or Swallowing, GI Status, Fluid Status or Edema, Hemodynamic Status, Meal Time Behavior, Nutrition Focused Physical Findings, Skin, Weight     Discharge Planning:     Too soon to determine     Electronically signed by Elma Zimmerman RD, LD on 7/13/21 at 10:55 AM EDT    Contact: [phone]

## 2021-07-13 NOTE — PROGRESS NOTES
[facility]  DAILY PROGRESS NOTE  7/13/2021  Chief Complaint   Patient presents with    Other     Pt sent over from angio after having 2 abdominal drains inserted. Subjective:  Did well yesterday, IR for tomorrow, pain controlled, having BMs and tolerated diet better     Objective:  BP (!) 106/55   Pulse 91   Temp 95.7 °F (35.4 °C) (Temporal)   Resp 16   Ht 5' 8\" (1.727 m)   Wt 186 lb 3.2 oz (84.5 kg)   SpO2 95%   BMI 28.31 kg/m²     GENERAL:  Laying in bed, awake, alert, cooperative, no apparent distress  HEAD: Normocephalic, atraumatic  EYES: No sclera icterus, pupils equal  LUNGS:  Normal work of breathing  CARDIOVASCULAR:  RR  ABDOMEN:  Soft, mildly distended, appropriately tender around midline wound vac with suction intact, LLQ drain with minimal drainage in bag, LUQ IR drain with drainage around tube   EXTREMITIES: minimal edema   SKIN: Warm and dry    Assessment/Plan:  62 y.o. male with postoperative development of intra-abdominal abscess status post distal pancreatectomy, splenectomy, cholecystectomy on 5/12. ERCP with pancreatic stent placement on 6/4, s/p IR drain placement x2 6/30. now s/p ERCP with stent exchange 7/2 and s/p IR drainage 7/9.     - Continue diet + supplements  - IR drain upsize tomorrow  - hold Eliquis until after IR  - drain flushes BID  - Monitor drain output  - PO pain control  - Continue IV antibiotics, appreciate ID recs  - appreciate wound care with vac changes  - thrombocytosis- monitor   - DC planning, will see if patient qualifies for SNF/LTACH    Electronically signed by Maine Johnson DO on 7/13/2021 at 6:03 AM       Attending Physician Statement:    Chief Complaint:   Chief Complaint   Patient presents with    Other     Pt sent over from angio after having 2 abdominal drains inserted.         I have examined the patient and performed the key aspects of physical exam, reviewed the record (including all pertinent and new radiology images and laboratory findings), and discussed the case with the surgical team.  I agree with the assessment and plan with the following additions, corrections, and changes. 14pt review of symptoms completed and negative except as mentioned. Feels well overall. White count trending up a bit. On antibiotics per ID. For IR drain interrogation and upsizing tomorrow. DC planning. Vinod Huynh MD  07/13/21  5:00 PM    NOTE: This report, in part or full, may have been transcribed using voice recognition software. Every effort was made to ensure accuracy; however, inadvertent computerized transcription errors may be present. Please excuse any transcriptional grammatical or spelling errors that may have escaped my editorial review.

## 2021-07-13 NOTE — PROGRESS NOTES
[facility] PROGRESS NOTE      Chief complaint: Follow-up of intra-abdominal abscess     The patient is a 62 y.o. 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 coli, anaerobic gram-negative rods, coagulase-negative Staphylococcus, Cronobacter sakazakii for which he was given a course of ciprofloxacin and metronidazole for 4 weeks until 06/25, presented on 06/30 with nausea and vomiting for 4 days. He had percutaneous drain removed on 06/23 with resolution of anterior fluid collection but other surgical drain continued to have more purulent fluid collection, prompting another percutaneous drain placement on 06/30 and subsequent admission for possible possible ERCP with stent repositioning. Abscess fluid Gram stain and culture showed abundant polymorphonuclear leukocytes, no epithelial cells, abundant gram variable rods, mixed alphahemolytic Streptococcus species, light growth of Klebsiella oxytoca (non-ESBL; resistant to ampicillin; susceptible to fluoroquinolones and co-trimoxazole), heavy growth of alphahemolytic Streptococcus, MSSA and Lactobacillus, light growth of Candida albicans. On admission, he was afebrile and hemodynamically stable with no leukocytosis. Piperacillin-tazobactam, fluconazole and vancomycin were started on admission. He underwent ERCP with exchange of pancreatic duct stent and balloon sweep of pancreatic duct with removal of large amount of debris/pus on 07/02.   Repeat CT abdomen and pelvis on 07/04 showed abscess in mid and left upper abdomen with 2 pigtail catheters in the right and left aspect of the collection, similar in size compared to that from 07/02; new subtle loculated fluid collection in the left upper abdomen; multiple additional foci of containing gas located in the left upper abdomen similar to prior; new left pleural effusion with left lower lobe atelectasis; stable portal vein thrombosis. He underwent thoracentesis on 07/07, yielding 700 mL of exudative pleural fluid with Gram stain and culture showing few polymorphonuclear leukocytes, no epithelial cells, no organisms, no growth to date. Subjective: Patient was seen and examined. No chills, + abdominal pain, no diarrhea, no rash, no itching. No fever. Tolerating diet. Objective:  BP (!) 112/58   Pulse 88   Temp 97.7 °F (36.5 °C) (Temporal)   Resp 16   Ht 5' 8\" (1.727 m)   Wt 186 lb 3.2 oz (84.5 kg)   SpO2 96%   BMI 28.31 kg/m²   Constitutional: Alert, not in distress  Respiratory: Clear breath sounds, no crackles, no wheezes  Cardiovascular: Regular rate and rhythm, no murmurs  Gastrointestinal: Bowel sounds present, soft, nontender. Wound vac abdomen, LUQ and LLQ drain  Skin: Warm and dry, no active dermatoses  Musculoskeletal: No joint swelling, no joint erythema  Right midline 7/7/21    Labs, imaging, and medical records/notes were personally reviewed. Assessment:  Sepsis, resolved  Intraabdominal abscess/surgical site infection, s/p percutaneous drain placement on 05/28, inadequate drainage prompting additional percutaneous drain placements on 06/30  Pancreatic duct abscess status post ERCP with exchange of pancreatic duct stent and balloon sweep of pancreatic duct with removal of large amount of debris/pus on 07/02  Recent distal pancreatectomy with splenectomy HF 71/57 complicated by pancreatic leak s/p pancreatic stent placement on 05/17  WBC [id number]  Going to IR drain tomorrorw at noon    Recommendations:  Continue ertapenem 1 g and fluconazole 400 mg every 24 hours.   Anticipate at least 4 weeks of IV antibiotic therapy from 07/02-07/30. Continue supportive care. No new ID suggestions at this juncture  Oncology noted noted     Thank you for involving me in the care of Fidencio Arguello.  .    Electronically signed by Cj De Guzman MD on 7/13/2021 at 11:59 AM

## 2021-07-14 ENCOUNTER — ANESTHESIA EVENT (OUTPATIENT)
Dept: CT IMAGING | Age: 61
DRG: 711 | End: 2021-07-14
Payer: MEDICAID

## 2021-07-14 ENCOUNTER — ANESTHESIA (OUTPATIENT)
Dept: CT IMAGING | Age: 61
DRG: 711 | End: 2021-07-14
Payer: MEDICAID

## 2021-07-14 ENCOUNTER — APPOINTMENT (OUTPATIENT)
Dept: CT IMAGING | Age: 61
DRG: 711 | End: 2021-07-14
Payer: MEDICAID

## 2021-07-14 VITALS
OXYGEN SATURATION: 97 % | DIASTOLIC BLOOD PRESSURE: 75 MMHG | RESPIRATION RATE: 15 BRPM | SYSTOLIC BLOOD PRESSURE: 116 MMHG

## 2021-07-14 LAB
ABO/RH: NORMAL
ALBUMIN SERPL-MCNC: 2.8 G/DL (ref 3.5–5.2)
ALP BLD-CCNC: 62 U/L (ref 40–129)
ALT SERPL-CCNC: 38 U/L (ref 0–40)
ANION GAP SERPL CALCULATED.3IONS-SCNC: 9 MMOL/L (ref 7–16)
ANTIBODY SCREEN: NORMAL
AST SERPL-CCNC: 32 U/L (ref 0–39)
BASOPHILS ABSOLUTE: 0.02 E9/L (ref 0–0.2)
BASOPHILS RELATIVE PERCENT: 0.1 % (ref 0–2)
BILIRUB SERPL-MCNC: 0.2 MG/DL (ref 0–1.2)
BLOOD BANK DISPENSE STATUS: NORMAL
BLOOD BANK PRODUCT CODE: NORMAL
BPU ID: NORMAL
BUN BLDV-MCNC: 12 MG/DL (ref 6–20)
CALCIUM IONIZED: 1.29 MMOL/L (ref 1.15–1.33)
CALCIUM SERPL-MCNC: 8.8 MG/DL (ref 8.6–10.2)
CHLORIDE BLD-SCNC: 103 MMOL/L (ref 98–107)
CO2: 25 MMOL/L (ref 22–29)
CREAT SERPL-MCNC: 0.7 MG/DL (ref 0.7–1.2)
DESCRIPTION BLOOD BANK: NORMAL
EOSINOPHILS ABSOLUTE: 0.09 E9/L (ref 0.05–0.5)
EOSINOPHILS RELATIVE PERCENT: 0.6 % (ref 0–6)
GFR AFRICAN AMERICAN: >60
GFR NON-AFRICAN AMERICAN: >60 ML/MIN/1.73
GLUCOSE BLD-MCNC: 201 MG/DL (ref 74–99)
HCT VFR BLD CALC: 25.5 % (ref 37–54)
HEMOGLOBIN: 7.9 G/DL (ref 12.5–16.5)
IMMATURE GRANULOCYTES #: 0.11 E9/L
IMMATURE GRANULOCYTES %: 0.7 % (ref 0–5)
LYMPHOCYTES ABSOLUTE: 0.6 E9/L (ref 1.5–4)
LYMPHOCYTES RELATIVE PERCENT: 3.9 % (ref 20–42)
MAGNESIUM: 2 MG/DL (ref 1.6–2.6)
MCH RBC QN AUTO: 21.9 PG (ref 26–35)
MCHC RBC AUTO-ENTMCNC: 31 % (ref 32–34.5)
MCV RBC AUTO: 70.6 FL (ref 80–99.9)
METER GLUCOSE: 114 MG/DL (ref 74–99)
METER GLUCOSE: 202 MG/DL (ref 74–99)
METER GLUCOSE: 216 MG/DL (ref 74–99)
METER GLUCOSE: 230 MG/DL (ref 74–99)
METER GLUCOSE: 261 MG/DL (ref 74–99)
MONOCYTES ABSOLUTE: 0.35 E9/L (ref 0.1–0.95)
MONOCYTES RELATIVE PERCENT: 2.3 % (ref 2–12)
NEUTROPHILS ABSOLUTE: 14.02 E9/L (ref 1.8–7.3)
NEUTROPHILS RELATIVE PERCENT: 92.4 % (ref 43–80)
PDW BLD-RTO: 17.2 FL (ref 11.5–15)
PHOSPHORUS: 3.9 MG/DL (ref 2.5–4.5)
PLATELET # BLD: 606 E9/L (ref 130–450)
PMV BLD AUTO: 10.3 FL (ref 7–12)
POTASSIUM SERPL-SCNC: 5.2 MMOL/L (ref 3.5–5)
RBC # BLD: 3.61 E12/L (ref 3.8–5.8)
SODIUM BLD-SCNC: 137 MMOL/L (ref 132–146)
TOTAL PROTEIN: 7.1 G/DL (ref 6.4–8.3)
WBC # BLD: 15.2 E9/L (ref 4.5–11.5)

## 2021-07-14 PROCEDURE — 86900 BLOOD TYPING SEROLOGIC ABO: CPT

## 2021-07-14 PROCEDURE — 2580000003 HC RX 258: Performed by: NURSE ANESTHETIST, CERTIFIED REGISTERED

## 2021-07-14 PROCEDURE — 97605 NEG PRS WND THER DME<=50SQCM: CPT

## 2021-07-14 PROCEDURE — 85025 COMPLETE CBC W/AUTO DIFF WBC: CPT

## 2021-07-14 PROCEDURE — 6370000000 HC RX 637 (ALT 250 FOR IP): Performed by: STUDENT IN AN ORGANIZED HEALTH CARE EDUCATION/TRAINING PROGRAM

## 2021-07-14 PROCEDURE — 36430 TRANSFUSION BLD/BLD COMPNT: CPT

## 2021-07-14 PROCEDURE — 6370000000 HC RX 637 (ALT 250 FOR IP): Performed by: RADIOLOGY

## 2021-07-14 PROCEDURE — 3700000001 HC ADD 15 MINUTES (ANESTHESIA)

## 2021-07-14 PROCEDURE — 3700000000 HC ANESTHESIA ATTENDED CARE

## 2021-07-14 PROCEDURE — 2580000003 HC RX 258: Performed by: STUDENT IN AN ORGANIZED HEALTH CARE EDUCATION/TRAINING PROGRAM

## 2021-07-14 PROCEDURE — 6360000004 HC RX CONTRAST MEDICATION: Performed by: RADIOLOGY

## 2021-07-14 PROCEDURE — 36415 COLL VENOUS BLD VENIPUNCTURE: CPT

## 2021-07-14 PROCEDURE — 80053 COMPREHEN METABOLIC PANEL: CPT

## 2021-07-14 PROCEDURE — 82330 ASSAY OF CALCIUM: CPT

## 2021-07-14 PROCEDURE — 83735 ASSAY OF MAGNESIUM: CPT

## 2021-07-14 PROCEDURE — 84100 ASSAY OF PHOSPHORUS: CPT

## 2021-07-14 PROCEDURE — 6370000000 HC RX 637 (ALT 250 FOR IP): Performed by: SURGERY

## 2021-07-14 PROCEDURE — 86901 BLOOD TYPING SEROLOGIC RH(D): CPT

## 2021-07-14 PROCEDURE — 2709999900 CT ABSC/CYST DRAINAGE CATHETER CHANGE

## 2021-07-14 PROCEDURE — 82962 GLUCOSE BLOOD TEST: CPT

## 2021-07-14 PROCEDURE — 36592 COLLECT BLOOD FROM PICC: CPT

## 2021-07-14 PROCEDURE — 6360000002 HC RX W HCPCS: Performed by: NURSE ANESTHETIST, CERTIFIED REGISTERED

## 2021-07-14 PROCEDURE — P9059 PLASMA, FRZ BETWEEN 8-24HOUR: HCPCS

## 2021-07-14 PROCEDURE — 6370000000 HC RX 637 (ALT 250 FOR IP): Performed by: INTERNAL MEDICINE

## 2021-07-14 PROCEDURE — 2500000003 HC RX 250 WO HCPCS: Performed by: NURSE ANESTHETIST, CERTIFIED REGISTERED

## 2021-07-14 PROCEDURE — 2140000000 HC CCU INTERMEDIATE R&B

## 2021-07-14 PROCEDURE — 2580000003 HC RX 258: Performed by: INTERNAL MEDICINE

## 2021-07-14 PROCEDURE — 86850 RBC ANTIBODY SCREEN: CPT

## 2021-07-14 PROCEDURE — 6360000002 HC RX W HCPCS: Performed by: INTERNAL MEDICINE

## 2021-07-14 PROCEDURE — 49423 EXCHANGE DRAINAGE CATHETER: CPT | Performed by: RADIOLOGY

## 2021-07-14 PROCEDURE — 6360000002 HC RX W HCPCS: Performed by: SURGERY

## 2021-07-14 PROCEDURE — 2580000003 HC RX 258: Performed by: RADIOLOGY

## 2021-07-14 PROCEDURE — 2500000003 HC RX 250 WO HCPCS: Performed by: RADIOLOGY

## 2021-07-14 RX ORDER — MIDAZOLAM HYDROCHLORIDE 1 MG/ML
INJECTION INTRAMUSCULAR; INTRAVENOUS PRN
Status: DISCONTINUED | OUTPATIENT
Start: 2021-07-14 | End: 2021-07-14 | Stop reason: SDUPTHER

## 2021-07-14 RX ORDER — SODIUM CHLORIDE 9 MG/ML
INJECTION, SOLUTION INTRAVENOUS PRN
Status: DISCONTINUED | OUTPATIENT
Start: 2021-07-14 | End: 2021-07-15 | Stop reason: HOSPADM

## 2021-07-14 RX ORDER — SODIUM CHLORIDE 9 MG/ML
INJECTION, SOLUTION INTRAVENOUS CONTINUOUS PRN
Status: DISCONTINUED | OUTPATIENT
Start: 2021-07-14 | End: 2021-07-14 | Stop reason: SDUPTHER

## 2021-07-14 RX ORDER — LIDOCAINE HYDROCHLORIDE 20 MG/ML
INJECTION, SOLUTION INFILTRATION; PERINEURAL
Status: COMPLETED | OUTPATIENT
Start: 2021-07-14 | End: 2021-07-14

## 2021-07-14 RX ORDER — DIPHENHYDRAMINE HYDROCHLORIDE 50 MG/ML
INJECTION INTRAMUSCULAR; INTRAVENOUS PRN
Status: DISCONTINUED | OUTPATIENT
Start: 2021-07-14 | End: 2021-07-14 | Stop reason: SDUPTHER

## 2021-07-14 RX ORDER — SODIUM CHLORIDE 0.9 % (FLUSH) 0.9 %
10 SYRINGE (ML) INJECTION PRN
Status: DISCONTINUED | OUTPATIENT
Start: 2021-07-14 | End: 2021-07-15 | Stop reason: HOSPADM

## 2021-07-14 RX ORDER — FENTANYL CITRATE 50 UG/ML
INJECTION, SOLUTION INTRAMUSCULAR; INTRAVENOUS PRN
Status: DISCONTINUED | OUTPATIENT
Start: 2021-07-14 | End: 2021-07-14 | Stop reason: SDUPTHER

## 2021-07-14 RX ORDER — KETAMINE HYDROCHLORIDE 10 MG/ML
INJECTION, SOLUTION INTRAMUSCULAR; INTRAVENOUS PRN
Status: DISCONTINUED | OUTPATIENT
Start: 2021-07-14 | End: 2021-07-14 | Stop reason: SDUPTHER

## 2021-07-14 RX ADMIN — KETAMINE HYDROCHLORIDE 30 MG: 10 INJECTION INTRAMUSCULAR; INTRAVENOUS at 16:18

## 2021-07-14 RX ADMIN — PANCRELIPASE 72000 UNITS: 60000; 12000; 38000 CAPSULE, DELAYED RELEASE PELLETS ORAL at 17:42

## 2021-07-14 RX ADMIN — OXYCODONE HYDROCHLORIDE 10 MG: 10 TABLET ORAL at 17:42

## 2021-07-14 RX ADMIN — OXYCODONE HYDROCHLORIDE 10 MG: 10 TABLET ORAL at 05:03

## 2021-07-14 RX ADMIN — CALCIUM POLYCARBOPHIL 625 MG: 625 TABLET, FILM COATED ORAL at 09:01

## 2021-07-14 RX ADMIN — DIPHENHYDRAMINE HYDROCHLORIDE 50 MG: 50 INJECTION, SOLUTION INTRAMUSCULAR; INTRAVENOUS at 16:07

## 2021-07-14 RX ADMIN — HEPARIN 100 UNITS: 100 SYRINGE at 19:47

## 2021-07-14 RX ADMIN — OXYCODONE HYDROCHLORIDE 10 MG: 10 TABLET ORAL at 10:57

## 2021-07-14 RX ADMIN — PREDNISONE 50 MG: 20 TABLET ORAL at 04:10

## 2021-07-14 RX ADMIN — DIAZEPAM 5 MG: 5 TABLET ORAL at 19:57

## 2021-07-14 RX ADMIN — ACETAMINOPHEN 650 MG: 325 TABLET ORAL at 06:02

## 2021-07-14 RX ADMIN — KETOROLAC TROMETHAMINE 15 MG: 30 INJECTION, SOLUTION INTRAMUSCULAR; INTRAVENOUS at 06:02

## 2021-07-14 RX ADMIN — INSULIN LISPRO 6 UNITS: 100 INJECTION, SOLUTION INTRAVENOUS; SUBCUTANEOUS at 06:55

## 2021-07-14 RX ADMIN — DOCUSATE SODIUM 100 MG: 100 CAPSULE ORAL at 19:58

## 2021-07-14 RX ADMIN — FENTANYL CITRATE 50 MCG: 50 INJECTION, SOLUTION INTRAMUSCULAR; INTRAVENOUS at 15:50

## 2021-07-14 RX ADMIN — ACETAMINOPHEN 650 MG: 325 TABLET ORAL at 10:55

## 2021-07-14 RX ADMIN — DIPHENHYDRAMINE HYDROCHLORIDE 50 MG: 25 TABLET ORAL at 10:57

## 2021-07-14 RX ADMIN — INSULIN LISPRO 6 UNITS: 100 INJECTION, SOLUTION INTRAVENOUS; SUBCUTANEOUS at 10:58

## 2021-07-14 RX ADMIN — Medication 10 ML: at 05:04

## 2021-07-14 RX ADMIN — SODIUM CHLORIDE, PRESERVATIVE FREE 10 ML: 5 INJECTION INTRAVENOUS at 20:20

## 2021-07-14 RX ADMIN — ATORVASTATIN CALCIUM 20 MG: 20 TABLET, FILM COATED ORAL at 19:57

## 2021-07-14 RX ADMIN — ACETAMINOPHEN 650 MG: 325 TABLET ORAL at 17:42

## 2021-07-14 RX ADMIN — SODIUM CHLORIDE, PRESERVATIVE FREE 10 ML: 5 INJECTION INTRAVENOUS at 19:57

## 2021-07-14 RX ADMIN — SODIUM CHLORIDE, PRESERVATIVE FREE 10 ML: 5 INJECTION INTRAVENOUS at 09:05

## 2021-07-14 RX ADMIN — POLYETHYLENE GLYCOL 3350 17 G: 17 POWDER, FOR SOLUTION ORAL at 19:57

## 2021-07-14 RX ADMIN — FENTANYL CITRATE 50 MCG: 50 INJECTION, SOLUTION INTRAMUSCULAR; INTRAVENOUS at 16:07

## 2021-07-14 RX ADMIN — OXYCODONE HYDROCHLORIDE 10 MG: 10 TABLET ORAL at 21:46

## 2021-07-14 RX ADMIN — KETAMINE HYDROCHLORIDE 20 MG: 10 INJECTION INTRAMUSCULAR; INTRAVENOUS at 16:13

## 2021-07-14 RX ADMIN — ERTAPENEM SODIUM 1000 MG: 1 INJECTION, POWDER, LYOPHILIZED, FOR SOLUTION INTRAMUSCULAR; INTRAVENOUS at 16:23

## 2021-07-14 RX ADMIN — MIDAZOLAM 2 MG: 1 INJECTION INTRAMUSCULAR; INTRAVENOUS at 15:50

## 2021-07-14 RX ADMIN — GABAPENTIN 300 MG: 300 CAPSULE ORAL at 09:01

## 2021-07-14 RX ADMIN — KETOROLAC TROMETHAMINE 15 MG: 30 INJECTION, SOLUTION INTRAMUSCULAR; INTRAVENOUS at 00:00

## 2021-07-14 RX ADMIN — HEPARIN 100 UNITS: 100 SYRINGE at 05:04

## 2021-07-14 RX ADMIN — PANTOPRAZOLE SODIUM 40 MG: 40 TABLET, DELAYED RELEASE ORAL at 17:48

## 2021-07-14 RX ADMIN — LIDOCAINE HYDROCHLORIDE 17 ML: 20 INJECTION, SOLUTION INFILTRATION; PERINEURAL at 16:19

## 2021-07-14 RX ADMIN — PREDNISONE 50 MG: 20 TABLET ORAL at 10:55

## 2021-07-14 RX ADMIN — FLUCONAZOLE 400 MG: 100 TABLET ORAL at 08:59

## 2021-07-14 RX ADMIN — HYDROCORTISONE SODIUM SUCCINATE 100 MG: 100 INJECTION, POWDER, FOR SOLUTION INTRAMUSCULAR; INTRAVENOUS at 16:07

## 2021-07-14 RX ADMIN — ACETAMINOPHEN 650 MG: 325 TABLET ORAL at 22:33

## 2021-07-14 RX ADMIN — KETOROLAC TROMETHAMINE 15 MG: 30 INJECTION, SOLUTION INTRAMUSCULAR; INTRAVENOUS at 17:42

## 2021-07-14 RX ADMIN — INSULIN LISPRO 9 UNITS: 100 INJECTION, SOLUTION INTRAVENOUS; SUBCUTANEOUS at 20:01

## 2021-07-14 RX ADMIN — GABAPENTIN 300 MG: 300 CAPSULE ORAL at 19:58

## 2021-07-14 RX ADMIN — INSULIN GLARGINE 25 UNITS: 100 INJECTION, SOLUTION SUBCUTANEOUS at 09:01

## 2021-07-14 RX ADMIN — INSULIN GLARGINE 25 UNITS: 100 INJECTION, SOLUTION SUBCUTANEOUS at 19:57

## 2021-07-14 RX ADMIN — SALINE NASAL SPRAY 1 SPRAY: 1.5 SOLUTION NASAL at 09:04

## 2021-07-14 RX ADMIN — SODIUM CHLORIDE: 9 INJECTION, SOLUTION INTRAVENOUS at 15:44

## 2021-07-14 RX ADMIN — INSULIN LISPRO 6 UNITS: 100 INJECTION, SOLUTION INTRAVENOUS; SUBCUTANEOUS at 17:48

## 2021-07-14 RX ADMIN — SALINE NASAL SPRAY 1 SPRAY: 1.5 SOLUTION NASAL at 20:20

## 2021-07-14 RX ADMIN — PANTOPRAZOLE SODIUM 40 MG: 40 TABLET, DELAYED RELEASE ORAL at 09:01

## 2021-07-14 RX ADMIN — OXYCODONE HYDROCHLORIDE 10 MG: 10 TABLET ORAL at 00:58

## 2021-07-14 RX ADMIN — HEPARIN 100 UNITS: 100 SYRINGE at 09:01

## 2021-07-14 RX ADMIN — KETOROLAC TROMETHAMINE 15 MG: 30 INJECTION, SOLUTION INTRAMUSCULAR; INTRAVENOUS at 13:14

## 2021-07-14 RX ADMIN — SODIUM CHLORIDE, PRESERVATIVE FREE 10 ML: 5 INJECTION INTRAVENOUS at 05:04

## 2021-07-14 RX ADMIN — IOPAMIDOL 10 ML: 612 INJECTION, SOLUTION INTRAVENOUS at 16:47

## 2021-07-14 ASSESSMENT — PAIN SCALES - GENERAL
PAINLEVEL_OUTOF10: 0
PAINLEVEL_OUTOF10: 8
PAINLEVEL_OUTOF10: 0
PAINLEVEL_OUTOF10: 7
PAINLEVEL_OUTOF10: 7
PAINLEVEL_OUTOF10: 6
PAINLEVEL_OUTOF10: 0
PAINLEVEL_OUTOF10: 0
PAINLEVEL_OUTOF10: 8
PAINLEVEL_OUTOF10: 7
PAINLEVEL_OUTOF10: 0
PAINLEVEL_OUTOF10: 0
PAINLEVEL_OUTOF10: 3
PAINLEVEL_OUTOF10: 3

## 2021-07-14 ASSESSMENT — LIFESTYLE VARIABLES: SMOKING_STATUS: 0

## 2021-07-14 NOTE — PROGRESS NOTES
[facility]  DAILY PROGRESS NOTE  7/14/2021  Chief Complaint   Patient presents with    Other     Pt sent over from angio after having 2 abdominal drains inserted. Subjective:  No new issues, WBC increasing, afebrile but temps are climbing, for upsize of IR drain today     Objective:  BP (!) 108/59   Pulse 82   Temp 96.5 °F (35.8 °C) (Temporal)   Resp 16   Ht 5' 8\" (1.727 m)   Wt 186 lb 3.2 oz (84.5 kg)   SpO2 97%   BMI 28.31 kg/m²     GENERAL:  Laying in bed, awake, alert, cooperative, no apparent distress  HEAD: Normocephalic, atraumatic  EYES: No sclera icterus, pupils equal  LUNGS:  Normal work of breathing  CARDIOVASCULAR:  RR  ABDOMEN:  Soft, mildly distended, appropriately tender around midline wound vac with suction intact, LLQ drain with minimal drainage in bag, LUQ IR drain with drainage around tube   EXTREMITIES: minimal edema   SKIN: Warm and dry    Assessment/Plan:  62 y.o. male with postoperative development of intra-abdominal abscess status post distal pancreatectomy, splenectomy, cholecystectomy on 5/12. ERCP with pancreatic stent placement on 6/4, s/p IR drain placement x2 6/30. now s/p ERCP with stent exchange 7/2 and s/p IR drainage 7/9.     - NPO for IR drain upsize today- diet post procedure  - IR drain upsize today  - hold Eliquis until after IR  - drain flushes BID  - Monitor drain output  - PO pain control  - Continue IV antibiotics, appreciate ID recs  - appreciate wound care with vac changes  - thrombocytosis- monitor   - continue Creon  - monitor leukocytosis- should decrease once better drainage from fluid collection  - DC planning- appreciate social work help for short term placement     Electronically signed by Toi Brower DO on 7/14/2021 at 6:11 AM     Attending Physician Statement:    Chief Complaint:   Chief Complaint   Patient presents with    Other     Pt sent over from angio after having 2 abdominal drains inserted.         I have examined the patient and performed the key aspects of physical exam, reviewed the record (including all pertinent and new radiology images and laboratory findings), and discussed the case with the surgical team.  I agree with the assessment and plan with the following additions, corrections, and changes. 14pt review of symptoms completed and negative except as mentioned. WBC up, afebrile. IR upsize today    Oneil Hamman, MD  07/14/21  8:55 AM    NOTE: This report, in part or full, may have been transcribed using voice recognition software. Every effort was made to ensure accuracy; however, inadvertent computerized transcription errors may be present. Please excuse any transcriptional grammatical or spelling errors that may have escaped my editorial review.

## 2021-07-14 NOTE — BRIEF OP NOTE
Brief Postoperative Note    Manuel Esteban  YOB: 1960  [id number]    Pre-operative Diagnosis and Procedure: 63 yo M with a pancreatic leak and recurrent abdominal abscesses. Here for CT guided drainage catheter exchange and upsizing. Post-operative Diagnosis: Same    Anesthesia: Local    Estimated Blood Loss: < 10 cc    Surgeon: Anamaria Martínez MD    Complications: none    Specimen obtained: none     Findings: Successful drainage catheter exchange and upsizing to a 14 Fr biliary drainage type catheter.      Anamaria Martínez MD   7/14/2021 3:40 PM

## 2021-07-14 NOTE — PATIENT CARE CONFERENCE
[facility] Quality Flow/Interdisciplinary Rounds Progress Note        Quality Flow Rounds held on July 14, 2021    Disciplines Attending:  Bedside Nurse, ,  and Nursing Unit Leadership    Shruthi Tilley was admitted on 6/30/2021  3:47 PM    Anticipated Discharge Date:  Expected Discharge Date: 07/15/21    Disposition:    Mukund Score:  Mukund Scale Score: 19    Readmission Risk              Risk of Unplanned Readmission:  19           Discussed patient goal for the day, patient clinical progression, and barriers to discharge.   The following Goal(s) of the Day/Commitment(s) have been identified:  have patient prepped and ready for IR      Jenaro Bland RN  July 14, 2021

## 2021-07-14 NOTE — PLAN OF CARE
Problem: Falls - Risk of:  Goal: Will remain free from falls  Description: Will remain free from falls  7/14/2021 1522 by Radha Linton RN  Outcome: Met This Shift  7/14/2021 1521 by Radha Linton RN  Outcome: Met This Shift  Goal: Absence of physical injury  Description: Absence of physical injury  7/14/2021 1522 by Radha Linton RN  Outcome: Met This Shift  7/14/2021 1521 by Radha Linton RN  Outcome: Met This Shift

## 2021-07-14 NOTE — PROGRESS NOTES
[facility] PROGRESS NOTE      Chief complaint: Follow-up of intra-abdominal abscess     The patient is a 62 y.o. 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 coli, anaerobic gram-negative rods, coagulase-negative Staphylococcus, Cronobacter sakazakii for which he was given a course of ciprofloxacin and metronidazole for 4 weeks until 06/25, presented on 06/30 with nausea and vomiting for 4 days. He had percutaneous drain removed on 06/23 with resolution of anterior fluid collection but other surgical drain continued to have more purulent fluid collection, prompting another percutaneous drain placement on 06/30 and subsequent admission for possible possible ERCP with stent repositioning. Abscess fluid Gram stain and culture showed abundant polymorphonuclear leukocytes, no epithelial cells, abundant gram variable rods, mixed alphahemolytic Streptococcus species, light growth of Klebsiella oxytoca (non-ESBL; resistant to ampicillin; susceptible to fluoroquinolones and co-trimoxazole), heavy growth of alphahemolytic Streptococcus, MSSA and Lactobacillus, light growth of Candida albicans. On admission, he was afebrile and hemodynamically stable with no leukocytosis. Piperacillin-tazobactam, fluconazole and vancomycin were started on admission. He underwent ERCP with exchange of pancreatic duct stent and balloon sweep of pancreatic duct with removal of large amount of debris/pus on 07/02.   Repeat CT abdomen and pelvis on 07/04 showed abscess in mid and left upper abdomen with 2 pigtail catheters in the right and left aspect of the collection, similar in size compared to that from 07/02; new subtle loculated fluid collection in the left upper abdomen; multiple additional foci of containing gas located in the left upper abdomen similar to prior; new left pleural effusion with left lower lobe atelectasis; stable portal vein thrombosis. He underwent thoracentesis on 07/07, yielding 700 mL of exudative pleural fluid with Gram stain and culture showing few polymorphonuclear leukocytes, no epithelial cells, no organisms, no growth to date. Subjective: Patient was seen and examined. No chills, no abdominal pain, no diarrhea, no rash, no itching. Afebrile. Objective:  /67   Pulse 73   Temp 97 °F (36.1 °C) (Axillary)   Resp 16   Ht 5' 8\" (1.727 m)   Wt 186 lb 3.2 oz (84.5 kg)   SpO2 99%   BMI 28.31 kg/m²   Constitutional: Alert, not in distress  Respiratory: Clear breath sounds, no crackles, no wheezes  Cardiovascular: Regular rate and rhythm, no murmurs  Gastrointestinal: Bowel sounds present, soft, nontender. Abdominal drains x3 on the left hemiabdomen with mucopurulent fluid output and wound VAC connected to one of the drains  Skin: Warm and dry, no active dermatoses  Musculoskeletal: No joint swelling, no joint erythema    Labs, imaging, and medical records/notes were personally reviewed. Assessment:  Sepsis, resolved  Intraabdominal abscess/surgical site infection, s/p percutaneous drain placement on 05/28, inadequate drainage prompting additional percutaneous drain placements on 06/30  Pancreatic duct abscess status post ERCP with exchange of pancreatic duct stent and balloon sweep of pancreatic duct with removal of large amount of debris/pus on 07/02  Recent distal pancreatectomy with splenectomy OU 70/71 complicated by pancreatic leak s/p pancreatic stent placement on 05/17    Recommendations:  Continue ertapenem 1 g and fluconazole 400 mg every 24 hours. Anticipate at least 4 weeks of IV antibiotic therapy from 07/02-07/30.   Plan for upsizing intra-abdominal drain is noted. Continue supportive care. Thank you for involving me in the care of Maryjane Kohli. I will continue to follow. Please do not hesitate to call for any questions or concerns.     Electronically signed by Jackie Flores MD on 7/14/2021 at 4:48 PM

## 2021-07-14 NOTE — ANESTHESIA PRE PROCEDURE
Department of Anesthesiology  Preprocedure Note       Name:  Cedric Barbosa   Age:  62 y.o.  :  1960                                          MRN:  [id number]         Date:  2021      Surgeon:  Dr. Whaley     Procedure:  Clovia Lower intra abdominal drain      Medications prior to admission:   Prior to Admission medications    Medication Sig Start Date End Date Taking? Authorizing Provider   ertapenem [facility]) infusion Infuse 1,000 mg intravenously every 24 hours for 21 days Compound per protocol. 21  [place] Bhat MD   fluconazole (DIFLUCAN) 200 MG tablet Take 2 tablets by mouth daily for 21 days 21  [place] Bhat MD   oxyCODONE-acetaminophen (PERCOCET) 7.5-325 MG per tablet Take 1 tablet by mouth every 4 hours as needed for Pain. Historical Provider, MD   BANOPHEN 25 MG tablet take 2 tablets by mouth 1 HOUR PRIOR TO CONTRAST MEDIA ADMINISTRATION 21   Opal Laird III, MD   acetaminophen (TYLENOL) 500 MG tablet Take 2 tablets by mouth every 6 hours 21   Lucho Avila MD   ibuprofen (ADVIL;MOTRIN) 600 MG tablet Take 1 tablet by mouth 3 times daily (with meals) 21   Lucho Avila MD   apixaban (ELIQUIS) 5 MG TABS tablet Take 1 tablet by mouth 2 times daily . The first dose of this Eliquis should be taken approximately 12 hours after you received your last dose of Lovenox (the blood thinner injection into your skin) given in the hospital. Continue to take the Eliquis approximately every 12 hours.  21   Lucho Avila MD   benzonatate (TESSALON) 200 MG capsule Take 200 mg by mouth 3 times daily as needed for Cough    Historical Provider, MD   insulin lispro, 1 Unit Dial, (HUMALOG KWIKPEN) 100 UNIT/ML SOPN Inject 0-12 Units into the skin 3 times daily (before meals) Glucose: Dose:  If <139             No Insulin  140-199 2 Units  200-249 4 Units  250-299 6 Units  300-349 8 Units  350-400 10 Units  Above 400       12 Units 21 Edwin Galeas MD   losartan (COZAAR) 100 MG tablet take 1 tablet by mouth once daily 5/19/21   Edwin Galeas MD   hydroCHLOROthiazide (HYDRODIURIL) 12.5 MG tablet take 1 tablet by mouth once daily 5/19/21   Edwin Galeas MD   albuterol (PROVENTIL) (2.5 MG/3ML) 0.083% nebulizer solution Take 3 mLs by nebulization 4 times daily 5/19/21   Edwin Galeas MD   budesonide (PULMICORT) 0.5 MG/2ML nebulizer suspension Take 4 mLs by nebulization 2 times daily 5/19/21   Edwin Galeas MD   guaiFENesin (ROBITUSSIN) 100 MG/5ML SOLN oral solution Take 15 mLs by mouth 3 times daily 5/19/21   Edwin Galeas MD   lipase-protease-amylase (CREON) 53870 units CPEP delayed release capsule Take 2 capsules by mouth 3 times daily (with meals) 5/19/21 8/17/21  Edwin Galeas MD   pantoprazole (PROTONIX) 40 MG tablet Take 1 tablet by mouth 2 times daily (before meals) 5/19/21   Edwin Galeas MD   atorvastatin (LIPITOR) 20 MG tablet Take 1 tablet by mouth daily 5/20/21   Edwin Galeas MD   insulin glargine (LANTUS SOLOSTAR) 100 UNIT/ML injection pen Inject 20 Units into the skin 2 times daily 5/19/21 6/30/21  Edwin Galeas MD   insulin lispro, 1 Unit Dial, [facility] 100 UNIT/ML SOPN Inject 7 Units into the skin 3 times daily (before meals) 5/19/21 6/30/21  Edwin Galeas MD   gabapentin (NEURONTIN) 300 MG capsule Take 1 capsule by mouth 2 times daily. 4/19/21   Historical Provider, MD       Current medications:    No current facility-administered medications for this visit. Current Outpatient Medications   Medication Sig Dispense Refill    ertapenem (INVANZ) infusion Infuse 1,000 mg intravenously every 24 hours for 21 days Compound per protocol.  73118 mg 0    fluconazole (DIFLUCAN) 200 MG tablet Take 2 tablets by mouth daily for 21 days 42 tablet 0     Facility-Administered Medications Ordered in Other Visits   Medication Dose Route Frequency Provider Last Rate Last Admin    0.9 % sodium chloride infusion   Intravenous PRN Hoang Corcoran MD        0.9 % sodium chloride infusion   Intravenous PRN Hoang Corcoran MD        diazePAM (VALIUM) tablet 5 mg  5 mg Oral Q6H PRN Ellis Best MD   5 mg at 07/13/21 2335    acetaminophen (TYLENOL) tablet 650 mg  650 mg Oral Q4H While awake Zahra Boateng MD   650 mg at 07/14/21 1055    ketorolac (TORADOL) injection 15 mg  15 mg Intravenous 4 times per day Zahra Boateng MD   15 mg at 07/14/21 1314    insulin lispro (HUMALOG) injection vial 0-18 Units  0-18 Units Subcutaneous Q4H Zahra Boateng MD   6 Units at 07/14/21 1058    insulin glargine (LANTUS) injection vial 25 Units  25 Units Subcutaneous BID Zahra Boateng MD   25 Units at 07/14/21 0901    iopamidol (ISOVUE-300) 61 % injection 1 mL  1 mL Other ONCE PRN Jesus Guillory II, MD        lidocaine 1 % injection 5 mL  5 mL Intradermal Once Boston Fill, DO        fluconazole (DIFLUCAN) tablet 400 mg  400 mg Oral Daily Tracey Morrow MD   400 mg at 07/14/21 0859    sodium chloride flush 0.9 % injection 5-40 mL  5-40 mL Intravenous 2 times per day Tracey Morrow MD   10 mL at 07/14/21 0905    sodium chloride flush 0.9 % injection 5-40 mL  5-40 mL Intravenous PRN Tracey Morrow MD   10 mL at 07/14/21 0504    0.9 % sodium chloride infusion  25 mL Intravenous PRN Tracey Morrow MD        heparin flush 100 UNIT/ML injection 100 Units  1 mL Intravenous 2 times per day Tracey Morrow MD   100 Units at 07/14/21 0901    heparin flush 100 UNIT/ML injection 100 Units  1 mL Intracatheter PRN Tracey Morrow MD   100 Units at 07/14/21 0504    polycarbophil (FIBERCON) tablet 625 mg  625 mg Oral Daily Roger Huizar, DO   625 mg at 07/14/21 0901    sodium chloride (Inhalant) 3 % nebulizer solution 4 mL  4 mL Nebulization BID Kathy Solis MD   4 mL at 07/13/21 0939    sodium chloride (OCEAN, BABY AYR) 0.65 % nasal spray 1 spray  1 spray Each Nostril TID Eri Atknison MD   1 spray at 07/14/21 0904    budesonide (PULMICORT) nebulizer suspension 500 mcg  0.5 mg Nebulization BID Eri Atkinson MD   500 mcg at 07/13/21 6800    polyethylene glycol (GLYCOLAX) packet 17 g  17 g Oral BID Della Greenlandic, DO   17 g at 07/13/21 2031    docusate sodium (COLACE) capsule 100 mg  100 mg Oral BID Della Greenlandic, DO   100 mg at 07/13/21 2030    sennosides-docusate sodium (SENOKOT-S) 8.6-50 MG tablet 2 tablet  2 tablet Oral Daily Della Greenlandic, DO   2 tablet at 07/13/21 0825    sodium chloride flush 0.9 % injection 10 mL  10 mL Intravenous PRN Shanda Andrade MD   10 mL at 07/14/21 0504    [Held by provider] apixaban (ELIQUIS) tablet 5 mg  5 mg Oral BID Sweta MD Sania   5 mg at 07/11/21 2023    atorvastatin (LIPITOR) tablet 20 mg  20 mg Oral Daily Haley E Kaercher, DO   20 mg at 07/13/21 2032    ertapenem (INVanz) 1000 mg IVPB minibag  1,000 mg Intravenous Q24H Felix Pedro MD   Stopped at 07/13/21 1602    sodium chloride flush 0.9 % injection 5-40 mL  5-40 mL Intravenous 2 times per day Abner Gutierrez MD   10 mL at 07/14/21 0905    sodium chloride flush 0.9 % injection 5-40 mL  5-40 mL Intravenous PRN Abner Gutierrez MD   10 mL at 07/11/21 0402    ondansetron (ZOFRAN-ODT) disintegrating tablet 4 mg  4 mg Oral Q8H PRN Abner Gutierrez MD        Or    ondansetron [facility]) injection 4 mg  4 mg Intravenous Q6H PRN Abner Gutierrez MD        albuterol (PROVENTIL) nebulizer solution 2.5 mg  2.5 mg Nebulization 4x daily Abner Gutierrez MD   2.5 mg at 07/13/21 1421    benzonatate (TESSALON) capsule 200 mg  200 mg Oral TID PRN Abner Gutierrez MD   200 mg at 07/08/21 2054    gabapentin (NEURONTIN) capsule 300 mg  300 mg Oral BID Abner Gutierrez MD   300 mg at 07/14/21 0901    lipase-protease-amylase (CREON) delayed release capsule 72,000 Units  72,000 Units Oral TID  Abner Gutierrez MD   72,000 Units at 07/13/21 1610    pantoprazole (PROTONIX) tablet 40 mg  40 mg Oral BID AC Lenin Lagunas MD   40 mg at 07/14/21 0901    oxyCODONE (ROXICODONE) immediate release tablet 5 mg  5 mg Oral Q4H PRN Lenin Lagunas MD        Or   [facility] oxyCODONE HCl (OXY-IR) immediate release tablet 10 mg  10 mg Oral Q4H PRN Lenin Lagunas MD   10 mg at 07/14/21 1057    glucose (GLUTOSE) 40 % oral gel 15 g  15 g Oral PRN Lenin Lagunas MD        dextrose 50 % IV solution  12.5 g Intravenous PRN Lenin Lagunas MD        glucagon (rDNA) injection 1 mg  1 mg Intramuscular PRN Lenin Lagunas MD        dextrose 5 % solution  100 mL/hr Intravenous PRN Lenin Lagunas MD           Allergies:     Allergies   Allergen Reactions    Iodine Swelling     Sea food    Metformin And Related Hives    Wheat Extract Swelling       Problem List:    Patient Active Problem List   Diagnosis Code    Mixed hyperlipidemia E78.2    Neck pain M54.2    Cervical radiculopathy at C7 M54.12    Essential hypertension I10    Chronic seasonal allergic rhinitis J30.2    Ventral hernia without obstruction or gangrene K43.9    Poorly controlled type 2 diabetes mellitus (HCC) E11.65    Chronic bilateral low back pain with right-sided sciatica M54.41, G89.29    Gynecomastia N62    Panic disorder F41.0    Cubital tunnel syndrome on right G56.21    Right carpal tunnel syndrome G56.01    Spondylosis of lumbar spine M47.816    Bell's palsy G51.0    Other bursal cyst, left elbow M71.322    Sacral radiculitis M54.18    Lumbar radiculitis M54.16    Spondylosis of cervical region without myelopathy or radiculopathy M47.812    Cervical facet joint syndrome M47.812    Cervical disc disorder M50.90    Lumbar facet arthropathy M47.816    Spinal stenosis of lumbar region with neurogenic claudication M48.062    Lumbar disc disorder M51.9    Pancreatic neoplasm D49.0    IPMN (intraductal papillary mucinous neoplasm) D49.0    Pancreatic duct leak K86.89    Intra-abdominal infection B99.9    Abdominal pain with fever after surgery R10.9, G89.18, R50.82    Sepsis ([address] A41.9    Acute respiratory failure with hypoxia ([address] J96.01    Intra-abdominal abscess (HCC) K65.1    Moderate protein-calorie malnutrition (HCC) E44.0       Past Medical History:        Diagnosis Date    Arthritis     Back pain     5th finger & ring finger on right hand is numb    Bell's palsy     NEW ONSET 3-     Hyperlipidemia     Hypertension     Neuropathy     toes numb    Poorly controlled type 2 diabetes mellitus with neuropathy ([address] 5/8/2018    Sacral radiculitis 2/27/2019    Spondylosis of lumbar spine 3/9/2019    Advanced arthritis and degenerative disc disease by MRI 3/2019    Ventral hernia        Past Surgical History:        Procedure Laterality Date    ANESTHESIA NERVE BLOCK Bilateral 07/11/2019    BILATERAL L4-5 TRANSFORAMINAL EPIDURAL STEROID [facility] Bilateral 08/01/2019    BILATERAL MEDIAL BRANCH BLOCK L4-5 AND L5-S1 [facility] Bilateral 08/15/2019    BILATERAL MEDIAL BRANCH BLOCK L4 - 5 AND L5 - S1 performed by Jose Rosales DO at [facility] Right 03/29/2019    right wrist carpal tunnel release and right elbow cubital tunnel release    CARPAL TUNNEL RELEASE Right 03/29/2019    RIGHT WRIST CARPAL TUNNEL RELEASE AND RIGHT ELBOW CUBITAL TUNNEL RELEASE performed by Geno Li DO at [place] [address]      at age 40 polyps    CYST REMOVAL Right     EPIDURAL STEROID INJECTION N/A 10/17/2019    LUMBAR EPIDURAL STEROID INJECTION UNDER FLUOROSCOPIC GUIDANCE AT L2-L3 WITHOUT SEDATION performed by Cole Mcwilliams MD at [address] [place] [address] ERCP N/A 05/17/2021    ERCP ENDOSCOPIC RETROGRADE CHOLANGIOPANCREATOGRAPHY SPHINCTER/PAPILLOTOMY performed by Yfn Muñoz MD at [address] ERCP N/A 05/17/2021    ERCP STENT INSERTION performed by Yfn Muñoz MD at [address] ERCP N/A 6/4/2021    ERCP STENT INSERTION performed by Yfn Muñoz MD at [address] ERCP N/A 7/2/2021    ERCP STENT REMOVAL performed by Yfn Muñoz MD at [address] ERCP N/A 7/2/2021    ERCP STONE REMOVAL performed by Yfn Muñoz MD at [address] ERCP N/A 7/2/2021    ERCP STENT INSERTION performed by Yfn Muñoz MD at [facility] Bilateral     groin and umbilical    HERNIA REPAIR N/A 12/13/2018    ROBOTIC ASSISTED LAPAROSCOPIC PRIMARY REPAIR OF RECURRENT VENTRAL HERNIA  REPAIR performed by Tashia Campbell MD at [address] Bilateral 07/11/2019    L4-5 transforaminal     NERVE BLOCK Bilateral 08/01/2019    medial branch block    NERVE BLOCK Bilateral 08/15/2019    bilateral medial branch block L4-S1    NERVE BLOCK  10/17/2019    lumbar epidural    PANCREATECTOMY N/A 05/12/2021    LAPAROSCOPIC ROBOTIC DISTAL PANCREATECTOMY AND SPLENECTOMY AND CHOLECYSTECTOMY, INTRA-OPERATIVE ULTRASOUND, TRANSITIONED TO OPEN -- EPIDURAL performed by Chantal Campbell MD at [place] [address]  06/05/2018    C5-7 fusion at [facility] in [address] [place] [address] Pkwy 01/08/2021    EGD W/EUS FNA performed by Corby De La Garza DO at [facility] N/A 01/08/2021    EGD DIAGNOSTIC ONLY performed by Corby De La Garza DO at [address] History:    Social History     Tobacco Use    Smoking status: Never Smoker    Smokeless tobacco: Never Used   Substance Use Topics    Alcohol use: Not Currently     Comment: occasional                                Counseling given: Not Answered      Vital Signs (Current): There were no vitals filed for this visit.                                            BP Readings from Last 3 Encounters: 21 125/67   21 (!) 86/58   21 112/70       NPO Status:  > 8 hours                                                                               BMI:   Wt Readings from Last 3 Encounters:   21 186 lb 3.2 oz (84.5 kg)   21 199 lb (90.3 kg)   06/15/21 197 lb (89.4 kg)     There is no height or weight on file to calculate BMI.    CBC:   Lab Results   Component Value Date    WBC 15.2 2021    RBC 3.61 2021    HGB 7.9 2021    HCT 25.5 2021    MCV 70.6 2021    RDW 17.2 2021     2021       CMP:   Lab Results   Component Value Date     2021    K 5.2 2021    K 4.1 2021     2021    CO2 25 2021    BUN 12 2021    CREATININE 0.7 2021    GFRAA >60 2021    LABGLOM >60 2021    GLUCOSE 201 2021    PROT 7.1 2021    CALCIUM 8.8 2021    BILITOT 0.2 2021    ALKPHOS 62 2021    AST 32 2021    ALT 38 2021       POC Tests: No results for input(s): POCGLU, POCNA, POCK, POCCL, POCBUN, POCHEMO, POCHCT in the last 72 hours.     Coags:   Lab Results   Component Value Date    PROTIME 20.8 2021    INR 1.9 2021       HCG (If Applicable): No results found for: PREGTESTUR, PREGSERUM, HCG, HCGQUANT     ABGs:   Lab Results   Component Value Date    PO2ART 118.2 2021    DWM8EHM 56.0 2021    NYS6KGP 22.9 2021        Type & Screen (If Applicable):  No results found for: LABABO, LABRH    Drug/Infectious Status (If Applicable):  No results found for: HIV, HEPCAB    COVID-19 Screening (If Applicable):   Lab Results   Component Value Date    COVID19 Not Detected 2021    COVID19 Not Detected 2021     EK2021  Ventricular Rate     Atrial Rate     QRS Duration ms 64    Q-T Interval ms 234    QTc Calculation (Bazett) ms 357    R Axis degrees 42    T Axis degrees 28    [facility]ing [facility]      Narrative & Impression    Supraventricular tachycardia  Low voltage QRS  Significant artifacts  Abnormal ECG  When compared with ECG of 13-MAY-2021 06:15,  Significant changes have occurred  Confirmed by Rowan Ortiz ([id number]) on 7/1/2021 3:36:12 PM       CXR: 6/30/2021      FINDINGS:   The lungs are without acute focal process.  There is no effusion or   pneumothorax. The cardiomediastinal silhouette is without acute process. The   osseous structures are without acute process.  Percutaneous drainage tube   noted in the left upper quadrant.           Impression   No acute process. CT 6/30/2021  EXAM: CT ABSCESS DRAINAGE W CATH PLACEMENT S&I, CT ABSCESS   DRAINAGE W CATH PLACEMENT S&I   INDICATION: B99.9 Intra-abdominal infection    placement of IR drain into left sided fluid collection   What reading provider will be dictating this exam?->[facility]       After obtaining informed consent, following the routine sterile prep   and drape and after the administration of local anesthesia a needle   was inserted into the cavity   . Purulent fluid was aspirated.       Subsequently a guidewire was passed through the needle. Subsequently   the needle was removed and over the guidewire the tract was dilated. Following dilatation a locking loop catheter was placed. Post   procedure CT scan reveals the tube to be in good position. Specimen   was sent to the laboratory. After placement of the initial catheter and aspiration drainage and   flushing it was noted that there was a second fluid collection that   did not drain with or communicate with the first fluid collection   Subsequently following the routine sterile prep and drape and after   the administration of local anesthesia a needle was inserted into the   second cavity   . Purulent fluid was aspirated.       Subsequently a guidewire was passed through the needle. Subsequently   the needle was removed and over the guidewire the tract was dilated.    Following dilatation a locking loop catheter was placed. Post   procedure CT scan reveals the tube to be in good position. Specimen   was sent to the laboratory. The patient tolerated the procedure well. There were no complications.       Prior to the procedure a timeout occurred at  1412 hours. The patient   received 41 seconds  of  fluoroscopy. The patient received [facility]   anesthesia and local anesthesia. The patient was monitored for 60   minutes by a registered nurse.           Impression   Successful uncomplicated  abscess drainage. 2 separate abscess   cavities were drained 1 in the midline and one in the left upper   quadrant       Recommendations:   1. Follow-up tube check in 2 weeks   2. Flush tube daily with 5 to 10 mL of sterile saline               Anesthesia Evaluation  Patient summary reviewed and Nursing notes reviewed no history of anesthetic complications:   Airway: Mallampati: III  TM distance: <3 FB   Neck ROM: limited  Mouth opening: > = 3 FB Dental:          Pulmonary: breath sounds clear to auscultation  (+) sleep apnea: on noncompliant,      (-) not a current smoker                           Cardiovascular:    (+) hypertension:, hyperlipidemia    (-) past MI, CAD and CABG/stent    ECG reviewed  Rhythm: regular  Rate: abnormal           Beta Blocker:  Not on Beta Blocker        PE comment: Pt has been tachycardic   Neuro/Psych:   (+) neuromuscular disease (lumbar spine spondylosis; sacral radiculitis; cervical radiculopathy (C5-7 fusion in 2018); s/p T10-L2 fusion (Jan 2020)):, psychiatric history:depression/anxiety              ROS comment: Bell's palsy - March 2019 (left face); No issues currently    Lower extremity neuropathy    Right hand - ring finger and 5th finger are numb      Cervical radiculopathy at C7 GI/Hepatic/Renal:   (+) GERD: poorly controlled,          ROS comment: postoperative development of intra-abdominal abscess status post distal pancreatectomy, splenectomy, cholecystectomy on 5/12.  ERCP with pancreatic stent placement on 6/4, s/p IR drain placement x2 6/30    Pain upper left quadrant . Endo/Other:    (+) DiabetesType II DM, using insulin, blood dyscrasia (on Eliquis): anemia and anticoagulation therapy, arthritis: OA., .                  ROS comment: OA, DJD, DDD, cervical radiculopathy, chronic low back pain with right sciatica Abdominal:             Vascular: negative vascular ROS. Other Findings:               Anesthesia Plan      MAC     ASA 4       Induction: intravenous. Anesthetic plan and risks discussed with patient. Use of blood products discussed with patient whom consented to blood products. Plan discussed with attending.                   Humera Cheema, MARGAUX - CRNA   7/14/2021

## 2021-07-14 NOTE — INTERVAL H&P NOTE
Update History & Physical    The patient's History and Physical of June 30, 2021 was reviewed with the patient and I examined the patient. There was no change. The surgical site was confirmed by the patient and me. Plan: The risks, benefits, expected outcome, and alternative to the recommended procedure have been discussed with the patient. Patient understands and wants to proceed with the procedure.      Electronically signed by Anamaria Martínez MD on 7/14/2021 at 3:37 PM

## 2021-07-14 NOTE — FLOWSHEET NOTE
Inpatient Wound Care (follow up) 6521A    Admit Date: 6/30/2021  3:47 PM    Reason for consult:  Abdomen: wound vac dressing      Findings:      07/14/21 1330   Wound 07/06/21 Abdomen Proximal;Mid   Date First Assessed: 07/06/21   Present on Hospital Admission: No  Location: Abdomen  Wound Location Orientation: Proximal;Mid   Wound Image    Wound Etiology Surgical   Dressing Status New dressing applied   Wound Cleansed Cleansed with saline   Dressing/Treatment Negative pressure wound therapy   Wound Length (cm) 1.8 cm   Wound Width (cm) 1.4 cm   Wound Depth (cm) 0.6 cm   Wound Surface Area (cm^2) 2.52 cm^2   Change in Wound Size % (l*w) 36.36   Wound Volume (cm^3) 1.512 cm^3   Wound Healing % 62   Wound Assessment Pink/red  (yellow)   Drainage Amount None   Odor None   Gayle-wound Assessment Intact   Wound 07/06/21 Abdomen Right   Date First Assessed: 07/06/21   Present on Hospital Admission: No  Location: Abdomen  Wound Location Orientation: Right   Wound Image   (see photo for abdomen)   Dressing Status New dressing applied   Wound Cleansed Cleansed with saline   Dressing/Treatment Negative pressure wound therapy   Wound Length (cm) 0.2 cm   Wound Width (cm) 0.4 cm   Wound Depth (cm) 0.2 cm   Wound Surface Area (cm^2) 0.08 cm^2   Change in Wound Size % (l*w) 92   Wound Volume (cm^3) 0.016 cm^3   Wound Healing % 84   Wound Assessment Pink/red   Drainage Amount Scant   Drainage Description Yellow   Odor None   Gayle-wound Assessment Intact   Wound Thickness Description not for Pressure Injury Partial thickness   Wound 07/09/21 Abdomen Left   Date First Assessed/Time First Assessed: 07/09/21 1630   Present on Hospital Admission: No  Location: Abdomen  Wound Location Orientation: Left   Wound Image   (see photo for abdomen)   Dressing Status New dressing applied   Wound Cleansed Cleansed with saline   Dressing/Treatment Alginate;Hydrocolloid   Wound Length (cm) 0.1 cm   Wound Width (cm) 0.6 cm   Wound Depth (cm) 0.1 cm Wound Surface Area (cm^2) 0.06 cm^2   Change in Wound Size % (l*w) 50   Wound Volume (cm^3) 0.006 cm^3   Wound Healing % 50   Wound Assessment Pink/red   Drainage Amount None   Odor None   Gayle-wound Assessment Intact   Wound Thickness Description not for Pressure Injury Partial thickness   Negative Pressure Wound Therapy Abdomen Mid   Placement Date/Time: 07/06/21 0930   Pre-existing: No  Location: Abdomen  Wound Location Orientation: Mid   $ Standard NPWT <=50 sq cm PER TX $ Yes   Wound Type Surgical   Dressing Type Black foam   Number of pieces used 3   Cycle Continuous   Target Pressure (mmHg) 125   Canister changed? No   Dressing Change Due 07/16/21      **Informed Consent**    The patient has given verbal consent to have photos taken of wounds and inserted into their chart as part of their permanent medical record for purposes of documentation, treatment management and/or medical review. All Images taken on 7/14/21 of patient name: Guilherme Pinedo were transmitted and stored on secured Pycno located within [employer] by a registered [employer] Mobile Application Device. Impression:  Mid abdomen: surgical    Plan: Wound Vac dressing change  Opticell, exuderm: left abdomen  Next dressing change: 7/16/21  Will follow.           Vincent Cool RN 7/14/2021 2:14 PM

## 2021-07-14 NOTE — PROGRESS NOTES
1702 - Patient awake and alert. Vitals stable. No complications noted or reported. Report called to [address].   2669 - Patient back to room in stable condition via transport.

## 2021-07-14 NOTE — ANESTHESIA POSTPROCEDURE EVALUATION
Department of Anesthesiology  Postprocedure Note    Patient: Pradip West  MRN: [id number]  YOB: 1960  Date of evaluation: 7/14/2021  Time:  5:50 PM     Procedure Summary     Date: 07/14/21 Room / Location: [address] [place] [address]    Anesthesia Start: 9505 Anesthesia Stop: 7627    Procedure: CT CHG ABSC CYST DRAINAGE CATH Diagnosis: (upsize current IR drainn due to drianage around tube)    Scheduled Providers: [facility] General Radiologist Responsible Provider: Carlitos Hussein DO    Anesthesia Type: MAC ASA Status: 4          Anesthesia Type: MAC    Dominic Phase I: Dominic Score: 9    Dominic Phase II:      Last vitals: Reviewed and per EMR flowsheets.        Anesthesia Post Evaluation    Patient location during evaluation: bedside  Patient participation: complete - patient cannot participate  Level of consciousness: awake and alert  Airway patency: patent  Nausea & Vomiting: no nausea and no vomiting  Complications: no  Cardiovascular status: blood pressure returned to baseline  Respiratory status: acceptable  Hydration status: euvolemic

## 2021-07-14 NOTE — PROGRESS NOTES
Pulmonary [facility]                      Pulmonary Consult Bronwyn Enciso Note :      CC follow up of Pleural effusion     HPI   No events   Up to chair  Doing better s/p thoracentesis  Stated that his shortness of breath has improved significantly after thoracentesis  Has abdomen drain   Has  fever or chills  On RA        PHYSICAL EXAM:      Vitals:    BP (!) 108/59   Pulse 82   Temp 96.5 °F (35.8 °C) (Temporal)   Resp 16   Ht 5' 8\" (1.727 m)   Wt 186 lb 3.2 oz (84.5 kg)   SpO2 97%   BMI 28.31 kg/m²     EYES:  Lids and lashes normal, pupils equal, round and reactive to light, extra ocular muscles intact, sclera clear, conjunctiva normal  ENT:  Normocephalic, without obvious abnormality, atraumatic, sinuses nontender on palpation, external ears without lesions, oral pharynx with moist mucus membranes, tonsils without erythema or exudates, gums normal and good dentition. NECK:  Supple, symmetrical, trachea midline, no adenopathy, thyroid symmetric, not enlarged and no tenderness, skin normal  LUNGS:  Minimal bibasal ronchi  CARDIOVASCULAR:  Normal apical impulse, regular rate and rhythm, normal S1 and S2, no S3 or S4, and no murmur noted  ABDOMEN:  (+) abdominal drains  MUSCULOSKELETAL:  There is no redness, warmth, or swelling of the joints. Full range of motion noted. NEUROLOGIC:  Awake, alert, oriented to name, place and time. Cranial nerves II-XII are grossly intact.     DATA:    CBC:   Recent Labs     07/11/21 0430 07/12/21 0630 07/13/21  0600   WBC 9.5 12.6* 13.7*   HGB 7.8* 8.5* 7.8*   HCT 25.2* 28.4* 25.4*   MCV 70.2* 71.7* 70.6*   * 784* 708*       BMP:  Recent Labs     07/11/21  0430 07/12/21  0630 07/13/21  0600    139 137   K 5.2* 5.0 4.3    101 101   CO2 29 27 26   PHOS 3.0 2.0* 2.7   BUN 27* 18 15   CREATININE 0.7 0.8 0.8    ALB:3,BILIDIR:3,BILITOT:3,ALKPHOS:3)@    PT/INR:   No results for input(s): PROTIME, INR in the last 72 hours.    ABG:   No results for input(s): PH, PO2, PCO2, HCO3, BE, O2SAT, METHB, O2HB, COHB, O2CON, HHB, THB in the last 72 hours. FiO2 : 21 %       Radiology Review:  CT abdomen lower lung cuts with left pleural effusion with congestion/atelectasis left lower lobe which appears increased from previous CT abdomen on 6/30    IMPRESSION/RECOMMENDATIONS:      Pleural effusion  Hypoxia  Pancreatitis    Plan  On RA   cxr as needed   Continue with same plan  1.s/p thoracentesis ,left side,700 ml ,done 2 days ago  2-fluid are exudative which can be seen in pancreatitis  3- on 1 L ,beathing better   . encourged incentive spirometry ,a lot of atelectasis   3- Elquis   2. Incentive spirometer, encourage use  3.  Nasal saline tid and humidity bottle to see if can decongest nose and help with secretions and cough  4-ID following    Chencho Gregorio MD,[facility]  Pulmonary&Critical Care Medicine   Director of [address] [place] [address] Director of [address] [place] [address]    Sanjay Tijerina

## 2021-07-15 VITALS
TEMPERATURE: 98.1 F | HEART RATE: 92 BPM | BODY MASS INDEX: 28.22 KG/M2 | OXYGEN SATURATION: 95 % | RESPIRATION RATE: 18 BRPM | DIASTOLIC BLOOD PRESSURE: 65 MMHG | HEIGHT: 68 IN | WEIGHT: 186.2 LBS | SYSTOLIC BLOOD PRESSURE: 112 MMHG

## 2021-07-15 LAB
ALBUMIN SERPL-MCNC: 2.8 G/DL (ref 3.5–5.2)
ALP BLD-CCNC: 57 U/L (ref 40–129)
ALT SERPL-CCNC: 35 U/L (ref 0–40)
ANION GAP SERPL CALCULATED.3IONS-SCNC: 11 MMOL/L (ref 7–16)
AST SERPL-CCNC: 26 U/L (ref 0–39)
BASOPHILS ABSOLUTE: 0.01 E9/L (ref 0–0.2)
BASOPHILS RELATIVE PERCENT: 0.1 % (ref 0–2)
BILIRUB SERPL-MCNC: <0.2 MG/DL (ref 0–1.2)
BUN BLDV-MCNC: 19 MG/DL (ref 6–20)
CALCIUM IONIZED: 1.27 MMOL/L (ref 1.15–1.33)
CALCIUM SERPL-MCNC: 8.4 MG/DL (ref 8.6–10.2)
CHLORIDE BLD-SCNC: 98 MMOL/L (ref 98–107)
CO2: 25 MMOL/L (ref 22–29)
CREAT SERPL-MCNC: 0.6 MG/DL (ref 0.7–1.2)
EOSINOPHILS ABSOLUTE: 0 E9/L (ref 0.05–0.5)
EOSINOPHILS RELATIVE PERCENT: 0 % (ref 0–6)
GFR AFRICAN AMERICAN: >60
GFR NON-AFRICAN AMERICAN: >60 ML/MIN/1.73
GLUCOSE BLD-MCNC: 320 MG/DL (ref 74–99)
HCT VFR BLD CALC: 23.8 % (ref 37–54)
HEMOGLOBIN: 7.4 G/DL (ref 12.5–16.5)
IMMATURE GRANULOCYTES #: 0.1 E9/L
IMMATURE GRANULOCYTES %: 0.8 % (ref 0–5)
LYMPHOCYTES ABSOLUTE: 0.88 E9/L (ref 1.5–4)
LYMPHOCYTES RELATIVE PERCENT: 6.7 % (ref 20–42)
MAGNESIUM: 1.9 MG/DL (ref 1.6–2.6)
MCH RBC QN AUTO: 21.9 PG (ref 26–35)
MCHC RBC AUTO-ENTMCNC: 31.1 % (ref 32–34.5)
MCV RBC AUTO: 70.4 FL (ref 80–99.9)
METER GLUCOSE: 343 MG/DL (ref 74–99)
METER GLUCOSE: 353 MG/DL (ref 74–99)
METER GLUCOSE: 395 MG/DL (ref 74–99)
METER GLUCOSE: 96 MG/DL (ref 74–99)
MONOCYTES ABSOLUTE: 0.87 E9/L (ref 0.1–0.95)
MONOCYTES RELATIVE PERCENT: 6.6 % (ref 2–12)
NEUTROPHILS ABSOLUTE: 11.36 E9/L (ref 1.8–7.3)
NEUTROPHILS RELATIVE PERCENT: 85.8 % (ref 43–80)
PDW BLD-RTO: 17.1 FL (ref 11.5–15)
PHOSPHORUS: 3.1 MG/DL (ref 2.5–4.5)
PLATELET # BLD: 808 E9/L (ref 130–450)
PMV BLD AUTO: 10.2 FL (ref 7–12)
POTASSIUM SERPL-SCNC: 4.3 MMOL/L (ref 3.5–5)
RBC # BLD: 3.38 E12/L (ref 3.8–5.8)
SODIUM BLD-SCNC: 134 MMOL/L (ref 132–146)
TOTAL PROTEIN: 6.6 G/DL (ref 6.4–8.3)
WBC # BLD: 13.2 E9/L (ref 4.5–11.5)

## 2021-07-15 PROCEDURE — 85025 COMPLETE CBC W/AUTO DIFF WBC: CPT

## 2021-07-15 PROCEDURE — 84100 ASSAY OF PHOSPHORUS: CPT

## 2021-07-15 PROCEDURE — 82962 GLUCOSE BLOOD TEST: CPT

## 2021-07-15 PROCEDURE — 2580000003 HC RX 258: Performed by: STUDENT IN AN ORGANIZED HEALTH CARE EDUCATION/TRAINING PROGRAM

## 2021-07-15 PROCEDURE — 94761 N-INVAS EAR/PLS OXIMETRY MLT: CPT

## 2021-07-15 PROCEDURE — 36592 COLLECT BLOOD FROM PICC: CPT

## 2021-07-15 PROCEDURE — 6360000002 HC RX W HCPCS: Performed by: INTERNAL MEDICINE

## 2021-07-15 PROCEDURE — 82330 ASSAY OF CALCIUM: CPT

## 2021-07-15 PROCEDURE — 94640 AIRWAY INHALATION TREATMENT: CPT

## 2021-07-15 PROCEDURE — 6370000000 HC RX 637 (ALT 250 FOR IP): Performed by: STUDENT IN AN ORGANIZED HEALTH CARE EDUCATION/TRAINING PROGRAM

## 2021-07-15 PROCEDURE — 94760 N-INVAS EAR/PLS OXIMETRY 1: CPT

## 2021-07-15 PROCEDURE — 2580000003 HC RX 258: Performed by: INTERNAL MEDICINE

## 2021-07-15 PROCEDURE — 6360000002 HC RX W HCPCS: Performed by: SURGERY

## 2021-07-15 PROCEDURE — 6370000000 HC RX 637 (ALT 250 FOR IP): Performed by: SURGERY

## 2021-07-15 PROCEDURE — 6360000002 HC RX W HCPCS: Performed by: STUDENT IN AN ORGANIZED HEALTH CARE EDUCATION/TRAINING PROGRAM

## 2021-07-15 PROCEDURE — 6370000000 HC RX 637 (ALT 250 FOR IP): Performed by: INTERNAL MEDICINE

## 2021-07-15 PROCEDURE — 83735 ASSAY OF MAGNESIUM: CPT

## 2021-07-15 PROCEDURE — 36415 COLL VENOUS BLD VENIPUNCTURE: CPT

## 2021-07-15 PROCEDURE — 2580000003 HC RX 258: Performed by: RADIOLOGY

## 2021-07-15 PROCEDURE — 80053 COMPREHEN METABOLIC PANEL: CPT

## 2021-07-15 RX ORDER — DIAZEPAM 5 MG/1
5 TABLET ORAL EVERY 6 HOURS PRN
Status: SHIPPED | DISCHARGE
Start: 2021-07-15 | End: 2021-07-25

## 2021-07-15 RX ORDER — SENNA AND DOCUSATE SODIUM 50; 8.6 MG/1; MG/1
2 TABLET, FILM COATED ORAL DAILY
DISCHARGE
Start: 2021-07-16

## 2021-07-15 RX ORDER — POLYETHYLENE GLYCOL 3350 17 G/17G
17 POWDER, FOR SOLUTION ORAL 2 TIMES DAILY
Qty: 527 G | Refills: 1 | DISCHARGE
Start: 2021-07-15 | End: 2021-08-14

## 2021-07-15 RX ORDER — OXYCODONE HYDROCHLORIDE 5 MG/1
5 TABLET ORAL EVERY 6 HOURS PRN
Refills: 0 | Status: SHIPPED | DISCHARGE
Start: 2021-07-15 | End: 2021-07-20

## 2021-07-15 RX ORDER — NALOXONE HYDROCHLORIDE 4 MG/.1ML
1 SPRAY NASAL PRN
Qty: 1 EACH | Refills: 5 | DISCHARGE
Start: 2021-07-15 | End: 2021-09-01

## 2021-07-15 RX ADMIN — KETOROLAC TROMETHAMINE 15 MG: 30 INJECTION, SOLUTION INTRAMUSCULAR; INTRAVENOUS at 00:27

## 2021-07-15 RX ADMIN — PANCRELIPASE 72000 UNITS: 60000; 12000; 38000 CAPSULE, DELAYED RELEASE PELLETS ORAL at 11:17

## 2021-07-15 RX ADMIN — INSULIN GLARGINE 25 UNITS: 100 INJECTION, SOLUTION SUBCUTANEOUS at 07:24

## 2021-07-15 RX ADMIN — ERTAPENEM SODIUM 1000 MG: 1 INJECTION, POWDER, LYOPHILIZED, FOR SOLUTION INTRAMUSCULAR; INTRAVENOUS at 14:45

## 2021-07-15 RX ADMIN — ALBUTEROL SULFATE 2.5 MG: 2.5 SOLUTION RESPIRATORY (INHALATION) at 09:07

## 2021-07-15 RX ADMIN — OXYCODONE HYDROCHLORIDE 10 MG: 10 TABLET ORAL at 15:19

## 2021-07-15 RX ADMIN — DIAZEPAM 5 MG: 5 TABLET ORAL at 08:36

## 2021-07-15 RX ADMIN — APIXABAN 5 MG: 5 TABLET, FILM COATED ORAL at 08:31

## 2021-07-15 RX ADMIN — POLYETHYLENE GLYCOL 3350 17 G: 17 POWDER, FOR SOLUTION ORAL at 08:30

## 2021-07-15 RX ADMIN — ACETAMINOPHEN 650 MG: 325 TABLET ORAL at 14:45

## 2021-07-15 RX ADMIN — GABAPENTIN 300 MG: 300 CAPSULE ORAL at 08:30

## 2021-07-15 RX ADMIN — OXYCODONE HYDROCHLORIDE 10 MG: 10 TABLET ORAL at 11:17

## 2021-07-15 RX ADMIN — INSULIN LISPRO 15 UNITS: 100 INJECTION, SOLUTION INTRAVENOUS; SUBCUTANEOUS at 16:17

## 2021-07-15 RX ADMIN — BUDESONIDE 500 MCG: 0.5 SUSPENSION RESPIRATORY (INHALATION) at 09:08

## 2021-07-15 RX ADMIN — HEPARIN 100 UNITS: 100 SYRINGE at 08:30

## 2021-07-15 RX ADMIN — FLUCONAZOLE 400 MG: 100 TABLET ORAL at 08:30

## 2021-07-15 RX ADMIN — DOCUSATE SODIUM 50 MG AND SENNOSIDES 8.6 MG 2 TABLET: 8.6; 5 TABLET, FILM COATED ORAL at 08:29

## 2021-07-15 RX ADMIN — SODIUM CHLORIDE, PRESERVATIVE FREE 10 ML: 5 INJECTION INTRAVENOUS at 01:57

## 2021-07-15 RX ADMIN — OXYCODONE HYDROCHLORIDE 10 MG: 10 TABLET ORAL at 01:56

## 2021-07-15 RX ADMIN — INSULIN LISPRO 15 UNITS: 100 INJECTION, SOLUTION INTRAVENOUS; SUBCUTANEOUS at 11:20

## 2021-07-15 RX ADMIN — ACETAMINOPHEN 650 MG: 325 TABLET ORAL at 08:32

## 2021-07-15 RX ADMIN — PANTOPRAZOLE SODIUM 40 MG: 40 TABLET, DELAYED RELEASE ORAL at 16:16

## 2021-07-15 RX ADMIN — CALCIUM POLYCARBOPHIL 625 MG: 625 TABLET, FILM COATED ORAL at 08:30

## 2021-07-15 RX ADMIN — PANCRELIPASE 72000 UNITS: 60000; 12000; 38000 CAPSULE, DELAYED RELEASE PELLETS ORAL at 08:29

## 2021-07-15 RX ADMIN — SODIUM CHLORIDE, PRESERVATIVE FREE 10 ML: 5 INJECTION INTRAVENOUS at 08:34

## 2021-07-15 RX ADMIN — PANCRELIPASE 72000 UNITS: 60000; 12000; 38000 CAPSULE, DELAYED RELEASE PELLETS ORAL at 16:15

## 2021-07-15 RX ADMIN — DOCUSATE SODIUM 100 MG: 100 CAPSULE ORAL at 08:30

## 2021-07-15 RX ADMIN — OXYCODONE HYDROCHLORIDE 10 MG: 10 TABLET ORAL at 06:21

## 2021-07-15 RX ADMIN — SODIUM CHLORIDE SOLN NEBU 3% 4 ML: 3 NEBU SOLN at 09:08

## 2021-07-15 RX ADMIN — INSULIN LISPRO 12 UNITS: 100 INJECTION, SOLUTION INTRAVENOUS; SUBCUTANEOUS at 07:23

## 2021-07-15 RX ADMIN — HEPARIN 100 UNITS: 100 SYRINGE at 01:57

## 2021-07-15 RX ADMIN — SODIUM CHLORIDE, PRESERVATIVE FREE 10 ML: 5 INJECTION INTRAVENOUS at 08:30

## 2021-07-15 RX ADMIN — PANTOPRAZOLE SODIUM 40 MG: 40 TABLET, DELAYED RELEASE ORAL at 08:31

## 2021-07-15 ASSESSMENT — PAIN SCALES - GENERAL
PAINLEVEL_OUTOF10: 8
PAINLEVEL_OUTOF10: 0
PAINLEVEL_OUTOF10: 7
PAINLEVEL_OUTOF10: 8
PAINLEVEL_OUTOF10: 0
PAINLEVEL_OUTOF10: 7
PAINLEVEL_OUTOF10: 6
PAINLEVEL_OUTOF10: 7
PAINLEVEL_OUTOF10: 4
PAINLEVEL_OUTOF10: 7

## 2021-07-15 ASSESSMENT — PAIN DESCRIPTION - PAIN TYPE: TYPE: SURGICAL PAIN

## 2021-07-15 ASSESSMENT — PAIN DESCRIPTION - ONSET: ONSET: AWAKENED FROM SLEEP

## 2021-07-15 ASSESSMENT — PAIN DESCRIPTION - PROGRESSION: CLINICAL_PROGRESSION: NOT CHANGED

## 2021-07-15 ASSESSMENT — PAIN DESCRIPTION - FREQUENCY: FREQUENCY: CONTINUOUS

## 2021-07-15 ASSESSMENT — PAIN DESCRIPTION - LOCATION: LOCATION: ABDOMEN

## 2021-07-15 ASSESSMENT — PAIN DESCRIPTION - DESCRIPTORS: DESCRIPTORS: ACHING;CRAMPING;DULL

## 2021-07-15 ASSESSMENT — PAIN - FUNCTIONAL ASSESSMENT: PAIN_FUNCTIONAL_ASSESSMENT: ACTIVITIES ARE NOT PREVENTED

## 2021-07-15 NOTE — DISCHARGE INSTR - COC
Continuity of Care Form    Patient Name: Marlene Helm   :  1960  MRN:  [id number]    Admit date:  2021  Discharge date:  ***    Code Status Order: Full Code   Advance Directives:   Advance Care Flowsheet Documentation     Date/Time Healthcare Directive Type of Healthcare Directive Copy in [address] Agent's Name Healthcare Agent's Phone Number    [phone]  No, patient does not have an advance directive for healthcare treatment  --  --  --  --  --          Admitting Physician:  Arvin Mclaughlin MD  PCP: Casey Abdalla DO    Discharging Nurse: [facility] Unit/Room#: 3366/5674-E  Discharging Unit Phone Number: ***    Emergency Contact:   Extended Emergency Contact Information  Primary Emergency Contact: [place]  Address: [address] [place] [address] [address] [place], [address] [place] [address] Phone: [phone]  Relation: Other   needed? No  Secondary Emergency Contact: [facility]  Mobile Phone: [phone]  Relation: Child  Preferred language: English   needed?  No    Past Surgical History:  Past Surgical History:   Procedure Laterality Date    ANESTHESIA NERVE BLOCK Bilateral 2019    BILATERAL L4-5 TRANSFORAMINAL EPIDURAL STEROID INJECTION performed by Samreen Rater, DO at [address] Bilateral 2019    BILATERAL MEDIAL BRANCH BLOCK L4-5 AND L5-S1 performed by Samreen Rater, DO at  [facility] Bilateral 08/15/2019    BILATERAL MEDIAL BRANCH BLOCK L4 - 5 AND L5 - S1 performed by Samreen Rater, DO at [facility] Right 2019    right wrist carpal tunnel release and right elbow cubital tunnel release    CARPAL TUNNEL RELEASE Right 2019    RIGHT WRIST CARPAL TUNNEL RELEASE AND RIGHT ELBOW CUBITAL TUNNEL RELEASE performed by Nasir Middleton, DO at [facility]    COLONOSCOPY      at age 40 polyps    CYST REMOVAL Right     EPIDURAL STEROID INJECTION N/A 10/17/2019    LUMBAR EPIDURAL STEROID INJECTION UNDER FLUOROSCOPIC GUIDANCE AT L2-L3 WITHOUT SEDATION performed by Jewels Coffey MD at [facility] ERCP N/A 05/17/2021    ERCP ENDOSCOPIC RETROGRADE CHOLANGIOPANCREATOGRAPHY SPHINCTER/PAPILLOTOMY performed by Umu Tatum MD at [address] ERCP N/A 05/17/2021    ERCP STENT INSERTION performed by Umu Tatum MD at [address] ERCP N/A 6/4/2021    ERCP STENT INSERTION performed by Umu Tatum MD at [address] ERCP N/A 7/2/2021    ERCP STENT REMOVAL performed by Umu Tatum MD at [address] ERCP N/A 7/2/2021    ERCP STONE REMOVAL performed by Umu Tatum MD at [address] ERCP N/A 7/2/2021    ERCP STENT INSERTION performed by Umu Tatum MD at [address] Bilateral     groin and umbilical    HERNIA REPAIR N/A 12/13/2018    ROBOTIC ASSISTED LAPAROSCOPIC PRIMARY REPAIR OF RECURRENT VENTRAL HERNIA  REPAIR performed by Francois Law MD at [facility] Bilateral 07/11/2019    L4-5 transforaminal     NERVE BLOCK Bilateral 08/01/2019    medial branch block    NERVE BLOCK Bilateral 08/15/2019    bilateral medial branch block L4-S1    NERVE BLOCK  10/17/2019    lumbar epidural    PANCREATECTOMY N/A 05/12/2021    LAPAROSCOPIC ROBOTIC DISTAL PANCREATECTOMY AND SPLENECTOMY AND CHOLECYSTECTOMY, INTRA-OPERATIVE ULTRASOUND, TRANSITIONED TO OPEN -- EPIDURAL performed by Zayra Fam MD at [place] [address]  06/05/2018    C5-7 fusion at [address] 01/08/2021    EGD W/EUS FNA performed by Tuan Go DO at [place] [address] N/A 01/08/2021    EGD DIAGNOSTIC ONLY performed by Tuan Go DO at [facility] History: Immunization History   Administered Date(s) Administered    COVID-19, Moderna, PF, 100mcg/0.5mL 03/19/2021, 04/16/2021    Hib PRP-OMP (PedvaxHIB) 04/27/2021    Influenza, Hassel Scarce, IM, PF (6 mo and older Fluzone, Flulaval, Fluarix, and 3 yrs and older Afluria) 11/20/2018, 11/20/2019, 10/27/2020    Meningococcal B, OMV (Bexsero) 04/27/2021, 05/27/2021    Meningococcal MCV4O (Menveo) 04/27/2021, 06/24/2021    Pneumococcal Conjugate 13-valent ([id number]) 04/27/2021    Pneumococcal Polysaccharide ([id number]) 06/24/2019, 06/24/2021    Tdap (Boostrix, Adacel) 06/24/2019       Active Problems:  Patient Active Problem List   Diagnosis Code    Mixed hyperlipidemia E78.2    Neck pain M54.2    Cervical radiculopathy at C7 M54.12    Essential hypertension I10    Chronic seasonal allergic rhinitis J30.2    Ventral hernia without obstruction or gangrene K43.9    Poorly controlled type 2 diabetes mellitus (HCC) E11.65    Chronic bilateral low back pain with right-sided sciatica M54.41, G89.29    Gynecomastia N62    Panic disorder F41.0    Cubital tunnel syndrome on right G56.21    Right carpal tunnel syndrome G56.01    Spondylosis of lumbar spine M47.816    Bell's palsy G51.0    Other bursal cyst, left elbow M71.322    Sacral radiculitis M54.18    Lumbar radiculitis M54.16    Spondylosis of cervical region without myelopathy or radiculopathy M47.812    Cervical facet joint syndrome M47.812    Cervical disc disorder M50.90    Lumbar facet arthropathy M47.816    Spinal stenosis of lumbar region with neurogenic claudication M48.062    Lumbar disc disorder M51.9    Pancreatic neoplasm D49.0    IPMN (intraductal papillary mucinous neoplasm) D49.0    Pancreatic duct leak K86.89    Intra-abdominal infection B99.9    Abdominal pain with fever after surgery R10.9, G89.18, R50.82    Sepsis (HCC) A41.9    Acute respiratory failure with hypoxia (HCC) J96.01    Intra-abdominal abscess (HCC) K65.1    Moderate protein-calorie malnutrition (HCC) E44.0       Isolation/Infection:   Isolation          No Isolation        Patient Infection Status     Infection Onset Added Last Indicated Last Indicated By Review Planned Expiration Resolved Resolved By    None active    Resolved    COVID-19 Rule Out 05/27/21 05/27/21 05/27/21 COVID-19, Rapid (Ordered)   05/27/21 Rule-Out Test Resulted    COVID-19 Rule Out 05/06/21 05/06/21 05/06/21 COVID-19 Ambulatory (Ordered)   05/08/21 Rule-Out Test Resulted    COVID-19 Rule Out 01/04/21 01/04/21 01/04/21 Covid-19 Ambulatory (Ordered)   01/05/21 Rule-Out Test Resulted          Nurse Assessment:  Last Vital Signs: BP (!) 104/55   Pulse 91   Temp 97.7 °F (36.5 °C) (Temporal)   Resp 16   Ht 5' 8\" (1.727 m)   Wt 186 lb 3.2 oz (84.5 kg)   SpO2 95%   BMI 28.31 kg/m²     Last documented pain score (0-10 scale): Pain Level: 4  Last Weight:   Wt Readings from Last 1 Encounters:   07/09/21 186 lb 3.2 oz (84.5 kg)     Mental Status:  oriented and alert    IV Access:  - Midline right upper arm - inserted 7/7/21    Nursing Mobility/ADLs:  Walking   Independent  Transfer  Independent  Bathing  Assisted  Dressing  Assisted  Toileting  Assisted  Feeding  Independent  Med Admin  Assisted  Med Delivery   whole    Wound Care Documentation and Therapy:  Negative Pressure Wound Therapy Abdomen Mid (Active)   $ Standard NPWT <=50 sq cm PER TX $ Yes 07/14/21 1330   Wound Type Surgical 07/15/21 0315   Dressing Type Black foam 07/15/21 0315   Number of pieces used 3 07/14/21 1330   Cycle Continuous 07/15/21 0315   Target Pressure (mmHg) 125 07/15/21 0315   Canister changed? Yes 07/14/21 1715   Dressing Status Clean;Dry; Intact 07/15/21 0315   Dressing Changed Other (Comment) 07/15/21 0315   Drainage Amount Scant 07/15/21 0315   Drainage Description Yellow 07/15/21 0315   Dressing Change Due 07/16/21 07/15/21 0315   Output (ml) 0 ml 07/15/21 0600   Wound Assessment Other (Comment) 07/14/21 0400 1350   Dressing Status Clean;Dry; Intact 07/15/21 0315   Wound Cleansed Cleansed with saline 07/14/21 1505   Dressing/Treatment Alginate;Hydrocolloid 07/15/21 0315   Wound Length (cm) 0.1 cm 07/14/21 1330   Wound Width (cm) 0.6 cm 07/14/21 1330   Wound Depth (cm) 0.1 cm 07/14/21 1330   Wound Surface Area (cm^2) 0.06 cm^2 07/14/21 1330   Change in Wound Size % (l*w) 50 07/14/21 1330   Wound Volume (cm^3) 0.006 cm^3 07/14/21 1330   Wound Healing % 50 07/14/21 1330   Wound Assessment Pink/red 07/15/21 0315   Drainage Amount None 07/15/21 0315   Drainage Description Yellow 07/15/21 0315   Odor None 07/15/21 0315   Gayle-wound Assessment Intact 07/15/21 0315   Wound Thickness Description not for Pressure Injury Partial thickness 07/14/21 1330   Number of days: 5        Elimination:  Continence:   · Bowel: Yes  · Bladder: Yes  Urinary Catheter: None   Colostomy/Ileostomy/Ileal Conduit: No       Date of Last BM: 7/14/21    Intake/Output Summary (Last 24 hours) at 7/15/2021 1242  Last data filed at 7/15/2021 0927  Gross per 24 hour   Intake 1773 ml   Output 1130 ml   Net 643 ml     I/O last 3 completed shifts: In: 7270 [P.O.:600; I.V.:300; Blood:633]  Out: 1230 [Urine:1075; Drains:155]    Safety Concerns: At Risk for Falls    Impairments/Disabilities:      None    Nutrition Therapy:  Current Nutrition Therapy:   - Oral Diet:  Low Fat    Routes of Feeding: Oral  Liquids: Thin Liquids  Daily Fluid Restriction: no  Last Modified Barium Swallow with Video (Video Swallowing Test): not done    Treatments at the Time of Hospital Discharge:   Respiratory Treatments:   Oxygen Therapy:  is not on home oxygen therapy.   Ventilator:    - No ventilator support    Rehab Therapies: PT and OT to eval and treat   Weight Bearing Status/Restrictions: No weight bearing restirctions  Other Medical Equipment (for information only, NOT a DME order):  none  Other Treatments:     Patient's personal belongings (please select all that are sent with patient):  Glasses    RN SIGNATURE:  Electronically signed by Aure Marie RN on 7/15/21 at 12:43 PM EDT    CASE MANAGEMENT/SOCIAL WORK SECTION    Inpatient Status Date: ***    Readmission Risk Assessment Score:  Readmission Risk              Risk of Unplanned Readmission:  18           Discharging to Facility/ Agency   · Name: select at [address] [place] [address],[address]   · Address:  · Phone:  · Fax:    Dialysis Facility (if applicable)   · Name:  · Address:  · Dialysis Schedule:  · Phone:  · Fax:    / signature: Electronically signed by MUNA Stephenson on 7/15/2021 at 9:00 AM      PHYSICIAN SECTION    Prognosis: {Prognosis:0807685747}    Condition at Discharge: Donavan Cabrera Sharad Patient Condition:[id number]}    Rehab Potential (if transferring to Rehab): {Prognosis:1228225361}    Recommended Labs or Other Treatments After Discharge: ***    Physician Certification: I certify the above information and transfer of Vaughn Alejandro  is necessary for the continuing treatment of the diagnosis listed and that he requires LTAC for less 30 days.      Update Admission H&P: {CHP DME Changes in LVGCZ:[id number]}    PHYSICIAN SIGNATURE:  {Esignature:[id number]}

## 2021-07-15 NOTE — PROGRESS NOTES
B SURGERY  DAILY PROGRESS NOTE  7/15/2021  Chief Complaint   Patient presents with    Other     Pt sent over from angio after having 2 abdominal drains inserted. Subjective:  Feels better this AM, drain upsized and having more drainage in tube and not around, having bowel function and tolerating diet   Objective:  BP (!) 123/58   Pulse 84   Temp 98.9 °F (37.2 °C) (Oral)   Resp 16   Ht 5' 8\" (1.727 m)   Wt 186 lb 3.2 oz (84.5 kg)   SpO2 95%   BMI 28.31 kg/m²     GENERAL:  Laying in bed, awake, alert, cooperative, no apparent distress  HEAD: Normocephalic, atraumatic  EYES: No sclera icterus, pupils equal  LUNGS:  Normal work of breathing  CARDIOVASCULAR:  RR  ABDOMEN:  Soft, mildly distended, appropriately tender around midline wound vac with suction intact, LLQ drain with minimal drainage in bag, LUQ IR drain with thick pancreatic drainage   EXTREMITIES: minimal edema   SKIN: Warm and dry    Assessment/Plan:  62 y.o. male with postoperative development of intra-abdominal abscess status post distal pancreatectomy, splenectomy, cholecystectomy on 5/12.  ERCP with pancreatic stent placement on 6/4, s/p IR drain placement x2 6/30. now s/p ERCP with stent exchange 7/2 and s/p IR drainage 7/9.     - low fat diet   - monitor drain output   - restart Eliquis today  - drain flushes BID  - Monitor drain output  - PO pain control  - Continue IV antibiotics, appreciate ID recs  - appreciate wound care with vac changes  - thrombocytosis- monitor   - continue Creon  - monitor leukocytosis- follow up todays labs   - DC planning- appreciate social work help for short term placement     Electronically signed by Kojo Eduardo DO on 7/15/2021 at 6:12 AM

## 2021-07-15 NOTE — PROGRESS NOTES
[facility] PROGRESS NOTE      Chief complaint: Follow-up of intra-abdominal abscess     The patient is a 62 y.o. 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 coli, anaerobic gram-negative rods, coagulase-negative Staphylococcus, Cronobacter sakazakii for which he was given a course of ciprofloxacin and metronidazole for 4 weeks until 06/25, presented on 06/30 with nausea and vomiting for 4 days. He had percutaneous drain removed on 06/23 with resolution of anterior fluid collection but other surgical drain continued to have more purulent fluid collection, prompting another percutaneous drain placement on 06/30 and subsequent admission for possible possible ERCP with stent repositioning. Abscess fluid Gram stain and culture showed abundant polymorphonuclear leukocytes, no epithelial cells, abundant gram variable rods, mixed alphahemolytic Streptococcus species, light growth of Klebsiella oxytoca (non-ESBL; resistant to ampicillin; susceptible to fluoroquinolones and co-trimoxazole), heavy growth of alphahemolytic Streptococcus, MSSA and Lactobacillus, light growth of Candida albicans. On admission, he was afebrile and hemodynamically stable with no leukocytosis. Piperacillin-tazobactam, fluconazole and vancomycin were started on admission. He underwent ERCP with exchange of pancreatic duct stent and balloon sweep of pancreatic duct with removal of large amount of debris/pus on 07/02.   Repeat CT abdomen and pelvis on 07/04 showed abscess in mid and left upper abdomen with 2 pigtail catheters in the right and left aspect of the collection, similar in size compared to that from 07/02; new subtle loculated fluid collection in the left upper abdomen; multiple additional foci of containing gas located in the left upper abdomen similar to prior; new left pleural effusion with left lower lobe atelectasis; stable portal vein thrombosis. He underwent thoracentesis on 07/07, yielding 700 mL of exudative pleural fluid with Gram stain and culture showing few polymorphonuclear leukocytes, no epithelial cells, no organisms, no growth. One of the abdominal drains was upsized on 07/14. Subjective: Patient was seen and examined. No chills, no abdominal pain, no diarrhea, no rash, no itching. He reports feeling better. Afebrile. Objective:  /68   Pulse 81   Temp 97.8 °F (36.6 °C) (Temporal)   Resp 20   Ht 5' 8\" (1.727 m)   Wt 186 lb 3.2 oz (84.5 kg)   SpO2 95%   BMI 28.31 kg/m²   Constitutional: Alert, not in distress  Respiratory: Clear breath sounds, no crackles, no wheezes  Cardiovascular: Regular rate and rhythm, no murmurs  Gastrointestinal: Bowel sounds present, soft, nontender. Abdominal drains x3 on the left hemiabdomen with mucopurulent fluid output and wound VAC connected to one of the drains  Skin: Warm and dry, no active dermatoses  Musculoskeletal: No joint swelling, no joint erythema    Labs, imaging, and medical records/notes were personally reviewed.     Assessment:  Sepsis, resolved  Leukocytosis  Intraabdominal abscess/surgical site infection, s/p percutaneous drain placement on 05/28, inadequate drainage prompting additional percutaneous drain placements on 06/30  Pancreatic duct abscess status post ERCP with exchange of pancreatic duct stent and balloon sweep of pancreatic duct with removal of large amount of debris/pus on 07/02  Recent distal pancreatectomy with splenectomy QW 93/45 complicated by pancreatic leak s/p pancreatic stent placement on 05/17    Recommendations:  Continue ertapenem 1 g and fluconazole 400 mg every 24 hours for at least 4 weeks from 07/02-07/30. Continue supportive care. Thank you for involving me in the care of Mona Justin. I will continue to follow. Please do not hesitate to call for any questions or concerns.     Electronically signed by Felix Pedro MD on 7/15/2021 at 10:45 AM

## 2021-07-15 NOTE — CARE COORDINATION
SOCIAL WORK/CASEMANAGEMENT TRANSITION OF CARE [id number] [place]  [facility] [address], Gabriela Cobb, [phone]): I met with pt in the room this a.m. select precert is pending for here. Pt wants a karine around this area if insurance denies ltac. He was provided karine choices and wants [facility]- referral made, Critical access hospital or [facility]. Pt with iv invanz til 7/30, wound vac and drain. Ethan/silvana to follow.  Lauri Mcnally, MUNA  7/15/2021

## 2021-07-15 NOTE — CARE COORDINATION
SOCIAL WORK/CASEMANAGEMENT TRANSITION OF CARE [id number] [facility], [address], Kuldeep Aguilar, [phone]: precert obtained to go to [facility] to [facility]. MUAN [place]  7/15/2021  Residents are working on transfer to [facility] today around 2-3 p.m.  MUNA [place]  7/15/2021

## 2021-07-16 NOTE — DISCHARGE SUMMARY
Physician Discharge Summary     Patient ID:  Gopi Azar  [id number]  37 y.o.  1960    Admit date: 6/30/2021    Discharge date and time: 7/15/2021  6:18 PM     Admitting Physician: Patricio Gabriel MD     Admission Diagnoses: Intra-abdominal infection [B99.9]    Discharge Diagnoses: Active Problems:    Intra-abdominal infection    Acute respiratory failure with hypoxia (HCC)    Intra-abdominal abscess (HCC)    Moderate protein-calorie malnutrition (HCC)  Resolved Problems:    * No resolved hospital problems. *      Admission Condition: poor    Discharged Condition: stable      Hospital Course:  Gopi Azar is a 62 y.o. male who presented with pancreatic leak and intra-abdominal abscess. Work up revealed clogged pancreatic stent. The patient's course was otherwise uneventful. He progressed well, pain was controlled on PO medications. He was tolerating a regular diet with no nausea or vomiting, and was in a suitable condition for discharge to [facility] in stable condition. Consults:   IP CONSULT TO GENERAL SURGERY  IP CONSULT TO INFECTIOUS DISEASES  IP CONSULT TO PHARMACY  IP CONSULT TO PHARMACY  IP CONSULT TO PULMONOLOGY  IP CONSULT TO SOCIAL WORK    Significant Diagnostic Studies:   No results found.     Discharge Exam:  GENERAL:  Laying in bed, awake, alert, cooperative, no apparent distress  HEAD: Normocephalic, atraumatic  EYES: No sclera icterus, pupils equal  LUNGS:  Normal work of breathing  CARDIOVASCULAR:  RR  ABDOMEN:  Soft, mildly distended, appropriately tender around midline wound vac with suction intact, LLQ drain with minimal drainage in bag, LUQ IR drain with thick pancreatic drainage   EXTREMITIES: minimal edema   SKIN: Warm and dry    Disposition: LTAC    In process/preliminary results:  Outstanding Order Results     Date and Time Order Name Status Description    7/14/2021 12:20 PM PREPARE FRESH FROZEN PLASMA, 1 Units Preliminary     7/14/2021 12:00 PM [address] [place] [address] PLASMA, 1 Units Preliminary     7/7/2021  4:10 PM Culture, Fungus In process     7/7/2021  4:10 PM Culture with Smear, Acid Fast Bacillius In process           Patient Instructions:   Discharge Medication List as of 7/15/2021 12:44 PM      START taking these medications    Details   diazePAM (VALIUM) 5 MG tablet Take 1 tablet by mouth every 6 hours as needed (muscle spasm) for up to 10 days. DC to SNF      oxyCODONE (ROXICODONE) 5 MG immediate release tablet Take 1 tablet by mouth every 6 hours as needed for Pain for up to 5 days. , R-0DC to SNF      sennosides-docusate sodium (SENOKOT-S) 8.6-50 MG tablet Take 2 tablets by mouth dailyDC to SNF      naloxone 4 MG/0.1ML LIQD nasal spray 1 spray by Nasal route as needed for Opioid Reversal, Disp-1 each, R-5DC to SNF      ertapenem [facility]) infusion Infuse 1,000 mg intravenously every 24 hours for 21 days Compound per protocol., Disp-15767 mg, R-0Print      fluconazole (DIFLUCAN) 200 MG tablet Take 2 tablets by mouth daily for 21 days, Disp-42 tablet, R-0Print         CONTINUE these medications which have CHANGED    Details   polyethylene glycol (GLYCOLAX) 17 g packet Take 17 g by mouth 2 times daily, Disp-527 g, R-1DC to SNF         CONTINUE these medications which have NOT CHANGED    Details   oxyCODONE-acetaminophen (PERCOCET) 7.5-325 MG per tablet Take 1 tablet by mouth every 4 hours as needed for Pain. Historical Med      BANOPHEN 25 MG tablet take 2 tablets by mouth 1 HOUR PRIOR TO CONTRAST MEDIA ADMINISTRATION, Disp-2 tablet, R-0Patient should not drive or operate heavy machinery for 4-6 hours after administration. Normal      acetaminophen (TYLENOL) 500 MG tablet Take 2 tablets by mouth every 6 hours, Disp-120 tablet, R-0Normal      ibuprofen (ADVIL;MOTRIN) 600 MG tablet Take 1 tablet by mouth 3 times daily (with meals), Disp-120 tablet, R-0Normal      apixaban (ELIQUIS) 5 MG TABS tablet Take 1 tablet by mouth 2 times daily .  The first dose of this Eliquis should be taken approximately 12 hours after you received your last dose of Lovenox (the blood thinner injection into your skin) given in the hospital. Continue to take the Eliquis jemima roximately every 12 hours. , Disp-60 tablet, R-3Normal      benzonatate (TESSALON) 200 MG capsule Take 200 mg by mouth 3 times daily as needed for CoughHistorical Med      insulin lispro, 1 Unit Dial, (HUMALOG KWIKPEN) 100 UNIT/ML SOPN Inject 0-12 Units into the skin 3 times daily (before meals) Glucose: Dose:  If <139             No Insulin  140-199 2 Units  200-249 4 Units  250-299 6 Units  300-349 8 Units  350-400 10 Units  Above 400       12 Units, Disp-10.8 mL, R-0Normal      losartan (COZAAR) 100 MG tablet take 1 tablet by mouth once daily, Disp-30 tablet, R-3Normal      hydroCHLOROthiazide (HYDRODIURIL) 12.5 MG tablet take 1 tablet by mouth once daily, Disp-30 tablet, R-3Normal      albuterol (PROVENTIL) (2.5 MG/3ML) 0.083% nebulizer solution Take 3 mLs by nebulization 4 times daily, Disp-120 each, R-3Normal      budesonide (PULMICORT) 0.5 MG/2ML nebulizer suspension Take 4 mLs by nebulization 2 times daily, Disp-60 ampule, R-3Normal      guaiFENesin (ROBITUSSIN) 100 MG/5ML SOLN oral solution Take 15 mLs by mouth 3 times daily, Disp-1200 mL, R-0Normal      lipase-protease-amylase (CREON) 62056 units CPEP delayed release capsule Take 2 capsules by mouth 3 times daily (with meals), Disp-540 capsule, R-3Normal      pantoprazole (PROTONIX) 40 MG tablet Take 1 tablet by mouth 2 times daily (before meals), Disp-60 tablet, R-0Normal      atorvastatin (LIPITOR) 20 MG tablet Take 1 tablet by mouth daily, Disp-30 tablet, R-3Normal      insulin glargine (LANTUS SOLOSTAR) 100 UNIT/ML injection pen Inject 20 Units into the skin 2 times daily, Disp-12 mL, R-3Normal      insulin lispro, 1 Unit Dial, (HUMALOG KWIKPEN) 100 UNIT/ML SOPN Inject 7 Units into the skin 3 times daily (before meals), Disp-6.3 mL, R-3Normal      gabapentin (NEURONTIN) 300 MG capsule Take 1 capsule by mouth 2 times daily. Historical Med         STOP taking these medications       predniSONE (DELTASONE) 50 MG tablet Comments:   Reason for Stopping:               [facility] IV ANTIBIOTIC PROTOCOL FOR ANTIBIOTICS OTHER THAN VANCOMYCIN, DAPTOMYCIN, OR AMINOGLYCOSIDES    VASCULAR ACCESS DEVICES OR VADs (TLC, PICC, Midline, etc.):   1. VADs will be replaced as necessary. 2.  Draw all blood work from VADs, except for drug levels. 3.  If unable to access a VAD, insert a peripheral catheter temporarily. Contact the Primary Care Physician or [facility] office for surgical referral.  4. VAD Dressing Change Protocol    - Cleanse insertion site with Shur-Clens® or equivalent three times.    - Secure catheter with Steri-Strips®, Bone®, or equivalent securing device. - Apply Opsite® 3000 or equivalent transparent dressing.    - Change dressing twice weekly, maintaining sterile technique. If there is a BioPatch®, SilverSite® or equivalent, change once weekly only or as needed. 5. For occluded VAD: instill alteplase (Cathflo®) 2 mg into occluded catheter but do not force solution into catheter. Let dwell x 30 minutes then check for patency, if not patent, let dwell for another 90 minutes then check patency. If still not patent, instill another 2 mg and repeat above. If still catheter still not patent, contact physician. If not using premixed 1 mg syringe, dilute each vial with 2.2 mL sterile water to final concentration of 1 mg/mL. Mix by gently swirling until completely dissolved. Do not shake. 6.  Remove VAD upon completion of IV antibiotics, unless otherwise specified by the ordering physician. When PICC or VAD is to be removed, documentation of length of inserted PICC. PICC or VAD length is to correlate with inserted length and sent to physician at the time of removal.  If VAD cannot be removed, schedule appointment at office for removal.    ROUTINE LABS TO BE ORDERED AND DRAWN:   1.   Infusion Center to call all abnormal lab values to the physician  2. Fax all labs to the office in a timely manner, during office hours. 3.  Twice weekly (preferably every Monday & Thursday):    - BUN Creatinine and liver panel    - Complete Blood Count with differential (CBC with dif)  4. Once weekly:    - C-Reactive Protein (not high sensitivity CRP)    - Erythrocyte/Westergren Sedimentation Rate (WSR or ESR)  5. When clinically indicated obtain:    - Urine culture - if the patient has a fever with purulent drainage from Justice or suprapubic catheter, or foul smelling urine. Do not irrigate a clogged Justice catheter. Replace it. - Blood cultures and Wound Gram stain with culture & sensitivity - if the patient has a fever or increasing drainage or foul odor from a wound. Notify the treating physician in a timely manner    - Stool specimen - if diarrhea occurs while on antibiotics, send stools for C. difficile and WBCs. 6.  When a drug is discontinued due to a low white blood cell count (WBCs) draw CBC with differential and CMP twice a week x 2 measurements. NOTIFICATION AND FOLLOW UP WITH INFECTIOUS DISEASE PHYSICIAN:  1. Notify ordering physician or office if patient requires admission to the hospital with reason for admission. 2.  Discontinue all blood work upon completion of IV antibiotics, unless otherwise specified by ordering physician. 3.  Notify ordering physician if the patient does not receive the scheduled antibiotic for 24 hours or more. 5.  Ensure patient has an appointment to follow up with the Infectious Disease physician within 2 weeks of discharge from the hospital or initiation of IV antibiotic therapy. 6.  Continue IV antibiotic therapy until patient is seen in the office or unless specific stop date is noted on the original order or unless otherwise ordered by physician. Contact the Infectious Disease physicians office to ensure stop date is correct before stopping antibiotics.     Follow up:   Pamela Fowler MD  [address] [place] [address] Dr. Justyn Fry  [phone]    Schedule an appointment as soon as possible for a visit in 2 weeks  wound check    [place] [address]   [facility] [id number]  [address] [address]  [phone]    In 2 weeks         Signed:  Carlos Cohn MD  7/16/2021  6:32 AM

## 2021-07-23 ENCOUNTER — OUTSIDE SERVICES (OUTPATIENT)
Dept: VASCULAR SURGERY | Age: 61
End: 2021-07-23
Payer: MEDICAID

## 2021-07-23 DIAGNOSIS — K86.89 PANCREATIC DUCT LEAK: Primary | ICD-10-CM

## 2021-07-23 PROCEDURE — 99223 1ST HOSP IP/OBS HIGH 75: CPT | Performed by: SURGERY

## 2021-07-27 ENCOUNTER — TELEPHONE (OUTPATIENT)
Dept: HEMATOLOGY | Age: 61
End: 2021-07-27

## 2021-07-27 NOTE — TELEPHONE ENCOUNTER
I called the direct admit access line per Dr Shaw Pass request and spoke with Jaylan Kurtz Rn. I told her that Dr would like to direct admit the patient to a monitored floor for intrabdominal abscess. I gave her all the info she needed and she stated she will work on getting him a bed and call our office when ready.  Office number was given and cofirmed to [facility] signed by Deanna Talamantes MA on 7/27/2021 at 11:57 AM

## 2021-07-28 ENCOUNTER — HOSPITAL ENCOUNTER (INPATIENT)
Age: 61
LOS: 8 days | Discharge: NURSING, REHAB OR LONG-TERM CARE | DRG: 721 | End: 2021-08-05
Attending: TRANSPLANT SURGERY | Admitting: TRANSPLANT SURGERY
Payer: MEDICAID

## 2021-07-28 ENCOUNTER — APPOINTMENT (OUTPATIENT)
Dept: CT IMAGING | Age: 61
DRG: 721 | End: 2021-07-28
Attending: TRANSPLANT SURGERY
Payer: MEDICAID

## 2021-07-28 LAB — METER GLUCOSE: 116 MG/DL (ref 74–99)

## 2021-07-28 PROCEDURE — 2580000003 HC RX 258: Performed by: STUDENT IN AN ORGANIZED HEALTH CARE EDUCATION/TRAINING PROGRAM

## 2021-07-28 PROCEDURE — 2580000003 HC RX 258: Performed by: INTERNAL MEDICINE

## 2021-07-28 PROCEDURE — 1200000000 HC SEMI PRIVATE

## 2021-07-28 PROCEDURE — 74177 CT ABD & PELVIS W/CONTRAST: CPT

## 2021-07-28 PROCEDURE — 6360000002 HC RX W HCPCS: Performed by: STUDENT IN AN ORGANIZED HEALTH CARE EDUCATION/TRAINING PROGRAM

## 2021-07-28 PROCEDURE — 6360000004 HC RX CONTRAST MEDICATION: Performed by: RADIOLOGY

## 2021-07-28 PROCEDURE — 99254 IP/OBS CNSLTJ NEW/EST MOD 60: CPT | Performed by: SURGERY

## 2021-07-28 PROCEDURE — 6360000002 HC RX W HCPCS: Performed by: INTERNAL MEDICINE

## 2021-07-28 PROCEDURE — 6370000000 HC RX 637 (ALT 250 FOR IP): Performed by: STUDENT IN AN ORGANIZED HEALTH CARE EDUCATION/TRAINING PROGRAM

## 2021-07-28 PROCEDURE — 82962 GLUCOSE BLOOD TEST: CPT

## 2021-07-28 RX ORDER — DIPHENHYDRAMINE HCL 25 MG
50 TABLET ORAL ONCE
Status: COMPLETED | OUTPATIENT
Start: 2021-07-28 | End: 2021-07-28

## 2021-07-28 RX ORDER — SODIUM CHLORIDE, SODIUM LACTATE, POTASSIUM CHLORIDE, CALCIUM CHLORIDE 600; 310; 30; 20 MG/100ML; MG/100ML; MG/100ML; MG/100ML
INJECTION, SOLUTION INTRAVENOUS CONTINUOUS
Status: DISCONTINUED | OUTPATIENT
Start: 2021-07-28 | End: 2021-08-03

## 2021-07-28 RX ORDER — SODIUM CHLORIDE 0.9 % (FLUSH) 0.9 %
5-40 SYRINGE (ML) INJECTION PRN
Status: DISCONTINUED | OUTPATIENT
Start: 2021-07-28 | End: 2021-08-02 | Stop reason: SDUPTHER

## 2021-07-28 RX ORDER — SODIUM CHLORIDE 9 MG/ML
25 INJECTION, SOLUTION INTRAVENOUS PRN
Status: DISCONTINUED | OUTPATIENT
Start: 2021-07-28 | End: 2021-08-02 | Stop reason: SDUPTHER

## 2021-07-28 RX ORDER — OXYCODONE HYDROCHLORIDE AND ACETAMINOPHEN 5; 325 MG/1; MG/1
1 TABLET ORAL EVERY 4 HOURS PRN
Status: DISCONTINUED | OUTPATIENT
Start: 2021-07-28 | End: 2021-08-05 | Stop reason: HOSPADM

## 2021-07-28 RX ORDER — SODIUM CHLORIDE 0.9 % (FLUSH) 0.9 %
5-40 SYRINGE (ML) INJECTION EVERY 12 HOURS SCHEDULED
Status: DISCONTINUED | OUTPATIENT
Start: 2021-07-28 | End: 2021-08-02 | Stop reason: SDUPTHER

## 2021-07-28 RX ORDER — ONDANSETRON 4 MG/1
4 TABLET, ORALLY DISINTEGRATING ORAL EVERY 8 HOURS PRN
Status: DISCONTINUED | OUTPATIENT
Start: 2021-07-28 | End: 2021-08-05 | Stop reason: HOSPADM

## 2021-07-28 RX ORDER — HEPARIN SODIUM 10000 [USP'U]/ML
5000 INJECTION, SOLUTION INTRAVENOUS; SUBCUTANEOUS EVERY 8 HOURS SCHEDULED
Status: DISCONTINUED | OUTPATIENT
Start: 2021-07-28 | End: 2021-08-05

## 2021-07-28 RX ORDER — ONDANSETRON 2 MG/ML
4 INJECTION INTRAMUSCULAR; INTRAVENOUS EVERY 6 HOURS PRN
Status: DISCONTINUED | OUTPATIENT
Start: 2021-07-28 | End: 2021-08-05 | Stop reason: HOSPADM

## 2021-07-28 RX ORDER — OXYCODONE HYDROCHLORIDE 5 MG/1
2.5 TABLET ORAL EVERY 4 HOURS PRN
Status: DISCONTINUED | OUTPATIENT
Start: 2021-07-28 | End: 2021-08-05 | Stop reason: HOSPADM

## 2021-07-28 RX ORDER — OXYCODONE AND ACETAMINOPHEN 7.5; 325 MG/1; MG/1
1 TABLET ORAL EVERY 4 HOURS PRN
Status: DISCONTINUED | OUTPATIENT
Start: 2021-07-28 | End: 2021-07-28 | Stop reason: CLARIF

## 2021-07-28 RX ORDER — FLUCONAZOLE 2 MG/ML
400 INJECTION, SOLUTION INTRAVENOUS EVERY 24 HOURS
Status: DISCONTINUED | OUTPATIENT
Start: 2021-07-28 | End: 2021-07-28

## 2021-07-28 RX ORDER — LINEZOLID 2 MG/ML
600 INJECTION, SOLUTION INTRAVENOUS EVERY 12 HOURS
Status: DISCONTINUED | OUTPATIENT
Start: 2021-07-28 | End: 2021-08-05 | Stop reason: HOSPADM

## 2021-07-28 RX ADMIN — LINEZOLID 600 MG: 600 INJECTION, SOLUTION INTRAVENOUS at 17:46

## 2021-07-28 RX ADMIN — IOHEXOL 50 ML: 240 INJECTION, SOLUTION INTRATHECAL; INTRAVASCULAR; INTRAVENOUS; ORAL at 20:39

## 2021-07-28 RX ADMIN — HYDROCORTISONE SODIUM SUCCINATE 200 MG: 100 INJECTION, POWDER, FOR SOLUTION INTRAMUSCULAR; INTRAVENOUS at 15:19

## 2021-07-28 RX ADMIN — HEPARIN SODIUM 5000 UNITS: 10000 INJECTION INTRAVENOUS; SUBCUTANEOUS at 22:21

## 2021-07-28 RX ADMIN — SODIUM CHLORIDE, PRESERVATIVE FREE 10 ML: 5 INJECTION INTRAVENOUS at 22:21

## 2021-07-28 RX ADMIN — DIPHENHYDRAMINE HYDROCHLORIDE 50 MG: 25 TABLET ORAL at 18:47

## 2021-07-28 RX ADMIN — DEXTROSE MONOHYDRATE 200 MG: 50 INJECTION, SOLUTION INTRAVENOUS at 17:46

## 2021-07-28 RX ADMIN — HYDROCORTISONE SODIUM SUCCINATE 200 MG: 100 INJECTION, POWDER, FOR SOLUTION INTRAMUSCULAR; INTRAVENOUS at 18:44

## 2021-07-28 RX ADMIN — ERTAPENEM SODIUM 1000 MG: 1 INJECTION, POWDER, LYOPHILIZED, FOR SOLUTION INTRAMUSCULAR; INTRAVENOUS at 17:15

## 2021-07-28 RX ADMIN — SODIUM CHLORIDE, POTASSIUM CHLORIDE, SODIUM LACTATE AND CALCIUM CHLORIDE: 600; 310; 30; 20 INJECTION, SOLUTION INTRAVENOUS at 14:46

## 2021-07-28 ASSESSMENT — PAIN SCALES - WONG BAKER: WONGBAKER_NUMERICALRESPONSE: 4

## 2021-07-28 ASSESSMENT — PAIN SCALES - GENERAL
PAINLEVEL_OUTOF10: 7
PAINLEVEL_OUTOF10: 8

## 2021-07-28 ASSESSMENT — PAIN DESCRIPTION - PAIN TYPE: TYPE: ACUTE PAIN

## 2021-07-28 ASSESSMENT — PAIN DESCRIPTION - PROGRESSION: CLINICAL_PROGRESSION: NOT CHANGED

## 2021-07-28 ASSESSMENT — ENCOUNTER SYMPTOMS
VOMITING: 0
CHEST TIGHTNESS: 0
DIARRHEA: 0
ABDOMINAL PAIN: 1
BACK PAIN: 0
NAUSEA: 0
COUGH: 0
SHORTNESS OF BREATH: 0
CONSTIPATION: 0
ABDOMINAL DISTENTION: 0

## 2021-07-28 ASSESSMENT — PAIN DESCRIPTION - DESCRIPTORS: DESCRIPTORS: SHARP

## 2021-07-28 ASSESSMENT — PAIN DESCRIPTION - LOCATION: LOCATION: ABDOMEN

## 2021-07-28 ASSESSMENT — PAIN DESCRIPTION - ORIENTATION: ORIENTATION: LEFT

## 2021-07-28 ASSESSMENT — PAIN - FUNCTIONAL ASSESSMENT: PAIN_FUNCTIONAL_ASSESSMENT: ACTIVITIES ARE NOT PREVENTED

## 2021-07-28 ASSESSMENT — PAIN DESCRIPTION - ONSET: ONSET: GRADUAL

## 2021-07-28 NOTE — H&P
Hepatobiliary and Pancreatic Surgery    History and Physical      Patient's Name/Date of Birth: Mike Dye /1960 (72 y.o.)    Date: July 28, 2021     CC: Intra-abdominal abscess  HPI:  Mike Dye is a 62year old male with a history of pancreatic neoplasm requiring distal pancreatectomy, splenectomy, and cholecystectomy on 5/12/21 s/p exchange of pancreatic stent on 7/2/21. He was discharged from the hospital on 7/15/21 to [facility]. He was discharged with a wound vac to his abdominal wound and two LUQ drains. ID sent him home on Invanz. Since discharge, he is reporting worsening of his abdominal pain. He is tachycardic and distended. He endorses no fever, chills, nausea, vomiting, or changes in his bowel habits.      Past Medical History:   Diagnosis Date    Arthritis     Back pain     5th finger & ring finger on right hand is numb    Bell's palsy     NEW ONSET 3-     Hyperlipidemia     Hypertension     Neuropathy     toes numb    Poorly controlled type 2 diabetes mellitus with neuropathy ([facility] 5/8/2018    Sacral radiculitis 2/27/2019    Spondylosis of lumbar spine 3/9/2019    Advanced arthritis and degenerative disc disease by MRI 3/2019    Ventral hernia        Past Surgical History:   Procedure Laterality Date    ANESTHESIA NERVE BLOCK Bilateral 07/11/2019    BILATERAL L4-5 TRANSFORAMINAL EPIDURAL STEROID INJECTION performed by Nicole Camacho DO at [address] [place] [address] Road Bilateral 08/01/2019    BILATERAL MEDIAL BRANCH BLOCK L4-5 AND L5-S1 performed by Nicole Camacho DO at [address] [place] [address] Bilateral 08/15/2019    BILATERAL MEDIAL BRANCH BLOCK L4 - 5 AND L5 - S1 performed by Nicole Camacho DO at [facility] Right 03/29/2019    right wrist carpal tunnel release and right elbow cubital tunnel release    CARPAL TUNNEL RELEASE Right 03/29/2019    RIGHT WRIST CARPAL TUNNEL RELEASE AND RIGHT ELBOW CUBITAL TUNNEL RELEASE performed by Alex Guajardo DO at [place] [address]      at age 40 polyps    CYST REMOVAL Right     EPIDURAL STEROID INJECTION N/A 10/17/2019    LUMBAR EPIDURAL STEROID INJECTION UNDER FLUOROSCOPIC GUIDANCE AT L2-L3 WITHOUT SEDATION performed by Lashay Berry MD at [address] [facility] ERCP N/A 05/17/2021    ERCP ENDOSCOPIC RETROGRADE CHOLANGIOPANCREATOGRAPHY SPHINCTER/PAPILLOTOMY performed by Klarissa Ziegler MD at [address] [facility] [address] ERCP N/A 05/17/2021    ERCP STENT INSERTION performed by Klarissa Ziegler MD at [address] ERCP N/A 6/4/2021    ERCP STENT INSERTION performed by Klarissa Ziegler MD at [address] [facility] [address] [facility] [address] ERCP N/A 7/2/2021    ERCP STENT REMOVAL performed by Klarissa Ziegler MD at [address] [facility] [address] ERCP N/A 7/2/2021    ERCP STONE REMOVAL performed by Klarissa Ziegler MD at [address] ERCP N/A 7/2/2021    ERCP STENT INSERTION performed by Klarissa Ziegler MD at [facility] Bilateral     groin and umbilical    HERNIA REPAIR N/A 12/13/2018    ROBOTIC ASSISTED LAPAROSCOPIC PRIMARY REPAIR OF RECURRENT VENTRAL HERNIA  REPAIR performed by Marylee Borg, MD at [address] Bilateral 07/11/2019    L4-5 transforaminal     NERVE BLOCK Bilateral 08/01/2019    medial branch block    NERVE BLOCK Bilateral 08/15/2019    bilateral medial branch block L4-S1    NERVE BLOCK  10/17/2019    lumbar epidural    PANCREATECTOMY N/A 05/12/2021    LAPAROSCOPIC ROBOTIC DISTAL PANCREATECTOMY AND SPLENECTOMY AND CHOLECYSTECTOMY, INTRA-OPERATIVE ULTRASOUND, TRANSITIONED TO OPEN -- EPIDURAL performed by Marcelino Holcomb MD at [facility]  06/05/2018    C5-7 fusion at [facility] in [address] [place] [address] Pkwy 01/08/2021    EGD W/EUS FNA performed by Tuan Go, DO at [address] 01/08/2021    EGD DIAGNOSTIC ONLY performed by Anuja Banuelos Diabetes Father     Cancer Sister 79        unknown    Cancer Brother 64        stomach    Diabetes Brother        Social History     Socioeconomic History    Marital status: Single     Spouse name: Not on file    Number of children: Not on file    Years of education: Not on file    Highest education level: Not on file   Occupational History    Not on file   Tobacco Use    Smoking status: Never Smoker    Smokeless tobacco: Never Used   Vaping Use    Vaping Use: Never used   Substance and Sexual Activity    Alcohol use: Not Currently     Comment: occasional    Drug use: Never    Sexual activity: Not on file   Other Topics Concern    Not on file   Social History Narrative    Not on file     Social Determinants of Health     Financial Resource Strain:     Difficulty of Paying Living Expenses:    Food Insecurity:     Worried About Running Out of Food in the Last Year:     [address] in the Last Year:    Transportation Needs:     Lack of Transportation (Medical):  Lack of Transportation (Non-Medical):    Physical Activity:     Days of Exercise per Week:     Minutes of Exercise per Session:    Stress:     Feeling of Stress :    Social Connections:     Frequency of Communication with Friends and Family:     Frequency of Social Gatherings with Friends and Family:     Attends Christianity Services:     Active Member of Clubs or Organizations:     Attends Club or Organization Meetings:     Marital Status:    Intimate Partner Violence:     Fear of Current or Ex-Partner:     Emotionally Abused:     Physically Abused:     Sexually Abused:        ROS:   Review of Systems   Constitutional: Negative for chills and fever. Respiratory: Negative for cough, chest tightness and shortness of breath. Cardiovascular: Negative for chest pain. Gastrointestinal: Positive for abdominal pain. Negative for abdominal distention, constipation, diarrhea, nausea and vomiting.    Genitourinary: Negative for dysuria and hematuria. Musculoskeletal: Negative for back pain. Skin: Positive for pallor. Negative for rash. Neurological: Negative for headaches. Physical Exam:  Vitals:    07/28/21 1230   BP: 108/69   Pulse: 99   Resp: 18   Temp: 97.5 °F (36.4 °C)   SpO2:        PSYCH: mood and affect normal, alert and oriented x 3: In mild distress, uncomfortable  EYES: Sclera white, pupils equal round and reactive to light  ENMT:  Hearing normal, trachea midline, ears externally intact  RESP: Respiratory effort was normal with no retractions or use of accessory muscles. CV: Regular rate. No pedal edema  GI/ Abdomen: The abdomen was soft and non distended. Diffusely tender concentrated in the LUQ. There was no, hepatosplenomegaly, or hernias. 2 LUQ drains. Midline wound packed with gauze. MSK: no clubbing/ no cyanosis/ gaitnormal     Assessment/Plan:  S/p open distal pancreatectomy and splenectomy with POPF and abscess formation and recurrent intra-abdominal abscess    - CT AP with oral and IV contrast - acutallyt shows improvement hwoever his clinical condition does not  - ID following- appreciate their recommendations. - Consult Dr. Douglas Nash for Endoscopy recommendations. - pain control.  - IR for drain exchange     Patient findings and plan discussed with Dr. Anali Bland.     Electronically signed by Jamarcus Peters MD on 7/29/2021 at 1:19 PM

## 2021-07-28 NOTE — CONSULTS
[facility] CONSULT NOTE    Reason for Consult: Intra-abdominal abscess, ongoing antibiotic therapy  Requested by: Melissa Akins DO     Chief complaint: Fever    History Obtained From: Patient and EMR    HISTORY [facility]              The patient is a 62 y.o. male with history of hypertension; hyperlipidemia; DM; IPMN of pancreatic tail status post robotic converted to open distal pancreatectomy with splenectomy on 05/12 complicated by pancreatic leak requiring stent placement on 05/17, intra-abdominal abscess status post percutaneous drain placement on 05/28 with abscess fluid culture showing Enterobacter cloacae, Klebsiella oxytoca, alphahemolytic Streptococcus, non-ESBL E. coli, anaerobic gram-negative rods, coagulase-negative Staphylococcus, Cronobacter sakazakii for which he was given a course of ciprofloxacin and metronidazole for 4 weeks until 06/25 then found to have inadequate drainage of abscess prompting another percutaneous drain placement on 06/30 and ERCP with exchange of pancreatic duct stent and balloon sweep of pancreatic duct with removal of large amount of debris/pus on 07/02 with abscess fluid culture showing light growth of Klebsiella oxytoca (non-ESBL; resistant to ampicillin; susceptible to fluoroquinolones and co-trimoxazole), heavy growth of alphahemolytic Streptococcus, MSSA and Lactobacillus, light growth of Candida albicans for which he was discharged to [facility] on a 4-week course of ertapenem and fluconazole from 07/02-07/30; presented on 07/28 for further evaluation of intra-abdominal abscess. While at [facility], he started having fever with CT abdomen and pelvis on 07/22 showing moderate volume fluid collection within the surgical bed likely representing abscess with a surgical drain within the abscess but with collection stable to minimally decreased.   Abscess fluid Gram stain and culture on 07/22 showed abundant polymorphonuclear leukocytes, no epithelial cells, abundant gram variable rods, heavy growth of Enterococcus gallinarum and faecium (resistant to ampicillin, doxycycline, vancomycin, streptomycin; susceptible to linezolid; daptomycin sensitivity not done), moderate growth of MRSE, light growth of Candida krusei, moderate growth of lactobacillus. Vancomycin was started on 07/22 then switched to linezolid. He was subsequently transferred to [facility] for further management.     Past Medical History  Past Medical History:   Diagnosis Date    Arthritis     Back pain     5th finger & ring finger on right hand is numb    Bell's palsy     NEW ONSET 3-     Hyperlipidemia     Hypertension     Neuropathy     toes numb    Poorly controlled type 2 diabetes mellitus with neuropathy ([facility] [address] 5/8/2018    Sacral radiculitis 2/27/2019    Spondylosis of lumbar spine 3/9/2019    Advanced arthritis and degenerative disc disease by MRI 3/2019    Ventral hernia        Current Facility-Administered Medications   Medication Dose Route Frequency Provider Last Rate Last Admin    sodium chloride flush 0.9 % injection 5-40 mL  5-40 mL Intravenous 2 times per day Stalin [place], DO        sodium chloride flush 0.9 % injection 5-40 mL  5-40 mL Intravenous PRN Stalin [place], DO        0.9 % sodium chloride infusion  25 mL Intravenous PRN Stalin [place], DO        ondansetron (ZOFRAN-ODT) disintegrating tablet 4 mg  4 mg Oral Q8H PRN Stalin [place], DO        Or    ondansetron (ZOFRAN) injection 4 mg  4 mg Intravenous Q6H PRN Stalin [place], DO        lactated ringers infusion   Intravenous Continuous Stalin [place], DO        heparin (porcine) injection 5,000 Units  5,000 Units Subcutaneous 3 times per day Stalin Cle [place]um, DO        fluconazole (DIFLUCAN) in 0.9 % sodium chloride IVPB 400 mg  400 mg Intravenous Q24H Stalin Cle [place], DO           Allergies   Allergen Reactions    Iodine Swelling     Sea food    Metformin And Related Hives    Wheat Extract Swelling       Surgical History  Past Surgical History:   Procedure Laterality Date    ANESTHESIA NERVE BLOCK Bilateral 07/11/2019    BILATERAL L4-5 TRANSFORAMINAL EPIDURAL STEROID INJECTION performed by Antoni Rich DO at [address] Bilateral 08/01/2019    BILATERAL MEDIAL BRANCH BLOCK L4-5 AND L5-S1 performed by Antoni Rich DO at [address] Bilateral 08/15/2019    BILATERAL MEDIAL BRANCH BLOCK L4 - 5 AND L5 - S1 performed by Antoni Rich DO at [facility] Right 03/29/2019    right wrist carpal tunnel release and right elbow cubital tunnel release    CARPAL TUNNEL RELEASE Right 03/29/2019    RIGHT WRIST CARPAL TUNNEL RELEASE AND RIGHT ELBOW CUBITAL TUNNEL RELEASE performed by Valentine Lord DO at [place] [address]      at age 40 polyps    CYST REMOVAL Right     EPIDURAL STEROID INJECTION N/A 10/17/2019    LUMBAR EPIDURAL STEROID INJECTION UNDER FLUOROSCOPIC GUIDANCE AT L2-L3 WITHOUT SEDATION performed by Landy Negrete MD at [address] [place] [address] ERCP N/A 05/17/2021    ERCP ENDOSCOPIC RETROGRADE CHOLANGIOPANCREATOGRAPHY SPHINCTER/PAPILLOTOMY performed by Compa Dominguez MD at [address] ERCP N/A 05/17/2021    ERCP STENT INSERTION performed by Compa Dominguez MD at [address] ERCP N/A 6/4/2021    ERCP STENT INSERTION performed by Compa Dominguez MD at [address] ERCP N/A 7/2/2021    ERCP STENT REMOVAL performed by Compa Dominguez MD at [address] ERCP N/A 7/2/2021    ERCP STONE REMOVAL performed by Compa Dominguez MD at [address] ERCP N/A 7/2/2021    ERCP STENT INSERTION performed by Compa Dominguez MD at [facility] Bilateral     groin and umbilical    HERNIA REPAIR N/A 12/13/2018    ROBOTIC ASSISTED LAPAROSCOPIC PRIMARY REPAIR OF RECURRENT VENTRAL HERNIA  REPAIR performed by Ulisses Garcia Gopi Kuhn MD at [facility] Bilateral 07/11/2019    L4-5 transforaminal     NERVE BLOCK Bilateral 08/01/2019    medial branch block    NERVE BLOCK Bilateral 08/15/2019    bilateral medial branch block L4-S1    NERVE BLOCK  10/17/2019    lumbar epidural    PANCREATECTOMY N/A 05/12/2021    LAPAROSCOPIC ROBOTIC DISTAL PANCREATECTOMY AND SPLENECTOMY AND CHOLECYSTECTOMY, INTRA-OPERATIVE ULTRASOUND, TRANSITIONED TO OPEN -- EPIDURAL performed by Ely Hess MD at [place] [address] SURGERY  06/05/2018    C5-7 fusion at [facility] in [address] [facility] N/A 01/08/2021    EGD W/EUS FNA performed by Arron Weiss DO at [address] [facility] [facility] N/A 01/08/2021    EGD DIAGNOSTIC ONLY performed by Arron Weiss DO at [address] History     Socioeconomic History    Marital status: Single   Tobacco Use    Smoking status: Never Smoker    Smokeless tobacco: Never Used   Vaping Use    Vaping Use: Never used   Substance and Sexual Activity    Alcohol use: Not Currently     Comment: occasional    Drug use: Never       Family Medical History  Family History   Problem Relation Age of Onset    Diabetes Mother     Other Mother         parkinsons    Diabetes Father     Cancer Sister 79        unknown    Cancer Brother 64        stomach    Diabetes Brother        Review of Systems:  Constitutional: Had fever, no chills  Eyes: No vision changes, no retroorbital pain  ENT: No hearing changes, no ear pain  Respiratory: No cough, no dyspnea  Cardiovascular: No chest pain, no palpitations  Gastrointestinal: Has abdominal pain, no diarrhea  Genitourinary: No dysuria, no hematuria  Integumentary: No rash, no itching  Musculoskeletal: No muscle pain, no joint pain  Neurologic: No headache, no numbness in extremities    Physical Examination:  Vitals:    07/28/21 1200 07/28/21 1209 07/28/21 1228 07/28/21 1230   BP: 108/69 108/69  108/69   Pulse: 99 99  99   Resp: 18   18   Temp: 97.5 °F (36.4 °C)   97.5 °F (36.4 °C)   TempSrc: Temporal   Temporal   SpO2: 98%      Weight:   185 lb (83.9 kg)    Height:   5' 8\" (1.727 m)      Constitutional: Alert, not in distress  Eyes: Sclerae anicteric, no conjunctival erythema  ENT: No buccal lesion, no pharyngeal exudates  Neck: No nuchal rigidity, no cervical adenopathy  Lungs: Clear breath sounds, no crackles, no wheezes  Heart: Regular rate and rhythm, no murmurs  Abdomen: Bowel sounds present, soft, tender on left hemiabdomen with 2 drainage devices in place (MAX drain and bag)  Skin: Warm and dry, no active dermatoses  Musculoskeletal: No joint erythema, no joint swelling    Labs, imaging, and medical records/notes were personally reviewed. Assessment:  VRE faecium and gallinarum infection/abscess  Candida krusei infection/abscess  Intraabdominal abscess/surgical site infection, s/p percutaneous drain placement on 05/28, inadequate drainage prompting additional percutaneous drain placements on 06/30. Unfortunately, source control has been difficult to address and increasingly antibiotic-resistant pathogens are developing. Pancreatic duct abscess status post ERCP with exchange of pancreatic duct stent and balloon sweep of pancreatic duct with removal of large amount of debris/pus on 07/02  Recent distal pancreatectomy with splenectomy IM 15/95 complicated by pancreatic leak s/p pancreatic stent placement on 05/17    Plan:  Resume ertapenem 1 g every 24 hours and linezolid 600 mg every 12 hours. Change fluconazole to anidulafungin 200 mg loading dose once followed by 100 mg every 24 hours. Follow-up Surgery recommendations for source control. Continue supportive care. Thank you for involving me in the care of Franky Martell. I will continue to follow. Please do not hesitate to call for any questions or concerns.     Electronically signed by Cathy Martins MD on 7/28/2021 at 1:54 PM

## 2021-07-28 NOTE — LETTER
[address] [place] [address] Dr Department Medicaid  CERTIFICATION OF NECESSITY  FOR NON-EMERGENCY TRANSPORTATION   BY GROUND AMBULANCE      Individual Information   1. Name: Gopi Ards 2. [address] [place] [address] Dr Medicaid Billing Number:    3. Address: [address]  [address] [facility] Provider Information   4. Provider Name: Samir Amb   5. [address] [place] [address] Dr Medicaid Provider Number:  National Provider Identifier (NPI):      Certification  7. Criteria:  During transport, this individual requires:  [x] Medical treatment or continuous     supervision by an EMT. [] The administration or regulation of oxygen by another person. [] Supervised protective restraint. 8. Period Beginning Date: 8/3/2021     9. Length  [x] Not more than 10 day(s)  [] One Year     Additional Information Relevant to Certification   10. Comments or Explanations, If Necessary or Appropriate     Intraabdominal abscess/surgical site infection with abdominal drain  Hx of falls     Certifying Practitioner Information   11. Name of Practitioner: Dallas Vences MD   12. [address] [place] [address] Dr Medicaid Provider Number, If Applicable:  [address] Provider Identifier (NPI):      Signature Information   14. Date of Signature: 8/3/2021   15. Name of Person Signing: Yanira West RN CM     16. Signature and Professional Designation: Yanira West RN CM       ODM [id number]  Rev. 7/2015    [address] Encounter Date/Time: 7/28/2021 [facility] Account: [de-identified]    MRN: [id number]    Patient: Gopi Azar    Contact Serial #: [id number]      ENCOUNTER          Patient Class: I Private Enc?   No Unit  BD: MATHEUS 8WE [id number]   Hospital Service: Med/Surg   Encounter DX: Intra-abdominal abscess *   ADM Provider: Ani Childress*   Procedure:     ATT Provider: Dallas Vences*   REF Provider: Raven Santana*      Admission DX: Intra-abdominal abscess ([facility] [address] and DX codes: K65.1      PATIENT Name: Suyapa Mccoy : 1960 (60 yrs)   Address: [place] [address] Sex: Male   [facility] [address]         Marital Status: Single   Employer: NOT EMPLOYED         Anglican: [place] [employer]   Primary Care Provider: DO Olga Flannery Phone: [phone]   EMERGENCY CONTACT   Contact Name Legal Guardian? Relationship to Patient Home Phone Work Phone   1. [place]  2. Hoang Lainez No  No Other  Child [phone]                 GUARANTOR            Guarantor: Dariusz Newton     : 1960   Address: [address] [place] [address] Sex: Male     [address] [facility] [address]     Relation to Patient: Self       Home Phone: [phone]   Guarantor ID: [id number]       Work Phone:     Guarantor Employer: UNEMPLOYED         Status: NOT EMPLO*      COVERAGE        PRIMARY INSURANCE   Payor: [facility] C* Plan: Mobile COMMUN*   Payor Address: [facility], [address] [place] [address],[address] [facility]       Group Number: [facility] Insurance Type: INDEMNITY   Subscriber Name: Yessy Zuleta : 1960   Subscriber ID: [id number] Pat. Rel. to Sub: Self   SECONDARY INSURANCE   Payor:   Plan:     Payor Address:  ,           Group Number:   Insurance Type:     Subscriber Name:   Subscriber :     Subscriber ID:   Pat.  Rel. to Sub:

## 2021-07-28 NOTE — CONSULTS
GENERAL SURGERY  CONSULT NOTE  7/28/2021    Physician Consulted: Dr. Rach Stover  Reason for Consult: endoscopy  Referring Physician: Dr. Pao SHI  Daisy Saba is a 62year old male with a history of pancreatic neoplasm requiring distal pancreatectomy, splenectomy, and cholecystectomy on 5/12/21 s/p exchange of pancreatic stent on 7/2/21. He was discharged from the hospital on 7/15/21 to [facility]. He was discharged with a wound vac to his abdominal wound and two LUQ drains. ID sent him home on Invanz. Since discharge, he is reporting worsening of his abdominal pain. He is tachycardic and distended. He endorses no fever, chills, nausea, vomiting, or changes in his bowel habits.      Past Medical History:   Diagnosis Date    Arthritis     Back pain     5th finger & ring finger on right hand is numb    Bell's palsy     NEW ONSET 3-     Hyperlipidemia     Hypertension     Neuropathy     toes numb    Poorly controlled type 2 diabetes mellitus with neuropathy ([facility] [address] 5/8/2018    Sacral radiculitis 2/27/2019    Spondylosis of lumbar spine 3/9/2019    Advanced arthritis and degenerative disc disease by MRI 3/2019    Ventral hernia        Past Surgical History:   Procedure Laterality Date    ANESTHESIA NERVE BLOCK Bilateral 07/11/2019    BILATERAL L4-5 TRANSFORAMINAL EPIDURAL STEROID INJECTION performed by Jai Castano DO at [address] [place] [address] Bilateral 08/01/2019    BILATERAL MEDIAL BRANCH BLOCK L4-5 AND L5-S1 performed by Jai Castano DO at [address] [place] [address] Bilateral 08/15/2019    BILATERAL MEDIAL BRANCH BLOCK L4 - 5 AND L5 - S1 performed by Jai Castano DO at [facility] Right 03/29/2019    right wrist carpal tunnel release and right elbow cubital tunnel release    CARPAL TUNNEL RELEASE Right 03/29/2019    RIGHT WRIST CARPAL TUNNEL RELEASE AND RIGHT ELBOW CUBITAL TUNNEL RELEASE performed by Becki Alejandro DO at [place] [id number]      at age 40 polyps    CYST REMOVAL Right     EPIDURAL STEROID INJECTION N/A 10/17/2019    LUMBAR EPIDURAL STEROID INJECTION UNDER FLUOROSCOPIC GUIDANCE AT L2-L3 WITHOUT SEDATION performed by Babak Dawson MD at [facility] ERCP N/A 05/17/2021    ERCP ENDOSCOPIC RETROGRADE CHOLANGIOPANCREATOGRAPHY SPHINCTER/PAPILLOTOMY performed by Ny Gómez MD at [address] ERCP N/A 05/17/2021    ERCP STENT INSERTION performed by Ny Gómez MD at [address] ERCP N/A 6/4/2021    ERCP STENT INSERTION performed by Ny Gómez MD at [address] ERCP N/A 7/2/2021    ERCP STENT REMOVAL performed by Ny Gómez MD at [address] ERCP N/A 7/2/2021    ERCP STONE REMOVAL performed by Ny Gómez MD at [address] ERCP N/A 7/2/2021    ERCP STENT INSERTION performed by Ny Gómez MD at [facility] Bilateral     groin and umbilical    HERNIA REPAIR N/A 12/13/2018    ROBOTIC ASSISTED LAPAROSCOPIC PRIMARY REPAIR OF RECURRENT VENTRAL HERNIA  REPAIR performed by Broderick Bearden MD at [address] [facility] [address] Bilateral 07/11/2019    L4-5 transforaminal     NERVE BLOCK Bilateral 08/01/2019    medial branch block    NERVE BLOCK Bilateral 08/15/2019    bilateral medial branch block L4-S1    NERVE BLOCK  10/17/2019    lumbar epidural    PANCREATECTOMY N/A 05/12/2021    LAPAROSCOPIC ROBOTIC DISTAL PANCREATECTOMY AND SPLENECTOMY AND CHOLECYSTECTOMY, INTRA-OPERATIVE ULTRASOUND, TRANSITIONED TO OPEN -- EPIDURAL performed by Savage Posadas MD at [facility]  06/05/2018    C5-7 fusion at Dionne August in [address] [place] [address] Pkwy 01/08/2021    EGD W/EUS FNA performed by Evelene Schaumann, DO at [facility] 01/08/2021    EGD DIAGNOSTIC ONLY performed by Evelene Schaumann, DO at [facility] Medications Prior to Admission:    Prior to Admission medications    Medication Sig Start Date End Date Taking? Authorizing Provider   polyethylene glycol (GLYCOLAX) 17 g packet Take 17 g by mouth 2 times daily 7/15/21 8/14/21 Yes Michelle Rodriguez MD   sennosides-docusate sodium (SENOKOT-S) 8.6-50 MG tablet Take 2 tablets by mouth daily 7/16/21  Yes Michelle Rodriguez MD   ertapenem [facility]) infusion Infuse 1,000 mg intravenously every 24 hours for 21 days Compound per protocol. 7/9/21 7/30/21 Yes Chata Lazo MD   fluconazole (DIFLUCAN) 200 MG tablet Take 2 tablets by mouth daily for 21 days 7/9/21 7/30/21 Yes Chata Lazo MD   oxyCODONE-acetaminophen (PERCOCET) 7.5-325 MG per tablet Take 1 tablet by mouth every 4 hours as needed for Pain. Yes Historical Provider, MD   BANOPHEN 25 MG tablet take 2 tablets by mouth 1 HOUR PRIOR TO CONTRAST MEDIA ADMINISTRATION 6/21/21  Yes [facility] BHATIA MD   acetaminophen (TYLENOL) 500 MG tablet Take 2 tablets by mouth every 6 hours 6/4/21  Yes Samanta Youngblood MD   ibuprofen (ADVIL;MOTRIN) 600 MG tablet Take 1 tablet by mouth 3 times daily (with meals) 6/4/21  Yes Samanta Youngblood MD   apixaban (ELIQUIS) 5 MG TABS tablet Take 1 tablet by mouth 2 times daily . The first dose of this Eliquis should be taken approximately 12 hours after you received your last dose of Lovenox (the blood thinner injection into your skin) given in the hospital. Continue to take the Eliquis approximately every 12 hours.  6/4/21  Yes Samanta Youngblood MD   losartan (COZAAR) 100 MG tablet take 1 tablet by mouth once daily 5/19/21  Yes Melvin Amaral MD   hydroCHLOROthiazide (HYDRODIURIL) 12.5 MG tablet take 1 tablet by mouth once daily 5/19/21  Yes Melvin Amaral MD   albuterol (PROVENTIL) (2.5 MG/3ML) 0.083% nebulizer solution Take 3 mLs by nebulization 4 times daily 5/19/21  Yes Melvin Amaral MD   budesonide (PULMICORT) 0.5 MG/2ML nebulizer suspension Take 4 mLs by nebulization 2 times daily 5/19/21  Yes Dustin Cano MD   lipase-protease-amylase (CREON) 18432 units CPEP delayed release capsule Take 2 capsules by mouth 3 times daily (with meals) 5/19/21 8/17/21 Yes Dustin Cano MD   pantoprazole (PROTONIX) 40 MG tablet Take 1 tablet by mouth 2 times daily (before meals) 5/19/21  Yes Dustin Cano MD   atorvastatin (LIPITOR) 20 MG tablet Take 1 tablet by mouth daily 5/20/21  Yes Dustin Cano MD   gabapentin (NEURONTIN) 300 MG capsule Take 1 capsule by mouth 2 times daily.  4/19/21  Yes Historical Provider, MD   naloxone 4 MG/0.1ML LIQD nasal spray 1 spray by Nasal route as needed for Opioid Reversal 7/15/21   Raegan Romo MD   benzonatate (TESSALON) 200 MG capsule Take 200 mg by mouth 3 times daily as needed for Cough    Historical Provider, MD   insulin lispro, 1 Unit Dial, (HUMALOG KWIKPEN) 100 UNIT/ML SOPN Inject 0-12 Units into the skin 3 times daily (before meals) Glucose: Dose:  If <139             No Insulin  140-199 2 Units  200-249 4 Units  250-299 6 Units  300-349 8 Units  350-400 10 Units  Above 400       12 Units 5/20/21 6/30/21  Dustin Cano MD   guaiFENesin (ROBITUSSIN) 100 MG/5ML SOLN oral solution Take 15 mLs by mouth 3 times daily 5/19/21   Dustin Cano MD   insulin glargine (LANTUS SOLOSTAR) 100 UNIT/ML injection pen Inject 20 Units into the skin 2 times daily 5/19/21 6/30/21  Dustin Cano MD   insulin lispro, 1 Unit Dial, (HUMALOG [facility]) 100 UNIT/ML SOPN Inject 7 Units into the skin 3 times daily (before meals) 5/19/21 6/30/21  Dustin Cano MD       Allergies   Allergen Reactions    Iodine Swelling     Sea food    Metformin And Related Hives    Wheat Extract Swelling       Family History   Problem Relation Age of Onset    Diabetes Mother     Other Mother         parkinsons    Diabetes Father     Cancer Sister 79        unknown    Cancer Brother 64        stomach    Diabetes Brother        Social History     Tobacco Use    Smoking status: Never Smoker    Smokeless tobacco: Never Used   Vaping Use    Vaping Use: Never used   Substance Use Topics    Alcohol use: Not Currently     Comment: occasional    Drug use: Never         Review of Systems   General ROS: negative for - chills or fever  Respiratory ROS: no cough, shortness of breath, or wheezing  Cardiovascular ROS: no chest pain or dyspnea on exertion  Gastrointestinal ROS: no change in bowel habits, or black or bloody stools, reporting moderate abdominal pain localized to the left upper quadrant  Genito-Urinary ROS: no dysuria, trouble voiding, or hematuria  Musculoskeletal ROS: negative for - muscle pain or muscular weakness      PHYSICAL EXAM:    Vitals:    07/28/21 1513   BP: 105/67   Pulse: 89   Resp: 18   Temp: 97.7 °F (36.5 °C)   SpO2: 96%       General Appearance:  awake, alert, oriented, in moderate distress  Skin:  Skin color, texture, turgor normal. No rashes or lesions. Head/face:  Atraumatic, normocephalic  Eyes:  No gross abnormalities. Lungs:  Normal effort  Heart:  Regular rate  Abdomen:  Soft, mildly distended, 2 drains in LUQ, wound midline packed with gauze. Extremities: Extremities warm to touch, pink, with no edema. LABS:    CBC  Recent Labs     07/26/21  0248   WBC 10.8   HGB 9.8*   HCT 33.0*   *     BMP  Recent Labs     07/26/21  0248      K 5.2*      CO2 26   BUN 4*   CREATININE 0.6*   CALCIUM 8.9     Liver Function  No results for input(s): AMYLASE, LIPASE, BILITOT, BILIDIR, AST, ALT, ALKPHOS, PROT, LABALBU in the last 72 hours. No results for input(s): LACTATE in the last 72 hours. No results for input(s): INR, PTT in the last 72 hours. Invalid input(s): PT    RADIOLOGY    No results found. ASSESSMENT:  62 y.o. male with concern for pancreatic stent blockage because of increased output from drains.     PLAN:  - ERCP  - pain control  - ID following- appreciate their recommendations.     Electronically signed by Tip Gibbs MD on 7/28/21 at 4:56 PM EDT    General Surgery Progress Note  [facility]    Patient's Name/Date of Birth: Tigist Sibley / 1960    Date: July 30, 2021     Surgeon: Yfn Bull MD    Chief Complaint: Abdominal pain, peripancreatic abscess    Patient Active Problem List   Diagnosis    Mixed hyperlipidemia    Neck pain    Cervical radiculopathy at C7    Essential hypertension    Chronic seasonal allergic rhinitis    Ventral hernia without obstruction or gangrene    Poorly controlled type 2 diabetes mellitus ([address]    Chronic bilateral low back pain with right-sided sciatica    Gynecomastia    Panic disorder    Cubital tunnel syndrome on right    Right carpal tunnel syndrome    Spondylosis of lumbar spine    Bell's palsy    Other bursal cyst, left elbow    Sacral radiculitis    Lumbar radiculitis    Spondylosis of cervical region without myelopathy or radiculopathy    Cervical facet joint syndrome    Cervical disc disorder    Lumbar facet arthropathy    Spinal stenosis of lumbar region with neurogenic claudication    Lumbar disc disorder    Pancreatic neoplasm    IPMN (intraductal papillary mucinous neoplasm)    Pancreatic duct leak    Intra-abdominal infection    Abdominal pain with fever after surgery    Sepsis ([address]    Acute respiratory failure with hypoxia ([address]    Intra-abdominal abscess (HCC)    Severe protein-calorie malnutrition ([address]       Subjective: HPI as above    Objective:  /88   Pulse 76   Temp 98.8 °F (37.1 °C) (Axillary)   Resp 18   Ht 5' 8\" (1.727 m)   Wt 185 lb (83.9 kg)   SpO2 96%   BMI 28.13 kg/m²   Labs:  Recent Labs     07/29/21  0258 07/29/21  0644 07/30/21  0618   WBC 3.6* 4.3* 7.4   HGB 8.8* 8.3* 8.7*   HCT 28.6* 26.7* 28.4*     Lab Results   Component Value Date    CREATININE 0.6 (L) 07/30/2021    BUN 11 07/30/2021     07/30/2021    K 3.8 07/30/2021     07/30/2021    CO2 33 (H) 07/30/2021     No results for input(s): LIPASE, AMYLASE in the last 72 hours. CBC with Differential:    Lab Results   Component Value Date    WBC 7.4 07/30/2021    RBC 3.89 07/30/2021    HGB 8.7 07/30/2021    HCT 28.4 07/30/2021     07/30/2021    MCV 73.0 07/30/2021    MCH 22.4 07/30/2021    MCHC 30.6 07/30/2021    RDW 20.2 07/30/2021    NRBC 1.8 07/07/2021    METASPCT 0.9 05/29/2021    LYMPHOPCT 9.6 07/30/2021    MONOPCT 8.7 07/30/2021    MYELOPCT 0.9 07/17/2021    BASOPCT 0.0 07/30/2021    MONOSABS 0.67 07/30/2021    LYMPHSABS 0.74 07/30/2021    EOSABS 0.00 07/30/2021    BASOSABS 0.00 07/30/2021     CMP:    Lab Results   Component Value Date     07/30/2021    K 3.8 07/30/2021     07/30/2021    CO2 33 07/30/2021    BUN 11 07/30/2021    CREATININE 0.6 07/30/2021    GFRAA >60 07/30/2021    LABGLOM >60 07/30/2021    GLUCOSE 357 07/30/2021    PROT 6.0 07/30/2021    LABALBU 2.6 07/30/2021    CALCIUM 8.4 07/30/2021    BILITOT <0.2 07/30/2021    ALKPHOS 49 07/30/2021    AST 38 07/30/2021    ALT 23 07/30/2021       General appearance:  NAD  Head: NCAT, PERRLA, EOMI, red conjunctiva  Neck: supple, no masses  Lungs: CTAB, equal chest rise bilateral  Heart: Reg rate  Abdomen: soft, nondistended, tender. IR drains purulent  Skin; no lesions  Gu: no cva tenderness  Extremities: extremities normal, atraumatic, no cyanosis or edema      Assessment/Plan:  Andre Garcia is a 62 y.o. male periancreatic abscess from pancreatic duct leak after distal pancreatectomy, multiple pancreatic stent exchanges    Proceed with EGD with pancreatic stent removal and EUS with possible Axios cystgastrostomy  -The procedure, risks, benefits and alternatives were discussed with patient. he   agrees to proceed.     Basilia Moyer MD  7/30/2021  3:39 PM

## 2021-07-29 ENCOUNTER — ANESTHESIA EVENT (OUTPATIENT)
Dept: CT IMAGING | Age: 61
DRG: 721 | End: 2021-07-29
Payer: MEDICAID

## 2021-07-29 ENCOUNTER — APPOINTMENT (OUTPATIENT)
Dept: CT IMAGING | Age: 61
DRG: 721 | End: 2021-07-29
Attending: TRANSPLANT SURGERY
Payer: MEDICAID

## 2021-07-29 ENCOUNTER — ANESTHESIA (OUTPATIENT)
Dept: CT IMAGING | Age: 61
DRG: 721 | End: 2021-07-29
Payer: MEDICAID

## 2021-07-29 ENCOUNTER — APPOINTMENT (OUTPATIENT)
Dept: INTERVENTIONAL RADIOLOGY/VASCULAR | Age: 61
DRG: 721 | End: 2021-07-29
Attending: TRANSPLANT SURGERY
Payer: MEDICAID

## 2021-07-29 VITALS
OXYGEN SATURATION: 95 % | SYSTOLIC BLOOD PRESSURE: 111 MMHG | DIASTOLIC BLOOD PRESSURE: 72 MMHG | RESPIRATION RATE: 15 BRPM

## 2021-07-29 PROBLEM — E43 SEVERE PROTEIN-CALORIE MALNUTRITION (HCC): Status: ACTIVE | Noted: 2021-07-07

## 2021-07-29 LAB
ABO/RH: NORMAL
ALBUMIN SERPL-MCNC: 2.6 G/DL (ref 3.5–5.2)
ALBUMIN SERPL-MCNC: 3 G/DL (ref 3.5–5.2)
ALP BLD-CCNC: 52 U/L (ref 40–129)
ALP BLD-CCNC: 60 U/L (ref 40–129)
ALT SERPL-CCNC: 20 U/L (ref 0–40)
ALT SERPL-CCNC: 23 U/L (ref 0–40)
ANION GAP SERPL CALCULATED.3IONS-SCNC: 12 MMOL/L (ref 7–16)
ANION GAP SERPL CALCULATED.3IONS-SCNC: 16 MMOL/L (ref 7–16)
ANISOCYTOSIS: ABNORMAL
ANISOCYTOSIS: ABNORMAL
ANTIBODY SCREEN: NORMAL
AST SERPL-CCNC: 33 U/L (ref 0–39)
AST SERPL-CCNC: 48 U/L (ref 0–39)
BASOPHILS ABSOLUTE: 0.01 E9/L (ref 0–0.2)
BASOPHILS ABSOLUTE: 0.01 E9/L (ref 0–0.2)
BASOPHILS RELATIVE PERCENT: 0.2 % (ref 0–2)
BASOPHILS RELATIVE PERCENT: 0.3 % (ref 0–2)
BILIRUB SERPL-MCNC: 0.2 MG/DL (ref 0–1.2)
BILIRUB SERPL-MCNC: 0.3 MG/DL (ref 0–1.2)
BLOOD BANK DISPENSE STATUS: NORMAL
BLOOD BANK DISPENSE STATUS: NORMAL
BLOOD BANK PRODUCT CODE: NORMAL
BLOOD BANK PRODUCT CODE: NORMAL
BPU ID: NORMAL
BPU ID: NORMAL
BUN BLDV-MCNC: 7 MG/DL (ref 6–20)
BUN BLDV-MCNC: 8 MG/DL (ref 6–20)
BURR CELLS: ABNORMAL
BURR CELLS: ABNORMAL
CALCIUM SERPL-MCNC: 8.7 MG/DL (ref 8.6–10.2)
CALCIUM SERPL-MCNC: 8.9 MG/DL (ref 8.6–10.2)
CHLORIDE BLD-SCNC: 98 MMOL/L (ref 98–107)
CHLORIDE BLD-SCNC: 99 MMOL/L (ref 98–107)
CO2: 23 MMOL/L (ref 22–29)
CO2: 25 MMOL/L (ref 22–29)
CREAT SERPL-MCNC: 0.6 MG/DL (ref 0.7–1.2)
CREAT SERPL-MCNC: 0.6 MG/DL (ref 0.7–1.2)
DESCRIPTION BLOOD BANK: NORMAL
DESCRIPTION BLOOD BANK: NORMAL
EOSINOPHILS ABSOLUTE: 0 E9/L (ref 0.05–0.5)
EOSINOPHILS ABSOLUTE: 0 E9/L (ref 0.05–0.5)
EOSINOPHILS RELATIVE PERCENT: 0 % (ref 0–6)
EOSINOPHILS RELATIVE PERCENT: 0 % (ref 0–6)
GFR AFRICAN AMERICAN: >60
GFR AFRICAN AMERICAN: >60
GFR NON-AFRICAN AMERICAN: >60 ML/MIN/1.73
GFR NON-AFRICAN AMERICAN: >60 ML/MIN/1.73
GLUCOSE BLD-MCNC: 298 MG/DL (ref 74–99)
GLUCOSE BLD-MCNC: 335 MG/DL (ref 74–99)
HCT VFR BLD CALC: 26.7 % (ref 37–54)
HCT VFR BLD CALC: 28.6 % (ref 37–54)
HEMOGLOBIN: 8.3 G/DL (ref 12.5–16.5)
HEMOGLOBIN: 8.8 G/DL (ref 12.5–16.5)
HYPOCHROMIA: ABNORMAL
HYPOCHROMIA: ABNORMAL
IMMATURE GRANULOCYTES #: 0.02 E9/L
IMMATURE GRANULOCYTES #: 0.03 E9/L
IMMATURE GRANULOCYTES %: 0.6 % (ref 0–5)
IMMATURE GRANULOCYTES %: 0.7 % (ref 0–5)
INR BLD: 1.9
LACTIC ACID: 1.4 MMOL/L (ref 0.5–2.2)
LACTIC ACID: 5 MMOL/L (ref 0.5–2.2)
LYMPHOCYTES ABSOLUTE: 0.46 E9/L (ref 1.5–4)
LYMPHOCYTES ABSOLUTE: 0.54 E9/L (ref 1.5–4)
LYMPHOCYTES RELATIVE PERCENT: 12.5 % (ref 20–42)
LYMPHOCYTES RELATIVE PERCENT: 12.8 % (ref 20–42)
MCH RBC QN AUTO: 22.6 PG (ref 26–35)
MCH RBC QN AUTO: 22.9 PG (ref 26–35)
MCHC RBC AUTO-ENTMCNC: 30.8 % (ref 32–34.5)
MCHC RBC AUTO-ENTMCNC: 31.1 % (ref 32–34.5)
MCV RBC AUTO: 73.3 FL (ref 80–99.9)
MCV RBC AUTO: 73.6 FL (ref 80–99.9)
METER GLUCOSE: 221 MG/DL (ref 74–99)
METER GLUCOSE: 232 MG/DL (ref 74–99)
METER GLUCOSE: 270 MG/DL (ref 74–99)
METER GLUCOSE: 274 MG/DL (ref 74–99)
METER GLUCOSE: 318 MG/DL (ref 74–99)
MONOCYTES ABSOLUTE: 0.1 E9/L (ref 0.1–0.95)
MONOCYTES ABSOLUTE: 0.18 E9/L (ref 0.1–0.95)
MONOCYTES RELATIVE PERCENT: 2.8 % (ref 2–12)
MONOCYTES RELATIVE PERCENT: 4.2 % (ref 2–12)
NEUTROPHILS ABSOLUTE: 3 E9/L (ref 1.8–7.3)
NEUTROPHILS ABSOLUTE: 3.57 E9/L (ref 1.8–7.3)
NEUTROPHILS RELATIVE PERCENT: 82.4 % (ref 43–80)
NEUTROPHILS RELATIVE PERCENT: 83.5 % (ref 43–80)
OVALOCYTES: ABNORMAL
OVALOCYTES: ABNORMAL
PDW BLD-RTO: 19.7 FL (ref 11.5–15)
PDW BLD-RTO: 19.9 FL (ref 11.5–15)
PLATELET # BLD: 538 E9/L (ref 130–450)
PLATELET # BLD: 646 E9/L (ref 130–450)
PMV BLD AUTO: 10.7 FL (ref 7–12)
PMV BLD AUTO: 11.4 FL (ref 7–12)
POIKILOCYTES: ABNORMAL
POIKILOCYTES: ABNORMAL
POLYCHROMASIA: ABNORMAL
POLYCHROMASIA: ABNORMAL
POTASSIUM REFLEX MAGNESIUM: 4.2 MMOL/L (ref 3.5–5)
POTASSIUM SERPL-SCNC: 4 MMOL/L (ref 3.5–5)
PROTHROMBIN TIME: 21.1 SEC (ref 9.3–12.4)
RBC # BLD: 3.63 E12/L (ref 3.8–5.8)
RBC # BLD: 3.9 E12/L (ref 3.8–5.8)
SCHISTOCYTES: ABNORMAL
SODIUM BLD-SCNC: 136 MMOL/L (ref 132–146)
SODIUM BLD-SCNC: 137 MMOL/L (ref 132–146)
TARGET CELLS: ABNORMAL
TARGET CELLS: ABNORMAL
TOTAL PROTEIN: 6.4 G/DL (ref 6.4–8.3)
TOTAL PROTEIN: 7.1 G/DL (ref 6.4–8.3)
WBC # BLD: 3.6 E9/L (ref 4.5–11.5)
WBC # BLD: 4.3 E9/L (ref 4.5–11.5)

## 2021-07-29 PROCEDURE — 86850 RBC ANTIBODY SCREEN: CPT

## 2021-07-29 PROCEDURE — 87205 SMEAR GRAM STAIN: CPT

## 2021-07-29 PROCEDURE — 6370000000 HC RX 637 (ALT 250 FOR IP): Performed by: RADIOLOGY

## 2021-07-29 PROCEDURE — 85025 COMPLETE CBC W/AUTO DIFF WBC: CPT

## 2021-07-29 PROCEDURE — 2580000003 HC RX 258: Performed by: STUDENT IN AN ORGANIZED HEALTH CARE EDUCATION/TRAINING PROGRAM

## 2021-07-29 PROCEDURE — 3700000000 HC ANESTHESIA ATTENDED CARE

## 2021-07-29 PROCEDURE — 1200000000 HC SEMI PRIVATE

## 2021-07-29 PROCEDURE — 83605 ASSAY OF LACTIC ACID: CPT

## 2021-07-29 PROCEDURE — 6370000000 HC RX 637 (ALT 250 FOR IP): Performed by: STUDENT IN AN ORGANIZED HEALTH CARE EDUCATION/TRAINING PROGRAM

## 2021-07-29 PROCEDURE — 87040 BLOOD CULTURE FOR BACTERIA: CPT

## 2021-07-29 PROCEDURE — 87186 SC STD MICRODIL/AGAR DIL: CPT

## 2021-07-29 PROCEDURE — 87070 CULTURE OTHR SPECIMN AEROBIC: CPT

## 2021-07-29 PROCEDURE — 80053 COMPREHEN METABOLIC PANEL: CPT

## 2021-07-29 PROCEDURE — 6360000002 HC RX W HCPCS

## 2021-07-29 PROCEDURE — 86901 BLOOD TYPING SEROLOGIC RH(D): CPT

## 2021-07-29 PROCEDURE — 49423 EXCHANGE DRAINAGE CATHETER: CPT

## 2021-07-29 PROCEDURE — 85610 PROTHROMBIN TIME: CPT

## 2021-07-29 PROCEDURE — 0FPD8DZ REMOVAL OF INTRALUMINAL DEVICE FROM PANCREATIC DUCT, VIA NATURAL OR ARTIFICIAL OPENING ENDOSCOPIC: ICD-10-PCS | Performed by: SURGERY

## 2021-07-29 PROCEDURE — 87181 SC STD AGAR DILUTION PER AGT: CPT

## 2021-07-29 PROCEDURE — 87075 CULTR BACTERIA EXCEPT BLOOD: CPT

## 2021-07-29 PROCEDURE — 75984 XRAY CONTROL CATHETER CHANGE: CPT

## 2021-07-29 PROCEDURE — 87077 CULTURE AEROBIC IDENTIFY: CPT

## 2021-07-29 PROCEDURE — 3700000001 HC ADD 15 MINUTES (ANESTHESIA)

## 2021-07-29 PROCEDURE — 6360000002 HC RX W HCPCS: Performed by: STUDENT IN AN ORGANIZED HEALTH CARE EDUCATION/TRAINING PROGRAM

## 2021-07-29 PROCEDURE — 86900 BLOOD TYPING SEROLOGIC ABO: CPT

## 2021-07-29 PROCEDURE — 36415 COLL VENOUS BLD VENIPUNCTURE: CPT

## 2021-07-29 PROCEDURE — 6360000002 HC RX W HCPCS: Performed by: INTERNAL MEDICINE

## 2021-07-29 PROCEDURE — 49423 EXCHANGE DRAINAGE CATHETER: CPT | Performed by: RADIOLOGY

## 2021-07-29 PROCEDURE — C1729 CATH, DRAINAGE: HCPCS

## 2021-07-29 PROCEDURE — 2500000003 HC RX 250 WO HCPCS

## 2021-07-29 PROCEDURE — 87184 SC STD DISK METHOD PER PLATE: CPT

## 2021-07-29 PROCEDURE — 6360000002 HC RX W HCPCS: Performed by: RADIOLOGY

## 2021-07-29 PROCEDURE — 82962 GLUCOSE BLOOD TEST: CPT

## 2021-07-29 PROCEDURE — 2580000003 HC RX 258: Performed by: INTERNAL MEDICINE

## 2021-07-29 RX ORDER — FENTANYL CITRATE 50 UG/ML
INJECTION, SOLUTION INTRAMUSCULAR; INTRAVENOUS PRN
Status: DISCONTINUED | OUTPATIENT
Start: 2021-07-29 | End: 2021-07-29 | Stop reason: SDUPTHER

## 2021-07-29 RX ORDER — DIPHENHYDRAMINE HCL 25 MG
50 TABLET ORAL ONCE
Status: COMPLETED | OUTPATIENT
Start: 2021-07-29 | End: 2021-07-29

## 2021-07-29 RX ORDER — DOCUSATE SODIUM 100 MG/1
100 CAPSULE, LIQUID FILLED ORAL 2 TIMES DAILY
Status: DISCONTINUED | OUTPATIENT
Start: 2021-07-29 | End: 2021-08-05 | Stop reason: HOSPADM

## 2021-07-29 RX ORDER — KETAMINE HYDROCHLORIDE 10 MG/ML
INJECTION, SOLUTION INTRAMUSCULAR; INTRAVENOUS PRN
Status: DISCONTINUED | OUTPATIENT
Start: 2021-07-29 | End: 2021-07-29 | Stop reason: SDUPTHER

## 2021-07-29 RX ORDER — SODIUM CHLORIDE 9 MG/ML
INJECTION, SOLUTION INTRAVENOUS PRN
Status: DISCONTINUED | OUTPATIENT
Start: 2021-07-29 | End: 2021-08-05 | Stop reason: HOSPADM

## 2021-07-29 RX ORDER — NICOTINE POLACRILEX 4 MG
15 LOZENGE BUCCAL PRN
Status: DISCONTINUED | OUTPATIENT
Start: 2021-07-29 | End: 2021-08-05 | Stop reason: HOSPADM

## 2021-07-29 RX ORDER — DEXTROSE MONOHYDRATE 25 G/50ML
12.5 INJECTION, SOLUTION INTRAVENOUS PRN
Status: DISCONTINUED | OUTPATIENT
Start: 2021-07-29 | End: 2021-08-05 | Stop reason: HOSPADM

## 2021-07-29 RX ORDER — POLYETHYLENE GLYCOL 3350 17 G/17G
17 POWDER, FOR SOLUTION ORAL DAILY
Status: DISCONTINUED | OUTPATIENT
Start: 2021-07-29 | End: 2021-08-05 | Stop reason: HOSPADM

## 2021-07-29 RX ORDER — MIDAZOLAM HYDROCHLORIDE 1 MG/ML
INJECTION INTRAMUSCULAR; INTRAVENOUS PRN
Status: DISCONTINUED | OUTPATIENT
Start: 2021-07-29 | End: 2021-07-29 | Stop reason: SDUPTHER

## 2021-07-29 RX ORDER — DEXTROSE MONOHYDRATE 50 MG/ML
100 INJECTION, SOLUTION INTRAVENOUS PRN
Status: DISCONTINUED | OUTPATIENT
Start: 2021-07-29 | End: 2021-08-05 | Stop reason: HOSPADM

## 2021-07-29 RX ORDER — SODIUM CHLORIDE, SODIUM LACTATE, POTASSIUM CHLORIDE, AND CALCIUM CHLORIDE .6; .31; .03; .02 G/100ML; G/100ML; G/100ML; G/100ML
1000 INJECTION, SOLUTION INTRAVENOUS ONCE
Status: COMPLETED | OUTPATIENT
Start: 2021-07-29 | End: 2021-07-29

## 2021-07-29 RX ADMIN — INSULIN LISPRO 8 UNITS: 100 INJECTION, SOLUTION INTRAVENOUS; SUBCUTANEOUS at 07:46

## 2021-07-29 RX ADMIN — ERTAPENEM SODIUM 1000 MG: 1 INJECTION, POWDER, LYOPHILIZED, FOR SOLUTION INTRAMUSCULAR; INTRAVENOUS at 15:46

## 2021-07-29 RX ADMIN — SODIUM CHLORIDE, POTASSIUM CHLORIDE, SODIUM LACTATE AND CALCIUM CHLORIDE: 600; 310; 30; 20 INJECTION, SOLUTION INTRAVENOUS at 21:01

## 2021-07-29 RX ADMIN — OXYCODONE AND ACETAMINOPHEN 1 TABLET: 5; 325 TABLET ORAL at 17:23

## 2021-07-29 RX ADMIN — DEXTROSE MONOHYDRATE 100 MG: 50 INJECTION, SOLUTION INTRAVENOUS at 14:08

## 2021-07-29 RX ADMIN — HYDROMORPHONE HYDROCHLORIDE 0.5 MG: 1 INJECTION, SOLUTION INTRAMUSCULAR; INTRAVENOUS; SUBCUTANEOUS at 21:01

## 2021-07-29 RX ADMIN — INSULIN LISPRO 6 UNITS: 100 INJECTION, SOLUTION INTRAVENOUS; SUBCUTANEOUS at 17:20

## 2021-07-29 RX ADMIN — SODIUM CHLORIDE, PRESERVATIVE FREE 10 ML: 5 INJECTION INTRAVENOUS at 07:47

## 2021-07-29 RX ADMIN — DOCUSATE SODIUM 100 MG: 100 CAPSULE, LIQUID FILLED ORAL at 21:02

## 2021-07-29 RX ADMIN — LINEZOLID 600 MG: 600 INJECTION, SOLUTION INTRAVENOUS at 14:09

## 2021-07-29 RX ADMIN — FENTANYL CITRATE 50 MCG: 50 INJECTION, SOLUTION INTRAMUSCULAR; INTRAVENOUS at 11:25

## 2021-07-29 RX ADMIN — SODIUM CHLORIDE, POTASSIUM CHLORIDE, SODIUM LACTATE AND CALCIUM CHLORIDE 1000 ML: 600; 310; 30; 20 INJECTION, SOLUTION INTRAVENOUS at 03:55

## 2021-07-29 RX ADMIN — OXYCODONE AND ACETAMINOPHEN 1 TABLET: 5; 325 TABLET ORAL at 22:25

## 2021-07-29 RX ADMIN — KETAMINE HYDROCHLORIDE 10 MG: 10 INJECTION INTRAMUSCULAR; INTRAVENOUS at 11:27

## 2021-07-29 RX ADMIN — INSULIN LISPRO 6 UNITS: 100 INJECTION, SOLUTION INTRAVENOUS; SUBCUTANEOUS at 13:57

## 2021-07-29 RX ADMIN — LINEZOLID 600 MG: 600 INJECTION, SOLUTION INTRAVENOUS at 03:04

## 2021-07-29 RX ADMIN — KETAMINE HYDROCHLORIDE 10 MG: 10 INJECTION INTRAMUSCULAR; INTRAVENOUS at 11:38

## 2021-07-29 RX ADMIN — MIDAZOLAM 1 MG: 1 INJECTION INTRAMUSCULAR; INTRAVENOUS at 11:24

## 2021-07-29 RX ADMIN — HEPARIN SODIUM 5000 UNITS: 10000 INJECTION INTRAVENOUS; SUBCUTANEOUS at 06:32

## 2021-07-29 RX ADMIN — KETAMINE HYDROCHLORIDE 10 MG: 10 INJECTION INTRAMUSCULAR; INTRAVENOUS at 11:30

## 2021-07-29 RX ADMIN — HYDROCORTISONE SODIUM SUCCINATE 100 MG: 100 INJECTION, POWDER, FOR SOLUTION INTRAMUSCULAR; INTRAVENOUS at 09:34

## 2021-07-29 RX ADMIN — FENTANYL CITRATE 50 MCG: 50 INJECTION, SOLUTION INTRAMUSCULAR; INTRAVENOUS at 11:21

## 2021-07-29 RX ADMIN — HEPARIN SODIUM 5000 UNITS: 10000 INJECTION INTRAVENOUS; SUBCUTANEOUS at 21:02

## 2021-07-29 RX ADMIN — MIDAZOLAM 1 MG: 1 INJECTION INTRAMUSCULAR; INTRAVENOUS at 11:21

## 2021-07-29 RX ADMIN — DIPHENHYDRAMINE HYDROCHLORIDE 50 MG: 25 TABLET ORAL at 09:33

## 2021-07-29 RX ADMIN — INSULIN LISPRO 2 UNITS: 100 INJECTION, SOLUTION INTRAVENOUS; SUBCUTANEOUS at 21:03

## 2021-07-29 RX ADMIN — KETAMINE HYDROCHLORIDE 20 MG: 10 INJECTION INTRAMUSCULAR; INTRAVENOUS at 11:37

## 2021-07-29 ASSESSMENT — PAIN - FUNCTIONAL ASSESSMENT: PAIN_FUNCTIONAL_ASSESSMENT: ACTIVITIES ARE NOT PREVENTED

## 2021-07-29 ASSESSMENT — PAIN SCALES - GENERAL
PAINLEVEL_OUTOF10: 5
PAINLEVEL_OUTOF10: 0
PAINLEVEL_OUTOF10: 8
PAINLEVEL_OUTOF10: 7
PAINLEVEL_OUTOF10: 10
PAINLEVEL_OUTOF10: 4

## 2021-07-29 ASSESSMENT — PAIN DESCRIPTION - LOCATION: LOCATION: ABDOMEN

## 2021-07-29 ASSESSMENT — PAIN DESCRIPTION - DESCRIPTORS: DESCRIPTORS: ACHING

## 2021-07-29 ASSESSMENT — PULMONARY FUNCTION TESTS
PIF_VALUE: 4
PIF_VALUE: 1

## 2021-07-29 ASSESSMENT — PAIN DESCRIPTION - PROGRESSION: CLINICAL_PROGRESSION: NOT CHANGED

## 2021-07-29 ASSESSMENT — PAIN DESCRIPTION - ORIENTATION: ORIENTATION: LEFT

## 2021-07-29 ASSESSMENT — LIFESTYLE VARIABLES: SMOKING_STATUS: 0

## 2021-07-29 ASSESSMENT — PAIN DESCRIPTION - FREQUENCY: FREQUENCY: CONTINUOUS

## 2021-07-29 ASSESSMENT — PAIN DESCRIPTION - ONSET: ONSET: GRADUAL

## 2021-07-29 ASSESSMENT — PAIN DESCRIPTION - PAIN TYPE: TYPE: ACUTE PAIN

## 2021-07-29 NOTE — PROGRESS NOTES
[facility] PROGRESS NOTE      Chief complaint: Follow-up of intraabdominal abscess    The patient is a 62 y.o. male with history of hypertension; hyperlipidemia; DM; IPMN of pancreatic tail status post robotic converted to open distal pancreatectomy with splenectomy on 05/12 complicated by pancreatic leak requiring stent placement on 05/17, intra-abdominal abscess status post percutaneous drain placement on 05/28 with abscess fluid culture showing Enterobacter cloacae, Klebsiella oxytoca, alphahemolytic Streptococcus, non-ESBL E. coli, anaerobic gram-negative rods, coagulase-negative Staphylococcus, Cronobacter sakazakii for which he was given a course of ciprofloxacin and metronidazole for 4 weeks until 06/25 then found to have inadequate drainage of abscess prompting another percutaneous drain placement on 06/30 and ERCP with exchange of pancreatic duct stent and balloon sweep of pancreatic duct with removal of large amount of debris/pus on 07/02 with abscess fluid culture showing light growth of Klebsiella oxytoca (non-ESBL; resistant to ampicillin; susceptible to fluoroquinolones and co-trimoxazole), heavy growth of alphahemolytic Streptococcus, MSSA and Lactobacillus, light growth of Candida albicans for which he was discharged to [facility] [place] on a 4-week course of ertapenem and fluconazole from 07/02-07/30; presented on 07/28 for further evaluation of intra-abdominal abscess. While at [facility] [place], he started having fever with CT abdomen and pelvis on 07/22 showing moderate volume fluid collection within the surgical bed likely representing abscess with a surgical drain within the abscess but with collection stable to minimally decreased.   Abscess fluid Gram stain and culture on 07/22 showed abundant polymorphonuclear leukocytes, no epithelial cells, abundant gram variable rods, heavy growth of Enterococcus gallinarum and faecium (resistant to ampicillin, doxycycline, vancomycin, streptomycin; susceptible to linezolid; daptomycin sensitivity not done), moderate growth of MRSE, light growth of Candida krusei, moderate growth of lactobacillus. Vancomycin was started on 07/22 then switched to linezolid. He was subsequently transferred to [facility] for further management. He underwent abscess drain tube exchange on 07/29. Subjective: Patient was seen and examined. No chills, less abdominal pain, no diarrhea, no rash, no itching. Objective:    Vitals:    07/29/21 1030   BP: 110/66   Pulse: 78   Resp: 22   Temp: 97.7 °F (36.5 °C)   SpO2: 96%     Constitutional: Alert, not in distress  Respiratory: Clear breath sounds, no crackles, no wheezes  Cardiovascular: Regular rate and rhythm, no murmurs  Gastrointestinal: Bowel sounds present, soft, nontender  Skin: Warm and dry, no active dermatoses  Musculoskeletal: No joint swelling, no joint erythema    Labs, imaging, and medical records/notes were personally reviewed. Assessment:  VRE faecium and gallinarum infection/abscess  Candida krusei infection/abscess  Intraabdominal abscess/surgical site infection, s/p percutaneous drain placement on 05/28, inadequate drainage prompting additional percutaneous drain placements on 06/30. Unfortunately, source control has been difficult to achieve and increasingly antibiotic-resistant pathogens are developing. Pancreatic duct abscess status post ERCP with exchange of pancreatic duct stent and balloon sweep of pancreatic duct with removal of large amount of debris/pus on 07/02  Recent distal pancreatectomy with splenectomy DQ 31/76 complicated by pancreatic leak s/p pancreatic stent placement on 05/17    Recommendations:  Continue  ertapenem 1 g every 24 hours and linezolid 600 mg every 12 hours. Continue anidulafungin 200 mg loading dose once followed by 100 mg every 24 hours. Follow up blood and abscess fluid cultures. Change midline to PICC line prior to discharge/transfer.   Continue supportive care.     Thank you for involving me in the care of Gayathri Bellamy. I will continue to follow. Please do not hesitate to call for any questions or concerns.     Electronically signed by Venus Frankel, MD on 7/29/2021 at 10:54 AM

## 2021-07-29 NOTE — PROGRESS NOTES
Patient arrived to Radiology department for drain exchange. Allergies, home medications, H&P and fasting instructions reviewed with patient. Vital signs taken. IV flushed and prn adapter attached. Procedural instructions given, questions answered, understanding expressed and consent signed. Patient given fluoroscopy education, no questions at this time.

## 2021-07-29 NOTE — CARE COORDINATION
Patient sent to ED from [facility]. Patient resides alone in a 9th floor apartment, elevator access, no steps to enter building. Patient independent of all ADL's prior to admission. Patient had a procedure for intra abdominal drain today. Met with patient at bedside for transition of care planning. Patient groggy; will return at an alternate time to assess. Call made to [facility] and they will continue to follow, patient will not need a covid test and will require pre-cert prior to return.     Magnus Gill, MSW, LSW [phone]

## 2021-07-29 NOTE — INTERVAL H&P NOTE
H&P Update    Patient's History and Physical  was reviewed. The patient appears likely to able to tolerate the procedure. Risk and benefits discussed including ultimate complications, possibly death and consent obtained.     Edin Jefferson, II

## 2021-07-29 NOTE — BRIEF OP NOTE
Brief Postoperative Note    Pipe Arizmendi  YOB: 1960  [id number]    Pre-operative Diagnosis and Procedure: tube change    Post-operative Diagnosis: Same    Anesthesia: Local    Estimated Blood Loss: < 10 cc    Surgeon: Dorene SANDOVAL     Complications: none    Specimen obtained: none   Findings: none     Vianey Brothers II, MD   7/29/2021 2:41 PM

## 2021-07-29 NOTE — PROGRESS NOTES
Notified Surgical Resident of pt stating that he felt he was having an allergic reaction and his rectal temp being 96.1 as well as him requesting pain medication.

## 2021-07-29 NOTE — ANESTHESIA PRE PROCEDURE
Department of Anesthesiology  Preprocedure Note       Name:  Shay Solorio   Age:  62 y.o.  :  1960                                          MRN:  [id number]         Date:  2021      Surgeon:  Dr. Whaley     Procedure:  Ok Leader intra abdominal drain      Medications prior to admission:   Prior to Admission medications    Medication Sig Start Date End Date Taking? Authorizing Provider   polyethylene glycol (GLYCOLAX) 17 g packet Take 17 g by mouth 2 times daily 7/15/21 8/14/21  Kitty Mata MD   sennosides-docusate sodium (SENOKOT-S) 8.6-50 MG tablet Take 2 tablets by mouth daily 21   Kitty Days, MD   naloxone 4 MG/0.1ML LIQD nasal spray 1 spray by Nasal route as needed for Opioid Reversal 7/15/21   Kitty Days, MD solorio [facility]) infusion Infuse 1,000 mg intravenously every 24 hours for 21 days Compound per protocol. 21  Josafat Rust MD   fluconazole (DIFLUCAN) 200 MG tablet Take 2 tablets by mouth daily for 21 days 21  Josafat Rust MD   oxyCODONE-acetaminophen (PERCOCET) 7.5-325 MG per tablet Take 1 tablet by mouth every 4 hours as needed for Pain. Historical Provider, MD   BANOPHEN 25 MG tablet take 2 tablets by mouth 1 HOUR PRIOR TO CONTRAST MEDIA ADMINISTRATION 21   Lisa Chamberlain III, MD   acetaminophen (TYLENOL) 500 MG tablet Take 2 tablets by mouth every 6 hours 21   Cindi Olmstead MD   ibuprofen (ADVIL;MOTRIN) 600 MG tablet Take 1 tablet by mouth 3 times daily (with meals) 21   Cindi Olmstead MD   apixaban (ELIQUIS) 5 MG TABS tablet Take 1 tablet by mouth 2 times daily . The first dose of this Eliquis should be taken approximately 12 hours after you received your last dose of Lovenox (the blood thinner injection into your skin) given in the hospital. Continue to take the Eliquis approximately every 12 hours.  21   Cindi Olmstead MD   benzonatate (TESSALON) 200 MG capsule Take 200 mg by mouth 3 times daily as needed for Cough    Historical Provider, MD   insulin lispro, 1 Unit Dial, (HUMALOG KWIKPEN) 100 UNIT/ML SOPN Inject 0-12 Units into the skin 3 times daily (before meals) Glucose: Dose:  If <139             No Insulin  140-199 2 Units  200-249 4 Units  250-299 6 Units  300-349 8 Units  350-400 10 Units  Above 400       12 Units 5/20/21 6/30/21  Jenni Ruth MD   losartan (COZAAR) 100 MG tablet take 1 tablet by mouth once daily 5/19/21   Jenni Ruth MD   hydroCHLOROthiazide (HYDRODIURIL) 12.5 MG tablet take 1 tablet by mouth once daily 5/19/21   Jenni Ruth MD   albuterol (PROVENTIL) (2.5 MG/3ML) 0.083% nebulizer solution Take 3 mLs by nebulization 4 times daily 5/19/21   Jenni Ruth MD   budesonide (PULMICORT) 0.5 MG/2ML nebulizer suspension Take 4 mLs by nebulization 2 times daily 5/19/21   Jenni Ruth MD   guaiFENesin (ROBITUSSIN) 100 MG/5ML SOLN oral solution Take 15 mLs by mouth 3 times daily 5/19/21   Jenni Ruth MD   lipase-protease-amylase (CREON) 39317 units CPEP delayed release capsule Take 2 capsules by mouth 3 times daily (with meals) 5/19/21 8/17/21  Jenni Ruth MD   pantoprazole (PROTONIX) 40 MG tablet Take 1 tablet by mouth 2 times daily (before meals) 5/19/21   Jenni Ruth MD   atorvastatin (LIPITOR) 20 MG tablet Take 1 tablet by mouth daily 5/20/21   Jenni Ruth MD   insulin glargine (LANTUS SOLOSTAR) 100 UNIT/ML injection pen Inject 20 Units into the skin 2 times daily 5/19/21 6/30/21  Jenni Ruth MD   insulin lispro, 1 Unit Dial, ([facility]) 100 UNIT/ML SOPN Inject 7 Units into the skin 3 times daily (before meals) 5/19/21 6/30/21  Jenni Ruth MD   gabapentin (NEURONTIN) 300 MG capsule Take 1 capsule by mouth 2 times daily. 4/19/21   Historical Provider, MD       Current medications:    No current facility-administered medications for this visit. No current outpatient medications on file.      Facility-Administered MG per tablet 1 tablet  1 tablet Oral Q4H PRN Yue Heard MD        And    oxyCODONE (ROXICODONE) immediate release tablet 2.5 mg  2.5 mg Oral Q4H PRN Yue Heard MD        iopamidol (ISOVUE-370) 76 % injection 90 mL  90 mL Intravenous ONCE PRN Monster Solorzanotlick, DO           Allergies:     Allergies   Allergen Reactions    Iodine Swelling     Sea food    Metformin And Related Hives    Wheat Extract Swelling       Problem List:    Patient Active Problem List   Diagnosis Code    Mixed hyperlipidemia E78.2    Neck pain M54.2    Cervical radiculopathy at C7 M54.12    Essential hypertension I10    Chronic seasonal allergic rhinitis J30.2    Ventral hernia without obstruction or gangrene K43.9    Poorly controlled type 2 diabetes mellitus (HCC) E11.65    Chronic bilateral low back pain with right-sided sciatica M54.41, G89.29    Gynecomastia N62    Panic disorder F41.0    Cubital tunnel syndrome on right G56.21    Right carpal tunnel syndrome G56.01    Spondylosis of lumbar spine M47.816    Bell's palsy G51.0    Other bursal cyst, left elbow M71.322    Sacral radiculitis M54.18    Lumbar radiculitis M54.16    Spondylosis of cervical region without myelopathy or radiculopathy M47.812    Cervical facet joint syndrome M47.812    Cervical disc disorder M50.90    Lumbar facet arthropathy M47.816    Spinal stenosis of lumbar region with neurogenic claudication M48.062    Lumbar disc disorder M51.9    Pancreatic neoplasm D49.0    IPMN (intraductal papillary mucinous neoplasm) D49.0    Pancreatic duct leak K86.89    Intra-abdominal infection B99.9    Abdominal pain with fever after surgery R10.9, G89.18, R50.82    Sepsis (HCC) A41.9    Acute respiratory failure with hypoxia (HCC) J96.01    Intra-abdominal abscess (HCC) K65.1    Moderate protein-calorie malnutrition (HCC) E44.0       Past Medical History:        Diagnosis Date    Arthritis     Back pain     5th finger & ring finger on right hand is numb    Bell's palsy     NEW ONSET 3-     Hyperlipidemia     Hypertension     Neuropathy     toes numb    Poorly controlled type 2 diabetes mellitus with neuropathy ([address] 5/8/2018    Sacral radiculitis 2/27/2019    Spondylosis of lumbar spine 3/9/2019    Advanced arthritis and degenerative disc disease by MRI 3/2019    Ventral hernia        Past Surgical History:        Procedure Laterality Date    ANESTHESIA NERVE BLOCK Bilateral 07/11/2019    BILATERAL L4-5 TRANSFORAMINAL EPIDURAL STEROID INJECTION performed by [employer] christi at [address] Bilateral 08/01/2019    BILATERAL MEDIAL BRANCH BLOCK L4-5 AND L5-S1 performed by [employer] DO at [address] Bilateral 08/15/2019    BILATERAL MEDIAL BRANCH BLOCK L4 - 5 AND L5 - S1 performed by [employer] [facility] at [facility] Right 03/29/2019    right wrist carpal tunnel release and right elbow cubital tunnel release    CARPAL TUNNEL RELEASE Right 03/29/2019    RIGHT WRIST CARPAL TUNNEL RELEASE AND RIGHT ELBOW CUBITAL TUNNEL RELEASE performed by Sergio Pollack DO at [place] [address]      at age 40 polyps    CYST REMOVAL Right     EPIDURAL STEROID INJECTION N/A 10/17/2019    LUMBAR EPIDURAL STEROID INJECTION UNDER FLUOROSCOPIC GUIDANCE AT L2-L3 WITHOUT SEDATION performed by Yane Kingsley MD at [address] [place] [address] ERCP N/A 05/17/2021    ERCP ENDOSCOPIC RETROGRADE CHOLANGIOPANCREATOGRAPHY SPHINCTER/PAPILLOTOMY performed by Jimbo Hicks MD at [id number] [place] [address] ERCP N/A 05/17/2021    ERCP STENT INSERTION performed by Jimbo Hicks MD at [id number] [place] [address] ERCP N/A 6/4/2021    ERCP STENT INSERTION performed by Jimbo Hicks MD at [id number] [place] [address] ERCP N/A 7/2/2021    ERCP STENT REMOVAL performed by Jimbo Hicks MD at [id number] [place] [address] ERCP N/A 7/2/2021    ERCP STONE REMOVAL performed by Angie Petty MD at [address] ERCP N/A 7/2/2021    ERCP STENT INSERTION performed by Angie Petty MD at [facility] Bilateral     groin and umbilical    HERNIA REPAIR N/A 12/13/2018    ROBOTIC ASSISTED LAPAROSCOPIC PRIMARY REPAIR OF RECURRENT VENTRAL HERNIA  REPAIR performed by Myla Cueto MD at [address] Bilateral 07/11/2019    L4-5 transforaminal     NERVE BLOCK Bilateral 08/01/2019    medial branch block    NERVE BLOCK Bilateral 08/15/2019    bilateral medial branch block L4-S1    NERVE BLOCK  10/17/2019    lumbar epidural    PANCREATECTOMY N/A 05/12/2021    LAPAROSCOPIC ROBOTIC DISTAL PANCREATECTOMY AND SPLENECTOMY AND CHOLECYSTECTOMY, INTRA-OPERATIVE ULTRASOUND, TRANSITIONED TO OPEN -- EPIDURAL performed by Brynn Mcnamara MD at [place] [address]  06/05/2018    C5-7 fusion at [facility] in [address] [place] [address] Pkwy 01/08/2021    EGD W/EUS FNA performed by Shahnaz Willett DO at [address] [facility] N/A 01/08/2021    EGD DIAGNOSTIC ONLY performed by Shahnaz Willett DO at [address] [place] [address] History:    Social History     Tobacco Use    Smoking status: Never Smoker    Smokeless tobacco: Never Used   Substance Use Topics    Alcohol use: Not Currently     Comment: occasional                                Counseling given: Not Answered      Vital Signs (Current): There were no vitals filed for this visit.                                            BP Readings from Last 3 Encounters:   07/29/21 119/79   07/15/21 112/65   07/14/21 116/75       NPO Status:  > 8 hours                                                                               BMI:   Wt Readings from Last 3 Encounters:   07/28/21 185 lb (83.9 kg)   07/09/21 186 lb 3.2 oz (84.5 kg)   06/24/21 199 lb (90.3 kg)     There is no height or weight on file to calculate BMI.    CBC:   Lab Results Component Value Date    WBC 4.3 2021    RBC 3.63 2021    HGB 8.3 2021    HCT 26.7 2021    MCV 73.6 2021    RDW 19.9 2021     2021       CMP:   Lab Results   Component Value Date     2021    K 4.2 2021    K 4.0 2021    CL 99 2021    CO2 25 2021    BUN 8 2021    CREATININE 0.6 2021    GFRAA >60 2021    LABGLOM >60 2021    GLUCOSE 335 2021    PROT 6.4 2021    CALCIUM 8.7 2021    BILITOT 0.2 2021    ALKPHOS 52 2021    AST 33 2021    ALT 20 2021       POC Tests: No results for input(s): POCGLU, POCNA, POCK, POCCL, POCBUN, POCHEMO, POCHCT in the last 72 hours. Coags:   Lab Results   Component Value Date    PROTIME 21.1 2021    INR 1.9 2021       HCG (If Applicable): No results found for: PREGTESTUR, PREGSERUM, HCG, HCGQUANT     ABGs:   Lab Results   Component Value Date    PO2ART 118.2 2021    ZAN5GOP 56.0 2021    FSP6NWP 22.9 2021        Type & Screen (If Applicable):  No results found for: LABABO, [address]    Drug/Infectious Status (If Applicable):  No results found for: HIV, HEPCAB    COVID-19 Screening (If Applicable):   Lab Results   Component Value Date    COVID19 Not Detected 2021    COVID19 Not Detected 2021     EK2021  Ventricular Rate     Atrial Rate     QRS Duration ms 64    Q-T Interval ms 234    QTc Calculation (Bazett) ms 357    R Axis degrees 42    T Axis degrees 28    Resulting Agency  [facility]      Narrative & Impression    Supraventricular tachycardia  Low voltage QRS  Significant artifacts  Abnormal ECG  When compared with ECG of 13-MAY-2021 06:15,  Significant changes have occurred  Confirmed by Leo Landeros ([id number]) on 2021 3:36:12 PM       CXR: 2021      FINDINGS:   The lungs are without acute focal process.  There is no effusion or   pneumothorax.  The cardiomediastinal silhouette is without acute process. The   osseous structures are without acute process.  Percutaneous drainage tube   noted in the left upper quadrant.           Impression   No acute process. CT 6/30/2021  EXAM: CT ABSCESS DRAINAGE W CATH PLACEMENT S&I, CT ABSCESS   DRAINAGE W CATH PLACEMENT S&I   INDICATION: B99.9 Intra-abdominal infection    placement of IR drain into left sided fluid collection   What reading provider will be dictating this exam?->[facility]       After obtaining informed consent, following the routine sterile prep   and drape and after the administration of local anesthesia a needle   was inserted into the cavity   . Purulent fluid was aspirated.       Subsequently a guidewire was passed through the needle. Subsequently   the needle was removed and over the guidewire the tract was dilated. Following dilatation a locking loop catheter was placed. Post   procedure CT scan reveals the tube to be in good position. Specimen   was sent to the laboratory. After placement of the initial catheter and aspiration drainage and   flushing it was noted that there was a second fluid collection that   did not drain with or communicate with the first fluid collection   Subsequently following the routine sterile prep and drape and after   the administration of local anesthesia a needle was inserted into the   second cavity   . Purulent fluid was aspirated.       Subsequently a guidewire was passed through the needle. Subsequently   the needle was removed and over the guidewire the tract was dilated. Following dilatation a locking loop catheter was placed. Post   procedure CT scan reveals the tube to be in good position. Specimen   was sent to the laboratory. The patient tolerated the procedure well. There were no complications.       Prior to the procedure a timeout occurred at  1412 hours. The patient   received 41 seconds  of  fluoroscopy.  The patient received [facility]   anesthesia and local Vascular: negative vascular ROS. Other Findings:               Anesthesia Plan      MAC     ASA 4       Induction: intravenous. Anesthetic plan and risks discussed with patient. Plan discussed with CRNA.                   Sherwin Lr MD   7/29/2021

## 2021-07-29 NOTE — PROGRESS NOTES
Comprehensive Nutrition Assessment    Type and Reason for Visit:  Initial, Positive Nutrition Screen    Nutrition Recommendations/Plan: Advance nutrition as medically appropriate    Nutrition Assessment:  Pt adm w/ intra-abd abscess s/p recent d/c to LTAC s/p ERCP w/ pancreatic stent exchange, multiple GI surgeries 2/2 IPMN. Pt now s/p IR drainage, noted WV to abd. Hx DM, pancreatitis. Will monitor for nutrition progression    Malnutrition Assessment:  Malnutrition Status:  Severe malnutrition    Context:  Chronic Illness     Findings of the 6 clinical characteristics of malnutrition:  Energy Intake:  7 - 75% or less estimated energy requirements for 1 month or longer  Weight Loss:  7 - Greater than 7.5% over 3 months     Body Fat Loss:  1 - Mild body fat loss Orbital, Triceps   Muscle Mass Loss:  1 - Mild muscle mass loss Temples (temporalis), Clavicles (pectoralis & deltoids)  Fluid Accumulation:  No significant fluid accumulation     Strength:  Not Performed    Estimated Daily Nutrient Needs:  Energy (kcal):   (MSJ 1629 x 1.3 SF); Weight Used for Energy Requirements:  Current     Protein (g):   (1.3-1.5); Weight Used for Protein Requirements:  Ideal        Fluid (ml/day):  ; Method Used for Fluid Requirements:  1 ml/kcal      Nutrition Related Findings:  pt alert, abd tenderness, active BS, open drain LUQ, WV to abd, trace edema, fluids WNL      Wounds:  Multiple, Open Wounds, Surgical Incision       Current Nutrition Therapies:    Diet NPO    Anthropometric Measures:  · Height: 5' 8\" (172.7 cm)  · Current Body Weight: 185 lb (83.9 kg) (no method, UTO updated CBW; noted recent standing wt 186# 7/9/21)   · Usual Body Weight: 231 lb (104.8 kg) (actual per EMR 04/2021)     · Ideal Body Weight: 154 lbs; % Ideal Body Weight 120.1 %   · BMI: 28.1  · BMI Categories: Overweight (BMI 25.0-29. 9)       Nutrition Diagnosis:   · Severe malnutrition, In context of chronic illness related to catabolic illness (IPMN) as evidenced by poor intake prior to admission, weight loss 7.5% in 3 months, mild loss of subcutaneous fat, mild muscle loss      Nutrition Interventions:   Food and/or Nutrient Delivery:  Continue NPO (advance nutrition as medically appropriate)  Nutrition Education/Counseling:  Education not indicated   Coordination of Nutrition Care:  Continue to monitor while inpatient    Goals:  Nutrition progression       Nutrition Monitoring and Evaluation:   Food/Nutrient Intake Outcomes:  Diet Advancement/Tolerance  Physical Signs/Symptoms Outcomes:  Biochemical Data, GI Status, Fluid Status or Edema, Nutrition Focused Physical Findings, Skin, Weight     Discharge Planning:     Too soon to determine     Electronically signed by Italia Moeller, MS, RD, LD on 7/29/21 at 3:54 PM EDT    Contact: [phone]

## 2021-07-30 ENCOUNTER — ANESTHESIA (OUTPATIENT)
Dept: ENDOSCOPY | Age: 61
DRG: 721 | End: 2021-07-30
Payer: MEDICAID

## 2021-07-30 ENCOUNTER — ANESTHESIA EVENT (OUTPATIENT)
Dept: ENDOSCOPY | Age: 61
DRG: 721 | End: 2021-07-30
Payer: MEDICAID

## 2021-07-30 VITALS — OXYGEN SATURATION: 100 % | SYSTOLIC BLOOD PRESSURE: 113 MMHG | TEMPERATURE: 94.3 F | DIASTOLIC BLOOD PRESSURE: 71 MMHG

## 2021-07-30 LAB
ALBUMIN SERPL-MCNC: 2.6 G/DL (ref 3.5–5.2)
ALP BLD-CCNC: 49 U/L (ref 40–129)
ALT SERPL-CCNC: 23 U/L (ref 0–40)
ANION GAP SERPL CALCULATED.3IONS-SCNC: 6 MMOL/L (ref 7–16)
ANISOCYTOSIS: ABNORMAL
AST SERPL-CCNC: 38 U/L (ref 0–39)
BASOPHILS ABSOLUTE: 0 E9/L (ref 0–0.2)
BASOPHILS RELATIVE PERCENT: 0 % (ref 0–2)
BILIRUB SERPL-MCNC: <0.2 MG/DL (ref 0–1.2)
BUN BLDV-MCNC: 11 MG/DL (ref 6–20)
CALCIUM SERPL-MCNC: 8.4 MG/DL (ref 8.6–10.2)
CHLORIDE BLD-SCNC: 100 MMOL/L (ref 98–107)
CO2: 33 MMOL/L (ref 22–29)
CREAT SERPL-MCNC: 0.6 MG/DL (ref 0.7–1.2)
EOSINOPHILS ABSOLUTE: 0 E9/L (ref 0.05–0.5)
EOSINOPHILS RELATIVE PERCENT: 0 % (ref 0–6)
GFR AFRICAN AMERICAN: >60
GFR NON-AFRICAN AMERICAN: >60 ML/MIN/1.73
GLUCOSE BLD-MCNC: 357 MG/DL (ref 74–99)
GRAM STAIN ORDERABLE: NORMAL
HCT VFR BLD CALC: 28.4 % (ref 37–54)
HEMOGLOBIN: 8.7 G/DL (ref 12.5–16.5)
HYPOCHROMIA: ABNORMAL
LYMPHOCYTES ABSOLUTE: 0.74 E9/L (ref 1.5–4)
LYMPHOCYTES RELATIVE PERCENT: 9.6 % (ref 20–42)
MCH RBC QN AUTO: 22.4 PG (ref 26–35)
MCHC RBC AUTO-ENTMCNC: 30.6 % (ref 32–34.5)
MCV RBC AUTO: 73 FL (ref 80–99.9)
METER GLUCOSE: 171 MG/DL (ref 74–99)
METER GLUCOSE: 172 MG/DL (ref 74–99)
METER GLUCOSE: 304 MG/DL (ref 74–99)
METER GLUCOSE: 353 MG/DL (ref 74–99)
MONOCYTES ABSOLUTE: 0.67 E9/L (ref 0.1–0.95)
MONOCYTES RELATIVE PERCENT: 8.7 % (ref 2–12)
NEUTROPHILS ABSOLUTE: 6.07 E9/L (ref 1.8–7.3)
NEUTROPHILS RELATIVE PERCENT: 81.7 % (ref 43–80)
OVALOCYTES: ABNORMAL
PDW BLD-RTO: 20.2 FL (ref 11.5–15)
PLATELET # BLD: 744 E9/L (ref 130–450)
PMV BLD AUTO: 10.8 FL (ref 7–12)
POIKILOCYTES: ABNORMAL
POLYCHROMASIA: ABNORMAL
POTASSIUM REFLEX MAGNESIUM: 3.8 MMOL/L (ref 3.5–5)
RBC # BLD: 3.89 E12/L (ref 3.8–5.8)
SODIUM BLD-SCNC: 139 MMOL/L (ref 132–146)
TARGET CELLS: ABNORMAL
TOTAL PROTEIN: 6 G/DL (ref 6.4–8.3)
WBC # BLD: 7.4 E9/L (ref 4.5–11.5)

## 2021-07-30 PROCEDURE — 82962 GLUCOSE BLOOD TEST: CPT

## 2021-07-30 PROCEDURE — 43247 EGD REMOVE FOREIGN BODY: CPT | Performed by: SURGERY

## 2021-07-30 PROCEDURE — 6360000002 HC RX W HCPCS: Performed by: INTERNAL MEDICINE

## 2021-07-30 PROCEDURE — 3700000000 HC ANESTHESIA ATTENDED CARE: Performed by: SURGERY

## 2021-07-30 PROCEDURE — 2580000003 HC RX 258: Performed by: STUDENT IN AN ORGANIZED HEALTH CARE EDUCATION/TRAINING PROGRAM

## 2021-07-30 PROCEDURE — 2709999900 HC NON-CHARGEABLE SUPPLY: Performed by: SURGERY

## 2021-07-30 PROCEDURE — 6370000000 HC RX 637 (ALT 250 FOR IP): Performed by: STUDENT IN AN ORGANIZED HEALTH CARE EDUCATION/TRAINING PROGRAM

## 2021-07-30 PROCEDURE — 85025 COMPLETE CBC W/AUTO DIFF WBC: CPT

## 2021-07-30 PROCEDURE — C1753 CATH, INTRAVAS ULTRASOUND: HCPCS | Performed by: SURGERY

## 2021-07-30 PROCEDURE — 3609012900 HC EGD FOREIGN BODY REMOVAL: Performed by: SURGERY

## 2021-07-30 PROCEDURE — 3700000001 HC ADD 15 MINUTES (ANESTHESIA): Performed by: SURGERY

## 2021-07-30 PROCEDURE — 43238 EGD US FINE NEEDLE BX/ASPIR: CPT | Performed by: SURGERY

## 2021-07-30 PROCEDURE — 1200000000 HC SEMI PRIVATE

## 2021-07-30 PROCEDURE — 2500000003 HC RX 250 WO HCPCS: Performed by: NURSE ANESTHETIST, CERTIFIED REGISTERED

## 2021-07-30 PROCEDURE — 2580000003 HC RX 258: Performed by: INTERNAL MEDICINE

## 2021-07-30 PROCEDURE — 6360000002 HC RX W HCPCS: Performed by: NURSE ANESTHETIST, CERTIFIED REGISTERED

## 2021-07-30 PROCEDURE — 6360000002 HC RX W HCPCS: Performed by: STUDENT IN AN ORGANIZED HEALTH CARE EDUCATION/TRAINING PROGRAM

## 2021-07-30 PROCEDURE — 7100000000 HC PACU RECOVERY - FIRST 15 MIN: Performed by: SURGERY

## 2021-07-30 PROCEDURE — 80053 COMPREHEN METABOLIC PANEL: CPT

## 2021-07-30 PROCEDURE — 3609018500 HC EGD US SCOPE W/ADJACENT STRUCTURES: Performed by: SURGERY

## 2021-07-30 PROCEDURE — 36415 COLL VENOUS BLD VENIPUNCTURE: CPT

## 2021-07-30 PROCEDURE — 7100000001 HC PACU RECOVERY - ADDTL 15 MIN: Performed by: SURGERY

## 2021-07-30 RX ORDER — MORPHINE SULFATE 2 MG/ML
2 INJECTION, SOLUTION INTRAMUSCULAR; INTRAVENOUS EVERY 5 MIN PRN
Status: DISCONTINUED | OUTPATIENT
Start: 2021-07-30 | End: 2021-07-30 | Stop reason: HOSPADM

## 2021-07-30 RX ORDER — HYDROCODONE BITARTRATE AND ACETAMINOPHEN 5; 325 MG/1; MG/1
2 TABLET ORAL PRN
Status: DISCONTINUED | OUTPATIENT
Start: 2021-07-30 | End: 2021-07-30 | Stop reason: HOSPADM

## 2021-07-30 RX ORDER — FENTANYL CITRATE 50 UG/ML
INJECTION, SOLUTION INTRAMUSCULAR; INTRAVENOUS PRN
Status: DISCONTINUED | OUTPATIENT
Start: 2021-07-30 | End: 2021-07-30 | Stop reason: SDUPTHER

## 2021-07-30 RX ORDER — HYDROCODONE BITARTRATE AND ACETAMINOPHEN 5; 325 MG/1; MG/1
1 TABLET ORAL PRN
Status: DISCONTINUED | OUTPATIENT
Start: 2021-07-30 | End: 2021-07-30 | Stop reason: HOSPADM

## 2021-07-30 RX ORDER — MEPERIDINE HYDROCHLORIDE 25 MG/ML
12.5 INJECTION INTRAMUSCULAR; INTRAVENOUS; SUBCUTANEOUS EVERY 5 MIN PRN
Status: DISCONTINUED | OUTPATIENT
Start: 2021-07-30 | End: 2021-07-30 | Stop reason: HOSPADM

## 2021-07-30 RX ORDER — PROMETHAZINE HYDROCHLORIDE 25 MG/ML
6.25 INJECTION, SOLUTION INTRAMUSCULAR; INTRAVENOUS EVERY 10 MIN PRN
Status: DISCONTINUED | OUTPATIENT
Start: 2021-07-30 | End: 2021-07-30 | Stop reason: HOSPADM

## 2021-07-30 RX ORDER — ROCURONIUM BROMIDE 10 MG/ML
INJECTION, SOLUTION INTRAVENOUS PRN
Status: DISCONTINUED | OUTPATIENT
Start: 2021-07-30 | End: 2021-07-30 | Stop reason: SDUPTHER

## 2021-07-30 RX ORDER — MORPHINE SULFATE 2 MG/ML
1 INJECTION, SOLUTION INTRAMUSCULAR; INTRAVENOUS EVERY 5 MIN PRN
Status: DISCONTINUED | OUTPATIENT
Start: 2021-07-30 | End: 2021-07-30 | Stop reason: HOSPADM

## 2021-07-30 RX ORDER — DEXAMETHASONE SODIUM PHOSPHATE 10 MG/ML
INJECTION, SOLUTION INTRAMUSCULAR; INTRAVENOUS PRN
Status: DISCONTINUED | OUTPATIENT
Start: 2021-07-30 | End: 2021-07-30 | Stop reason: SDUPTHER

## 2021-07-30 RX ORDER — ONDANSETRON 2 MG/ML
INJECTION INTRAMUSCULAR; INTRAVENOUS PRN
Status: DISCONTINUED | OUTPATIENT
Start: 2021-07-30 | End: 2021-07-30 | Stop reason: SDUPTHER

## 2021-07-30 RX ORDER — PROPOFOL 10 MG/ML
INJECTION, EMULSION INTRAVENOUS PRN
Status: DISCONTINUED | OUTPATIENT
Start: 2021-07-30 | End: 2021-07-30 | Stop reason: SDUPTHER

## 2021-07-30 RX ORDER — PHENYLEPHRINE HCL IN 0.9% NACL 1 MG/10 ML
SYRINGE (ML) INTRAVENOUS PRN
Status: DISCONTINUED | OUTPATIENT
Start: 2021-07-30 | End: 2021-07-30 | Stop reason: SDUPTHER

## 2021-07-30 RX ADMIN — Medication 200 MCG: at 15:58

## 2021-07-30 RX ADMIN — INSULIN LISPRO 8 UNITS: 100 INJECTION, SOLUTION INTRAVENOUS; SUBCUTANEOUS at 11:58

## 2021-07-30 RX ADMIN — FENTANYL CITRATE 100 MCG: 50 INJECTION, SOLUTION INTRAMUSCULAR; INTRAVENOUS at 15:44

## 2021-07-30 RX ADMIN — INSULIN LISPRO 10 UNITS: 100 INJECTION, SOLUTION INTRAVENOUS; SUBCUTANEOUS at 09:56

## 2021-07-30 RX ADMIN — OXYCODONE AND ACETAMINOPHEN 1 TABLET: 5; 325 TABLET ORAL at 19:06

## 2021-07-30 RX ADMIN — ONDANSETRON HYDROCHLORIDE 4 MG: 2 INJECTION, SOLUTION INTRAMUSCULAR; INTRAVENOUS at 15:50

## 2021-07-30 RX ADMIN — HYDROMORPHONE HYDROCHLORIDE 0.5 MG: 1 INJECTION, SOLUTION INTRAMUSCULAR; INTRAVENOUS; SUBCUTANEOUS at 06:06

## 2021-07-30 RX ADMIN — LINEZOLID 600 MG: 600 INJECTION, SOLUTION INTRAVENOUS at 14:07

## 2021-07-30 RX ADMIN — HEPARIN SODIUM 5000 UNITS: 10000 INJECTION INTRAVENOUS; SUBCUTANEOUS at 14:08

## 2021-07-30 RX ADMIN — ROCURONIUM BROMIDE 50 MG: 10 INJECTION, SOLUTION INTRAVENOUS at 15:44

## 2021-07-30 RX ADMIN — LINEZOLID 600 MG: 600 INJECTION, SOLUTION INTRAVENOUS at 02:09

## 2021-07-30 RX ADMIN — HYDROMORPHONE HYDROCHLORIDE 0.5 MG: 1 INJECTION, SOLUTION INTRAMUSCULAR; INTRAVENOUS; SUBCUTANEOUS at 20:56

## 2021-07-30 RX ADMIN — PROPOFOL 200 MG: 10 INJECTION, EMULSION INTRAVENOUS at 15:44

## 2021-07-30 RX ADMIN — OXYCODONE AND ACETAMINOPHEN 1 TABLET: 5; 325 TABLET ORAL at 08:36

## 2021-07-30 RX ADMIN — SODIUM CHLORIDE, POTASSIUM CHLORIDE, SODIUM LACTATE AND CALCIUM CHLORIDE: 600; 310; 30; 20 INJECTION, SOLUTION INTRAVENOUS at 20:25

## 2021-07-30 RX ADMIN — HYDROMORPHONE HYDROCHLORIDE 0.5 MG: 1 INJECTION, SOLUTION INTRAMUSCULAR; INTRAVENOUS; SUBCUTANEOUS at 00:21

## 2021-07-30 RX ADMIN — INSULIN LISPRO 1 UNITS: 100 INJECTION, SOLUTION INTRAVENOUS; SUBCUTANEOUS at 20:41

## 2021-07-30 RX ADMIN — HEPARIN SODIUM 5000 UNITS: 10000 INJECTION INTRAVENOUS; SUBCUTANEOUS at 20:42

## 2021-07-30 RX ADMIN — DEXAMETHASONE SODIUM PHOSPHATE 10 MG: 10 INJECTION INTRAMUSCULAR; INTRAVENOUS at 15:50

## 2021-07-30 RX ADMIN — OXYCODONE AND ACETAMINOPHEN 1 TABLET: 5; 325 TABLET ORAL at 14:12

## 2021-07-30 RX ADMIN — INSULIN LISPRO 2 UNITS: 100 INJECTION, SOLUTION INTRAVENOUS; SUBCUTANEOUS at 17:57

## 2021-07-30 RX ADMIN — ERTAPENEM SODIUM 1000 MG: 1 INJECTION, POWDER, LYOPHILIZED, FOR SOLUTION INTRAMUSCULAR; INTRAVENOUS at 17:40

## 2021-07-30 RX ADMIN — DEXTROSE MONOHYDRATE 100 MG: 50 INJECTION, SOLUTION INTRAVENOUS at 18:46

## 2021-07-30 RX ADMIN — SUGAMMADEX 336 MG: 100 INJECTION, SOLUTION INTRAVENOUS at 16:10

## 2021-07-30 RX ADMIN — HEPARIN SODIUM 5000 UNITS: 10000 INJECTION INTRAVENOUS; SUBCUTANEOUS at 06:07

## 2021-07-30 RX ADMIN — SODIUM CHLORIDE, PRESERVATIVE FREE 10 ML: 5 INJECTION INTRAVENOUS at 18:50

## 2021-07-30 ASSESSMENT — PULMONARY FUNCTION TESTS
PIF_VALUE: 19
PIF_VALUE: 21
PIF_VALUE: 20
PIF_VALUE: 19
PIF_VALUE: 18
PIF_VALUE: 0
PIF_VALUE: 20
PIF_VALUE: 20
PIF_VALUE: 0
PIF_VALUE: 19
PIF_VALUE: 20
PIF_VALUE: 20
PIF_VALUE: 21
PIF_VALUE: 19
PIF_VALUE: 24
PIF_VALUE: 19
PIF_VALUE: 21
PIF_VALUE: 19
PIF_VALUE: 13
PIF_VALUE: 21
PIF_VALUE: 19
PIF_VALUE: 21
PIF_VALUE: 31
PIF_VALUE: 24
PIF_VALUE: 1
PIF_VALUE: 22
PIF_VALUE: 0
PIF_VALUE: 21
PIF_VALUE: 31
PIF_VALUE: 19
PIF_VALUE: 11
PIF_VALUE: 1
PIF_VALUE: 1
PIF_VALUE: 3
PIF_VALUE: 5

## 2021-07-30 ASSESSMENT — PAIN DESCRIPTION - FREQUENCY: FREQUENCY: CONTINUOUS

## 2021-07-30 ASSESSMENT — PAIN DESCRIPTION - ORIENTATION: ORIENTATION: RIGHT;LEFT

## 2021-07-30 ASSESSMENT — PAIN SCALES - GENERAL
PAINLEVEL_OUTOF10: 8
PAINLEVEL_OUTOF10: 0
PAINLEVEL_OUTOF10: 9
PAINLEVEL_OUTOF10: 8
PAINLEVEL_OUTOF10: 0
PAINLEVEL_OUTOF10: 0
PAINLEVEL_OUTOF10: 5
PAINLEVEL_OUTOF10: 7
PAINLEVEL_OUTOF10: 7
PAINLEVEL_OUTOF10: 8
PAINLEVEL_OUTOF10: 0

## 2021-07-30 ASSESSMENT — LIFESTYLE VARIABLES: SMOKING_STATUS: 0

## 2021-07-30 ASSESSMENT — PAIN DESCRIPTION - DESCRIPTORS: DESCRIPTORS: ACHING

## 2021-07-30 ASSESSMENT — PAIN DESCRIPTION - LOCATION: LOCATION: ABDOMEN

## 2021-07-30 ASSESSMENT — PAIN DESCRIPTION - ONSET: ONSET: ON-GOING

## 2021-07-30 ASSESSMENT — PAIN DESCRIPTION - PAIN TYPE: TYPE: ACUTE PAIN

## 2021-07-30 NOTE — CARE COORDINATION
Social Work Discharge Planning:  Per paolo with [facility],patient is from [facility], he will need a precert before returning.  SW following  Electronically signed by MUNA Salcido on 7/30/2021 at 12:34 PM

## 2021-07-30 NOTE — ANESTHESIA PRE PROCEDURE
Department of Anesthesiology  Preprocedure Note       Name:  Asa Kim   Age:  62 y.o.  :  1960                                          MRN:  [id number]         Date:  2021      Surgeon:      Procedure:  EGD      Medications prior to admission:   Prior to Admission medications    Medication Sig Start Date End Date Taking? Authorizing Provider   polyethylene glycol (GLYCOLAX) 17 g packet Take 17 g by mouth 2 times daily 7/15/21 8/14/21 Yes Francie Lewis MD   sennosides-docusate sodium (SENOKOT-S) 8.6-50 MG tablet Take 2 tablets by mouth daily 21  Yes Francie Lewis MD   ertapenem [facility]) infusion Infuse 1,000 mg intravenously every 24 hours for 21 days Compound per protocol. 21 Yes Josiane Grimm MD   fluconazole (DIFLUCAN) 200 MG tablet Take 2 tablets by mouth daily for 21 days 21 Yes Josiane Grimm MD   oxyCODONE-acetaminophen (PERCOCET) 7.5-325 MG per tablet Take 1 tablet by mouth every 4 hours as needed for Pain. Yes Historical Provider, MD   BANOPHEN 25 MG tablet take 2 tablets by mouth 1 HOUR PRIOR TO CONTRAST MEDIA ADMINISTRATION 21  Yes Roger Handley III, MD   acetaminophen (TYLENOL) 500 MG tablet Take 2 tablets by mouth every 6 hours 21  Yes Dimitris Stiles MD   ibuprofen (ADVIL;MOTRIN) 600 MG tablet Take 1 tablet by mouth 3 times daily (with meals) 21  Yes Dimitris Stiles MD   apixaban (ELIQUIS) 5 MG TABS tablet Take 1 tablet by mouth 2 times daily . The first dose of this Eliquis should be taken approximately 12 hours after you received your last dose of Lovenox (the blood thinner injection into your skin) given in the hospital. Continue to take the Eliquis approximately every 12 hours.  21  Yes Dimitris Stiles MD   losartan (COZAAR) 100 MG tablet take 1 tablet by mouth once daily 21  Yes Jorgito Loaiza MD   hydroCHLOROthiazide (HYDRODIURIL) 12.5 MG tablet take 1 tablet by mouth once daily 21  Yes Flower Crow MD   albuterol (PROVENTIL) (2.5 MG/3ML) 0.083% nebulizer solution Take 3 mLs by nebulization 4 times daily 5/19/21  Yes Flower Crow MD   budesonide (PULMICORT) 0.5 MG/2ML nebulizer suspension Take 4 mLs by nebulization 2 times daily 5/19/21  Yes Flower Crow MD   lipase-protease-amylase (CREON) 10813 units CPEP delayed release capsule Take 2 capsules by mouth 3 times daily (with meals) 5/19/21 8/17/21 Yes Flower Crow MD   pantoprazole (PROTONIX) 40 MG tablet Take 1 tablet by mouth 2 times daily (before meals) 5/19/21  Yes Flower Crow MD   atorvastatin (LIPITOR) 20 MG tablet Take 1 tablet by mouth daily 5/20/21  Yes Flower Crow MD   gabapentin (NEURONTIN) 300 MG capsule Take 1 capsule by mouth 2 times daily.  4/19/21  Yes Historical Provider, MD   naloxone 4 MG/0.1ML LIQD nasal spray 1 spray by Nasal route as needed for Opioid Reversal 7/15/21   Jeffry Nicole MD   benzonatate (TESSALON) 200 MG capsule Take 200 mg by mouth 3 times daily as needed for Cough    Historical Provider, MD   insulin lispro, 1 Unit Dial, (HUMALOG KWIKPEN) 100 UNIT/ML SOPN Inject 0-12 Units into the skin 3 times daily (before meals) Glucose: Dose:  If <139             No Insulin  140-199 2 Units  200-249 4 Units  250-299 6 Units  300-349 8 Units  350-400 10 Units  Above 400       12 Units 5/20/21 6/30/21  Flower Crow MD   guaiFENesin (ROBITUSSIN) 100 MG/5ML SOLN oral solution Take 15 mLs by mouth 3 times daily 5/19/21   Flower Crow MD   insulin glargine (LANTUS SOLOSTAR) 100 UNIT/ML injection pen Inject 20 Units into the skin 2 times daily 5/19/21 6/30/21  Flower Crow MD   insulin lispro, 1 Unit Dial, (HUMALOG [facility]) 100 UNIT/ML SOPN Inject 7 Units into the skin 3 times daily (before meals) 5/19/21 6/30/21  Flower Crow MD       Current medications:    Current Facility-Administered Medications   Medication Dose Route Frequency Provider Last Rate Last Admin    insulin lispro (HUMALOG) injection vial 0-12 Units  0-12 Units Subcutaneous TID WC Rena Farooq MD   6 Units at 07/29/21 1720    insulin lispro (HUMALOG) injection vial 0-6 Units  0-6 Units Subcutaneous Nightly Rena Farooq MD   2 Units at 07/29/21 2103    glucose (GLUTOSE) 40 % oral gel 15 g  15 g Oral PRN Rena Farooq MD        dextrose 50 % IV solution  12.5 g Intravenous PRN Rena Farooq MD        glucagon (rDNA) injection 1 mg  1 mg Intramuscular PRN Rena Farooq MD        dextrose 5 % solution  100 mL/hr Intravenous PRN Rena Farooq MD        0.9 % sodium chloride infusion   Intravenous PRN Dash Martinez III, MD        docusate sodium (COLACE) capsule 100 mg  100 mg Oral BID Isabeladarvin Lane, DO   100 mg at 07/29/21 2102    polyethylene glycol (GLYCOLAX) packet 17 g  17 g Oral Daily Isabeladarvin Lane, DO        HYDROmorphone (DILAUDID) injection 0.5 mg  0.5 mg Intravenous Q3H PRN Rena Farooq MD   0.5 mg at 07/30/21 0606    sodium chloride flush 0.9 % injection 5-40 mL  5-40 mL Intravenous 2 times per day Isabeladarvin Kemper, DO   10 mL at 07/29/21 0747    sodium chloride flush 0.9 % injection 5-40 mL  5-40 mL Intravenous PRN Isabela Viridianaer, DO        0.9 % sodium chloride infusion  25 mL Intravenous PRN Isabela Viridianaer, DO        ondansetron (ZOFRAN-ODT) disintegrating tablet 4 mg  4 mg Oral Q8H PRN Isabela Viridianaer, DO        Or    ondansetron [facility]) injection 4 mg  4 mg Intravenous Q6H PRN Isabela Hammer, DO        lactated ringers infusion   Intravenous Continuous Isabela Domingo,  mL/hr at 07/29/21 2101 New Bag at 07/29/21 2101    heparin (porcine) injection 5,000 Units  5,000 Units Subcutaneous 3 times per day Isabela Viridianaer, DO   5,000 Units at 07/30/21 9217    ertapenem (INVanz) 1000 mg IVPB minibag  1,000 mg Intravenous Q24H Angie Guallpa MD   Stopped at 07/29/21 1410    anidulafungin (ERAXIS) 100 mg in dextrose 5 % 130 mL IVPB  100 mg Intravenous Q24H Mario Gomes MD   Stopped at 07/29/21 1546    linezolid (ZYVOX) IVPB 600 mg  600 mg Intravenous Q12H Mario Gomes MD   Stopped at 07/30/21 0318    oxyCODONE-acetaminophen (PERCOCET) 5-325 MG per tablet 1 tablet  1 tablet Oral Q4H PRN Jarrell Anglin MD   1 tablet at 07/30/21 1867    And    oxyCODONE (ROXICODONE) immediate release tablet 2.5 mg  2.5 mg Oral Q4H PRN Jarrell Anglin MD        iopamidol (ISOVUE-370) 76 % injection 90 mL  90 mL Intravenous ONCE PRN Monster Carlton DO           Allergies:     Allergies   Allergen Reactions    Iodine Swelling     Sea food    Metformin And Related Hives    Wheat Extract Swelling       Problem List:    Patient Active Problem List   Diagnosis Code    Mixed hyperlipidemia E78.2    Neck pain M54.2    Cervical radiculopathy at C7 M54.12    Essential hypertension I10    Chronic seasonal allergic rhinitis J30.2    Ventral hernia without obstruction or gangrene K43.9    Poorly controlled type 2 diabetes mellitus (HCC) E11.65    Chronic bilateral low back pain with right-sided sciatica M54.41, G89.29    Gynecomastia N62    Panic disorder F41.0    Cubital tunnel syndrome on right G56.21    Right carpal tunnel syndrome G56.01    Spondylosis of lumbar spine M47.816    Bell's palsy G51.0    Other bursal cyst, left elbow M71.322    Sacral radiculitis M54.18    Lumbar radiculitis M54.16    Spondylosis of cervical region without myelopathy or radiculopathy M47.812    Cervical facet joint syndrome M47.812    Cervical disc disorder M50.90    Lumbar facet arthropathy M47.816    Spinal stenosis of lumbar region with neurogenic claudication M48.062    Lumbar disc disorder M51.9    Pancreatic neoplasm D49.0    IPMN (intraductal papillary mucinous neoplasm) D49.0    Pancreatic duct leak K86.89    Intra-abdominal infection B99.9    Abdominal pain with fever after surgery R10.9, G89.18, R50.82    Sepsis ([address] A41.9    Acute respiratory failure with hypoxia (HCC) J96.01    Intra-abdominal abscess ([address] K65.1    Severe protein-calorie malnutrition ([address] E43       Past Medical History:        Diagnosis Date    Arthritis     Back pain     5th finger & ring finger on right hand is numb    Bell's palsy     NEW ONSET 3-     Hyperlipidemia     Hypertension     Neuropathy     toes numb    Poorly controlled type 2 diabetes mellitus with neuropathy ([address] 5/8/2018    Sacral radiculitis 2/27/2019    Spondylosis of lumbar spine 3/9/2019    Advanced arthritis and degenerative disc disease by MRI 3/2019    Ventral hernia        Past Surgical History:        Procedure Laterality Date    ANESTHESIA NERVE BLOCK Bilateral 07/11/2019    BILATERAL L4-5 TRANSFORAMINAL EPIDURAL STEROID INJECTION [facility] Hamm [facility] [address] Bilateral 08/01/2019    BILATERAL MEDIAL BRANCH BLOCK L4-5 AND L5-S1 [facility] Bilateral 08/15/2019    BILATERAL MEDIAL BRANCH BLOCK L4 - 5 AND L5 - S1 performed by Ashley Hamm DO at [facility] Right 03/29/2019    right wrist carpal tunnel release and right elbow cubital tunnel release    CARPAL TUNNEL RELEASE Right 03/29/2019    RIGHT WRIST CARPAL TUNNEL RELEASE AND RIGHT ELBOW CUBITAL TUNNEL RELEASE performed by Candy Pabon DO at [place] [address]      at age 40 polyps    CYST REMOVAL Right     EPIDURAL STEROID INJECTION N/A 10/17/2019    LUMBAR EPIDURAL STEROID INJECTION UNDER FLUOROSCOPIC GUIDANCE AT L2-L3 WITHOUT SEDATION performed by Hector Marie MD at [address] [facility] ERCP N/A 05/17/2021    ERCP ENDOSCOPIC RETROGRADE CHOLANGIOPANCREATOGRAPHY SPHINCTER/PAPILLOTOMY performed by Marie Tomlinson MD at [address] ERCP N/A 05/17/2021    ERCP STENT INSERTION performed by Angie Petty MD at [address] [facility] ERCP N/A 6/4/2021    ERCP STENT INSERTION performed by Angie Petty MD at [address] [facility] ERCP N/A 7/2/2021    ERCP STENT REMOVAL performed by Angie Petty MD at [address] [facility] ERCP N/A 7/2/2021    ERCP STONE REMOVAL performed by Angie Petty MD at [address] [facility] ERCP N/A 7/2/2021    ERCP STENT INSERTION performed by Angie Petty MD at [facility] Bilateral     groin and umbilical    HERNIA REPAIR N/A 12/13/2018    ROBOTIC ASSISTED LAPAROSCOPIC PRIMARY REPAIR OF RECURRENT VENTRAL HERNIA  REPAIR performed by Myla Cueto MD at [address] [place] [address] Bilateral 07/11/2019    L4-5 transforaminal     NERVE BLOCK Bilateral 08/01/2019    medial branch block    NERVE BLOCK Bilateral 08/15/2019    bilateral medial branch block L4-S1    NERVE BLOCK  10/17/2019    lumbar epidural    PANCREATECTOMY N/A 05/12/2021    LAPAROSCOPIC ROBOTIC DISTAL PANCREATECTOMY AND SPLENECTOMY AND CHOLECYSTECTOMY, INTRA-OPERATIVE ULTRASOUND, TRANSITIONED TO OPEN -- EPIDURAL performed by Brynn Mcnamara MD at [place] [address]  06/05/2018    C5-7 fusion at [facility] in [address] [place] [address] Pkwy 01/08/2021    EGD W/EUS FNA performed by Shahnaz Willett DO at [address] [place] [address] N/A 01/08/2021    EGD DIAGNOSTIC ONLY performed by Shahnaz Willett DO at [facility] History:    Social History     Tobacco Use    Smoking status: Never Smoker    Smokeless tobacco: Never Used   Substance Use Topics    Alcohol use: Not Currently     Comment: occasional                                Counseling given: Not Answered      Vital Signs (Current):   Vitals:    07/29/21 1315 07/29/21 1544 07/29/21 2000 07/30/21 0751   BP: 113/68  121/70 126/88   Pulse: 76  71 76   Resp: 20  18 18   Temp: 36.4 °C (97.5 °F)  36.7 °C (98.1 °F) 37.1 °C (98.8 °F)   TempSrc: Oral  Axillary Axillary   SpO2: 98%  95% 96%   Weight:       Height:  5' 8\" (1.727 m)                                                BP Readings from Last 3 Encounters:   21 126/88   21 111/72   07/15/21 112/65       NPO Status:  > 8 hours                                                                               BMI:   Wt Readings from Last 3 Encounters:   21 185 lb (83.9 kg)   21 186 lb 3.2 oz (84.5 kg)   21 199 lb (90.3 kg)     Body mass index is 28.13 kg/m². CBC:   Lab Results   Component Value Date    WBC 7.4 2021    RBC 3.89 2021    HGB 8.7 2021    HCT 28.4 2021    MCV 73.0 2021    RDW 20.2 2021     2021       CMP:   Lab Results   Component Value Date     2021    K 3.8 2021     2021    CO2 33 2021    BUN 11 2021    CREATININE 0.6 2021    GFRAA >60 2021    LABGLOM >60 2021    GLUCOSE 357 2021    PROT 6.0 2021    CALCIUM 8.4 2021    BILITOT <0.2 2021    ALKPHOS 49 2021    AST 38 2021    ALT 23 2021       POC Tests: No results for input(s): POCGLU, POCNA, POCK, POCCL, POCBUN, POCHEMO, POCHCT in the last 72 hours.     Coags:   Lab Results   Component Value Date    PROTIME 21.1 2021    INR 1.9 2021       HCG (If Applicable): No results found for: PREGTESTUR, PREGSERUM, HCG, HCGQUANT     ABGs:   Lab Results   Component Value Date    PO2ART 118.2 2021    QHF0NNR 56.0 2021    MHX9USI 22.9 2021        Type & Screen (If Applicable):  No results found for: LABABO, LABRH    Drug/Infectious Status (If Applicable):  No results found for: HIV, HEPCAB    COVID-19 Screening (If Applicable):   Lab Results   Component Value Date    COVID19 Not Detected 2021    COVID19 Not Detected 2021     EK2021  Ventricular Rate     Atrial Rate     QRS Duration ms 64    Q-T Interval ms 234    QTc Calculation (Bazett) ms 357    R Axis degrees 42    T Axis degrees 28    Resulting Agency  [facility]      Narrative & Impression    Supraventricular tachycardia  Low voltage QRS  Significant artifacts  Abnormal ECG  When compared with ECG of 13-MAY-2021 06:15,  Significant changes have occurred  Confirmed by Calvin Hyman ([id number]) on 7/1/2021 3:36:12 PM       CXR: 6/30/2021      FINDINGS:   The lungs are without acute focal process.  There is no effusion or   pneumothorax. The cardiomediastinal silhouette is without acute process. The   osseous structures are without acute process.  Percutaneous drainage tube   noted in the left upper quadrant.           Impression   No acute process. CT 6/30/2021  EXAM: CT ABSCESS DRAINAGE W CATH PLACEMENT S&I, CT ABSCESS   DRAINAGE W CATH PLACEMENT S&I   INDICATION: B99.9 Intra-abdominal infection    placement of IR drain into left sided fluid collection   What reading provider will be dictating this exam?->[facility]       After obtaining informed consent, following the routine sterile prep   and drape and after the administration of local anesthesia a needle   was inserted into the cavity   . Purulent fluid was aspirated.       Subsequently a guidewire was passed through the needle. Subsequently   the needle was removed and over the guidewire the tract was dilated. Following dilatation a locking loop catheter was placed. Post   procedure CT scan reveals the tube to be in good position. Specimen   was sent to the laboratory. After placement of the initial catheter and aspiration drainage and   flushing it was noted that there was a second fluid collection that   did not drain with or communicate with the first fluid collection   Subsequently following the routine sterile prep and drape and after   the administration of local anesthesia a needle was inserted into the   second cavity   . Purulent fluid was aspirated.       Subsequently a guidewire was passed through the needle. Subsequently   the needle was removed and over the guidewire the tract was dilated. Following dilatation a locking loop catheter was placed. Post   procedure CT scan reveals the tube to be in good position. Specimen   was sent to the laboratory. The patient tolerated the procedure well. There were no complications.       Prior to the procedure a timeout occurred at  1412 hours. The patient   received 41 seconds  of  fluoroscopy. [facility]   [facility] and local anesthesia. The patient was monitored for 60   minutes by a registered nurse.           Impression   Successful uncomplicated  abscess drainage. 2 separate abscess   cavities were drained 1 in the midline and one in the left upper   quadrant       Recommendations:   1. Follow-up tube check in 2 weeks   2. Flush tube daily with 5 to 10 mL of sterile saline               Anesthesia Evaluation  Patient summary reviewed and Nursing notes reviewed no history of anesthetic complications:   Airway: Mallampati: III  TM distance: <3 FB   Neck ROM: limited  Mouth opening: > = 3 FB Dental:          Pulmonary: breath sounds clear to auscultation  (+) sleep apnea: on noncompliant,      (-) not a current smoker                           Cardiovascular:    (+) hypertension:, hyperlipidemia    (-) past MI, CAD and CABG/stent    ECG reviewed  Rhythm: regular  Rate: abnormal           Beta Blocker:  Not on Beta Blocker        PE comment: Pt has been tachycardic   Neuro/Psych:   (+) neuromuscular disease (lumbar spine spondylosis; sacral radiculitis; cervical radiculopathy (C5-7 fusion in 2018); s/p T10-L2 fusion (Jan 2020)):, psychiatric history: stable without treatmentdepression/anxiety              ROS comment: Bell's palsy - March 2019 (left face);   No issues currently    Lower extremity neuropathy    Right hand - ring finger and 5th finger are numb      Cervical radiculopathy at C7 GI/Hepatic/Renal:   (+) GERD: poorly controlled,          ROS comment: postoperative development of intra-abdominal abscess status post distal pancreatectomy, splenectomy, cholecystectomy on 5/12. ERCP with pancreatic stent placement on 6/4, s/p IR drain placement x2 6/30    Pain upper left quadrant . Endo/Other:    (+) DiabetesType II DM, poorly controlled, using insulin, blood dyscrasia (on Eliquis): anemia and anticoagulation therapy, arthritis: OA., . Pt had no PAT visit        ROS comment: OA, DJD, DDD, cervical radiculopathy, chronic low back pain with right sciatica Abdominal:             Vascular: negative vascular ROS. Other Findings:             Anesthesia Plan      MAC     ASA 4       Induction: intravenous. MIPS: Postoperative opioids intended. Anesthetic plan and risks discussed with patient. Use of blood products discussed with patient whom consented to blood products. Plan discussed with CRNA. Kala Pride RN   7/30/2021        Pt seen, examined, chart reviewed, plan discussed.   Sebas Vazquez MD  7/30/2021  2:05 PM

## 2021-07-30 NOTE — ANESTHESIA POSTPROCEDURE EVALUATION
Department of Anesthesiology  Postprocedure Note    Patient: Erinn Pearson  MRN: [id number]  YOB: 1960  Date of evaluation: 7/30/2021  Time:  5:01 PM     Procedure Summary     Date: 07/30/21 [facility]    Anesthesia Start: 1540 Anesthesia Stop:     Procedures:       EGD ESOPHAGOGASTRODUODENOSCOPY ULTRASOUND (N/A )      PANCREATIC PSEUDOCYST BIOPSY EXCISION DRAINAGE (N/A ) Diagnosis: (pancreatic pseudocyst)    Surgeons: Robe Coppola MD Responsible Provider: Nneka Johnson MD    Anesthesia Type: MAC ASA Status: 4          Anesthesia Type: MAC    Dominic Phase I: Dominic Score: 8    Dominic Phase II:      Last vitals: Reviewed and per EMR flowsheets.        Anesthesia Post Evaluation    Patient location during evaluation: PACU  Patient participation: complete - patient participated  Level of consciousness: awake  Pain score: 3  Airway patency: patent  Nausea & Vomiting: no nausea and no vomiting  Complications: no  Cardiovascular status: blood pressure returned to baseline  Respiratory status: acceptable  Hydration status: euvolemic

## 2021-07-30 NOTE — PLAN OF CARE
Problem: Skin Integrity:  Goal: Will show no infection signs and symptoms  Description: Will show no infection signs and symptoms  7/30/2021 1037 by Jacey Gandara RN  Outcome: Ongoing     Problem: Skin Integrity:  Goal: Absence of new skin breakdown  Description: Absence of new skin breakdown  7/30/2021 1037 by Jacey Gandara RN  Outcome: Ongoing     Problem: Falls - Risk of:  Goal: Will remain free from falls  Description: Will remain free from falls  7/30/2021 1037 by Jacey Gandara RN  Outcome: Ongoing     Problem: Falls - Risk of:  Goal: Absence of physical injury  Description: Absence of physical injury  7/30/2021 1037 by Jacey Gandara RN  Outcome: Ongoing     Problem: Pain:  Description: Pain management should include both nonpharmacologic and pharmacologic interventions. Goal: Pain level will decrease  Description: Pain level will decrease  7/30/2021 1037 by Jacey Gandara RN  Outcome: Ongoing     Problem: Pain:  Description: Pain management should include both nonpharmacologic and pharmacologic interventions. Goal: Control of acute pain  Description: Control of acute pain  7/30/2021 1037 by Jacey Gandara RN  Outcome: Ongoing     Problem: Pain:  Description: Pain management should include both nonpharmacologic and pharmacologic interventions.   Goal: Control of chronic pain  Description: Control of chronic pain  7/30/2021 1037 by Jacey Gandara RN  Outcome: Ongoing

## 2021-07-30 NOTE — PROGRESS NOTES
0840-Assessment complete and charted patient awake in bed with abdominal dressing and drains in place safety maintained patient medicated for pain will continue to monitor.

## 2021-07-30 NOTE — PROGRESS NOTES
Hepatobiliary and Pancreatic SURGERY  DAILY PROGRESS NOTE  7/30/2021    Subjective:  Much improved this AM, conversational this AM, pain controlled after dilaudid overnight,    Objective:  /70   Pulse 71   Temp 98.1 °F (36.7 °C) (Axillary)   Resp 18   Ht 5' 8\" (1.727 m)   Wt 185 lb (83.9 kg)   SpO2 95%   BMI 28.13 kg/m²     GENERAL:  Laying in bed, awake, alert  HEAD: Normocephalic, atraumatic  EYES: No sclera icterus, pupils equal  LUNGS:  No increased work of breathing  CARDIOVASCULAR:  RR  ABDOMEN:  Soft, tender about drain site, ostomy bag with some purulence, new IR drain with purulent material   EXTREMITIES: edema   SKIN: Warm and dry    Assessment/Plan:  62 y.o. male w/ history of distal pancreatectomy and splenectomy with POPF and infected pancreatic fluid collection w/ recent IR drainage and previous pancreatic stens and exchanges, IR drain exchange 7/29    NPO- diet post procedure  Continue abx- appreciate ID recs  Dr. Heide Gao to remove vs exchange pancreatic stent today  Monitor drain output and output in ostomy bag   HOLD Eliquis for now     Electronically signed by Michael Gilmore DO on 7/30/2021 at 6:27 AM    imrpoved with IR drain  Appreciate all consultants input  Ostomy appliance with decreased output since drain upsized    Electronically signed by Dawna Villatoro MD on 7/30/2021 at 8:00 AM

## 2021-07-30 NOTE — OP NOTE
Endoscopic Ultrasound Procedure Note -removal of pancreatic stent    Date of Procedure: 7/30/2021    Pre-procedure Diagnosis: Peripancreatic abscess    Post-procedure Diagnosis: Same    Physician: Kilo Wakefield MD    Assistant: None    Estimated Blood Loss: Estimated amount of blood loss is 5ml. Anesthesia: LMAC     Complications: None    Indications and History:  The patient is a 62 y.o. male. The risks, benefits, complications, treatment options and expected outcomes were discussed with the patient. The possibilities of reaction to medication, pulmonary aspiration, perforation of the gastrointestinal tract, bleeding requiring transfusion or operation, respiratory failure requiring placement on a ventilator and failure to diagnose a condition were discussed with the patient who freely signed the consent. Description of Procedure: The patient was taken to the endoscopy suite, identified as Frida Vargas and the procedure verified as Endoscopic Ultrasound (EUS). A Time Out was held and the above information confirmed. The patient was positioned in the left lateral position with an oral bite block and anesthesia was provided for sedation and comfort. The echoendoscope was passed to the second portion of the duodenum. EGD/EUS findings:   Esophagus: normal   Stomach: Gastritis   Duodenum: normal.  A plastic stent was grasped from the ampulla with the snare and removed without difficulty   Pancreas: Normal pancreatic head and body with abrupt cut off at the mid pancreatic body, the location of the patient's pancreatic resection. No evidence of well-circumscribed peripancreatic fluid collection to drain. The patient has a large well-known abscess cavity that I was not able to define on EUS. Therefore I elected not to place an axial stent into this cavity. Large ascites      Specimens:  1.  None    The Patient was taken to the Endoscopy Recovery area in satisfactory condition.       Electronically signed by Daryle Krabbe, MD on 7/30/2021 at 4:23 PM

## 2021-07-30 NOTE — PROGRESS NOTES
[facility] PROGRESS NOTE      Chief complaint: Follow-up of intraabdominal abscess    The patient is a 62 y.o. male with history of hypertension; hyperlipidemia; DM; IPMN of pancreatic tail status post robotic converted to open distal pancreatectomy with splenectomy on 05/12 complicated by pancreatic leak requiring stent placement on 05/17, intra-abdominal abscess status post percutaneous drain placement on 05/28 with abscess fluid culture showing Enterobacter cloacae, Klebsiella oxytoca, alphahemolytic Streptococcus, non-ESBL E. coli, anaerobic gram-negative rods, coagulase-negative Staphylococcus, Cronobacter sakazakii for which he was given a course of ciprofloxacin and metronidazole for 4 weeks until 06/25 then found to have inadequate drainage of abscess prompting another percutaneous drain placement on 06/30 and ERCP with exchange of pancreatic duct stent and balloon sweep of pancreatic duct with removal of large amount of debris/pus on 07/02 with abscess fluid culture showing light growth of Klebsiella oxytoca (non-ESBL; resistant to ampicillin; susceptible to fluoroquinolones and co-trimoxazole), heavy growth of alphahemolytic Streptococcus, MSSA and Lactobacillus, light growth of Candida albicans for which he was discharged to [facility] on a 4-week course of ertapenem and fluconazole from 07/02-07/30; presented on 07/28 for further evaluation of intra-abdominal abscess. While at [facility], he started having fever with CT abdomen and pelvis on 07/22 showing moderate volume fluid collection within the surgical bed likely representing abscess with a surgical drain within the abscess but with collection stable to minimally decreased.   Abscess fluid Gram stain and culture on 07/22 showed abundant polymorphonuclear leukocytes, no epithelial cells, abundant gram variable rods, heavy growth of Enterococcus gallinarum and faecium (resistant to ampicillin, doxycycline, vancomycin, streptomycin; susceptible to linezolid; daptomycin sensitivity not done), moderate growth of MRSE, light growth of Candida krusei, moderate growth of lactobacillus. Vancomycin was started on 07/22 then switched to linezolid. He was subsequently transferred to [facility] for further management. He underwent abscess drain tube exchange on 07/29. Abscess fluid Gram stain and culture showed abundant polymorphonuclear leukocytes, no epithelial cells, abundant gram-negative rods. Subjective: Patient was seen and examined. No chills, less abdominal pain, no diarrhea, no rash, no itching. Afebrile. Objective:    Vitals:    07/30/21 0751   BP: 126/88   Pulse: 76   Resp: 18   Temp: 98.8 °F (37.1 °C)   SpO2: 96%     Constitutional: Alert, not in distress  Respiratory: Clear breath sounds, no crackles, no wheezes  Cardiovascular: Regular rate and rhythm, no murmurs  Gastrointestinal: Bowel sounds present, soft, nontender  Skin: Warm and dry, no active dermatoses  Musculoskeletal: No joint swelling, no joint erythema    Labs, imaging, and medical records/notes were personally reviewed. Assessment:  VRE faecium and gallinarum infection/abscess  Candida krusei infection/abscess  Intraabdominal abscess/surgical site infection, s/p percutaneous drain placement on 05/28, inadequate drainage prompting additional percutaneous drain placements on 06/30. Unfortunately, source control has been difficult to achieve and increasingly antibiotic-resistant pathogens are developing. Pancreatic duct abscess status post ERCP with exchange of pancreatic duct stent and balloon sweep of pancreatic duct with removal of large amount of debris/pus on 07/02  Recent distal pancreatectomy with splenectomy IQ 34/39 complicated by pancreatic leak s/p pancreatic stent placement on 05/17    Recommendations:  Continue ertapenem 1 g every 24 hours and linezolid 600 mg every 12 hours. Continue anidulafungin 200 mg loading dose once followed by 100 mg every 24 hours.   Follow up blood and abscess fluid cultures. Change midline to PICC line prior to discharge/transfer. Continue supportive care.     Thank you for involving me in the care of Valerie Stark. I will continue to follow. Please do not hesitate to call for any questions or concerns.     Electronically signed by Yessy Emery MD on 7/30/2021 at 11:20 AM

## 2021-07-31 LAB
ALBUMIN SERPL-MCNC: 2.4 G/DL (ref 3.5–5.2)
ALP BLD-CCNC: 47 U/L (ref 40–129)
ALT SERPL-CCNC: 18 U/L (ref 0–40)
ANION GAP SERPL CALCULATED.3IONS-SCNC: 0 MMOL/L (ref 7–16)
ANISOCYTOSIS: ABNORMAL
AST SERPL-CCNC: 24 U/L (ref 0–39)
BASOPHILS ABSOLUTE: 0 E9/L (ref 0–0.2)
BASOPHILS RELATIVE PERCENT: 0 % (ref 0–2)
BILIRUB SERPL-MCNC: <0.2 MG/DL (ref 0–1.2)
BUN BLDV-MCNC: 11 MG/DL (ref 6–20)
BURR CELLS: ABNORMAL
CALCIUM SERPL-MCNC: 8.2 MG/DL (ref 8.6–10.2)
CHLORIDE BLD-SCNC: 101 MMOL/L (ref 98–107)
CO2: 32 MMOL/L (ref 22–29)
CREAT SERPL-MCNC: 0.6 MG/DL (ref 0.7–1.2)
EOSINOPHILS ABSOLUTE: 0 E9/L (ref 0.05–0.5)
EOSINOPHILS RELATIVE PERCENT: 0 % (ref 0–6)
GFR AFRICAN AMERICAN: >60
GFR NON-AFRICAN AMERICAN: >60 ML/MIN/1.73
GLUCOSE BLD-MCNC: 431 MG/DL (ref 74–99)
HCT VFR BLD CALC: 27 % (ref 37–54)
HEMOGLOBIN: 8.5 G/DL (ref 12.5–16.5)
HYPOCHROMIA: ABNORMAL
LYMPHOCYTES ABSOLUTE: 0.2 E9/L (ref 1.5–4)
LYMPHOCYTES RELATIVE PERCENT: 5.3 % (ref 20–42)
MCH RBC QN AUTO: 23.1 PG (ref 26–35)
MCHC RBC AUTO-ENTMCNC: 31.5 % (ref 32–34.5)
MCV RBC AUTO: 73.4 FL (ref 80–99.9)
METER GLUCOSE: 299 MG/DL (ref 74–99)
METER GLUCOSE: 361 MG/DL (ref 74–99)
METER GLUCOSE: 365 MG/DL (ref 74–99)
METER GLUCOSE: 410 MG/DL (ref 74–99)
MONOCYTES ABSOLUTE: 0.24 E9/L (ref 0.1–0.95)
MONOCYTES RELATIVE PERCENT: 6.1 % (ref 2–12)
NEUTROPHILS ABSOLUTE: 3.56 E9/L (ref 1.8–7.3)
NEUTROPHILS RELATIVE PERCENT: 88.6 % (ref 43–80)
NUCLEATED RED BLOOD CELLS: 0.9 /100 WBC
OVALOCYTES: ABNORMAL
PDW BLD-RTO: 20.5 FL (ref 11.5–15)
PLATELET # BLD: 728 E9/L (ref 130–450)
PMV BLD AUTO: 10.7 FL (ref 7–12)
POIKILOCYTES: ABNORMAL
POLYCHROMASIA: ABNORMAL
POTASSIUM REFLEX MAGNESIUM: 4.4 MMOL/L (ref 3.5–5)
RBC # BLD: 3.68 E12/L (ref 3.8–5.8)
SCHISTOCYTES: ABNORMAL
SODIUM BLD-SCNC: 133 MMOL/L (ref 132–146)
TARGET CELLS: ABNORMAL
TOTAL PROTEIN: 5.6 G/DL (ref 6.4–8.3)
WBC # BLD: 4 E9/L (ref 4.5–11.5)

## 2021-07-31 PROCEDURE — 6370000000 HC RX 637 (ALT 250 FOR IP): Performed by: STUDENT IN AN ORGANIZED HEALTH CARE EDUCATION/TRAINING PROGRAM

## 2021-07-31 PROCEDURE — 6360000002 HC RX W HCPCS: Performed by: STUDENT IN AN ORGANIZED HEALTH CARE EDUCATION/TRAINING PROGRAM

## 2021-07-31 PROCEDURE — 1200000000 HC SEMI PRIVATE

## 2021-07-31 PROCEDURE — 6360000002 HC RX W HCPCS: Performed by: INTERNAL MEDICINE

## 2021-07-31 PROCEDURE — 85025 COMPLETE CBC W/AUTO DIFF WBC: CPT

## 2021-07-31 PROCEDURE — 94640 AIRWAY INHALATION TREATMENT: CPT

## 2021-07-31 PROCEDURE — 99233 SBSQ HOSP IP/OBS HIGH 50: CPT | Performed by: SURGERY

## 2021-07-31 PROCEDURE — 80053 COMPREHEN METABOLIC PANEL: CPT

## 2021-07-31 PROCEDURE — 94664 DEMO&/EVAL PT USE INHALER: CPT

## 2021-07-31 PROCEDURE — 2580000003 HC RX 258: Performed by: INTERNAL MEDICINE

## 2021-07-31 PROCEDURE — 2580000003 HC RX 258: Performed by: STUDENT IN AN ORGANIZED HEALTH CARE EDUCATION/TRAINING PROGRAM

## 2021-07-31 PROCEDURE — 82962 GLUCOSE BLOOD TEST: CPT

## 2021-07-31 RX ORDER — ALBUTEROL SULFATE 2.5 MG/3ML
2.5 SOLUTION RESPIRATORY (INHALATION) 4 TIMES DAILY
Status: DISCONTINUED | OUTPATIENT
Start: 2021-07-31 | End: 2021-08-05 | Stop reason: HOSPADM

## 2021-07-31 RX ORDER — BUDESONIDE 0.5 MG/2ML
1 INHALANT ORAL 2 TIMES DAILY
Status: DISCONTINUED | OUTPATIENT
Start: 2021-07-31 | End: 2021-08-05 | Stop reason: HOSPADM

## 2021-07-31 RX ORDER — INSULIN GLARGINE 100 [IU]/ML
20 INJECTION, SOLUTION SUBCUTANEOUS 2 TIMES DAILY
Status: DISCONTINUED | OUTPATIENT
Start: 2021-07-31 | End: 2021-08-05 | Stop reason: HOSPADM

## 2021-07-31 RX ORDER — ATORVASTATIN CALCIUM 20 MG/1
20 TABLET, FILM COATED ORAL DAILY
Status: DISCONTINUED | OUTPATIENT
Start: 2021-07-31 | End: 2021-08-05 | Stop reason: HOSPADM

## 2021-07-31 RX ORDER — INSULIN LISPRO 100 [IU]/ML
7 INJECTION, SOLUTION INTRAVENOUS; SUBCUTANEOUS
Status: DISCONTINUED | OUTPATIENT
Start: 2021-07-31 | End: 2021-07-31

## 2021-07-31 RX ORDER — INSULIN LISPRO 100 [IU]/ML
0-12 INJECTION, SOLUTION INTRAVENOUS; SUBCUTANEOUS
Status: DISCONTINUED | OUTPATIENT
Start: 2021-07-31 | End: 2021-07-31

## 2021-07-31 RX ADMIN — HYDROMORPHONE HYDROCHLORIDE 0.5 MG: 1 INJECTION, SOLUTION INTRAMUSCULAR; INTRAVENOUS; SUBCUTANEOUS at 03:32

## 2021-07-31 RX ADMIN — ERTAPENEM SODIUM 1000 MG: 1 INJECTION, POWDER, LYOPHILIZED, FOR SOLUTION INTRAMUSCULAR; INTRAVENOUS at 16:35

## 2021-07-31 RX ADMIN — DOCUSATE SODIUM 100 MG: 100 CAPSULE, LIQUID FILLED ORAL at 08:38

## 2021-07-31 RX ADMIN — SODIUM CHLORIDE, PRESERVATIVE FREE 10 ML: 5 INJECTION INTRAVENOUS at 22:31

## 2021-07-31 RX ADMIN — INSULIN LISPRO 6 UNITS: 100 INJECTION, SOLUTION INTRAVENOUS; SUBCUTANEOUS at 17:22

## 2021-07-31 RX ADMIN — HEPARIN SODIUM 5000 UNITS: 10000 INJECTION INTRAVENOUS; SUBCUTANEOUS at 22:32

## 2021-07-31 RX ADMIN — BUDESONIDE 1000 MCG: 0.5 SUSPENSION RESPIRATORY (INHALATION) at 15:08

## 2021-07-31 RX ADMIN — INSULIN GLARGINE 20 UNITS: 100 INJECTION, SOLUTION SUBCUTANEOUS at 13:03

## 2021-07-31 RX ADMIN — HYDROMORPHONE HYDROCHLORIDE 0.5 MG: 1 INJECTION, SOLUTION INTRAMUSCULAR; INTRAVENOUS; SUBCUTANEOUS at 22:31

## 2021-07-31 RX ADMIN — INSULIN GLARGINE 20 UNITS: 100 INJECTION, SOLUTION SUBCUTANEOUS at 22:32

## 2021-07-31 RX ADMIN — ALBUTEROL SULFATE 2.5 MG: 2.5 SOLUTION RESPIRATORY (INHALATION) at 15:09

## 2021-07-31 RX ADMIN — DEXTROSE MONOHYDRATE 100 MG: 50 INJECTION, SOLUTION INTRAVENOUS at 18:57

## 2021-07-31 RX ADMIN — PANCRELIPASE 36000 UNITS: 60000; 12000; 38000 CAPSULE, DELAYED RELEASE PELLETS ORAL at 17:22

## 2021-07-31 RX ADMIN — HYDROMORPHONE HYDROCHLORIDE 0.5 MG: 1 INJECTION, SOLUTION INTRAMUSCULAR; INTRAVENOUS; SUBCUTANEOUS at 06:48

## 2021-07-31 RX ADMIN — PANCRELIPASE 36000 UNITS: 60000; 12000; 38000 CAPSULE, DELAYED RELEASE PELLETS ORAL at 11:58

## 2021-07-31 RX ADMIN — INSULIN LISPRO 12 UNITS: 100 INJECTION, SOLUTION INTRAVENOUS; SUBCUTANEOUS at 08:38

## 2021-07-31 RX ADMIN — HYDROMORPHONE HYDROCHLORIDE 0.5 MG: 1 INJECTION, SOLUTION INTRAMUSCULAR; INTRAVENOUS; SUBCUTANEOUS at 19:00

## 2021-07-31 RX ADMIN — SODIUM CHLORIDE, POTASSIUM CHLORIDE, SODIUM LACTATE AND CALCIUM CHLORIDE: 600; 310; 30; 20 INJECTION, SOLUTION INTRAVENOUS at 11:55

## 2021-07-31 RX ADMIN — OXYCODONE AND ACETAMINOPHEN 1 TABLET: 5; 325 TABLET ORAL at 04:49

## 2021-07-31 RX ADMIN — LINEZOLID 600 MG: 600 INJECTION, SOLUTION INTRAVENOUS at 03:00

## 2021-07-31 RX ADMIN — ALBUTEROL SULFATE 2.5 MG: 2.5 SOLUTION RESPIRATORY (INHALATION) at 20:43

## 2021-07-31 RX ADMIN — LINEZOLID 600 MG: 600 INJECTION, SOLUTION INTRAVENOUS at 14:43

## 2021-07-31 RX ADMIN — HEPARIN SODIUM 5000 UNITS: 10000 INJECTION INTRAVENOUS; SUBCUTANEOUS at 14:13

## 2021-07-31 RX ADMIN — POLYETHYLENE GLYCOL 3350 17 G: 17 POWDER, FOR SOLUTION ORAL at 08:38

## 2021-07-31 RX ADMIN — INSULIN LISPRO 10 UNITS: 100 INJECTION, SOLUTION INTRAVENOUS; SUBCUTANEOUS at 11:56

## 2021-07-31 RX ADMIN — HEPARIN SODIUM 5000 UNITS: 10000 INJECTION INTRAVENOUS; SUBCUTANEOUS at 06:41

## 2021-07-31 RX ADMIN — OXYCODONE AND ACETAMINOPHEN 1 TABLET: 5; 325 TABLET ORAL at 17:27

## 2021-07-31 RX ADMIN — HYDROMORPHONE HYDROCHLORIDE 0.5 MG: 1 INJECTION, SOLUTION INTRAMUSCULAR; INTRAVENOUS; SUBCUTANEOUS at 14:38

## 2021-07-31 RX ADMIN — HYDROMORPHONE HYDROCHLORIDE 0.5 MG: 1 INJECTION, SOLUTION INTRAMUSCULAR; INTRAVENOUS; SUBCUTANEOUS at 10:05

## 2021-07-31 RX ADMIN — ATORVASTATIN CALCIUM 20 MG: 20 TABLET, FILM COATED ORAL at 14:13

## 2021-07-31 RX ADMIN — BUDESONIDE 1000 MCG: 0.5 SUSPENSION RESPIRATORY (INHALATION) at 20:44

## 2021-07-31 RX ADMIN — OXYCODONE AND ACETAMINOPHEN 1 TABLET: 5; 325 TABLET ORAL at 13:03

## 2021-07-31 RX ADMIN — OXYCODONE AND ACETAMINOPHEN 1 TABLET: 5; 325 TABLET ORAL at 08:47

## 2021-07-31 ASSESSMENT — PAIN SCALES - GENERAL
PAINLEVEL_OUTOF10: 8
PAINLEVEL_OUTOF10: 7
PAINLEVEL_OUTOF10: 8
PAINLEVEL_OUTOF10: 6
PAINLEVEL_OUTOF10: 8
PAINLEVEL_OUTOF10: 7
PAINLEVEL_OUTOF10: 8
PAINLEVEL_OUTOF10: 6
PAINLEVEL_OUTOF10: 8
PAINLEVEL_OUTOF10: 8

## 2021-07-31 ASSESSMENT — PAIN DESCRIPTION - DESCRIPTORS: DESCRIPTORS: ACHING;CONSTANT;DISCOMFORT

## 2021-07-31 ASSESSMENT — PAIN DESCRIPTION - ORIENTATION: ORIENTATION: RIGHT;LEFT

## 2021-07-31 ASSESSMENT — PAIN DESCRIPTION - PAIN TYPE: TYPE: ACUTE PAIN

## 2021-07-31 ASSESSMENT — PAIN DESCRIPTION - LOCATION: LOCATION: ABDOMEN

## 2021-07-31 ASSESSMENT — PAIN DESCRIPTION - ONSET: ONSET: ON-GOING

## 2021-07-31 ASSESSMENT — PAIN DESCRIPTION - FREQUENCY: FREQUENCY: CONTINUOUS

## 2021-07-31 NOTE — PLAN OF CARE
Problem: Skin Integrity:  Goal: Will show no infection signs and symptoms  Description: Will show no infection signs and symptoms  7/31/2021 0018 by Helder Velazquez RN  Outcome: Met This Shift  7/30/2021 1037 by Marvin Reynolds RN  Outcome: Ongoing  Goal: Absence of new skin breakdown  Description: Absence of new skin breakdown  7/31/2021 0018 by Helder Velazquez RN  Outcome: Met This Shift  7/30/2021 1037 by Marvin Reynolds RN  Outcome: Ongoing     Problem: Falls - Risk of:  Goal: Will remain free from falls  Description: Will remain free from falls  7/31/2021 0018 by Helder Velazquez RN  Outcome: Met This Shift  7/30/2021 1037 by Marvin Reynolds RN  Outcome: Ongoing  Goal: Absence of physical injury  Description: Absence of physical injury  7/31/2021 0018 by Helder Velazquez RN  Outcome: Met This Shift  7/30/2021 1037 by Marvin Reynolds RN  Outcome: Ongoing     Problem: Pain:  Goal: Control of acute pain  Description: Control of acute pain  7/31/2021 0018 by Helder Velazquez RN  Outcome: Met This Shift  7/30/2021 1037 by Marvin Reynolds RN  Outcome: Ongoing  Goal: Control of chronic pain  Description: Control of chronic pain  7/31/2021 0018 by Helder Velazquez RN  Outcome: Met This Shift  7/30/2021 1037 by Marvin Reynolds RN  Outcome: Ongoing

## 2021-07-31 NOTE — PROGRESS NOTES
GENERAL SURGERY  DAILY PROGRESS NOTE  7/31/2021    Subjective:  Reports improvement in his abdominal pain. He reports feeling better overall. He went for ERCP and stent removal yesterday 7/30. He is tolerating diet. There is less drainage out of his drain sites. Objective:  /72   Pulse 68   Temp 97.5 °F (36.4 °C) (Oral)   Resp 19   Ht 5' 8\" (1.727 m)   Wt 185 lb (83.9 kg)   SpO2 97%   BMI 28.13 kg/m²     GENERAL:  Laying in bed, awake, alert, cooperative, no apparent distress  HEAD: Normocephalic, atraumatic  EYES: No sclera icterus, pupils equal  LUNGS:  No increased work of breathing  CARDIOVASCULAR:  Regular rate, normotensive  ABDOMEN:  Soft, mildly tender, non-distended, LUQ drain and ostomy bag with purulent material.  EXTREMITIES: No edema or swelling   SKIN: Warm and dry    Assessment/Plan:  62 y.o. male with history of distal pancreatectomy and splenectomy with POPF and infected pancreatic fluid collection and pancreatic stent removal 7/30.    - Drainage improving.  - ID following- appreciate their recommendations  - Continue Creon. - Advance diet to full liquids. Patient findings and plan discussed with Dr. Scot Wynne. Electronically signed by Estela Cramer MD on 7/31/2021 at 8:05 AM  Attending Physician Statement:  I have examined the patient, reviewed the record, and discussed the case with the surgical team.  I agree with the assessment and plan with the following additions, corrections, and changes. Feeling much better. Abdomen soft nondistended drain is purulent. Diet as tolerated. Rajinder Doshi DO, FACS    NOTE: This report was transcribed using voice recognition software. Every effort was made to ensure accuracy; however, inadvertent computerized transcription errors may be present.

## 2021-07-31 NOTE — PROGRESS NOTES
[facility] PROGRESS NOTE      Chief complaint: Follow-up of intraabdominal abscess    The patient is a 62 y.o. male with history of hypertension; hyperlipidemia; DM; IPMN of pancreatic tail status post robotic converted to open distal pancreatectomy with splenectomy on 05/12 complicated by pancreatic leak requiring stent placement on 05/17, intra-abdominal abscess status post percutaneous drain placement on 05/28 with abscess fluid culture showing Enterobacter cloacae, Klebsiella oxytoca, alphahemolytic Streptococcus, non-ESBL E. coli, anaerobic gram-negative rods, coagulase-negative Staphylococcus, Cronobacter sakazakii for which he was given a course of ciprofloxacin and metronidazole for 4 weeks until 06/25 then found to have inadequate drainage of abscess prompting another percutaneous drain placement on 06/30 and ERCP with exchange of pancreatic duct stent and balloon sweep of pancreatic duct with removal of large amount of debris/pus on 07/02 with abscess fluid culture showing light growth of Klebsiella oxytoca (non-ESBL; resistant to ampicillin; susceptible to fluoroquinolones and co-trimoxazole), heavy growth of alphahemolytic Streptococcus, MSSA and Lactobacillus, light growth of Candida albicans for which he was discharged to [facility] on a 4-week course of ertapenem and fluconazole from 07/02-07/30; presented on 07/28 for further evaluation of intra-abdominal abscess. While at [facility], he started having fever with CT abdomen and pelvis on 07/22 showing moderate volume fluid collection within the surgical bed likely representing abscess with a surgical drain within the abscess but with collection stable to minimally decreased.   Abscess fluid Gram stain and culture on 07/22 showed abundant polymorphonuclear leukocytes, no epithelial cells, abundant gram variable rods, heavy growth of Enterococcus gallinarum and faecium (resistant to ampicillin, doxycycline, vancomycin, streptomycin; susceptible to linezolid; daptomycin sensitivity not done), moderate growth of MRSE, light growth of Candida krusei, moderate growth of lactobacillus. Vancomycin was started on 07/22 then switched to linezolid. He was subsequently transferred to [address] [place] [address] for further management. He underwent abscess drain tube exchange on 07/29. Abscess fluid Gram stain and culture showed abundant polymorphonuclear leukocytes, no epithelial cells, abundant gram-negative rods, mixed Gram-positive organisms, anaerobes. He underwent ERCP with pancreatic stent removal on 07/30. Subjective: Patient was seen and examined. No chills, controlled abdominal pain, has diarrhea, no rash, no itching. Afebrile. Objective:    Vitals:    07/31/21 0800   BP: 125/72   Pulse: 56   Resp: 18   Temp: 98.2 °F (36.8 °C)   SpO2: 94%     Constitutional: Alert, not in distress  Respiratory: Clear breath sounds, no crackles, no wheezes  Cardiovascular: Regular rate and rhythm, no murmurs  Gastrointestinal: Bowel sounds present, soft, nontender  Skin: Warm and dry, no active dermatoses  Musculoskeletal: No joint swelling, no joint erythema    Labs, imaging, and medical records/notes were personally reviewed. Assessment:  VRE faecium and gallinarum infection/abscess  Candida krusei infection/abscess  Intraabdominal abscess/surgical site infection, s/p percutaneous drain placement on 05/28, inadequate drainage prompting additional percutaneous drain placements on 06/30. Unfortunately, source control has been difficult to achieve and increasingly antibiotic-resistant pathogens are developing.   Pancreatic duct abscess status post ERCP with exchange of pancreatic duct stent and balloon sweep of pancreatic duct with removal of large amount of debris/pus on 07/02  Recent distal pancreatectomy with splenectomy HT 54/26 complicated by pancreatic leak s/p pancreatic stent placement on 05/17 then removal on 07/30    Recommendations:  Continue ertapenem 1 g every 24 hours and linezolid 600 mg every 12 hours. Continue anidulafungin 200 mg loading dose once followed by 100 mg every 24 hours. Follow up blood and abscess fluid cultures. Change midline to PICC line prior to discharge/transfer. Continue supportive care.     Thank you for involving me in the care of Jose Martin Garcia. I will continue to follow. Please do not hesitate to call for any questions or concerns.     Electronically signed by Jackie Flores MD on 7/31/2021 at 9:41 AM

## 2021-08-01 LAB
ALBUMIN SERPL-MCNC: 2.6 G/DL (ref 3.5–5.2)
ALP BLD-CCNC: 45 U/L (ref 40–129)
ALT SERPL-CCNC: 16 U/L (ref 0–40)
ANAEROBIC CULTURE: NORMAL
ANION GAP SERPL CALCULATED.3IONS-SCNC: 11 MMOL/L (ref 7–16)
AST SERPL-CCNC: 22 U/L (ref 0–39)
BASOPHILS ABSOLUTE: 0.01 E9/L (ref 0–0.2)
BASOPHILS RELATIVE PERCENT: 0.2 % (ref 0–2)
BILIRUB SERPL-MCNC: <0.2 MG/DL (ref 0–1.2)
BUN BLDV-MCNC: 8 MG/DL (ref 6–20)
CALCIUM SERPL-MCNC: 8.6 MG/DL (ref 8.6–10.2)
CHLORIDE BLD-SCNC: 103 MMOL/L (ref 98–107)
CO2: 26 MMOL/L (ref 22–29)
CREAT SERPL-MCNC: 0.6 MG/DL (ref 0.7–1.2)
EOSINOPHILS ABSOLUTE: 0.01 E9/L (ref 0.05–0.5)
EOSINOPHILS RELATIVE PERCENT: 0.2 % (ref 0–6)
GFR AFRICAN AMERICAN: >60
GFR NON-AFRICAN AMERICAN: >60 ML/MIN/1.73
GLUCOSE BLD-MCNC: 305 MG/DL (ref 74–99)
HCT VFR BLD CALC: 26.4 % (ref 37–54)
HEMOGLOBIN: 8 G/DL (ref 12.5–16.5)
IMMATURE GRANULOCYTES #: 0.03 E9/L
IMMATURE GRANULOCYTES %: 0.6 % (ref 0–5)
LYMPHOCYTES ABSOLUTE: 1.47 E9/L (ref 1.5–4)
LYMPHOCYTES RELATIVE PERCENT: 28.6 % (ref 20–42)
MCH RBC QN AUTO: 22.6 PG (ref 26–35)
MCHC RBC AUTO-ENTMCNC: 30.3 % (ref 32–34.5)
MCV RBC AUTO: 74.6 FL (ref 80–99.9)
METER GLUCOSE: 122 MG/DL (ref 74–99)
METER GLUCOSE: 170 MG/DL (ref 74–99)
METER GLUCOSE: 189 MG/DL (ref 74–99)
METER GLUCOSE: 303 MG/DL (ref 74–99)
MONOCYTES ABSOLUTE: 1 E9/L (ref 0.1–0.95)
MONOCYTES RELATIVE PERCENT: 19.5 % (ref 2–12)
NEUTROPHILS ABSOLUTE: 2.62 E9/L (ref 1.8–7.3)
NEUTROPHILS RELATIVE PERCENT: 50.9 % (ref 43–80)
PDW BLD-RTO: 20 FL (ref 11.5–15)
PLATELET # BLD: 692 E9/L (ref 130–450)
PMV BLD AUTO: 10.7 FL (ref 7–12)
POTASSIUM REFLEX MAGNESIUM: 3.7 MMOL/L (ref 3.5–5)
RBC # BLD: 3.54 E12/L (ref 3.8–5.8)
SODIUM BLD-SCNC: 140 MMOL/L (ref 132–146)
TOTAL PROTEIN: 5.7 G/DL (ref 6.4–8.3)
WBC # BLD: 5.1 E9/L (ref 4.5–11.5)

## 2021-08-01 PROCEDURE — 6370000000 HC RX 637 (ALT 250 FOR IP): Performed by: STUDENT IN AN ORGANIZED HEALTH CARE EDUCATION/TRAINING PROGRAM

## 2021-08-01 PROCEDURE — 82962 GLUCOSE BLOOD TEST: CPT

## 2021-08-01 PROCEDURE — 1200000000 HC SEMI PRIVATE

## 2021-08-01 PROCEDURE — 6360000002 HC RX W HCPCS: Performed by: INTERNAL MEDICINE

## 2021-08-01 PROCEDURE — 2580000003 HC RX 258: Performed by: STUDENT IN AN ORGANIZED HEALTH CARE EDUCATION/TRAINING PROGRAM

## 2021-08-01 PROCEDURE — 36415 COLL VENOUS BLD VENIPUNCTURE: CPT

## 2021-08-01 PROCEDURE — 2580000003 HC RX 258: Performed by: INTERNAL MEDICINE

## 2021-08-01 PROCEDURE — 6360000002 HC RX W HCPCS: Performed by: STUDENT IN AN ORGANIZED HEALTH CARE EDUCATION/TRAINING PROGRAM

## 2021-08-01 PROCEDURE — 80053 COMPREHEN METABOLIC PANEL: CPT

## 2021-08-01 PROCEDURE — 94640 AIRWAY INHALATION TREATMENT: CPT

## 2021-08-01 PROCEDURE — 85025 COMPLETE CBC W/AUTO DIFF WBC: CPT

## 2021-08-01 PROCEDURE — 99232 SBSQ HOSP IP/OBS MODERATE 35: CPT | Performed by: SURGERY

## 2021-08-01 RX ADMIN — ERTAPENEM SODIUM 1000 MG: 1 INJECTION, POWDER, LYOPHILIZED, FOR SOLUTION INTRAMUSCULAR; INTRAVENOUS at 14:15

## 2021-08-01 RX ADMIN — DOCUSATE SODIUM 100 MG: 100 CAPSULE, LIQUID FILLED ORAL at 21:57

## 2021-08-01 RX ADMIN — HEPARIN SODIUM 5000 UNITS: 10000 INJECTION INTRAVENOUS; SUBCUTANEOUS at 05:23

## 2021-08-01 RX ADMIN — INSULIN LISPRO 3 UNITS: 100 INJECTION, SOLUTION INTRAVENOUS; SUBCUTANEOUS at 18:38

## 2021-08-01 RX ADMIN — PANCRELIPASE 36000 UNITS: 60000; 12000; 38000 CAPSULE, DELAYED RELEASE PELLETS ORAL at 09:19

## 2021-08-01 RX ADMIN — SODIUM CHLORIDE, POTASSIUM CHLORIDE, SODIUM LACTATE AND CALCIUM CHLORIDE: 600; 310; 30; 20 INJECTION, SOLUTION INTRAVENOUS at 21:17

## 2021-08-01 RX ADMIN — OXYCODONE AND ACETAMINOPHEN 1 TABLET: 5; 325 TABLET ORAL at 09:19

## 2021-08-01 RX ADMIN — DOCUSATE SODIUM 100 MG: 100 CAPSULE, LIQUID FILLED ORAL at 09:19

## 2021-08-01 RX ADMIN — PANCRELIPASE 36000 UNITS: 60000; 12000; 38000 CAPSULE, DELAYED RELEASE PELLETS ORAL at 17:41

## 2021-08-01 RX ADMIN — INSULIN LISPRO 12 UNITS: 100 INJECTION, SOLUTION INTRAVENOUS; SUBCUTANEOUS at 09:20

## 2021-08-01 RX ADMIN — OXYCODONE AND ACETAMINOPHEN 1 TABLET: 5; 325 TABLET ORAL at 21:57

## 2021-08-01 RX ADMIN — POLYETHYLENE GLYCOL 3350 17 G: 17 POWDER, FOR SOLUTION ORAL at 09:19

## 2021-08-01 RX ADMIN — ALBUTEROL SULFATE 2.5 MG: 2.5 SOLUTION RESPIRATORY (INHALATION) at 13:14

## 2021-08-01 RX ADMIN — LINEZOLID 600 MG: 600 INJECTION, SOLUTION INTRAVENOUS at 03:52

## 2021-08-01 RX ADMIN — OXYCODONE AND ACETAMINOPHEN 1 TABLET: 5; 325 TABLET ORAL at 01:48

## 2021-08-01 RX ADMIN — INSULIN GLARGINE 20 UNITS: 100 INJECTION, SOLUTION SUBCUTANEOUS at 09:20

## 2021-08-01 RX ADMIN — INSULIN GLARGINE 20 UNITS: 100 INJECTION, SOLUTION SUBCUTANEOUS at 21:58

## 2021-08-01 RX ADMIN — BUDESONIDE 1000 MCG: 0.5 SUSPENSION RESPIRATORY (INHALATION) at 20:49

## 2021-08-01 RX ADMIN — OXYCODONE 2.5 MG: 5 TABLET ORAL at 17:40

## 2021-08-01 RX ADMIN — ALBUTEROL SULFATE 2.5 MG: 2.5 SOLUTION RESPIRATORY (INHALATION) at 20:48

## 2021-08-01 RX ADMIN — ATORVASTATIN CALCIUM 20 MG: 20 TABLET, FILM COATED ORAL at 09:19

## 2021-08-01 RX ADMIN — SODIUM CHLORIDE, PRESERVATIVE FREE 10 ML: 5 INJECTION INTRAVENOUS at 09:20

## 2021-08-01 RX ADMIN — HEPARIN SODIUM 5000 UNITS: 10000 INJECTION INTRAVENOUS; SUBCUTANEOUS at 22:00

## 2021-08-01 RX ADMIN — PANCRELIPASE 36000 UNITS: 60000; 12000; 38000 CAPSULE, DELAYED RELEASE PELLETS ORAL at 13:04

## 2021-08-01 RX ADMIN — INSULIN LISPRO 3 UNITS: 100 INJECTION, SOLUTION INTRAVENOUS; SUBCUTANEOUS at 13:04

## 2021-08-01 RX ADMIN — OXYCODONE AND ACETAMINOPHEN 1 TABLET: 5; 325 TABLET ORAL at 17:40

## 2021-08-01 RX ADMIN — DOCUSATE SODIUM 100 MG: 100 CAPSULE, LIQUID FILLED ORAL at 00:10

## 2021-08-01 RX ADMIN — SODIUM CHLORIDE, POTASSIUM CHLORIDE, SODIUM LACTATE AND CALCIUM CHLORIDE: 600; 310; 30; 20 INJECTION, SOLUTION INTRAVENOUS at 05:55

## 2021-08-01 RX ADMIN — OXYCODONE 2.5 MG: 5 TABLET ORAL at 13:18

## 2021-08-01 RX ADMIN — OXYCODONE 2.5 MG: 5 TABLET ORAL at 21:57

## 2021-08-01 RX ADMIN — SODIUM CHLORIDE, PRESERVATIVE FREE 10 ML: 5 INJECTION INTRAVENOUS at 21:17

## 2021-08-01 RX ADMIN — SODIUM CHLORIDE, POTASSIUM CHLORIDE, SODIUM LACTATE AND CALCIUM CHLORIDE: 600; 310; 30; 20 INJECTION, SOLUTION INTRAVENOUS at 15:14

## 2021-08-01 RX ADMIN — SODIUM CHLORIDE, PRESERVATIVE FREE 10 ML: 5 INJECTION INTRAVENOUS at 15:12

## 2021-08-01 RX ADMIN — DEXTROSE MONOHYDRATE 100 MG: 50 INJECTION, SOLUTION INTRAVENOUS at 18:37

## 2021-08-01 RX ADMIN — OXYCODONE AND ACETAMINOPHEN 1 TABLET: 5; 325 TABLET ORAL at 13:18

## 2021-08-01 RX ADMIN — HEPARIN SODIUM 5000 UNITS: 10000 INJECTION INTRAVENOUS; SUBCUTANEOUS at 14:15

## 2021-08-01 RX ADMIN — LINEZOLID 600 MG: 600 INJECTION, SOLUTION INTRAVENOUS at 15:11

## 2021-08-01 ASSESSMENT — PAIN SCALES - GENERAL
PAINLEVEL_OUTOF10: 8
PAINLEVEL_OUTOF10: 6
PAINLEVEL_OUTOF10: 8
PAINLEVEL_OUTOF10: 7

## 2021-08-01 ASSESSMENT — PAIN DESCRIPTION - DESCRIPTORS: DESCRIPTORS: STABBING

## 2021-08-01 ASSESSMENT — PAIN SCALES - WONG BAKER: WONGBAKER_NUMERICALRESPONSE: 2

## 2021-08-01 ASSESSMENT — PAIN DESCRIPTION - PAIN TYPE: TYPE: ACUTE PAIN

## 2021-08-01 ASSESSMENT — PAIN DESCRIPTION - ORIENTATION: ORIENTATION: LEFT;MID

## 2021-08-01 ASSESSMENT — PAIN DESCRIPTION - LOCATION: LOCATION: ABDOMEN;RIB CAGE

## 2021-08-01 NOTE — PROGRESS NOTES
[facility] PROGRESS NOTE      Chief complaint: Follow-up of intraabdominal abscess    The patient is a 62 y.o. male with history of hypertension; hyperlipidemia; DM; IPMN of pancreatic tail status post robotic converted to open distal pancreatectomy with splenectomy on 05/12 complicated by pancreatic leak requiring stent placement on 05/17, intra-abdominal abscess status post percutaneous drain placement on 05/28 with abscess fluid culture showing Enterobacter cloacae, Klebsiella oxytoca, alphahemolytic Streptococcus, non-ESBL E. coli, anaerobic gram-negative rods, coagulase-negative Staphylococcus, Cronobacter sakazakii for which he was given a course of ciprofloxacin and metronidazole for 4 weeks until 06/25 then found to have inadequate drainage of abscess prompting another percutaneous drain placement on 06/30 and ERCP with exchange of pancreatic duct stent and balloon sweep of pancreatic duct with removal of large amount of debris/pus on 07/02 with abscess fluid culture showing light growth of Klebsiella oxytoca (non-ESBL; resistant to ampicillin; susceptible to fluoroquinolones and co-trimoxazole), heavy growth of alphahemolytic Streptococcus, MSSA and Lactobacillus, light growth of Candida albicans for which [place] was [place] [facility] [place]week course of ertapenem and fluconazole from 07/02-07/30; presented on 07/28 for further evaluation of intra-abdominal abscess. [place] [facility] having fever with CT abdomen and pelvis on 07/22 showing moderate volume fluid collection within the surgical bed likely representing abscess with a surgical drain within the abscess but with collection stable to minimally decreased.   Abscess fluid Gram stain and culture on 07/22 showed abundant polymorphonuclear leukocytes, no epithelial cells, abundant gram variable rods, heavy growth of Enterococcus gallinarum and faecium (resistant to ampicillin, doxycycline, vancomycin, streptomycin; susceptible to linezolid; daptomycin sensitivity not done), moderate growth of MRSE, light growth of Candida krusei, moderate growth of lactobacillus. Vancomycin was started on 07/22 then switched to linezolid. He was subsequently transferred to [facility] for further management. He underwent abscess drain tube exchange on 07/29. Abscess fluid Gram stain and culture showed abundant polymorphonuclear leukocytes, no epithelial cells, abundant gram-negative rods, mixed Gram-positive organisms including coagulase-negative Staphylococcus species, lactobacillus, alphahemolytic Streptococcus species, anaerobes. He underwent ERCP with pancreatic stent removal on 07/30. Subjective: Patient was seen and examined. No chills, controlled abdominal pain, has diarrhea, no rash, no itching. Afebrile. Objective:    Vitals:    08/01/21 0915   BP: 139/77   Pulse: 58   Resp: 17   Temp: 97.3 °F (36.3 °C)   SpO2:      Constitutional: Alert, not in distress  Respiratory: Clear breath sounds, no crackles, no wheezes  Cardiovascular: Regular rate and rhythm, no murmurs  Gastrointestinal: Bowel sounds present, soft, nontender  Skin: Warm and dry, no active dermatoses  Musculoskeletal: No joint swelling, no joint erythema    Labs, imaging, and medical records/notes were personally reviewed. Assessment:  VRE faecium and gallinarum infection/abscess  Candida krusei infection/abscess  Intraabdominal abscess/surgical site infection, s/p percutaneous drain placement on 05/28, inadequate drainage prompting additional percutaneous drain placements on 06/30. Unfortunately, source control has been difficult to achieve and increasingly antibiotic-resistant pathogens are developing.   Pancreatic duct abscess status post ERCP with exchange of pancreatic duct stent and balloon sweep of pancreatic duct with removal of large amount of debris/pus on 07/02  Recent distal pancreatectomy with splenectomy EB 64/26 complicated by pancreatic leak s/p pancreatic stent placement on 05/17 then removal on 07/30    Recommendations:  Continue ertapenem 1 g every 24 hours and linezolid 600 mg every 12 hours. Continue anidulafungin 200 mg loading dose once followed by 100 mg every 24 hours. Follow up blood and abscess fluid cultures. Change midline to PICC line prior to discharge/transfer. Continue supportive care.     Thank you for involving me in the care of Epi Casey. I will continue to follow. Please do not hesitate to call for any questions or concerns.     Electronically signed by Theodore Breaux MD on 8/1/2021 at 11:10 AM

## 2021-08-02 LAB
ALBUMIN SERPL-MCNC: 2.8 G/DL (ref 3.5–5.2)
ALP BLD-CCNC: 51 U/L (ref 40–129)
ALT SERPL-CCNC: 17 U/L (ref 0–40)
ANION GAP SERPL CALCULATED.3IONS-SCNC: 12 MMOL/L (ref 7–16)
ANISOCYTOSIS: ABNORMAL
AST SERPL-CCNC: 22 U/L (ref 0–39)
BASOPHILS ABSOLUTE: 0.01 E9/L (ref 0–0.2)
BASOPHILS RELATIVE PERCENT: 0.2 % (ref 0–2)
BILIRUB SERPL-MCNC: <0.2 MG/DL (ref 0–1.2)
BLOOD BANK DISPENSE STATUS: NORMAL
BLOOD BANK DISPENSE STATUS: NORMAL
BLOOD BANK PRODUCT CODE: NORMAL
BLOOD BANK PRODUCT CODE: NORMAL
BPU ID: NORMAL
BPU ID: NORMAL
BUN BLDV-MCNC: 7 MG/DL (ref 6–20)
CALCIUM SERPL-MCNC: 9 MG/DL (ref 8.6–10.2)
CHLORIDE BLD-SCNC: 101 MMOL/L (ref 98–107)
CO2: 29 MMOL/L (ref 22–29)
CREAT SERPL-MCNC: 0.7 MG/DL (ref 0.7–1.2)
DESCRIPTION BLOOD BANK: NORMAL
DESCRIPTION BLOOD BANK: NORMAL
EOSINOPHILS ABSOLUTE: 0.23 E9/L (ref 0.05–0.5)
EOSINOPHILS RELATIVE PERCENT: 3.9 % (ref 0–6)
GFR AFRICAN AMERICAN: >60
GFR NON-AFRICAN AMERICAN: >60 ML/MIN/1.73
GLUCOSE BLD-MCNC: 159 MG/DL (ref 74–99)
HCT VFR BLD CALC: 29.1 % (ref 37–54)
HEMOGLOBIN: 8.8 G/DL (ref 12.5–16.5)
HYPOCHROMIA: ABNORMAL
IMMATURE GRANULOCYTES #: 0.02 E9/L
IMMATURE GRANULOCYTES %: 0.3 % (ref 0–5)
LYMPHOCYTES ABSOLUTE: 1.65 E9/L (ref 1.5–4)
LYMPHOCYTES RELATIVE PERCENT: 28.2 % (ref 20–42)
MAGNESIUM: 1.8 MG/DL (ref 1.6–2.6)
MCH RBC QN AUTO: 22.4 PG (ref 26–35)
MCHC RBC AUTO-ENTMCNC: 30.2 % (ref 32–34.5)
MCV RBC AUTO: 74.2 FL (ref 80–99.9)
METER GLUCOSE: 134 MG/DL (ref 74–99)
METER GLUCOSE: 136 MG/DL (ref 74–99)
METER GLUCOSE: 168 MG/DL (ref 74–99)
METER GLUCOSE: 211 MG/DL (ref 74–99)
MONOCYTES ABSOLUTE: 0.93 E9/L (ref 0.1–0.95)
MONOCYTES RELATIVE PERCENT: 15.9 % (ref 2–12)
NEUTROPHILS ABSOLUTE: 3.02 E9/L (ref 1.8–7.3)
NEUTROPHILS RELATIVE PERCENT: 51.5 % (ref 43–80)
OVALOCYTES: ABNORMAL
PDW BLD-RTO: 21.1 FL (ref 11.5–15)
PLATELET # BLD: 714 E9/L (ref 130–450)
PMV BLD AUTO: 10.3 FL (ref 7–12)
POIKILOCYTES: ABNORMAL
POLYCHROMASIA: ABNORMAL
POTASSIUM REFLEX MAGNESIUM: 3.5 MMOL/L (ref 3.5–5)
RBC # BLD: 3.92 E12/L (ref 3.8–5.8)
SCHISTOCYTES: ABNORMAL
SODIUM BLD-SCNC: 142 MMOL/L (ref 132–146)
TARGET CELLS: ABNORMAL
TOTAL PROTEIN: 6.3 G/DL (ref 6.4–8.3)
WBC # BLD: 5.9 E9/L (ref 4.5–11.5)

## 2021-08-02 PROCEDURE — C1751 CATH, INF, PER/CENT/MIDLINE: HCPCS

## 2021-08-02 PROCEDURE — 2580000003 HC RX 258: Performed by: INTERNAL MEDICINE

## 2021-08-02 PROCEDURE — 36569 INSJ PICC 5 YR+ W/O IMAGING: CPT

## 2021-08-02 PROCEDURE — 83735 ASSAY OF MAGNESIUM: CPT

## 2021-08-02 PROCEDURE — 6360000002 HC RX W HCPCS: Performed by: INTERNAL MEDICINE

## 2021-08-02 PROCEDURE — 2580000003 HC RX 258: Performed by: STUDENT IN AN ORGANIZED HEALTH CARE EDUCATION/TRAINING PROGRAM

## 2021-08-02 PROCEDURE — 1200000000 HC SEMI PRIVATE

## 2021-08-02 PROCEDURE — 94640 AIRWAY INHALATION TREATMENT: CPT

## 2021-08-02 PROCEDURE — 2500000003 HC RX 250 WO HCPCS: Performed by: INTERNAL MEDICINE

## 2021-08-02 PROCEDURE — 6360000002 HC RX W HCPCS: Performed by: STUDENT IN AN ORGANIZED HEALTH CARE EDUCATION/TRAINING PROGRAM

## 2021-08-02 PROCEDURE — 80053 COMPREHEN METABOLIC PANEL: CPT

## 2021-08-02 PROCEDURE — 82962 GLUCOSE BLOOD TEST: CPT

## 2021-08-02 PROCEDURE — 6370000000 HC RX 637 (ALT 250 FOR IP): Performed by: STUDENT IN AN ORGANIZED HEALTH CARE EDUCATION/TRAINING PROGRAM

## 2021-08-02 PROCEDURE — 36415 COLL VENOUS BLD VENIPUNCTURE: CPT

## 2021-08-02 PROCEDURE — 76937 US GUIDE VASCULAR ACCESS: CPT

## 2021-08-02 PROCEDURE — 85025 COMPLETE CBC W/AUTO DIFF WBC: CPT

## 2021-08-02 RX ORDER — HEPARIN SODIUM (PORCINE) LOCK FLUSH IV SOLN 100 UNIT/ML 100 UNIT/ML
3 SOLUTION INTRAVENOUS PRN
Status: DISCONTINUED | OUTPATIENT
Start: 2021-08-02 | End: 2021-08-05 | Stop reason: HOSPADM

## 2021-08-02 RX ORDER — LINEZOLID 600 MG/1
600 TABLET, FILM COATED ORAL 2 TIMES DAILY
Qty: 50 TABLET | Refills: 0 | DISCHARGE
Start: 2021-08-02 | End: 2021-08-27

## 2021-08-02 RX ORDER — SODIUM CHLORIDE 0.9 % (FLUSH) 0.9 %
5-40 SYRINGE (ML) INJECTION EVERY 12 HOURS SCHEDULED
Status: DISCONTINUED | OUTPATIENT
Start: 2021-08-02 | End: 2021-08-05 | Stop reason: HOSPADM

## 2021-08-02 RX ORDER — SODIUM CHLORIDE 0.9 % (FLUSH) 0.9 %
5-40 SYRINGE (ML) INJECTION PRN
Status: DISCONTINUED | OUTPATIENT
Start: 2021-08-02 | End: 2021-08-05 | Stop reason: HOSPADM

## 2021-08-02 RX ORDER — HEPARIN SODIUM (PORCINE) LOCK FLUSH IV SOLN 100 UNIT/ML 100 UNIT/ML
3 SOLUTION INTRAVENOUS EVERY 12 HOURS SCHEDULED
Status: DISCONTINUED | OUTPATIENT
Start: 2021-08-02 | End: 2021-08-05 | Stop reason: HOSPADM

## 2021-08-02 RX ORDER — SODIUM CHLORIDE 9 MG/ML
25 INJECTION, SOLUTION INTRAVENOUS PRN
Status: DISCONTINUED | OUTPATIENT
Start: 2021-08-02 | End: 2021-08-05 | Stop reason: HOSPADM

## 2021-08-02 RX ORDER — LIDOCAINE HYDROCHLORIDE 10 MG/ML
5 INJECTION, SOLUTION EPIDURAL; INFILTRATION; INTRACAUDAL; PERINEURAL ONCE
Status: COMPLETED | OUTPATIENT
Start: 2021-08-02 | End: 2021-08-02

## 2021-08-02 RX ADMIN — HEPARIN SODIUM 5000 UNITS: 10000 INJECTION INTRAVENOUS; SUBCUTANEOUS at 14:07

## 2021-08-02 RX ADMIN — PANCRELIPASE 36000 UNITS: 60000; 12000; 38000 CAPSULE, DELAYED RELEASE PELLETS ORAL at 17:17

## 2021-08-02 RX ADMIN — POLYETHYLENE GLYCOL 3350 17 G: 17 POWDER, FOR SOLUTION ORAL at 07:47

## 2021-08-02 RX ADMIN — OXYCODONE AND ACETAMINOPHEN 1 TABLET: 5; 325 TABLET ORAL at 03:41

## 2021-08-02 RX ADMIN — HYDROMORPHONE HYDROCHLORIDE 0.5 MG: 1 INJECTION, SOLUTION INTRAMUSCULAR; INTRAVENOUS; SUBCUTANEOUS at 07:47

## 2021-08-02 RX ADMIN — SODIUM CHLORIDE, PRESERVATIVE FREE 300 UNITS: 5 INJECTION INTRAVENOUS at 21:30

## 2021-08-02 RX ADMIN — DOCUSATE SODIUM 100 MG: 100 CAPSULE, LIQUID FILLED ORAL at 07:46

## 2021-08-02 RX ADMIN — INSULIN LISPRO 3 UNITS: 100 INJECTION, SOLUTION INTRAVENOUS; SUBCUTANEOUS at 21:30

## 2021-08-02 RX ADMIN — OXYCODONE 2.5 MG: 5 TABLET ORAL at 17:17

## 2021-08-02 RX ADMIN — OXYCODONE 2.5 MG: 5 TABLET ORAL at 10:32

## 2021-08-02 RX ADMIN — SODIUM CHLORIDE, POTASSIUM CHLORIDE, SODIUM LACTATE AND CALCIUM CHLORIDE: 600; 310; 30; 20 INJECTION, SOLUTION INTRAVENOUS at 00:06

## 2021-08-02 RX ADMIN — OXYCODONE AND ACETAMINOPHEN 1 TABLET: 5; 325 TABLET ORAL at 17:17

## 2021-08-02 RX ADMIN — HYDROMORPHONE HYDROCHLORIDE 0.5 MG: 1 INJECTION, SOLUTION INTRAMUSCULAR; INTRAVENOUS; SUBCUTANEOUS at 18:56

## 2021-08-02 RX ADMIN — OXYCODONE 2.5 MG: 5 TABLET ORAL at 03:41

## 2021-08-02 RX ADMIN — ATORVASTATIN CALCIUM 20 MG: 20 TABLET, FILM COATED ORAL at 07:46

## 2021-08-02 RX ADMIN — INSULIN LISPRO 6 UNITS: 100 INJECTION, SOLUTION INTRAVENOUS; SUBCUTANEOUS at 11:53

## 2021-08-02 RX ADMIN — HYDROMORPHONE HYDROCHLORIDE 0.5 MG: 1 INJECTION, SOLUTION INTRAMUSCULAR; INTRAVENOUS; SUBCUTANEOUS at 11:52

## 2021-08-02 RX ADMIN — ERTAPENEM SODIUM 1000 MG: 1 INJECTION, POWDER, LYOPHILIZED, FOR SOLUTION INTRAMUSCULAR; INTRAVENOUS at 17:12

## 2021-08-02 RX ADMIN — ALBUTEROL SULFATE 2.5 MG: 2.5 SOLUTION RESPIRATORY (INHALATION) at 22:01

## 2021-08-02 RX ADMIN — SODIUM CHLORIDE, PRESERVATIVE FREE 10 ML: 5 INJECTION INTRAVENOUS at 15:38

## 2021-08-02 RX ADMIN — INSULIN GLARGINE 20 UNITS: 100 INJECTION, SOLUTION SUBCUTANEOUS at 07:51

## 2021-08-02 RX ADMIN — HEPARIN SODIUM 5000 UNITS: 10000 INJECTION INTRAVENOUS; SUBCUTANEOUS at 06:41

## 2021-08-02 RX ADMIN — SODIUM CHLORIDE, POTASSIUM CHLORIDE, SODIUM LACTATE AND CALCIUM CHLORIDE: 600; 310; 30; 20 INJECTION, SOLUTION INTRAVENOUS at 17:12

## 2021-08-02 RX ADMIN — OXYCODONE 2.5 MG: 5 TABLET ORAL at 21:29

## 2021-08-02 RX ADMIN — LINEZOLID 600 MG: 600 INJECTION, SOLUTION INTRAVENOUS at 14:07

## 2021-08-02 RX ADMIN — Medication 10 ML: at 21:29

## 2021-08-02 RX ADMIN — OXYCODONE AND ACETAMINOPHEN 1 TABLET: 5; 325 TABLET ORAL at 10:29

## 2021-08-02 RX ADMIN — MAGNESIUM GLUCONATE 500 MG ORAL TABLET 400 MG: 500 TABLET ORAL at 11:52

## 2021-08-02 RX ADMIN — SODIUM CHLORIDE, PRESERVATIVE FREE 10 ML: 5 INJECTION INTRAVENOUS at 07:47

## 2021-08-02 RX ADMIN — DOCUSATE SODIUM 100 MG: 100 CAPSULE, LIQUID FILLED ORAL at 21:29

## 2021-08-02 RX ADMIN — HYDROMORPHONE HYDROCHLORIDE 0.5 MG: 1 INJECTION, SOLUTION INTRAMUSCULAR; INTRAVENOUS; SUBCUTANEOUS at 00:11

## 2021-08-02 RX ADMIN — DEXTROSE MONOHYDRATE 100 MG: 50 INJECTION, SOLUTION INTRAVENOUS at 18:49

## 2021-08-02 RX ADMIN — LINEZOLID 600 MG: 600 INJECTION, SOLUTION INTRAVENOUS at 02:29

## 2021-08-02 RX ADMIN — INSULIN GLARGINE 20 UNITS: 100 INJECTION, SOLUTION SUBCUTANEOUS at 21:31

## 2021-08-02 RX ADMIN — OXYCODONE AND ACETAMINOPHEN 1 TABLET: 5; 325 TABLET ORAL at 21:29

## 2021-08-02 RX ADMIN — HYDROMORPHONE HYDROCHLORIDE 0.5 MG: 1 INJECTION, SOLUTION INTRAMUSCULAR; INTRAVENOUS; SUBCUTANEOUS at 15:38

## 2021-08-02 RX ADMIN — HYDROMORPHONE HYDROCHLORIDE 0.5 MG: 1 INJECTION, SOLUTION INTRAMUSCULAR; INTRAVENOUS; SUBCUTANEOUS at 23:35

## 2021-08-02 RX ADMIN — BUDESONIDE 1000 MCG: 0.5 SUSPENSION RESPIRATORY (INHALATION) at 22:02

## 2021-08-02 RX ADMIN — PANCRELIPASE 36000 UNITS: 60000; 12000; 38000 CAPSULE, DELAYED RELEASE PELLETS ORAL at 11:52

## 2021-08-02 RX ADMIN — POTASSIUM BICARBONATE 40 MEQ: 782 TABLET, EFFERVESCENT ORAL at 11:52

## 2021-08-02 RX ADMIN — PANCRELIPASE 36000 UNITS: 60000; 12000; 38000 CAPSULE, DELAYED RELEASE PELLETS ORAL at 07:46

## 2021-08-02 RX ADMIN — HEPARIN SODIUM 5000 UNITS: 10000 INJECTION INTRAVENOUS; SUBCUTANEOUS at 21:30

## 2021-08-02 RX ADMIN — LIDOCAINE HYDROCHLORIDE 5 ML: 10 INJECTION, SOLUTION EPIDURAL; INFILTRATION; INTRACAUDAL; PERINEURAL at 17:14

## 2021-08-02 ASSESSMENT — PAIN SCALES - GENERAL
PAINLEVEL_OUTOF10: 8
PAINLEVEL_OUTOF10: 8
PAINLEVEL_OUTOF10: 9
PAINLEVEL_OUTOF10: 6
PAINLEVEL_OUTOF10: 8
PAINLEVEL_OUTOF10: 9
PAINLEVEL_OUTOF10: 8
PAINLEVEL_OUTOF10: 6
PAINLEVEL_OUTOF10: 8
PAINLEVEL_OUTOF10: 9
PAINLEVEL_OUTOF10: 6

## 2021-08-02 ASSESSMENT — PAIN DESCRIPTION - ORIENTATION
ORIENTATION: LEFT;MID
ORIENTATION: LEFT

## 2021-08-02 ASSESSMENT — PAIN DESCRIPTION - ONSET
ONSET: ON-GOING
ONSET: ON-GOING

## 2021-08-02 ASSESSMENT — PAIN DESCRIPTION - PROGRESSION
CLINICAL_PROGRESSION: NOT CHANGED
CLINICAL_PROGRESSION: NOT CHANGED

## 2021-08-02 ASSESSMENT — PAIN DESCRIPTION - PAIN TYPE
TYPE: ACUTE PAIN
TYPE: ACUTE PAIN

## 2021-08-02 ASSESSMENT — PAIN DESCRIPTION - LOCATION
LOCATION: ABDOMEN
LOCATION: ABDOMEN

## 2021-08-02 ASSESSMENT — PAIN DESCRIPTION - FREQUENCY
FREQUENCY: CONTINUOUS
FREQUENCY: CONTINUOUS

## 2021-08-02 ASSESSMENT — PAIN DESCRIPTION - DESCRIPTORS
DESCRIPTORS: STABBING;DISCOMFORT;CONSTANT
DESCRIPTORS: ACHING;CONSTANT;DISCOMFORT

## 2021-08-02 NOTE — PROGRESS NOTES
Comprehensive Nutrition Assessment    Type and Reason for Visit:  Reassess    Nutrition Recommendations/Plan: Continue current diet, Start Ensure HP BID    Nutrition Assessment:  Pt remains nutritionally at risk w/ decreased PO intake s/p IR drainage of abscess s/p recent ERCP w/ pancreatic stent exchange, multiple GI surgeries 2/2 IPMN. Hx DM, pancreatitis. Will add ONS and monitor    Malnutrition Assessment:  Malnutrition Status:  Severe malnutrition    Context:  Chronic Illness     Findings of the 6 clinical characteristics of malnutrition:  Energy Intake:  7 - 75% or less estimated energy requirements for 1 month or longer  Weight Loss:  7 - Greater than 7.5% over 3 months     Body Fat Loss:  1 - Mild body fat loss Orbital, Triceps   Muscle Mass Loss:  1 - Mild muscle mass loss Temples (temporalis), Clavicles (pectoralis & deltoids)  Fluid Accumulation:  No significant fluid accumulation     Strength:  Not Performed    Estimated Daily Nutrient Needs:  Energy (kcal):   (MSJ 1629 x 1.3 SF); Weight Used for Energy Requirements:  Current     Protein (g):   (1.3-1.5); Weight Used for Protein Requirements:  Ideal        Fluid (ml/day):  ; Method Used for Fluid Requirements:  1 ml/kcal      Nutrition Related Findings:  pt alert, abd tenderness, active BS, open drain LUQ, fistula, trace edema, -I/Os      Wounds:  Multiple, Open Wounds, Surgical Incision       Current Nutrition Therapies:    ADULT DIET; Regular; Low Fat (less than or equal to 50 gm/day)    Anthropometric Measures:  · Height: 5' 8\" (172.7 cm)  · Current Body Weight: 185 lb (83.9 kg) (no method, UTO updated CBW; noted recent standing wt 186# 7/9/21)   · Usual Body Weight: 231 lb (104.8 kg) (actual per EMR 04/2021)     · Ideal Body Weight: 154 lbs; % Ideal Body Weight 120.1 %   · BMI: 28.1  · BMI Categories: Overweight (BMI 25.0-29. 9)       Nutrition Diagnosis:   · Severe malnutrition, In context of chronic illness related to catabolic illness (IPMN) as evidenced by poor intake prior to admission, weight loss 7.5% in 3 months, mild loss of subcutaneous fat, mild muscle loss      Nutrition Interventions:   Food and/or Nutrient Delivery:  Continue Current Diet, Start Oral Nutrition Supplement (Ensure HP BID)  Nutrition Education/Counseling:  Education not indicated   Coordination of Nutrition Care:  Continue to monitor while inpatient    Goals:  Consume >50% meals/ONS       Nutrition Monitoring and Evaluation:   Food/Nutrient Intake Outcomes:  Diet Advancement/Tolerance, Food and Nutrient Intake, Supplement Intake  Physical Signs/Symptoms Outcomes:  Biochemical Data, GI Status, Fluid Status or Edema, Nutrition Focused Physical Findings, Skin, Weight     Discharge Planning:     Too soon to determine     Electronically signed by Serina Alfaro MS, RD, LD on 8/2/21 at 1:45 PM EDT    Contact: [phone]

## 2021-08-02 NOTE — DISCHARGE SUMMARY
Physician Discharge Summary     Patient ID:  Lawerence Hamman  [id number]  42 y.o.  1960    Admit date: 7/28/2021    Discharge date and time: No discharge date for patient encounter. Admitting Physician: Krista Roberts MD     Admission Diagnoses: Intra-abdominal abscess [facility]) [K65.1]    Discharge Diagnoses: Principal Problem:    Intra-abdominal abscess ([facility] [address]  Active Problems:    Severe protein-calorie malnutrition ([facility] [address]  Resolved Problems:    * No resolved hospital problems. *      Admission Condition: fair    Discharged Condition: stable    Indication for Admission: Intra-abdominal abscess    Hospital Course/Procedures/Operation/treatments:   7/28: Patient presented to ED with worsening abdominal pain, tachycardia, and abdominal distension. He was admitted and endoscopy consulted for further recommendations regarding his recurrent intra-abdominal abscesses. He remained on IV antibiotics. 7/29: Patient remained NPO and on IV antibiotics. Eliquis held. ID continued to follow along. IR consulted and performed drain upsize. Patient tolerated procedure well. 7/30: Endoscopy recommended EGD with pancreatic stent removal and EUS with possible axios. Pain continued to be well controlled. He underwent EGD and removal of pancreatic stent with Dr. Sam Gilmore with no complications. 7/31: Patient reported feeling better overall. He was tolerating his diet and there was less drainage from his drain site. 8/1: Patient continued to feel well. Awaiting PICC placement. 8/2-8/5: Continued IV antibiotics. Drains with continued output. PICC placed. Pre-cert accepted. Patient's pain continued to be well controlled, diet was tolerated, and he continued having normal bowel function. He was deemed stable for discharge.      Consults:   IP CONSULT TO INFECTIOUS DISEASES  IP CONSULT TO PHARMACY  IP CONSULT TO GENERAL SURGERY    Significant Diagnostic Studies:   CT ABDOMEN PELVIS W IV CONTRAST Additional Contrast? Oral    Result Date: 7/28/2021  EXAMINATION: CT OF THE ABDOMEN AND PELVIS WITH CONTRAST 7/28/2021 8:40 pm TECHNIQUE: CT of the abdomen and pelvis was performed with the administration of intravenous contrast. Multiplanar reformatted images are provided for review. Dose modulation, iterative reconstruction, and/or weight based adjustment of the mA/kV was utilized to reduce the radiation dose to as low as reasonably achievable. COMPARISON: 07/22/2021 HISTORY: ORDERING SYSTEM PROVIDED HISTORY: blocked pancreatic stent TECHNOLOGIST PROVIDED HISTORY: Reason for exam:->blocked pancreatic stent Additional Contrast?->Oral What reading provider will be dictating this exam?->[facility] FINDINGS: Some images are degraded by patient motion artifact. Gallbladder is absent. Liver is homogeneous. The spleen is absent. Portions of the pancreatic body and tail are not identified consistent with a reported history of partial pancreatectomy. Again identified is a pigtail catheter within the left upper abdomen, positioned within the region of a collection of fluid and gas likely representing abscess. Given its ill-defined appearance, exact size measurement is difficult but this measures approximately 10.7 x 3.6 cm. Measured at the same level previously this was approximately 10.3 by 4.6 cm. Visually, this is relatively similar. There are persistent multiple foci of gas within this region which may be within this fluid collection or extraluminal and likely are related to infection. An underlying bowel perforation could produce this but thought to be less likely. There is some apparent retained packing material in the midline of the anterior upper abdomen similar to previous. Diffuse ill-defined soft tissue thickening and fat stranding in the upper abdomen is also similar to previous. Diffuse thickening of the stomach and proximal duodenum may be reactive. Notation is made of portal vein thrombosis.   This was also present on 2021 and the volume of portal vein thrombus appears subtly improved. Superior mesenteric vein is well opacified. No hydronephrosis. No bowel obstruction. Scattered air-fluid levels in the colon could be related to a diffuse colitis and appear similar to previous. Urinary bladder is unremarkable. Prostate is normal in size. Partial visualization of a small to moderate left pleural effusion appearing similar to slightly improved from previous with adjacent left lower lobe atelectasis. Minimal right basal atelectasis. Persistent collection of fluid and gas in the left upper abdomen likely representing abscess with drainage catheter remaining in place. The collection is similar to possibly slightly improved. Persistent portal vein thrombosis appearing similar to subtly improved. Left pleural effusion also appearing similar to slightly improved with persistent left greater than right basal atelectasis. Thickening of the stomach and proximal duodenum favored to be reactive. IR CHANGE DRAINAGE CATH    Result Date: 2021  Patient MRN: [id number] : 1960 Age:  62 years Gender: Male Order Date: 2021 11:38 AM Exam: IR CHANGE DRAINAGE CATH Number of Images: 2 views Indication:   drain exchange, likely clogged drain exchange, likely clogged What reading provider will be dictating this exam?->[facility] Comparison: None. Findings: Following a timeout at 1138 2 minutes and 45 seconds of fluoroscopy was utilized. A guidewire was placed through the indwelling tube. Subsequently the tube was removed. Subsequently a 15 Slovenian tube was replaced with a 14 Western Fauzia tube. Tube change       Discharge Exam:  GENERAL:  NAD. A&Ox3. LUNGS:  No increased work of breathing. CARDIOVASCULAR: RR   ABDOMEN:  Soft, non-distended, appropriately tender around drain. No guarding, rigidity, rebound.  Drain with thick tan pancreatic output 205 yesterday, upper midline gauze with minimal drainage, ostomy pouch with 25ml recorded    Disposition: SNF    In process/preliminary results:  Outstanding Order Results     Date and Time Order Name Status Description    7/29/2021 11:44 AM Miscellaneous Sendout 1 Preliminary     7/29/2021  8:30 AM PREPARE FRESH FROZEN PLASMA, 2 Units Preliminary     7/14/2021 12:00 PM PREPARE FRESH FROZEN PLASMA, 1 Units Preliminary     7/7/2021  4:10 PM Culture, Fungus Preliminary     7/7/2021  4:10 PM Culture with Smear, Acid Fast Bacillius Preliminary           Patient Instructions:   Current Discharge Medication List           Details   anidulafungin (ERAXIS) infusion Infuse 100 mg intravenously every 24 hours for 25 days Compound per protocol. Qty: 2500 mg, Refills: 0      linezolid (ZYVOX) 600 MG tablet Take 1 tablet by mouth 2 times daily for 25 days  Qty: 50 tablet, Refills: 0              Details   ertapenem (INVANZ) infusion Infuse 1,000 mg intravenously every 24 hours for 25 days Compound per protocol. Qty: 64112 mg, Refills: 0              Details   polyethylene glycol (GLYCOLAX) 17 g packet Take 17 g by mouth 2 times daily  Qty: 527 g, Refills: 1      sennosides-docusate sodium (SENOKOT-S) 8.6-50 MG tablet Take 2 tablets by mouth daily      oxyCODONE-acetaminophen (PERCOCET) 7.5-325 MG per tablet Take 1 tablet by mouth every 4 hours as needed for Pain. BANOPHEN 25 MG tablet take 2 tablets by mouth 1 HOUR PRIOR TO CONTRAST MEDIA ADMINISTRATION  Qty: 2 tablet, Refills: 0    Comments: Patient should not drive or operate heavy machinery for 4-6 hours after administration. acetaminophen (TYLENOL) 500 MG tablet Take 2 tablets by mouth every 6 hours  Qty: 120 tablet, Refills: 0      ibuprofen (ADVIL;MOTRIN) 600 MG tablet Take 1 tablet by mouth 3 times daily (with meals)  Qty: 120 tablet, Refills: 0      apixaban (ELIQUIS) 5 MG TABS tablet Take 1 tablet by mouth 2 times daily .  The first dose of this Eliquis should be taken approximately 12 hours after you received your last dose of Lovenox (the blood thinner injection into your skin) given in the hospital. Continue to take the Eliquis approximately every 12 hours. Qty: 60 tablet, Refills: 3      losartan (COZAAR) 100 MG tablet take 1 tablet by mouth once daily  Qty: 30 tablet, Refills: 3      hydroCHLOROthiazide (HYDRODIURIL) 12.5 MG tablet take 1 tablet by mouth once daily  Qty: 30 tablet, Refills: 3      albuterol (PROVENTIL) (2.5 MG/3ML) 0.083% nebulizer solution Take 3 mLs by nebulization 4 times daily  Qty: 120 each, Refills: 3      budesonide (PULMICORT) 0.5 MG/2ML nebulizer suspension Take 4 mLs by nebulization 2 times daily  Qty: 60 ampule, Refills: 3      lipase-protease-amylase (CREON) 74068 units CPEP delayed release capsule Take 2 capsules by mouth 3 times daily (with meals)  Qty: 540 capsule, Refills: 3      pantoprazole (PROTONIX) 40 MG tablet Take 1 tablet by mouth 2 times daily (before meals)  Qty: 60 tablet, Refills: 0      atorvastatin (LIPITOR) 20 MG tablet Take 1 tablet by mouth daily  Qty: 30 tablet, Refills: 3      gabapentin (NEURONTIN) 300 MG capsule Take 1 capsule by mouth 2 times daily.       naloxone 4 MG/0.1ML LIQD nasal spray 1 spray by Nasal route as needed for Opioid Reversal  Qty: 1 each, Refills: 5      benzonatate (TESSALON) 200 MG capsule Take 200 mg by mouth 3 times daily as needed for Cough      insulin lispro, 1 Unit Dial, (HUMALOG KWIKPEN) 100 UNIT/ML SOPN Inject 0-12 Units into the skin 3 times daily (before meals) Glucose: Dose:  If <139             No Insulin  140-199 2 Units  200-249 4 Units  250-299 6 Units  300-349 8 Units  350-400 10 Units  Above 400       12 Units  Qty: 10.8 mL, Refills: 0      guaiFENesin (ROBITUSSIN) 100 MG/5ML SOLN oral solution Take 15 mLs by mouth 3 times daily  Qty: 1200 mL, Refills: 0      insulin glargine (LANTUS SOLOSTAR) 100 UNIT/ML injection pen Inject 20 Units into the skin 2 times daily  Qty: 12 mL, Refills: 3      insulin lispro, 1 Unit Dial, (HUMALOG KWIKPEN) 100 UNIT/ML SOPN Inject 7 Units into the skin 3 times daily (before meals)  Qty: 6.3 mL, Refills: 3             Follow up:   Angie Lozada MD  [place] [id number]  [phone]    Schedule an appointment as soon as possible for a visit in 2 weeks      Oneil Hamman, MD  [address] [facility] [address] Kat[address]  [phone]    Schedule an appointment as soon as possible for a visit  follow up    [facility] [address] [phone]    Schedule an appointment as soon as possible for a visit  follow up       Signed:  Francesca Peraza MD  8/5/2021  5:48 PM

## 2021-08-02 NOTE — PROGRESS NOTES
to linezolid; daptomycin sensitivity not done), moderate growth of MRSE, light growth of Candida krusei, moderate growth of lactobacillus. Vancomycin was started on 07/22 then switched to linezolid. He was subsequently transferred to [facility] for further management. He underwent abscess drain tube exchange on 07/29. Abscess fluid Gram stain and culture showed abundant polymorphonuclear leukocytes, no epithelial cells, abundant gram-negative rods, mixed Gram-positive organisms including light growth of Staphylococcus hominis, heavy growth of lactobacillus, alphahemolytic Streptococcus species, anaerobes. He underwent ERCP with pancreatic stent removal on 07/30. Subjective: Patient was seen and examined. No chills, has abdominal pain, has diarrhea, no rash, no itching. Afebrile. Objective:    Vitals:    08/02/21 0747   BP: 128/83   Pulse: 67   Resp: 16   Temp: 97.5 °F (36.4 °C)   SpO2: 98%     Constitutional: Alert, not in distress  Respiratory: Clear breath sounds, no crackles, no wheezes  Cardiovascular: Regular rate and rhythm, no murmurs  Gastrointestinal: Bowel sounds present, soft, nontender  Skin: Warm and dry, no active dermatoses  Musculoskeletal: No joint swelling, no joint erythema    Labs, imaging, and medical records/notes were personally reviewed. Assessment:  VRE faecium and gallinarum infection/abscess  Candida krusei infection/abscess  Intraabdominal abscess/surgical site infection, s/p percutaneous drain placement on 05/28, inadequate drainage prompting additional percutaneous drain placements on 06/30. Unfortunately, source control has been difficult to achieve and increasingly antibiotic-resistant pathogens are developing.   Pancreatic duct abscess status post ERCP with exchange of pancreatic duct stent and balloon sweep of pancreatic duct with removal of large amount of debris/pus on 07/02  Recent distal pancreatectomy with splenectomy ZB 36/06 complicated by pancreatic leak s/p pancreatic stent placement on 05/17 then removal on 07/30    Recommendations:  Continue ertapenem 1 g every 24 hours and linezolid 600 mg every 12 hours. Continue anidulafungin 200 mg loading dose once followed by 100 mg every 24 hours. Anticipate 4 weeks of antibiotic therapy from 07/30-08/27. Follow up blood and abscess fluid cultures. Change midline to PICC line. Continue supportive care.     Thank you for involving me in the care of Margarita Adrian. I will continue to follow. Please do not hesitate to call for any questions or concerns.     Electronically signed by Juliette Lozada MD on 8/2/2021 at 11:46 AM

## 2021-08-02 NOTE — PLAN OF CARE
Problem: Skin Integrity:  Goal: Will show no infection signs and symptoms  Description: Will show no infection signs and symptoms  8/2/2021 1103 by Maria Guadalupe Barr RN  Outcome: Met This Shift     Problem: Skin Integrity:  Goal: Absence of new skin breakdown  Description: Absence of new skin breakdown  8/2/2021 1103 by Maria Guadalupe Barr RN  Outcome: Met This Shift     Problem: Falls - Risk of:  Goal: Will remain free from falls  Description: Will remain free from falls  8/2/2021 1103 by Maria Guadalupe Barr RN  Outcome: Met This Shift     Problem: Falls - Risk of:  Goal: Absence of physical injury  Description: Absence of physical injury  8/2/2021 1103 by Maria Guadalupe Barr RN  Outcome: Met This Shift     Problem: Pain:  Description: Pain management should include both nonpharmacologic and pharmacologic interventions. Goal: Pain level will decrease  Description: Pain level will decrease  8/2/2021 1103 by Maria Guadalupe Barr RN  Outcome: Met This Shift     Problem: Pain:  Description: Pain management should include both nonpharmacologic and pharmacologic interventions.   Goal: Control of acute pain  Description: Control of acute pain  8/2/2021 1103 by Maria Guadalupe Barr RN  Outcome: Met This Shift

## 2021-08-02 NOTE — ANESTHESIA POSTPROCEDURE EVALUATION
Department of Anesthesiology  Postprocedure Note    Patient: Chantal High  MRN: [id number]  YOB: 1960  Date of evaluation: 8/2/2021  Time:  10:28 AM     Procedure Summary     Date: 07/29/21 Room / Location: [address] Scan; [address] [place] [address] Procedures; [address] Radiology    Anesthesia Start: 7008 Anesthesia Stop: 3487    Procedures:       [facility] CT BIOPSY W ANESTHESIA      IR CHANGE DRAINAGE CATH Diagnosis: (drain exchange, likely clogged)    Scheduled Providers: Tiffani Radiologist Responsible Provider: Prudencio Gilliland MD    Anesthesia Type: MAC ASA Status: 4          Anesthesia Type: MAC    Dominic Phase I: Dominic Score: 10    Dominic Phase II:      Last vitals: Reviewed and per EMR flowsheets. Anesthesia Post Evaluation    Patient location during evaluation: PACU  Patient participation: complete - patient participated  Level of consciousness: awake and alert  Airway patency: patent  Nausea & Vomiting: no nausea and no vomiting  Complications: no  Cardiovascular status: hemodynamically stable  Respiratory status: acceptable  Hydration status: euvolemic  Comments: Department of Anesthesiology  Post-Anesthesia Note    Name:  Chantal High                                         Age:  62 y.o.   MRN:  [id number]     Last Vitals:  /83   Pulse 67   Temp 97.5 °F (36.4 °C) (Oral)   Resp 16   Ht 5' 8\" (1.727 m)   Wt 185 lb (83.9 kg)   SpO2 98%   BMI 28.13 kg/m²   Patient Vitals in the past 4 hrs:  08/02/21 0747, BP:128/83, Temp:97.5 °F (36.4 °C), Temp src:Oral, Pulse:67, Resp:16, SpO2:98 %    Level of Consciousness:  Awake    Respiratory:  Stable    Oxygen Saturation:  Stable    Cardiovascular:  Stable    Hydration:  Adequate    PONV:  Stable    Post-op Pain:  Adequate analgesia    Post-op Assessment:  No apparent anesthetic complications    Additional Follow-Up / Treatment / Comment:  None    Prudencio Gilliland MD  August 2, 2021   10:28 AM

## 2021-08-02 NOTE — CARE COORDINATION
I met with patient in room to discuss transition of care. Patient said he would like to go to [facility] at discharge. Destiny dumont [facility] notified and will start precert today.   Ryne Bianchi RN CM

## 2021-08-02 NOTE — PROGRESS NOTES
[facility] SURGERY  DAILY PROGRESS NOTE  8/2/2021    CHIEF COMPLAINT:  POPF    SUBJECTIVE:  No acute events overnight. This AM, patient with no complaints. Tolerating regular diet, passing flatus and having bowel movement. Decreasing output from pouched fistula since drain was placed    OBJECTIVE:  /75   Pulse 79   Temp 97.1 °F (36.2 °C) (Temporal)   Resp 16   Ht 5' 8\" (1.727 m)   Wt 185 lb (83.9 kg)   SpO2 97%   BMI 28.13 kg/m²     GENERAL:  NAD. A&Ox3. LUNGS:  No increased work of breathing. CARDIOVASCULAR: RR   ABDOMEN:  Soft, non-distended, appropriately tender around drain. No guarding, rigidity, rebound.  Drain with thick tan pancreatic output 105 yesterday, upper midline gauze with minimal drainage, ostomy pouch with 5ml recorded    ASSESSMENT/PLAN:  62 y.o. male with history of distal pancreatectomy and splenectomy with POPF and infected pancreatic fluid collection with clogged drain s/p 7/29 IR drain exchange and 7/30 ERCP with pancreatic stent removal - unable to access fluid collection endoscopically    - pain controlled - change dilaudid to breakthrough only, continue tylenol and oxy  - low fat diet as tolerated with creon  - monitor drain output and fistula output  - PICC and antibiotics per ID - invanz, zyvox, eraxis  - [facility] - [facility] unsure about returning to [facility] and would like to discuss other options if possible  - medically ready for discharge to LTAC once he has PICC    Ching Portillo MD  Surgery Resident PGY-2  8/2/2021  4:58 AM    Electronically signed by Samina Rapp MD on 8/2/2021 at 7:51 AM     Attending Physician Statement:    Chief Complaint: abscess    I have examined the patient and performed the key aspects of physical exam, reviewed the record (including all pertinent and new radiology images and laboratory findings), and discussed the case with the surgical team.  I agree with the assessment and plan with the following additions, corrections, and changes. 14pt review of symptoms completed and negative except as mentioned. Continue current. Await placement. Samreen Colvin MD  08/02/21  8:04 PM    NOTE: This report, in part or full, may have been transcribed using voice recognition software. Every effort was made to ensure accuracy; however, inadvertent computerized transcription errors may be present. Please excuse any transcriptional grammatical or spelling errors that may have escaped my editorial review.

## 2021-08-02 NOTE — PROGRESS NOTES
Power picc Placement 8/2/2021    Product number: [id number]   Lot Number: [id number]      Ultrasound: yes   Left Brachial vein:                Upper Arm Circumference: 30cm    Size: 5fdl    Exposed Length: 0    Internal Length: 40cm   Cut: 15cm   Vein Measurement: 0.6cm  Bulls eye  Inserted by jeanette Molina RN  8/2/2021  4:58 PM

## 2021-08-03 LAB
ALBUMIN SERPL-MCNC: 2.9 G/DL (ref 3.5–5.2)
ALP BLD-CCNC: 52 U/L (ref 40–129)
ALT SERPL-CCNC: 11 U/L (ref 0–40)
ANION GAP SERPL CALCULATED.3IONS-SCNC: 8 MMOL/L (ref 7–16)
ANISOCYTOSIS: ABNORMAL
AST SERPL-CCNC: 17 U/L (ref 0–39)
BASOPHILS ABSOLUTE: 0 E9/L (ref 0–0.2)
BASOPHILS RELATIVE PERCENT: 0.4 % (ref 0–2)
BILIRUB SERPL-MCNC: <0.2 MG/DL (ref 0–1.2)
BLOOD CULTURE, ROUTINE: NORMAL
BUN BLDV-MCNC: 7 MG/DL (ref 6–20)
CALCIUM SERPL-MCNC: 8.9 MG/DL (ref 8.6–10.2)
CHLORIDE BLD-SCNC: 99 MMOL/L (ref 98–107)
CO2: 29 MMOL/L (ref 22–29)
CREAT SERPL-MCNC: 0.6 MG/DL (ref 0.7–1.2)
CULTURE, BLOOD 2: NORMAL
EOSINOPHILS ABSOLUTE: 0.42 E9/L (ref 0.05–0.5)
EOSINOPHILS RELATIVE PERCENT: 6.1 % (ref 0–6)
GFR AFRICAN AMERICAN: >60
GFR NON-AFRICAN AMERICAN: >60 ML/MIN/1.73
GLUCOSE BLD-MCNC: 146 MG/DL (ref 74–99)
HCT VFR BLD CALC: 27.9 % (ref 37–54)
HEMOGLOBIN: 8.5 G/DL (ref 12.5–16.5)
HYPOCHROMIA: ABNORMAL
LYMPHOCYTES ABSOLUTE: 2.07 E9/L (ref 1.5–4)
LYMPHOCYTES RELATIVE PERCENT: 29.6 % (ref 20–42)
MAGNESIUM: 1.7 MG/DL (ref 1.6–2.6)
MCH RBC QN AUTO: 22.3 PG (ref 26–35)
MCHC RBC AUTO-ENTMCNC: 30.5 % (ref 32–34.5)
MCV RBC AUTO: 73.2 FL (ref 80–99.9)
METER GLUCOSE: 139 MG/DL (ref 74–99)
METER GLUCOSE: 176 MG/DL (ref 74–99)
METER GLUCOSE: 201 MG/DL (ref 74–99)
METER GLUCOSE: 228 MG/DL (ref 74–99)
MONOCYTES ABSOLUTE: 0.62 E9/L (ref 0.1–0.95)
MONOCYTES RELATIVE PERCENT: 8.7 % (ref 2–12)
NEUTROPHILS ABSOLUTE: 3.86 E9/L (ref 1.8–7.3)
NEUTROPHILS RELATIVE PERCENT: 55.7 % (ref 43–80)
NUCLEATED RED BLOOD CELLS: 0.9 /100 WBC
OVALOCYTES: ABNORMAL
PDW BLD-RTO: 20.8 FL (ref 11.5–15)
PLATELET # BLD: 640 E9/L (ref 130–450)
PMV BLD AUTO: 10.3 FL (ref 7–12)
POIKILOCYTES: ABNORMAL
POLYCHROMASIA: ABNORMAL
POTASSIUM REFLEX MAGNESIUM: 3.5 MMOL/L (ref 3.5–5)
RBC # BLD: 3.81 E12/L (ref 3.8–5.8)
SMUDGE CELLS: ABNORMAL
SODIUM BLD-SCNC: 136 MMOL/L (ref 132–146)
TARGET CELLS: ABNORMAL
TOTAL PROTEIN: 6.1 G/DL (ref 6.4–8.3)
WBC # BLD: 6.9 E9/L (ref 4.5–11.5)

## 2021-08-03 PROCEDURE — 1200000000 HC SEMI PRIVATE

## 2021-08-03 PROCEDURE — 82962 GLUCOSE BLOOD TEST: CPT

## 2021-08-03 PROCEDURE — 80053 COMPREHEN METABOLIC PANEL: CPT

## 2021-08-03 PROCEDURE — 36415 COLL VENOUS BLD VENIPUNCTURE: CPT

## 2021-08-03 PROCEDURE — 6370000000 HC RX 637 (ALT 250 FOR IP): Performed by: STUDENT IN AN ORGANIZED HEALTH CARE EDUCATION/TRAINING PROGRAM

## 2021-08-03 PROCEDURE — 6360000002 HC RX W HCPCS: Performed by: INTERNAL MEDICINE

## 2021-08-03 PROCEDURE — 6360000002 HC RX W HCPCS: Performed by: STUDENT IN AN ORGANIZED HEALTH CARE EDUCATION/TRAINING PROGRAM

## 2021-08-03 PROCEDURE — 83735 ASSAY OF MAGNESIUM: CPT

## 2021-08-03 PROCEDURE — 85025 COMPLETE CBC W/AUTO DIFF WBC: CPT

## 2021-08-03 PROCEDURE — 2580000003 HC RX 258: Performed by: INTERNAL MEDICINE

## 2021-08-03 PROCEDURE — 94640 AIRWAY INHALATION TREATMENT: CPT

## 2021-08-03 RX ADMIN — HEPARIN SODIUM 5000 UNITS: 10000 INJECTION INTRAVENOUS; SUBCUTANEOUS at 13:49

## 2021-08-03 RX ADMIN — HYDROMORPHONE HYDROCHLORIDE 0.5 MG: 1 INJECTION, SOLUTION INTRAMUSCULAR; INTRAVENOUS; SUBCUTANEOUS at 12:02

## 2021-08-03 RX ADMIN — SODIUM CHLORIDE, PRESERVATIVE FREE 300 UNITS: 5 INJECTION INTRAVENOUS at 08:09

## 2021-08-03 RX ADMIN — INSULIN LISPRO 3 UNITS: 100 INJECTION, SOLUTION INTRAVENOUS; SUBCUTANEOUS at 18:12

## 2021-08-03 RX ADMIN — INSULIN GLARGINE 20 UNITS: 100 INJECTION, SOLUTION SUBCUTANEOUS at 21:18

## 2021-08-03 RX ADMIN — Medication 10 ML: at 08:09

## 2021-08-03 RX ADMIN — OXYCODONE 2.5 MG: 5 TABLET ORAL at 04:31

## 2021-08-03 RX ADMIN — OXYCODONE 2.5 MG: 5 TABLET ORAL at 10:45

## 2021-08-03 RX ADMIN — LINEZOLID 600 MG: 600 INJECTION, SOLUTION INTRAVENOUS at 13:49

## 2021-08-03 RX ADMIN — OXYCODONE 2.5 MG: 5 TABLET ORAL at 21:15

## 2021-08-03 RX ADMIN — OXYCODONE AND ACETAMINOPHEN 1 TABLET: 5; 325 TABLET ORAL at 04:31

## 2021-08-03 RX ADMIN — HYDROMORPHONE HYDROCHLORIDE 0.5 MG: 1 INJECTION, SOLUTION INTRAMUSCULAR; INTRAVENOUS; SUBCUTANEOUS at 17:33

## 2021-08-03 RX ADMIN — INSULIN GLARGINE 20 UNITS: 100 INJECTION, SOLUTION SUBCUTANEOUS at 08:09

## 2021-08-03 RX ADMIN — INSULIN LISPRO 6 UNITS: 100 INJECTION, SOLUTION INTRAVENOUS; SUBCUTANEOUS at 21:17

## 2021-08-03 RX ADMIN — HEPARIN SODIUM 5000 UNITS: 10000 INJECTION INTRAVENOUS; SUBCUTANEOUS at 21:13

## 2021-08-03 RX ADMIN — PANCRELIPASE 36000 UNITS: 60000; 12000; 38000 CAPSULE, DELAYED RELEASE PELLETS ORAL at 08:09

## 2021-08-03 RX ADMIN — ALBUTEROL SULFATE 2.5 MG: 2.5 SOLUTION RESPIRATORY (INHALATION) at 15:50

## 2021-08-03 RX ADMIN — HYDROMORPHONE HYDROCHLORIDE 0.5 MG: 1 INJECTION, SOLUTION INTRAMUSCULAR; INTRAVENOUS; SUBCUTANEOUS at 06:07

## 2021-08-03 RX ADMIN — OXYCODONE AND ACETAMINOPHEN 1 TABLET: 5; 325 TABLET ORAL at 10:45

## 2021-08-03 RX ADMIN — INSULIN LISPRO 6 UNITS: 100 INJECTION, SOLUTION INTRAVENOUS; SUBCUTANEOUS at 11:56

## 2021-08-03 RX ADMIN — OXYCODONE 2.5 MG: 5 TABLET ORAL at 15:23

## 2021-08-03 RX ADMIN — Medication 10 ML: at 21:20

## 2021-08-03 RX ADMIN — Medication 800 MG: at 08:08

## 2021-08-03 RX ADMIN — OXYCODONE AND ACETAMINOPHEN 1 TABLET: 5; 325 TABLET ORAL at 21:14

## 2021-08-03 RX ADMIN — DEXTROSE MONOHYDRATE 100 MG: 50 INJECTION, SOLUTION INTRAVENOUS at 18:13

## 2021-08-03 RX ADMIN — SODIUM CHLORIDE, PRESERVATIVE FREE 300 UNITS: 5 INJECTION INTRAVENOUS at 21:13

## 2021-08-03 RX ADMIN — ATORVASTATIN CALCIUM 20 MG: 20 TABLET, FILM COATED ORAL at 08:09

## 2021-08-03 RX ADMIN — DOCUSATE SODIUM 100 MG: 100 CAPSULE, LIQUID FILLED ORAL at 08:08

## 2021-08-03 RX ADMIN — OXYCODONE AND ACETAMINOPHEN 1 TABLET: 5; 325 TABLET ORAL at 15:23

## 2021-08-03 RX ADMIN — LINEZOLID 600 MG: 600 INJECTION, SOLUTION INTRAVENOUS at 02:45

## 2021-08-03 RX ADMIN — POLYETHYLENE GLYCOL 3350 17 G: 17 POWDER, FOR SOLUTION ORAL at 08:11

## 2021-08-03 RX ADMIN — ERTAPENEM SODIUM 1000 MG: 1 INJECTION, POWDER, LYOPHILIZED, FOR SOLUTION INTRAMUSCULAR; INTRAVENOUS at 15:23

## 2021-08-03 RX ADMIN — DOCUSATE SODIUM 100 MG: 100 CAPSULE, LIQUID FILLED ORAL at 21:14

## 2021-08-03 RX ADMIN — PANCRELIPASE 36000 UNITS: 60000; 12000; 38000 CAPSULE, DELAYED RELEASE PELLETS ORAL at 18:13

## 2021-08-03 RX ADMIN — ALBUTEROL SULFATE 2.5 MG: 2.5 SOLUTION RESPIRATORY (INHALATION) at 19:43

## 2021-08-03 RX ADMIN — HEPARIN SODIUM 5000 UNITS: 10000 INJECTION INTRAVENOUS; SUBCUTANEOUS at 06:07

## 2021-08-03 RX ADMIN — BUDESONIDE 1000 MCG: 0.5 SUSPENSION RESPIRATORY (INHALATION) at 19:43

## 2021-08-03 RX ADMIN — PANCRELIPASE 36000 UNITS: 60000; 12000; 38000 CAPSULE, DELAYED RELEASE PELLETS ORAL at 11:57

## 2021-08-03 ASSESSMENT — PAIN DESCRIPTION - DESCRIPTORS
DESCRIPTORS: DISCOMFORT
DESCRIPTORS: ACHING;CONSTANT;DISCOMFORT
DESCRIPTORS: ACHING;CONSTANT;DISCOMFORT
DESCRIPTORS: DISCOMFORT
DESCRIPTORS: ACHING;CONSTANT;DISCOMFORT

## 2021-08-03 ASSESSMENT — PAIN DESCRIPTION - LOCATION
LOCATION: ABDOMEN

## 2021-08-03 ASSESSMENT — PAIN - FUNCTIONAL ASSESSMENT
PAIN_FUNCTIONAL_ASSESSMENT: PREVENTS OR INTERFERES SOME ACTIVE ACTIVITIES AND ADLS
PAIN_FUNCTIONAL_ASSESSMENT: ACTIVITIES ARE NOT PREVENTED
PAIN_FUNCTIONAL_ASSESSMENT: PREVENTS OR INTERFERES SOME ACTIVE ACTIVITIES AND ADLS

## 2021-08-03 ASSESSMENT — PAIN DESCRIPTION - PAIN TYPE
TYPE: ACUTE PAIN

## 2021-08-03 ASSESSMENT — PAIN DESCRIPTION - ONSET
ONSET: ON-GOING

## 2021-08-03 ASSESSMENT — PAIN DESCRIPTION - ORIENTATION
ORIENTATION: LEFT
ORIENTATION: LEFT;UPPER
ORIENTATION: LEFT;UPPER
ORIENTATION: LEFT
ORIENTATION: LEFT;UPPER

## 2021-08-03 ASSESSMENT — PAIN SCALES - GENERAL
PAINLEVEL_OUTOF10: 9
PAINLEVEL_OUTOF10: 8
PAINLEVEL_OUTOF10: 9
PAINLEVEL_OUTOF10: 8
PAINLEVEL_OUTOF10: 7
PAINLEVEL_OUTOF10: 9
PAINLEVEL_OUTOF10: 9

## 2021-08-03 ASSESSMENT — PAIN DESCRIPTION - PROGRESSION
CLINICAL_PROGRESSION: NOT CHANGED

## 2021-08-03 ASSESSMENT — PAIN DESCRIPTION - FREQUENCY
FREQUENCY: CONTINUOUS

## 2021-08-03 ASSESSMENT — PAIN DESCRIPTION - DIRECTION: RADIATING_TOWARDS: `

## 2021-08-03 NOTE — PROGRESS NOTES
ELIZABETHIDA PROGRESS NOTE      Chief complaint: Follow-up of intraabdominal abscess    The patient is a 62 y.o. male with history of hypertension; hyperlipidemia; DM; IPMN of pancreatic tail status post robotic converted to open distal pancreatectomy with splenectomy on 05/12 complicated by pancreatic leak requiring stent placement on 05/17, intra-abdominal abscess status post percutaneous drain placement on 05/28 with abscess fluid culture showing Enterobacter cloacae, Klebsiella oxytoca, alphahemolytic Streptococcus, non-ESBL E. coli, anaerobic gram-negative rods, coagulase-negative Staphylococcus, Cronobacter sakazakii for which he was given a course of ciprofloxacin and metronidazole for 4 weeks until 06/25 then found to have inadequate drainage of abscess prompting another percutaneous drain placement on 06/30 and ERCP with exchange of pancreatic duct stent and balloon sweep of pancreatic duct with removal of large amount of debris/pus on 07/02 with abscess fluid culture showing light growth of Klebsiella oxytoca (non-ESBL; resistant to ampicillin; susceptible to fluoroquinolones and co-trimoxazole), heavy growth of alphahemolytic Streptococcus, MSSA and Lactobacillus, light growth of Candida albicans for which he was discharged to [facility] [place], [place] on a 4-week course of ertapenem and fluconazole from 07/02-07/30; presented on 07/28 for further evaluation of intra-abdominal abscess. While at [facility] [place], [place], he started having fever with CT abdomen and pelvis on 07/22 showing moderate volume fluid collection within the surgical bed likely representing abscess with a surgical drain within the abscess but with collection stable to minimally decreased.   Abscess fluid Gram stain and culture on 07/22 showed abundant polymorphonuclear leukocytes, no epithelial cells, abundant gram variable rods, heavy growth of Enterococcus gallinarum and faecium (resistant to ampicillin, doxycycline, vancomycin, streptomycin; susceptible to linezolid; daptomycin sensitivity not done), moderate growth of MRSE, light growth of Candida krusei, moderate growth of lactobacillus. Vancomycin was started on 07/22 then switched to linezolid. He was subsequently transferred to [facility] for further management. He underwent abscess drain tube exchange on 07/29. Abscess fluid Gram stain and culture showed abundant polymorphonuclear leukocytes, no epithelial cells, abundant gram-negative rods, mixed Gram-positive organisms including light growth of Staphylococcus hominis, heavy growth of lactobacillus, alphahemolytic Streptococcus species, anaerobes. He underwent ERCP with pancreatic stent removal on 07/30. Subjective: Patient was seen and examined. No chills, abdominal pain controlled, has diarrhea, no rash, no itching. Afebrile. Objective:    Vitals:    08/03/21 0752   BP: (!) 125/90   Pulse: 83   Resp: 16   Temp: 97.6 °F (36.4 °C)   SpO2: 100%     Constitutional: Alert, not in distress  Respiratory: Clear breath sounds, no crackles, no wheezes  Cardiovascular: Regular rate and rhythm, no murmurs  Gastrointestinal: Bowel sounds present, soft, nontender  Skin: Warm and dry, no active dermatoses  Musculoskeletal: No joint swelling, no joint erythema    Labs, imaging, and medical records/notes were personally reviewed. Assessment:  VRE faecium and gallinarum infection/abscess  Candida krusei infection/abscess  Intraabdominal abscess/surgical site infection, s/p percutaneous drain placement on 05/28, inadequate drainage prompting additional percutaneous drain placements on 06/30. Unfortunately, source control has been difficult to achieve and increasingly antibiotic-resistant pathogens are developing.   Pancreatic duct abscess status post ERCP with exchange of pancreatic duct stent and balloon sweep of pancreatic duct with removal of large amount of debris/pus on 07/02  Recent distal pancreatectomy with splenectomy SC 77/78 complicated by pancreatic leak s/p pancreatic stent placement on 05/17 then removal on 07/30    Recommendations:  Continue ertapenem 1 g every 24 hours and linezolid 600 mg every 12 hours. Continue anidulafungin 200 mg loading dose once followed by 100 mg every 24 hours. Anticipate 4 weeks of antibiotic therapy from 07/30-08/27. Follow up blood and abscess fluid cultures. Maintain PICC care. Continue supportive care.     Thank you for involving me in the care of Bethaman Hazellourdes. I will continue to follow. Please do not hesitate to call for any questions or concerns.     Electronically signed by Yessy Emery MD on 8/3/2021 at 12:04 PM

## 2021-08-03 NOTE — CARE COORDINATION
Per Yareli Xavier from [employer], precert is still pending which was started yesterday. Ambulance form in envelope in soft chart.   Uriel Ellis RN CM

## 2021-08-03 NOTE — PROGRESS NOTES
[facility] SURGERY  DAILY PROGRESS NOTE  8/3/2021    CHIEF COMPLAINT:  POPF    SUBJECTIVE:  PICC placed yesterday. No acute events overnight. This AM, with some abdominal pain. Denies nausea, vomiting, chest pain, or SOB. OBJECTIVE:  BP (!) 129/90   Pulse 76   Temp 98.1 °F (36.7 °C) (Oral)   Resp 16   Ht 5' 8\" (1.727 m)   Wt 185 lb (83.9 kg)   SpO2 98%   BMI 28.13 kg/m²     GENERAL:  NAD. A&Ox3. LUNGS:  No increased work of breathing. CARDIOVASCULAR: RR   ABDOMEN:  Soft, non-distended, appropriately tender around drain. No guarding, rigidity, rebound. Drain with thick tan pancreatic output 230 yesterday, upper midline gauze with minimal drainage, ostomy pouch with 75ml recorded    ASSESSMENT/PLAN:  62 y.o. male with history of distal pancreatectomy and splenectomy with POPF and infected pancreatic fluid collection with clogged drain s/p 7/29 IR drain exchange and 7/30 ERCP with pancreatic stent removal - unable to access fluid collection endoscopically. Now has PICC in place.     - pain controlled - percocet, edna, dilaudid breakthrough  - low fat diet as tolerated with creon  - discontinue mIVF  - monitor drain output and fistula output  - PICC and antibiotics per ID - invanz, zyvox, eraxis  - medically ready for discharge to LTAC once approved    Adela Hawkins MD  Surgery Resident PGY-2  8/3/2021  5:29 AM      Attending Physician Statement:    Chief Complaint: No chief complaint on file. I have examined the patient and performed the key aspects of physical exam, reviewed the record (including all pertinent and new radiology images and laboratory findings), and discussed the case with the surgical team.  I agree with the assessment and plan with the following additions, corrections, and changes. 14pt review of symptoms completed and negative except as mentioned. Stable. Awaiting precert. Okay for discharge.  Will need interval imaging likely as OP    Karina Vu MD  08/03/21  11:19 AM    NOTE: This report, in part or full, may have been transcribed using voice recognition software. Every effort was made to ensure accuracy; however, inadvertent computerized transcription errors may be present. Please excuse any transcriptional grammatical or spelling errors that may have escaped my editorial review.

## 2021-08-03 NOTE — PLAN OF CARE
Problem: Falls - Risk of:  Goal: Will remain free from falls  Description: Will remain free from falls  8/3/2021 0816 by Maisha Charles RN  Outcome: Met This Shift     Problem: Falls - Risk of:  Goal: Absence of physical injury  Description: Absence of physical injury  Outcome: Met This Shift

## 2021-08-04 LAB
ALBUMIN SERPL-MCNC: 2.9 G/DL (ref 3.5–5.2)
ALP BLD-CCNC: 57 U/L (ref 40–129)
ALT SERPL-CCNC: 10 U/L (ref 0–40)
ANION GAP SERPL CALCULATED.3IONS-SCNC: 10 MMOL/L (ref 7–16)
ANISOCYTOSIS: ABNORMAL
AST SERPL-CCNC: 14 U/L (ref 0–39)
BASOPHILS ABSOLUTE: 0 E9/L (ref 0–0.2)
BASOPHILS RELATIVE PERCENT: 0.3 % (ref 0–2)
BILIRUB SERPL-MCNC: <0.2 MG/DL (ref 0–1.2)
BUN BLDV-MCNC: 8 MG/DL (ref 6–20)
CALCIUM SERPL-MCNC: 8.8 MG/DL (ref 8.6–10.2)
CHLORIDE BLD-SCNC: 101 MMOL/L (ref 98–107)
CO2: 28 MMOL/L (ref 22–29)
CREAT SERPL-MCNC: 0.7 MG/DL (ref 0.7–1.2)
EOSINOPHILS ABSOLUTE: 0.33 E9/L (ref 0.05–0.5)
EOSINOPHILS RELATIVE PERCENT: 4.4 % (ref 0–6)
GFR AFRICAN AMERICAN: >60
GFR NON-AFRICAN AMERICAN: >60 ML/MIN/1.73
GLUCOSE BLD-MCNC: 126 MG/DL (ref 74–99)
HCT VFR BLD CALC: 27.8 % (ref 37–54)
HEMOGLOBIN: 8.7 G/DL (ref 12.5–16.5)
HYPOCHROMIA: ABNORMAL
LYMPHOCYTES ABSOLUTE: 1.48 E9/L (ref 1.5–4)
LYMPHOCYTES RELATIVE PERCENT: 20.2 % (ref 20–42)
MCH RBC QN AUTO: 22.7 PG (ref 26–35)
MCHC RBC AUTO-ENTMCNC: 31.3 % (ref 32–34.5)
MCV RBC AUTO: 72.6 FL (ref 80–99.9)
METER GLUCOSE: 129 MG/DL (ref 74–99)
METER GLUCOSE: 144 MG/DL (ref 74–99)
METER GLUCOSE: 165 MG/DL (ref 74–99)
METER GLUCOSE: 238 MG/DL (ref 74–99)
MONOCYTES ABSOLUTE: 0.96 E9/L (ref 0.1–0.95)
MONOCYTES RELATIVE PERCENT: 13.2 % (ref 2–12)
NEUTROPHILS ABSOLUTE: 4.59 E9/L (ref 1.8–7.3)
NEUTROPHILS RELATIVE PERCENT: 62.3 % (ref 43–80)
NUCLEATED RED BLOOD CELLS: 2.6 /100 WBC
OVALOCYTES: ABNORMAL
PDW BLD-RTO: 20.9 FL (ref 11.5–15)
PLATELET # BLD: 594 E9/L (ref 130–450)
PMV BLD AUTO: 9.9 FL (ref 7–12)
POIKILOCYTES: ABNORMAL
POLYCHROMASIA: ABNORMAL
POTASSIUM REFLEX MAGNESIUM: 4 MMOL/L (ref 3.5–5)
RBC # BLD: 3.83 E12/L (ref 3.8–5.8)
SCHISTOCYTES: ABNORMAL
SMUDGE CELLS: ABNORMAL
SODIUM BLD-SCNC: 139 MMOL/L (ref 132–146)
TARGET CELLS: ABNORMAL
TOTAL PROTEIN: 6.5 G/DL (ref 6.4–8.3)
WBC # BLD: 7.4 E9/L (ref 4.5–11.5)

## 2021-08-04 PROCEDURE — 1200000000 HC SEMI PRIVATE

## 2021-08-04 PROCEDURE — 6360000002 HC RX W HCPCS: Performed by: STUDENT IN AN ORGANIZED HEALTH CARE EDUCATION/TRAINING PROGRAM

## 2021-08-04 PROCEDURE — 80053 COMPREHEN METABOLIC PANEL: CPT

## 2021-08-04 PROCEDURE — 94640 AIRWAY INHALATION TREATMENT: CPT

## 2021-08-04 PROCEDURE — 6360000002 HC RX W HCPCS: Performed by: INTERNAL MEDICINE

## 2021-08-04 PROCEDURE — 36415 COLL VENOUS BLD VENIPUNCTURE: CPT

## 2021-08-04 PROCEDURE — 2580000003 HC RX 258: Performed by: INTERNAL MEDICINE

## 2021-08-04 PROCEDURE — 82962 GLUCOSE BLOOD TEST: CPT

## 2021-08-04 PROCEDURE — 6370000000 HC RX 637 (ALT 250 FOR IP): Performed by: STUDENT IN AN ORGANIZED HEALTH CARE EDUCATION/TRAINING PROGRAM

## 2021-08-04 PROCEDURE — 85025 COMPLETE CBC W/AUTO DIFF WBC: CPT

## 2021-08-04 RX ADMIN — SODIUM CHLORIDE, PRESERVATIVE FREE 300 UNITS: 5 INJECTION INTRAVENOUS at 20:57

## 2021-08-04 RX ADMIN — OXYCODONE 2.5 MG: 5 TABLET ORAL at 08:25

## 2021-08-04 RX ADMIN — OXYCODONE AND ACETAMINOPHEN 1 TABLET: 5; 325 TABLET ORAL at 08:25

## 2021-08-04 RX ADMIN — OXYCODONE 2.5 MG: 5 TABLET ORAL at 04:01

## 2021-08-04 RX ADMIN — PANCRELIPASE 36000 UNITS: 60000; 12000; 38000 CAPSULE, DELAYED RELEASE PELLETS ORAL at 08:15

## 2021-08-04 RX ADMIN — OXYCODONE AND ACETAMINOPHEN 1 TABLET: 5; 325 TABLET ORAL at 21:00

## 2021-08-04 RX ADMIN — PANCRELIPASE 36000 UNITS: 60000; 12000; 38000 CAPSULE, DELAYED RELEASE PELLETS ORAL at 12:05

## 2021-08-04 RX ADMIN — INSULIN LISPRO 6 UNITS: 100 INJECTION, SOLUTION INTRAVENOUS; SUBCUTANEOUS at 12:05

## 2021-08-04 RX ADMIN — HEPARIN SODIUM 5000 UNITS: 10000 INJECTION INTRAVENOUS; SUBCUTANEOUS at 14:07

## 2021-08-04 RX ADMIN — BUDESONIDE 1000 MCG: 0.5 SUSPENSION RESPIRATORY (INHALATION) at 19:45

## 2021-08-04 RX ADMIN — PANCRELIPASE 36000 UNITS: 60000; 12000; 38000 CAPSULE, DELAYED RELEASE PELLETS ORAL at 18:07

## 2021-08-04 RX ADMIN — HEPARIN SODIUM 5000 UNITS: 10000 INJECTION INTRAVENOUS; SUBCUTANEOUS at 05:52

## 2021-08-04 RX ADMIN — Medication 10 ML: at 20:54

## 2021-08-04 RX ADMIN — ERTAPENEM SODIUM 1000 MG: 1 INJECTION, POWDER, LYOPHILIZED, FOR SOLUTION INTRAMUSCULAR; INTRAVENOUS at 14:05

## 2021-08-04 RX ADMIN — OXYCODONE 2.5 MG: 5 TABLET ORAL at 21:00

## 2021-08-04 RX ADMIN — DOCUSATE SODIUM 100 MG: 100 CAPSULE, LIQUID FILLED ORAL at 08:16

## 2021-08-04 RX ADMIN — DOCUSATE SODIUM 100 MG: 100 CAPSULE, LIQUID FILLED ORAL at 20:53

## 2021-08-04 RX ADMIN — POLYETHYLENE GLYCOL 3350 17 G: 17 POWDER, FOR SOLUTION ORAL at 08:16

## 2021-08-04 RX ADMIN — ALBUTEROL SULFATE 2.5 MG: 2.5 SOLUTION RESPIRATORY (INHALATION) at 09:33

## 2021-08-04 RX ADMIN — INSULIN LISPRO 3 UNITS: 100 INJECTION, SOLUTION INTRAVENOUS; SUBCUTANEOUS at 20:55

## 2021-08-04 RX ADMIN — LINEZOLID 600 MG: 600 INJECTION, SOLUTION INTRAVENOUS at 01:32

## 2021-08-04 RX ADMIN — LINEZOLID 600 MG: 600 INJECTION, SOLUTION INTRAVENOUS at 14:03

## 2021-08-04 RX ADMIN — BUDESONIDE 1000 MCG: 0.5 SUSPENSION RESPIRATORY (INHALATION) at 09:34

## 2021-08-04 RX ADMIN — HYDROMORPHONE HYDROCHLORIDE 0.5 MG: 1 INJECTION, SOLUTION INTRAMUSCULAR; INTRAVENOUS; SUBCUTANEOUS at 00:28

## 2021-08-04 RX ADMIN — OXYCODONE AND ACETAMINOPHEN 1 TABLET: 5; 325 TABLET ORAL at 04:01

## 2021-08-04 RX ADMIN — SODIUM CHLORIDE, PRESERVATIVE FREE 300 UNITS: 5 INJECTION INTRAVENOUS at 08:16

## 2021-08-04 RX ADMIN — HEPARIN SODIUM 5000 UNITS: 10000 INJECTION INTRAVENOUS; SUBCUTANEOUS at 20:56

## 2021-08-04 RX ADMIN — OXYCODONE 2.5 MG: 5 TABLET ORAL at 14:14

## 2021-08-04 RX ADMIN — HYDROMORPHONE HYDROCHLORIDE 0.5 MG: 1 INJECTION, SOLUTION INTRAMUSCULAR; INTRAVENOUS; SUBCUTANEOUS at 05:51

## 2021-08-04 RX ADMIN — OXYCODONE AND ACETAMINOPHEN 1 TABLET: 5; 325 TABLET ORAL at 14:14

## 2021-08-04 RX ADMIN — Medication 10 ML: at 08:16

## 2021-08-04 RX ADMIN — INSULIN LISPRO 3 UNITS: 100 INJECTION, SOLUTION INTRAVENOUS; SUBCUTANEOUS at 18:06

## 2021-08-04 RX ADMIN — INSULIN GLARGINE 20 UNITS: 100 INJECTION, SOLUTION SUBCUTANEOUS at 08:15

## 2021-08-04 RX ADMIN — ALBUTEROL SULFATE 2.5 MG: 2.5 SOLUTION RESPIRATORY (INHALATION) at 13:42

## 2021-08-04 RX ADMIN — DEXTROSE MONOHYDRATE 100 MG: 50 INJECTION, SOLUTION INTRAVENOUS at 18:07

## 2021-08-04 RX ADMIN — HYDROMORPHONE HYDROCHLORIDE 0.5 MG: 1 INJECTION, SOLUTION INTRAMUSCULAR; INTRAVENOUS; SUBCUTANEOUS at 12:01

## 2021-08-04 RX ADMIN — HYDROMORPHONE HYDROCHLORIDE 0.5 MG: 1 INJECTION, SOLUTION INTRAMUSCULAR; INTRAVENOUS; SUBCUTANEOUS at 18:17

## 2021-08-04 RX ADMIN — ALBUTEROL SULFATE 2.5 MG: 2.5 SOLUTION RESPIRATORY (INHALATION) at 19:44

## 2021-08-04 RX ADMIN — ATORVASTATIN CALCIUM 20 MG: 20 TABLET, FILM COATED ORAL at 08:15

## 2021-08-04 RX ADMIN — INSULIN GLARGINE 20 UNITS: 100 INJECTION, SOLUTION SUBCUTANEOUS at 20:55

## 2021-08-04 ASSESSMENT — PAIN DESCRIPTION - ONSET
ONSET: ON-GOING

## 2021-08-04 ASSESSMENT — PAIN DESCRIPTION - PROGRESSION
CLINICAL_PROGRESSION: NOT CHANGED

## 2021-08-04 ASSESSMENT — PAIN DESCRIPTION - FREQUENCY
FREQUENCY: CONTINUOUS

## 2021-08-04 ASSESSMENT — PAIN DESCRIPTION - PAIN TYPE
TYPE: ACUTE PAIN

## 2021-08-04 ASSESSMENT — PAIN DESCRIPTION - LOCATION
LOCATION: ABDOMEN

## 2021-08-04 ASSESSMENT — PAIN DESCRIPTION - DESCRIPTORS
DESCRIPTORS: ACHING;DISCOMFORT
DESCRIPTORS: ACHING;DISCOMFORT
DESCRIPTORS: ACHING;CONSTANT;DISCOMFORT
DESCRIPTORS: ACHING;DISCOMFORT
DESCRIPTORS: ACHING;DISCOMFORT

## 2021-08-04 ASSESSMENT — PAIN DESCRIPTION - ORIENTATION
ORIENTATION: LEFT;UPPER

## 2021-08-04 ASSESSMENT — PAIN SCALES - GENERAL
PAINLEVEL_OUTOF10: 8
PAINLEVEL_OUTOF10: 7
PAINLEVEL_OUTOF10: 9
PAINLEVEL_OUTOF10: 8
PAINLEVEL_OUTOF10: 8
PAINLEVEL_OUTOF10: 5
PAINLEVEL_OUTOF10: 8
PAINLEVEL_OUTOF10: 5
PAINLEVEL_OUTOF10: 8
PAINLEVEL_OUTOF10: 8

## 2021-08-04 NOTE — PROGRESS NOTES
ELIZABETHIDA PROGRESS NOTE      Chief complaint: Follow-up of intraabdominal abscess    The patient is a 62 y.o. male with history of hypertension; hyperlipidemia; DM; IPMN of pancreatic tail status post robotic converted to open distal pancreatectomy with splenectomy on 05/12 complicated by pancreatic leak requiring stent placement on 05/17, intra-abdominal abscess status post percutaneous drain placement on 05/28 with abscess fluid culture showing Enterobacter cloacae, Klebsiella oxytoca, alphahemolytic Streptococcus, non-ESBL E. coli, anaerobic gram-negative rods, coagulase-negative Staphylococcus, Cronobacter sakazakii for which he was given a course of ciprofloxacin and metronidazole for 4 weeks until 06/25 then found to have inadequate drainage of abscess prompting another percutaneous drain placement on 06/30 and ERCP with exchange of pancreatic duct stent and balloon sweep of pancreatic duct with removal of large amount of debris/pus on 07/02 with abscess fluid culture showing light growth of Klebsiella oxytoca (non-ESBL; resistant to ampicillin; susceptible to fluoroquinolones and co-trimoxazole), heavy growth of alphahemolytic Streptococcus, MSSA and Lactobacillus, light growth of Candida albicans for which he was discharged to [facility] [place] on a 4-week course of ertapenem and fluconazole from 07/02-07/30; presented on 07/28 for further evaluation of intra-abdominal abscess. While at [facility] [place], he started having fever with CT abdomen and pelvis on 07/22 showing moderate volume fluid collection within the surgical bed likely representing abscess with a surgical drain within the abscess but with collection stable to minimally decreased.   Abscess fluid Gram stain and culture on 07/22 showed abundant polymorphonuclear leukocytes, no epithelial cells, abundant gram variable rods, heavy growth of Enterococcus gallinarum and faecium (resistant to ampicillin, doxycycline, vancomycin, streptomycin; susceptible to linezolid; daptomycin sensitivity not done), moderate growth of MRSE, light growth of Candida krusei, moderate growth of lactobacillus. Vancomycin was started on 07/22 then switched to linezolid. He was subsequently transferred to [facility] for further management. He underwent abscess drain tube exchange on 07/29. Abscess fluid Gram stain and culture showed abundant polymorphonuclear leukocytes, no epithelial cells, abundant gram-negative rods, mixed Gram-positive organisms including light growth of Staphylococcus hominis, heavy growth of lactobacillus, alphahemolytic Streptococcus species, anaerobes. He underwent ERCP with pancreatic stent removal on 07/30. Subjective: Patient was seen and examined. No chills, abdominal pain controlled, has diarrhea, no rash, no itching. Afebrile. He reports feeling better but still weak. Objective:    Vitals:    08/04/21 1118   BP: 117/86   Pulse: 102   Resp: 17   Temp: 98 °F (36.7 °C)   SpO2: 97%     Constitutional: Alert, not in distress  Respiratory: Clear breath sounds, no crackles, no wheezes  Cardiovascular: Regular rate and rhythm, no murmurs  Gastrointestinal: Bowel sounds present, soft, nontender  Skin: Warm and dry, no active dermatoses  Musculoskeletal: No joint swelling, no joint erythema    Labs, imaging, and medical records/notes were personally reviewed. Assessment:  VRE faecium and gallinarum infection/abscess  Candida krusei infection/abscess  Intraabdominal abscess/surgical site infection, s/p percutaneous drain placement on 05/28, inadequate drainage prompting additional percutaneous drain placements on 06/30. Unfortunately, source control has been difficult to achieve and increasingly antibiotic-resistant pathogens are developing.   Pancreatic duct abscess status post ERCP with exchange of pancreatic duct stent and balloon sweep of pancreatic duct with removal of large amount of debris/pus on 07/02  Recent distal pancreatectomy with splenectomy QT 82/66 complicated by pancreatic leak s/p pancreatic stent placement on 05/17 then removal on 07/30    Recommendations:  Continue ertapenem 1 g every 24 hours and linezolid 600 mg every 12 hours. Continue anidulafungin 200 mg loading dose once followed by 100 mg every 24 hours. Anticipate 4 weeks of antibiotic therapy from 07/30-08/27. Follow up blood and abscess fluid cultures. Maintain PICC care. Continue supportive care.     Thank you for involving me in the care of Terrie Calvo. I will continue to follow. Please do not hesitate to call for any questions or concerns.     Electronically signed by Haylee Hernandez MD on 8/4/2021 at 11:44 AM

## 2021-08-04 NOTE — PLAN OF CARE
Problem: Falls - Risk of:  Goal: Will remain free from falls  Description: Will remain free from falls  8/4/2021 0935 by Aleshia Roger RN  Outcome: Met This Shift     Problem: Falls - Risk of:  Goal: Absence of physical injury  Description: Absence of physical injury  8/4/2021 0935 by Aleshia Roger RN  Outcome: Met This Shift     Problem: Pain:  Goal: Pain level will decrease  Description: Pain level will decrease  8/4/2021 0935 by Aleshia Roger RN  Outcome: Met This Shift     Problem: Pain:  Goal: Control of acute pain  Description: Control of acute pain  8/4/2021 0935 by Aleshia Roger RN  Outcome: Met This Shift

## 2021-08-04 NOTE — CARE COORDINATION
Per Mark Hence from [employer], precert is still pending which was started Tuesday. Ambulance form in envelope in soft chart.   Deatra Schwab RN CM

## 2021-08-04 NOTE — PROGRESS NOTES
interval outpatient imaging to make sure collections are improving and no further intervention will be needed. Norbert Georges MD  08/04/21  9:52 AM    NOTE: This report, in part or full, may have been transcribed using voice recognition software. Every effort was made to ensure accuracy; however, inadvertent computerized transcription errors may be present. Please excuse any transcriptional grammatical or spelling errors that may have escaped my editorial review.

## 2021-08-05 VITALS
DIASTOLIC BLOOD PRESSURE: 70 MMHG | BODY MASS INDEX: 28.04 KG/M2 | OXYGEN SATURATION: 96 % | TEMPERATURE: 98.1 F | RESPIRATION RATE: 16 BRPM | HEART RATE: 90 BPM | WEIGHT: 185 LBS | SYSTOLIC BLOOD PRESSURE: 100 MMHG | HEIGHT: 68 IN

## 2021-08-05 LAB
ALBUMIN SERPL-MCNC: 3 G/DL (ref 3.5–5.2)
ALP BLD-CCNC: 55 U/L (ref 40–129)
ALT SERPL-CCNC: 10 U/L (ref 0–40)
ANION GAP SERPL CALCULATED.3IONS-SCNC: 8 MMOL/L (ref 7–16)
ANISOCYTOSIS: ABNORMAL
AST SERPL-CCNC: 13 U/L (ref 0–39)
BASOPHILIC STIPPLING: ABNORMAL
BASOPHILS ABSOLUTE: 0.12 E9/L (ref 0–0.2)
BASOPHILS RELATIVE PERCENT: 1.7 % (ref 0–2)
BILIRUB SERPL-MCNC: 0.2 MG/DL (ref 0–1.2)
BODY FLUID CULTURE, STERILE: ABNORMAL
BUN BLDV-MCNC: 8 MG/DL (ref 6–20)
BURR CELLS: ABNORMAL
CALCIUM SERPL-MCNC: 9.2 MG/DL (ref 8.6–10.2)
CHLORIDE BLD-SCNC: 104 MMOL/L (ref 98–107)
CO2: 27 MMOL/L (ref 22–29)
CREAT SERPL-MCNC: 0.6 MG/DL (ref 0.7–1.2)
EOSINOPHILS ABSOLUTE: 0.37 E9/L (ref 0.05–0.5)
EOSINOPHILS RELATIVE PERCENT: 5.2 % (ref 0–6)
GFR AFRICAN AMERICAN: >60
GFR NON-AFRICAN AMERICAN: >60 ML/MIN/1.73
GLUCOSE BLD-MCNC: 131 MG/DL (ref 74–99)
GRAM STAIN RESULT: ABNORMAL
HCT VFR BLD CALC: 26.4 % (ref 37–54)
HEMOGLOBIN: 8.1 G/DL (ref 12.5–16.5)
HYPOCHROMIA: ABNORMAL
LYMPHOCYTES ABSOLUTE: 1.66 E9/L (ref 1.5–4)
LYMPHOCYTES RELATIVE PERCENT: 22.6 % (ref 20–42)
MCH RBC QN AUTO: 22.5 PG (ref 26–35)
MCHC RBC AUTO-ENTMCNC: 30.7 % (ref 32–34.5)
MCV RBC AUTO: 73.3 FL (ref 80–99.9)
METER GLUCOSE: 113 MG/DL (ref 74–99)
METER GLUCOSE: 186 MG/DL (ref 74–99)
METER GLUCOSE: 217 MG/DL (ref 74–99)
MONOCYTES ABSOLUTE: 1.01 E9/L (ref 0.1–0.95)
MONOCYTES RELATIVE PERCENT: 13.9 % (ref 2–12)
NEUTROPHILS ABSOLUTE: 4.1 E9/L (ref 1.8–7.3)
NEUTROPHILS RELATIVE PERCENT: 56.5 % (ref 43–80)
NUCLEATED RED BLOOD CELLS: 5.2 /100 WBC
ORGANISM: ABNORMAL
OVALOCYTES: ABNORMAL
PDW BLD-RTO: 20.7 FL (ref 11.5–15)
PLATELET # BLD: 510 E9/L (ref 130–450)
PMV BLD AUTO: 9.8 FL (ref 7–12)
POIKILOCYTES: ABNORMAL
POLYCHROMASIA: ABNORMAL
POTASSIUM REFLEX MAGNESIUM: 4 MMOL/L (ref 3.5–5)
RBC # BLD: 3.6 E12/L (ref 3.8–5.8)
SODIUM BLD-SCNC: 139 MMOL/L (ref 132–146)
TARGET CELLS: ABNORMAL
TOTAL PROTEIN: 6.2 G/DL (ref 6.4–8.3)
WBC # BLD: 7.2 E9/L (ref 4.5–11.5)

## 2021-08-05 PROCEDURE — 2580000003 HC RX 258: Performed by: INTERNAL MEDICINE

## 2021-08-05 PROCEDURE — 6370000000 HC RX 637 (ALT 250 FOR IP): Performed by: STUDENT IN AN ORGANIZED HEALTH CARE EDUCATION/TRAINING PROGRAM

## 2021-08-05 PROCEDURE — 94640 AIRWAY INHALATION TREATMENT: CPT

## 2021-08-05 PROCEDURE — 6360000002 HC RX W HCPCS: Performed by: STUDENT IN AN ORGANIZED HEALTH CARE EDUCATION/TRAINING PROGRAM

## 2021-08-05 PROCEDURE — 6360000002 HC RX W HCPCS: Performed by: INTERNAL MEDICINE

## 2021-08-05 PROCEDURE — 36415 COLL VENOUS BLD VENIPUNCTURE: CPT

## 2021-08-05 PROCEDURE — 80053 COMPREHEN METABOLIC PANEL: CPT

## 2021-08-05 PROCEDURE — 82962 GLUCOSE BLOOD TEST: CPT

## 2021-08-05 PROCEDURE — 85025 COMPLETE CBC W/AUTO DIFF WBC: CPT

## 2021-08-05 RX ADMIN — HEPARIN SODIUM 5000 UNITS: 10000 INJECTION INTRAVENOUS; SUBCUTANEOUS at 05:59

## 2021-08-05 RX ADMIN — SODIUM CHLORIDE, PRESERVATIVE FREE 10 ML: 5 INJECTION INTRAVENOUS at 14:30

## 2021-08-05 RX ADMIN — OXYCODONE AND ACETAMINOPHEN 1 TABLET: 5; 325 TABLET ORAL at 06:09

## 2021-08-05 RX ADMIN — SODIUM CHLORIDE, PRESERVATIVE FREE 300 UNITS: 5 INJECTION INTRAVENOUS at 08:59

## 2021-08-05 RX ADMIN — HYDROMORPHONE HYDROCHLORIDE 0.5 MG: 1 INJECTION, SOLUTION INTRAMUSCULAR; INTRAVENOUS; SUBCUTANEOUS at 09:07

## 2021-08-05 RX ADMIN — ERTAPENEM SODIUM 1000 MG: 1 INJECTION, POWDER, LYOPHILIZED, FOR SOLUTION INTRAMUSCULAR; INTRAVENOUS at 14:30

## 2021-08-05 RX ADMIN — APIXABAN 5 MG: 5 TABLET, FILM COATED ORAL at 14:30

## 2021-08-05 RX ADMIN — BUDESONIDE 1000 MCG: 0.5 SUSPENSION RESPIRATORY (INHALATION) at 11:33

## 2021-08-05 RX ADMIN — PANCRELIPASE 36000 UNITS: 60000; 12000; 38000 CAPSULE, DELAYED RELEASE PELLETS ORAL at 11:53

## 2021-08-05 RX ADMIN — OXYCODONE 2.5 MG: 5 TABLET ORAL at 11:54

## 2021-08-05 RX ADMIN — PANCRELIPASE 36000 UNITS: 60000; 12000; 38000 CAPSULE, DELAYED RELEASE PELLETS ORAL at 08:58

## 2021-08-05 RX ADMIN — INSULIN LISPRO 3 UNITS: 100 INJECTION, SOLUTION INTRAVENOUS; SUBCUTANEOUS at 11:52

## 2021-08-05 RX ADMIN — HYDROMORPHONE HYDROCHLORIDE 0.5 MG: 1 INJECTION, SOLUTION INTRAMUSCULAR; INTRAVENOUS; SUBCUTANEOUS at 01:55

## 2021-08-05 RX ADMIN — Medication 10 ML: at 08:59

## 2021-08-05 RX ADMIN — INSULIN GLARGINE 20 UNITS: 100 INJECTION, SOLUTION SUBCUTANEOUS at 08:58

## 2021-08-05 RX ADMIN — OXYCODONE 2.5 MG: 5 TABLET ORAL at 06:09

## 2021-08-05 RX ADMIN — LINEZOLID 600 MG: 600 INJECTION, SOLUTION INTRAVENOUS at 14:30

## 2021-08-05 RX ADMIN — LINEZOLID 600 MG: 600 INJECTION, SOLUTION INTRAVENOUS at 01:55

## 2021-08-05 RX ADMIN — HEPARIN 300 UNITS: 100 SYRINGE at 16:31

## 2021-08-05 RX ADMIN — ATORVASTATIN CALCIUM 20 MG: 20 TABLET, FILM COATED ORAL at 08:58

## 2021-08-05 RX ADMIN — DOCUSATE SODIUM 100 MG: 100 CAPSULE, LIQUID FILLED ORAL at 08:59

## 2021-08-05 RX ADMIN — POLYETHYLENE GLYCOL 3350 17 G: 17 POWDER, FOR SOLUTION ORAL at 08:59

## 2021-08-05 RX ADMIN — ALBUTEROL SULFATE 2.5 MG: 2.5 SOLUTION RESPIRATORY (INHALATION) at 11:32

## 2021-08-05 RX ADMIN — OXYCODONE AND ACETAMINOPHEN 1 TABLET: 5; 325 TABLET ORAL at 11:54

## 2021-08-05 ASSESSMENT — PAIN DESCRIPTION - ORIENTATION: ORIENTATION: LEFT;UPPER

## 2021-08-05 ASSESSMENT — PAIN DESCRIPTION - LOCATION: LOCATION: ABDOMEN

## 2021-08-05 ASSESSMENT — PAIN SCALES - GENERAL
PAINLEVEL_OUTOF10: 9
PAINLEVEL_OUTOF10: 8
PAINLEVEL_OUTOF10: 5
PAINLEVEL_OUTOF10: 8
PAINLEVEL_OUTOF10: 9
PAINLEVEL_OUTOF10: 9

## 2021-08-05 ASSESSMENT — PAIN DESCRIPTION - PAIN TYPE: TYPE: ACUTE PAIN

## 2021-08-05 ASSESSMENT — PAIN DESCRIPTION - FREQUENCY: FREQUENCY: CONTINUOUS

## 2021-08-05 ASSESSMENT — PAIN DESCRIPTION - PROGRESSION: CLINICAL_PROGRESSION: NOT CHANGED

## 2021-08-05 ASSESSMENT — PAIN DESCRIPTION - DESCRIPTORS: DESCRIPTORS: ACHING;CONSTANT;DISCOMFORT

## 2021-08-05 ASSESSMENT — PAIN DESCRIPTION - ONSET: ONSET: ON-GOING

## 2021-08-05 ASSESSMENT — PAIN - FUNCTIONAL ASSESSMENT: PAIN_FUNCTIONAL_ASSESSMENT: PREVENTS OR INTERFERES SOME ACTIVE ACTIVITIES AND ADLS

## 2021-08-05 NOTE — PLAN OF CARE
Problem: Falls - Risk of:  Goal: Will remain free from falls  Description: Will remain free from falls  8/5/2021 1144 by Seb Hess RN  Outcome: Met This Shift     Problem: Falls - Risk of:  Goal: Absence of physical injury  Description: Absence of physical injury  8/5/2021 1144 by Seb Hess RN  Outcome: Met This Shift

## 2021-08-05 NOTE — PROGRESS NOTES
ELIZABETHIDA PROGRESS NOTE      Chief complaint: Follow-up of intraabdominal abscess    The patient is a 62 y.o. male with history of hypertension; hyperlipidemia; DM; IPMN of pancreatic tail status post robotic converted to open distal pancreatectomy with splenectomy on 05/12 complicated by pancreatic leak requiring stent placement on 05/17, intra-abdominal abscess status post percutaneous drain placement on 05/28 with abscess fluid culture showing Enterobacter cloacae, Klebsiella oxytoca, alphahemolytic Streptococcus, non-ESBL E. coli, anaerobic gram-negative rods, coagulase-negative Staphylococcus, Cronobacter sakazakii for which he was given a course of ciprofloxacin and metronidazole for 4 weeks until 06/25 then found to have inadequate drainage of abscess prompting another percutaneous drain placement on 06/30 and ERCP with exchange of pancreatic duct stent and balloon sweep of pancreatic duct with removal of large amount of debris/pus on 07/02 with abscess fluid culture showing light growth of Klebsiella oxytoca (non-ESBL; resistant to ampicillin; susceptible to fluoroquinolones and co-trimoxazole), heavy growth of alphahemolytic Streptococcus, MSSA and Lactobacillus, light growth of Candida albicans for which he was discharged to [facility] [place] on a 4-week course of ertapenem and fluconazole from 07/02-07/30; presented on 07/28 for further evaluation of intra-abdominal abscess. While at [facility], he started having fever with CT abdomen and pelvis on 07/22 showing moderate volume fluid collection within the surgical bed likely representing abscess with a surgical drain within the abscess but with collection stable to minimally decreased.   Abscess fluid Gram stain and culture on 07/22 showed abundant polymorphonuclear leukocytes, no epithelial cells, abundant gram variable rods, heavy growth of Enterococcus gallinarum and faecium (resistant to ampicillin, doxycycline, vancomycin, streptomycin; susceptible to linezolid; daptomycin sensitivity not done), moderate growth of MRSE, light growth of Candida krusei, moderate growth of lactobacillus. Vancomycin was started on 07/22 then switched to linezolid. He was subsequently transferred to [address] [place] [address] for further management. He underwent abscess drain tube exchange on 07/29. Abscess fluid Gram stain and culture showed abundant polymorphonuclear leukocytes, no epithelial cells, abundant gram-negative rods, mixed Gram-positive organisms including light growth of Staphylococcus hominis, heavy growth of lactobacillus, moderate growth of Enterococcus faecium (sensitive to vancomycin), anaerobes. He underwent ERCP with pancreatic stent removal on 07/30. Subjective: Patient was seen and examined. No chills, abdominal pain controlled, has diarrhea, no rash, no itching. Afebrile. He reports feeling better. Objective:    Vitals:    08/05/21 0757   BP: 111/83   Pulse: 91   Resp: 16   Temp: 97.9 °F (36.6 °C)   SpO2: 95%     Constitutional: Alert, not in distress  Respiratory: Clear breath sounds, no crackles, no wheezes  Cardiovascular: Regular rate and rhythm, no murmurs  Gastrointestinal: Bowel sounds present, soft, nontender  Skin: Warm and dry, no active dermatoses  Musculoskeletal: No joint swelling, no joint erythema    Labs, imaging, and medical records/notes were personally reviewed. Assessment:  VRE faecium and gallinarum infection/abscess  Candida krusei infection/abscess  Intraabdominal abscess/surgical site infection, s/p percutaneous drain placement on 05/28, inadequate drainage prompting additional percutaneous drain placements on 06/30. Unfortunately, source control has been difficult to achieve and increasingly antibiotic-resistant pathogens are developing.   Pancreatic duct abscess status post ERCP with exchange of pancreatic duct stent and balloon sweep of pancreatic duct with removal of large amount of debris/pus on 07/02  Recent distal pancreatectomy with splenectomy YZ 97/09 complicated by pancreatic leak s/p pancreatic stent placement on 05/17 then removal on 07/30    Recommendations:  Continue ertapenem 1 g every 24 hours and linezolid 600 mg every 12 hours. Continue anidulafungin 200 mg loading dose once followed by 100 mg every 24 hours. Anticipate 4 weeks of antibiotic therapy from 07/30-08/27. Follow up blood and abscess fluid cultures. Maintain PICC care. Continue supportive care.     Thank you for involving me in the care of Kaylah Espinoza. I will continue to follow. Please do not hesitate to call for any questions or concerns.     Electronically signed by Cirilo Calhoun MD on 8/5/2021 at 11:24 AM

## 2021-08-05 NOTE — DISCHARGE INSTR - COC
Continuity of Care Form    Patient Name: Arvell Brunner   :  1960  MRN:  [id number]    Admit date:  2021  Discharge date:      Code Status Order: Full Code   Advance Directives:      Admitting Physician:  Savage Posadas MD  PCP: Enoch Aguilera DO    Discharging Nurse: [facility] Unit/Room#: 4795/7604-H  Discharging Unit Phone Number: [phone]    Emergency Contact:   Extended Emergency Contact Information  Primary Emergency Contact: [facility]  Address: [address] [place] [address], [address] [place] [address] Phone: [phone]  Relation: Other   needed? No  Secondary Emergency Contact: Janet Shukla  [employer] Phone: [phone]  Relation: Child  Preferred language: English   needed?  No    Past Surgical History:  Past Surgical History:   Procedure Laterality Date    ANESTHESIA NERVE BLOCK Bilateral 2019    BILATERAL L4-5 TRANSFORAMINAL EPIDURAL STEROID INJECTION performed by Gabriel Nam DO at [address] [facility] [facility] Bilateral 2019    BILATERAL MEDIAL BRANCH BLOCK L4-5 AND L5-S1 performed by Gabriel Nam DO at [facility] Bilateral 08/15/2019    BILATERAL MEDIAL BRANCH BLOCK L4 - 5 AND L5 - S1 performed by Gabriel Nam DO at [facility] Right 2019    right wrist carpal tunnel release and right elbow cubital tunnel release    CARPAL TUNNEL RELEASE Right 2019    RIGHT WRIST CARPAL TUNNEL RELEASE AND RIGHT ELBOW CUBITAL TUNNEL RELEASE performed by Becki Alejandro DO at [place] [address]      at age 40 polyps    CYST REMOVAL Right     EPIDURAL STEROID INJECTION N/A 10/17/2019    LUMBAR EPIDURAL STEROID INJECTION UNDER FLUOROSCOPIC GUIDANCE AT L2-L3 WITHOUT SEDATION performed by Babak Dawson MD at [address] [place] [address] ERCP N/A 2021    ERCP Administered Date(s) Administered    COVID-19, Moderna, PF, 100mcg/0.5mL 03/19/2021, 04/16/2021    Hib PRP-OMP (PedvaxHIB) 04/27/2021    Influenza, Ora Galileo, IM, PF (6 mo and older Fluzone, Flulaval, Fluarix, and 3 yrs and older Afluria) 11/20/2018, 11/20/2019, 10/27/2020    Meningococcal B, OMV (Bexsero) 04/27/2021, 05/27/2021    Meningococcal MCV4O (Menveo) 04/27/2021, 06/24/2021    Pneumococcal Conjugate 13-valent (Iyddurn41) 04/27/2021    Pneumococcal Polysaccharide (Wtzixlhcs33) 06/24/2019, 06/24/2021    Tdap (Boostrix, Adacel) 06/24/2019       Active Problems:  Patient Active Problem List   Diagnosis Code    Mixed hyperlipidemia E78.2    Neck pain M54.2    Cervical radiculopathy at C7 M54.12    Essential hypertension I10    Chronic seasonal allergic rhinitis J30.2    Ventral hernia without obstruction or gangrene K43.9    Poorly controlled type 2 diabetes mellitus (HCC) E11.65    Chronic bilateral low back pain with right-sided sciatica M54.41, G89.29    Gynecomastia N62    Panic disorder F41.0    Cubital tunnel syndrome on right G56.21    Right carpal tunnel syndrome G56.01    Spondylosis of lumbar spine M47.816    Bell's palsy G51.0    Other bursal cyst, left elbow M71.322    Sacral radiculitis M54.18    Lumbar radiculitis M54.16    Spondylosis of cervical region without myelopathy or radiculopathy M47.812    Cervical facet joint syndrome M47.812    Cervical disc disorder M50.90    Lumbar facet arthropathy M47.816    Spinal stenosis of lumbar region with neurogenic claudication M48.062    Lumbar disc disorder M51.9    Pancreatic neoplasm D49.0    IPMN (intraductal papillary mucinous neoplasm) D49.0    Pancreatic duct leak K86.89    Intra-abdominal infection B99.9    Abdominal pain with fever after surgery R10.9, G89.18, R50.82    Sepsis (HCC) A41.9    Acute respiratory failure with hypoxia (HCC) J96.01    Intra-abdominal abscess (HCC) K65.1    Severe protein-calorie malnutrition ([facility] [address] E43       Isolation/Infection:   Isolation            Contact          Patient Infection Status       Infection Onset Added Last Indicated Last Indicated By Review Planned Expiration Resolved Resolved By    VRE 07/22/21 07/28/21 07/22/21 Culture, Body Fluid        Enterococcus faecium culture fluid 7/22/21    Resolved    COVID-19 Rule Out 05/27/21 05/27/21 05/27/21 COVID-19, Rapid (Ordered)   05/27/21 Rule-Out Test Resulted    COVID-19 Rule Out 05/06/21 05/06/21 05/06/21 COVID-19 Ambulatory (Ordered)   05/08/21 Rule-Out Test Resulted    COVID-19 Rule Out 01/04/21 01/04/21 01/04/21 Covid-19 Ambulatory (Ordered)   01/05/21 Rule-Out Test Resulted            Nurse Assessment:  Last Vital Signs: /83   Pulse 91   Temp 97.9 °F (36.6 °C) (Oral)   Resp 16   Ht 5' 8\" (1.727 m)   Wt 185 lb (83.9 kg)   SpO2 95%   BMI 28.13 kg/m²     Last documented pain score (0-10 scale): Pain Level: 9  Last Weight:   Wt Readings from Last 1 Encounters:   07/28/21 185 lb (83.9 kg)     Mental Status:  oriented, alert and able to concentrate and follow conversation    IV Access:  - PICC - site  Left Upper, insertion date: 8/2    Nursing Mobility/ADLs:  Walking   Assisted  Transfer  Assisted  Bathing  Assisted  Dressing  Assisted  Toileting  Assisted  Feeding  Independent  Med Admin  Assisted  Med Delivery   whole    Wound Care Documentation and Therapy:  Wound 07/06/21 Abdomen Proximal;Mid (Active)   Wound Image   07/14/21 1330   Wound Etiology Surgical 07/28/21 1737   Dressing Status Clean;Dry; Intact 08/05/21 0855   Wound Cleansed Cleansed with saline 07/14/21 1330   Dressing/Treatment Negative pressure wound therapy 07/15/21 0746   Dressing Change Due 07/16/21 07/15/21 0315   Wound Length (cm) 1.8 cm 07/14/21 1330   Wound Width (cm) 1.4 cm 07/14/21 1330   Wound Depth (cm) 0.6 cm 07/14/21 1330   Wound Surface Area (cm^2) 2.52 cm^2 07/14/21 1330   Change in Wound Size % (l*w) 36.36 07/14/21 1330   Wound Volume (cm^3) 1.512 cm^3 07/14/21 1330   Wound Healing % 62 07/14/21 1330   Wound Assessment Pink/red 07/15/21 0315   Drainage Amount None 07/29/21 2000   Drainage Description Yellow;Hernandez 07/15/21 0315   Odor None 07/15/21 0315   Gayle-wound Assessment Intact 07/15/21 0315   Wound Thickness Description not for Pressure Injury Partial thickness 07/07/21 1520   Number of days: 30       Wound 07/06/21 Abdomen Right (Active)   Wound Image   07/12/21 1350   Dressing Status Clean;Dry; Intact 07/15/21 0746   Wound Cleansed Cleansed with saline 07/14/21 1330   Dressing/Treatment Negative pressure wound therapy 07/15/21 0746   Wound Length (cm) 0.2 cm 07/14/21 1330   Wound Width (cm) 0.4 cm 07/14/21 1330   Wound Depth (cm) 0.2 cm 07/14/21 1330   Wound Surface Area (cm^2) 0.08 cm^2 07/14/21 1330   Change in Wound Size % (l*w) 92 07/14/21 1330   Wound Volume (cm^3) 0.016 cm^3 07/14/21 1330   Wound Healing % 84 07/14/21 1330   Wound Assessment Pink/red 07/15/21 0315   Drainage Amount Scant 07/15/21 0315   Drainage Description Yellow 07/15/21 0315   Odor None 07/15/21 0315   Gayle-wound Assessment Intact 07/15/21 0746   Wound Thickness Description not for Pressure Injury Partial thickness 07/14/21 1330   Number of days: 30       Wound 07/09/21 Abdomen Left (Active)   Wound Image   07/12/21 1350   Dressing Status Clean;Dry; Intact 07/30/21 0840   Wound Cleansed Cleansed with saline 07/14/21 1505   Dressing/Treatment Alginate;Hydrocolloid 07/15/21 0746   Wound Length (cm) 0.1 cm 07/14/21 1330   Wound Width (cm) 0.6 cm 07/14/21 1330   Wound Depth (cm) 0.1 cm 07/14/21 1330   Wound Surface Area (cm^2) 0.06 cm^2 07/14/21 1330   Change in Wound Size % (l*w) 50 07/14/21 1330   Wound Volume (cm^3) 0.006 cm^3 07/14/21 1330   Wound Healing % 50 07/14/21 1330   Wound Assessment Pink/red 07/15/21 0315   Drainage Amount None 07/15/21 0315   Drainage Description Purulent 07/29/21 2000   Odor None 07/15/21 0746   Gayle-wound Assessment Intact 07/15/21 0315 Wound Thickness Description not for Pressure Injury Partial thickness 21 1330   Number of days: 26        Elimination:  Continence:   · Bowel: Yes  · Bladder: Yes  Urinary Catheter: None   Colostomy/Ileostomy/Ileal Conduit: No       Date of Last BM:     Intake/Output Summary (Last 24 hours) at 2021 1248  Last data filed at 2021 1018  Gross per 24 hour   Intake 870 ml   Output 1800 ml   Net -930 ml     I/O last 3 completed shifts: In: 18 [P.O.:960; I.V.:30]  Out: 1800 [Urine:1550; Drains:250]    Safety Concerns: At Risk for Falls    Impairments/Disabilities:      [address] [facility] Impairments/Disabilities:[id number]:::0}    Nutrition Therapy:  Current Nutrition Therapy:   - Oral Diet:  Regular; low fat    Routes of Feeding: Oral  Liquids: No Restrictions  Daily Fluid Restriction: no  Last Modified Barium Swallow with Video (Video Swallowing Test): not done    Treatments at the Time of Hospital Discharge:   Respiratory Treatments: ***  Oxygen Therapy:  is not on home oxygen therapy.   Ventilator:    {[facility] Vent List:[id number]:::0}    Rehab Therapies: {THERAPEUTIC INTERVENTION:[id number]}  Weight Bearing Status/Restrictions: [address]  Weight Bearin:::0}  Other Medical Equipment (for information only, NOT a DME order):  {EQUIPMENT:[id number]}  Other Treatments: ***    Patient's personal belongings (please select all that are sent with patient):  cell phone    RN SIGNATURE:  Electronically signed by Lupillo Shafer RN on 21 at 3:22 PM EDT    CASE MANAGEMENT/SOCIAL WORK SECTION    Inpatient Status Date: ***    Readmission Risk Assessment Score:  Readmission Risk              Risk of Unplanned Readmission:  17           Discharging to Facility/ Agency   · Name:   · Address:  · Phone:  · Fax:    Dialysis Facility (if applicable)   · Name:  · Address:  · Dialysis Schedule:  · Phone:  · Fax:    / signature: {Esignature:[id number]:::0}    PHYSICIAN SECTION    Prognosis:

## 2021-08-05 NOTE — CARE COORDINATION
Per Rosemary Ochoa from [facility], precert has been obtained. Per Rosemary Ochoa patient will discharge to room 725 under Dr. Zahra Perez. Nurse to nurse phone# [phone]. Transportation set up via [employer] for 4:30 pm today. Charge nurse, RN, liaison and patient all notified. Ambulance form in envelope in soft chart. Await discharge order.   Geri Fair RN CM

## 2021-08-09 LAB
FUNGUS (MYCOLOGY) CULTURE: NORMAL
FUNGUS STAIN: NORMAL

## 2021-08-13 LAB
Lab: NORMAL
REPORT: NORMAL
THIS TEST SENT TO: NORMAL

## 2021-08-16 NOTE — PROGRESS NOTES
Physician Progress Note      PATIENT:               Virginia Dubin  CSN #:                  [id number]  :                       1960  ADMIT DATE:       2021 11:55 AM  DISCH DATE:        2021 7:10 PM  RESPONDING  PROVIDER #:        Cirilo Washburn          QUERY TEXT:    Pt admitted with Intra-abdominal abscess. Pt noted to have distal   pancreatectomy, splenectomy, and cholecystectomy on 21 s/p exchange of   pancreatic stent on 21. If possible, please document in progress notes   and discharge summary:    The medical record reflects the following:  Risk Factors: abscess with recent post op  Clinical Indicators: H&P notes \"history of pancreatic neoplasm requiring   distal pancreatectomy, splenectomy, and cholecystectomy on 21 s/p   exchange of pancreatic stent on 21. He was discharged from the hospital on   7/15/21 to [facility]. He was discharged with a wound vac to his abdominal wound   and two LUQ drains. ID sent him home on Invanz. Since discharge, he is   reporting worsening of his abdominal pain. He is tachycardic and distended. He   endorses no fever, chills, nausea, vomiting, or changes in his bowel habits. POPF and abscess formation and recurrent intra-abdomin  Treatment: Removal of stent, Ertapenem, Linezolid, Anidulafungin with   antibiotics continued on DC. Thank you,  Mahesh Rubi RN, BSN, CCDS, Clinical Documentation Improvement  [phone]  Options provided:  -- Intra-abdominal abscess as a postoperative complication  -- Intra-abdominal abscess is a surgical related site infection  -- Intra-abdominal abscess not as a post op complication or surgical related   infection  -- Other - I will add my own diagnosis  -- Disagree - Not applicable / Not valid  -- Disagree - Clinically unable to determine / Unknown  -- Refer to Clinical Documentation Reviewer    PROVIDER RESPONSE TEXT:    Patient has Intra-abdominal abscess as a postoperative complication.     Query created by: Lucy Rodríguez on 8/16/2021 9:18 AM      Electronically signed by:  Charles Ayon 8/16/2021 10:15 AM

## 2021-08-24 LAB
AFB CULTURE (MYCOBACTERIA): NORMAL
AFB SMEAR: NORMAL

## 2021-08-25 ENCOUNTER — TELEPHONE (OUTPATIENT)
Dept: HEMATOLOGY | Age: 61
End: 2021-08-25

## 2021-08-25 NOTE — TELEPHONE ENCOUNTER
Amy Mcwilliams charge RN in [facility] called, she stated that they got the patient scheduled for a drain in at [facility] on 09/01/21 at noon, but wanted to know if they could schedule the patient in [address] [place] [address]. I did speak with dr Cornelius Castro, he stated that he would much rather have the patient wait and go to [facility]. I gave that information to noy to let her know. I also tried to call the patient to explain to him why he should wait but was not able to get a answer on his phone.    Electronically signed by Marie Benavides MA on 8/25/2021 at 1:59 PM

## 2021-09-01 ENCOUNTER — HOSPITAL ENCOUNTER (OUTPATIENT)
Dept: CT IMAGING | Age: 61
Discharge: HOME | End: 2021-09-03
Payer: MEDICAID

## 2021-09-01 ENCOUNTER — ANESTHESIA (OUTPATIENT)
Dept: CT IMAGING | Age: 61
End: 2021-09-01

## 2021-09-01 ENCOUNTER — ANESTHESIA EVENT (OUTPATIENT)
Dept: ENDOSCOPY | Age: 61
End: 2021-09-01
Payer: MEDICAID

## 2021-09-01 ENCOUNTER — ANESTHESIA EVENT (OUTPATIENT)
Dept: CT IMAGING | Age: 61
End: 2021-09-01

## 2021-09-01 VITALS
HEART RATE: 86 BPM | BODY MASS INDEX: 26.52 KG/M2 | TEMPERATURE: 97.9 F | DIASTOLIC BLOOD PRESSURE: 66 MMHG | RESPIRATION RATE: 16 BRPM | HEIGHT: 68 IN | OXYGEN SATURATION: 97 % | SYSTOLIC BLOOD PRESSURE: 131 MMHG | WEIGHT: 175 LBS

## 2021-09-01 VITALS
RESPIRATION RATE: 20 BRPM | OXYGEN SATURATION: 100 % | DIASTOLIC BLOOD PRESSURE: 68 MMHG | SYSTOLIC BLOOD PRESSURE: 129 MMHG

## 2021-09-01 DIAGNOSIS — L02.91 ABSCESS: ICD-10-CM

## 2021-09-01 PROCEDURE — 3700000001 HC ADD 15 MINUTES (ANESTHESIA)

## 2021-09-01 PROCEDURE — 2580000003 HC RX 258: Performed by: NURSE ANESTHETIST, CERTIFIED REGISTERED

## 2021-09-01 PROCEDURE — 6360000004 HC RX CONTRAST MEDICATION: Performed by: RADIOLOGY

## 2021-09-01 PROCEDURE — 76080 X-RAY EXAM OF FISTULA: CPT

## 2021-09-01 PROCEDURE — 3700000000 HC ANESTHESIA ATTENDED CARE

## 2021-09-01 PROCEDURE — 6360000002 HC RX W HCPCS: Performed by: NURSE ANESTHETIST, CERTIFIED REGISTERED

## 2021-09-01 PROCEDURE — 76080 X-RAY EXAM OF FISTULA: CPT | Performed by: RADIOLOGY

## 2021-09-01 RX ORDER — SUCRALFATE 1 G/1
1 TABLET ORAL EVERY 6 HOURS
COMMUNITY
End: 2022-04-12 | Stop reason: ALTCHOICE

## 2021-09-01 RX ORDER — DIPHENHYDRAMINE HYDROCHLORIDE 50 MG/ML
INJECTION INTRAMUSCULAR; INTRAVENOUS PRN
Status: DISCONTINUED | OUTPATIENT
Start: 2021-09-01 | End: 2021-09-01 | Stop reason: SDUPTHER

## 2021-09-01 RX ORDER — PANTOPRAZOLE SODIUM 40 MG/1
40 TABLET, DELAYED RELEASE ORAL PRN
COMMUNITY

## 2021-09-01 RX ORDER — PANTOPRAZOLE SODIUM 40 MG/10ML
40 INJECTION, POWDER, LYOPHILIZED, FOR SOLUTION INTRAVENOUS 2 TIMES DAILY
COMMUNITY
End: 2021-09-01

## 2021-09-01 RX ORDER — METHYLPREDNISOLONE SODIUM SUCCINATE 125 MG/2ML
INJECTION, POWDER, LYOPHILIZED, FOR SOLUTION INTRAMUSCULAR; INTRAVENOUS PRN
Status: DISCONTINUED | OUTPATIENT
Start: 2021-09-01 | End: 2021-09-01 | Stop reason: SDUPTHER

## 2021-09-01 RX ORDER — POLYETHYLENE GLYCOL 3350 17 G/17G
17 POWDER, FOR SOLUTION ORAL DAILY
COMMUNITY

## 2021-09-01 RX ORDER — MIDAZOLAM HYDROCHLORIDE 1 MG/ML
INJECTION INTRAMUSCULAR; INTRAVENOUS PRN
Status: DISCONTINUED | OUTPATIENT
Start: 2021-09-01 | End: 2021-09-01 | Stop reason: SDUPTHER

## 2021-09-01 RX ORDER — FENTANYL CITRATE 50 UG/ML
INJECTION, SOLUTION INTRAMUSCULAR; INTRAVENOUS PRN
Status: DISCONTINUED | OUTPATIENT
Start: 2021-09-01 | End: 2021-09-01 | Stop reason: SDUPTHER

## 2021-09-01 RX ORDER — OXYCODONE HYDROCHLORIDE AND ACETAMINOPHEN 5; 325 MG/1; MG/1
1 TABLET ORAL EVERY 6 HOURS PRN
COMMUNITY
End: 2022-04-12 | Stop reason: ALTCHOICE

## 2021-09-01 RX ORDER — SODIUM CHLORIDE 9 MG/ML
INJECTION, SOLUTION INTRAVENOUS CONTINUOUS PRN
Status: DISCONTINUED | OUTPATIENT
Start: 2021-09-01 | End: 2021-09-01 | Stop reason: SDUPTHER

## 2021-09-01 RX ADMIN — METHYLPREDNISOLONE SODIUM SUCCINATE 125 MG: 125 INJECTION, POWDER, FOR SOLUTION INTRAMUSCULAR; INTRAVENOUS at 14:14

## 2021-09-01 RX ADMIN — MIDAZOLAM 2 MG: 1 INJECTION INTRAMUSCULAR; INTRAVENOUS at 14:14

## 2021-09-01 RX ADMIN — SODIUM CHLORIDE: 9 INJECTION, SOLUTION INTRAVENOUS at 14:09

## 2021-09-01 RX ADMIN — FENTANYL CITRATE 25 MCG: 50 INJECTION, SOLUTION INTRAMUSCULAR; INTRAVENOUS at 14:34

## 2021-09-01 RX ADMIN — DIPHENHYDRAMINE HYDROCHLORIDE 25 MG: 50 INJECTION, SOLUTION INTRAMUSCULAR; INTRAVENOUS at 14:14

## 2021-09-01 RX ADMIN — IOPAMIDOL 1 ML: 612 INJECTION, SOLUTION INTRAVENOUS at 14:51

## 2021-09-01 ASSESSMENT — LIFESTYLE VARIABLES
SMOKING_STATUS: 0
SMOKING_STATUS: 0

## 2021-09-01 NOTE — ANESTHESIA POSTPROCEDURE EVALUATION
Department of Anesthesiology  Postprocedure Note    Patient: Asa Kim  MRN: [id number]  YOB: 1960  Date of evaluation: 9/1/2021  Time:  3:11 PM     Procedure Summary     Date: 09/01/21 Room / Location: [address] [place] [address]    Anesthesia Start: 1409 Anesthesia Stop: 6420    Procedure: CT ABSCESS CATHETER FOLLOW UP Diagnosis:       Abscess      Abscess      (eval drain possible exchange/ upsize)    Scheduled Providers: Jolly General Radiologist; MARGAUX Portillo - CRNA Responsible Provider: Luis Mauro DO    Anesthesia Type: MAC ASA Status: 4          Anesthesia Type: MAC    Dominic Phase I:      Dominic Phase II:      Last vitals: Reviewed and per EMR flowsheets.        Anesthesia Post Evaluation    Patient location during evaluation: bedside  Patient participation: complete - patient participated  Level of consciousness: awake  Pain score: 3  Airway patency: patent  Nausea & Vomiting: no vomiting and no nausea  Complications: no  Cardiovascular status: hemodynamically stable  Respiratory status: acceptable  Hydration status: stable

## 2021-09-01 NOTE — ANESTHESIA PRE PROCEDURE
Department of Anesthesiology  Preprocedure Note       Name:  Daisy Saba   Age:  62 y.o.  :  1960                                          MRN:  [id number]         Date:  2021      Surgeon:  Mohsen Garcia    Procedure:  ABSCESS DRAIN      Medications prior to admission:   Prior to Admission medications    Medication Sig Start Date End Date Taking? Authorizing Provider   sennosides-docusate sodium (SENOKOT-S) 8.6-50 MG tablet Take 2 tablets by mouth daily 21   Mina Conti MD   naloxone 4 MG/0.1ML LIQD nasal spray 1 spray by Nasal route as needed for Opioid Reversal 7/15/21   Mina Conti MD   oxyCODONE-acetaminophen (PERCOCET) 7.5-325 MG per tablet Take 1 tablet by mouth every 4 hours as needed for Pain. Historical Provider, MD   BANOPHEN 25 MG tablet take 2 tablets by mouth 1 HOUR PRIOR TO CONTRAST MEDIA ADMINISTRATION 21   Deion Samuel III, MD   acetaminophen (TYLENOL) 500 MG tablet Take 2 tablets by mouth every 6 hours 21   Sabas Daniels MD   ibuprofen (ADVIL;MOTRIN) 600 MG tablet Take 1 tablet by mouth 3 times daily (with meals) 21   Sabas Daniels MD   apixaban (ELIQUIS) 5 MG TABS tablet Take 1 tablet by mouth 2 times daily . The first dose of this Eliquis should be taken approximately 12 hours after you received your last dose of Lovenox (the blood thinner injection into your skin) given in the hospital. Continue to take the Eliquis approximately every 12 hours.  21   Sabas Daniels MD   benzonatate (TESSALON) 200 MG capsule Take 200 mg by mouth 3 times daily as needed for Cough    Historical Provider, MD   insulin lispro, 1 Unit Dial, (HUMALOG KWIKPEN) 100 UNIT/ML SOPN Inject 0-12 Units into the skin 3 times daily (before meals) Glucose: Dose:  If <139             No Insulin  140-199 2 Units  200-249 4 Units  250-299 6 Units  300-349 8 Units  350-400 10 Units  Above 400       12 Units 21  Gail Ware MD   losartan (COZAAR) 100 MG tablet take 1 tablet by mouth once daily 5/19/21   Natalio Stout MD   hydroCHLOROthiazide (HYDRODIURIL) 12.5 MG tablet take 1 tablet by mouth once daily 5/19/21   Natalio Stout MD   albuterol (PROVENTIL) (2.5 MG/3ML) 0.083% nebulizer solution Take 3 mLs by nebulization 4 times daily 5/19/21   Natalio Stout MD   budesonide (PULMICORT) 0.5 MG/2ML nebulizer suspension Take 4 mLs by nebulization 2 times daily 5/19/21   Natalio Stout MD   guaiFENesin (ROBITUSSIN) 100 MG/5ML SOLN oral solution Take 15 mLs by mouth 3 times daily 5/19/21   Natalio Stout MD   lipase-protease-amylase (CREON) 01979 units CPEP delayed release capsule Take 2 capsules by mouth 3 times daily (with meals) 5/19/21 8/17/21  Natalio Stout MD   pantoprazole (PROTONIX) 40 MG tablet Take 1 tablet by mouth 2 times daily (before meals) 5/19/21   Natalio Stout MD   atorvastatin (LIPITOR) 20 MG tablet Take 1 tablet by mouth daily 5/20/21   Natalio Stout MD   insulin glargine (LANTUS SOLOSTAR) 100 UNIT/ML injection pen Inject 20 Units into the skin 2 times daily 5/19/21 6/30/21  Natalio Stout MD   insulin lispro, 1 Unit Dial, ([facility]) 100 UNIT/ML SOPN Inject 7 Units into the skin 3 times daily (before meals) 5/19/21 6/30/21  Natalio Stout MD   gabapentin (NEURONTIN) 300 MG capsule Take 1 capsule by mouth 2 times daily. 4/19/21   Historical Provider, MD       Current medications:    Current Outpatient Medications   Medication Sig Dispense Refill    sennosides-docusate sodium (SENOKOT-S) 8.6-50 MG tablet Take 2 tablets by mouth daily      naloxone 4 MG/0.1ML LIQD nasal spray 1 spray by Nasal route as needed for Opioid Reversal 1 each 5    oxyCODONE-acetaminophen (PERCOCET) 7.5-325 MG per tablet Take 1 tablet by mouth every 4 hours as needed for Pain.       BANOPHEN 25 MG tablet take 2 tablets by mouth 1 HOUR PRIOR TO CONTRAST MEDIA ADMINISTRATION 2 tablet 0    acetaminophen (TYLENOL) 500 MG tablet Take 2 tablets by mouth every 6 hours 120 tablet 0    ibuprofen (ADVIL;MOTRIN) 600 MG tablet Take 1 tablet by mouth 3 times daily (with meals) 120 tablet 0    apixaban (ELIQUIS) 5 MG TABS tablet Take 1 tablet by mouth 2 times daily . The first dose of this Eliquis should be taken approximately 12 hours after you received your last dose of Lovenox (the blood thinner injection into your skin) given in the hospital. Continue to take the Eliquis approximately every 12 hours.  60 tablet 3    benzonatate (TESSALON) 200 MG capsule Take 200 mg by mouth 3 times daily as needed for Cough      insulin lispro, 1 Unit Dial, (HUMALOG KWIKPEN) 100 UNIT/ML SOPN Inject 0-12 Units into the skin 3 times daily (before meals) Glucose: Dose:  If <139             No Insulin  140-199 2 Units  200-249 4 Units  250-299 6 Units  300-349 8 Units  350-400 10 Units  Above 400       12 Units 10.8 mL 0    losartan (COZAAR) 100 MG tablet take 1 tablet by mouth once daily 30 tablet 3    hydroCHLOROthiazide (HYDRODIURIL) 12.5 MG tablet take 1 tablet by mouth once daily 30 tablet 3    albuterol (PROVENTIL) (2.5 MG/3ML) 0.083% nebulizer solution Take 3 mLs by nebulization 4 times daily 120 each 3    budesonide (PULMICORT) 0.5 MG/2ML nebulizer suspension Take 4 mLs by nebulization 2 times daily 60 ampule 3    guaiFENesin (ROBITUSSIN) 100 MG/5ML SOLN oral solution Take 15 mLs by mouth 3 times daily 1200 mL 0    lipase-protease-amylase (CREON) 16455 units CPEP delayed release capsule Take 2 capsules by mouth 3 times daily (with meals) 540 capsule 3    pantoprazole (PROTONIX) 40 MG tablet Take 1 tablet by mouth 2 times daily (before meals) 60 tablet 0    atorvastatin (LIPITOR) 20 MG tablet Take 1 tablet by mouth daily 30 tablet 3    insulin glargine (LANTUS SOLOSTAR) 100 UNIT/ML injection pen Inject 20 Units into the skin 2 times daily 12 mL 3    insulin lispro, 1 Unit Dial, (HUMALOG KWIKPEN) 100 UNIT/ML SOPN Inject 7 Units into the skin 3 times daily (before meals) 6.3 mL 3    gabapentin (NEURONTIN) 300 MG capsule Take 1 capsule by mouth 2 times daily. No current facility-administered medications for this visit. Allergies:     Allergies   Allergen Reactions    Iodine Swelling     Sea food    Metformin And Related Hives    Wheat Extract Swelling       Problem List:    Patient Active Problem List   Diagnosis Code    Mixed hyperlipidemia E78.2    Neck pain M54.2    Cervical radiculopathy at C7 M54.12    Essential hypertension I10    Chronic seasonal allergic rhinitis J30.2    Ventral hernia without obstruction or gangrene K43.9    Poorly controlled type 2 diabetes mellitus (HCC) E11.65    Chronic bilateral low back pain with right-sided sciatica M54.41, G89.29    Gynecomastia N62    Panic disorder F41.0    Cubital tunnel syndrome on right G56.21    Right carpal tunnel syndrome G56.01    Spondylosis of lumbar spine M47.816    Bell's palsy G51.0    Other bursal cyst, left elbow M71.322    Sacral radiculitis M54.18    Lumbar radiculitis M54.16    Spondylosis of cervical region without myelopathy or radiculopathy M47.812    Cervical facet joint syndrome M47.812    Cervical disc disorder M50.90    Lumbar facet arthropathy M47.816    Spinal stenosis of lumbar region with neurogenic claudication M48.062    Lumbar disc disorder M51.9    Pancreatic neoplasm D49.0    IPMN (intraductal papillary mucinous neoplasm) D49.0    Pancreatic duct leak K86.89    Intra-abdominal infection B99.9    Abdominal pain with fever after surgery R10.9, G89.18, R50.82    Sepsis ([facility]) A41.9    Acute respiratory failure with hypoxia ([facility]) J96.01    Intra-abdominal abscess ([facility]) K65.1    Severe protein-calorie malnutrition ([facility]) E43       Past Medical History:        Diagnosis Date    Arthritis     Back pain     5th finger & ring finger on right hand is numb    Bell's palsy     NEW ONSET 3-     Hyperlipidemia     Hypertension     Neuropathy     toes numb [facility]    HERNIA REPAIR Bilateral     groin and umbilical    HERNIA REPAIR N/A 12/13/2018    ROBOTIC ASSISTED LAPAROSCOPIC PRIMARY REPAIR OF RECURRENT VENTRAL HERNIA  REPAIR performed by Jojo Montaño MD at [address] Bilateral 07/11/2019    L4-5 transforaminal     NERVE BLOCK Bilateral 08/01/2019    medial branch block    NERVE BLOCK Bilateral 08/15/2019    bilateral medial branch block L4-S1    NERVE BLOCK  10/17/2019    lumbar epidural    PANCREATECTOMY N/A 05/12/2021    LAPAROSCOPIC ROBOTIC DISTAL PANCREATECTOMY AND SPLENECTOMY AND CHOLECYSTECTOMY, INTRA-OPERATIVE ULTRASOUND, TRANSITIONED TO OPEN -- EPIDURAL performed by Susana Toledo MD at [place] [address] SURGERY  06/05/2018    C5-7 fusion at [facility] in [address] [place] [address] Pkwy 01/08/2021    EGD W/EUS FNA performed by Kartik Dudley DO at [address] [place] [address] 01/08/2021    EGD DIAGNOSTIC ONLY performed by Kartik Dudley DO at [address] [place] [address] N/A 7/30/2021    EGD ESOPHAGOGASTRODUODENOSCOPY ULTRASOUND performed by Yobani Tracy MD at [address] [place] [address] N/A 7/30/2021    EUS FOREIGN BODY REMOVAL performed by Yobani Tracy MD at [facility] History:    Social History     Tobacco Use    Smoking status: Never Smoker    Smokeless tobacco: Never Used   Substance Use Topics    Alcohol use: Not Currently     Comment: occasional                                Counseling given: Not Answered      Vital Signs (Current): There were no vitals filed for this visit.                                            BP Readings from Last 3 Encounters:   08/05/21 100/70   07/30/21 113/71   07/29/21 111/72       NPO Status:  > 8 hours                                                                               BMI:   Wt Readings from Last 3 Encounters:   07/28/21 185 lb (83.9 kg) 21 186 lb 3.2 oz (84.5 kg)   21 199 lb (90.3 kg)     There is no height or weight on file to calculate BMI.    CBC:   Lab Results   Component Value Date    WBC 5.8 2021    RBC 2.54 2021    HGB 6.5 2021    HCT 21.0 2021    MCV 82.7 2021    RDW 23.2 2021     2021       CMP:   Lab Results   Component Value Date     2021    K 4.2 2021    K 4.0 2021     2021    CO2 24 2021    BUN 10 2021    CREATININE 0.7 2021    GFRAA >60 2021    LABGLOM >60 2021    GLUCOSE 68 2021    PROT 6.7 2021    CALCIUM 8.7 2021    BILITOT 0.2 2021    ALKPHOS 82 2021    AST 29 2021    ALT 12 2021       POC Tests: No results for input(s): POCGLU, POCNA, POCK, POCCL, POCBUN, POCHEMO, POCHCT in the last 72 hours.     Coags:   Lab Results   Component Value Date    PROTIME 13.5 2021    INR 1.3 2021       HCG (If Applicable): No results found for: PREGTESTUR, PREGSERUM, HCG, HCGQUANT     ABGs:   Lab Results   Component Value Date    PO2ART 118.2 2021    RMV2DMS 56.0 2021    NZZ3YDJ 22.9 2021        Type & Screen (If Applicable):  No results found for: LABABO, [address]    Drug/Infectious Status (If Applicable):  No results found for: HIV, HEPCAB    COVID-19 Screening (If Applicable):   Lab Results   Component Value Date    COVID19 Not Detected 2021    COVID19 Not Detected 2021     EK2021  Ventricular Rate     Atrial Rate     QRS Duration ms 64    Q-T Interval ms 234    QTc Calculation (Bazett) ms 357    R Axis degrees 42    T Axis degrees 28    Resulting Agency  [facility]      Narrative & Impression    Supraventricular tachycardia  Low voltage QRS  Significant artifacts  Abnormal ECG  When compared with ECG of 13-MAY- 06:15,  Significant changes have occurred  Confirmed by Stephanie Ramos ([id number]) on 2021 3:36:12 PM Impression   1.  No acute disease or significant change.  Splenectomy and distal   pancreatectomy with persistent surgical bed abscess cavity which is the same   or mildly decreased in size.       2.  Small left pleural effusion with left basilar mild passive atelectasis,   unchanged.       3.  No ascites or free abdominal fluid. Anesthesia Evaluation  Patient summary reviewed and Nursing notes reviewed no history of anesthetic complications:   Airway: Mallampati: III  TM distance: <3 FB   Neck ROM: limited  Mouth opening: > = 3 FB Dental:          Pulmonary: breath sounds clear to auscultation  (+) sleep apnea: on noncompliant,      (-) not a current smoker                           Cardiovascular:  Exercise tolerance: poor (<4 METS),   (+) hypertension:, hyperlipidemia    (-) past MI, CAD and CABG/stent    ECG reviewed  Rhythm: regular  Rate: abnormal           Beta Blocker:  Not on Beta Blocker        PE comment: Pt has been tachycardic   Neuro/Psych:   (+) neuromuscular disease (Polyneuropathy):, psychiatric history: stable without treatmentdepression/anxiety              ROS comment: Bell's palsy - March 2019 (left face); No issues currently  OA, DJD, DDD, cervical radiculopathy, chronic low back pain with right sciatica  Lower extremity neuropathy    Right hand - ring finger and 5th finger are numb      Cervical radiculopathy at C7 GI/Hepatic/Renal:   (+) GERD: poorly controlled,          ROS comment: postoperative development of intra-abdominal abscess status post distal pancreatectomy, splenectomy, cholecystectomy on 5/12. ERCP with pancreatic stent placement on 6/4, s/p IR drain placement x2 6/30    Pain upper left quadrant     FOR ABSCESS DRAIN TODAY 9/1/21  .    Endo/Other:    (+) Diabetes (A1C 8.7%)Type II DM, poorly controlled, using insulin, blood dyscrasia (H&H-6.5/21 s/p PRBC transfusion): anemia and anticoagulation therapy, arthritis: OA., malignancy/cancer (Intraductal papillary mucinous neoplasm). Pt had no PAT visit        ROS comment: VRE and MDRO [facility] Abdominal:             Vascular: negative vascular ROS. Other Findings:             Anesthesia Plan      MAC     ASA 4       Induction: intravenous. MIPS: Postoperative opioids intended and Prophylactic antiemetics administered. Anesthetic plan and risks discussed with patient. Use of blood products discussed with patient whom consented to blood products. Plan discussed with CRNA. Venancio Spicer, RN   9/1/2021      DOS STAFF ADDENDUM:    Patient seen and chart reviewed on DOS. No interval change in history or exam.   I agree with the anesthesia pre-operative assessment written above and have made the appropriate addendums and/or changes. Anesthesia plan discussed and risks/benefits addressed. Patient's concerns and questions answered. NPO >8 hours.      Kavita Zurita DO  September 1, 2021  1:26 PM

## 2021-09-01 NOTE — PROGRESS NOTES

## 2021-09-01 NOTE — PROGRESS NOTES
[facility] PRE-ADMISSION TESTING INSTRUCTIONS    The Preadmission Testing patient is instructed accordingly using the following criteria (check applicable):    ARRIVAL INSTRUCTIONS:  [] Parking the day of Surgery is located in the Main Entrance lot. Upon entering the door, make an immediate right to the surgery reception desk    [] Bring photo ID and insurance card    [] Bring in a copy of Living will or Durable Power of  papers. [] Please be sure to arrange for responsible adult to provide transportation to and from the hospital    [] Please arrange for responsible adult to be with you for the 24 hour period post procedure due to having anesthesia      GENERAL INSTRUCTIONS:    [x] Nothing by mouth after midnight, including gum, candy, mints or water    [x] You may brush your teeth, but do not swallow any water    [x] Take medications as instructed with 1-2 oz of water    [x] Stop herbal supplements and vitamins 5 days prior to procedure    [x] Follow preop dosing of blood thinners per physician instructions    [x] Take 1/2 dose of evening insulin, but no insulin after midnight    [x] No oral diabetic medications after midnight    [x] If diabetic and have low blood sugar or feel symptomatic, take 1-2oz apple juice only    [] Bring inhalers day of surgery    [] Bring C-PAP/ Bi-Pap day of surgery    [] Bring urine specimen day of surgery    [x] Shower or bath with soap, lather and rinse well, AM of Surgery, no lotion, powders or creams    [] Follow bowel prep as instructed per surgeon    [x] No tobacco products within 24 hours of surgery     [x] No alcohol or illegal drug use within 24 hours of surgery.     [x] Jewelry, body piercing's, eyeglasses, contact lenses and dentures are not permitted into surgery (bring cases)      [] Please do not wear any nail polish, make up or hair products on the day of surgery    [] You can expect a call the business day prior to procedure to notify you if your arrival time changes    [] If you receive a survey after surgery we would greatly appreciate your comments    [] Parent/guardian of a minor must accompany their child and remain on the premises  the entire time they are under our care     [] Pediatric patients may bring favorite toy, blanket or comfort item with them    [] A caregiver or family member must remain with the patient during their stay if they are mentally handicapped, have dementia, disoriented or unable to use a call light or would be a safety concern if left unattended    [x] Please notify surgeon if you develop any illness between now and time of surgery (cold, cough, sore throat, fever, nausea, vomiting) or any signs of infections  including skin, wounds, and dental.    []  The Outpatient Pharmacy is available to fill your prescription here on your day of surgery, ask your preop nurse for details    [] Other instructions    EDUCATIONAL MATERIALS PROVIDED:    [] PAT Preoperative Education Packet/Booklet     [] Medication List    [] Transfusion bracelet applied with instructions    [] Shower with soap, lather and rinse well, and use CHG wipes provided the evening before surgery as instructed    [] Incentive spirometer with instructions

## 2021-09-01 NOTE — PROGRESS NOTES
1530 Patient returned from drain tube evaluation. Dressing checked, clean, dry, and intact. Patient stable. No s/s of complications noted or reported. Vitals will be checked q 15min, see flow sheets. Patient eating and drinking well with no s/s of complications noted or reported.     Awaiting transportation back to [facility]  1610 transferred to [facility].

## 2021-09-01 NOTE — PROGRESS NOTES
Pt is a pt on the [facility] unit in room 725. Spoke with Kuldeep NICKERSON and verified all current treatment and medications. . pt is alert and oriented, signs for himself . Cristóbal Ghotra No oxygen use, on room air. Takes meds and food orally . Able to use call light . Cristóbal Ghotra FULL CODE. . nurse denies Mira Ayala, living will, Health care directive, POA documents at current time. His emergency contact is [employer]. Right arm PICC line presently. Abd IR  drain intact LUQ that gets flushed q4h. . pt is at [address] today in IR having drain checked. Contact isolation  For VRE, MDRO . Cristóbal Ghotra no wounds    Rapid Covid testing to be done 9/1/2021 by [facility] unit per protocol. Repeat H&H 9/2/2021 a.m. PAT instructions for EGD procedure on 9/2/2021 faxed to [facility].    pts HGB is 6.5 today and had his Eliquis this morning. Spoke with Nayana Ball R.N. and requested her to inform Radha Powell that pt had his ELiquis today and should it be held or given prior to procedure (EGD) 9/2/2021. She verbalzies understanding. She states pt will have a repeat H&H upon return from [facility] today .

## 2021-09-02 ENCOUNTER — HOSPITAL ENCOUNTER (OUTPATIENT)
Age: 61
Setting detail: OUTPATIENT SURGERY
Discharge: ANOTHER HOSPITAL | End: 2021-09-02
Attending: TRANSPLANT SURGERY | Admitting: TRANSPLANT SURGERY
Payer: MEDICAID

## 2021-09-02 ENCOUNTER — ANESTHESIA (OUTPATIENT)
Dept: ENDOSCOPY | Age: 61
End: 2021-09-02
Payer: MEDICAID

## 2021-09-02 VITALS
SYSTOLIC BLOOD PRESSURE: 135 MMHG | RESPIRATION RATE: 18 BRPM | WEIGHT: 175 LBS | DIASTOLIC BLOOD PRESSURE: 78 MMHG | BODY MASS INDEX: 26.52 KG/M2 | HEART RATE: 86 BPM | OXYGEN SATURATION: 99 % | TEMPERATURE: 97.2 F | HEIGHT: 68 IN

## 2021-09-02 VITALS
SYSTOLIC BLOOD PRESSURE: 147 MMHG | RESPIRATION RATE: 26 BRPM | OXYGEN SATURATION: 94 % | DIASTOLIC BLOOD PRESSURE: 77 MMHG

## 2021-09-02 PROBLEM — K26.9 DUODENAL ULCER DISEASE: Status: ACTIVE | Noted: 2021-09-02

## 2021-09-02 LAB — METER GLUCOSE: 263 MG/DL (ref 74–99)

## 2021-09-02 PROCEDURE — 3700000001 HC ADD 15 MINUTES (ANESTHESIA): Performed by: TRANSPLANT SURGERY

## 2021-09-02 PROCEDURE — 3609013000 HC EGD TRANSORAL CONTROL BLEEDING ANY METHOD: Performed by: TRANSPLANT SURGERY

## 2021-09-02 PROCEDURE — 3609012400 HC EGD TRANSORAL BIOPSY SINGLE/MULTIPLE: Performed by: TRANSPLANT SURGERY

## 2021-09-02 PROCEDURE — 6360000002 HC RX W HCPCS: Performed by: NURSE ANESTHETIST, CERTIFIED REGISTERED

## 2021-09-02 PROCEDURE — 82962 GLUCOSE BLOOD TEST: CPT

## 2021-09-02 PROCEDURE — 3700000000 HC ANESTHESIA ATTENDED CARE: Performed by: TRANSPLANT SURGERY

## 2021-09-02 PROCEDURE — 3609017100 HC EGD: Performed by: TRANSPLANT SURGERY

## 2021-09-02 PROCEDURE — 6360000002 HC RX W HCPCS: Performed by: TRANSPLANT SURGERY

## 2021-09-02 PROCEDURE — 2709999900 HC NON-CHARGEABLE SUPPLY: Performed by: TRANSPLANT SURGERY

## 2021-09-02 PROCEDURE — 43239 EGD BIOPSY SINGLE/MULTIPLE: CPT | Performed by: TRANSPLANT SURGERY

## 2021-09-02 PROCEDURE — 43236 UPPR GI SCOPE W/SUBMUC INJ: CPT | Performed by: TRANSPLANT SURGERY

## 2021-09-02 PROCEDURE — 2580000003 HC RX 258: Performed by: NURSE ANESTHETIST, CERTIFIED REGISTERED

## 2021-09-02 PROCEDURE — 88305 TISSUE EXAM BY PATHOLOGIST: CPT

## 2021-09-02 PROCEDURE — 7100000010 HC PHASE II RECOVERY - FIRST 15 MIN: Performed by: TRANSPLANT SURGERY

## 2021-09-02 PROCEDURE — 7100000011 HC PHASE II RECOVERY - ADDTL 15 MIN: Performed by: TRANSPLANT SURGERY

## 2021-09-02 RX ORDER — SODIUM CHLORIDE 9 MG/ML
INJECTION, SOLUTION INTRAVENOUS CONTINUOUS PRN
Status: DISCONTINUED | OUTPATIENT
Start: 2021-09-02 | End: 2021-09-02 | Stop reason: SDUPTHER

## 2021-09-02 RX ORDER — EPINEPHRINE 1 MG/ML
INJECTION, SOLUTION, CONCENTRATE INTRAVENOUS PRN
Status: DISCONTINUED | OUTPATIENT
Start: 2021-09-02 | End: 2021-09-02 | Stop reason: ALTCHOICE

## 2021-09-02 RX ORDER — PROPOFOL 10 MG/ML
INJECTION, EMULSION INTRAVENOUS PRN
Status: DISCONTINUED | OUTPATIENT
Start: 2021-09-02 | End: 2021-09-02 | Stop reason: SDUPTHER

## 2021-09-02 RX ORDER — SODIUM CHLORIDE 9 MG/ML
25 INJECTION, SOLUTION INTRAVENOUS PRN
Status: DISCONTINUED | OUTPATIENT
Start: 2021-09-02 | End: 2021-09-02 | Stop reason: HOSPADM

## 2021-09-02 RX ORDER — SODIUM CHLORIDE 0.9 % (FLUSH) 0.9 %
5-40 SYRINGE (ML) INJECTION PRN
Status: DISCONTINUED | OUTPATIENT
Start: 2021-09-02 | End: 2021-09-02 | Stop reason: HOSPADM

## 2021-09-02 RX ORDER — SODIUM CHLORIDE 0.9 % (FLUSH) 0.9 %
5-40 SYRINGE (ML) INJECTION EVERY 12 HOURS SCHEDULED
Status: DISCONTINUED | OUTPATIENT
Start: 2021-09-02 | End: 2021-09-02 | Stop reason: HOSPADM

## 2021-09-02 RX ADMIN — PROPOFOL 150 MG: 10 INJECTION, EMULSION INTRAVENOUS at 08:06

## 2021-09-02 RX ADMIN — PROPOFOL 50 MG: 10 INJECTION, EMULSION INTRAVENOUS at 08:13

## 2021-09-02 RX ADMIN — SODIUM CHLORIDE: 9 INJECTION, SOLUTION INTRAVENOUS at 07:49

## 2021-09-02 RX ADMIN — PROPOFOL 50 MG: 10 INJECTION, EMULSION INTRAVENOUS at 08:09

## 2021-09-02 ASSESSMENT — PAIN SCALES - GENERAL
PAINLEVEL_OUTOF10: 0

## 2021-09-02 ASSESSMENT — PAIN - FUNCTIONAL ASSESSMENT: PAIN_FUNCTIONAL_ASSESSMENT: 0-10

## 2021-09-02 NOTE — H&P
Hepatobiliary and Pancreatic Surgery    History and Physical        Patient's Name/Date of Birth: Frida Vargas /1960 (67 y.o.)     Date: September 2, 2021      CC: Melena  HPI:  Frida Vargas is a 62year old male with a history of pancreatic neoplasm requiring distal pancreatectomy, splenectomy, and cholecystectomy on 5/12/21 s/p exchange of pancreatic stent on 7/2/21. He was [facility] from the hospital [facility]  He had his drain checked yesterday, however, he is now having black stools and requiring blood transfusions.   Past Medical History        Past Medical History:   Diagnosis Date    Arthritis      Back pain       5th finger & ring finger on right hand is numb    Bell's palsy       NEW ONSET 3-     Hyperlipidemia      Hypertension      Neuropathy       toes numb    Poorly controlled type 2 diabetes mellitus with neuropathy ([facility] [address] 5/8/2018    Sacral radiculitis 2/27/2019    Spondylosis of lumbar spine 3/9/2019     Advanced arthritis and degenerative disc disease by MRI 3/2019    Ventral hernia              Past Surgical History         Past Surgical History:   Procedure Laterality Date    ANESTHESIA NERVE BLOCK Bilateral 07/11/2019     BILATERAL L4-5 TRANSFORAMINAL EPIDURAL STEROID INJECTION performed by Danielle Greenfield DO at [address] Bilateral 08/01/2019     BILATERAL MEDIAL BRANCH BLOCK L4-5 AND L5-S1 performed by Danielle Greenfield DO at  [address] Bilateral 08/15/2019     BILATERAL MEDIAL BRANCH BLOCK L4 - 5 AND L5 - S1 performed by Danielle Greenfield DO at [facility] Right 03/29/2019     right wrist carpal tunnel release and right elbow cubital tunnel release    CARPAL TUNNEL RELEASE Right 03/29/2019     RIGHT WRIST CARPAL TUNNEL RELEASE AND RIGHT ELBOW CUBITAL TUNNEL RELEASE performed by Michelle Loo DO at [place] [address]       at age 40 polyps    CYST REMOVAL Right      EPIDURAL STEROID INJECTION N/A 10/17/2019     LUMBAR EPIDURAL STEROID INJECTION UNDER FLUOROSCOPIC GUIDANCE AT L2-L3 WITHOUT SEDATION performed by Hector Marie MD at [address] [facility] ERCP N/A 05/17/2021     ERCP ENDOSCOPIC RETROGRADE CHOLANGIOPANCREATOGRAPHY SPHINCTER/PAPILLOTOMY performed by Marie Tomlinson MD at [address] [facility] ERCP N/A 05/17/2021     ERCP STENT INSERTION performed by Marie Tomlinson MD at [address] [facility] ERCP N/A 6/4/2021     ERCP STENT INSERTION performed by Marie Tomlinson MD at [address] [facility] ERCP N/A 7/2/2021     ERCP STENT REMOVAL performed by Marie Tomlinson MD at [address] [facility] ERCP N/A 7/2/2021     ERCP STONE REMOVAL performed by Marie Tomlinson MD at [address] [facility] ERCP N/A 7/2/2021     ERCP STENT INSERTION performed by Marie [facility]linson MD at [facility] Bilateral       groin and umbilical    HERNIA REPAIR N/A 12/13/2018     ROBOTIC ASSISTED LAPAROSCOPIC PRIMARY REPAIR OF RECURRENT VENTRAL HERNIA  REPAIR performed by Juan F Monreal MD at [address] [facility] Bilateral 07/11/2019     L4-5 transforaminal     NERVE BLOCK Bilateral 08/01/2019     medial branch block    NERVE BLOCK Bilateral 08/15/2019     bilateral medial branch block L4-S1    NERVE BLOCK   10/17/2019     lumbar epidural    PANCREATECTOMY N/A 05/12/2021     LAPAROSCOPIC ROBOTIC DISTAL PANCREATECTOMY AND SPLENECTOMY AND CHOLECYSTECTOMY, INTRA-OPERATIVE ULTRASOUND, TRANSITIONED TO OPEN -- EPIDURAL performed by Angie Lozada MD at [place] [address]   06/05/2018     [address]5-7 fusion [address] [facility] [address] 01/08/2021     EGD W/EUS FNA performed by Cristina Ramirez DO at [address] [place] [address] 01/08/2021     EGD DIAGNOSTIC ONLY performed by Cristina Ramirez DO at [address] [facility]            Current Facility-Administered Medications Current Facility-Administered Medications   Medication Dose Route Frequency Provider Last Rate Last Admin    sodium chloride flush 0.9 % injection 5-40 mL  5-40 mL Intravenous 2 times per day [place] Fill, DO        sodium chloride flush 0.9 % injection 5-40 mL  5-40 mL Intravenous PRN [place] Fill, DO        0.9 % sodium chloride infusion  25 mL Intravenous PRN [place] Fill, DO        ondansetron (ZOFRAN-ODT) disintegrating tablet 4 mg  4 mg Oral Q8H PRN [place] Fill, DO         Or    ondansetron (ZOFRAN) injection 4 mg  4 mg Intravenous Q6H PRN [place] Fill, DO        lactated ringers infusion   Intravenous Continuous [place] Fill,  mL/hr at 07/28/21 1446 New Bag at 07/28/21 1446    heparin (porcine) injection 5,000 Units  5,000 Units Subcutaneous 3 times per day [place] Fill, DO        ertapenem Krysta Homes) 1000 mg IVPB minibag  1,000 mg Intravenous Q24H Megan Martines MD        anidulafungin (ERAXIS) 200 mg in dextrose 5 % 260 mL IVPB  200 mg Intravenous Once Tracey Morrow MD         And    [START ON 7/29/2021] anidulafungin (ERAXIS) 100 mg in dextrose 5 % 130 mL IVPB  100 mg Intravenous Q24H [facility] Emilie Kitchen MD        linezolid (ZYVOX) IVPB 600 mg  600 mg Intravenous Q12H Tracey Morrow MD        hydrocortisone sodium succinate PF (SOLU-CORTEF) injection 200 mg  200 mg Intravenous Once Shellie Tay MD         Followed by   [facility] hydrocortisone sodium succinate PF (SOLU-CORTEF) injection 200 mg  200 mg Intravenous Once Shellie Tay MD         Followed by   [facility] diphenhydrAMINE (BENADRYL) tablet 50 mg  50 mg Oral Once Shellie Tay MD                      Allergies   Allergen Reactions    Iodine Swelling       Sea food    Metformin And Related Hives    Wheat Extract Swelling         Family History         Family History   Problem Relation Age of Onset    Diabetes Mother      Other Mother       diarrhea, nausea and vomiting. Genitourinary: Negative for dysuria and hematuria. Musculoskeletal: Negative for back pain. Skin: Positive for pallor. Negative for rash. Neurological: Negative for headaches.         Physical Exam:      Vitals:     07/28/21 1230   BP: 108/69   Pulse: 99   Resp: 18   Temp: 97.5 °F (36.4 °C)   SpO2:           PSYCH: mood and affect normal, alert and oriented x 3: In mild distress, uncomfortable  EYES: Sclera white, pupils equal round and reactive to light  ENMT:  Hearing normal, trachea midline, ears externally intact  RESP: Respiratory effort was normal with no retractions or use of accessory muscles. CV: Regular rate. No pedal edema  GI/ Abdomen: The abdomen was soft and non distended.  Drain intact  MSK: no clubbing/ no cyanosis/ gaitnormal  Assessment/Plan:  S/p open distal pancreatectomy and splenectomy with POPF and abscess formation and recurrent intra-abdominal abscess, now with GI bleed causing acute blood loss anemia     - Patient was made aware of the risks, benefits, alternatives, and complications of a Esophagogastroduodenoscopy with possible biopsy and polypectomy and wish to proceed with surgery.       Electronically signed by Stanley Thornton MD on 9/2/2021 at 6:24 AM

## 2021-09-02 NOTE — ANESTHESIA PRE PROCEDURE
Department of Anesthesiology  Preprocedure Note       Name:  Davina Segura   Age:  62 y.o.  :  1960                                          MRN:  [id number]         Date:  2021      Surgeon:  Bipin Duke III, MD    Procedure:  EGD ESOPHAGOGASTRODUODENOSCOPY       Medications prior to admission:   Prior to Admission medications    Medication Sig Start Date End Date Taking? Authorizing Provider   sodium chloride 0.9 % SOLN 50 mL with ertapenem 1 GM SOLR 1,000 mg Infuse 1 g intravenously 21  Yes Historical Provider, MD   insulin lispro (HUMALOG) 100 UNIT/ML injection vial Inject 0-16 Units into the skin 3 times daily (before meals) Moderate dose <70mg/dl initiate hypoglycemia treatment  . . no insulin  151-200. 4 units  201-250. Lorean Snare 8 units  251-300 10 units  301-350 12units  351-400 16 units  >400 . Lorean Snare 16 units and call dr   Yes Historical Provider, MD   insulin lispro (HUMALOG) 100 UNIT/ML injection vial Inject 0-8 Units into the skin nightly Moderate dose. <70mg/dl initiate hypoglycemia treatment  . .. no insulin  151-200. .. 2 units  201-250. Lorean Snare 4 units  251-300. .. 5 units  301-350. .. 6 units  351-400. .. 8 units  >400. .. 8 units and call medical   Yes Historical Provider, MD   LINEZOLID IV Infuse 600 mg intravenously Every 12 hours IV   Yes Historical Provider, MD   sodium chloride 0.9 % SOLN 100 mL with micafungin 100 MG SOLR 100 mg Infuse 100 mg intravenously daily In sodium chloride 0.9% 100ml ever 24hours   Yes Historical Provider, MD   oxyCODONE-acetaminophen (PERCOCET) 5-325 MG per tablet Take 1 tablet by mouth every 6 hours as needed for Pain (moderate pain).    Yes Historical Provider, MD   oxyCODONE HCl (ROXICODONE PO) Take 2.5 mg by mouth Every 6 hrs prn moderate pain   Yes Historical Provider, MD   lipase-protease-amylase (ZENPEP) 33584-17122 units CPEP delayed release capsule Take 1 capsule by mouth 3 times daily (with meals) 40,000 units   Yes Historical Provider, MD polyethylene glycol (GLYCOLAX) 17 g packet Take 17 g by mouth daily   Yes Historical Provider, MD   sucralfate (CARAFATE) 1 GM tablet Take 1 g by mouth every 6 hours   Yes Historical Provider, MD   pantoprazole (PROTONIX) 40 MG tablet Take 40 mg by mouth 2 times daily   Yes Historical Provider, MD   sennosides-docusate sodium (SENOKOT-S) 8.6-50 MG tablet Take 2 tablets by mouth daily  Patient taking differently: Take 2 tablets by mouth daily Taking 100mg BID 7/16/21  Yes Su Jerry MD   acetaminophen (TYLENOL) 500 MG tablet Take 2 tablets by mouth every 6 hours  Patient taking differently: Take 650 mg by mouth every 6 hours as needed for Pain or Fever (mild pain temp > or equal 101F)  6/4/21  Yes Nona Ruiz MD   apixaban (ELIQUIS) 5 MG TABS tablet Take 1 tablet by mouth 2 times daily . The first dose of this Eliquis should be taken approximately 12 hours after you received your last dose of Lovenox (the blood thinner injection into your skin) given in the hospital. Continue to take the Eliquis approximately every 12 hours.  6/4/21  Yes Nona Ruiz MD   benzonatate (TESSALON) 200 MG capsule Take 200 mg by mouth 3 times daily as needed for Cough   Yes Historical Provider, MD   albuterol (PROVENTIL) (2.5 MG/3ML) 0.083% nebulizer solution Take 3 mLs by nebulization 4 times daily 5/19/21  Yes Silke Fung MD   budesonide (PULMICORT) 0.5 MG/2ML nebulizer suspension Take 4 mLs by nebulization 2 times daily 5/19/21  Yes Silke Fung MD   guaiFENesin (ROBITUSSIN) 100 MG/5ML SOLN oral solution Take 15 mLs by mouth 3 times daily 5/19/21  Yes Silke Fung MD   lipase-protease-amylase (CREON) 93252 units CPEP delayed release capsule Take 2 capsules by mouth 3 times daily (with meals) 5/19/21 9/1/21 Yes Silke Fung MD   atorvastatin (LIPITOR) 20 MG tablet Take 1 tablet by mouth daily 5/20/21  Yes Silke Fung MD   insulin glargine (LANTUS SOLOSTAR) 100 UNIT/ML injection pen Inject 20 Units into the skin 2 times daily 5/19/21 9/1/21 Yes Tolu Luna MD       Current medications:    Current Facility-Administered Medications   Medication Dose Route Frequency Provider Last Rate Last Admin    sodium chloride flush 0.9 % injection 5-40 mL  5-40 mL IntraVENous 2 times per day Sri Mir III, MD        sodium chloride flush 0.9 % injection 5-40 mL  5-40 mL IntraVENous PRN Sri Mir III, MD        0.9 % sodium chloride infusion  25 mL IntraVENous PRN Sri Mir III, MD           Allergies:     Allergies   Allergen Reactions    Iodine Swelling     Sea food    Metformin And Related Hives    Wheat Extract Swelling       Problem List:    Patient Active Problem List   Diagnosis Code    Mixed hyperlipidemia E78.2    Neck pain M54.2    Cervical radiculopathy at C7 M54.12    Essential hypertension I10    Chronic seasonal allergic rhinitis J30.2    Ventral hernia without obstruction or gangrene K43.9    Poorly controlled type 2 diabetes mellitus (HCC) E11.65    Chronic bilateral low back pain with right-sided sciatica M54.41, G89.29    Gynecomastia N62    Panic disorder F41.0    Cubital tunnel syndrome on right G56.21    Right carpal tunnel syndrome G56.01    Spondylosis of lumbar spine M47.816    Bell's palsy G51.0    Other bursal cyst, left elbow M71.322    Sacral radiculitis M54.18    Lumbar radiculitis M54.16    Spondylosis of cervical region without myelopathy or radiculopathy M47.812    Cervical facet joint syndrome M47.812    Cervical disc disorder M50.90    Lumbar facet arthropathy M47.816    Spinal stenosis of lumbar region with neurogenic claudication M48.062    Lumbar disc disorder M51.9    Pancreatic neoplasm D49.0    IPMN (intraductal papillary mucinous neoplasm) D49.0    Pancreatic duct leak K86.89    Intra-abdominal infection B99.9    Abdominal pain with fever after surgery R10.9, G89.18, R50.82    Sepsis (HCC) A41.9    Acute respiratory failure with hypoxia (HCC) J96.01    Intra-abdominal abscess (HCC) K65.1    Severe protein-calorie malnutrition ([address] E43       Past Medical History:        Diagnosis Date    Allergic rhinitis     Anxiety     Arthritis     Back pain     5th finger & ring finger on right hand is numb    Bell's palsy     NEW ONSET 3-     Cervical disc disorder     cervical radiculopathy    Hyperlipidemia     Hypertension     Intra-abdominal abscess (HCC)     recurrent    Lumbago     Malnutrition (HCC)     Neuropathy     toes numb    Obesity     Polyneuropathy due to type 2 diabetes mellitus ([address]     Poorly controlled type 2 diabetes mellitus with neuropathy ([address] 5/8/2018    Radiculitis, lumbosacral     Sacral radiculitis 2/27/2019    Sepsis ([address]     Spondylosis of lumbar spine 3/9/2019    Advanced arthritis and degenerative disc disease by MRI 3/2019    Ventral hernia        Past Surgical History:        Procedure Laterality Date    ANESTHESIA NERVE BLOCK Bilateral 07/11/2019    BILATERAL L4-5 TRANSFORAMINAL EPIDURAL STEROID INJECTION [facility] Bilateral 08/01/2019    BILATERAL MEDIAL BRANCH BLOCK L4-5 AND L5-S1 performed [facility] Jennie Dillon DO [facility] Bilateral 08/15/2019    BILATERAL MEDIAL BRANCH BLOCK L4 - 5 AND L5 - S1 performed [facility] DO at [facility] Right 03/29/2019    right wrist carpal tunnel release and right elbow cubital tunnel release    CARPAL TUNNEL RELEASE Right 03/29/2019    RIGHT WRIST CARPAL TUNNEL RELEASE AND RIGHT ELBOW CUBITAL TUNNEL RELEASE performed by Lavinia Gurrola DO at [place] [address]      at age 40 polyps    CYST REMOVAL Right     EPIDURAL STEROID INJECTION N/A 10/17/2019    LUMBAR EPIDURAL STEROID INJECTION UNDER FLUOROSCOPIC GUIDANCE AT L2-L3 WITHOUT SEDATION performed by Vicki Stephens MD at [address] [facility] ERCP N/A 05/17/2021    ERCP ENDOSCOPIC RETROGRADE CHOLANGIOPANCREATOGRAPHY SPHINCTER/PAPILLOTOMY performed by Lilian Ortega MD at [address] ERCP N/A 05/17/2021    ERCP STENT INSERTION performed by Lilian Ortega MD at [address] ERCP N/A 6/4/2021    ERCP STENT INSERTION performed by Lilian Ortega MD at [address] ERCP N/A 7/2/2021    ERCP STENT REMOVAL performed by Lilian Ortega MD at [address] ERCP N/A 7/2/2021    ERCP STONE REMOVAL performed by Lilian Ortega MD at [address] ERCP N/A 7/2/2021    ERCP STENT INSERTION performed by Lilian Ortega MD at [facility] Bilateral     groin and umbilical    HERNIA REPAIR N/A 12/13/2018    ROBOTIC ASSISTED LAPAROSCOPIC PRIMARY REPAIR OF RECURRENT VENTRAL HERNIA  REPAIR performed by Arsen Jacome MD at [address] [facility] [place] Bilateral 07/11/2019    L4-5 transforaminal     NERVE BLOCK Bilateral 08/01/2019    medial branch block    NERVE BLOCK Bilateral 08/15/2019    bilateral medial branch block L4-S1    NERVE BLOCK  10/17/2019    lumbar epidural    PANCREATECTOMY N/A 05/12/2021    LAPAROSCOPIC ROBOTIC DISTAL PANCREATECTOMY AND SPLENECTOMY AND CHOLECYSTECTOMY, INTRA-OPERATIVE ULTRASOUND, TRANSITIONED TO OPEN -- EPIDURAL performed by Karen Kumar MD at [facility]  06/05/2018    C5-7 fusion at [facility] in [address] [place] [address] Pkwy 01/08/2021    EGD W/EUS FNA performed by Cynthia Rubin DO at [address] 01/08/2021    EGD DIAGNOSTIC ONLY performed by Cynthia Rubin DO at [address] [facility] N/A 7/30/2021    EGD ESOPHAGOGASTRODUODENOSCOPY ULTRASOUND performed by Lilian Ortega MD at [address] [facility] N/A 7/30/2021    EUS FOREIGN BODY REMOVAL performed by Victorino Rollins Sam Gilmore MD at [facility] History:    Social History     Tobacco Use    Smoking status: Never Smoker    Smokeless tobacco: Never Used   Substance Use Topics    Alcohol use: Not Currently     Comment: occasional                                Counseling given: Not Answered      Vital Signs (Current):   Vitals:    09/01/21 1402   Weight: 175 lb (79.4 kg)   Height: 5' 8\" (1.727 m)                                              BP Readings from Last 3 Encounters:   09/01/21 129/68   09/01/21 131/66   08/05/21 100/70       NPO Status:  > 8 hours                                                                               BMI:   Wt Readings from Last 3 Encounters:   09/01/21 175 lb (79.4 kg)   09/01/21 175 lb (79.4 kg)   07/28/21 185 lb (83.9 kg)     Body mass index is 26.61 kg/m². CBC:   Lab Results   Component Value Date    WBC 5.8 09/01/2021    RBC 2.54 09/01/2021    HGB 7.0 09/01/2021    HCT 21.9 09/01/2021    MCV 82.7 09/01/2021    RDW 23.2 09/01/2021     09/01/2021       CMP:   Lab Results   Component Value Date     08/30/2021    K 4.2 08/30/2021    K 4.0 08/05/2021     08/30/2021    CO2 24 08/30/2021    BUN 10 08/30/2021    CREATININE 0.7 08/30/2021    GFRAA >60 08/30/2021    LABGLOM >60 08/30/2021    GLUCOSE 68 08/30/2021    PROT 6.7 08/30/2021    CALCIUM 8.7 08/30/2021    BILITOT 0.2 08/30/2021    ALKPHOS 82 08/30/2021    AST 29 08/30/2021    ALT 12 08/30/2021       POC Tests: No results for input(s): POCGLU, POCNA, POCK, POCCL, POCBUN, POCHEMO, POCHCT in the last 72 hours.     Coags:   Lab Results   Component Value Date    PROTIME 13.5 09/01/2021    INR 1.3 09/01/2021       HCG (If Applicable): No results found for: PREGTESTUR, PREGSERUM, HCG, HCGQUANT     ABGs:   Lab Results   Component Value Date    PO2ART 118.2 05/12/2021    CWL8GTH 56.0 05/12/2021    GIT3OGM 22.9 05/12/2021        Type & Screen (If Applicable):  No results found for: LABABO, LABRH    Drug/Infectious Status (If Applicable):  No results found for: HIV, HEPCAB    COVID-19 Screening (If Applicable):   Lab Results   Component Value Date    COVID19 Not Detected 2021    COVID19 Not Detected 2021     EK2021  Ventricular Rate     Atrial Rate     QRS Duration ms 64    Q-T Interval ms 234    QTc Calculation (Bazett) ms 357    R Axis degrees 42    T Axis degrees 28    Resulting Agency  [facility]      Narrative & Impression    Supraventricular tachycardia  Low voltage QRS  Significant artifacts  Abnormal ECG  When compared with ECG of 13-MAY-2021 06:15,  Significant changes have occurred  Confirmed by Juan Antonio Bishop ([id number]) on 2021 3:36:12 PM     Impression   1.  No acute disease or significant change.  Splenectomy and distal   pancreatectomy with persistent surgical bed abscess cavity which is the same   or mildly decreased in size.       2.  Small left pleural effusion with left basilar mild passive atelectasis,   unchanged.       3.  No ascites or free abdominal fluid. Anesthesia Evaluation  Patient summary reviewed and Nursing notes reviewed no history of anesthetic complications:   Airway: Mallampati: III  TM distance: <3 FB     Mouth opening: > = 3 FB Dental:          Pulmonary: breath sounds clear to auscultation  (+) sleep apnea: on noncompliant,  asthma:     (-) not a current smoker                           Cardiovascular:  Exercise tolerance: poor (<4 METS),   (+) hypertension:, hyperlipidemia    (-) past MI, CAD and CABG/stent      Rhythm: regular  Rate: abnormal                   PE comment: Pt has been tachycardic   Neuro/Psych:   (+) neuromuscular disease (Polyneuropathy):, psychiatric history:depression/anxiety              ROS comment: Bell's palsy - 2019 (left face);   No issues currently  OA, DJD, DDD, cervical radiculopathy, chronic low back pain with right sciatica  Lower extremity neuropathy    Right hand - ring finger and 5th finger are numb      Cervical radiculopathy at C7 GI/Hepatic/Renal:   (+) GERD: poorly controlled,          ROS comment: postoperative development of intra-abdominal abscess status post distal pancreatectomy, splenectomy, cholecystectomy on 5/12. ERCP with pancreatic stent placement on 6/4, s/p IR drain placement x2 6/30    Pain upper left quadrant     FOR ABSCESS DRAIN TODAY 9/1/21  . Endo/Other:    (+) Diabetes (A1C 8.7%)Type II DM, poorly controlled, using insulin, blood dyscrasia (H&H-6.5/21 s/p PRBC transfusion): anemia and anticoagulation therapy, arthritis: OA., malignancy/cancer (Intraductal papillary mucinous neoplasm). Pt had no PAT visit        ROS comment: VRE and MDRO [facility] Abdominal:             Vascular: negative vascular ROS. Other Findings:             Anesthesia Plan      MAC     ASA 3     (Backup GA if needed)  Induction: intravenous. Anesthetic plan and risks discussed with patient. Plan discussed with CRNA.                 Jorge Jerry MD   9/2/2021

## 2021-09-03 ENCOUNTER — TELEPHONE (OUTPATIENT)
Dept: HEMATOLOGY | Age: 61
End: 2021-09-03

## 2021-09-03 NOTE — ANESTHESIA POSTPROCEDURE EVALUATION
Department of Anesthesiology  Postprocedure Note    Patient: Arvell Brunner  MRN: [id number]  YOB: 1960  Date of evaluation: 9/2/2021  Time:  11:32 PM     Procedure Summary     Date: 09/02/21 Room / Location: [address] / [facility]    Anesthesia Start: 9990 Anesthesia Stop: 0820    Procedures:       EGD ESOPHAGOGASTRODUODENOSCOPY ++CONTACT ISOLATION++ (N/A )      EGD CONTROL HEMORRHAGE      EGD BIOPSY Diagnosis: (/)    Surgeons: Savage Posadas MD Responsible Provider: Antonette Amador MD    Anesthesia Type: MAC ASA Status: 3          Anesthesia Type: MAC    Dominic Phase I: Dominic Score: 10    Dominic Phase II: Dominic Score: 10    Last vitals: Reviewed and per EMR flowsheets.        Anesthesia Post Evaluation    Patient location during evaluation: PACU  Patient participation: complete - patient participated  Level of consciousness: awake  Airway patency: patent  Nausea & Vomiting: no vomiting and no nausea  Complications: no  Cardiovascular status: hemodynamically stable  Respiratory status: acceptable  Hydration status: stable

## 2021-09-03 NOTE — TELEPHONE ENCOUNTER
Chinyere from [facility] called in to make patient a follow up for next week since he is for possible discharge tomorrow. I made an appt for 9/7/21 at 1:30pm at the [place] office. I did ask Nidhi Holden to let our office know if the discharge does not take place so we can move the follow up date.       Electronically signed by Jett Marcum RN on 9/3/2021 at 2:51 PM

## 2021-09-07 ENCOUNTER — OFFICE VISIT (OUTPATIENT)
Dept: SURGERY | Age: 61
End: 2021-09-07

## 2021-09-07 VITALS
RESPIRATION RATE: 18 BRPM | BODY MASS INDEX: 27.43 KG/M2 | DIASTOLIC BLOOD PRESSURE: 92 MMHG | SYSTOLIC BLOOD PRESSURE: 145 MMHG | WEIGHT: 181 LBS | HEART RATE: 95 BPM | TEMPERATURE: 97.8 F | HEIGHT: 68 IN

## 2021-09-07 DIAGNOSIS — Z98.890 S/P EXPLORATORY LAPAROTOMY: Primary | ICD-10-CM

## 2021-09-07 PROCEDURE — 99024 POSTOP FOLLOW-UP VISIT: CPT | Performed by: TRANSPLANT SURGERY

## 2021-09-07 RX ORDER — GABAPENTIN 300 MG/1
300 CAPSULE ORAL 2 TIMES DAILY
COMMUNITY

## 2021-09-07 NOTE — PROGRESS NOTES
Hepatobiliary and Pancreatic Surgery Progress Note    CC: follow up from surgery    Subjective: Patient states that he is feeling much better. He finsihed his antibiotics. [facility] and had a bleeding duodenal ulcer. He underwent epinephrine injection. Pathology is still pending from 9/2 EGD. He had a repeat CT scan with his IR drain which showed marked improvement. His drain output is less than 10mL a day      OBJECTIVE      Physical    BP (!) 145/92 (Site: Left Upper Arm, Position: Sitting, Cuff Size: Medium Adult)   Pulse 95   Temp 97.8 °F (36.6 °C) (Temporal)   Resp 18   Ht 5' 8\" (1.727 m)   Wt 181 lb (82.1 kg)   BMI 27.52 kg/m²       General appearance: appears in no acute distress  Lungs:respiratory effort normal without accessory numbers  Heart: no pedal edema  Abdomen: soft, nondistended, nontympanic, no guarding, no peritoneal signs, normoactive bowel sounds, IR drain  Extremities: ROM normal    ASSESSMENT: s/p robotic converted to open distal pancreatectomy with splenectomy 5/21 with pancreatic leak requiring pancreatic stenting with resolving pancreatic abscess and post operative pancreatic fistula    PLAN:    - IR drain removed today  - follow up with me in 2 weeks    Thank you for the consultation and allowing me to take part in Mr. Cook' care.     Please send a copy of my note to Dr.F. Kim Iraheta    Electronically signed by Luli Schaffer MD on 9/7/2021 at 2:46 PM

## 2021-09-16 ENCOUNTER — TELEPHONE (OUTPATIENT)
Dept: HEMATOLOGY | Age: 61
End: 2021-09-16

## 2021-09-16 ENCOUNTER — OFFICE VISIT (OUTPATIENT)
Dept: HEMATOLOGY | Age: 61
End: 2021-09-16
Payer: MEDICAID

## 2021-09-16 VITALS
WEIGHT: 183 LBS | SYSTOLIC BLOOD PRESSURE: 143 MMHG | OXYGEN SATURATION: 97 % | BODY MASS INDEX: 27.74 KG/M2 | TEMPERATURE: 97.7 F | HEIGHT: 68 IN | HEART RATE: 89 BPM | DIASTOLIC BLOOD PRESSURE: 84 MMHG | RESPIRATION RATE: 16 BRPM

## 2021-09-16 DIAGNOSIS — B99.9 INTRA-ABDOMINAL INFECTION: Primary | ICD-10-CM

## 2021-09-16 PROCEDURE — 99212 OFFICE O/P EST SF 10 MIN: CPT | Performed by: TRANSPLANT SURGERY

## 2021-09-16 PROCEDURE — 99024 POSTOP FOLLOW-UP VISIT: CPT | Performed by: TRANSPLANT SURGERY

## 2021-09-16 RX ORDER — OXYCODONE AND ACETAMINOPHEN 7.5; 325 MG/1; MG/1
1 TABLET ORAL EVERY 8 HOURS PRN
COMMUNITY

## 2021-09-16 NOTE — TELEPHONE ENCOUNTER
Patient called in concerned about a new blister that developed to the upper abdomen with redness around it, denies fever or chills. I made him an appt today at 12:30pm to come in to see Dr. Gauri Monreal. He confirmed this appt.     Electronically signed by Marylen Ko, RN on 9/16/2021 at 10:35 AM

## 2021-09-17 NOTE — PROGRESS NOTES
Hepatobiliary and Pancreatic Surgery Progress Note    CC: follow up from surgery    Subjective: Patient states that he is feeling much better. He finsihed his antibiotics. [facility] and had a bleeding duodenal ulcer. He underwent epinephrine injection. Pathology was negative from 9/2 EGD. His IR drain was removed and he is having minimal output from the drain sites. However, his upper midline now has a pancreatic pustule. OBJECTIVE      Physical    BP (!) 143/84 (Site: Right Upper Arm, Position: Sitting, Cuff Size: Medium Adult)   Pulse 89   Temp 97.7 °F (36.5 °C) (Temporal)   Resp 16   Ht 5' 8\" (1.727 m)   Wt 183 lb (83 kg)   SpO2 97%   BMI 27.83 kg/m²       General appearance: appears in no acute distress  Lungs:respiratory effort normal without accessory numbers  Heart: no pedal edema  Abdomen: soft, nondistended, nontympanic, no guarding, no peritoneal signs, normoactive bowel sounds, upper midline wound opened and packed. 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 Cook' care.     Please send a copy of my note to Dr.F. Natalie Parekh    Electronically signed by Elder James MD on 9/17/2021 at 5:26 PM

## 2021-09-21 ENCOUNTER — OFFICE VISIT (OUTPATIENT)
Dept: SURGERY | Age: 61
End: 2021-09-21

## 2021-09-21 VITALS
DIASTOLIC BLOOD PRESSURE: 77 MMHG | HEIGHT: 68 IN | TEMPERATURE: 97.5 F | HEART RATE: 102 BPM | BODY MASS INDEX: 27.89 KG/M2 | SYSTOLIC BLOOD PRESSURE: 147 MMHG | WEIGHT: 184 LBS

## 2021-09-21 DIAGNOSIS — E11.65 UNCONTROLLED TYPE 2 DIABETES MELLITUS WITH HYPERGLYCEMIA (HCC): Primary | ICD-10-CM

## 2021-09-21 PROCEDURE — 99024 POSTOP FOLLOW-UP VISIT: CPT | Performed by: TRANSPLANT SURGERY

## 2021-09-21 NOTE — PROGRESS NOTES
Hepatobiliary and Pancreatic Surgery Progress Note    CC: follow up from surgery    Subjective: Patient states that he is feeling much better. He finsihed his antibiotics. His upper midline is  draining some fluid, and he is taking two showers a day plus packing it. The lateral drain sites have minimal to no output. Stools aren't black. Sugars are marginal.      OBJECTIVE      Physical    BP (!) 147/77 (Site: Left Upper Arm, Position: Sitting, Cuff Size: Medium Adult)   Pulse 102   Temp 97.5 °F (36.4 °C) (Temporal)   Ht 5' 8\" (1.727 m)   Wt 184 lb (83.5 kg)   BMI 27.98 kg/m²     General appearance: appears in no acute distress  Lungs:respiratory effort normal without accessory numbers  Heart: no pedal edema  Abdomen: soft, nondistended, nontympanic, no guarding, no peritoneal signs, normoactive bowel sounds, upper midline wound opened and packed. 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 Cook' care.     Please send a copy of my note to Dr.F. Honey Joshi    Electronically signed by Raymond Hsu MD on 9/21/2021 at 3:16 PM

## 2021-10-05 ENCOUNTER — OFFICE VISIT (OUTPATIENT)
Dept: SURGERY | Age: 61
End: 2021-10-05

## 2021-10-05 VITALS
WEIGHT: 180.7 LBS | BODY MASS INDEX: 27.38 KG/M2 | HEART RATE: 75 BPM | TEMPERATURE: 97.7 F | RESPIRATION RATE: 18 BRPM | SYSTOLIC BLOOD PRESSURE: 149 MMHG | DIASTOLIC BLOOD PRESSURE: 77 MMHG | HEIGHT: 68 IN

## 2021-10-05 DIAGNOSIS — G89.18 POST-OP PAIN: Primary | ICD-10-CM

## 2021-10-05 PROCEDURE — 99024 POSTOP FOLLOW-UP VISIT: CPT | Performed by: TRANSPLANT SURGERY

## 2021-10-05 RX ORDER — PANCRELIPASE 36000; 180000; 114000 [USP'U]/1; [USP'U]/1; [USP'U]/1
2 CAPSULE, DELAYED RELEASE PELLETS ORAL
Qty: 250 CAPSULE | Refills: 5 | Status: SHIPPED
Start: 2021-10-05 | End: 2022-06-14

## 2021-10-05 NOTE — PROGRESS NOTES
Hepatobiliary and Pancreatic Surgery Progress Note    CC: follow up from surgery    Subjective: Patient states that he is feeling better. His sugars are under better control. His left sided wounds are closed. His upper midline wound is almost closed. He is no longer able to pack it and it has minimal drainage. OBJECTIVE      Physical    BP (!) 149/77   Pulse 75   Temp 97.7 °F (36.5 °C) (Temporal)   Resp 18   Ht 5' 8\" (1.727 m)   Wt 180 lb 11.2 oz (82 kg)   BMI 27.48 kg/m²     General appearance: appears in no acute distress  Lungs:respiratory effort normal without accessory numbers  Heart: no pedal edema  Abdomen: soft, nondistended, nontympanic, no guarding, no peritoneal signs, normoactive bowel sounds, upper midline wound with tiana drainage and the lateral wounds almost closed. 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 Cook' care.     Please send a copy of my note to Dr.F. Missy Smith    Electronically signed by Radhika Pickens MD on 10/5/2021 at 2:45 PM

## 2021-11-02 ENCOUNTER — OFFICE VISIT (OUTPATIENT)
Dept: SURGERY | Age: 61
End: 2021-11-02

## 2021-11-02 VITALS
HEIGHT: 68 IN | BODY MASS INDEX: 28.51 KG/M2 | WEIGHT: 188.1 LBS | RESPIRATION RATE: 18 BRPM | HEART RATE: 85 BPM | TEMPERATURE: 97.8 F | SYSTOLIC BLOOD PRESSURE: 134 MMHG | DIASTOLIC BLOOD PRESSURE: 85 MMHG

## 2021-11-02 DIAGNOSIS — Z09 POSTOP CHECK: Primary | ICD-10-CM

## 2021-11-02 PROCEDURE — 99024 POSTOP FOLLOW-UP VISIT: CPT | Performed by: TRANSPLANT SURGERY

## 2021-11-02 NOTE — PROGRESS NOTES
Hepatobiliary and Pancreatic Surgery Progress Note    CC: follow up from surgery    Subjective: Patient states that he is feeling better. His sugars are under better control. All wounds are now closed. He is eating well but if he eats something that he shouldn't. He had salsa last night and feels some bloating today. The upper wound still hurts and so does the left upper quadrant on. His last HbA1c was 6.7. OBJECTIVE      Physical    /85   Pulse 85   Temp 97.8 °F (36.6 °C) (Temporal)   Resp 18   Ht 5' 8\" (1.727 m)   Wt 188 lb 1.6 oz (85.3 kg)   BMI 28.60 kg/m²     General appearance: appears in no acute distress  Lungs:respiratory effort normal without accessory numbers  Heart: no pedal edema  Abdomen: soft, nondistended, nontympanic, no guarding, no peritoneal signs, normoactive bowel sounds, upper midline wound with tiana drainage and the lateral wounds almost closed. 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 Cook' care.     Please send a copy of my note to Dr. Cee Townsend    Electronically signed by Fatuma Bardales MD on 11/2/2021 at 12:40 PM

## 2022-02-08 ENCOUNTER — OFFICE VISIT (OUTPATIENT)
Dept: SURGERY | Age: 62
End: 2022-02-08

## 2022-02-08 VITALS
WEIGHT: 209.9 LBS | TEMPERATURE: 97.5 F | SYSTOLIC BLOOD PRESSURE: 133 MMHG | HEIGHT: 68 IN | HEART RATE: 95 BPM | DIASTOLIC BLOOD PRESSURE: 77 MMHG | BODY MASS INDEX: 31.81 KG/M2 | RESPIRATION RATE: 18 BRPM

## 2022-02-08 DIAGNOSIS — K43.2 INCISIONAL HERNIA, WITHOUT OBSTRUCTION OR GANGRENE: Primary | ICD-10-CM

## 2022-02-08 DIAGNOSIS — E11.65 UNCONTROLLED TYPE 2 DIABETES MELLITUS WITH HYPERGLYCEMIA (HCC): ICD-10-CM

## 2022-02-08 PROCEDURE — 99024 POSTOP FOLLOW-UP VISIT: CPT | Performed by: TRANSPLANT SURGERY

## 2022-02-08 NOTE — PROGRESS NOTES
Hepatobiliary and Pancreatic Surgery Progress Note    CC: follow up from surgery    Subjective: Patient states that he is feeling better. He has developed a cough and is having some pain around his incisional hernia. His sugars haven't been bad elevated in the morning. He is still taking creon, if he doesn't take it or skip a meal he gets full and bloated. He is taking protonix in the evening. He is following with Dr. Asha Esquivel    OBJECTIVE      Physical    /77   Pulse 95   Temp 97.5 °F (36.4 °C) (Temporal)   Resp 18   Ht 5' 8\" (1.727 m)   Wt 209 lb 14.4 oz (95.2 kg)   BMI 31.92 kg/m²       General appearance: appears in no acute distress  Lungs:respiratory effort normal without accessory numbers  Heart: no pedal edema  Abdomen: soft, nondistended, nontympanic, no guarding, no peritoneal signs, normoactive bowel sounds, upper midline wound well healed. 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 Dara Duggan for possible hernia repair. - continue appointments with Dr. Asha Esquivel - he is scheduled for a colonoscopy    Thank you for the consultation and allowing me to take part in Mr. Cook' care.     Please send a copy of my note to Dr. Jeremias Osman    Electronically signed by Trinidad Ospina MD on 2/8/2022 at 12:47 PM

## 2022-02-09 ENCOUNTER — TELEPHONE (OUTPATIENT)
Dept: SURGERY | Age: 62
End: 2022-02-09

## 2022-02-09 ENCOUNTER — OFFICE VISIT (OUTPATIENT)
Dept: SURGERY | Age: 62
End: 2022-02-09
Payer: MEDICAID

## 2022-02-09 VITALS
TEMPERATURE: 97.9 F | SYSTOLIC BLOOD PRESSURE: 143 MMHG | DIASTOLIC BLOOD PRESSURE: 85 MMHG | BODY MASS INDEX: 31.67 KG/M2 | HEART RATE: 89 BPM | WEIGHT: 209 LBS | HEIGHT: 68 IN

## 2022-02-09 DIAGNOSIS — K43.2 INCISIONAL HERNIA, WITHOUT OBSTRUCTION OR GANGRENE: Primary | ICD-10-CM

## 2022-02-09 DIAGNOSIS — D64.9 ANEMIA, UNSPECIFIED TYPE: ICD-10-CM

## 2022-02-09 PROCEDURE — G8427 DOCREV CUR MEDS BY ELIG CLIN: HCPCS | Performed by: SURGERY

## 2022-02-09 PROCEDURE — 3017F COLORECTAL CA SCREEN DOC REV: CPT | Performed by: SURGERY

## 2022-02-09 PROCEDURE — 99204 OFFICE O/P NEW MOD 45 MIN: CPT | Performed by: SURGERY

## 2022-02-09 PROCEDURE — G8417 CALC BMI ABV UP PARAM F/U: HCPCS | Performed by: SURGERY

## 2022-02-09 PROCEDURE — 1036F TOBACCO NON-USER: CPT | Performed by: SURGERY

## 2022-02-09 PROCEDURE — G8484 FLU IMMUNIZE NO ADMIN: HCPCS | Performed by: SURGERY

## 2022-02-09 NOTE — TELEPHONE ENCOUNTER
Per Dr. Chacha Stevenson, patient is scheduled for Laparoscopic robotic incisional hernia repair with mesh, possible component separation at [facility] on 22. Surgery scheduled via iqueue, surgeon's calendar updated. Dr. Chacha Stevenson to enter orders. Follow up appointment scheduled. Patient has received COVID 19 vaccine. Medical clearance requested from Dr. Mirian Coronado. Electronically signed by Sofy Phelps RN on 2022 at 3:26 PM     Clearance appointment scheduled with Dr. Mirian Coronado on 2022. Electronically signed by Sofy Phelps RN on 3/4/2022 at 11:19 AM     Medical clearance received. Electronically signed by Sofy Phelps RN on 2022 at 8:34 AM              Prior Authorization Form:      DEMOGRAPHICS:                     Patient Name:  Violet Khalil  Patient :  1960            Insurance:  Payor: [employer] / [place] [id number] / Product Type: *No Product type* /   Insurance ID Number:    Payor/Plan Subscr  Sex Relation Sub.  Ins. ID Effective Group Num   1. [address] [facility] 1960 Male Self [id number]                                   [address]         DIAGNOSIS & PROCEDURE:                       Procedure/Operation: Laparoscopic robotic incisional hernia repair with mesh, possible component separation           CPT Code: 55554    Diagnosis:  Incisional hernia    ICD10 Code: K43.2 + D64.9    Location:  [facility]    Surgeon:  rTacy Pham INFORMATION:                          Date: 22    Time: TBD              Anesthesia:  General                                                       Status:  Outpatient        Special Comments:         Electronically signed by Sofy Phelps RN on 2022 at 3:26 PM

## 2022-02-09 NOTE — Clinical Note
Plan for CT scan just wanted to check and see what protocol if any you had preference for.   I would just need a noncontrast CT abdomen pelvis

## 2022-02-09 NOTE — PROGRESS NOTES
General Surgery History and Physical  [facility]    Patient's Name/Date of Birth: Boone Schultz / 1960    Date: February 9, 2022     Surgeon: Barbara Shetty M.D.    PCP: Chelsie Bautista DO     Chief Complaint: Abdominal hernia    HPI:   Boone Schultz is a 62 y.o. male who presents for evaluation of incisional hernia. Timing is constant, radiation to midline, alleviated by none and started months ago and severity is 7/10. he has history of multiple abdominal surgeries. he has no history of previous repairs of the hernia. Denies nausea, vomiting, fever, chills, SOB, chest pain, diarrheal constipation. No history of abnormal colonoscopy. They are a nonsmoker. Patient with a complicated history of a resection of pancreatic tail mass and splenectomy. Initially started robotically converted to open and subsequent complications including a pancreatic fistula and postoperative abscess with drains. His surgery was done in May 1194 he had complications extending all the way out October 2021. Has been doing well since early September in which point he had a pancreatic stent placed in EGD with ulcers. He has had ongoing problems with anemia since then 2. He is ambulatory independently but states he still finding improvements on a weekly basis. He denies rapid loss weight loss or weight gain. Denies any nausea or vomiting. He states \"protein shake saved my life\". He still heavily reliable on high calorie beverages in order to maintain his weight. States he is having increasing pain at his midline incisional hernia states when he coughs or sneezes he has several days of pain. Denies any shortness of breath or chest pain. Denies any change in his bowel habits no bloody stools diarrhea or constipation.     Patient Active Problem List   Diagnosis    Mixed hyperlipidemia    Neck pain    Cervical radiculopathy at C7    Essential hypertension    Chronic seasonal allergic rhinitis    Ventral hernia without obstruction or gangrene    Poorly controlled type 2 diabetes mellitus (HCC)    Chronic bilateral low back pain with right-sided sciatica    Gynecomastia    Panic disorder    Cubital tunnel syndrome on right    Right carpal tunnel syndrome    Spondylosis of lumbar spine    Bell's palsy    Other bursal cyst, left elbow    Sacral radiculitis    Lumbar radiculitis    Spondylosis of cervical region without myelopathy or radiculopathy    Cervical facet joint syndrome    Cervical disc disorder    Lumbar facet arthropathy    Spinal stenosis of lumbar region with neurogenic claudication    Lumbar disc disorder    Pancreatic neoplasm    IPMN (intraductal papillary mucinous neoplasm)    Pancreatic duct leak    Intra-abdominal infection    Abdominal pain with fever after surgery    Sepsis ([address]    Acute respiratory failure with hypoxia (HCC)    Intra-abdominal abscess (HCC)    Severe protein-calorie malnutrition (HCC)    Duodenal ulcer disease       Past Medical History:   Diagnosis Date    Allergic rhinitis     Anxiety     Arthritis     Back pain     5th finger & ring finger on right hand is numb    Bell's palsy     NEW ONSET 3-     Cervical disc disorder     cervical radiculopathy    Hyperlipidemia     Hypertension     Intra-abdominal abscess (HCC)     recurrent    Lumbago     Malnutrition (HCC)     Neuropathy     toes numb    Obesity     Polyneuropathy due to type 2 diabetes mellitus ([address]     Poorly controlled type 2 diabetes mellitus with neuropathy ([address] 5/8/2018    Radiculitis, lumbosacral     Sacral radiculitis 2/27/2019    Sepsis ([address]     Spondylosis of lumbar spine 3/9/2019    Advanced arthritis and degenerative disc disease by MRI 3/2019    Ventral hernia        Past Surgical History:   Procedure Laterality Date    ANESTHESIA NERVE BLOCK Bilateral 07/11/2019    BILATERAL L4-5 TRANSFORAMINAL EPIDURAL STEROID INJECTION performed by Madhavi Joshua DO Bharath at [address] Bilateral 08/01/2019    BILATERAL MEDIAL BRANCH BLOCK L4-5 AND L5-S1 performed by Lenny Dorado DO at [address] Bilateral 08/15/2019    BILATERAL MEDIAL BRANCH BLOCK L4 - 5 AND L5 - S1 performed by Lenny Dorado DO at [facility] Right 03/29/2019    right wrist carpal tunnel release and right elbow cubital tunnel release    CARPAL TUNNEL RELEASE Right 03/29/2019    RIGHT WRIST CARPAL TUNNEL RELEASE AND RIGHT ELBOW CUBITAL TUNNEL RELEASE performed by Melissa Pearson DO at [place] [address]      at age 40 polyps    CYST REMOVAL Right     EPIDURAL STEROID INJECTION N/A 10/17/2019    LUMBAR EPIDURAL STEROID INJECTION UNDER FLUOROSCOPIC GUIDANCE AT L2-L3 WITHOUT SEDATION performed by Ema Johnson MD at [facility] ERCP N/A 05/17/2021    ERCP ENDOSCOPIC RETROGRADE CHOLANGIOPANCREATOGRAPHY SPHINCTER/PAPILLOTOMY performed by Pieter Sam MD at [address] [facility] ERCP N/A 05/17/2021    ERCP STENT INSERTION performed by Pieter Sam MD at [address] [facility] ERCP N/A 6/4/2021    ERCP STENT INSERTION performed by Pieter Sam MD at [address] ERCP N/A 7/2/2021    ERCP STENT REMOVAL performed by Pieter Sam MD at [address] ERCP N/A 7/2/2021    ERCP STONE REMOVAL performed by Pieter Sam MD at [address] [facility] ERCP N/A 7/2/2021    ERCP STENT INSERTION performed by Pieter Sam MD at [facility] Bilateral     groin and umbilical    HERNIA REPAIR N/A 12/13/2018    ROBOTIC ASSISTED LAPAROSCOPIC PRIMARY REPAIR OF RECURRENT VENTRAL HERNIA  REPAIR performed by Maxwell Bach MD at [address] [facility] Bilateral 07/11/2019    L4-5 transforaminal     NERVE BLOCK Bilateral 08/01/2019    medial branch block    NERVE BLOCK Bilateral 08/15/2019    bilateral medial branch block L4-S1    NERVE BLOCK  10/17/2019    lumbar epidural    PANCREATECTOMY N/A 05/12/2021    LAPAROSCOPIC ROBOTIC DISTAL PANCREATECTOMY AND SPLENECTOMY AND CHOLECYSTECTOMY, INTRA-OPERATIVE ULTRASOUND, TRANSITIONED TO OPEN -- EPIDURAL performed by Evans Titus MD at [place] [address] SURGERY  06/05/2018    C5-7 fusion at [employer] in [address] [place] [address] Pkwy 01/08/2021    EGD W/EUS FNA performed by Gladys Shelley DO at [address] [place] [address] 01/08/2021    EGD DIAGNOSTIC ONLY performed by Gladys Shelley DO at [address] N/A 7/30/2021    EGD ESOPHAGOGASTRODUODENOSCOPY ULTRASOUND performed by Alexis Newton MD at [address] N/A 7/30/2021    EUS FOREIGN BODY REMOVAL performed by Alexis Newton MD at [address] N/A 9/2/2021    EGD ESOPHAGOGASTRODUODENOSCOPY performed by Evans Titus MD at [facility] ENDOSCOPY       Allergies   Allergen Reactions    Iodine Swelling     Sea food    Metformin And Related Hives    Wheat Extract Swelling       Current Outpatient Medications on File Prior to Visit   Medication Sig Dispense Refill    blood glucose test strips (ASCENSIA AUTODISC VI;ONE TOUCH ULTRA TEST VI) strip 1 each by In Vitro route 6 times daily As needed. 1200 each 5    lipase-protease-amylase (CREON) 20699-028955 units CPEP delayed release capsule Take 2 capsules by mouth 3 times daily (with meals) 250 capsule 5    oxyCODONE-acetaminophen (PERCOCET) 7.5-325 MG per tablet Take 1 tablet by mouth every 8 hours as needed for Pain.  gabapentin (NEURONTIN) 300 MG capsule Take 300 mg by mouth 2 times daily.  insulin lispro (HUMALOG) 100 UNIT/ML injection vial Inject 0-16 Units into the skin 3 times daily (before meals) Moderate dose <70mg/dl initiate hypoglycemia treatment  . . no insulin  151-200. 4 units  201-250. Kavita Colmenares 8 units  251-300 10 units  301-350 12units  351-400 16 units  >400 . Kavita Colmenares 16 units and call dr Enriquez insulin lispro (HUMALOG) 100 UNIT/ML injection vial Inject 0-8 Units into the skin nightly Moderate dose. <70mg/dl initiate hypoglycemia treatment  . .. no insulin  151-200. .. 2 units  201-250. Kavita Colmenares 4 units  251-300. .. 5 units  301-350. .. 6 units  351-400. .. 8 units  >400. .. 8 units and call medical      oxyCODONE-acetaminophen (PERCOCET) 5-325 MG per tablet Take 1 tablet by mouth every 6 hours as needed for Pain (moderate pain).  (Patient not taking: Reported on 9/16/2021)      oxyCODONE HCl (ROXICODONE PO) Take 2.5 mg by mouth Every 6 hrs prn moderate pain (Patient not taking: Reported on 9/16/2021)      lipase-protease-amylase (Orien Loraine) 04507-06682 units CPEP delayed release capsule Take 1 capsule by mouth 3 times daily (with meals) 40,000 units (Patient not taking: Reported on 9/7/2021)      polyethylene glycol (GLYCOLAX) 17 g packet Take 17 g by mouth daily       sucralfate (CARAFATE) 1 GM tablet Take 1 g by mouth every 6 hours       pantoprazole (PROTONIX) 40 MG tablet Take 40 mg by mouth 2 times daily      sennosides-docusate sodium (SENOKOT-S) 8.6-50 MG tablet Take 2 tablets by mouth daily (Patient not taking: Reported on 9/16/2021)      acetaminophen (TYLENOL) 500 MG tablet Take 2 tablets by mouth every 6 hours (Patient taking differently: Take 650 mg by mouth every 6 hours as needed for Pain or Fever (mild pain temp > or equal 101F) ) 120 tablet 0    benzonatate (TESSALON) 200 MG capsule Take 200 mg by mouth 3 times daily as needed for Cough (Patient not taking: Reported on 9/7/2021)      albuterol (PROVENTIL) (2.5 MG/3ML) 0.083% nebulizer solution Take 3 mLs by nebulization 4 times daily (Patient not taking: Reported on 2/8/2022) 120 each 3    budesonide (PULMICORT) 0.5 MG/2ML nebulizer suspension Take 4 mLs by nebulization 2 times daily (Patient not taking: Reported on 9/7/2021) 60 ampule 3    guaiFENesin (ROBITUSSIN) 100 MG/5ML SOLN oral solution Take 15 mLs by mouth 3 times daily (Patient not taking: Reported on 9/7/2021) 1200 mL 0    lipase-protease-amylase (CREON) 61667 units CPEP delayed release capsule Take 2 capsules by mouth 3 times daily (with meals) 540 capsule 3    atorvastatin (LIPITOR) 20 MG tablet Take 1 tablet by mouth daily 30 tablet 3    insulin glargine (LANTUS SOLOSTAR) 100 UNIT/ML injection pen Inject 20 Units into the skin 2 times daily 12 mL 3     No current facility-administered medications on file prior to visit. The patient has a family history that is negative for severe cardiovascular or respiratory issues, negative for reaction to anesthesia. Time spent reviewing past medical, surgical, social and family history, vitals, nursing assessment and images. No changes from above documented history. Social History     Socioeconomic History    Marital status: Single     Spouse name: Not on file    Number of children: Not on file    Years of education: Not on file    Highest education level: Not on file   Occupational History    Not on file   Tobacco Use    Smoking status: Never Smoker    Smokeless tobacco: Never Used   Vaping Use    Vaping Use: Never used   Substance and Sexual Activity    Alcohol use: Not Currently     Comment: occasional    Drug use: Never    Sexual activity: Not on file   Other Topics Concern    Not on file   Social History Narrative    Not on file     Social Determinants of Health     Financial Resource Strain:     Difficulty of Paying Living Expenses: Not on file   Food Insecurity:     Worried About Running Out of Food in the Last Year: Not on file    Sanjana of Food in the Last Year: Not on file   Transportation Needs:     Lack of Transportation (Medical): Not on file    Lack of Transportation (Non-Medical):  Not on file   Physical Activity:     Days of Exercise per Week: Not on file    Minutes of Exercise per Session: Not on file   Stress:     Feeling of Stress : Not on file   Social Connections:     Frequency of Communication with Friends and Family: Not on file    Frequency of Social Gatherings with Friends and Family: Not on file    Attends Latter day Services: Not on file    Active Member of [address] [place] [address] or Organizations: Not on file    Attends Club or Organization Meetings: Not on file    Marital Status: Not on file   Intimate Partner Violence:     Fear of Current or Ex-Partner: Not on file    Emotionally Abused: Not on file    Physically Abused: Not on file    Sexually Abused: Not on file   Housing Stability:     Unable to Pay for Housing in the Last Year: Not on file    Number of [place]ana in the Last Year: Not on file    Unstable Housing in the Last Year: Not on file       A complete 10 system review was performed and are otherwise negative unless mentioned in the above HPI. Specific negatives are listed below but may not include all those reviewed.     General ROS: negative obtundation, AMS  ENT ROS: negative rhinorrhea, epistaxis  Allergy and Immunology ROS: negative itchy/watery eyes or nasal congestion  Hematological and Lymphatic ROS: negative spontaneous bleeding or bruising  Endocrine ROS: negative  lethargy, mood swings, palpitations or polydipsia/polyuria  Respiratory ROS: negative sputum changes, stridor, tachypnea or wheezing  Cardiovascular ROS: negative for - loss of consciousness, murmur or orthopnea  Gastrointestinal ROS: negative for - hematochezia or hematemesis  Genito-Urinary ROS: negative for -  genital discharge or hematuria  Musculoskeletal ROS: negative for - focal weakness, gangrene  Psych/Neuro ROS: negative for - visual or auditory hallucinations, suicidal ideation    Physical exam:   BP (!) 143/85 (Site: Left Upper Arm, Position: Sitting, Cuff Size: Medium Adult)   Pulse 89   Temp 97.9 °F (36.6 °C)   Ht 5' 8\" (1.727 m)   Wt 209 lb (94.8 kg)   BMI 31.78 kg/m²   General appearance:  NAD, appears stated age  Head: NCAT, PERRLA, EOMI, red conjunctiva  Neck: supple, no masses, trachea midline  Lungs: Equal chest rise bilateral, no retractions, no wheezing  Heart: Reg rate  Abdomen: soft, large midline incisional hernia that is partially reducible, well-healed midline incision. Obese, at least 12 cm of diastases of the midline above the umbilicus. Skin; warm and dry, no cyanosis  Gu: no cva tenderness  Extremities: atraumatic, no focal motor deficits, no open wounds  Psych: No tremor, visual hallucinations        Radiology: I reviewed relevant abdominal imaging from this admission and that available in the EMR including CT abd/pel from Aug 2021.  My assessment is no evidence of incisional hernia on that CT    Assessment:  Geri Pierson is a 62 y.o. male with massive midline incisional hernia, severe anemia with protein calorie malnutrition  Patient Active Problem List   Diagnosis    Mixed hyperlipidemia    Neck pain    Cervical radiculopathy at C7    Essential hypertension    Chronic seasonal allergic rhinitis    Ventral hernia without obstruction or gangrene    Poorly controlled type 2 diabetes mellitus (HCC)    Chronic bilateral low back pain with right-sided sciatica    Gynecomastia    Panic disorder    Cubital tunnel syndrome on right    Right carpal tunnel syndrome    Spondylosis of lumbar spine    Bell's palsy    Other bursal cyst, left elbow    Sacral radiculitis    Lumbar radiculitis    Spondylosis of cervical region without myelopathy or radiculopathy    Cervical facet joint syndrome    Cervical disc disorder    Lumbar facet arthropathy    Spinal stenosis of lumbar region with neurogenic claudication    Lumbar disc disorder    Pancreatic neoplasm    IPMN (intraductal papillary mucinous neoplasm)    Pancreatic duct leak    Intra-abdominal infection    Abdominal pain with fever after surgery    Sepsis ([address]    Acute respiratory failure with hypoxia ([facility]    Intra-abdominal abscess (HCC)    Severe protein-calorie malnutrition (HCC)    Duodenal ulcer disease         Plan:  OR for Laparoscopic robot assisted incisional hernia repair with mesh, poss component separation  Medical clearance  Check CBC, CMP, iron, folate due to anemia and malnutrition  I will discuss with HPV surgery regarding CT scan which I would need for operative planning. Plan would be delay as repair at least additional 3 months to see if he can improve his nutritional status and make sure that his anemia is maximized management as possible    Discussed the risk, benefits and alternatives of surgery including wound infections, bleeding, scar, seroma, mesh infection, mesh removal and migration and recurrent hernia formation and the risks of general anesthetic including MI, CVA, sudden death or reactions to anesthetic medications. The patient understands the risks and alternatives and the possibility of converting to an open procedure. All questions were answered to the patient's satisfaction and they freely signed the consent.          Leni Beavers MD  11:02 AM  2/9/2022

## 2022-02-09 NOTE — LETTER
[facility]  [address] [facility]  [address]  [address]  Phone: [phone]  Fax: [phone]           Marianela Holloway MD      February 9, 2022     Patient: Gabriella Johnson   MR Number: [id number]   YOB: 1960   Date of Visit: 2/9/2022       Dear Dr. Jose Miguel Urias: Thank you for referring Gabriella Johnson to me for evaluation/treatment. Below are the relevant portions of my assessment and plan of care. If you have questions, please do not hesitate to call me. I look forward to following Edin Lora along with you.     Sincerely,        Marianela Holloway MD    CC providers:  Armand Kimbrough III, MD  [place] [address]  [address]

## 2022-02-17 ENCOUNTER — HOSPITAL ENCOUNTER (OUTPATIENT)
Age: 62
Discharge: HOME | End: 2022-02-17
Payer: MEDICAID

## 2022-02-17 DIAGNOSIS — K43.2 INCISIONAL HERNIA, WITHOUT OBSTRUCTION OR GANGRENE: ICD-10-CM

## 2022-02-17 DIAGNOSIS — D64.9 ANEMIA, UNSPECIFIED TYPE: ICD-10-CM

## 2022-02-17 LAB
ALBUMIN SERPL-MCNC: 4.7 G/DL (ref 3.5–5.2)
ALP BLD-CCNC: 62 U/L (ref 40–129)
ALT SERPL-CCNC: 20 U/L (ref 0–40)
ANION GAP SERPL CALCULATED.3IONS-SCNC: 12 MMOL/L (ref 7–16)
AST SERPL-CCNC: 24 U/L (ref 0–39)
BASOPHILS ABSOLUTE: 0.11 E9/L (ref 0–0.2)
BASOPHILS RELATIVE PERCENT: 1.1 % (ref 0–2)
BILIRUB SERPL-MCNC: 0.3 MG/DL (ref 0–1.2)
BUN BLDV-MCNC: 20 MG/DL (ref 6–23)
CALCIUM SERPL-MCNC: 9.5 MG/DL (ref 8.6–10.2)
CHLORIDE BLD-SCNC: 97 MMOL/L (ref 98–107)
CO2: 26 MMOL/L (ref 22–29)
CREAT SERPL-MCNC: 0.7 MG/DL (ref 0.7–1.2)
EOSINOPHILS ABSOLUTE: 0.18 E9/L (ref 0.05–0.5)
EOSINOPHILS RELATIVE PERCENT: 1.7 % (ref 0–6)
FERRITIN: 64 NG/ML
FOLATE: 12 NG/ML (ref 4.8–24.2)
GFR AFRICAN AMERICAN: >60
GFR NON-AFRICAN AMERICAN: >60 ML/MIN/1.73
GLUCOSE BLD-MCNC: 249 MG/DL (ref 74–99)
HCT VFR BLD CALC: 41.3 % (ref 37–54)
HEMOGLOBIN: 12.7 G/DL (ref 12.5–16.5)
IMMATURE GRANULOCYTES #: 0.03 E9/L
IMMATURE GRANULOCYTES %: 0.3 % (ref 0–5)
IRON SATURATION: 27 % (ref 20–55)
IRON: 95 MCG/DL (ref 59–158)
LYMPHOCYTES ABSOLUTE: 2.46 E9/L (ref 1.5–4)
LYMPHOCYTES RELATIVE PERCENT: 23.8 % (ref 20–42)
MCH RBC QN AUTO: 23 PG (ref 26–35)
MCHC RBC AUTO-ENTMCNC: 30.8 % (ref 32–34.5)
MCV RBC AUTO: 74.7 FL (ref 80–99.9)
MONOCYTES ABSOLUTE: 0.95 E9/L (ref 0.1–0.95)
MONOCYTES RELATIVE PERCENT: 9.2 % (ref 2–12)
NEUTROPHILS ABSOLUTE: 6.62 E9/L (ref 1.8–7.3)
NEUTROPHILS RELATIVE PERCENT: 63.9 % (ref 43–80)
PDW BLD-RTO: 18.5 FL (ref 11.5–15)
PLATELET # BLD: 527 E9/L (ref 130–450)
PMV BLD AUTO: 11.4 FL (ref 7–12)
POTASSIUM SERPL-SCNC: 4.3 MMOL/L (ref 3.5–5)
PREALBUMIN: 28 MG/DL (ref 20–40)
RBC # BLD: 5.53 E12/L (ref 3.8–5.8)
SODIUM BLD-SCNC: 135 MMOL/L (ref 132–146)
TOTAL IRON BINDING CAPACITY: 353 MCG/DL (ref 250–450)
TOTAL PROTEIN: 8.5 G/DL (ref 6.4–8.3)
VITAMIN B-12: 587 PG/ML (ref 211–946)
WBC # BLD: 10.4 E9/L (ref 4.5–11.5)

## 2022-02-17 PROCEDURE — 83550 IRON BINDING TEST: CPT

## 2022-02-17 PROCEDURE — 82746 ASSAY OF FOLIC ACID SERUM: CPT

## 2022-02-17 PROCEDURE — 85025 COMPLETE CBC W/AUTO DIFF WBC: CPT

## 2022-02-17 PROCEDURE — 84134 ASSAY OF PREALBUMIN: CPT

## 2022-02-17 PROCEDURE — 36415 COLL VENOUS BLD VENIPUNCTURE: CPT

## 2022-02-17 PROCEDURE — 80053 COMPREHEN METABOLIC PANEL: CPT

## 2022-02-17 PROCEDURE — 82607 VITAMIN B-12: CPT

## 2022-02-17 PROCEDURE — 82728 ASSAY OF FERRITIN: CPT

## 2022-02-17 PROCEDURE — 83540 ASSAY OF IRON: CPT

## 2022-03-07 ENCOUNTER — TELEPHONE (OUTPATIENT)
Dept: SURGERY | Age: 62
End: 2022-03-07

## 2022-03-07 DIAGNOSIS — K43.0 INCISIONAL HERNIA WITH OBSTRUCTION BUT NO GANGRENE: Primary | ICD-10-CM

## 2022-03-09 ENCOUNTER — HOSPITAL ENCOUNTER (OUTPATIENT)
Dept: CT IMAGING | Age: 62
Discharge: HOME | End: 2022-03-09
Payer: MEDICAID

## 2022-03-09 DIAGNOSIS — K43.0 INCISIONAL HERNIA WITH OBSTRUCTION BUT NO GANGRENE: ICD-10-CM

## 2022-03-09 PROCEDURE — 74176 CT ABD & PELVIS W/O CONTRAST: CPT

## 2022-03-15 ENCOUNTER — OFFICE VISIT (OUTPATIENT)
Dept: ENDOCRINOLOGY | Age: 62
End: 2022-03-15
Payer: MEDICAID

## 2022-03-15 VITALS
DIASTOLIC BLOOD PRESSURE: 87 MMHG | WEIGHT: 214 LBS | BODY MASS INDEX: 32.43 KG/M2 | SYSTOLIC BLOOD PRESSURE: 150 MMHG | HEART RATE: 90 BPM | OXYGEN SATURATION: 97 % | HEIGHT: 68 IN

## 2022-03-15 DIAGNOSIS — G62.9 NEUROPATHY: ICD-10-CM

## 2022-03-15 DIAGNOSIS — E78.2 MIXED HYPERLIPIDEMIA: ICD-10-CM

## 2022-03-15 DIAGNOSIS — E13.9 DIABETES MELLITUS TYPE 1.5, MANAGED AS TYPE 1 (HCC): Primary | ICD-10-CM

## 2022-03-15 DIAGNOSIS — E66.09 CLASS 1 OBESITY DUE TO EXCESS CALORIES WITHOUT SERIOUS COMORBIDITY WITH BODY MASS INDEX (BMI) OF 32.0 TO 32.9 IN ADULT: ICD-10-CM

## 2022-03-15 DIAGNOSIS — E55.9 VITAMIN D DEFICIENCY: ICD-10-CM

## 2022-03-15 DIAGNOSIS — E13.9 DIABETES MELLITUS TYPE 1.5, MANAGED AS TYPE 1 (HCC): ICD-10-CM

## 2022-03-15 LAB
ALBUMIN SERPL-MCNC: 5.1 G/DL (ref 3.5–5.2)
ALP BLD-CCNC: 59 U/L (ref 40–129)
ALT SERPL-CCNC: 40 U/L (ref 0–40)
ANION GAP SERPL CALCULATED.3IONS-SCNC: 20 MMOL/L (ref 7–16)
AST SERPL-CCNC: 45 U/L (ref 0–39)
BILIRUB SERPL-MCNC: 0.3 MG/DL (ref 0–1.2)
BUN BLDV-MCNC: 17 MG/DL (ref 6–23)
CALCIUM SERPL-MCNC: 10.5 MG/DL (ref 8.6–10.2)
CHLORIDE BLD-SCNC: 98 MMOL/L (ref 98–107)
CHOLESTEROL, TOTAL: 186 MG/DL (ref 0–199)
CO2: 21 MMOL/L (ref 22–29)
CREAT SERPL-MCNC: 0.8 MG/DL (ref 0.7–1.2)
CREATININE URINE: 23 MG/DL (ref 40–278)
GFR AFRICAN AMERICAN: >60
GFR NON-AFRICAN AMERICAN: >60 ML/MIN/1.73
GLUCOSE BLD-MCNC: 274 MG/DL (ref 74–99)
HBA1C MFR BLD: 8.7 %
HDLC SERPL-MCNC: 60 MG/DL
LDL CHOLESTEROL CALCULATED: 80 MG/DL (ref 0–99)
MICROALBUMIN UR-MCNC: 51.4 MG/L
MICROALBUMIN/CREAT UR-RTO: 223.5 (ref 0–30)
POTASSIUM SERPL-SCNC: 5.3 MMOL/L (ref 3.5–5)
SODIUM BLD-SCNC: 139 MMOL/L (ref 132–146)
TOTAL PROTEIN: 9 G/DL (ref 6.4–8.3)
TRIGL SERPL-MCNC: 231 MG/DL (ref 0–149)
TSH SERPL DL<=0.05 MIU/L-ACNC: 1.75 UIU/ML (ref 0.27–4.2)
VITAMIN D 25-HYDROXY: 25 NG/ML (ref 30–100)
VLDLC SERPL CALC-MCNC: 46 MG/DL

## 2022-03-15 PROCEDURE — 1036F TOBACCO NON-USER: CPT | Performed by: CLINICAL NURSE SPECIALIST

## 2022-03-15 PROCEDURE — 83036 HEMOGLOBIN GLYCOSYLATED A1C: CPT | Performed by: CLINICAL NURSE SPECIALIST

## 2022-03-15 PROCEDURE — G8484 FLU IMMUNIZE NO ADMIN: HCPCS | Performed by: CLINICAL NURSE SPECIALIST

## 2022-03-15 PROCEDURE — G8417 CALC BMI ABV UP PARAM F/U: HCPCS | Performed by: CLINICAL NURSE SPECIALIST

## 2022-03-15 PROCEDURE — 3052F HG A1C>EQUAL 8.0%<EQUAL 9.0%: CPT | Performed by: CLINICAL NURSE SPECIALIST

## 2022-03-15 PROCEDURE — G8427 DOCREV CUR MEDS BY ELIG CLIN: HCPCS | Performed by: CLINICAL NURSE SPECIALIST

## 2022-03-15 PROCEDURE — 99205 OFFICE O/P NEW HI 60 MIN: CPT | Performed by: CLINICAL NURSE SPECIALIST

## 2022-03-15 PROCEDURE — 2022F DILAT RTA XM EVC RTNOPTHY: CPT | Performed by: CLINICAL NURSE SPECIALIST

## 2022-03-15 PROCEDURE — 3017F COLORECTAL CA SCREEN DOC REV: CPT | Performed by: CLINICAL NURSE SPECIALIST

## 2022-03-15 RX ORDER — INSULIN GLARGINE 100 [IU]/ML
24 INJECTION, SOLUTION SUBCUTANEOUS 2 TIMES DAILY
Qty: 12 ML | Refills: 3
Start: 2022-03-15 | End: 2022-03-30 | Stop reason: SDUPTHER

## 2022-03-15 RX ORDER — INSULIN LISPRO 100 [IU]/ML
INJECTION, SOLUTION INTRAVENOUS; SUBCUTANEOUS
Qty: 5 PEN | Refills: 3
Start: 2022-03-15 | End: 2022-03-30 | Stop reason: SDUPTHER

## 2022-03-15 NOTE — PROGRESS NOTES
[address] Department of Endocrinology Diabetes and Metabolism   [address] [facility] [address]   Phone: [phone]  Fax: [phone]    Date of Service: 3/15/2022    Primary Care Physician: Marcela Maguire DO  Referring physician: Laquita Opitz*  Provider: Lacy Negrete APRN - CNS     Reason for the visit:  Type 2 DM       History of Present Illness: The history is provided by the patient. No  was used. Accuracy of the patient data is excellent. Joann Ahuja is a very pleasant 62 y.o. male seen today for diabetes management     Joann Ahuja was diagnosed with diabetes at age of 65 . Context:  Was on Glipizide and jardiance but Then May 12th 2021 had partial pancreatectomy and splenectomy and has been on insulin since that time . Currently on Lajntus 20 units BID and Humalog 7 units with meals plus moddosed ISS    The patient has been checking blood sugar 3 times per day .     Average 240  Most recent A1c results summarized below  Lab Results   Component Value Date    LABA1C 8.7 03/15/2022    LABA1C 8.7 07/19/2021    LABA1C 9.4 06/30/2021       Patient has had no hypoglycemic episodes   The patient hasn't been mindful of what has been eating and wasn't following diabetes diet    Never had DE   I reviewed current medications and the patient has no issues with diabetes medications  Joann Ahuja is up to date with eye exam and denied any history of diabetic retinopathy   The patient seeing podiatrist every   And also performs  own feet care  Microvascular complications:  No Retinopathy, Nephropathy , + Neuropathy   Macrovascular complications: no CAD, PVD, or Stroke  The patient receives Flushot every year and up to date        PAST MEDICAL HISTORY   Past Medical History:   Diagnosis Date    Allergic rhinitis     Anxiety     Arthritis     Back pain     5th finger & ring finger on right hand is numb    Bell's palsy     NEW ONSET 3-     Cervical disc disorder     cervical radiculopathy    Hyperlipidemia     Hypertension     Intra-abdominal abscess (HCC)     recurrent    Lumbago     Malnutrition ([address]     Neuropathy     toes numb    Obesity     Polyneuropathy due to type 2 diabetes mellitus ([address]     Poorly controlled type 2 diabetes mellitus with neuropathy ([address] 5/8/2018    Radiculitis, lumbosacral     Sacral radiculitis 2/27/2019    Sepsis ([address]     Spondylosis of lumbar spine 3/9/2019    Advanced arthritis and degenerative disc disease by MRI 3/2019    Ventral hernia        PAST SURGICAL HISTORY   Past Surgical History:   Procedure Laterality Date    ANESTHESIA NERVE BLOCK Bilateral 07/11/2019    BILATERAL L4-5 TRANSFORAMINAL EPIDURAL STEROID INJE[facility] Bilateral 08/01/2019    BILATERAL MEDIAL BRANCH BLOCK L4-5 AND L5-S1 [facility] Bilateral 08/15/2019    BILATERAL MEDIAL BRANCH BLOCK L4 - 5 AND L5 - S1 performed by Marti Kim DO at [facility] Right 03/29/2019    right wrist carpal tunnel release and right elbow cubital tunnel release    CARPAL TUNNEL RELEASE Right 03/29/2019    RIGHT WRIST CARPAL TUNNEL RELEASE AND RIGHT ELBOW CUBITAL TUNNEL RELEASE performed by Alysa Aden DO at [place] [address]      at age 40 polyps    CYST REMOVAL Right     EPIDURAL STEROID INJECTION N/A 10/17/2019    LUMBAR EPIDURAL STEROID INJECTION UNDER FLUOROSCOPIC GUIDANCE AT L2-L3 WITHOUT SEDATION performed by Misty Brennan MD at [facility] ERCP N/A 05/17/2021    ERCP ENDOSCOPIC RETROGRADE CHOLANGIOPANCREATOGRAPHY SPHINCTER/PAPILLOTOMY performed by Barry Alejo MD at [address] ERCP N/A 05/17/2021    ERCP STENT INSERTION performed by Barry Alejo MD at [address] ERCP N/A 6/4/2021    ERCP STENT INSERTION performed by Dina Wan MD at [address] ERCP N/A 7/2/2021    ERCP STENT REMOVAL performed by Dina Wan MD at [address] ERCP N/A 7/2/2021    ERCP STONE REMOVAL performed by Dina Wan MD at [address] ERCP N/A 7/2/2021    ERCP STENT INSERTION performed by Dina Wan MD at [facility] Bilateral     groin and umbilical    HERNIA REPAIR N/A 12/13/2018    ROBOTIC ASSISTED LAPAROSCOPIC PRIMARY REPAIR OF RECURRENT VENTRAL HERNIA  REPAIR performed by Iban Gaspar MD at [address] [place] [address] Bilateral 07/11/2019    L4-5 transforaminal     NERVE BLOCK Bilateral 08/01/2019    medial branch block    NERVE BLOCK Bilateral 08/15/2019    bilateral medial branch block L4-S1    NERVE BLOCK  10/17/2019    lumbar epidural    PANCREATECTOMY N/A 05/12/2021    LAPAROSCOPIC ROBOTIC DISTAL PANCREATECTOMY AND SPLENECTOMY AND CHOLECYSTECTOMY, INTRA-OPERATIVE ULTRASOUND, TRANSITIONED TO OPEN -- EPIDURAL performed by Maggi Shepard MD at [place] [address]  06/05/2018    C5-7 fusion at [facility] in [address] [place] [address] Pkwy 01/08/2021    EGD W/EUS FNA performed by Scot Robert DO at [address] [place] [address] 01/08/2021    EGD DIAGNOSTIC ONLY performed by Scot Robert DO at [address] [place] [address] N/A 7/30/2021    EGD ESOPHAGOGASTRODUODENOSCOPY ULTRASOUND performed by Dina Wan MD at [address] [place] [address] N/A 7/30/2021    EUS FOREIGN BODY REMOVAL performed by Dina Wan MD at [address] [place] [address] N/A 9/2/2021    EGD ESOPHAGOGASTRODUODENOSCOPY performed by Maggi Shepard MD at [facility]:   reports that he has never smoked.  He has never used smokeless tobacco.  Alcohol:   reports previous alcohol use.  Drugs:   reports no history of drug use. FAMILY HISTORY   Family History   Problem Relation Age of Onset    Diabetes Mother     Other Mother         parkinsons    Diabetes Father     Cancer Sister 79        unknown    Cancer Brother 64        stomach    Diabetes Brother        ALLERGIES AND DRUG REACTIONS   Allergies   Allergen Reactions    Iodine Swelling     Sea food    Metformin And Related Hives    Wheat Extract Swelling       CURRENT MEDICATIONS   Current Outpatient Medications   Medication Sig Dispense Refill    insulin glargine (LANTUS SOLOSTAR) 100 UNIT/ML injection pen Inject 24 Units into the skin 2 times daily 12 mL 3    insulin lispro, 1 Unit Dial, (HUMALOG KWIKPEN) 100 UNIT/ML SOPN 10 units TID with meals plus mod dose ISS AC only 5 pen 3    blood glucose test strips (ASCENSIA AUTODISC VI;ONE TOUCH ULTRA TEST VI) strip 1 each by In Vitro route 6 times daily As needed. 1200 each 5    lipase-protease-amylase (CREON) 98352-077746 units CPEP delayed release capsule Take 2 capsules by mouth 3 times daily (with meals) 250 capsule 5    oxyCODONE-acetaminophen (PERCOCET) 7.5-325 MG per tablet Take 1 tablet by mouth every 8 hours as needed for Pain.  gabapentin (NEURONTIN) 300 MG capsule Take 300 mg by mouth 2 times daily.  oxyCODONE-acetaminophen (PERCOCET) 5-325 MG per tablet Take 1 tablet by mouth every 6 hours as needed for Pain (moderate pain).  (Patient not taking: Reported on 9/16/2021)      oxyCODONE HCl (ROXICODONE PO) Take 2.5 mg by mouth Every 6 hrs prn moderate pain (Patient not taking: Reported on 9/16/2021)      lipase-protease-amylase (ZENPEP) 57190-53056 units CPEP delayed release capsule Take 1 capsule by mouth 3 times daily (with meals) 40,000 units (Patient not taking: Reported on 9/7/2021)      polyethylene glycol (GLYCOLAX) 17 g packet Take 17 g by mouth daily       sucralfate (CARAFATE) 1 GM tablet Take 1 g by mouth every 6 hours       pantoprazole (PROTONIX) 40 MG tablet Take 40 mg by mouth 2 times daily      sennosides-docusate sodium (SENOKOT-S) 8.6-50 MG tablet Take 2 tablets by mouth daily (Patient not taking: Reported on 9/16/2021)      acetaminophen (TYLENOL) 500 MG tablet Take 2 tablets by mouth every 6 hours (Patient taking differently: Take 650 mg by mouth every 6 hours as needed for Pain or Fever (mild pain temp > or equal 101F) ) 120 tablet 0    benzonatate (TESSALON) 200 MG capsule Take 200 mg by mouth 3 times daily as needed for Cough (Patient not taking: Reported on 9/7/2021)      albuterol (PROVENTIL) (2.5 MG/3ML) 0.083% nebulizer solution Take 3 mLs by nebulization 4 times daily (Patient not taking: Reported on 2/8/2022) 120 each 3    budesonide (PULMICORT) 0.5 MG/2ML nebulizer suspension Take 4 mLs by nebulization 2 times daily (Patient not taking: Reported on 9/7/2021) 60 ampule 3    guaiFENesin (ROBITUSSIN) 100 MG/5ML SOLN oral solution Take 15 mLs by mouth 3 times daily (Patient not taking: Reported on 9/7/2021) 1200 mL 0    lipase-protease-amylase (CREON) 44596 units CPEP delayed release capsule Take 2 capsules by mouth 3 times daily (with meals) 540 capsule 3    atorvastatin (LIPITOR) 20 MG tablet Take 1 tablet by mouth daily 30 tablet 3     No current facility-administered medications for this visit. Review of Systems  Constitutional: No fever, no chills, no diaphoresis, no generalized weakness. HEENT: No blurred vision, No sore throat, no ear pain, no hair loss  Neck: denied any neck swelling, difficulty swallowing,   Cardio-pulmonary: No CP, SOB or palpitation, No orthopnea or PND. No cough or wheezing. GI: No N/V/D, no constipation, No abdominal pain, no melena or hematochezia   : Denied any dysuria, hematuria, flank pain, discharge, or incontinence. Skin: denied any rash, ulcer, Hirsute, or hyperpigmentation. MSK: denied any joint deformity, joint pain/swelling, muscle pain, or back pain.   Neuro: no numbness, no tingling, no weakness, _    OBJECTIVE    BP (!) 150/87   Pulse 90   Ht 5' 8\" (1.727 m)   Wt 214 lb (97.1 kg)   SpO2 97%   BMI 32.54 kg/m²   BP Readings from Last 4 Encounters:   03/15/22 (!) 150/87   02/09/22 (!) 143/85   02/08/22 133/77   11/02/21 134/85     Wt Readings from Last 6 Encounters:   03/15/22 214 lb (97.1 kg)   02/09/22 209 lb (94.8 kg)   02/08/22 209 lb 14.4 oz (95.2 kg)   11/02/21 188 lb 1.6 oz (85.3 kg)   10/05/21 180 lb 11.2 oz (82 kg)   09/21/21 184 lb (83.5 kg)       Physical examination:  General: awake alert, oriented x3, no abnormal position or movements. HEENT: normocephalic non-traumatic, no exophthalmos   Neck: supple, no LN enlargement, no thyromegaly, no thyroid tenderness, no JVD. Pulm: Clear equal air entry no added sounds, no wheezing or rhonchi    CVS: S1 + S2, no murmur, no heave. Dorsalis pedis pulse palpable   Abd: soft lax, no tenderness, no organomegaly, audible bowel sounds. Skin: warm, no lesions, no rash.  No callus, no Ulcers, No acanthosis nigricans  Musculoskeletal: No back tenderness, no kyphosis/scoliosis    Neuro: CN intact, Monofilament sensation decreased bilateral , muscle power normal  Psych: normal mood, and affect      Review of Laboratory Data:  I personally reviewed the following lab:  Lab Results   Component Value Date/Time    WBC 10.4 02/17/2022 02:14 PM    RBC 5.53 02/17/2022 02:14 PM    HGB 12.7 02/17/2022 02:14 PM    HCT 41.3 02/17/2022 02:14 PM    MCV 74.7 (L) 02/17/2022 02:14 PM    MCH 23.0 (L) 02/17/2022 02:14 PM    MCHC 30.8 (L) 02/17/2022 02:14 PM    RDW 18.5 (H) 02/17/2022 02:14 PM     (H) 02/17/2022 02:14 PM    MPV 11.4 02/17/2022 02:14 PM      Lab Results   Component Value Date/Time     02/17/2022 02:14 PM    K 4.3 02/17/2022 02:14 PM    K 4.0 08/05/2021 04:30 AM    CO2 26 02/17/2022 02:14 PM    BUN 20 02/17/2022 02:14 PM    CREATININE 0.7 02/17/2022 02:14 PM    CALCIUM 9.5 02/17/2022 02:14 PM    LABGLOM >60 02/17/2022 02:14 PM GFRAA >60 02/17/2022 02:14 PM      No results found for: TSH, T4FREE, M2XHKTK, FT3, Z9SZQQM, TSI, TPOABS, THGAB  Lab Results   Component Value Date    LABA1C 8.7 03/15/2022    GLUCOSE 249 02/17/2022    MALBCR - 02/23/2019    LABMICR <12.0 02/23/2019    LABCREA 156 02/23/2019     Lab Results   Component Value Date    LABA1C 8.7 03/15/2022    LABA1C 8.7 07/19/2021    LABA1C 9.4 06/30/2021     Lab Results   Component Value Date    TRIG 117 11/14/2018    HDL 52 11/14/2018    LDLCALC 115 11/14/2018    CHOL 190 11/14/2018     No results found for: VITD25    ASSESSMENT & RECOMMENDATIONS   Dru Canas, a 62 y.o.-old male seen in for the following issues       Assessment:      Diagnosis Orders   1. Diabetes mellitus type 1.5, managed as type 1 ([facility]  Comprehensive Metabolic Panel    Lipid Panel    Microalbumin / Creatinine Urine Ratio    [facility] Diabetes [facility]   2. Mixed hyperlipidemia     3. Class 1 obesity due to excess calories without serious comorbidity with body mass index (BMI) of 32.0 to 32.9 in adult     4. Neuropathy     5. Vitamin D deficiency  Vitamin D 25 Hydroxy       Plan:     1. Diabetes mellitus type 1.5, managed as type 1 ([facility]   · Patient's diabetes is uncontrolled  · Plan: Increase Lantus to 24 units twice daily  · Increase Humalog to 10 units 3 times daily with meals plus moderate dose insulin sliding scale  · Counseled on hypoglycemia. Counseled on treatment of hypoglycemia  · Patient would benefit from insulin pump. Will refer  · The patient was advised to check blood sugars 4 times a day before meals and at bedtime and send BS readings to our office in a week.   · Discussed with patient A1c and blood sugar goals   · The patient counseled about the complications of uncontrolled diabetes   · Patient was counselled about the importance of self-blood glucose monitoring and eating consistent carb diet to avoid blood sugar fluctuations   · Patient will need routine diabetes maintenance and prevention  · Discussed lifestyle changes including diet and exercise with patient; recommended 150 minutes of moderate intensity exercise per week. · Diabetes labs orderedf   2. Mixed hyperlipidemia   Continue atorvastatin 20 mg daily. Will assess lipids   3. Class 1 obesity due to excess calories without serious comorbidity with body mass index (BMI) of 32.0 to 32.9 in adult   Discussed lifestyle changes including diet and exercise with patient in depth. Also discussed with patient cardiovascular risk associated with obesity   4. Neuropathy   Counseled on optimal foot care. 5. Vitamin D deficiency   Patient at risk for vitamin D deficiency. Will assess vitamin D         I personally reviewed external notes from PCP and other patient's care team providers, and personally interpreted labs associated with the above diagnosis. I also ordered labs to further assess and manage the above addressed medical conditions. Return in about 3 months (around 6/15/2022). The above issues were reviewed with the patient who understood and agreed with the plan. Thank you for allowing us to participate in the care of this patient. Please do not hesitate to contact us with any additional questions. MARGAUX Ortiz     [facility] - BEHAVIORAL HEALTH SERVICES Diabetes Care and Endocrinology   [address] [facility] [address]   Phone: [phone]  Fax: [phone]  --------------------------------------------  An electronic signature was used to authenticate this note.  Ronald ARRIAGA on 3/15/2022 at 1:33 PM

## 2022-03-16 ENCOUNTER — TELEPHONE (OUTPATIENT)
Dept: HEMATOLOGY | Age: 62
End: 2022-03-16

## 2022-03-16 ENCOUNTER — TELEPHONE (OUTPATIENT)
Dept: ENDOCRINOLOGY | Age: 62
End: 2022-03-16

## 2022-03-16 DIAGNOSIS — E11.65 POORLY CONTROLLED TYPE 2 DIABETES MELLITUS (HCC): ICD-10-CM

## 2022-03-16 DIAGNOSIS — E13.9 DIABETES MELLITUS TYPE 1.5, MANAGED AS TYPE 1 (HCC): Primary | ICD-10-CM

## 2022-03-16 DIAGNOSIS — E87.5 HYPERKALEMIA: Primary | ICD-10-CM

## 2022-03-16 PROCEDURE — 3052F HG A1C>EQUAL 8.0%<EQUAL 9.0%: CPT | Performed by: CLINICAL NURSE SPECIALIST

## 2022-03-16 RX ORDER — BLOOD-GLUCOSE SENSOR
EACH MISCELLANEOUS
Qty: 12 EACH | Refills: 3 | Status: SHIPPED | OUTPATIENT
Start: 2022-03-16

## 2022-03-16 RX ORDER — SUBCUTANEOUS INSULIN PUMP
EACH MISCELLANEOUS
Qty: 1 KIT | Refills: 0 | Status: SHIPPED | OUTPATIENT
Start: 2022-03-16

## 2022-03-16 RX ORDER — BLOOD-GLUCOSE TRANSMITTER
EACH MISCELLANEOUS
Qty: 1 EACH | Refills: 0 | Status: SHIPPED | OUTPATIENT
Start: 2022-03-16

## 2022-03-16 RX ORDER — MELATONIN
1000 DAILY
Qty: 30 TABLET | Refills: 5 | Status: SHIPPED | OUTPATIENT
Start: 2022-03-16

## 2022-03-16 NOTE — TELEPHONE ENCOUNTER
The patient called with a lot of questions and concerns for Dr Dania Serna regarding his upcoming surgery with Dr Yoli Mcfadden.  The patient is very apprehensive with the fact that Dr Yoli Mcfadden told him that he may have to do a open procedure if needed and that this is scaring him, he wanted to just speak with Dr Dania Serna personally and to ask questions so I put him on for a phone visit, which the patient verbalized understanding  Electronically signed by Chuck Polanco MA on 3/16/2022 at 10:45 AM

## 2022-03-16 NOTE — TELEPHONE ENCOUNTER
----- Message from MARGAUX Dailey - CNS sent at 3/16/2022  7:45 AM EDT -----  Please call patient and inform him vitamin D is mildly low. Please have patient start vitamin D 1000 international units daily. Prescription sent. Also his potassium level was mildly elevated. Please have patient repeat BMP in 1 week.   Lab ordered

## 2022-03-17 ENCOUNTER — SCHEDULED TELEPHONE ENCOUNTER (OUTPATIENT)
Dept: HEMATOLOGY | Age: 62
End: 2022-03-17

## 2022-03-17 PROCEDURE — 99024 POSTOP FOLLOW-UP VISIT: CPT | Performed by: TRANSPLANT SURGERY

## 2022-03-19 NOTE — PROGRESS NOTES
[facility] Surgery    Discussed with patient regarding his upcoming surgery with Dr. Fernando Medina    Encouraged glycemic control prior to surgery and to undergo the [facility] system    Electronically signed by Cynthia Thomas MD on 3/19/2022 at 9:33 AM

## 2022-03-30 ENCOUNTER — HOSPITAL ENCOUNTER (OUTPATIENT)
Age: 62
Discharge: HOME | End: 2022-03-30
Payer: MEDICAID

## 2022-03-30 DIAGNOSIS — E11.65 POORLY CONTROLLED TYPE 2 DIABETES MELLITUS (HCC): Primary | ICD-10-CM

## 2022-03-30 DIAGNOSIS — E87.5 HYPERKALEMIA: ICD-10-CM

## 2022-03-30 LAB
ANION GAP SERPL CALCULATED.3IONS-SCNC: 14 MMOL/L (ref 7–16)
BUN BLDV-MCNC: 18 MG/DL (ref 6–23)
CALCIUM SERPL-MCNC: 9.5 MG/DL (ref 8.6–10.2)
CHLORIDE BLD-SCNC: 99 MMOL/L (ref 98–107)
CO2: 23 MMOL/L (ref 22–29)
CREAT SERPL-MCNC: 0.8 MG/DL (ref 0.7–1.2)
GFR AFRICAN AMERICAN: >60
GFR NON-AFRICAN AMERICAN: >60 ML/MIN/1.73
GLUCOSE BLD-MCNC: 313 MG/DL (ref 74–99)
POTASSIUM SERPL-SCNC: 4.3 MMOL/L (ref 3.5–5)
SODIUM BLD-SCNC: 136 MMOL/L (ref 132–146)

## 2022-03-30 PROCEDURE — 36415 COLL VENOUS BLD VENIPUNCTURE: CPT

## 2022-03-30 PROCEDURE — 80048 BASIC METABOLIC PNL TOTAL CA: CPT

## 2022-03-30 RX ORDER — INSULIN GLARGINE 100 [IU]/ML
24 INJECTION, SOLUTION SUBCUTANEOUS 2 TIMES DAILY
Qty: 15 ML | Refills: 3 | Status: SHIPPED
Start: 2022-03-30 | End: 2022-08-17

## 2022-03-30 RX ORDER — INSULIN LISPRO 100 [IU]/ML
INJECTION, SOLUTION INTRAVENOUS; SUBCUTANEOUS
Qty: 20 ML | Refills: 3 | Status: SHIPPED
Start: 2022-03-30 | End: 2022-06-27 | Stop reason: SDUPTHER

## 2022-03-31 ENCOUNTER — TELEPHONE (OUTPATIENT)
Dept: ENDOCRINOLOGY | Age: 62
End: 2022-03-31

## 2022-03-31 NOTE — TELEPHONE ENCOUNTER
----- Message from MARGAUX Benietz sent at 3/31/2022  8:10 AM EDT -----  Please call patient and inform him potassium level is normal.

## 2022-04-06 DIAGNOSIS — E11.65 POORLY CONTROLLED TYPE 2 DIABETES MELLITUS (HCC): Primary | ICD-10-CM

## 2022-04-06 RX ORDER — BLOOD SUGAR DIAGNOSTIC
STRIP MISCELLANEOUS
Qty: 200 EACH | Refills: 6 | Status: SHIPPED
Start: 2022-04-06 | End: 2022-05-20

## 2022-04-06 RX ORDER — LANCETS
EACH MISCELLANEOUS
Qty: 204 EACH | Refills: 6 | Status: SHIPPED | OUTPATIENT
Start: 2022-04-06

## 2022-04-11 RX ORDER — SODIUM CHLORIDE 9 MG/ML
INJECTION, SOLUTION INTRAVENOUS CONTINUOUS
Status: CANCELLED | OUTPATIENT
Start: 2022-04-11

## 2022-04-12 ENCOUNTER — HOSPITAL ENCOUNTER (OUTPATIENT)
Dept: PREADMISSION TESTING | Age: 62
Discharge: HOME | DRG: 227 | End: 2022-04-12
Payer: MEDICAID

## 2022-04-12 VITALS
HEART RATE: 95 BPM | HEIGHT: 68 IN | BODY MASS INDEX: 32.58 KG/M2 | WEIGHT: 215 LBS | DIASTOLIC BLOOD PRESSURE: 70 MMHG | TEMPERATURE: 97.6 F | SYSTOLIC BLOOD PRESSURE: 150 MMHG | OXYGEN SATURATION: 95 % | RESPIRATION RATE: 18 BRPM

## 2022-04-12 DIAGNOSIS — Z01.818 PREOP TESTING: Primary | ICD-10-CM

## 2022-04-12 LAB
ALBUMIN SERPL-MCNC: 4.5 G/DL (ref 3.5–5.2)
ALP BLD-CCNC: 59 U/L (ref 40–129)
ALT SERPL-CCNC: 27 U/L (ref 0–40)
ANION GAP SERPL CALCULATED.3IONS-SCNC: 17 MMOL/L (ref 7–16)
AST SERPL-CCNC: 27 U/L (ref 0–39)
BILIRUB SERPL-MCNC: 0.2 MG/DL (ref 0–1.2)
BUN BLDV-MCNC: 22 MG/DL (ref 6–23)
CALCIUM SERPL-MCNC: 9.7 MG/DL (ref 8.6–10.2)
CHLORIDE BLD-SCNC: 98 MMOL/L (ref 98–107)
CO2: 22 MMOL/L (ref 22–29)
CREAT SERPL-MCNC: 0.9 MG/DL (ref 0.7–1.2)
GFR AFRICAN AMERICAN: >60
GFR NON-AFRICAN AMERICAN: >60 ML/MIN/1.73
GLUCOSE BLD-MCNC: 213 MG/DL (ref 74–99)
HCT VFR BLD CALC: 44.1 % (ref 37–54)
HEMOGLOBIN: 13.6 G/DL (ref 12.5–16.5)
MCH RBC QN AUTO: 23.4 PG (ref 26–35)
MCHC RBC AUTO-ENTMCNC: 30.8 % (ref 32–34.5)
MCV RBC AUTO: 76 FL (ref 80–99.9)
PDW BLD-RTO: 17.2 FL (ref 11.5–15)
PLATELET # BLD: 395 E9/L (ref 130–450)
PMV BLD AUTO: 12.4 FL (ref 7–12)
POTASSIUM REFLEX MAGNESIUM: 4.1 MMOL/L (ref 3.5–5)
RBC # BLD: 5.8 E12/L (ref 3.8–5.8)
SODIUM BLD-SCNC: 137 MMOL/L (ref 132–146)
TOTAL PROTEIN: 8.2 G/DL (ref 6.4–8.3)
WBC # BLD: 8 E9/L (ref 4.5–11.5)

## 2022-04-12 PROCEDURE — 85027 COMPLETE CBC AUTOMATED: CPT

## 2022-04-12 PROCEDURE — 36415 COLL VENOUS BLD VENIPUNCTURE: CPT

## 2022-04-12 PROCEDURE — 80053 COMPREHEN METABOLIC PANEL: CPT

## 2022-04-12 RX ORDER — CETIRIZINE HYDROCHLORIDE 10 MG/1
10 TABLET ORAL DAILY
COMMUNITY

## 2022-04-12 ASSESSMENT — PAIN DESCRIPTION - DESCRIPTORS: DESCRIPTORS: ACHING;DISCOMFORT;CRAMPING;CONSTANT

## 2022-04-12 ASSESSMENT — PAIN DESCRIPTION - PAIN TYPE: TYPE: CHRONIC PAIN

## 2022-04-12 ASSESSMENT — PAIN SCALES - GENERAL: PAINLEVEL_OUTOF10: 7

## 2022-04-12 ASSESSMENT — PAIN DESCRIPTION - LOCATION: LOCATION: ABDOMEN;BACK

## 2022-04-12 ASSESSMENT — PAIN DESCRIPTION - ORIENTATION: ORIENTATION: LEFT

## 2022-04-12 NOTE — PROGRESS NOTES
[address] [facility]                                                                                                                    PRE OP INSTRUCTIONS FOR  Erinn Pearson        Date: 4/12/2022    Date of surgery: 4-14-22   Arrival Time: Hospital will call you between 5pm and 7pm on 4-13-22 with your final arrival time for surgery    1. Do not eat or drink anything after midnight prior to surgery. This includes no water, chewing gum, mints or ice chips. 2. Take the following medications with a small sip of water on the morning of Surgery: gabapentin (neurontin), protonix (pantoprazole)  percocet, use and bring inhalers day of surgery       3. Diabetics may take evening dose of insulin but none after midnight. If you feel symptomatic or low blood sugar morning of surgery drink 1-2 ounces of apple juice only. 4. Aspirin, Ibuprofen, Advil, Naproxen, Vitamin E and other Anti-inflammatory products should be stopped  before surgery  as directed by your physician. Take Tylenol only unless instructed otherwise by your surgeon. 5. Check with your Doctor regarding stopping Plavix, Coumadin, Lovenox, Eliquis, Effient, or other blood thinners. 6. Do not smoke,use illicit drugs and do not drink any alcoholic beverages 24 hours prior to surgery. 7. You may brush your teeth the morning of surgery. DO NOT SWALLOW WATER    8. You MUST make arrangements for a responsible adult to take you home after your surgery. You will not be allowed to leave alone or drive yourself home. It is strongly suggested someone stay with you the first 24 hrs. Your surgery will be cancelled if you do not have a ride home. 9. PEDIATRIC PATIENTS ONLY:  A parent/legal guardian must accompany a child scheduled for surgery and plan to stay at the hospital until the child is discharged. Please do not bring other children with you.     10. Please wear simple, loose fitting clothing to the hospital.  Do not bring valuables (money, credit cards, checkbooks, etc.) Do not wear any makeup (including no eye makeup) or nail polish on your fingers or toes. 11. DO NOT wear any jewelry or piercings on day of surgery. All body piercing jewelry must be removed. 12. Shower the night before surgery with _x__Antibacterial soap /KEELY WIPES________    13. TOTAL JOINT REPLACEMENT/HYSTERECTOMY PATIENTS ONLY---Remember to bring Blood Bank bracelet to the hospital on the day of surgery. 14. If you have a Living Will and Durable Power of  for Healthcare, please bring in a copy. 15. If appropriate bring crutches, inspirex, WALKER, CANE etc... 12. Notify your Surgeon if you develop any illness between now and surgery time, cough, cold, fever, sore throat, nausea, vomiting, etc.  Please notify your surgeon if you experience dizziness, shortness of breath or blurred vision between now & the time of your surgery. 17. If you have ___dentures, they will be removed before going to the OR; we will provide you a container. If you wear ___contact lenses or ___glasses, they will be removed; please bring a case for them. 18. To provide excellent care visitors will be limited to 2 in the room at any given time. 19. Please bring picture ID and insurance card. 20. Sleep apnea patients need to bring CPAP AND SETTINGS to hospital on day of surgery. 21. During flu season no children under the age of 15 are permitted in the hospital for the safety of all patients. 22. Other walk in through visitors entrance and check in at front lobby registration desk                  Please call AMBULATORY CARE if you have any further questions.    [address] [facility]     [address]

## 2022-04-13 ENCOUNTER — ANESTHESIA EVENT (OUTPATIENT)
Dept: OPERATING ROOM | Age: 62
DRG: 227 | End: 2022-04-13
Payer: MEDICAID

## 2022-04-14 ENCOUNTER — ANESTHESIA (OUTPATIENT)
Dept: OPERATING ROOM | Age: 62
DRG: 227 | End: 2022-04-14
Payer: MEDICAID

## 2022-04-14 ENCOUNTER — HOSPITAL ENCOUNTER (INPATIENT)
Age: 62
LOS: 2 days | Discharge: HOME | DRG: 227 | End: 2022-04-16
Attending: SURGERY | Admitting: SURGERY
Payer: MEDICAID

## 2022-04-14 VITALS
SYSTOLIC BLOOD PRESSURE: 136 MMHG | DIASTOLIC BLOOD PRESSURE: 85 MMHG | RESPIRATION RATE: 9 BRPM | OXYGEN SATURATION: 99 %

## 2022-04-14 DIAGNOSIS — M54.2 NECK PAIN: ICD-10-CM

## 2022-04-14 DIAGNOSIS — N62 GYNECOMASTIA: ICD-10-CM

## 2022-04-14 DIAGNOSIS — G56.01 RIGHT CARPAL TUNNEL SYNDROME: ICD-10-CM

## 2022-04-14 DIAGNOSIS — M47.816 SPONDYLOSIS OF LUMBAR SPINE: Chronic | ICD-10-CM

## 2022-04-14 DIAGNOSIS — M47.816 LUMBAR FACET ARTHROPATHY: ICD-10-CM

## 2022-04-14 DIAGNOSIS — F41.0 PANIC DISORDER: ICD-10-CM

## 2022-04-14 DIAGNOSIS — R50.82 ABDOMINAL PAIN WITH FEVER AFTER SURGERY: ICD-10-CM

## 2022-04-14 DIAGNOSIS — M51.9 LUMBAR DISC DISORDER: ICD-10-CM

## 2022-04-14 DIAGNOSIS — M54.18 SACRAL RADICULITIS: ICD-10-CM

## 2022-04-14 DIAGNOSIS — E11.65 POORLY CONTROLLED TYPE 2 DIABETES MELLITUS (HCC): ICD-10-CM

## 2022-04-14 DIAGNOSIS — G56.21 CUBITAL TUNNEL SYNDROME ON RIGHT: ICD-10-CM

## 2022-04-14 DIAGNOSIS — M50.90 CERVICAL DISC DISORDER: ICD-10-CM

## 2022-04-14 DIAGNOSIS — M54.12 CERVICAL RADICULOPATHY AT C7: ICD-10-CM

## 2022-04-14 DIAGNOSIS — K26.9 DUODENAL ULCER DISEASE: ICD-10-CM

## 2022-04-14 DIAGNOSIS — K65.1 INTRA-ABDOMINAL ABSCESS (HCC): ICD-10-CM

## 2022-04-14 DIAGNOSIS — M54.16 LUMBAR RADICULITIS: ICD-10-CM

## 2022-04-14 DIAGNOSIS — K43.0 INCARCERATED INCISIONAL HERNIA: Primary | ICD-10-CM

## 2022-04-14 DIAGNOSIS — G89.29 CHRONIC BILATERAL LOW BACK PAIN WITH RIGHT-SIDED SCIATICA: ICD-10-CM

## 2022-04-14 DIAGNOSIS — I10 ESSENTIAL HYPERTENSION: ICD-10-CM

## 2022-04-14 DIAGNOSIS — J30.2 CHRONIC SEASONAL ALLERGIC RHINITIS: ICD-10-CM

## 2022-04-14 DIAGNOSIS — D49.0 IPMN (INTRADUCTAL PAPILLARY MUCINOUS NEOPLASM): ICD-10-CM

## 2022-04-14 DIAGNOSIS — G51.0 BELL'S PALSY: Chronic | ICD-10-CM

## 2022-04-14 DIAGNOSIS — J96.01 ACUTE RESPIRATORY FAILURE WITH HYPOXIA (HCC): ICD-10-CM

## 2022-04-14 DIAGNOSIS — G89.18 ABDOMINAL PAIN WITH FEVER AFTER SURGERY: ICD-10-CM

## 2022-04-14 DIAGNOSIS — M71.322 OTHER BURSAL CYST, LEFT ELBOW: ICD-10-CM

## 2022-04-14 DIAGNOSIS — B99.9 INTRA-ABDOMINAL INFECTION: ICD-10-CM

## 2022-04-14 DIAGNOSIS — E43 SEVERE PROTEIN-CALORIE MALNUTRITION (HCC): ICD-10-CM

## 2022-04-14 DIAGNOSIS — R10.9 ABDOMINAL PAIN WITH FEVER AFTER SURGERY: ICD-10-CM

## 2022-04-14 DIAGNOSIS — M54.41 CHRONIC BILATERAL LOW BACK PAIN WITH RIGHT-SIDED SCIATICA: ICD-10-CM

## 2022-04-14 DIAGNOSIS — M48.062 SPINAL STENOSIS OF LUMBAR REGION WITH NEUROGENIC CLAUDICATION: ICD-10-CM

## 2022-04-14 DIAGNOSIS — M47.812 CERVICAL FACET JOINT SYNDROME: ICD-10-CM

## 2022-04-14 DIAGNOSIS — D49.0 PANCREATIC NEOPLASM: ICD-10-CM

## 2022-04-14 DIAGNOSIS — M47.812 SPONDYLOSIS OF CERVICAL REGION WITHOUT MYELOPATHY OR RADICULOPATHY: ICD-10-CM

## 2022-04-14 DIAGNOSIS — K86.89 PANCREATIC DUCT LEAK: ICD-10-CM

## 2022-04-14 DIAGNOSIS — E78.2 MIXED HYPERLIPIDEMIA: ICD-10-CM

## 2022-04-14 DIAGNOSIS — K43.9 VENTRAL HERNIA WITHOUT OBSTRUCTION OR GANGRENE: ICD-10-CM

## 2022-04-14 LAB
METER GLUCOSE: 237 MG/DL (ref 74–99)
METER GLUCOSE: 317 MG/DL (ref 74–99)

## 2022-04-14 PROCEDURE — 2709999900 HC NON-CHARGEABLE SUPPLY: Performed by: SURGERY

## 2022-04-14 PROCEDURE — 6360000002 HC RX W HCPCS

## 2022-04-14 PROCEDURE — 2580000003 HC RX 258: Performed by: SURGERY

## 2022-04-14 PROCEDURE — 3600000019 HC SURGERY ROBOT ADDTL 15MIN: Performed by: SURGERY

## 2022-04-14 PROCEDURE — 6370000000 HC RX 637 (ALT 250 FOR IP): Performed by: SURGERY

## 2022-04-14 PROCEDURE — 1200000000 HC SEMI PRIVATE

## 2022-04-14 PROCEDURE — C1781 MESH (IMPLANTABLE): HCPCS | Performed by: SURGERY

## 2022-04-14 PROCEDURE — 3600000009 HC SURGERY ROBOT BASE: Performed by: SURGERY

## 2022-04-14 PROCEDURE — 8E0W4CZ ROBOTIC ASSISTED PROCEDURE OF TRUNK REGION, PERCUTANEOUS ENDOSCOPIC APPROACH: ICD-10-PCS | Performed by: SURGERY

## 2022-04-14 PROCEDURE — 7100000000 HC PACU RECOVERY - FIRST 15 MIN: Performed by: SURGERY

## 2022-04-14 PROCEDURE — 2720000010 HC SURG SUPPLY STERILE: Performed by: SURGERY

## 2022-04-14 PROCEDURE — 3700000001 HC ADD 15 MINUTES (ANESTHESIA): Performed by: SURGERY

## 2022-04-14 PROCEDURE — 82962 GLUCOSE BLOOD TEST: CPT

## 2022-04-14 PROCEDURE — S2900 ROBOTIC SURGICAL SYSTEM: HCPCS | Performed by: SURGERY

## 2022-04-14 PROCEDURE — 3700000000 HC ANESTHESIA ATTENDED CARE: Performed by: SURGERY

## 2022-04-14 PROCEDURE — 0WUF4JZ SUPPLEMENT ABDOMINAL WALL WITH SYNTHETIC SUBSTITUTE, PERCUTANEOUS ENDOSCOPIC APPROACH: ICD-10-PCS | Performed by: SURGERY

## 2022-04-14 PROCEDURE — 7100000001 HC PACU RECOVERY - ADDTL 15 MIN: Performed by: SURGERY

## 2022-04-14 PROCEDURE — 6360000002 HC RX W HCPCS: Performed by: ANESTHESIOLOGY

## 2022-04-14 PROCEDURE — 15734 MUSCLE-SKIN GRAFT TRUNK: CPT | Performed by: SURGERY

## 2022-04-14 PROCEDURE — 3E0M45Z INTRODUCTION OF ADHESION BARRIER INTO PERITONEAL CAVITY, PERCUTANEOUS ENDOSCOPIC APPROACH: ICD-10-PCS | Performed by: SURGERY

## 2022-04-14 PROCEDURE — 2500000003 HC RX 250 WO HCPCS

## 2022-04-14 PROCEDURE — 2580000003 HC RX 258

## 2022-04-14 PROCEDURE — 6360000002 HC RX W HCPCS: Performed by: SURGERY

## 2022-04-14 PROCEDURE — 2500000003 HC RX 250 WO HCPCS: Performed by: SURGERY

## 2022-04-14 PROCEDURE — 49657 PR LAP, RECURRENT INCISIONAL HERNIA REPAIR,INCARCERATED: CPT | Performed by: SURGERY

## 2022-04-14 DEVICE — BARD SOFT MESH, 12" X 12" (30. 5 CM X 30.5 CM)
Type: IMPLANTABLE DEVICE | Site: ABDOMEN | Status: FUNCTIONAL
Brand: BARD

## 2022-04-14 RX ORDER — GABAPENTIN 300 MG/1
300 CAPSULE ORAL 2 TIMES DAILY
Status: DISCONTINUED | OUTPATIENT
Start: 2022-04-14 | End: 2022-04-16 | Stop reason: HOSPADM

## 2022-04-14 RX ORDER — BUPIVACAINE HYDROCHLORIDE AND EPINEPHRINE 2.5; 5 MG/ML; UG/ML
INJECTION, SOLUTION EPIDURAL; INFILTRATION; INTRACAUDAL; PERINEURAL PRN
Status: DISCONTINUED | OUTPATIENT
Start: 2022-04-14 | End: 2022-04-14 | Stop reason: HOSPADM

## 2022-04-14 RX ORDER — FENTANYL CITRATE 50 UG/ML
INJECTION, SOLUTION INTRAMUSCULAR; INTRAVENOUS PRN
Status: DISCONTINUED | OUTPATIENT
Start: 2022-04-14 | End: 2022-04-14 | Stop reason: SDUPTHER

## 2022-04-14 RX ORDER — SODIUM CHLORIDE 0.9 % (FLUSH) 0.9 %
10 SYRINGE (ML) INJECTION EVERY 12 HOURS SCHEDULED
Status: DISCONTINUED | OUTPATIENT
Start: 2022-04-14 | End: 2022-04-16 | Stop reason: HOSPADM

## 2022-04-14 RX ORDER — SODIUM CHLORIDE, SODIUM LACTATE, POTASSIUM CHLORIDE, CALCIUM CHLORIDE 600; 310; 30; 20 MG/100ML; MG/100ML; MG/100ML; MG/100ML
INJECTION, SOLUTION INTRAVENOUS CONTINUOUS
Status: DISCONTINUED | OUTPATIENT
Start: 2022-04-14 | End: 2022-04-15

## 2022-04-14 RX ORDER — ONDANSETRON 4 MG/1
4 TABLET, ORALLY DISINTEGRATING ORAL EVERY 8 HOURS PRN
Status: DISCONTINUED | OUTPATIENT
Start: 2022-04-14 | End: 2022-04-16 | Stop reason: HOSPADM

## 2022-04-14 RX ORDER — SODIUM CHLORIDE 9 MG/ML
INJECTION, SOLUTION INTRAVENOUS PRN
Status: DISCONTINUED | OUTPATIENT
Start: 2022-04-14 | End: 2022-04-14 | Stop reason: HOSPADM

## 2022-04-14 RX ORDER — SODIUM CHLORIDE 9 MG/ML
INJECTION, SOLUTION INTRAVENOUS CONTINUOUS PRN
Status: DISCONTINUED | OUTPATIENT
Start: 2022-04-14 | End: 2022-04-14 | Stop reason: SDUPTHER

## 2022-04-14 RX ORDER — SODIUM CHLORIDE 0.9 % (FLUSH) 0.9 %
10 SYRINGE (ML) INJECTION PRN
Status: DISCONTINUED | OUTPATIENT
Start: 2022-04-14 | End: 2022-04-16 | Stop reason: HOSPADM

## 2022-04-14 RX ORDER — FAMOTIDINE 20 MG/1
20 TABLET, FILM COATED ORAL 2 TIMES DAILY
Status: DISCONTINUED | OUTPATIENT
Start: 2022-04-14 | End: 2022-04-16 | Stop reason: HOSPADM

## 2022-04-14 RX ORDER — ACETAMINOPHEN 325 MG/1
650 TABLET ORAL EVERY 6 HOURS SCHEDULED
Status: DISCONTINUED | OUTPATIENT
Start: 2022-04-14 | End: 2022-04-16 | Stop reason: HOSPADM

## 2022-04-14 RX ORDER — DEXTROSE MONOHYDRATE 50 MG/ML
100 INJECTION, SOLUTION INTRAVENOUS PRN
Status: DISCONTINUED | OUTPATIENT
Start: 2022-04-14 | End: 2022-04-16 | Stop reason: HOSPADM

## 2022-04-14 RX ORDER — PROPOFOL 10 MG/ML
INJECTION, EMULSION INTRAVENOUS PRN
Status: DISCONTINUED | OUTPATIENT
Start: 2022-04-14 | End: 2022-04-14 | Stop reason: SDUPTHER

## 2022-04-14 RX ORDER — MEPERIDINE HYDROCHLORIDE 25 MG/ML
INJECTION INTRAMUSCULAR; INTRAVENOUS; SUBCUTANEOUS
Status: COMPLETED
Start: 2022-04-14 | End: 2022-04-14

## 2022-04-14 RX ORDER — KETOROLAC TROMETHAMINE 30 MG/ML
30 INJECTION, SOLUTION INTRAMUSCULAR; INTRAVENOUS EVERY 6 HOURS PRN
Status: DISCONTINUED | OUTPATIENT
Start: 2022-04-14 | End: 2022-04-16 | Stop reason: HOSPADM

## 2022-04-14 RX ORDER — ONDANSETRON 2 MG/ML
4 INJECTION INTRAMUSCULAR; INTRAVENOUS
Status: DISCONTINUED | OUTPATIENT
Start: 2022-04-14 | End: 2022-04-14 | Stop reason: HOSPADM

## 2022-04-14 RX ORDER — SODIUM CHLORIDE 0.9 % (FLUSH) 0.9 %
10 SYRINGE (ML) INJECTION EVERY 12 HOURS SCHEDULED
Status: DISCONTINUED | OUTPATIENT
Start: 2022-04-14 | End: 2022-04-14 | Stop reason: HOSPADM

## 2022-04-14 RX ORDER — SODIUM CHLORIDE 0.9 % (FLUSH) 0.9 %
5-40 SYRINGE (ML) INJECTION PRN
Status: DISCONTINUED | OUTPATIENT
Start: 2022-04-14 | End: 2022-04-14 | Stop reason: HOSPADM

## 2022-04-14 RX ORDER — NEOSTIGMINE METHYLSULFATE 1 MG/ML
INJECTION, SOLUTION INTRAVENOUS PRN
Status: DISCONTINUED | OUTPATIENT
Start: 2022-04-14 | End: 2022-04-14 | Stop reason: SDUPTHER

## 2022-04-14 RX ORDER — SODIUM CHLORIDE 9 MG/ML
25 INJECTION, SOLUTION INTRAVENOUS PRN
Status: DISCONTINUED | OUTPATIENT
Start: 2022-04-14 | End: 2022-04-16 | Stop reason: HOSPADM

## 2022-04-14 RX ORDER — GLYCOPYRROLATE 1 MG/5 ML
SYRINGE (ML) INTRAVENOUS PRN
Status: DISCONTINUED | OUTPATIENT
Start: 2022-04-14 | End: 2022-04-14 | Stop reason: SDUPTHER

## 2022-04-14 RX ORDER — MIDAZOLAM HYDROCHLORIDE 1 MG/ML
INJECTION INTRAMUSCULAR; INTRAVENOUS PRN
Status: DISCONTINUED | OUTPATIENT
Start: 2022-04-14 | End: 2022-04-14 | Stop reason: SDUPTHER

## 2022-04-14 RX ORDER — ATORVASTATIN CALCIUM 20 MG/1
20 TABLET, FILM COATED ORAL DAILY
Status: DISCONTINUED | OUTPATIENT
Start: 2022-04-15 | End: 2022-04-16 | Stop reason: HOSPADM

## 2022-04-14 RX ORDER — FENTANYL CITRATE 0.05 MG/ML
INJECTION, SOLUTION INTRAMUSCULAR; INTRAVENOUS
Status: COMPLETED
Start: 2022-04-14 | End: 2022-04-14

## 2022-04-14 RX ORDER — SODIUM CHLORIDE 9 MG/ML
25 INJECTION, SOLUTION INTRAVENOUS PRN
Status: DISCONTINUED | OUTPATIENT
Start: 2022-04-14 | End: 2022-04-14 | Stop reason: HOSPADM

## 2022-04-14 RX ORDER — SODIUM CHLORIDE 9 MG/ML
INJECTION, SOLUTION INTRAVENOUS CONTINUOUS
Status: DISCONTINUED | OUTPATIENT
Start: 2022-04-14 | End: 2022-04-14

## 2022-04-14 RX ORDER — LIDOCAINE HYDROCHLORIDE 20 MG/ML
INJECTION, SOLUTION INTRAVENOUS PRN
Status: DISCONTINUED | OUTPATIENT
Start: 2022-04-14 | End: 2022-04-14 | Stop reason: SDUPTHER

## 2022-04-14 RX ORDER — INSULIN GLARGINE 100 [IU]/ML
24 INJECTION, SOLUTION SUBCUTANEOUS 2 TIMES DAILY
Status: DISCONTINUED | OUTPATIENT
Start: 2022-04-14 | End: 2022-04-16 | Stop reason: HOSPADM

## 2022-04-14 RX ORDER — SODIUM CHLORIDE 0.9 % (FLUSH) 0.9 %
5-40 SYRINGE (ML) INJECTION EVERY 12 HOURS SCHEDULED
Status: DISCONTINUED | OUTPATIENT
Start: 2022-04-14 | End: 2022-04-14 | Stop reason: HOSPADM

## 2022-04-14 RX ORDER — OXYCODONE HYDROCHLORIDE 5 MG/1
10 TABLET ORAL EVERY 4 HOURS PRN
Status: DISCONTINUED | OUTPATIENT
Start: 2022-04-14 | End: 2022-04-16 | Stop reason: HOSPADM

## 2022-04-14 RX ORDER — NICOTINE POLACRILEX 4 MG
15 LOZENGE BUCCAL PRN
Status: DISCONTINUED | OUTPATIENT
Start: 2022-04-14 | End: 2022-04-16 | Stop reason: HOSPADM

## 2022-04-14 RX ORDER — MORPHINE SULFATE 2 MG/ML
2 INJECTION, SOLUTION INTRAMUSCULAR; INTRAVENOUS
Status: DISCONTINUED | OUTPATIENT
Start: 2022-04-14 | End: 2022-04-16 | Stop reason: HOSPADM

## 2022-04-14 RX ORDER — ROCURONIUM BROMIDE 10 MG/ML
INJECTION, SOLUTION INTRAVENOUS PRN
Status: DISCONTINUED | OUTPATIENT
Start: 2022-04-14 | End: 2022-04-14 | Stop reason: SDUPTHER

## 2022-04-14 RX ORDER — MEPERIDINE HYDROCHLORIDE 25 MG/ML
25 INJECTION INTRAMUSCULAR; INTRAVENOUS; SUBCUTANEOUS EVERY 5 MIN PRN
Status: DISCONTINUED | OUTPATIENT
Start: 2022-04-14 | End: 2022-04-14 | Stop reason: HOSPADM

## 2022-04-14 RX ORDER — SODIUM CHLORIDE 0.9 % (FLUSH) 0.9 %
10 SYRINGE (ML) INJECTION PRN
Status: DISCONTINUED | OUTPATIENT
Start: 2022-04-14 | End: 2022-04-14 | Stop reason: HOSPADM

## 2022-04-14 RX ORDER — FENTANYL CITRATE 50 UG/ML
25 INJECTION, SOLUTION INTRAMUSCULAR; INTRAVENOUS EVERY 5 MIN PRN
Status: DISCONTINUED | OUTPATIENT
Start: 2022-04-14 | End: 2022-04-14 | Stop reason: HOSPADM

## 2022-04-14 RX ORDER — ONDANSETRON 2 MG/ML
4 INJECTION INTRAMUSCULAR; INTRAVENOUS EVERY 6 HOURS PRN
Status: DISCONTINUED | OUTPATIENT
Start: 2022-04-14 | End: 2022-04-16 | Stop reason: HOSPADM

## 2022-04-14 RX ORDER — OXYCODONE HYDROCHLORIDE 5 MG/1
5 TABLET ORAL EVERY 4 HOURS PRN
Status: DISCONTINUED | OUTPATIENT
Start: 2022-04-14 | End: 2022-04-16 | Stop reason: HOSPADM

## 2022-04-14 RX ORDER — CEFAZOLIN SODIUM 2 G/50ML
2000 SOLUTION INTRAVENOUS
Status: COMPLETED | OUTPATIENT
Start: 2022-04-14 | End: 2022-04-14

## 2022-04-14 RX ORDER — ONDANSETRON 2 MG/ML
INJECTION INTRAMUSCULAR; INTRAVENOUS PRN
Status: DISCONTINUED | OUTPATIENT
Start: 2022-04-14 | End: 2022-04-14 | Stop reason: SDUPTHER

## 2022-04-14 RX ORDER — DEXTROSE MONOHYDRATE 25 G/50ML
12.5 INJECTION, SOLUTION INTRAVENOUS PRN
Status: DISCONTINUED | OUTPATIENT
Start: 2022-04-14 | End: 2022-04-14

## 2022-04-14 RX ORDER — DEXAMETHASONE SODIUM PHOSPHATE 10 MG/ML
INJECTION, SOLUTION INTRAMUSCULAR; INTRAVENOUS PRN
Status: DISCONTINUED | OUTPATIENT
Start: 2022-04-14 | End: 2022-04-14 | Stop reason: SDUPTHER

## 2022-04-14 RX ADMIN — MEPERIDINE HYDROCHLORIDE 25 MG: 25 INJECTION, SOLUTION INTRAMUSCULAR; INTRAVENOUS; SUBCUTANEOUS at 17:46

## 2022-04-14 RX ADMIN — FENTANYL CITRATE 50 MCG: 50 INJECTION, SOLUTION INTRAMUSCULAR; INTRAVENOUS at 16:12

## 2022-04-14 RX ADMIN — FENTANYL CITRATE 50 MCG: 50 INJECTION, SOLUTION INTRAMUSCULAR; INTRAVENOUS at 14:03

## 2022-04-14 RX ADMIN — SODIUM CHLORIDE: 9 INJECTION, SOLUTION INTRAVENOUS at 13:56

## 2022-04-14 RX ADMIN — METHOCARBAMOL 1000 MG: 100 INJECTION, SOLUTION INTRAMUSCULAR; INTRAVENOUS at 17:10

## 2022-04-14 RX ADMIN — MORPHINE SULFATE 2 MG: 2 INJECTION, SOLUTION INTRAMUSCULAR; INTRAVENOUS at 23:03

## 2022-04-14 RX ADMIN — FAMOTIDINE 20 MG: 20 TABLET, FILM COATED ORAL at 20:04

## 2022-04-14 RX ADMIN — FENTANYL CITRATE 50 MCG: 50 INJECTION, SOLUTION INTRAMUSCULAR; INTRAVENOUS at 14:29

## 2022-04-14 RX ADMIN — FENTANYL CITRATE 100 MCG: 50 INJECTION, SOLUTION INTRAMUSCULAR; INTRAVENOUS at 13:08

## 2022-04-14 RX ADMIN — PHENYLEPHRINE HYDROCHLORIDE 100 MCG: 10 INJECTION INTRAVENOUS at 13:51

## 2022-04-14 RX ADMIN — FENTANYL CITRATE 50 MCG: 50 INJECTION, SOLUTION INTRAMUSCULAR; INTRAVENOUS at 14:52

## 2022-04-14 RX ADMIN — ROCURONIUM BROMIDE 20 MG: 10 INJECTION INTRAVENOUS at 13:46

## 2022-04-14 RX ADMIN — MORPHINE SULFATE 2 MG: 2 INJECTION, SOLUTION INTRAMUSCULAR; INTRAVENOUS at 20:04

## 2022-04-14 RX ADMIN — FENTANYL CITRATE 25 MCG: 50 INJECTION, SOLUTION INTRAMUSCULAR; INTRAVENOUS at 18:17

## 2022-04-14 RX ADMIN — Medication 3 MG: at 16:48

## 2022-04-14 RX ADMIN — Medication 10 ML: at 19:51

## 2022-04-14 RX ADMIN — DEXAMETHASONE SODIUM PHOSPHATE 10 MG: 10 INJECTION, SOLUTION INTRAMUSCULAR; INTRAVENOUS at 13:14

## 2022-04-14 RX ADMIN — FENTANYL CITRATE 25 MCG: 50 INJECTION INTRAMUSCULAR; INTRAVENOUS at 18:17

## 2022-04-14 RX ADMIN — ROCURONIUM BROMIDE 20 MG: 10 INJECTION INTRAVENOUS at 14:45

## 2022-04-14 RX ADMIN — HYDROMORPHONE HYDROCHLORIDE 0.5 MG: 1 INJECTION, SOLUTION INTRAMUSCULAR; INTRAVENOUS; SUBCUTANEOUS at 17:32

## 2022-04-14 RX ADMIN — CEFAZOLIN SODIUM 2000 MG: 2 SOLUTION INTRAVENOUS at 12:56

## 2022-04-14 RX ADMIN — PROPOFOL 190 MG: 10 INJECTION, EMULSION INTRAVENOUS at 13:08

## 2022-04-14 RX ADMIN — ROCURONIUM BROMIDE 50 MG: 10 INJECTION INTRAVENOUS at 13:08

## 2022-04-14 RX ADMIN — Medication 0.6 MG: at 16:48

## 2022-04-14 RX ADMIN — MIDAZOLAM 2 MG: 1 INJECTION INTRAMUSCULAR; INTRAVENOUS at 12:56

## 2022-04-14 RX ADMIN — FENTANYL CITRATE 50 MCG: 50 INJECTION, SOLUTION INTRAMUSCULAR; INTRAVENOUS at 13:23

## 2022-04-14 RX ADMIN — SODIUM CHLORIDE, POTASSIUM CHLORIDE, SODIUM LACTATE AND CALCIUM CHLORIDE: 600; 310; 30; 20 INJECTION, SOLUTION INTRAVENOUS at 20:06

## 2022-04-14 RX ADMIN — INSULIN LISPRO 6 UNITS: 100 INJECTION, SOLUTION INTRAVENOUS; SUBCUTANEOUS at 20:07

## 2022-04-14 RX ADMIN — PHENYLEPHRINE HYDROCHLORIDE 100 MCG: 10 INJECTION INTRAVENOUS at 13:43

## 2022-04-14 RX ADMIN — SODIUM CHLORIDE: 9 INJECTION, SOLUTION INTRAVENOUS at 16:20

## 2022-04-14 RX ADMIN — ROCURONIUM BROMIDE 10 MG: 10 INJECTION INTRAVENOUS at 14:23

## 2022-04-14 RX ADMIN — MEPERIDINE HYDROCHLORIDE 25 MG: 25 INJECTION INTRAMUSCULAR; INTRAVENOUS; SUBCUTANEOUS at 17:46

## 2022-04-14 RX ADMIN — INSULIN GLARGINE 24 UNITS: 100 INJECTION, SOLUTION SUBCUTANEOUS at 20:07

## 2022-04-14 RX ADMIN — ONDANSETRON 4 MG: 2 INJECTION INTRAMUSCULAR; INTRAVENOUS at 16:36

## 2022-04-14 RX ADMIN — LIDOCAINE HYDROCHLORIDE 100 MG: 20 INJECTION, SOLUTION INTRAVENOUS at 13:08

## 2022-04-14 RX ADMIN — ACETAMINOPHEN 650 MG: 325 TABLET ORAL at 20:03

## 2022-04-14 RX ADMIN — HYDROMORPHONE HYDROCHLORIDE 0.5 MG: 1 INJECTION, SOLUTION INTRAMUSCULAR; INTRAVENOUS; SUBCUTANEOUS at 17:40

## 2022-04-14 RX ADMIN — FENTANYL CITRATE 50 MCG: 50 INJECTION, SOLUTION INTRAMUSCULAR; INTRAVENOUS at 16:48

## 2022-04-14 RX ADMIN — ROCURONIUM BROMIDE 10 MG: 10 INJECTION INTRAVENOUS at 16:27

## 2022-04-14 RX ADMIN — SODIUM CHLORIDE: 9 INJECTION, SOLUTION INTRAVENOUS at 12:53

## 2022-04-14 RX ADMIN — SODIUM CHLORIDE: 9 INJECTION, SOLUTION INTRAVENOUS at 12:40

## 2022-04-14 RX ADMIN — GABAPENTIN 300 MG: 300 CAPSULE ORAL at 20:04

## 2022-04-14 ASSESSMENT — PULMONARY FUNCTION TESTS
PIF_VALUE: 34
PIF_VALUE: 33
PIF_VALUE: 34
PIF_VALUE: 18
PIF_VALUE: 34
PIF_VALUE: 30
PIF_VALUE: 34
PIF_VALUE: 30
PIF_VALUE: 4
PIF_VALUE: 34
PIF_VALUE: 33
PIF_VALUE: 32
PIF_VALUE: 32
PIF_VALUE: 30
PIF_VALUE: 33
PIF_VALUE: 34
PIF_VALUE: 29
PIF_VALUE: 32
PIF_VALUE: 35
PIF_VALUE: 35
PIF_VALUE: 17
PIF_VALUE: 34
PIF_VALUE: 34
PIF_VALUE: 33
PIF_VALUE: 34
PIF_VALUE: 15
PIF_VALUE: 32
PIF_VALUE: 31
PIF_VALUE: 34
PIF_VALUE: 33
PIF_VALUE: 33
PIF_VALUE: 35
PIF_VALUE: 30
PIF_VALUE: 18
PIF_VALUE: 34
PIF_VALUE: 33
PIF_VALUE: 30
PIF_VALUE: 34
PIF_VALUE: 32
PIF_VALUE: 35
PIF_VALUE: 34
PIF_VALUE: 30
PIF_VALUE: 35
PIF_VALUE: 2
PIF_VALUE: 31
PIF_VALUE: 34
PIF_VALUE: 18
PIF_VALUE: 12
PIF_VALUE: 29
PIF_VALUE: 35
PIF_VALUE: 34
PIF_VALUE: 33
PIF_VALUE: 35
PIF_VALUE: 33
PIF_VALUE: 29
PIF_VALUE: 29
PIF_VALUE: 19
PIF_VALUE: 34
PIF_VALUE: 35
PIF_VALUE: 34
PIF_VALUE: 34
PIF_VALUE: 30
PIF_VALUE: 33
PIF_VALUE: 35
PIF_VALUE: 34
PIF_VALUE: 21
PIF_VALUE: 34
PIF_VALUE: 34
PIF_VALUE: 33
PIF_VALUE: 11
PIF_VALUE: 34
PIF_VALUE: 33
PIF_VALUE: 34
PIF_VALUE: 35
PIF_VALUE: 21
PIF_VALUE: 34
PIF_VALUE: 18
PIF_VALUE: 33
PIF_VALUE: 32
PIF_VALUE: 32
PIF_VALUE: 35
PIF_VALUE: 34
PIF_VALUE: 33
PIF_VALUE: 30
PIF_VALUE: 31
PIF_VALUE: 34
PIF_VALUE: 33
PIF_VALUE: 30
PIF_VALUE: 32
PIF_VALUE: 34
PIF_VALUE: 22
PIF_VALUE: 33
PIF_VALUE: 33
PIF_VALUE: 29
PIF_VALUE: 35
PIF_VALUE: 8
PIF_VALUE: 35
PIF_VALUE: 34
PIF_VALUE: 30
PIF_VALUE: 34
PIF_VALUE: 35
PIF_VALUE: 30
PIF_VALUE: 30
PIF_VALUE: 34
PIF_VALUE: 17
PIF_VALUE: 35
PIF_VALUE: 34
PIF_VALUE: 6
PIF_VALUE: 30
PIF_VALUE: 18
PIF_VALUE: 35
PIF_VALUE: 34
PIF_VALUE: 31
PIF_VALUE: 35
PIF_VALUE: 34
PIF_VALUE: 30
PIF_VALUE: 34
PIF_VALUE: 17
PIF_VALUE: 36
PIF_VALUE: 34
PIF_VALUE: 35
PIF_VALUE: 35
PIF_VALUE: 34
PIF_VALUE: 35
PIF_VALUE: 32
PIF_VALUE: 33
PIF_VALUE: 31
PIF_VALUE: 30
PIF_VALUE: 33
PIF_VALUE: 35
PIF_VALUE: 35
PIF_VALUE: 20
PIF_VALUE: 35
PIF_VALUE: 34
PIF_VALUE: 2
PIF_VALUE: 13
PIF_VALUE: 32
PIF_VALUE: 32
PIF_VALUE: 21
PIF_VALUE: 29
PIF_VALUE: 4
PIF_VALUE: 30
PIF_VALUE: 15
PIF_VALUE: 34
PIF_VALUE: 34
PIF_VALUE: 16
PIF_VALUE: 33
PIF_VALUE: 34
PIF_VALUE: 33
PIF_VALUE: 32
PIF_VALUE: 32
PIF_VALUE: 35
PIF_VALUE: 33
PIF_VALUE: 22
PIF_VALUE: 34
PIF_VALUE: 34
PIF_VALUE: 35
PIF_VALUE: 34
PIF_VALUE: 30
PIF_VALUE: 33
PIF_VALUE: 34
PIF_VALUE: 2
PIF_VALUE: 34
PIF_VALUE: 33
PIF_VALUE: 34
PIF_VALUE: 18
PIF_VALUE: 19
PIF_VALUE: 31
PIF_VALUE: 31
PIF_VALUE: 24
PIF_VALUE: 33
PIF_VALUE: 0
PIF_VALUE: 34
PIF_VALUE: 35
PIF_VALUE: 35
PIF_VALUE: 33
PIF_VALUE: 33
PIF_VALUE: 34
PIF_VALUE: 17
PIF_VALUE: 34
PIF_VALUE: 35
PIF_VALUE: 35
PIF_VALUE: 25
PIF_VALUE: 33
PIF_VALUE: 33
PIF_VALUE: 35
PIF_VALUE: 34
PIF_VALUE: 35
PIF_VALUE: 36
PIF_VALUE: 33
PIF_VALUE: 32
PIF_VALUE: 35
PIF_VALUE: 34
PIF_VALUE: 35
PIF_VALUE: 1
PIF_VALUE: 32
PIF_VALUE: 35
PIF_VALUE: 30
PIF_VALUE: 34
PIF_VALUE: 35
PIF_VALUE: 32
PIF_VALUE: 18
PIF_VALUE: 29
PIF_VALUE: 33
PIF_VALUE: 17
PIF_VALUE: 21
PIF_VALUE: 15
PIF_VALUE: 34
PIF_VALUE: 20
PIF_VALUE: 33
PIF_VALUE: 34
PIF_VALUE: 35
PIF_VALUE: 29
PIF_VALUE: 17
PIF_VALUE: 35
PIF_VALUE: 33
PIF_VALUE: 33
PIF_VALUE: 17
PIF_VALUE: 33
PIF_VALUE: 9
PIF_VALUE: 0
PIF_VALUE: 35
PIF_VALUE: 35
PIF_VALUE: 32
PIF_VALUE: 34
PIF_VALUE: 35
PIF_VALUE: 4
PIF_VALUE: 34
PIF_VALUE: 0
PIF_VALUE: 33
PIF_VALUE: 34

## 2022-04-14 ASSESSMENT — PAIN DESCRIPTION - ORIENTATION
ORIENTATION: RIGHT;LEFT
ORIENTATION: RIGHT;LEFT
ORIENTATION: MID
ORIENTATION: MID

## 2022-04-14 ASSESSMENT — PAIN - FUNCTIONAL ASSESSMENT
PAIN_FUNCTIONAL_ASSESSMENT: 0-10
PAIN_FUNCTIONAL_ASSESSMENT: ACTIVITIES ARE NOT PREVENTED

## 2022-04-14 ASSESSMENT — PAIN DESCRIPTION - PROGRESSION
CLINICAL_PROGRESSION: GRADUALLY IMPROVING

## 2022-04-14 ASSESSMENT — PAIN DESCRIPTION - PAIN TYPE
TYPE: SURGICAL PAIN

## 2022-04-14 ASSESSMENT — PAIN DESCRIPTION - FREQUENCY
FREQUENCY: INTERMITTENT

## 2022-04-14 ASSESSMENT — PAIN SCALES - GENERAL
PAINLEVEL_OUTOF10: 3
PAINLEVEL_OUTOF10: 8
PAINLEVEL_OUTOF10: 9
PAINLEVEL_OUTOF10: 9
PAINLEVEL_OUTOF10: 8
PAINLEVEL_OUTOF10: 3
PAINLEVEL_OUTOF10: 8
PAINLEVEL_OUTOF10: 6
PAINLEVEL_OUTOF10: 3

## 2022-04-14 ASSESSMENT — PAIN DESCRIPTION - DESCRIPTORS
DESCRIPTORS: ACHING;DISCOMFORT
DESCRIPTORS: ACHING
DESCRIPTORS: ACHING;DISCOMFORT

## 2022-04-14 ASSESSMENT — PAIN DESCRIPTION - ONSET
ONSET: ON-GOING

## 2022-04-14 ASSESSMENT — PAIN DESCRIPTION - LOCATION
LOCATION: ABDOMEN

## 2022-04-14 ASSESSMENT — LIFESTYLE VARIABLES: SMOKING_STATUS: 0

## 2022-04-14 NOTE — ANESTHESIA PRE PROCEDURE
Department of Anesthesiology  Preprocedure Note       Name:  Kaylah Espinoza   Age:  62 y.o.  :  1960                                          MRN:  [id number]         Date:  2022      Surgeon:  Dr. Karlo Guillen    Procedure: [address] [facility] (N/A )        Medications prior to admission:   Prior to Admission medications    Medication Sig Start Date End Date Taking?  Authorizing Provider   cetirizine (ZYRTEC) 10 MG tablet Take 10 mg by mouth daily    Historical Provider, MD   insulin lispro (HUMALOG) 100 UNIT/ML injection vial Use with insulin pump, max dose 110 units daily 22   Larry Ayala MD   Accu-Chek Multiclix Lancets MISC Check blood sugars 4x daily, pt has Medtronic insulin pump 22   Larry Ayala MD   blood glucose test strips (ACCU-CHEK GUIDE) strip Use to check blood sugars 4x daily, patient has Medtronic insulin pump 22   Larry Ayala MD   insulin lispro, 1 Unit Dial, (HUMALOG KWIKPEN) 100 UNIT/ML SOPN 10 units TID with meals plus mod dose ISS AC only 3/30/22   Larry Ayala MD   insulin glargine (LANTUS SOLOSTAR) 100 UNIT/ML injection pen Inject 24 Units into the skin 2 times daily 3/30/22 4/29/22  Larry Ayala MD   blood glucose test strips (ASCENSIA AUTODISC VI;ONE TOUCH ULTRA TEST VI) strip 1 each by In Vitro route 6 times daily Check blood sugar 4 times a day 3/30/22   Larry Ayala MD   vitamin D3 (CHOLECALCIFEROL) 25 MCG (1000 UT) TABS tablet Take 1 tablet by mouth daily 3/16/22   MARGAUX Méndez   Insulin Infusion Pump (MINIMED 770G INSULIN PUMP SYS) KIT To infuse insulin 3/16/22   MARGAUX Méndez   Continuous Blood Gluc Transmit (GUARDIAN LINK 3 TRANSMITTER) MISC To use with sensors 3/16/22   MARGAUX Méndez   Continuous Blood Gluc Sensor (GUARDIAN SENSOR 3) MISC To change every 7 days 3/16/22   MARGAUX Méndez lipase-protease-amylase (CREON) 16448-805933 units CPEP delayed release capsule Take 2 capsules by mouth 3 times daily (with meals) 10/5/21   Sasha Ronquillo III, MD   oxyCODONE-acetaminophen (PERCOCET) 7.5-325 MG per tablet Take 1 tablet by mouth every 8 hours as needed for Pain. Historical Provider, MD   gabapentin (NEURONTIN) 300 MG capsule Take 300 mg by mouth 2 times daily.     Historical Provider, MD   polyethylene glycol (GLYCOLAX) 17 g packet Take 17 g by mouth daily     Historical Provider, MD   pantoprazole (PROTONIX) 40 MG tablet Take 40 mg by mouth 2 times daily    Historical Provider, MD   sennosides-docusate sodium (SENOKOT-S) 8.6-50 MG tablet Take 2 tablets by mouth daily  Patient not taking: Reported on 9/16/2021 7/16/21   Jana Souza MD   acetaminophen (TYLENOL) 500 MG tablet Take 2 tablets by mouth every 6 hours  Patient taking differently: Take 650 mg by mouth every 6 hours as needed for Pain or Fever (mild pain temp > or equal 101F)  6/4/21   Fidel Prabhakar MD   albuterol (PROVENTIL) (2.5 MG/3ML) 0.083% nebulizer solution Take 3 mLs by nebulization 4 times daily  Patient not taking: Reported on 2/8/2022 5/19/21   Geoffrey Wei MD   budesonide (PULMICORT) 0.5 MG/2ML nebulizer suspension Take 4 mLs by nebulization 2 times daily  Patient not taking: Reported on 9/7/2021 5/19/21   Geoffrey Wei MD   guaiFENesin (ROBITUSSIN) 100 MG/5ML SOLN oral solution Take 15 mLs by mouth 3 times daily  Patient not taking: Reported on 9/7/2021 5/19/21   Geoffrey Wei MD   atorvastatin (LIPITOR) 20 MG tablet Take 1 tablet by mouth daily 5/20/21   Geoffrey Wei MD       Current medications:    Current Facility-Administered Medications   Medication Dose Route Frequency Provider Last Rate Last Admin    0.9 % sodium chloride infusion   IntraVENous Continuous Darek Sutton  mL/hr at 04/14/22 1240 New Bag at 04/14/22 1240    sodium chloride flush 0.9 % injection 10 mL  10 mL IntraVENous 2 times per day Cristina Escobar MD        sodium chloride flush 0.9 % injection 10 mL  10 mL IntraVENous PRN Cristina Escobar MD        0.9 % sodium chloride infusion  25 mL IntraVENous PRN Cristina Escobar MD        ceFAZolin (ANCEF) 2000 mg in dextrose 3 % 50 mL IVPB (duplex)  2,000 mg IntraVENous On Call to Tenzin Cazares MD        0.9 % sodium chloride infusion   IntraVENous Continuous Gladis Arizmendi MD           Allergies:     Allergies   Allergen Reactions    Iodine Swelling     Sea food    Metformin And Related Hives    Wheat Extract Swelling       Problem List:    Patient Active Problem List   Diagnosis Code    Mixed hyperlipidemia E78.2    Neck pain M54.2    Cervical radiculopathy at C7 M54.12    Essential hypertension I10    Chronic seasonal allergic rhinitis J30.2    Ventral hernia without obstruction or gangrene K43.9    Poorly controlled type 2 diabetes mellitus (HCC) E11.65    Chronic bilateral low back pain with right-sided sciatica M54.41, G89.29    Gynecomastia N62    Panic disorder F41.0    Cubital tunnel syndrome on right G56.21    Right carpal tunnel syndrome G56.01    Spondylosis of lumbar spine M47.816    Bell's palsy G51.0    Other bursal cyst, left elbow M71.322    Sacral radiculitis M54.18    Lumbar radiculitis M54.16    Spondylosis of cervical region without myelopathy or radiculopathy M47.812    Cervical facet joint syndrome M47.812    Cervical disc disorder M50.90    Lumbar facet arthropathy M47.816    Spinal stenosis of lumbar region with neurogenic claudication M48.062    Lumbar disc disorder M51.9    Pancreatic neoplasm D49.0    IPMN (intraductal papillary mucinous neoplasm) D49.0    Pancreatic duct leak K86.89    Intra-abdominal infection B99.9    Abdominal pain with fever after surgery R10.9, G89.18, R50.82    Sepsis (HCC) A41.9    Acute respiratory failure with hypoxia (HCC) J96.01    Intra-abdominal abscess (HCC) K65.1    Severe protein-calorie malnutrition ([address] E43    Duodenal ulcer disease K26.9       Past Medical History:        Diagnosis Date    Allergic rhinitis     Anxiety     Arthritis     Back pain     5th finger & ring finger on right hand is numb    Bell's palsy     NEW ONSET 3-     Cancer ([address]     pancreatic CA- removed surgically - 2021    Cervical disc disorder     cervical radiculopathy    Hyperlipidemia     Hypertension     Intra-abdominal abscess (HCC)     recurrent    Lumbago     Neuropathy     toes numb    Obesity     Polyneuropathy due to type 2 diabetes mellitus ([address]     Poorly controlled type 2 diabetes mellitus with neuropathy ([address] 5/8/2018    Radiculitis, lumbosacral     Sacral radiculitis 2/27/2019    Sepsis ([address]     Spondylosis of lumbar spine 3/9/2019    Advanced arthritis and degenerative disc disease by MRI 3/2019    Ventral hernia        Past Surgical History:        Procedure Laterality Date    ANESTHESIA NERVE BLOCK Bilateral 07/11/2019    BILATERAL L4-5 TRANSFORAMINAL EPIDURAL STEROID INJECTION [facility] Bilateral 08/01/2019    BILATERAL MEDIAL BRANCH BLOCK L4-5 AND L5-S1 [facility] Bilateral 08/15/2019    BILATERAL MEDIAL BRANCH BLOCK L4 - 5 AND L5 - S1 performed by Tia Arroyo DO at [facility] Right 03/29/2019    right wrist carpal tunnel release and right elbow cubital tunnel release    CARPAL TUNNEL RELEASE Right 03/29/2019    RIGHT WRIST CARPAL TUNNEL RELEASE AND RIGHT ELBOW CUBITAL TUNNEL RELEASE performed by Antonette Shaffer DO at [address] [place] [address]      at age 40 polyps    CYST REMOVAL Right     EPIDURAL STEROID INJECTION N/A 10/17/2019    LUMBAR EPIDURAL STEROID INJECTION UNDER FLUOROSCOPIC GUIDANCE AT L2-L3 WITHOUT SEDATION performed by Ashley Truong MD at [address] [facility] ERCP N/A 05/17/2021    ERCP ENDOSCOPIC RETROGRADE CHOLANGIOPANCREATOGRAPHY SPHINCTER/PAPILLOTOMY performed by Angie Petty MD at [address] [facility] ERCP N/A 05/17/2021    ERCP STENT INSERTION performed by Angie Petty MD at [address] [facility] ERCP N/A 6/4/2021    ERCP STENT INSERTION performed by Angie Petty MD at [address] [facility] ERCP N/A 7/2/2021    ERCP STENT REMOVAL performed by Angie Petty MD at [address] [facility] ERCP N/A 7/2/2021    ERCP STONE REMOVAL performed by Angie Petty MD at [address] [facility] ERCP N/A 7/2/2021    ERCP STENT INSERTION performed by Angie Petty MD at [address] [facility] Bilateral     groin and umbilical    HERNIA REPAIR N/A 12/13/2018    ROBOTIC ASSISTED LAPAROSCOPIC PRIMARY REPAIR OF RECURRENT VENTRAL HERNIA  REPAIR performed by Myla Cueto MD at [facility] 07/11/2019    L4-5 transforaminal     NERVE BLOCK Bilateral 08/01/2019    medial branch block    NERVE BLOCK Bilateral 08/15/2019    bilateral medial branch block L4-S1    NERVE BLOCK  10/17/2019    lumbar epidural    PANCREATECTOMY N/A 05/12/2021    LAPAROSCOPIC ROBOTIC DISTAL PANCREATECTOMY AND SPLENECTOMY AND CHOLECYSTECTOMY, INTRA-OPERATIVE ULTRASOUND, TRANSITIONED TO OPEN -- EPIDURAL performed by Brynn Mcnamara MD at [place] [address]  06/05/2018    C5-7 fusion at [facility] in [address] [place] [address] Pkwy 01/08/2021    EGD W/EUS FNA performed by Shahnaz Willett DO at [address] [place] [address] 01/08/2021    EGD DIAGNOSTIC ONLY performed by Shahnaz Willett DO at [address] [place] [address],Third Floor N/A 7/30/2021    EGD ESOPHAGOGASTRODUODENOSCOPY ULTRASOUND performed by Angie Petty MD at [facility],Third Floor N/A 7/30/2021    EUS FOREIGN BODY REMOVAL performed by Dorene Ibrahim Estrellita Blankenship MD at [address] [facility] N/A 9/2/2021    EGD ESOPHAGOGASTRODUODENOSCOPY performed by Simona Blackmon MD at [address] History:    Social History     Tobacco Use    Smoking status: Never Smoker    Smokeless tobacco: Never Used   Substance Use Topics    Alcohol use: Yes     Comment: occasional                                Counseling given: Not Answered      Vital Signs (Current):   Vitals:    04/14/22 1224   BP: (!) 140/74   Pulse: 87   Resp: 18   Temp: 36.7 °C (98 °F)   TempSrc: Infrared   SpO2: 96%   Weight: 214 lb (97.1 kg)   Height: 5' 8\" (1.727 m)                                              BP Readings from Last 3 Encounters:   04/14/22 (!) 140/74   04/12/22 (!) 150/70   03/15/22 (!) 150/87       NPO Status: Time of last liquid consumption: 0800 (sip with meds)                        Time of last solid consumption: 2330                        Date of last liquid consumption: 04/14/22                        Date of last solid food consumption: 04/13/22    BMI:   Wt Readings from Last 3 Encounters:   04/14/22 214 lb (97.1 kg)   04/12/22 215 lb (97.5 kg)   03/15/22 214 lb (97.1 kg)     Body mass index is 32.54 kg/m². CBC:   Lab Results   Component Value Date    WBC 8.0 04/12/2022    RBC 5.80 04/12/2022    HGB 13.6 04/12/2022    HCT 44.1 04/12/2022    MCV 76.0 04/12/2022    RDW 17.2 04/12/2022     04/12/2022       CMP:   Lab Results   Component Value Date     04/12/2022    K 4.1 04/12/2022    CL 98 04/12/2022    CO2 22 04/12/2022    BUN 22 04/12/2022    CREATININE 0.9 04/12/2022    GFRAA >60 04/12/2022    LABGLOM >60 04/12/2022    GLUCOSE 213 04/12/2022    PROT 8.2 04/12/2022    CALCIUM 9.7 04/12/2022    BILITOT 0.2 04/12/2022    ALKPHOS 59 04/12/2022    AST 27 04/12/2022    ALT 27 04/12/2022       POC Tests: No results for input(s): POCGLU, POCNA, POCK, POCCL, POCBUN, POCHEMO, POCHCT in the last 72 hours.     Coags:   Lab Results   Component Value Date    PROTIME 13.5 09/01/2021    INR 1.3 09/01/2021       HCG (If Applicable): No results found for: PREGTESTUR, PREGSERUM, HCG, HCGQUANT     ABGs:   Lab Results   Component Value Date    PO2ART 118.2 05/12/2021    MHN2GZF 56.0 05/12/2021    RPQ4UCG 22.9 05/12/2021        Type & Screen (If Applicable):  No results found for: LABABO, LABRH    Drug/Infectious Status (If Applicable):  No results found for: HIV, HEPCAB    COVID-19 Screening (If Applicable):   Lab Results   Component Value Date    COVID19 Not Detected 09/01/2021    COVID19 Not Detected 05/06/2021           Anesthesia Evaluation  Patient summary reviewed and Nursing notes reviewed no history of anesthetic complications:   Airway: Mallampati: III  TM distance: <3 FB   Neck ROM: full  Mouth opening: > = 3 FB Dental:          Pulmonary: breath sounds clear to auscultation  (+) sleep apnea: on noncompliant,  asthma:     (-) not a current smoker                           Cardiovascular:    (+) hypertension:, hyperlipidemia    (-) past MI, CAD and CABG/stent    ECG reviewed  Rhythm: regular  Rate: abnormal           Beta Blocker:  Not on Beta Blocker         Neuro/Psych:   (+) neuromuscular disease (Polyneuropathy):, psychiatric history:depression/anxiety              ROS comment: Bell's palsy - March 2019 (left face); No issues currently  OA, DJD, DDD, cervical radiculopathy, chronic low back pain with right sciatica  Lower extremity neuropathy    Right hand - ring finger and 5th finger are numb      Cervical radiculopathy at C7 GI/Hepatic/Renal:   (+) GERD: well controlled,          ROS comment: status post distal pancreatectomy, splenectomy, cholecystectomy on 5/12. ERCP with pancreatic stent placement on 6/4,   s/p IR drain placement x2 6/30    .    Endo/Other:    (+) DiabetesType II DM, poorly controlled, using insulin, blood dyscrasia: anemia, arthritis: OA., malignancy/cancer (Intraductal papillary mucinous neoplasm). Abdominal:             Vascular: negative vascular ROS. Other Findings:               Anesthesia Plan      general     ASA 3       Induction: intravenous. MIPS: Postoperative opioids intended, Prophylactic antiemetics administered and Postoperative trial extubation. Anesthetic plan and risks discussed with patient. Plan discussed with attending.                   MARGAUX Hoyt - JOE   4/14/2022

## 2022-04-14 NOTE — H&P
General Surgery History and Physical  [facility]    Patient's Name/Date of Birth: Kaylah Espinoza / 1960    Date: April 14, 2022     Surgeon: Abelino Bellamy M.D.    PCP: Akin Diaz DO     Chief Complaint: Abdominal hernia    HPI:   Kaylah Espinoza is a 62 y.o. male who presents for evaluation of incisional hernia. Timing is constant, radiation to midline, alleviated by none and started months ago and severity is 7/10. he has history of multiple abdominal surgeries. he has no history of previous repairs of the hernia. Denies nausea, vomiting, fever, chills, SOB, chest pain, diarrheal constipation. No history of abnormal colonoscopy. They are a nonsmoker. Patient with a complicated history of a resection of pancreatic tail mass and splenectomy. Initially started robotically converted to open and subsequent complications including a pancreatic fistula and postoperative abscess with drains. His surgery was done in May 4595 he had complications extending all the way out October 2021. Has been doing well since early September in which point he had a pancreatic stent placed in EGD with ulcers. He has had ongoing problems with anemia since then 2. He is ambulatory independently but states he still finding improvements on a weekly basis. He denies rapid loss weight loss or weight gain. Denies any nausea or vomiting. He states \"protein shake saved my life\". He still heavily reliable on high calorie beverages in order to maintain his weight. States he is having increasing pain at his midline incisional hernia states when he coughs or sneezes he has several days of pain. Denies any shortness of breath or chest pain. Denies any change in his bowel habits no bloody stools diarrhea or constipation.     Patient Active Problem List   Diagnosis    Mixed hyperlipidemia    Neck pain    Cervical radiculopathy at C7    Essential hypertension    Chronic seasonal allergic rhinitis    Ventral hernia without obstruction or gangrene    Poorly controlled type 2 diabetes mellitus (HCC)    Chronic bilateral low back pain with right-sided sciatica    Gynecomastia    Panic disorder    Cubital tunnel syndrome on right    Right carpal tunnel syndrome    Spondylosis of lumbar spine    Bell's palsy    Other bursal cyst, left elbow    Sacral radiculitis    Lumbar radiculitis    Spondylosis of cervical region without myelopathy or radiculopathy    Cervical facet joint syndrome    Cervical disc disorder    Lumbar facet arthropathy    Spinal stenosis of lumbar region with neurogenic claudication    Lumbar disc disorder    Pancreatic neoplasm    IPMN (intraductal papillary mucinous neoplasm)    Pancreatic duct leak    Intra-abdominal infection    Abdominal pain with fever after surgery    Sepsis ([address]    Acute respiratory failure with hypoxia (HCC)    Intra-abdominal abscess (HCC)    Severe protein-calorie malnutrition (HCC)    Duodenal ulcer disease       Past Medical History:   Diagnosis Date    Allergic rhinitis     Anxiety     Arthritis     Back pain     5th finger & ring finger on right hand is numb    Bell's palsy     NEW ONSET 3-     Cancer ([address]     pancreatic CA- removed surgically - 2021    Cervical disc disorder     cervical radiculopathy    Hyperlipidemia     Hypertension     Intra-abdominal abscess (HCC)     recurrent    Lumbago     Neuropathy     toes numb    Obesity     Polyneuropathy due to type 2 diabetes mellitus ([address]     Poorly controlled type 2 diabetes mellitus with neuropathy ([address] 5/8/2018    Radiculitis, lumbosacral     Sacral radiculitis 2/27/2019    Sepsis ([address]     Spondylosis of lumbar spine 3/9/2019    Advanced arthritis and degenerative disc disease by MRI 3/2019    Ventral hernia        Past Surgical History:   Procedure Laterality Date    ANESTHESIA NERVE BLOCK Bilateral 07/11/2019    BILATERAL L4-5 TRANSFORAMINAL EPIDURAL STEROID INJECTION performed by Paxton Ames DO at [address] [facility] Bilateral 08/01/2019    BILATERAL MEDIAL BRANCH BLOCK L4-5 AND L5-S1 performed by Paxton Ames DO at  [facility] Bilateral 08/15/2019    BILATERAL MEDIAL BRANCH BLOCK L4 - 5 AND L5 - S1 performed by Paxton Ames DO at [facility] Right 03/29/2019    right wrist carpal tunnel release and right elbow cubital tunnel release    CARPAL TUNNEL RELEASE Right 03/29/2019    RIGHT WRIST CARPAL TUNNEL RELEASE AND RIGHT ELBOW CUBITAL TUNNEL RELEASE performed by Patricia Tam DO at [address] [place] [address]      at age 40 polyps    CYST REMOVAL Right     EPIDURAL STEROID INJECTION N/A 10/17/2019    LUMBAR EPIDURAL STEROID INJECTION UNDER FLUOROSCOPIC GUIDANCE AT L2-L3 WITHOUT SEDATION performed by Norm Reynaga MD at [facility] ERCP N/A 05/17/2021    ERCP ENDOSCOPIC RETROGRADE CHOLANGIOPANCREATOGRAPHY SPHINCTER/PAPILLOTOMY performed by Christiano Fajardo MD at [address] [facility] [address] ERCP N/A 05/17/2021    ERCP STENT INSERTION performed by Christiano Fajardo MD at [address] ERCP N/A 6/4/2021    ERCP STENT INSERTION performed by Christiano Fajardo MD at [address] ERCP N/A 7/2/2021    ERCP STENT REMOVAL performed by Christiano Fajardo MD at [address] ERCP N/A 7/2/2021    ERCP STONE REMOVAL performed by Christiano Fajardo MD at [address] ERCP N/A 7/2/2021    ERCP STENT INSERTION performed by Christiano Fajardo MD at [facility] Bilateral     groin and umbilical    HERNIA REPAIR N/A 12/13/2018    ROBOTIC ASSISTED LAPAROSCOPIC PRIMARY REPAIR OF RECURRENT VENTRAL HERNIA  REPAIR performed by James Limon MD at [address] [facility] Bilateral 07/11/2019    L4-5 transforaminal     NERVE BLOCK Bilateral 08/01/2019    medial branch block    NERVE BLOCK Bilateral 08/15/2019    bilateral medial branch block L4-S1    NERVE BLOCK  10/17/2019    lumbar epidural    PANCREATECTOMY N/A 05/12/2021    LAPAROSCOPIC ROBOTIC DISTAL PANCREATECTOMY AND SPLENECTOMY AND CHOLECYSTECTOMY, INTRA-OPERATIVE ULTRASOUND, TRANSITIONED TO OPEN -- EPIDURAL performed by Karen Kumar MD at [place] [address] SURGERY  06/05/2018    C5-7 fusion at [facility] in [address] [place] [address] Pkwy 01/08/2021    EGD W/EUS FNA performed by Cynthia Rubin DO at [address] [place] [address] Road 01/08/2021    EGD DIAGNOSTIC ONLY performed by Cynthia Rubin DO at [address] [place] [address] Road 7/30/2021    EGD ESOPHAGOGASTRODUODENOSCOPY ULTRASOUND performed by Lilian Ortega MD at [address] [place] [address] N/A 7/30/2021    EUS FOREIGN BODY REMOVAL performed by Lilian Ortega MD at [address] [place] [address] N/A 9/2/2021    EGD ESOPHAGOGASTRODUODENOSCOPY performed by Karen Kumar MD at [facility] ENDOSCOPY       Allergies   Allergen Reactions    Iodine Swelling     Sea food    Metformin And Related Hives    Wheat Extract Swelling       No current facility-administered medications on file prior to encounter. Current Outpatient Medications on File Prior to Encounter   Medication Sig Dispense Refill    lipase-protease-amylase (CREON) 50128-882099 units CPEP delayed release capsule Take 2 capsules by mouth 3 times daily (with meals) 250 capsule 5    oxyCODONE-acetaminophen (PERCOCET) 7.5-325 MG per tablet Take 1 tablet by mouth every 8 hours as needed for Pain.  gabapentin (NEURONTIN) 300 MG capsule Take 300 mg by mouth 2 times daily.       polyethylene glycol (GLYCOLAX) 17 g packet Take 17 g by mouth daily       pantoprazole (PROTONIX) 40 MG tablet Take 40 mg by mouth 2 times daily      sennosides-docusate sodium (SENOKOT-S) 8.6-50 MG tablet Take 2 tablets by mouth daily (Patient not taking: Reported on 9/16/2021)      acetaminophen (TYLENOL) 500 MG tablet Take 2 tablets by mouth every 6 hours (Patient taking differently: Take 650 mg by mouth every 6 hours as needed for Pain or Fever (mild pain temp > or equal 101F) ) 120 tablet 0    albuterol (PROVENTIL) (2.5 MG/3ML) 0.083% nebulizer solution Take 3 mLs by nebulization 4 times daily (Patient not taking: Reported on 2/8/2022) 120 each 3    budesonide (PULMICORT) 0.5 MG/2ML nebulizer suspension Take 4 mLs by nebulization 2 times daily (Patient not taking: Reported on 9/7/2021) 60 ampule 3    guaiFENesin (ROBITUSSIN) 100 MG/5ML SOLN oral solution Take 15 mLs by mouth 3 times daily (Patient not taking: Reported on 9/7/2021) 1200 mL 0    atorvastatin (LIPITOR) 20 MG tablet Take 1 tablet by mouth daily 30 tablet 3         The patient has a family history that is negative for severe cardiovascular or respiratory issues, negative for reaction to anesthesia. Time spent reviewing past medical, surgical, social and family history, vitals, nursing assessment and images. No changes from above documented history.     Social History     Socioeconomic History    Marital status: Single     Spouse name: Not on file    Number of children: Not on file    Years of education: Not on file    Highest education level: Not on file   Occupational History    Not on file   Tobacco Use    Smoking status: Never Smoker    Smokeless tobacco: Never Used   Vaping Use    Vaping Use: Never used   Substance and Sexual Activity    Alcohol use: Yes     Comment: occasional    Drug use: Never    Sexual activity: Not on file   Other Topics Concern    Not on file   Social History Narrative    Not on file     Social Determinants of Health     Financial Resource Strain:     Difficulty of Paying Living Expenses: Not on file   Food Insecurity:     Worried About Running Out of Food in the Last Year: Not on file    [address] in the Last Year: Not on file   Transportation Needs:     Lack of Transportation (Medical): Not on file    Lack of Transportation (Non-Medical): Not on file   Physical Activity:     Days of Exercise per Week: Not on file    Minutes of Exercise per Session: Not on file   Stress:     Feeling of Stress : Not on file   Social Connections:     Frequency of Communication with Friends and Family: Not on file    Frequency of Social Gatherings with Friends and Family: Not on file    Attends [employer] Services: Not on file    Active Member of [address] [place] [address] [employer] or Organizations: Not on file    Attends Club or Organization Meetings: Not on file    Marital Status: Not on file   Intimate Partner Violence:     Fear of Current or Ex-Partner: Not on file    Emotionally Abused: Not on file    Physically Abused: Not on file    Sexually Abused: Not on file   Housing Stability:     Unable to Pay for Housing in the Last Year: Not on file    Number of [place] in the Last Year: Not on file    Unstable Housing in the Last Year: Not on file       A complete 10 system review was performed and are otherwise negative unless mentioned in the above HPI. Specific negatives are listed below but may not include all those reviewed.     General ROS: negative obtundation, AMS  ENT ROS: negative rhinorrhea, epistaxis  Allergy and Immunology ROS: negative itchy/watery eyes or nasal congestion  Hematological and Lymphatic ROS: negative spontaneous bleeding or bruising  Endocrine ROS: negative  lethargy, mood swings, palpitations or polydipsia/polyuria  Respiratory ROS: negative sputum changes, stridor, tachypnea or wheezing  Cardiovascular ROS: negative for - loss of consciousness, murmur or orthopnea  Gastrointestinal ROS: negative for - hematochezia or hematemesis  Genito-Urinary ROS: negative for -  genital discharge or hematuria  Musculoskeletal ROS: negative for - focal weakness, gangrene  Psych/Neuro ROS: negative for - visual or auditory hallucinations, suicidal ideation    Physical exam:   There were no vitals taken for this visit. General appearance:  NAD, appears stated age  Head: NCAT, PERRLA, EOMI, red conjunctiva  Neck: supple, no masses, trachea midline  Lungs: Equal chest rise bilateral, no retractions, no wheezing  Heart: Reg rate  Abdomen: soft, large midline incisional hernia that is partially reducible, well-healed midline incision. Obese, at least 12 cm of diastases of the midline above the umbilicus. Skin; warm and dry, no cyanosis  Gu: no cva tenderness  Extremities: atraumatic, no focal motor deficits, no open wounds  Psych: No tremor, visual hallucinations        Radiology: I reviewed relevant abdominal imaging from this admission and that available in the EMR including CT abd/pel from Aug 2021.  My assessment is no evidence of incisional hernia on that CT        Assessment:  Mirian Mcfadden is a 62 y.o. male with massive midline incisional hernia, severe anemia with protein calorie malnutrition  Patient Active Problem List   Diagnosis    Mixed hyperlipidemia    Neck pain    Cervical radiculopathy at C7    Essential hypertension    Chronic seasonal allergic rhinitis    Ventral hernia without obstruction or gangrene    Poorly controlled type 2 diabetes mellitus (HCC)    Chronic bilateral low back pain with right-sided sciatica    Gynecomastia    Panic disorder    Cubital tunnel syndrome on right    Right carpal tunnel syndrome    Spondylosis of lumbar spine    Bell's palsy    Other bursal cyst, left elbow    Sacral radiculitis    Lumbar radiculitis    Spondylosis of cervical region without myelopathy or radiculopathy    Cervical facet joint syndrome    Cervical disc disorder    Lumbar facet arthropathy    Spinal stenosis of lumbar region with neurogenic claudication    Lumbar disc disorder    Pancreatic neoplasm    IPMN (intraductal papillary mucinous neoplasm)    Pancreatic duct leak    Intra-abdominal infection    Abdominal pain with fever after surgery    [address]    Acute respiratory failure with hypoxia (HCC)    Intra-abdominal abscess (HCC)    Severe protein-calorie malnutrition (HCC)    Duodenal ulcer disease         Plan:  OR for Laparoscopic robot assisted incisional hernia repair with mesh, poss component separation  Medical clearance  Check CBC, CMP, iron, folate due to anemia and malnutrition  I will discuss with HPV surgery regarding CT scan which I would need for operative planning. Plan would be delay as repair at least additional 3 months to see if he can improve his nutritional status and make sure that his anemia is maximized management as possible    Discussed the risk, benefits and alternatives of surgery including wound infections, bleeding, scar, seroma, mesh infection, mesh removal and migration and recurrent hernia formation and the risks of general anesthetic including MI, CVA, sudden death or reactions to anesthetic medications. The patient understands the risks and alternatives and the possibility of converting to an open procedure. All questions were answered to the patient's satisfaction and they freely signed the consent.          Madison Perez MD  11:18 AM  4/14/2022

## 2022-04-14 NOTE — OP NOTE
Roel Dooley MD, MS Frida Vargas  [id number]    DATE OF PROCEDURE:  4/14/2022    PREOPERATIVE DIAGNOSES:  1. Recurrent Incarcerated incisional hernia. 2.  Disruption of muscle closure. 3.  Intraabdominal adhesions. POSTOPERATIVE DIAGNOSES:  1. Recurrent Incarcerated incisional hernia. 2.  Disruption of muscle closure. 3.  Intraabdominal adhesions. PROCEDURE PERFORMED:    1. Laparoscopic robotic-assisted transversus abdominus release with repair of recurrent incisional hernia with mesh  2. Extensive lysis of adhesions  3. Left sided abdominal wall posterior component separation with myofascial flap mobilization and placement  4. Right sided abdominal wall posterior component separation with myofascial flap mobilization and placement    ANESTHESIA:  General endotracheal.    SURGEON:  Marily Carpenter MD    ASSISTANT: Dr Migdalia Marrero MD     COMPLICATIONS:  None. ESTIMATED BLOOD LOSS:  20 mL. FLUIDS:  Crystalloid. SPECIMEN:  None. Implant Name Type Inv. Item Serial No.  Lot No. LRB No. Used Action   MESH KYLEE [id number] INGUINAL POLYPR SQ L PORE MFIL SFT KNIT - [id number]  MESH KYLEE [id number] INGUINAL POLYPR SQ L PORE MFIL SFT KNIT  Tr Waldron [id number] N/A 1 Implanted         INDICATIONS:  This is a 62 y.o. patient with symptomatic recurrent incisional hernia. A CT scan showed a complete midline disruption with greater than 8 cm diastasis of the fascial edges and multiple areas of incarcerated bowel extending from above the xyphoid to inferior to the umbilicus. After being explained the risks, benefits, and alternatives of the procedure, the patient agreed to proceed. OPERATIVE PROCEDURE:  The patient was taken to the operating room, placed supine, administered general anesthesia and intubated. Once the airway was secured and they were adequately sedated, they was prepped and draped in normal sterile fashion.   A time-out was performed, confirming surgical site and the patient's name. The patient received preoperative antibiotics with in 30min prior to incision. We initially placed them supine, fractured the bed in order to separate the anterior superior iliac spine in the costal margin. We started on the right side for dissection and made an 8-mm incision just in the left upper quadrant at Cowan's point. Inserted a Veress needle, confirmed needle placement, insufflated to 15 mmHg. We removed the Veress needle and inserted an 8-mm robotic trocar. Inserted the camera to follow and inspected the abdomen. There was extensive adhesions in the midline to multiple hernia defects from the above the xiphoid all the way down to the suprapubic. We then inserted 2 more 8-mm trocars, one in the left lateral abdomen and one in the left lower quadrant. Under direct visualization, we docked the robot over the left side. We initially started our dissection, lysed adhesions for greater than an hour in order to expose hernia defect at the midline. There was a very large defect extending up above the xiphoid and several small hernia defects that extended anterior to the xiphoid process. The xiphoid process was posteriorly dislodged and was adhered to multiple intra-abdominal adhesions. Lysis of adhesions was extensive and there were multiple adhesions including bowel that was incarcerated in the hernia defects. Sharp dissection with scissors and minimal cautery was used to free the bowel. We were able to reduce them and without any injury to bowel. Meticulous dissection was necessary to gently reduce the large hernia defect at times even entering into the hernia with the camera to dissect bowel from the hernia sac. We then identified the midline defect, which extended approximately 21 cm in length and was almost 9 cm in width at its widest portion.   Most superior aspect of it extended above the xiphoid and again the xiphoid was floating posterior to the defect. there was obvious lack in the ability to close the midline without component muscle release. We initially started on the right lateral side, identifying the medial border at the right rectus muscle. We could immediately appreciate the midline defect was not amenable to repair with primary closure. Created a fascial defect and entered into the retromuscular plane behind the posterior sheath. We then dissected laterally, until we were to the lateral edge of the rectus. We then identified the perforating vessels to the rectus and preserved the branches of the epigastrics and perforating vessels. We moved 1cm medial to the linea semilunaris and incised the posterior lamellae of the posterior sheath to expose the transversalis muscle fibers. We identified the transversalis fascia and entered into the preperitonal space releasing the transversalis muscle from the fascial edges. We traversed across the posterior lamellar sheath of the transversalis and into the preperitoneal space. We then entered in the preperitoneal space and dissected all the way lateral to the sulcus if the retroperitoneum. We did this in order to create right sided myofascial flaps in order to gain enough laxity to close the midline defect anteriorly and approximate the posterior sheath excluding they cross and into abdominal organs from our field. We dissected all the way up to the costal margin, to and including visualization of the diaphragmatic fibers and then laterally all the way up to the sulcus to the white line of Toldt. We dissected all the way down in the inguinal canal, crossed the arcuate ligament and then into the pre-peritoneal plane in the right groin. Our dissection extended all the way to the midline and we completely exposed just posterior to the bladder. At this point, we were satisfied that we got adequate amount of release to extend the myofascial flap past the midline.       We then under direct visualization, inserted 3 more 8-mm trocars in the right side of the abdomen in a similar fashion and then docked the robot over the left side again to work from right to left. We started our dissection again working from medial to lateral, identifying the medial border at the left rectus muscle and then dissecting laterally in the retrorectus plane, until we identified the edge of the semilunaris fascial confluence and then we entered into the preperitoneal space, releasing the posterior lamellar sheath of the internal oblique and transversalis muscle and dissecting both cephalad and caudad. We were able to connect our right sided dissection posterior to the  xyphoid cephalad and the posterior bladder sulcus caudad. We again preserved the perforating rectus blood supply and branches of the epigastric vessels. We were able to identify both inferior epigastric vessels and preserve them on the left and the right. This completed the release of bilateral myofascial flaps in order to gain midline closure. Suprapubic dissection was met and we extended into the left groin. We identified the arcuate line, traversed it, and then dissected all the way to the white line of Toldt laterally. We dissected up to the costal margin, past it and then even up to the diaphragm. The falciform ligament was taken down partially until we completely connected our 2 fascial edges. At this point, we satisfied with our dissection. Bowel was inspected and intact free of injury and hemostasis was ensured. Our fascial edges would meet in the middle with some laxity and then we closed the peritoneal space using a running Stratafix suture, running from cephalad to caudad, until we completely excluded the intraabdominal contents.   At this point, we were in the retrorectus plane and we closed the midline defect using a running Stratafix PDS 2-0 barbed suture, running from caudad to cephalad, until we completely reapproximated the fascial edges. We then used 3-0 vicryl suture to close any peritoneal tears that were made during dissection including the three trocar holes on the right side posterior sheath. There were several separate areas that were closed. This occluded the abdominal organs and the intraperitoneal space from our field and we were now in the rectrorectus space with all of our trocars. We dropped the insufflation pressure to 8 mmHg and at the beginning of the closure of the anterior sheath, the peak airway pressures were 40 and at the completion of the closure that was 40. We used two separate 2-0 PDS barbed sutures to achieve closure of the anterior midline. At this point, we had reapproximated the fascial edges. This was tension free and came together easily with laxity from tranversus release. Our defect had been primarily closed and we were excluded from the peritoneal cavity. At this point, we used a piece of 12 inch x 12 inch Bard soft mesh, measured our cavity that we had created, was 34 cm from lateral borders at the most superior aspect by the costal edges and down into the groin, it was tapered to approximately 30 cm in width. We cut our mesh that was 30 x 30 cm in order to accommodate this tapering. The cephalad to caudad distance was approximately 30 cm. We then rolled up our mesh, placed it through a 12-mm left upper quadrant trocar. Under direct visualization, we unrolled it, until completely covered and reached both lateral edges of our dissection and all the way down to the prevesicular space as well as up to the subxiphoid area. Mesh laid flat without any kinks, rolls, or bunching. We used 6 g of Ayesha over the mesh in attempts to minimize seroma formation. At this point, we decompressed the abdomen under direct visualization, while the mesh remained in place and evacuated all the air from the pre-rectus space.   At this point, we removed each one of our trocars, closed the skin incisions using 4-0 Monocryl suture and surgical glue. The patient was then awoken, extubated, and transferred to the postoperative care in stable condition. All instrument counts, lap counts, and needle counts were correct at the completion of the procedure.           Crys Castorena MD, MS  Minimally Invasive and Bariatric Surgery  [phone] (p)  4/14/2022  5:07 PM

## 2022-04-14 NOTE — DISCHARGE SUMMARY
Physician Discharge Summary     Patient ID:  Kd Ochoa  [id number]  89 y.o.  1960    Admit date: 4/14/2022    Discharge date and time: No discharge date for patient encounter. Admitting Physician: Dalila Pruett MD     Admission Diagnoses: Incarcerated incisional hernia [K43.0]    Discharge Diagnoses: Principal Problem:    Incarcerated incisional hernia  Resolved Problems:    * No resolved hospital problems. *      Admission Condition: good    Discharged Condition: stable    Indication for Admission: Elective incisional hernia repair with component separation    Hospital Course/Procedures/Operation/treatments:   314: 62year old male presenting for elective robotic assisted laparoscopic incisional hernia repair with bilateral component separation and mesh placement. Admitted post-op, started diet, pain control PRN. 4/15: Doing okay, has some soreness when moving. Denies any nausea/vomiting. Tolerating diet. Consults:   IP CONSULT TO HOSPITALIST    Significant Diagnostic Studies:   No results found. Discharge Exam:  General appearance:  NAD, appears stated age  Head: NCAT, PERRLA, EOMI, red conjunctiva  Neck: supple, no masses, trachea midline  Lungs: Equal chest rise bilateral, no retractions, no wheezing  Heart: Reg rate  Abdomen: soft, appropriate TTP around incisions, incisions c/d/i, obese, at least 12 cm of diastases of the midline above the umbilicus. Skin; warm and dry, no cyanosis  Gu: no cva tenderness  Extremities: atraumatic, no focal motor deficits, no open wounds  Psych: No tremor, visual hallucinations    Disposition: home    In process/preliminary results:  Outstanding Order Results     No orders found for last 30 day(s).           Patient Instructions:   Current Discharge Medication List           Details   cetirizine (ZYRTEC) 10 MG tablet Take 10 mg by mouth daily      insulin lispro (HUMALOG) 100 UNIT/ML injection vial Use with insulin pump, max dose 110 units daily  Qty: 40 mL, Refills: 6    Associated Diagnoses: Poorly controlled type 2 diabetes mellitus (HCC)      Accu-Chek Multiclix Lancets MISC Check blood sugars 4x daily, pt has Medtronic insulin pump  Qty: 204 each, Refills: 6    Associated Diagnoses: Poorly controlled type 2 diabetes mellitus ([address]      ! ! blood glucose test strips (ACCU-CHEK GUIDE) strip Use to check blood sugars 4x daily, patient has Medtronic insulin pump  Qty: 200 each, Refills: 6    Associated Diagnoses: Poorly controlled type 2 diabetes mellitus (HCC)      insulin lispro, 1 Unit Dial, (HUMALOG KWIKPEN) 100 UNIT/ML SOPN 10 units TID with meals plus mod dose ISS AC only  Qty: 20 mL, Refills: 3    Associated Diagnoses: Poorly controlled type 2 diabetes mellitus (HCC)      insulin glargine (LANTUS SOLOSTAR) 100 UNIT/ML injection pen Inject 24 Units into the skin 2 times daily  Qty: 15 mL, Refills: 3    Associated Diagnoses: Poorly controlled type 2 diabetes mellitus ([address]      ! ! blood glucose test strips (ASCENSIA AUTODISC VI;ONE TOUCH ULTRA TEST VI) strip 1 each by In Vitro route 6 times daily Check blood sugar 4 times a day  Qty: 150 each, Refills: 5    Associated Diagnoses: Poorly controlled type 2 diabetes mellitus (HCC)      vitamin D3 (CHOLECALCIFEROL) 25 MCG (1000 UT) TABS tablet Take 1 tablet by mouth daily  Qty: 30 tablet, Refills: 5      Insulin Infusion Pump (MINIMED 770G INSULIN PUMP SYS) KIT To infuse insulin  Qty: 1 kit, Refills: 0    Associated Diagnoses: Poorly controlled type 2 diabetes mellitus (HCC)      Continuous Blood Gluc Transmit (GUARDIAN LINK 3 TRANSMITTER) MISC To use with sensors  Qty: 1 each, Refills: 0    Associated Diagnoses: Poorly controlled type 2 diabetes mellitus (HCC)      Continuous Blood Gluc Sensor (GUARDIAN SENSOR 3) MISC To change every 7 days  Qty: 12 each, Refills: 3    Associated Diagnoses: Poorly controlled type 2 diabetes mellitus ([address]      lipase-protease-amylase (CREON) 10714-744466 units CPEP delayed release capsule Take 2 capsules by mouth 3 times daily (with meals)  Qty: 250 capsule, Refills: 5      oxyCODONE-acetaminophen (PERCOCET) 7.5-325 MG per tablet Take 1 tablet by mouth every 8 hours as needed for Pain.      gabapentin (NEURONTIN) 300 MG capsule Take 300 mg by mouth 2 times daily. polyethylene glycol (GLYCOLAX) 17 g packet Take 17 g by mouth daily       pantoprazole (PROTONIX) 40 MG tablet Take 40 mg by mouth 2 times daily      sennosides-docusate sodium (SENOKOT-S) 8.6-50 MG tablet Take 2 tablets by mouth daily      acetaminophen (TYLENOL) 500 MG tablet Take 2 tablets by mouth every 6 hours  Qty: 120 tablet, Refills: 0      albuterol (PROVENTIL) (2.5 MG/3ML) 0.083% nebulizer solution Take 3 mLs by nebulization 4 times daily  Qty: 120 each, Refills: 3      budesonide (PULMICORT) 0.5 MG/2ML nebulizer suspension Take 4 mLs by nebulization 2 times daily  Qty: 60 ampule, Refills: 3      guaiFENesin (ROBITUSSIN) 100 MG/5ML SOLN oral solution Take 15 mLs by mouth 3 times daily  Qty: 1200 mL, Refills: 0      atorvastatin (LIPITOR) 20 MG tablet Take 1 tablet by mouth daily  Qty: 30 tablet, Refills: 3       !! - Potential duplicate medications found. Please discuss with provider. Patient Discharge Instructions  [place] [address], [address] [place] [address] I) [phone] (f)  Discharge Date:  4/14/2022    Discharged To: Home    RESUME ACTIVITY:     WOUND CARE: The day of surgery be sure to place ice to site of operation for 15min intervals. No need to sleep with ice in place. Keep protective cloth barrier between ice bag and skin to prevent frostbite. Should wear abdominal binder for two weeks. Ok to remove to bathe and sleep. Need to wear when doing any other activity. Bruising is normal after surgery. Ice will help reduce swelling and bruising. A bulge at the hernia site is normal after surgery and may persist for a few months.  This is the normal healing process. BATHING:  May shower 24hrs after surgery, remove dressings after 24hrs if in place, leave steristrips in place as they will fall of independently. You may have adhesive glue covering your incisions which will dissolve on it own. May bathe or swim 5 days after surgery    DRIVING: No driving while on pain medications. RETURN TO WORK: After follow up appointment    WALKING:  Yes    SEXUAL ACTIVITY: Yes    STAIRS:  Yes    LIFTING: Less than 15 pounds for 4 weeks    DIET: General adult    SPECIAL INSTRUCTIONS:     Call physician if they or any other problems occur:  - Fever over 101°    - Redness, swelling, hardness or warmth at the operative site  - Unrelieved nausea    - Foul smelling or cloudy drainage at the operative site   - Unrelieved pain    - Blood soaked dressing. (Some oozing may be normal)    Call office for follow up appointment with Dr Mak Burgess in 2 weeks. Call the office at [phone] if you have a fever > 100 F, or if your incision becomes red, tender, or drains more than a small amount of clear fluid. BOWELS: constipation is a side effect of your pain meds, take a daily laxative (miralax, dulcolax, etc.) as needed to keep your bowels moving as they normally do, do not go 2-3 days without having a bowel movement. Pain medications; Percocet- take at least 1/2 pill every 6 hours the first 36 hours after surgery, and may take as many as 2 pills every 4 hours. After the first 36hours only take the pills as needed and stop them as soon as possible. Pain meds cause constipation so pay close attention to the \"bowels\" topic above. Keep incisions clean and dry. Vicodin/Percocet and ibuprofen for pain as prescribed. Okay to resume anticoagulant medication after 24hrs. Do not exceed 4000mg of Tylenol/Acetaminophen per day. Vicodin/Norco/Percocet contain acetaminophen. Do not take additional amounts of Tylenol if you are taking these medications.         Follow up: Attila Mclean MD  [facility].   [address] [facility]  [phone]    Schedule an appointment as soon as possible for a visit in 2 weeks  For wound re-check       Signed:  Noble Sandhu MD  4/15/2022  7:44 AM

## 2022-04-15 LAB
ANION GAP SERPL CALCULATED.3IONS-SCNC: 15 MMOL/L (ref 7–16)
BASOPHILS ABSOLUTE: 0.02 E9/L (ref 0–0.2)
BASOPHILS RELATIVE PERCENT: 0.2 % (ref 0–2)
BUN BLDV-MCNC: 16 MG/DL (ref 6–23)
CALCIUM SERPL-MCNC: 8.4 MG/DL (ref 8.6–10.2)
CHLORIDE BLD-SCNC: 99 MMOL/L (ref 98–107)
CO2: 19 MMOL/L (ref 22–29)
CREAT SERPL-MCNC: 0.8 MG/DL (ref 0.7–1.2)
EOSINOPHILS ABSOLUTE: 0 E9/L (ref 0.05–0.5)
EOSINOPHILS RELATIVE PERCENT: 0 % (ref 0–6)
GFR AFRICAN AMERICAN: >60
GFR NON-AFRICAN AMERICAN: >60 ML/MIN/1.73
GLUCOSE BLD-MCNC: 306 MG/DL (ref 74–99)
HCT VFR BLD CALC: 39.8 % (ref 37–54)
HEMOGLOBIN: 12.1 G/DL (ref 12.5–16.5)
IMMATURE GRANULOCYTES #: 0.03 E9/L
IMMATURE GRANULOCYTES %: 0.2 % (ref 0–5)
LYMPHOCYTES ABSOLUTE: 1.28 E9/L (ref 1.5–4)
LYMPHOCYTES RELATIVE PERCENT: 9.7 % (ref 20–42)
MCH RBC QN AUTO: 23 PG (ref 26–35)
MCHC RBC AUTO-ENTMCNC: 30.4 % (ref 32–34.5)
MCV RBC AUTO: 75.7 FL (ref 80–99.9)
METER GLUCOSE: 210 MG/DL (ref 74–99)
METER GLUCOSE: 240 MG/DL (ref 74–99)
METER GLUCOSE: 270 MG/DL (ref 74–99)
METER GLUCOSE: 290 MG/DL (ref 74–99)
MONOCYTES ABSOLUTE: 1.23 E9/L (ref 0.1–0.95)
MONOCYTES RELATIVE PERCENT: 9.3 % (ref 2–12)
NEUTROPHILS ABSOLUTE: 10.65 E9/L (ref 1.8–7.3)
NEUTROPHILS RELATIVE PERCENT: 80.6 % (ref 43–80)
PDW BLD-RTO: 16.1 FL (ref 11.5–15)
PLATELET # BLD: 339 E9/L (ref 130–450)
PMV BLD AUTO: 12.4 FL (ref 7–12)
POTASSIUM REFLEX MAGNESIUM: 4.6 MMOL/L (ref 3.5–5)
RBC # BLD: 5.26 E12/L (ref 3.8–5.8)
SODIUM BLD-SCNC: 133 MMOL/L (ref 132–146)
WBC # BLD: 13.2 E9/L (ref 4.5–11.5)

## 2022-04-15 PROCEDURE — 6360000002 HC RX W HCPCS: Performed by: SURGERY

## 2022-04-15 PROCEDURE — 97535 SELF CARE MNGMENT TRAINING: CPT

## 2022-04-15 PROCEDURE — 2580000003 HC RX 258: Performed by: SURGERY

## 2022-04-15 PROCEDURE — 97116 GAIT TRAINING THERAPY: CPT | Performed by: PHYSICAL THERAPIST

## 2022-04-15 PROCEDURE — 97161 PT EVAL LOW COMPLEX 20 MIN: CPT | Performed by: PHYSICAL THERAPIST

## 2022-04-15 PROCEDURE — 1200000000 HC SEMI PRIVATE

## 2022-04-15 PROCEDURE — 85025 COMPLETE CBC W/AUTO DIFF WBC: CPT

## 2022-04-15 PROCEDURE — 6370000000 HC RX 637 (ALT 250 FOR IP): Performed by: SURGERY

## 2022-04-15 PROCEDURE — 97530 THERAPEUTIC ACTIVITIES: CPT | Performed by: PHYSICAL THERAPIST

## 2022-04-15 PROCEDURE — 36415 COLL VENOUS BLD VENIPUNCTURE: CPT

## 2022-04-15 PROCEDURE — 97165 OT EVAL LOW COMPLEX 30 MIN: CPT

## 2022-04-15 PROCEDURE — 82962 GLUCOSE BLOOD TEST: CPT

## 2022-04-15 PROCEDURE — 80048 BASIC METABOLIC PNL TOTAL CA: CPT

## 2022-04-15 RX ORDER — DOCUSATE SODIUM 100 MG/1
100 CAPSULE, LIQUID FILLED ORAL 2 TIMES DAILY
Qty: 30 CAPSULE | Refills: 0 | Status: SHIPPED | OUTPATIENT
Start: 2022-04-15 | End: 2022-04-30

## 2022-04-15 RX ORDER — SENNA PLUS 8.6 MG/1
1 TABLET ORAL NIGHTLY
Status: DISCONTINUED | OUTPATIENT
Start: 2022-04-15 | End: 2022-04-16 | Stop reason: HOSPADM

## 2022-04-15 RX ORDER — METHOCARBAMOL 500 MG/1
500 TABLET, FILM COATED ORAL 4 TIMES DAILY PRN
Qty: 20 TABLET | Refills: 0 | Status: SHIPPED | OUTPATIENT
Start: 2022-04-15

## 2022-04-15 RX ORDER — POLYETHYLENE GLYCOL 3350 17 G/17G
17 POWDER, FOR SOLUTION ORAL DAILY
Status: DISCONTINUED | OUTPATIENT
Start: 2022-04-15 | End: 2022-04-16 | Stop reason: HOSPADM

## 2022-04-15 RX ORDER — ONDANSETRON 4 MG/1
4 TABLET, ORALLY DISINTEGRATING ORAL EVERY 8 HOURS PRN
Qty: 20 TABLET | Refills: 1 | Status: SHIPPED | OUTPATIENT
Start: 2022-04-15

## 2022-04-15 RX ORDER — OXYCODONE HYDROCHLORIDE 5 MG/1
5 TABLET ORAL EVERY 6 HOURS PRN
Qty: 20 TABLET | Refills: 0 | Status: SHIPPED | OUTPATIENT
Start: 2022-04-15 | End: 2022-04-20

## 2022-04-15 RX ORDER — IBUPROFEN 800 MG/1
800 TABLET ORAL EVERY 6 HOURS PRN
Qty: 90 TABLET | Refills: 1 | Status: SHIPPED | OUTPATIENT
Start: 2022-04-15 | End: 2022-04-29

## 2022-04-15 RX ADMIN — GABAPENTIN 300 MG: 300 CAPSULE ORAL at 07:50

## 2022-04-15 RX ADMIN — GABAPENTIN 300 MG: 300 CAPSULE ORAL at 21:14

## 2022-04-15 RX ADMIN — PANCRELIPASE 72000 UNITS: 36000; 180000; 114000 CAPSULE, DELAYED RELEASE PELLETS ORAL at 07:50

## 2022-04-15 RX ADMIN — MORPHINE SULFATE 2 MG: 2 INJECTION, SOLUTION INTRAMUSCULAR; INTRAVENOUS at 22:20

## 2022-04-15 RX ADMIN — INSULIN GLARGINE 24 UNITS: 100 INJECTION, SOLUTION SUBCUTANEOUS at 07:57

## 2022-04-15 RX ADMIN — INSULIN LISPRO 3 UNITS: 100 INJECTION, SOLUTION INTRAVENOUS; SUBCUTANEOUS at 21:17

## 2022-04-15 RX ADMIN — INSULIN GLARGINE 24 UNITS: 100 INJECTION, SOLUTION SUBCUTANEOUS at 21:16

## 2022-04-15 RX ADMIN — OXYCODONE 10 MG: 5 TABLET ORAL at 13:34

## 2022-04-15 RX ADMIN — FAMOTIDINE 20 MG: 20 TABLET, FILM COATED ORAL at 07:50

## 2022-04-15 RX ADMIN — OXYCODONE 10 MG: 5 TABLET ORAL at 09:13

## 2022-04-15 RX ADMIN — OXYCODONE 10 MG: 5 TABLET ORAL at 04:31

## 2022-04-15 RX ADMIN — MORPHINE SULFATE 2 MG: 2 INJECTION, SOLUTION INTRAMUSCULAR; INTRAVENOUS at 02:09

## 2022-04-15 RX ADMIN — MORPHINE SULFATE 2 MG: 2 INJECTION, SOLUTION INTRAMUSCULAR; INTRAVENOUS at 19:17

## 2022-04-15 RX ADMIN — INSULIN LISPRO 9 UNITS: 100 INJECTION, SOLUTION INTRAVENOUS; SUBCUTANEOUS at 16:17

## 2022-04-15 RX ADMIN — ACETAMINOPHEN 650 MG: 325 TABLET ORAL at 18:01

## 2022-04-15 RX ADMIN — ENOXAPARIN SODIUM 40 MG: 40 INJECTION SUBCUTANEOUS at 07:50

## 2022-04-15 RX ADMIN — FAMOTIDINE 20 MG: 20 TABLET, FILM COATED ORAL at 21:14

## 2022-04-15 RX ADMIN — METHOCARBAMOL 1000 MG: 100 INJECTION, SOLUTION INTRAMUSCULAR; INTRAVENOUS at 00:56

## 2022-04-15 RX ADMIN — INSULIN LISPRO 6 UNITS: 100 INJECTION, SOLUTION INTRAVENOUS; SUBCUTANEOUS at 11:31

## 2022-04-15 RX ADMIN — ACETAMINOPHEN 650 MG: 325 TABLET ORAL at 00:10

## 2022-04-15 RX ADMIN — OXYCODONE 10 MG: 5 TABLET ORAL at 18:01

## 2022-04-15 RX ADMIN — OXYCODONE 10 MG: 5 TABLET ORAL at 23:32

## 2022-04-15 RX ADMIN — ACETAMINOPHEN 650 MG: 325 TABLET ORAL at 11:30

## 2022-04-15 RX ADMIN — ACETAMINOPHEN 650 MG: 325 TABLET ORAL at 23:32

## 2022-04-15 RX ADMIN — PANCRELIPASE 72000 UNITS: 36000; 180000; 114000 CAPSULE, DELAYED RELEASE PELLETS ORAL at 17:07

## 2022-04-15 RX ADMIN — MORPHINE SULFATE 2 MG: 2 INJECTION, SOLUTION INTRAMUSCULAR; INTRAVENOUS at 15:19

## 2022-04-15 RX ADMIN — PANCRELIPASE 72000 UNITS: 36000; 180000; 114000 CAPSULE, DELAYED RELEASE PELLETS ORAL at 11:30

## 2022-04-15 RX ADMIN — ATORVASTATIN CALCIUM 20 MG: 20 TABLET, FILM COATED ORAL at 07:50

## 2022-04-15 RX ADMIN — Medication 10 ML: at 21:00

## 2022-04-15 RX ADMIN — SENNOSIDES 8.6 MG: 8.6 TABLET, COATED ORAL at 21:14

## 2022-04-15 RX ADMIN — METHOCARBAMOL 1000 MG: 100 INJECTION, SOLUTION INTRAMUSCULAR; INTRAVENOUS at 08:05

## 2022-04-15 RX ADMIN — MORPHINE SULFATE 2 MG: 2 INJECTION, SOLUTION INTRAMUSCULAR; INTRAVENOUS at 11:34

## 2022-04-15 RX ADMIN — MORPHINE SULFATE 2 MG: 2 INJECTION, SOLUTION INTRAMUSCULAR; INTRAVENOUS at 05:38

## 2022-04-15 RX ADMIN — OXYCODONE 10 MG: 5 TABLET ORAL at 00:11

## 2022-04-15 RX ADMIN — ACETAMINOPHEN 650 MG: 325 TABLET ORAL at 05:38

## 2022-04-15 RX ADMIN — METHOCARBAMOL 1000 MG: 100 INJECTION, SOLUTION INTRAMUSCULAR; INTRAVENOUS at 16:19

## 2022-04-15 RX ADMIN — SODIUM CHLORIDE, POTASSIUM CHLORIDE, SODIUM LACTATE AND CALCIUM CHLORIDE: 600; 310; 30; 20 INJECTION, SOLUTION INTRAVENOUS at 04:28

## 2022-04-15 RX ADMIN — INSULIN LISPRO 9 UNITS: 100 INJECTION, SOLUTION INTRAVENOUS; SUBCUTANEOUS at 07:51

## 2022-04-15 RX ADMIN — POLYETHYLENE GLYCOL 3350 17 G: 17 POWDER, FOR SOLUTION ORAL at 13:34

## 2022-04-15 RX ADMIN — BISACODYL 5 MG: 5 TABLET, COATED ORAL at 07:50

## 2022-04-15 ASSESSMENT — PAIN SCALES - GENERAL
PAINLEVEL_OUTOF10: 2
PAINLEVEL_OUTOF10: 8
PAINLEVEL_OUTOF10: 9
PAINLEVEL_OUTOF10: 7
PAINLEVEL_OUTOF10: 9
PAINLEVEL_OUTOF10: 8
PAINLEVEL_OUTOF10: 7
PAINLEVEL_OUTOF10: 8
PAINLEVEL_OUTOF10: 7
PAINLEVEL_OUTOF10: 9
PAINLEVEL_OUTOF10: 3
PAINLEVEL_OUTOF10: 4
PAINLEVEL_OUTOF10: 7
PAINLEVEL_OUTOF10: 2
PAINLEVEL_OUTOF10: 3
PAINLEVEL_OUTOF10: 5
PAINLEVEL_OUTOF10: 9
PAINLEVEL_OUTOF10: 8
PAINLEVEL_OUTOF10: 3
PAINLEVEL_OUTOF10: 4
PAINLEVEL_OUTOF10: 8
PAINLEVEL_OUTOF10: 8
PAINLEVEL_OUTOF10: 7

## 2022-04-15 ASSESSMENT — PAIN DESCRIPTION - ORIENTATION
ORIENTATION: RIGHT
ORIENTATION: RIGHT;LEFT;MID
ORIENTATION: RIGHT

## 2022-04-15 ASSESSMENT — PAIN DESCRIPTION - LOCATION
LOCATION: ABDOMEN

## 2022-04-15 ASSESSMENT — PAIN DESCRIPTION - PAIN TYPE
TYPE: SURGICAL PAIN

## 2022-04-15 ASSESSMENT — PAIN DESCRIPTION - ONSET
ONSET: ON-GOING
ONSET: ON-GOING

## 2022-04-15 ASSESSMENT — PAIN - FUNCTIONAL ASSESSMENT: PAIN_FUNCTIONAL_ASSESSMENT: ACTIVITIES ARE NOT PREVENTED

## 2022-04-15 ASSESSMENT — PAIN DESCRIPTION - FREQUENCY
FREQUENCY: CONTINUOUS
FREQUENCY: INTERMITTENT

## 2022-04-15 ASSESSMENT — PAIN DESCRIPTION - PROGRESSION
CLINICAL_PROGRESSION: GRADUALLY IMPROVING
CLINICAL_PROGRESSION: GRADUALLY IMPROVING

## 2022-04-15 ASSESSMENT — PAIN DESCRIPTION - DESCRIPTORS
DESCRIPTORS: ACHING;DISCOMFORT;SORE
DESCRIPTORS: CONSTANT;SHARP;TENDER

## 2022-04-15 NOTE — ANESTHESIA POSTPROCEDURE EVALUATION
Department of Anesthesiology  Postprocedure Note    Patient: Hebert Steele  MRN: [id number]  YOB: 1960  Date of evaluation: 4/15/2022  Time:  7:11 AM     Procedure Summary     Date: 04/14/22 Room / Location: [address] [place] [address] / [address] [facility]    Anesthesia Start: 9375 Anesthesia Stop: 2628    Procedure: [address] WITH MESH, COMPONENT SEPARATION (N/A Abdomen) Diagnosis: (INCISIONAL HERNIA, ANEMIA)    Surgeons: Rosalba Kaiser MD Responsible Provider: Tiffany De Santiago MD    Anesthesia Type: general ASA Status: 3          Anesthesia Type: general    Dominic Phase I: Dominic Score: 9    Dominic Phase II:      Last vitals: Reviewed and per EMR flowsheets.        Anesthesia Post Evaluation    Patient location during evaluation: PACU  Patient participation: complete - patient participated  Level of consciousness: awake  Airway patency: patent  Nausea & Vomiting: no nausea and no vomiting  Complications: no  Cardiovascular status: hemodynamically stable  Respiratory status: acceptable  Hydration status: euvolemic

## 2022-04-15 NOTE — PROGRESS NOTES
General Surgery Progress Note  Sonia Kiran MD, MS    Patient's Name/Date of Birth: Frida Vargas / 1960    Date: April 15, 2022     Surgeon: Jerad Delaney MD    Chief Complaint: s/p lap robot incisional hernia repair, component separation    Patient Active Problem List   Diagnosis    Mixed hyperlipidemia    Neck pain    Cervical radiculopathy at C7    Essential hypertension    Chronic seasonal allergic rhinitis    Ventral hernia without obstruction or gangrene    Poorly controlled type 2 diabetes mellitus ([address]    Chronic bilateral low back pain with right-sided sciatica    Gynecomastia    Panic disorder    Cubital tunnel syndrome on right    Right carpal tunnel syndrome    Spondylosis of lumbar spine    Bell's palsy    Other bursal cyst, left elbow    Sacral radiculitis    Lumbar radiculitis    Spondylosis of cervical region without myelopathy or radiculopathy    Cervical facet joint syndrome    Cervical disc disorder    Lumbar facet arthropathy    Spinal stenosis of lumbar region with neurogenic claudication    Lumbar disc disorder    Pancreatic neoplasm    IPMN (intraductal papillary mucinous neoplasm)    Pancreatic duct leak    Intra-abdominal infection    Abdominal pain with fever after surgery    Sepsis ([address]    Acute respiratory failure with hypoxia ([address]    Intra-abdominal abscess ([address]    Severe protein-calorie malnutrition ([address]    Duodenal ulcer disease    Incarcerated incisional hernia       Subjective: doing well, pain better controlled, appears comfortabl    Objective:  BP (!) 141/79   Pulse 99   Temp 98.1 °F (36.7 °C) (Oral)   Resp 18   Ht 5' 8\" (1.727 m)   Wt 214 lb (97.1 kg)   SpO2 94%   BMI 32.54 kg/m²   Labs:  Recent Labs     04/12/22  1440 04/15/22  0451   WBC 8.0 13.2*   HGB 13.6 12.1*   HCT 44.1 39.8     Lab Results   Component Value Date    CREATININE 0.8 04/15/2022    BUN 16 04/15/2022     04/15/2022    K 4.6

## 2022-04-15 NOTE — CONSULTS
Consult Note            Date:4/15/2022        Patient Name:Eliezer Cook     Date of Birth:[birth year]     Age:61 y.o. Consults    Chief Complaint       History Obtained From   patient    History of Present Illness   59-year-old male admitted for incisional   Hernia repair. Doing well postop day 1. Sugars have been stable. Denies any nausea vomiting diarrhea no rectal bleeding. Tolerating diet.   Consulted for medical management    Past Medical History     Past Medical History:   Diagnosis Date    Allergic rhinitis     Anxiety     Arthritis     Back pain     5th finger & ring finger on right hand is numb    Bell's palsy     NEW ONSET 3-     Cancer ([address]     pancreatic CA- removed surgically - 2021    Cervical disc disorder     cervical radiculopathy    Hyperlipidemia     Hypertension     Intra-abdominal abscess (HCC)     recurrent    Lumbago     Neuropathy     toes numb    Obesity     Polyneuropathy due to type 2 diabetes mellitus ([address]     Poorly controlled type 2 diabetes mellitus with neuropathy ([address] 5/8/2018    Radiculitis, lumbosacral     Sacral radiculitis 2/27/2019    Sepsis ([address]     Spondylosis of lumbar spine 3/9/2019    Advanced arthritis and degenerative disc disease by MRI 3/2019    Ventral hernia         Past Surgical History     Past Surgical History:   Procedure Laterality Date    ANESTHESIA NERVE BLOCK Bilateral 07/11/2019    BILATERAL L4-5 TRANSFORAMINAL EPIDURAL STEROID INJECTION performed by [employer], DO at  [address] Bilateral 08/01/2019    BILATERAL MEDIAL BRANCH BLOCK L4-5 AND L5-S1 [facility] [employer] [facility] Bilateral 08/15/2019    BILATERAL MEDIAL BRANCH BLOCK L4 - 5 AND L5 - S1 performed by [employer] DO at [facility] Right 03/29/2019    right wrist carpal tunnel release and right elbow cubital tunnel release  CARPAL TUNNEL RELEASE Right 03/29/2019    RIGHT WRIST CARPAL TUNNEL RELEASE AND RIGHT ELBOW CUBITAL TUNNEL RELEASE performed by Ross Malin DO at [facility]      at age 40 polyps    CYST REMOVAL Right     EPIDURAL STEROID INJECTION N/A 10/17/2019    LUMBAR EPIDURAL STEROID INJECTION UNDER FLUOROSCOPIC GUIDANCE AT L2-L3 WITHOUT SEDATION performed by Florence Pressley MD at [facility] ERCP N/A 05/17/2021    ERCP ENDOSCOPIC RETROGRADE CHOLANGIOPANCREATOGRAPHY SPHINCTER/PAPILLOTOMY performed by Jayesh Jackson MD at [address] ERCP N/A 05/17/2021    ERCP STENT INSERTION performed by Jayesh Jackson MD at [address] ERCP N/A 6/4/2021    ERCP STENT INSERTION performed by Jayesh Jackson MD at [address] ERCP N/A 7/2/2021    ERCP STENT REMOVAL performed by Jayesh Jackson MD at [address] ERCP N/A 7/2/2021    ERCP STONE REMOVAL performed by Jayesh Jackson MD at [address] ERCP N/A 7/2/2021    ERCP STENT INSERTION performed by Jayesh Jackson MD at [address] Bilateral     groin and umbilical    HERNIA REPAIR N/A 12/13/2018    ROBOTIC ASSISTED LAPAROSCOPIC PRIMARY REPAIR OF RECURRENT VENTRAL HERNIA  REPAIR performed by Laury Gaucher, MD at [address] [facility] [address] Bilateral 07/11/2019    L4-5 transforaminal     NERVE BLOCK Bilateral 08/01/2019    medial branch block    NERVE BLOCK Bilateral 08/15/2019    bilateral medial branch block L4-S1    NERVE BLOCK  10/17/2019    lumbar epidural    PANCREATECTOMY N/A 05/12/2021    LAPAROSCOPIC ROBOTIC DISTAL PANCREATECTOMY AND SPLENECTOMY AND CHOLECYSTECTOMY, INTRA-OPERATIVE ULTRASOUND, TRANSITIONED TO OPEN -- EPIDURAL performed by Dixon Lin MD at [address]  06/05/2018    C5-7 fusion at [facility] in [address] [place] [address] Pkwy 01/08/2021    EGD W/EUS FNA performed by Echo Navarro DO at [facility]    UPPER GASTROINTESTINAL ENDOSCOPY N/A 01/08/2021    EGD DIAGNOSTIC ONLY performed by Pilar Aldana DO at [address] [place] [address] N/A 7/30/2021    EGD ESOPHAGOGASTRODUODENOSCOPY ULTRASOUND performed by Robert Hu MD at [address] [place] [address] N/A 7/30/2021    EUS FOREIGN BODY REMOVAL performed by Robert Hu MD at [address] [place] [address] N/A 9/2/2021    EGD ESOPHAGOGASTRODUODENOSCOPY performed by Krista Roberts MD at [facility] ENDOSCOPY        Medications     Prior to Admission medications    Medication Sig Start Date End Date Taking? Authorizing Provider   oxyCODONE (ROXICODONE) 5 MG immediate release tablet Take 1 tablet by mouth every 6 hours as needed for Pain for up to 5 days.  4/15/22 4/20/22 Yes Hannah Gentile MD   cetirizine (ZYRTEC) 10 MG tablet Take 10 mg by mouth daily    Historical Provider, MD   insulin lispro (HUMALOG) 100 UNIT/ML injection vial Use with insulin pump, max dose 110 units daily 4/6/22   Emir Camara MD   Accu-Chek Multiclix Lancets MISC Check blood sugars 4x daily, pt has Medtronic insulin pump 4/6/22   Emir Camara MD   blood glucose test strips (ACCU-CHEK GUIDE) strip Use to check blood sugars 4x daily, patient has Medtronic insulin pump 4/6/22   Emir Camara MD   insulin lispro, 1 Unit Dial, (HUMALOG KWIKPEN) 100 UNIT/ML SOPN 10 units TID with meals plus mod dose ISS AC only 3/30/22   Emir Camara MD   insulin glargine (LANTUS SOLOSTAR) 100 UNIT/ML injection pen Inject 24 Units into the skin 2 times daily 3/30/22 4/29/22  Emir Camara MD   blood glucose test strips (ASCENSIA AUTODISC VI;ONE TOUCH ULTRA TEST VI) strip 1 each by In Vitro route 6 times daily Check blood sugar 4 times a day 3/30/22   Emir Camara MD   vitamin D3 (CHOLECALCIFEROL) 25 MCG (1000 UT) TABS tablet Take 1 tablet by mouth daily 3/16/22   Sue Islas MARGAUX [place]   Insulin Infusion Pump (MINIMED 770G INSULIN PUMP SYS) KIT To infuse insulin 3/16/22   MARGAUX [place]   Continuous Blood Gluc Transmit (GUARDIAN LINK 3 TRANSMITTER) MISC To use with sensors 3/16/22   MARGAUX [place]   Continuous Blood Gluc Sensor (GUARDIAN SENSOR 3) MISC To change every 7 days 3/16/22   MARGAUX [place]   lipase-protease-amylase (CREON) 54881-314444 units CPEP delayed release capsule Take 2 capsules by mouth 3 times daily (with meals) 10/5/21   Evelin Chamberlain III, MD   oxyCODONE-acetaminophen (PERCOCET) 7.5-325 MG per tablet Take 1 tablet by mouth every 8 hours as needed for Pain. Historical Provider, MD   gabapentin (NEURONTIN) 300 MG capsule Take 300 mg by mouth 2 times daily.     Historical Provider, MD   polyethylene glycol (GLYCOLAX) 17 g packet Take 17 g by mouth daily     Historical Provider, MD   pantoprazole (PROTONIX) 40 MG tablet Take 40 mg by mouth 2 times daily    Historical Provider, MD   sennosides-docusate sodium (SENOKOT-S) 8.6-50 MG tablet Take 2 tablets by mouth daily  Patient not taking: Reported on 9/16/2021 7/16/21   Donal Negrete MD   acetaminophen (TYLENOL) 500 MG tablet Take 2 tablets by mouth every 6 hours  Patient taking differently: Take 650 mg by mouth every 6 hours as needed for Pain or Fever (mild pain temp > or equal 101F)  6/4/21   Anabelle Robins MD   albuterol (PROVENTIL) (2.5 MG/3ML) 0.083% nebulizer solution Take 3 mLs by nebulization 4 times daily  Patient not taking: Reported on 2/8/2022 5/19/21   Natalio Stout MD   budesonide (PULMICORT) 0.5 MG/2ML nebulizer suspension Take 4 mLs by nebulization 2 times daily  Patient not taking: Reported on 9/7/2021 5/19/21   Natalio Stout MD   guaiFENesin (ROBITUSSIN) 100 MG/5ML SOLN oral solution Take 15 mLs by mouth 3 times daily  Patient not taking: Reported on 9/7/2021 5/19/21   Natalio Stout MD   atorvastatin (LIPITOR) 20 MG tablet Take 1 tablet by mouth daily 5/20/21   Delmer Spence MD        oxyCODONE (ROXICODONE) immediate release tablet 5 mg, Q4H PRN   Or  oxyCODONE (ROXICODONE) immediate release tablet 10 mg, Q4H PRN  morphine (PF) injection 2 mg, Q3H PRN  sodium chloride flush 0.9 % injection 10 mL, 2 times per day  sodium chloride flush 0.9 % injection 10 mL, PRN  0.9 % sodium chloride infusion, PRN  enoxaparin (LOVENOX) injection 40 mg, Daily  lactated ringers infusion, Continuous  acetaminophen (TYLENOL) tablet 650 mg, 4 times per day  bisacodyl (DULCOLAX) EC tablet 5 mg, Daily  ondansetron (ZOFRAN-ODT) disintegrating tablet 4 mg, Q8H PRN   Or  ondansetron (ZOFRAN) injection 4 mg, Q6H PRN  famotidine (PEPCID) tablet 20 mg, BID   Or  famotidine (PEPCID) 20 mg in sodium chloride (PF) 10 mL injection, BID  ketorolac (TORADOL) injection 30 mg, Q6H PRN  methocarbamol (ROBAXIN) 1,000 mg in dextrose 5 % 100 mL IVPB, Q8H  atorvastatin (LIPITOR) tablet 20 mg, Daily  gabapentin (NEURONTIN) capsule 300 mg, BID  lipase-protease-amylase (CREON) delayed release capsule 72,000 Units, TID WC  insulin glargine (LANTUS) injection vial 24 Units, BID  insulin lispro (HUMALOG) injection vial 0-18 Units, TID WC  insulin lispro (HUMALOG) injection vial 0-9 Units, Nightly  glucose (GLUTOSE) 40 % oral gel 15 g, PRN  glucagon (rDNA) injection 1 mg, PRN  dextrose 5 % solution, PRN  dextrose bolus (hypoglycemia) 10% 125 mL, PRN   Or  dextrose bolus (hypoglycemia) 10% 250 mL, PRN        Allergies   Iodine, Metformin and related, and Wheat extract    Social History     Social History     Tobacco History     Smoking Status  Never Smoker    Smokeless Tobacco Use  Never Used          Alcohol History     Alcohol Use Status  Yes Comment  occasional          Drug Use     Drug Use Status  Never          Sexual Activity     Sexually Active  Not Asked                Family History     Family History   Problem Relation Age of Onset    Diabetes Mother    Zannie Cowden Other Mother parkinsons    Diabetes Father     Cancer Sister 79        unknown    Cancer Brother 64        stomach    Diabetes Brother        Review of Systems   Review of Systems   Gastrointestinal: Positive for abdominal pain. All other systems reviewed and are negative. Physical Exam   BP (!) 141/79   Pulse 99   Temp 98.1 °F (36.7 °C) (Oral)   Resp 18   Ht 5' 8\" (1.727 m)   Wt 214 lb (97.1 kg)   SpO2 95%   BMI 32.54 kg/m²      Physical Exam  Vitals and nursing note reviewed. Constitutional:       Appearance: Normal appearance. He is obese. HENT:      Head: Normocephalic and atraumatic. Nose: Nose normal.      Mouth/Throat:      Mouth: Mucous membranes are moist.      Pharynx: Oropharynx is clear. Eyes:      Extraocular Movements: Extraocular movements intact. Conjunctiva/sclera: Conjunctivae normal.      Pupils: Pupils are equal, round, and reactive to light. Cardiovascular:      Rate and Rhythm: Normal rate and regular rhythm. Pulses: Normal pulses. Heart sounds: Normal heart sounds. Pulmonary:      Effort: Pulmonary effort is normal.      Breath sounds: Normal breath sounds. Abdominal:      General: There is distension. Palpations: Abdomen is soft. Tenderness: There is abdominal tenderness. Musculoskeletal:         General: Normal range of motion. Cervical back: Normal range of motion and neck supple. Skin:     General: Skin is warm and dry. Capillary Refill: Capillary refill takes less than 2 seconds. Neurological:      General: No focal deficit present. Mental Status: He is alert. Mental status is at baseline.    Psychiatric:         Mood and Affect: Mood normal.         Labs    CBC:  Recent Labs     04/12/22  1440 04/15/22  0451   WBC 8.0 13.2*   RBC 5.80 5.26   HGB 13.6 12.1*   HCT 44.1 39.8   MCV 76.0* 75.7*   RDW 17.2* 16.1*    339     CHEMISTRIES:  Recent Labs     04/12/22  1440 04/15/22  0451    133   K 4.1 4.6   CL 98 99 CO2 22 19*   BUN 22 16   CREATININE 0.9 0.8   GLUCOSE 213* 306*     PT/INR:No results for input(s): PROTIME, INR in the last 72 hours. APTT:No results for input(s): APTT in the last 72 hours. LIVER PROFILE:  Recent Labs     04/12/22  1440   AST 27   ALT 27   BILITOT 0.2   ALKPHOS 59       Imaging/Diagnostics   No results found. Assessment      Hospital Problems           Last Modified POA    * (Principal) Incarcerated incisional hernia 4/14/2022 Yes       history of high blood pressure   insulin-dependent diabetes    Plan   1. Resume home medications  2. Accu-Cheks q.i.d.  3.   Sliding scale insulin  4.    Pain control orders per surgery      Electronically signed by Evans Sifuentes DO on 4/15/22 at 8:22 AM EDT

## 2022-04-15 NOTE — PLAN OF CARE
Problem: Pain:  Description: Pain management should include both nonpharmacologic and pharmacologic interventions. Goal: Pain level will decrease  Description: Pain level will decrease  4/15/2022 0941 by Oneal Trimble RN  Outcome: Met This Shift     Problem: Pain:  Description: Pain management should include both nonpharmacologic and pharmacologic interventions.   Goal: Control of acute pain  Description: Control of acute pain  4/15/2022 0941 by Oneal Trimble RN  Outcome: Met This Shift

## 2022-04-15 NOTE — CARE COORDINATION
CM note: Pt was originally slated for discharge today, however this was held d/t elevated blood glucose, anticipate that patient will discharge home tomorrow if better controlled. No anticipated discharge needs. Post op day one of a hiatal hernia repair.

## 2022-04-15 NOTE — PROGRESS NOTES
robotic-assisted transversus abdominus release with repair of recurrent incisional hernia with mesh  2. Extensive lysis of adhesions  3. Left sided abdominal wall posterior component separation with myofascial flap mobilization and placement  4.  Right sided abdominal wall posterior component separation with myofascial flap mobilization and placement    SURGEON:  Cassandra Gurrola MD  DATE OF PROCEDURE:  4/14/2022        Pertinent Medical History:       Past Medical History:   Diagnosis Date    Allergic rhinitis     Anxiety     Arthritis     Back pain     5th finger & ring finger on right hand is numb    Bell's palsy     NEW ONSET 3-     Cancer ([address]     pancreatic CA- removed surgically - 2021    Cervical disc disorder     cervical radiculopathy    Hyperlipidemia     Hypertension     Intra-abdominal abscess ([address]     recurrent    Lumbago     Neuropathy     toes numb    Obesity     Polyneuropathy due to type 2 diabetes mellitus ([address]     Poorly controlled type 2 diabetes mellitus with neuropathy ([address] 5/8/2018    Radiculitis, lumbosacral     Sacral radiculitis 2/27/2019    Sepsis ([address]     Spondylosis of lumbar spine 3/9/2019    Advanced arthritis and degenerative disc disease by MRI 3/2019    Ventral hernia          Past Surgical History:   Procedure Laterality Date    ANESTHESIA NERVE BLOCK Bilateral 07/11/2019    BILATERAL L4-5 TRANSFORAMINAL EPIDURAL STEROID IN[facility] Bilateral 08/01/2019    BILATERAL MEDIAL BRANCH BLOCK L4-5 AND L5-S1 [facility] Bilateral 08/15/2019    BILATERAL MEDIAL BRANCH BLOCK L4 - 5 AND L5 - S1 performed by Kasi Loo DO at [facility] Right 03/29/2019    right wrist carpal tunnel release and right elbow cubital tunnel release    CARPAL TUNNEL RELEASE Right 03/29/2019    RIGHT WRIST CARPAL TUNNEL RELEASE AND RIGHT ELBOW CUBITAL TUNNEL RELEASE performed by Mindy Brown DO at [facility]      at age 40 polyps    CYST REMOVAL Right     EPIDURAL STEROID INJECTION N/A 10/17/2019    LUMBAR EPIDURAL STEROID INJECTION UNDER FLUOROSCOPIC GUIDANCE AT L2-L3 WITHOUT SEDATION performed by Vicki Stephens MD at [facility] ERCP N/A 05/17/2021    ERCP ENDOSCOPIC RETROGRADE CHOLANGIOPANCREATOGRAPHY SPHINCTER/PAPILLOTOMY [facility] 05/17/2021    ERCP STENT INSERTION performed by Lilian Ortega MD at [address] ERCP N/A 6/4/2021    ERCP STENT INSERTION [facility]ga [facility] [address] ERCP N/A 7/2/2021    ERCP STENT REM[facility]AL [facility]A 7/2/2021    ERCP STONE REMOVAL performed by Lilian [facility]tega MD at [address] ERCP N/A 7/2/2021    ERCP STENT INSERTION performed by Lilian Ortega MD at [facility] Bilateral     groin and umbilical    HERNIA REPAIR N/A 12/13/2018    ROBOTIC ASSISTED LAPAROSCOPIC PRIMARY REPAIR OF RECURRENT VENTRAL HERNIA  REPAIR performed by Arsen Jacome MD at [facility] 07/11/2019    L4-5 transforaminal     NERVE BLOCK Bilateral 08/01/2019    medial branch block    NERVE BLOCK Bilateral 08/15/2019    bilateral medial branch block L4-S1    NERVE BLOCK  10/17/2019    lumbar epidural    PANCREATECTOMY N/A 05/12/2021    LAPAROSCOPIC ROBOTIC DISTAL PANCREATECTOMY AND SPLENECTOMY AND CHOLECYSTECTOMY, INTRA-OPERATIVE ULTRASOUND, TRANSITIONED TO OPEN -- EPIDURAL performed by Karen Kumar MD at [facility]  06/05/2018    C5-7 fusion at [facility] in [address] [place] [address] Pkwy 01/08/2021    EGD W/EUS FNA performed by Cynthia Rubin DO at [address] [place] [address] GASTROINTESTINAL ENDOSCOPY N/A 01/08/2021    EGD DIAGNOSTIC ONLY performed by Shaista Nguyen DO at [address] N/A 7/30/2021    EGD ESOPHAGOGASTRODUODENOSCOPY ULTRASOUND performed by Jimbo Hicks MD at [address] N/A 7/30/2021    EUS FOREIGN BODY REMOVAL performed by Jimbo Hicks MD at [address] N/A 9/2/2021    EGD ESOPHAGOGASTRODUODENOSCOPY performed by Noé Duggna MD at [facility] ENDOSCOPY        Precautions:  Fall Risk, abdominal binder     Assessment of current deficits    [x] Functional mobility  [x]ADLs  [x] Strength               []Cognition    [x] Functional transfers   [x] IADLs         [] Safety Awareness   []Endurance    [] Fine Coordination              [x] Balance      [] Vision/perception   []Sensation     []Gross Motor Coordination  [] ROM  [] Delirium                   [] Motor Control     OT PLAN OF CARE   OT POC based on physician orders, patient diagnosis and results of clinical assessment    Frequency/Duration 1-3 days/wk for 2 weeks PRN     Specific OT Treatment Interventions to include:   * Instruction/training on adapted ADL techniques and AE recommendations to increase functional independence within precautions       * Training on energy conservation strategies, correct breathing pattern and techniques to improve independence/tolerance for self-care routine  * Functional transfer/mobility training/DME recommendations for increased independence, safety, and fall prevention  * Patient/Family education to increase follow through with safety techniques and functional independence  * Recommendation of environmental modifications for increased safety with functional transfers/mobility and ADLs  * Therapeutic exercise to improve motor endurance, ROM, and functional strength for ADLs/functional transfers  * Therapeutic activities to facilitate/challenge Moderate Young    Bathing Moderate Assist     Moderate Young    Toileting Stand by Assist   Standing at wheeled walker for use of urinal     Moderate Young    Bed Mobility  Supine to sit: Moderate Assist   Sit to supine: Not assessed     Instructions for technique and overall safety for pain mgmt. Supine to sit: Independent   Sit to supine: Independent    Functional Transfers Sit to stand: Minimal Assist    Stand to sit: Minimal Assist   Commode Transfer: Min A-SBA    Transfer training with instructions for overall technique and safety. Independent    Functional Mobility Minimal Assist with wheeled walker to improve balance and overall safety to/from bathroom. Moderate Young with use of AD prn    Balance Sitting:     Static: fair plus    Dynamic: fair plus  Standing: fair plus     Sitting:     Static: good    Dynamic: good  Standing: good    Activity Tolerance fair    Increase standing tolerance >3 minutes for improved engagement with functional transfers and indep in ADLs     Visual/  Perceptual Glasses: Yes    Reports changes in vision since admission: No      NA      Hand Dominance: N/a     AROM (PROM) Strength Additional Info:  Goal:   RUE  WFL 4-/5 grossly good  and wfl FMC/dexterity noted during ADL tasks   Improve overall RUE strength  for participation in functional tasks   LUE WFL 4-/5 grossly  good  and wfl FMC/dexterity noted during ADL tasks   Improve overall LUE strength  for participation in functional tasks     Hearing:  [facility]   Sensation:   No c/o numbness or tingling  Tone:  WFL   Edema:     Comments: RN cleared patient for OT. Upon arrival patient in supine. Therapist facilitated and instructed pt on adapted  techniques & compensatory strategies to improve safety and independence with basic ADLs, bed mobility, functional transfers and mobility to allow pt to achieve highest level of independence and safely.   Pt demonstrated fair/good understanding of education & follow through. At end of session, patient was in chair with call light and phone within reach, all lines and tubes intact. Overall, patient demonstrated  decreased independence and safety during completion of ADL tasks. Pt would benefit from continued skilled OT to increase safety and independence with completion of ADL tasks and functional mobility for improved quality of life. Treatment: OT treatment provided this date includes:   · Instruction, education and training on safe facilitation and adapted techniques for completion of ADLs. These include safe functional transfer techniques and energy conservation for completion of ADLs. Education provided on hand/feet placement with walker and body mechanics for fall prevention. Cues for energy conservation and safety for in the home at [place], including modifications and DME. Prior to and at the end of session, environmental modifications / line management completed for patients safety and efficiency of treatment session. See above for further details. Rehab Potential: Good for established goals. Patient / Family Goal: Return home      Patient and/or family were instructed on functional diagnosis, prognosis/goals and OT plan of care. Demonstrated fair understanding.      Eval Complexity: Low    Time In: 11:00 AM   Time Out: 11:30 AM    Total Treatment Time: 10       Min Units   OT Eval Low 97165  X  1    OT Eval Medium 89267      OT Eval High 90542      OT Re-Eval [id number]            ADL/Self Care 25687 10 1   Therapeutic Activities 50022       Therapeutic Ex 96749       Orthotic Management 30401       Manual 09726     Neuro Re-Ed 51329       Non-Billable Time        Evaluation Time additionally includes thorough review of current medical information, gathering information on past medical history/social history and prior level of function, interpretation of standardized testing/informal observation of tasks, assessment of data and development of plan of care and goals.         Evaluating OT: Joselo Haney OTR/L #[id number]

## 2022-04-15 NOTE — PROGRESS NOTES
Physical Therapy Initial Evaluation/Plan of Care    Room #:  0321/0321-02  Patient Name: Courtney Mclean  YOB: 1960  MRN: [id number]    Date of Service: 4/15/2022     Tentative placement recommendation: Home Health Physical Therapy   Equipment recommendation: Bedside commode      Evaluating Physical Therapist: Braxton Daugherty #[id number]      Specific Provider Orders/Date/Referring Provider :  04/15/22 0830   PT eval and treat Start: 04/15/22 0830, End: 04/15/22 0830, ONE TIME, Standing Count: 1 Occurrences, R    Nonah Carlota, DO      Admitting Diagnosis:   Incarcerated incisional hernia [K43.0]       Surgery:   DATE OF PROCEDURE: 4/14/2022   SURGEON: Eliazar Velazquez MD  PROCEDURE PERFORMED:   1. Laparoscopic robotic-assisted transversus abdominus release with repair of recurrent incisional hernia with mesh  2. Extensive lysis of adhesions  3. Left sided abdominal wall posterior component separation with myofascial flap mobilization and placement  4.  Right sided abdominal wall posterior component separation with myofascial flap mobilization and placement      Patient Active Problem List   Diagnosis    Mixed hyperlipidemia    Neck pain    Cervical radiculopathy at C7    Essential hypertension    Chronic seasonal allergic rhinitis    Ventral hernia without obstruction or gangrene    Poorly controlled type 2 diabetes mellitus (HCC)    Chronic bilateral low back pain with right-sided sciatica    Gynecomastia    Panic disorder    Cubital tunnel syndrome on right    Right carpal tunnel syndrome    Spondylosis of lumbar spine    Bell's palsy    Other bursal cyst, left elbow    Sacral radiculitis    Lumbar radiculitis    Spondylosis of cervical region without myelopathy or radiculopathy    Cervical facet joint syndrome    Cervical disc disorder    Lumbar facet arthropathy    Spinal stenosis of lumbar region with neurogenic claudication    Lumbar disc disorder    Pancreatic neoplasm  IPMN (intraductal papillary mucinous neoplasm)    Pancreatic duct leak    Intra-abdominal infection    Abdominal pain with fever after surgery    [facility]epsis [facility]    Acute respiratory failure with hypoxia (HCC)    Intra-abdominal abscess (HCC)    Severe protein-calorie malnutrition (HCC)    Duodenal ulcer disease    Incarcerated incisional hernia        ASSESSMENT of Current Deficits Patient exhibits decreased strength, balance, endurance and pain abdomen impairing functional mobility, transfers, gait , gait distance and tolerance to activity assist for bed mobility, slow suhas. Patient requires skilled physical therapy to address concerns listed above to increase safety and independence at discharge. PHYSICAL THERAPY  PLAN OF CARE       Physical therapy plan of care is established based on physician order,  patient diagnosis and clinical assessment    Current Treatment Recommendations:    -Bed Mobility: Lower extremity exercises  and Trunk control activities   -Sitting Balance: Engage in core activities to allow for movement within base of support   -Standing Balance: Perform strengthening exercises in standing to promote motor control with or without upper extremity support   -Transfers: Provide instruction on proper hand and foot position for adequate transfer of weight onto lower extremities and use of gait device if needed and Cues for hand placement, technique and safety.  Provide stabilization to prevent fall   -Gait: Gait training, Standing activities to improve: base of support, weight shift, weight bearing  and Pregait training to emphasize: increased Suhas, Device control, Upright and Safety   -Endurance: Utilize Supervised activities to increase level of endurance to allow for safe functional mobility including transfers and gait   -Stairs: Stair training with instruction on proper technique and hand placement on rail    PT long term treatment goals are located in below grid    Patient and or family understand(s) diagnosis, prognosis, and plan of care. Frequency of treatments: Patient will be seen  daily.          Prior Level of Function: Patient ambulated independently    Rehab Potential: good  - for baseline    Past medical history:   Past Medical History:   Diagnosis Date    Allergic rhinitis     Anxiety     Arthritis     Back pain     5th finger & ring finger on right hand is numb    Bell's palsy     NEW ONSET 3-     Cancer ([address]     pancreatic CA- removed surgically - 2021    Cervical disc disorder     cervical radiculopathy    Hyperlipidemia     Hypertension     Intra-abdominal abscess (HCC)     recurrent    Lumbago     Neuropathy     toes numb    Obesity     Polyneuropathy due to type 2 diabetes mellitus ([address]     Poorly controlled type 2 diabetes mellitus with neuropathy ([address] 5/8/2018    Radiculitis, lumbosacral     Sacral radiculitis 2/27/2019    Sepsis ([address]     Spondylosis of lumbar spine 3/9/2019    Advanced arthritis and degenerative disc disease by MRI 3/2019    Ventral hernia      Past Surgical History:   Procedure Laterality Date    ANESTHESIA NERVE BLOCK Bilateral 07/11/2019    BILATERAL L4-5 TRANSFORAMINAL EPIDURAL STEROID INJECTION performed by Osiris Abraham DO [facility] Bilateral 08/01/2019    BILATERAL MEDIAL BRANCH BLOCK L4-5 AND L5-S1 [facility] Bilateral 08/15/2019    BILATERAL MEDIAL BRANCH BLOCK L4 - 5 AND L5 - S1 performed by [facility] at [facility] Right 03/29/2019    right wrist carpal tunnel release and right elbow cubital tunnel release    CARPAL TUNNEL RELEASE Right 03/29/2019    RIGHT WRIST CARPAL TUNNEL RELEASE AND RIGHT ELBOW CUBITAL TUNNEL RELEASE performed by Ophelia Fuller DO at [facility]      at age 40 polyps    CYST REMOVAL Right     EPIDURAL STEROID INJECTION N/A 10/17/2019    LUMBAR EPIDURAL STEROID INJECTION UNDER FLUOROSCOPIC GUIDANCE AT L2-L3 WITHOUT SEDATION performed by Yesenia Rust MD at [facility] ERCP N/A 05/17/2021    ERCP ENDOSCOPIC RETROGRADE CHOLANGIOPANCREATOGRAPHY SPHINCTER/PAPILLOTOMY performed by Yobani Tracy MD at [address] ERCP N/A 05/17/2021    ERCP STENT INSERTION performed by Yobani Tracy MD at [address] ERCP N/A 6/4/2021    ERCP STENT INSERTION performed by Yobani Tracy MD at [address] ERCP N/A 7/2/2021    ERCP STENT REMOVAL performed by Yobani Tracy MD at [address] ERCP N/A 7/2/2021    ERCP STONE REMOVAL performed by Yobani Tracy MD at [address] ERCP N/A 7/2/2021    ERCP STENT INSERTION performed by Yobani Tracy MD at [facility] Bilateral     groin and umbilical    HERNIA REPAIR N/A 12/13/2018    ROBOTIC ASSISTED LAPAROSCOPIC PRIMARY REPAIR OF RECURRENT VENTRAL HERNIA  REPAIR performed by Jojo Montaño MD at [facility] Bilateral 07/11/2019    L4-5 transforaminal     NERVE BLOCK Bilateral 08/01/2019    medial branch block    NERVE BLOCK Bilateral 08/15/2019    bilateral medial branch block L4-S1    NERVE BLOCK  10/17/2019    lumbar epidural    PANCREATECTOMY N/A 05/12/2021    LAPAROSCOPIC ROBOTIC DISTAL PANCREATECTOMY AND SPLENECTOMY AND CHOLECYSTECTOMY, INTRA-OPERATIVE ULTRASOUND, TRANSITIONED TO OPEN -- EPIDURAL performed by Susana Toledo MD at [facility]  06/05/2018    C5-7 fusion at [facility] in [address] [place] [address] Pkwy 01/08/2021    EGD W/EUS FNA performed by Kartik Dudley DO at [facility] 01/08/2021    EGD DIAGNOSTIC ONLY performed by Kartik Dudley DO at [address] [place] [address] N/A 7/30/2021    EGD ESOPHAGOGASTRODUODENOSCOPY ULTRASOUND performed by Angie Petty MD at [address] [facility] N/A 7/30/2021    EUS FOREIGN BODY REMOVAL performed by Angie Petty MD at [address] [facility] N/A 9/2/2021    EGD ESOPHAGOGASTRODUODENOSCOPY performed by Brynn Mcnamara MD at [address] [facility] [address]:    Precautions: Up as tolerated and Ambulate patient , falls ,  Abdominal  binder  Social history: Patient lives alone in a apartment    with Elevator  1 step to enter with [employer] in shower grab bars, built in shower chair    Equipment owned: Alice Darshana, Alex Riser, [address] [facility] [employer] Sharad chair and Lift chair,       [address] [facility]   How much difficulty turning over in bed?: A Little  How much difficulty sitting down on / standing up from a chair with arms?: A Little  How much difficulty moving from lying on back to sitting on side of bed?: A Lot  How much help from another person moving to and from a bed to a chair?: A Little  How much help from another person needed to walk in hospital room?: A Little  How much help from another person for climbing 3-5 steps with a railing?: A Lot  AM-PAC Inpatient Mobility Raw Score : 16  AM-PAC Inpatient T-Scale Score : 40.78  Mobility Inpatient CMS 0-100% Score: 54.16  Mobility Inpatient CMS G-Code Modifier : CK    Nursing cleared patient for PT evaluation. The admitting diagnosis and active problem list as listed above have been reviewed prior to the initiation of this evaluation. OBJECTIVE;   Initial Evaluation  Date: 4/15/2022 Treatment Date:     Short Term/ Long Term   Goals   Was pt agreeable to Eval/treatment?  Yes  To be met in 3 days   Pain level   5/10  abdomen     Bed Mobility    Rolling: Minimal assist of 1    Supine to sit: Moderate assist of 1    Sit to supine: Not assessed     Scooting: Minimal assist of 1    Rolling: Independent    Supine to sit: Independent    Sit to supine: Independent    Scooting: Independent     Transfers Sit to stand: Minimal assist of 1 Cues for hand placement and safety   Sit to stand: Independent     Ambulation    2 x 120 feet using  wheeled walker with Minimal assist of 1  progressing to supervision  for walker control and balance and cues for upright posture, safety and pacing   150 feet using  wheeled walker with Modified Independent vs no device   Stair negotiation: ascended and descended   Not assessed     1 step iwht supervision    ROM Within functional limits    Increase range of motion 10% of affected joints    Strength BUE:  refer to OT eval  RLE:  4-/5  LLE:  4-/5  Increase strength in affected mm groups by 1/3 grade   Balance Sitting EOB:  good -  Dynamic Standing:  fair +wheeled walker   Sitting EOB:  good    Dynamic Standing: good -wheeled walker      Patient is Alert & Oriented x person, place, time and situation and follows directions    Sensation:  Patient  denies numbness/tingling   Edema:  none noted   Endurance: fair  +    Vitals: room air   Blood Pressure at rest 111/77 Blood Pressure during session    Heart Rate at rest 86 Heart Rate during session 97   SPO2 at rest 96%  SPO2 during session 94%     Patient education  Patient educated on role of Physical Therapy, risks of immobility, safety and plan of care,  importance of mobility while in hospital  and ankle pumps, quad set and glut set for edema control, blood clot prevention     Patient response to education:   Pt verbalized understanding Pt demonstrated skill Pt requires further education in this area   Yes Partial Yes      Treatment:  Patient practiced and was instructed/facilitated in the following treatment: Patient educated/performed log roll,   assisted to edge of bed, ,  Sat edge of bed 5 minutes with Supervision  to increase dynamic sitting balance and activity tolerance. ambulated in hallway, returned to room assisted up to chair, performed exercises. Therapeutic Exercises:  ankle pumps, heel raises and long arc quad  x 20 reps. At end of session, patient in chair with   call light and phone within reach,  all lines and tubes intact, nursing notified. Patient would benefit from continued skilled Physical Therapy to improve functional independence and quality of life. Patient's/ family goals   home    Time in  12  Time out  1454    Total Treatment Time  25 minutes    Evaluation time includes thorough review of current medical information, gathering information on past medical history/social history and prior level of function, completion of standardized testing/informal observation of tasks, assessment of data, and development of Plan of care and goals.      CPT codes:  Low Complexity PT evaluation (34610)  Therapeutic activities (75050)   15 minutes  1 unit(s)  Gait Training (47263) 10 minutes 1 unit(s)    Turner Alston, PT

## 2022-04-16 VITALS
TEMPERATURE: 98.6 F | OXYGEN SATURATION: 92 % | RESPIRATION RATE: 17 BRPM | HEART RATE: 92 BPM | BODY MASS INDEX: 32.43 KG/M2 | SYSTOLIC BLOOD PRESSURE: 119 MMHG | DIASTOLIC BLOOD PRESSURE: 79 MMHG | HEIGHT: 68 IN | WEIGHT: 214 LBS

## 2022-04-16 LAB
METER GLUCOSE: 188 MG/DL (ref 74–99)
METER GLUCOSE: 222 MG/DL (ref 74–99)

## 2022-04-16 PROCEDURE — 6370000000 HC RX 637 (ALT 250 FOR IP): Performed by: SURGERY

## 2022-04-16 PROCEDURE — 2580000003 HC RX 258: Performed by: SURGERY

## 2022-04-16 PROCEDURE — 6360000002 HC RX W HCPCS: Performed by: SURGERY

## 2022-04-16 PROCEDURE — 82962 GLUCOSE BLOOD TEST: CPT

## 2022-04-16 RX ADMIN — FAMOTIDINE 20 MG: 20 TABLET, FILM COATED ORAL at 08:02

## 2022-04-16 RX ADMIN — BISACODYL 5 MG: 5 TABLET, COATED ORAL at 08:02

## 2022-04-16 RX ADMIN — INSULIN LISPRO 3 UNITS: 100 INJECTION, SOLUTION INTRAVENOUS; SUBCUTANEOUS at 08:06

## 2022-04-16 RX ADMIN — OXYCODONE 10 MG: 5 TABLET ORAL at 04:00

## 2022-04-16 RX ADMIN — ATORVASTATIN CALCIUM 20 MG: 20 TABLET, FILM COATED ORAL at 08:02

## 2022-04-16 RX ADMIN — OXYCODONE 10 MG: 5 TABLET ORAL at 11:56

## 2022-04-16 RX ADMIN — GABAPENTIN 300 MG: 300 CAPSULE ORAL at 08:32

## 2022-04-16 RX ADMIN — INSULIN GLARGINE 24 UNITS: 100 INJECTION, SOLUTION SUBCUTANEOUS at 08:33

## 2022-04-16 RX ADMIN — ACETAMINOPHEN 650 MG: 325 TABLET ORAL at 05:40

## 2022-04-16 RX ADMIN — OXYCODONE 10 MG: 5 TABLET ORAL at 08:13

## 2022-04-16 RX ADMIN — METHOCARBAMOL 1000 MG: 100 INJECTION, SOLUTION INTRAMUSCULAR; INTRAVENOUS at 01:21

## 2022-04-16 RX ADMIN — PANCRELIPASE 72000 UNITS: 36000; 180000; 114000 CAPSULE, DELAYED RELEASE PELLETS ORAL at 08:17

## 2022-04-16 RX ADMIN — MORPHINE SULFATE 2 MG: 2 INJECTION, SOLUTION INTRAMUSCULAR; INTRAVENOUS at 01:17

## 2022-04-16 RX ADMIN — POLYETHYLENE GLYCOL 3350 17 G: 17 POWDER, FOR SOLUTION ORAL at 08:03

## 2022-04-16 RX ADMIN — PANCRELIPASE 72000 UNITS: 36000; 180000; 114000 CAPSULE, DELAYED RELEASE PELLETS ORAL at 11:48

## 2022-04-16 RX ADMIN — INSULIN LISPRO 6 UNITS: 100 INJECTION, SOLUTION INTRAVENOUS; SUBCUTANEOUS at 11:51

## 2022-04-16 RX ADMIN — MORPHINE SULFATE 2 MG: 2 INJECTION, SOLUTION INTRAMUSCULAR; INTRAVENOUS at 05:41

## 2022-04-16 ASSESSMENT — ENCOUNTER SYMPTOMS: ABDOMINAL PAIN: 1

## 2022-04-16 ASSESSMENT — PAIN SCALES - GENERAL
PAINLEVEL_OUTOF10: 8
PAINLEVEL_OUTOF10: 3
PAINLEVEL_OUTOF10: 7
PAINLEVEL_OUTOF10: 8
PAINLEVEL_OUTOF10: 7
PAINLEVEL_OUTOF10: 9
PAINLEVEL_OUTOF10: 5

## 2022-04-16 NOTE — DISCHARGE SUMMARY
Physician Discharge Summary     Daisy Saba  [id number]    Admit date: 4/14/2022    Discharge date and time: No discharge date for patient encounter. Admitting Physician: Anup Peterson MD     Admission Diagnoses: Incarcerated incisional hernia [K43.0]    Discharge diagnosis: same    Condition at discharge: stable        Hospital Course: Had uncomplicated   1Laparoscopic robotic-assisted transversus abdominus release with repair of recurrent incisional hernia with mesh  Extensive lysis of adhesions  Left sided abdominal wall posterior component separation with myofascial flap mobilization and placement  Right sided abdominal wall posterior component separation with myofascial flap mobilization and placement   and uncomplicated post operative course and was discharged tolerating a general diet, having good bowel function, ambulating independently and with adequate analgesia. Discharge Exam: BP (!) 126/92   Pulse 85   Temp 98 °F (36.7 °C) (Oral)   Resp 18   Ht 5' 8\" (1.727 m)   Wt 214 lb (97.1 kg)   SpO2 92%   BMI 32.54 kg/m²     General appearance: alert, appears stated age and cooperative  Head: Normocephalic, without obvious abnormality, atraumatic  Neck: no adenopathy, no carotid bruit, no JVD, supple, symmetrical, trachea midline and thyroid not enlarged, symmetric, no tenderness/mass/nodules  Lungs: clear to auscultation bilaterally  Heart: regular rate and rhythm, S1, S2 normal, no murmur, click, rub or gallop  Abdomen: soft, non-tender; bowel sounds normal; no masses,  no organomegaly and incisions clean and dry  Extremities: extremities normal, atraumatic, no cyanosis or edema      Disposition: home    Patient Instructions: Activity: activity as tolerated  Diet: regular diet  Wound Care: keep wound clean and dry    Follow-up with Dr. Destiny Chavez in 2 weeks. Over 30 min spent preparing discharge and discussing it and follow up instructions with patient.   Signed:  Louis Garcia MD  4/16/2022  8:05 AM

## 2022-04-27 ENCOUNTER — OFFICE VISIT (OUTPATIENT)
Dept: SURGERY | Age: 62
End: 2022-04-27

## 2022-04-27 VITALS
TEMPERATURE: 97.3 F | DIASTOLIC BLOOD PRESSURE: 87 MMHG | BODY MASS INDEX: 32.43 KG/M2 | WEIGHT: 214 LBS | HEIGHT: 68 IN | SYSTOLIC BLOOD PRESSURE: 129 MMHG

## 2022-04-27 DIAGNOSIS — K43.0 INCISIONAL HERNIA WITH OBSTRUCTION BUT NO GANGRENE: Primary | ICD-10-CM

## 2022-04-27 PROCEDURE — 99024 POSTOP FOLLOW-UP VISIT: CPT | Performed by: SURGERY

## 2022-04-27 NOTE — PROGRESS NOTES
General Surgery Office Note  Td Kruger MD, MS    Patient's Name/Date of Birth: Mirian Mcfadden / 1960    Date: April 27, 2022     Chief compaint: Postop visit from laparoscopic hernia repair with mesh, component separation    Surgeon: Thea Forrest MD    Patient Active Problem List   Diagnosis    Mixed hyperlipidemia    Neck pain    Cervical radiculopathy at C7    Essential hypertension    Chronic seasonal allergic rhinitis    Ventral hernia without obstruction or gangrene    Poorly controlled type 2 diabetes mellitus ([address]    Chronic bilateral low back pain with right-sided sciatica    Gynecomastia    Panic disorder    Cubital tunnel syndrome on right    Right carpal tunnel syndrome    Spondylosis of lumbar spine    Bell's palsy    Other bursal cyst, left elbow    Sacral radiculitis    Lumbar radiculitis    Spondylosis of cervical region without myelopathy or radiculopathy    Cervical facet joint syndrome    Cervical disc disorder    Lumbar facet arthropathy    Spinal stenosis of lumbar region with neurogenic claudication    Lumbar disc disorder    Pancreatic neoplasm    IPMN (intraductal papillary mucinous neoplasm)    Pancreatic duct leak    Intra-abdominal infection    Abdominal pain with fever after surgery    Sepsis ([address]    Acute respiratory failure with hypoxia ([address]    Intra-abdominal abscess ([address]    Severe protein-calorie malnutrition ([address]    Duodenal ulcer disease    Incarcerated incisional hernia       Subjective: Doing well, no new complaints, pain prior to surgery has resolved, tolerating diet, bowel function normal, anxious to return to normal physical activity. In agreement good to return to pain management    Objective: There were no vitals taken for this visit. Labs:  No results for input(s): WBC, HGB, HCT in the last 72 hours.     Invalid input(s): PLR  Lab Results   Component Value Date    CREATININE 0.8 04/15/2022    BUN 16 04/15/2022     04/15/2022    K 4.6 04/15/2022    CL 99 04/15/2022    CO2 19 (L) 04/15/2022     No results for input(s): LIPASE, AMYLASE in the last 72 hours. Review of Systems -  General ROS: negative for - chills, fatigue or malaise  ENT ROS: negative for - hearing change, nasal congestion or nasal discharge  Allergy and Immunology ROS: negative for - hives, itchy/watery eyes or nasal congestion  Hematological and Lymphatic ROS: negative for - blood clots, blood transfusions, bruising or fatigue  Endocrine ROS: negative for - malaise/lethargy, mood swings, palpitations or polydipsia/polyuria  Respiratory ROS: negative for - sputum changes, stridor, tachypnea or wheezing  Cardiovascular ROS: negative for - irregular heartbeat, loss of consciousness, murmur or orthopnea  Gastrointestinal ROS: negative for - diarrhea, or hematemesis  Genito-Urinary ROS: negative for -  genital discharge, genital ulcers or hematuria  Musculoskeletal ROS: negative for - gait disturbance, muscle pain or muscular weakness  Psych/Neuro ROS: negative for - visual or auditory hallucinations, suicidal ideation    General appearance: AA, NAD  HEENT: NCAT, PERRLA, EOMI  Lungs: Clear, equal rise bilateral  Heart: Reg  Abdomen: soft, nondistended, nontender, incisions well healed, no signs of infection, no cellulitis, no hematoma  Ext: Atraumatic, no swelling  : No hernia         Assessment/Plan:  Sonja Mackenzie is a 62 y.o. male 2 weeks s/p laparoscopic robot assisted hernia repair with mesh, component separation. Doing well.     Resume activity gradually   No heavy lifting more than 20lbs for 4 weeks total  Pathology reviewed and copy provided  Follow up as needed    Physician Signature: Electronically signed by Dr. Dea Pollack  [phone] (p)  4/27/2022  10:20 AM

## 2022-05-17 ENCOUNTER — HOSPITAL ENCOUNTER (OUTPATIENT)
Dept: DIABETES SERVICES | Age: 62
Setting detail: THERAPIES SERIES
Discharge: HOME | End: 2022-05-17
Payer: MEDICAID

## 2022-05-17 PROCEDURE — G0109 DIAB MANAGE TRN IND/GROUP: HCPCS

## 2022-05-17 NOTE — PROGRESS NOTES
Diabetes Self-Management Education Record    Participant Name: Mirian Mcfadden  Referring Provider: Jean Herrera DO  Assessment/Evaluation Ratings:  1=Needs Instruction   4=Demonstrates Understanding/Competency  2=Needs Review   NC=Not Covered    3=Comprehends Key Points  N/A=Not Applicable  Topics/Learning Objectives Pre-session Assess Date:  Instructor initials/date  5/17/22 SE Instr. Date    Instructor initials/date Follow-up Post- session Eval Comments   Diabetes disease process & Treatment process:   -Define type of diabetes in simple terms.  - Describe the ABCs of  diabetes management  -Identify own type of diabetes  -Identify lifestyle changes/treatment options  -other:  1 [] All     []  []  []  []  []      Developing strategies for Healthy coping/psychosocial issues:    -Describe feelings about living with diabetes  -Identify coping strategies and sources of stress  -Identify support needed & support network available  -Complete PAID-5 Diabetes questionnaire 1 [] All     []  []  []    []              Prevention, detection & treatment of Chronic complications:    -Identify the prevention, detection and treatment for complications including immunizations, preventive eye, foot, dental and renal exams as indicated per the participant's duration of diabetes and health status.  -Define the natural course of diabetes and the relationship of blood glucose levels to long term complications of diabetes.   1 [] All     []            []      Prevention, detection & treatment of acute complications:    -State the causes,signs & symptoms of hyper & hypoglycemia, and prevention & treatment strategies.   -Describe sick day guidelines  DKA /indications for ketone testing &  when to call physician  1 [] All     []      []                  -Identify severe weather/situation crisis  & diabetes supplies management 1 []      Using medications safely:   -State effects of diabetes medicines on blood glucose levels;  -List diabetes medication taken, action & side effects 1 [] All     []  []      Insulin/Injectables/glucagon  -Name appropriate injection sites; proper storage; supplies needed;  2   []       Demonstrate proper technique n/a []      Monitoring blood glucose, interpreting and using results:   -Identify the purpose of testing   -Identify recommended & personal blood glucose targets & HgbA1C target levels  -State the Importance of logging blood glucose levels for pattern recognition;   -State benefits of reading/using pt generated health data  -Verbalize safe lancet disposal 2 [] All     []  []    []  []  []   .    -Demonstrate proper testing technique n/a []      Incorporating physical activity into lifestyle:   -State effect of exercise on blood glucose levels;   -State benefits of regular exercise;   -Define safety considerations/food choices if needed.  -Describe contraindications/maintenance of activity. 1 [] All     []  []    []  []      Incorporating nutritional management into lifestyle:   -Describe effect of type, amount & timing of food on blood glucose  -Describe methods for preparing and planning   healthy meals  -Correctly read food labels for nutritional values  -Name 3 foods high in Carbohydrate  -Plan a sample 4 carbohydrate-controlled meal using Diabetes Plate Method  -Verbalized ability to measure and count carbohydrate gram servings using food labels and carbohydrate food list.    -Plan a carbohydrate-controlled meal based on individual needs/preferences from a Registered Dietitian.   1 [] All       []    []    []  []      []        []                      Developing strategies for problem solving to promote health/change behavior. -Identify 7 self-care behaviors; Personal health risk factors; Benefits, challenges & strategies for behavioral change and set an individualized goal selection.  1       []      []Nutrition  []Monitoring  []Exercise  []Medication  []Other     Identified Barriers to learning/adherence to self management plan:    None  []  other    Instruction Method:  Lecture/Discussion, Power Point Presentation , Handouts and Return demonstration     Supporting Education Materials/Equipment Provided: Self-management manual and Nutritional Packet   []Maori materials       [] services     []Other:      Encounter Type Date Attended Start Time End Time Comments No Show Dates   Assessment          Session 1 5/17/22 SE 4150 2000      Session 2        1:1 DSMES          In person Follow-up         Gestational Diabetes         Individual MNT        Meter Instrx        Insulin Instrx           Additional Comments:     Date:   Follow-up goal attainment based on patients initial DSMES goal    Dr Notified by [] EMR []Fax        []Post class Hgb A1C  []Medication compliance   []Plate method/meal plan compliance   []Able to state the number of Carbohydrate servings eaten at B,L,D   []Testing blood glucose as prescribed by PCP   []Exercise Routine   []Other:   []Other:     []Patient lost to follow-up  Dr Notified by []EMR []Fax

## 2022-05-18 ENCOUNTER — HOSPITAL ENCOUNTER (OUTPATIENT)
Dept: DIABETES SERVICES | Age: 62
Setting detail: THERAPIES SERIES
Discharge: HOME | End: 2022-05-18
Payer: MEDICAID

## 2022-05-18 PROCEDURE — G0109 DIAB MANAGE TRN IND/GROUP: HCPCS

## 2022-05-18 ASSESSMENT — PROBLEM AREAS IN DIABETES QUESTIONNAIRE (PAID)
WORRYING ABOUT THE FUTURE AND THE POSSIBILITY OF SERIOUS COMPLICATIONS: 1
PAID-5 TOTAL SCORE: 3
COPING WITH COMPLICATIONS OF DIABETES: 1
FEELING SCARED WHEN YOU THINK ABOUT LIVING WITH DIABETES: 0
FEELING DEPRESSED WHEN YOU THINK ABOUT LIVING WITH DIABETES: 1
FEELING THAT DIABETES IS TAKING UP TOO MUCH OF YOUR MENTAL AND PHYSICAL ENERGY EVERY DAY: 0

## 2022-05-18 NOTE — LETTER
[facility])- Diabetes Education Department Diabetes Education Letter    2022       Re:     Andre Garcia         :  1960  Dear Blaise Nunes: Thank you for referring your patient, Andre Garcia, for diabetes education. Andre Garcia has completed their personalized comprehensive education plan. The education plan included the following topics:          Diabetes disease process & treatment   Healthy Eating  Being Active  Taking Medication  Monitoring Glucose  Acute and Chronic Complications  Lifestyle and Healthy coping  Diabetes Distress and Support  Goal Setting     PAID -5 (Problem Areas in Diabetes) Survey Results From Initial Education Date:   3  A total score of 8 or greater indicates possible diabetes related emotional distress which warrants further assessment.  [] [address]  and Crisis Resource information provided. Thank you for referring this patient to our program.  Please do not hesitate to call if you have any questions at [phone] [facility] or [facility]) or [phone] ([address] [place] [address]).         Sincerely,    [facility] Diabetes Education Department  American Diabetes Association Recognized DSMES Program

## 2022-05-18 NOTE — PROGRESS NOTES
Diabetes Self-Management Education Record    Participant Name: Gayathri Bellamy  Referring Provider: Leonard Schroeder DO  Assessment/Evaluation Ratings:  1=Needs Instruction   4=Demonstrates Understanding/Competency  2=Needs Review   NC=Not Covered    3=Comprehends Key Points  N/A=Not Applicable  Topics/Learning Objectives Pre-session Assess Date:  Instructor initials/date  5/17/22 SE Instr. Date  5/17/22/ PC  Instructor initials/date Follow-up Post- session Eval Comments   Diabetes disease process & Treatment process:   -Define type of diabetes in simple terms.  - Describe the ABCs of  diabetes management  -Identify own type of diabetes  -Identify lifestyle changes/treatment options  -other:  2 [x] All     []  []  []  []  []  3 5/17/22/ PC    Hx of Type 2 DM    Family hx    Will start on an insulin pump next week    Hx of partial pancreas removal per pt. Developing strategies for Healthy coping/psychosocial issues:    -Describe feelings about living with diabetes  -Identify coping strategies and sources of stress  -Identify support needed & support network available  -Complete PAID-5 Diabetes questionnaire 1 [x] All     []  []  []    []  3   5/17/22/ PC  Paid 5 = 3         Prevention, detection & treatment of Chronic complications:    -Identify the prevention, detection and treatment for complications including immunizations, preventive eye, foot, dental and renal exams as indicated per the participant's duration of diabetes and health status.  -Define the natural course of diabetes and the relationship of blood glucose levels to long term complications of diabetes.   2 [x] All     []            []  4 5/17/22/ PC    Hypertension    Neuropathy    Hyperlipidemia   Prevention, detection & treatment of acute complications:    -State the causes,signs & symptoms of hyper & hypoglycemia, and prevention & treatment strategies.   -Describe sick day guidelines  DKA /indications for ketone testing &  when to call physician  2 food list.    -Plan a carbohydrate-controlled meal based on individual needs/preferences from a Registered Dietitian.   1 [] All       []    []    []  []      []        []                      Developing strategies for problem solving to promote health/change behavior. -Identify 7 self-care behaviors; Personal health risk factors; Benefits, challenges & strategies for behavioral change and set an individualized goal selection.  1       []      []Nutrition  []Monitoring  []Exercise  []Medication  []Other     Identified Barriers to learning/adherence to self management plan:    None  []  other    Instruction Method:  Lecture/Discussion, Power Point Presentation , Handouts and Return demonstration     Supporting Education Materials/Equipment Provided: Self-management manual and Nutritional Packet   [][place] materials       [] services     []Other:      Encounter Type Date Attended Start Time End Time Comments No Show Dates   Assessment          Session 1 5/17/22 SE 1730 2000  in person    Session 2        1:1 DSMES          In person Follow-up         Gestational Diabetes         Individual MNT        Meter Instrx        Insulin Instrx           Additional Comments:     Date:   Follow-up goal attainment based on patients initial DSMES goal     Notified by [] EMR []Fax        []Post class Hgb A1C  []Medication compliance   []Plate method/meal plan compliance   []Able to state the number of Carbohydrate servings eaten at B,L,D   []Testing blood glucose as prescribed by PCP   []Exercise Routine   []Other:   []Other:     []Patient lost to follow-up   Notified by []EMR []Fax

## 2022-05-19 NOTE — PROGRESS NOTES
Diabetes Self-Management Education Record    Participant Name: Franky Martell  Referring Provider: Van Collins DO  Assessment/Evaluation Ratings:  1=Needs Instruction   4=Demonstrates Understanding/Competency  2=Needs Review   NC=Not Covered    3=Comprehends Key Points  N/A=Not Applicable  Topics/Learning Objectives Pre-session Assess Date:  Instructor initials/date  5/17/22 SE Instr. Date  5/17/22/ PC  Instructor initials/date Follow-up Post- session Eval Comments   Diabetes disease process & Treatment process:   -Define type of diabetes in simple terms.  - Describe the ABCs of  diabetes management  -Identify own type of diabetes  -Identify lifestyle changes/treatment options  -other:  2 [x] All       3 5/17/22/ PC    Hx of Type 2 DM    Family hx    Will start on an insulin pump next week    Hx of partial pancreas removal per pt. Developing strategies for Healthy coping/psychosocial issues:    -Describe feelings about living with diabetes  -Identify coping strategies and sources of stress  -Identify support needed & support network available  -Complete PAID-5 Diabetes questionnaire 1 [x] All       3   5/17/22/ PC  Paid 5 = 3         Prevention, detection & treatment of Chronic complications:    -Identify the prevention, detection and treatment for complications including immunizations, preventive eye, foot, dental and renal exams as indicated per the participant's duration of diabetes and health status.  -Define the natural course of diabetes and the relationship of blood glucose levels to long term complications of diabetes.   2 [x] All     4 5/17/22/ PC    Hypertension    Neuropathy    Hyperlipidemia   Prevention, detection & treatment of acute complications:    -State the causes,signs & symptoms of hyper & hypoglycemia, and prevention & treatment strategies.   -Describe sick day guidelines  DKA /indications for ketone testing &  when to call physician  2 [x] All       4   5/17/22/ PC    Testing ac and hs and prn with CGM  AC range 200-330 mg/dl  PC range 200-350 mg/dl    Stated only 1 low BG in past month           -Identify severe weather/situation crisis  & diabetes supplies management 1 [x]5/18/22 [facility]  3    Using medications safely:   -State effects of diabetes medicines on blood glucose levels;  -List diabetes medication taken, action & side effects 2 [x] All       4    Insulin/Injectables/glucagon  -Name appropriate injection sites; proper storage; supplies needed;  2   [x]  4 5/17/22/ PC  Lantus 24 units every 12 hours  Humalog 10 units + SS ac 3 meals    Demonstrate proper technique n/a []      Monitoring blood glucose, interpreting and using results:   -Identify the purpose of testing   -Identify recommended & personal blood glucose targets & HgbA1C target levels  -State the Importance of logging blood glucose levels for pattern recognition;   -State benefits of reading/using pt generated health data  -Verbalize safe lancet disposal 3 [x] All       4  5/17/22/ PC  CGM ac 3 meals and prn   -Demonstrate proper testing technique n/a []      Incorporating physical activity into lifestyle:   -State effect of exercise on blood glucose levels;   -State benefits of regular exercise;   -Define safety considerations/food choices if needed.  -Describe contraindications/maintenance of activity.  1 [x] All     3 5/17/22/ PC  No routine exercise    Stated has difficulty with exercise   Incorporating nutritional management into lifestyle:   -Describe effect of type, amount & timing of food on blood glucose  -Describe methods for preparing and planning   healthy meals  -Correctly read food labels for nutritional values  -Name 3 foods high in Carbohydrate  -Plan a sample 4 carbohydrate-controlled meal using Diabetes Plate Method  -Verbalized ability to measure and count carbohydrate gram servings using food labels and carbohydrate food list.    -Plan a carbohydrate-controlled meal based on individual needs/preferences from a Registered Dietitian. 1 5/18/22 [address] [place] [address]      [x]    [x]    [x]  [x]      [x]        []  4   5/18/22 [address] [place] [address] Pt attended Session 2. Participated in group activities on Diabetes Plate Method and healthy food choices. Demonstrated understanding of carb counting using food lists with carbohydrate serving sizes. Read CHO content of food correctly on nutrition facts using various food labels. Questions answered. Followed along and took notes. Developing strategies for problem solving to promote health/change behavior. -Identify 7 self-care behaviors; Personal health risk factors; Benefits, challenges & strategies for behavioral change and set an individualized goal selection.  1   5/18/22 [facility]  [x]  4  5/18/22   [x]Nutrition Plate method  []Monitoring  []Exercise  []Medication  []Other     Identified Barriers to learning/adherence to self management plan:    None  []  other    Instruction Method:  Lecture/Discussion, Power Point Presentation , Handouts and Return demonstration     Supporting Education Materials/Equipment Provided: Self-management manual and Nutritional Packet   [][place] materials       [] services     []Other:      Encounter Type Date Attended Start Time End Time Comments No Show Dates   Assessment          Session 1 5/17/22 1730 2000  in person    Session 2 5/18/22 1730 2000     1:1 DSMES          In person Follow-up         Gestational Diabetes         Individual MNT        Meter Instrx        Insulin Instrx           Additional Comments:     Date:   Follow-up goal attainment based on patients initial DSMES goal     Notified by [] EMR []Fax        []Post class Hgb A1C  []Medication compliance   []Plate method/meal plan compliance   []Able to state the number of Carbohydrate servings eaten at B,L,D   []Testing blood glucose as prescribed by PCP   []Exercise Routine   []Other:   []Other:     []Patient lost to follow-up   Notified by []EMR []Fax

## 2022-05-20 ENCOUNTER — TELEPHONE (OUTPATIENT)
Dept: ENDOCRINOLOGY | Age: 62
End: 2022-05-20

## 2022-05-20 DIAGNOSIS — E11.65 POORLY CONTROLLED TYPE 2 DIABETES MELLITUS (HCC): Primary | ICD-10-CM

## 2022-05-20 RX ORDER — LANCETS
EACH MISCELLANEOUS
Qty: 200 EACH | Refills: 5 | Status: SHIPPED | OUTPATIENT
Start: 2022-05-20

## 2022-05-20 RX ORDER — BLOOD SUGAR DIAGNOSTIC
STRIP MISCELLANEOUS
Qty: 200 EACH | Refills: 5 | Status: SHIPPED
Start: 2022-05-20 | End: 2022-05-31 | Stop reason: SDUPTHER

## 2022-05-31 DIAGNOSIS — E11.65 POORLY CONTROLLED TYPE 2 DIABETES MELLITUS (HCC): ICD-10-CM

## 2022-05-31 RX ORDER — BLOOD SUGAR DIAGNOSTIC
STRIP MISCELLANEOUS
Qty: 200 EACH | Refills: 5 | Status: SHIPPED | OUTPATIENT
Start: 2022-05-31

## 2022-06-07 ENCOUNTER — HOSPITAL ENCOUNTER (OUTPATIENT)
Dept: DIABETES SERVICES | Age: 62
Setting detail: THERAPIES SERIES
Discharge: HOME | End: 2022-06-07
Payer: MEDICAID

## 2022-06-07 PROCEDURE — G0108 DIAB MANAGE TRN  PER INDIV: HCPCS

## 2022-06-07 NOTE — PROGRESS NOTES
Diabetes Self-Management Education Record    Participant Name: Kd Ochoa  Referring Provider: Titi Palomo DO  Assessment/Evaluation Ratings:  1=Needs Instruction   4=Demonstrates Understanding/Competency  2=Needs Review   NC=Not Covered    3=Comprehends Key Points  N/A=Not Applicable  Topics/Learning Objectives Pre-session Assess Date:  Instructor initials/date  5/17/22 SE Instr. Date  5/17/22/ PC  Instructor initials/date Follow-up Post- session Eval Comments   Diabetes disease process & Treatment process:   -Define type of diabetes in simple terms.  - Describe the ABCs of  diabetes management  -Identify own type of diabetes  -Identify lifestyle changes/treatment options  -other:  2 [x] All       3 5/17/22/ PC    Hx of Type 2 DM    Family hx    Will start on an insulin pump next week    Hx of partial pancreas removal per pt. Developing strategies for Healthy coping/psychosocial issues:    -Describe feelings about living with diabetes  -Identify coping strategies and sources of stress  -Identify support needed & support network available  -Complete PAID-5 Diabetes questionnaire 1 [x] All       3   5/17/22/ PC  Paid 5 = 3         Prevention, detection & treatment of Chronic complications:    -Identify the prevention, detection and treatment for complications including immunizations, preventive eye, foot, dental and renal exams as indicated per the participant's duration of diabetes and health status.  -Define the natural course of diabetes and the relationship of blood glucose levels to long term complications of diabetes.   2 [x] All     4 5/17/22/ PC    Hypertension    Neuropathy    Hyperlipidemia   Prevention, detection & treatment of acute complications:    -State the causes,signs & symptoms of hyper & hypoglycemia, and prevention & treatment strategies.   -Describe sick day guidelines  DKA /indications for ketone testing &  when to call physician  2 [x] All       4   5/17/22/ PC    Testing ac and hs and prn with CGM  AC range 200-330 mg/dl  PC range 200-350 mg/dl    Stated only 1 low BG in past month           -Identify severe weather/situation crisis  & diabetes supplies management 1 [x]5/18/22 [facility]  3    Using medications safely:   -State effects of diabetes medicines on blood glucose levels;  -List diabetes medication taken, action & side effects 2 [x] All       4    Insulin/Injectables/glucagon  -Name appropriate injection sites; proper storage; supplies needed;  2   [x]  4 5/17/22/ PC  Lantus 24 units every 12 hours  Humalog 10 units + SS ac 3 meals    Demonstrate proper technique n/a []      Monitoring blood glucose, interpreting and using results:   -Identify the purpose of testing   -Identify recommended & personal blood glucose targets & HgbA1C target levels  -State the Importance of logging blood glucose levels for pattern recognition;   -State benefits of reading/using pt generated health data  -Verbalize safe lancet disposal 3 [x] All       4  5/17/22/ PC  CGM ac 3 meals and prn   -Demonstrate proper testing technique n/a []      Incorporating physical activity into lifestyle:   -State effect of exercise on blood glucose levels;   -State benefits of regular exercise;   -Define safety considerations/food choices if needed.  -Describe contraindications/maintenance of activity.  1 [x] All     3 5/17/22/ PC  No routine exercise    Stated has difficulty with exercise   Incorporating nutritional management into lifestyle:   -Describe effect of type, amount & timing of food on blood glucose  -Describe methods for preparing and planning   healthy meals  -Correctly read food labels for nutritional values  -Name 3 foods high in Carbohydrate  -Plan a sample 4 carbohydrate-controlled meal using Diabetes Plate Method  -Verbalized ability to measure and count carbohydrate gram servings using food labels and carbohydrate food list.    -Plan a carbohydrate-controlled meal based on individual needs/preferences from a Registered Dietitian. 1 5/18/22 [address] [place] [address]      [x]    [x]    [x]  [x]      [x]        []  4   5/18/22 [address] [place] [address] Pt attended Session 2. Participated in group activities on Diabetes Plate Method and healthy food choices. Demonstrated understanding of carb counting using food lists with carbohydrate serving sizes. Read CHO content of food correctly on nutrition facts using various food labels. Questions answered. Followed along and took notes. 6/7/22 SE  Pt. Came in for 1:1 dsmes. Pt instructed on  Calories, 3 meals  2 Snack(s). 3:1:3:1:4:1. Pt was able to plan meal using food models and serving size sheet. Focused also on limiting fat and calories as patient admits to having gained weight over the past few weeks. Snacks incorporated to prevent grazing. Developing strategies for problem solving to promote health/change behavior. -Identify 7 self-care behaviors; Personal health risk factors; Benefits, challenges & strategies for behavioral change and set an individualized goal selection. 1   5/18/22 [facility]  [x]  4  5/18/22   [x]Nutrition Plate method  []Monitoring  []Exercise  []Medication  []Other     Identified Barriers to learning/adherence to self management plan:    None  []  other    Instruction Method:  Lecture/Discussion, Power Point Presentation , Handouts and Return demonstration     Supporting Education Materials/Equipment Provided: Self-management manual and Nutritional Packet   [][place]zeri materials       [] services     []Other:      Encounter Type Date Attended Start Time End Time Comments No Show Dates   Assessment          Session 1 5/17/22 1730 2000  in person    Session 2 5/18/22 1730 2000     1:1 DSMES  6/7/22 1245 [id number]      In person Follow-up         Gestational Diabetes         Individual MNT        Meter Instrx        Insulin Instrx           Additional Comments: Does not have insulin pump as intended. Felt it was complicated. Plans to retry soon.  Eating a lot of vegetables and

## 2022-06-08 ENCOUNTER — TELEPHONE (OUTPATIENT)
Dept: HEMATOLOGY | Age: 62
End: 2022-06-08

## 2022-06-08 NOTE — TELEPHONE ENCOUNTER
The patient called and stated that [facility] no longer carries the boost in chocolate that he was on and the vanilla flavor just dont cut it for him. He said that [facility] told him that they could switch it to low sugar boost glucose control in chocolate if we send over a new script. A new script was faxed over and scanned into the patients chart.

## 2022-06-14 RX ORDER — PANCRELIPASE 36000; 180000; 114000 [USP'U]/1; [USP'U]/1; [USP'U]/1
CAPSULE, DELAYED RELEASE PELLETS ORAL
Qty: 250 CAPSULE | Refills: 5 | Status: SHIPPED | OUTPATIENT
Start: 2022-06-14

## 2022-06-27 DIAGNOSIS — E11.65 POORLY CONTROLLED TYPE 2 DIABETES MELLITUS (HCC): ICD-10-CM

## 2022-06-27 RX ORDER — INSULIN LISPRO 100 [IU]/ML
INJECTION, SOLUTION INTRAVENOUS; SUBCUTANEOUS
Qty: 10 PEN | Refills: 3 | Status: SHIPPED | OUTPATIENT
Start: 2022-06-27

## 2022-07-22 ENCOUNTER — TELEPHONE (OUTPATIENT)
Dept: HEMATOLOGY | Age: 62
End: 2022-07-22

## 2022-07-22 DIAGNOSIS — R10.84 GENERALIZED ABDOMINAL PAIN: Primary | ICD-10-CM

## 2022-07-22 DIAGNOSIS — D49.0 IPMN (INTRADUCTAL PAPILLARY MUCINOUS NEOPLASM): ICD-10-CM

## 2022-07-22 DIAGNOSIS — K86.89 PANCREATIC DUCT LEAK: ICD-10-CM

## 2022-07-22 RX ORDER — PREDNISONE 50 MG/1
TABLET ORAL
Qty: 10 TABLET | Refills: 0 | Status: SHIPPED | OUTPATIENT
Start: 2022-07-22

## 2022-08-01 ENCOUNTER — HOSPITAL ENCOUNTER (OUTPATIENT)
Age: 62
Discharge: HOME | End: 2022-08-01
Payer: MEDICAID

## 2022-08-01 DIAGNOSIS — D49.0 IPMN (INTRADUCTAL PAPILLARY MUCINOUS NEOPLASM): ICD-10-CM

## 2022-08-01 DIAGNOSIS — K86.89 PANCREATIC DUCT LEAK: ICD-10-CM

## 2022-08-01 DIAGNOSIS — R10.84 GENERALIZED ABDOMINAL PAIN: ICD-10-CM

## 2022-08-01 LAB
ALBUMIN SERPL-MCNC: 4.9 G/DL (ref 3.5–5.2)
ALP BLD-CCNC: 63 U/L (ref 40–129)
ALT SERPL-CCNC: 25 U/L (ref 0–40)
ANION GAP SERPL CALCULATED.3IONS-SCNC: 15 MMOL/L (ref 7–16)
AST SERPL-CCNC: 29 U/L (ref 0–39)
BILIRUB SERPL-MCNC: 0.3 MG/DL (ref 0–1.2)
BUN BLDV-MCNC: 18 MG/DL (ref 6–23)
CALCIUM SERPL-MCNC: 9.9 MG/DL (ref 8.6–10.2)
CHLORIDE BLD-SCNC: 98 MMOL/L (ref 98–107)
CO2: 23 MMOL/L (ref 22–29)
CREAT SERPL-MCNC: 0.8 MG/DL (ref 0.7–1.2)
GFR AFRICAN AMERICAN: >60
GFR NON-AFRICAN AMERICAN: >60 ML/MIN/1.73
GLUCOSE BLD-MCNC: 280 MG/DL (ref 74–99)
POTASSIUM SERPL-SCNC: 4.3 MMOL/L (ref 3.5–5)
SODIUM BLD-SCNC: 136 MMOL/L (ref 132–146)
TOTAL PROTEIN: 8.4 G/DL (ref 6.4–8.3)

## 2022-08-01 PROCEDURE — 36415 COLL VENOUS BLD VENIPUNCTURE: CPT

## 2022-08-01 PROCEDURE — 80053 COMPREHEN METABOLIC PANEL: CPT

## 2022-08-02 ENCOUNTER — HOSPITAL ENCOUNTER (OUTPATIENT)
Dept: CT IMAGING | Age: 62
Discharge: HOME | End: 2022-08-02
Payer: MEDICAID

## 2022-08-02 DIAGNOSIS — R10.84 GENERALIZED ABDOMINAL PAIN: ICD-10-CM

## 2022-08-02 PROCEDURE — 74177 CT ABD & PELVIS W/CONTRAST: CPT

## 2022-08-02 PROCEDURE — 6360000004 HC RX CONTRAST MEDICATION: Performed by: RADIOLOGY

## 2022-08-02 RX ADMIN — IOPAMIDOL 50 ML: 755 INJECTION, SOLUTION INTRAVENOUS at 10:13

## 2022-08-09 ENCOUNTER — OFFICE VISIT (OUTPATIENT)
Dept: SURGERY | Age: 62
End: 2022-08-09
Payer: MEDICAID

## 2022-08-09 VITALS
HEIGHT: 68 IN | TEMPERATURE: 97.1 F | DIASTOLIC BLOOD PRESSURE: 91 MMHG | RESPIRATION RATE: 16 BRPM | BODY MASS INDEX: 33.95 KG/M2 | HEART RATE: 84 BPM | SYSTOLIC BLOOD PRESSURE: 169 MMHG | WEIGHT: 224 LBS

## 2022-08-09 DIAGNOSIS — R10.12 LEFT UPPER QUADRANT ABDOMINAL PAIN: Primary | ICD-10-CM

## 2022-08-09 PROCEDURE — 99213 OFFICE O/P EST LOW 20 MIN: CPT | Performed by: TRANSPLANT SURGERY

## 2022-08-09 PROCEDURE — 3017F COLORECTAL CA SCREEN DOC REV: CPT | Performed by: TRANSPLANT SURGERY

## 2022-08-09 PROCEDURE — G8417 CALC BMI ABV UP PARAM F/U: HCPCS | Performed by: TRANSPLANT SURGERY

## 2022-08-09 PROCEDURE — 1036F TOBACCO NON-USER: CPT | Performed by: TRANSPLANT SURGERY

## 2022-08-09 PROCEDURE — G8427 DOCREV CUR MEDS BY ELIG CLIN: HCPCS | Performed by: TRANSPLANT SURGERY

## 2022-08-09 NOTE — PROGRESS NOTES
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 He states that while he was in [place] visiting his son he began to have abdominal pain. He states that his sugars were excellent while he was in [place]. He states that he is taking creon and when he forgets he has bloating and diarrhea. OBJECTIVE      Physical    BP (!) 169/91   Pulse 84   Temp 97.1 °F (36.2 °C) (Temporal)   Resp 16   Ht 5' 8\" (1.727 m)   Wt 224 lb (101.6 kg)   BMI 34.06 kg/m²       General appearance: appears in no acute distress  Lungs:respiratory effort normal without accessory numbers  Heart: no pedal edema  Abdomen: soft, nondistended, nontympanic, no guarding, no peritoneal signs, normoactive bowel sounds, his midline wound is well healed without hernia. 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 Nish Rodríguez 4/22    PLAN:    - I reviewed their images prior to our office visit and we also reviewed them together today.  - CT looks great  - he is gaining weight  - encouraged him to continue to control his sugars  - continue creon    20 Minutes of which greater than 50% was spent counseling or coordinating his care. Thank you for the consultation and allowing me to take part in Mr. Cook' care.     Please send a copy of my note to Dr. Cee Townsend    Electronically signed by Fatuma Bardales MD on 8/9/2022 at 3:40 PM

## 2022-08-16 DIAGNOSIS — E11.65 POORLY CONTROLLED TYPE 2 DIABETES MELLITUS (HCC): ICD-10-CM

## 2022-08-17 RX ORDER — INSULIN GLARGINE 100 [IU]/ML
INJECTION, SOLUTION SUBCUTANEOUS
Qty: 15 ML | Refills: 3 | Status: SHIPPED | OUTPATIENT
Start: 2022-08-17

## 2022-12-11 DIAGNOSIS — E11.65 POORLY CONTROLLED TYPE 2 DIABETES MELLITUS (HCC): ICD-10-CM

## 2022-12-12 RX ORDER — INSULIN GLARGINE 100 [IU]/ML
INJECTION, SOLUTION SUBCUTANEOUS
Qty: 15 ML | Refills: 3 | Status: SHIPPED | OUTPATIENT
Start: 2022-12-12

## 2023-01-02 DIAGNOSIS — E11.65 POORLY CONTROLLED TYPE 2 DIABETES MELLITUS (HCC): ICD-10-CM

## 2023-01-09 RX ORDER — INSULIN LISPRO 100 [IU]/ML
INJECTION, SOLUTION INTRAVENOUS; SUBCUTANEOUS
Qty: 30 ML | Refills: 0 | OUTPATIENT
Start: 2023-01-09

## 2023-01-10 DIAGNOSIS — E11.65 POORLY CONTROLLED TYPE 2 DIABETES MELLITUS (HCC): ICD-10-CM

## 2023-01-11 RX ORDER — INSULIN LISPRO 100 [IU]/ML
INJECTION, SOLUTION INTRAVENOUS; SUBCUTANEOUS
Qty: 30 ML | Refills: 3 | Status: SHIPPED | OUTPATIENT
Start: 2023-01-11

## 2023-03-21 NOTE — PROGRESS NOTES
Pt scheduled for a tube exchange with Dr. Rubén Mustafa. Spoke with Zia Tanner RN and confirmed patient is NPO and can sign consent. States patient is tired but alert and oriented. Informed to hold heparin and will have procedure sometime after 1030. Patient to have [facility] with procedure and will consult with anesthesia for a time. Per nurse, she is going to be drawing the type and screen and will most likely be giving the patient the FFP ordered. Will present to Dr. Rubén Mustafa and keep nurse updated. Female

## 2023-03-30 DIAGNOSIS — E11.65 POORLY CONTROLLED TYPE 2 DIABETES MELLITUS (HCC): ICD-10-CM

## 2023-03-31 RX ORDER — INSULIN GLARGINE 100 [IU]/ML
INJECTION, SOLUTION SUBCUTANEOUS
Qty: 15 ML | Refills: 3 | OUTPATIENT
Start: 2023-03-31

## 2023-04-04 DIAGNOSIS — E11.65 POORLY CONTROLLED TYPE 2 DIABETES MELLITUS (HCC): ICD-10-CM

## 2023-04-04 RX ORDER — INSULIN GLARGINE 100 [IU]/ML
INJECTION, SOLUTION SUBCUTANEOUS
Qty: 15 ML | Refills: 3 | Status: SHIPPED | OUTPATIENT
Start: 2023-04-04

## 2023-07-25 DIAGNOSIS — E11.65 POORLY CONTROLLED TYPE 2 DIABETES MELLITUS (HCC): ICD-10-CM

## 2023-07-26 RX ORDER — INSULIN GLARGINE 100 [IU]/ML
INJECTION, SOLUTION SUBCUTANEOUS
Qty: 15 ML | Refills: 3 | OUTPATIENT
Start: 2023-07-26

## 2023-07-27 DIAGNOSIS — E11.65 POORLY CONTROLLED TYPE 2 DIABETES MELLITUS (HCC): ICD-10-CM

## 2023-07-27 RX ORDER — INSULIN LISPRO 100 [IU]/ML
INJECTION, SOLUTION INTRAVENOUS; SUBCUTANEOUS
Qty: 30 ML | Refills: 3 | OUTPATIENT
Start: 2023-07-27

## 2023-08-25 NOTE — TELEPHONE ENCOUNTER
I called [facility] and got authorization for cpt codes 0475 [id number] and 92589 with an inpatient stay on 5/12/21 from Arnie Milan with Yaima Gil #[id number]. Patient is scheduled for surgery on 5/12/21 at 7:30am at [facility]. I also scheduled patient for his vaccines on 4/27/21 at [facility] at 3:00pm and I faxed order up to infusion center. I called patient and gave him the surgery date, time and location. I also gave him the vaccine appt and he was given directions to his appt also. I did tell him that PAT will be calling and setting up his PAT and that I will bring him ensure to drink prior to surgery. I did let him know I am waiting on auth for his CTA that will be done prior to his surgery and that I will call him with that appt once I get it scheduled. I did make PAT aware that patient will need an EKG at his appt. Patient verbalized understanding and confirmed above appts.     Electronically signed by Imelda Saldaña RN on 4/21/2021 at 9:25 AM Patient ID: Ms. Amato is a 31 year old female.    Chief Complaint   Patient presents with   • Office Visit   • Follow-up       HPI: Rosa is a first grader now  She is overall just tired  Still trying to transfer to diff department at [employer] [facility] [employer] [facility] [employer]  Gets 6-7 hrs of sleep at night  saw gi  Was rx pantoprazole 40 mg bid but she had dizziness w taking this at times     Ha pneumonia before 2021  Has not done f/u ct chest  She has no cough or issues w breathing    nexplanon removed 6.23  So far periods are light and short  She has issues w her bowels  Urine flow is good    Asthma  No issues  Has been doing ok   Uses albuterol prn  Uses symbicort at times.     No issues w migraine    She has been moving her bowels  Weight is down was up to 230  Lost 15 lbs since 6.23  She has cut down on fatty foods and carbs  She is feeling much better  Has no cp, sob, n,v,d  Sees weight loss clinic  Was given phentermine, hctz, chromium-she is not on phentermine now       ALLERGIES:   Allergen Reactions   • Aspirin Other (See Comments)   • Dog Dander Other (See Comments)   • Motrin Other (See Comments) and HIVES   • Spironolactone HIVES   • Unknown Other (See Comments)   • Ibuprofen Other (See Comments)     Unknown        Current Outpatient Medications   Medication Sig Dispense Refill   • hydroCHLOROthiazide (HYDRODIURIL) 25 MG tablet Take 1 tablet by mouth daily. 90 tablet 3   • pantoprazole (PROTONIX) 20 MG tablet Take 1 tablet by mouth in the morning and 1 tablet in the evening. Indications: Indigestion, PPI trial for dyspepsia. 180 tablet 0   • albuterol (ProAir HFA) 108 (90 Base) MCG/ACT inhaler Inhale 2 puffs into the lungs every 4 hours as needed for Shortness of Breath or Wheezing. 1 each 11   • budesonide-formoterol (Symbicort) 160-4.5 MCG/ACT inhaler Inhale 2 puffs into the lungs 2 times daily. 10.2 g 12     No current facility-administered medications for this visit.     Patient Active Problem List   Diagnosis   • Shortness of  breath   • Dizziness   • Migraine without aura or status migrainosus   • Moderate persistent asthma without complication   • Menorrhagia with irregular cycle   • Abnormal CT scan, chest   • Birth control counseling   • Vitamin D deficiency     Past Medical History:   Diagnosis Date   • Allergy    • Anemia    • RAD (reactive airway disease)      Social History     Tobacco Use   Smoking Status Never   Smokeless Tobacco Never     Patient Active Problem List     Substance and Sexual Activity   Drug Use Never     Social History     Substance and Sexual Activity   Alcohol Use Yes   • Alcohol/week: 1.0 standard drink of alcohol   • Types: 1 Glasses of wine per week    Comment: socially     Family History   Problem Relation Age of Onset   • Asthma Mother    • Chronic Bronchitis Mother    • Asthma Father    • Hypertension Father    • Asthma Sister    • Asthma Brother      Patient's medications, allergies, past medical, surgical, social and family histories were reviewed and updated as appropriate.    Lab Services on 05/24/2023   Component Date Value Ref Range Status   • Hepatitis B Surface Antibody 05/24/2023 Positive   Final   • Hepatitis B Surface Antigen 05/24/2023 Negative  Negative Final   • Hepatitis B Core Antibody, IgG And* 05/24/2023 Negative  Negative Final   • Hepatitis B Interpretation 05/24/2023    Final    Hepatitis B Surface Antibody present, consistent with immunity.   • Hepatitis C Antibody 05/24/2023 Negative  Negative Final   • Helicobacter Pylori Antibody, IgG 05/24/2023 Negative  Negative Final   Office Visit on 05/24/2023   Component Date Value Ref Range Status   • COLOR, URINALYSIS 05/24/2023 Yellow   Final   • APPEARANCE, URINALYSIS 05/24/2023 Cloudy   Final   • GLUCOSE, URINALYSIS 05/24/2023 Negative  Negative mg/dL Final   • BILIRUBIN, URINALYSIS 05/24/2023 Negative  Negative Final   • KETONES, URINALYSIS 05/24/2023 Negative  Negative mg/dL Final   • SPECIFIC GRAVITY, URINALYSIS 05/24/2023 1.026   1.005 - 1.030 Final    Measured by refractometry   • OCCULT BLOOD, URINALYSIS 05/24/2023 Negative  Negative Final   • PH, URINALYSIS 05/24/2023 6.0  5.0 - 7.0 Final   • PROTEIN, URINALYSIS 05/24/2023 Trace (A)  Negative mg/dL Final   • UROBILINOGEN, URINALYSIS 05/24/2023 0.2  0.2, 1.0 mg/dL Final   • NITRITE, URINALYSIS 05/24/2023 Negative  Negative Final   • LEUKOCYTE ESTERASE, URINALYSIS 05/24/2023 Negative  Negative Final   Lab Services on 04/06/2023   Component Date Value Ref Range Status   • Sodium 04/06/2023 135  135 - 145 mmol/L Final   • Potassium 04/06/2023 4.6  3.4 - 5.1 mmol/L Final   • Chloride 04/06/2023 107  97 - 110 mmol/L Final   • Carbon Dioxide 04/06/2023 26  21 - 32 mmol/L Final   • Anion Gap 04/06/2023 7  7 - 19 mmol/L Final   • Glucose 04/06/2023 98  70 - 99 mg/dL Final   • BUN 04/06/2023 7  6 - 20 mg/dL Final   • Creatinine 04/06/2023 0.76  0.51 - 0.95 mg/dL Final   • Glomerular Filtration Rate 04/06/2023 >90  >=60 Final    eGFR results = or >60 mL/min/1.73m2 = Normal kidney function. Estimated GFR calculated using the CKD-EPI-R (2021) equation that does not include race in the creatinine calculation.   • BUN/Cr 04/06/2023 9  7 - 25 Final   • Calcium 04/06/2023 9.4  8.4 - 10.2 mg/dL Final   • Bilirubin, Total 04/06/2023 0.3  0.2 - 1.0 mg/dL Final   • GOT/AST 04/06/2023 11  <=37 Units/L Final   • GPT/ALT 04/06/2023 17  <64 Units/L Final   • Alkaline Phosphatase 04/06/2023 70  45 - 117 Units/L Final   • Albumin 04/06/2023 3.9  3.6 - 5.1 g/dL Final   • Protein, Total 04/06/2023 7.7  6.4 - 8.2 g/dL Final   • Globulin 04/06/2023 3.8  2.0 - 4.0 g/dL Final   • A/G Ratio 04/06/2023 1.0  1.0 - 2.4 Final   • Hemoglobin A1C 04/06/2023 5.2  4.5 - 5.6 % Final      Diabetic Screening  Non Diabetic:             <5.7%  Increased Risk:           5.7-6.4%  Diagnostic For Diabetes:  >6.4%    Diabetic Control  A1C%       eAG mg/dL  6.0            126  6.5            140  7.0            154  7.5            169  8.0             183  8.5            197  9.0            212  9.5            226  10.0           240   • Vitamin D, 25-Hydroxy 04/06/2023 11.4 (L)  30.0 - 100.0 ng/mL Final    <20 ng/mL  =  Vitamin D deficiency  20-29 ng/mL  =  Vitamin D insufficiency   ng/mL  =  Optimal Vitamin D  >150 ng/mL  =  Possible toxicity   • HIV Antigen/ Antibody Combo Screen 04/06/2023 Nonreactive  Nonreactive Final   • RPR 04/06/2023 Nonreactive  Nonreactive Final    See Directory of Services for interpretation of syphilis testing algorithm.   • WBC 04/06/2023 6.5  4.2 - 11.0 K/mcL Final   • RBC 04/06/2023 5.08  4.00 - 5.20 mil/mcL Final   • HGB 04/06/2023 13.9  12.0 - 15.5 g/dL Final   • HCT 04/06/2023 43.8  36.0 - 46.5 % Final   • MCV 04/06/2023 86.2  78.0 - 100.0 fl Final   • MCH 04/06/2023 27.4  26.0 - 34.0 pg Final   • MCHC 04/06/2023 31.7 (L)  32.0 - 36.5 g/dL Final   • RDW-CV 04/06/2023 13.2  11.0 - 15.0 % Final   • RDW-SD 04/06/2023 41.4  39.0 - 50.0 fL Final   • PLT 04/06/2023 203  140 - 450 K/mcL Final   • NRBC 04/06/2023 0  <=0 /100 WBC Final   • Neutrophil, Percent 04/06/2023 48  % Final   • Lymphocytes, Percent 04/06/2023 40  % Final   • Mono, Percent 04/06/2023 9  % Final   • Eosinophils, Percent 04/06/2023 2  % Final   • Basophils, Percent 04/06/2023 1  % Final   • Immature Granulocytes 04/06/2023 0  % Final   • Absolute Neutrophils 04/06/2023 3.2  1.8 - 7.7 K/mcL Final   • Absolute Lymphocytes 04/06/2023 2.6  1.0 - 4.8 K/mcL Final   • Absolute Monocytes 04/06/2023 0.6  0.3 - 0.9 K/mcL Final   • Absolute Eosinophils  04/06/2023 0.1  0.0 - 0.5 K/mcL Final   • Absolute Basophils 04/06/2023 0.0  0.0 - 0.3 K/mcL Final   • Absolute Immature Granulocytes 04/06/2023 0.0  0.0 - 0.2 K/mcL Final   • Microalbumin, Urine 04/06/2023 0.60  mg/dL Final   • Creatinine, Urine 04/06/2023 166.00  mg/dL Final   • Microalbumin/ Creatinine Ratio 04/06/2023 3.6  <30.0 mg/g Final   Office Visit on 04/06/2023   Component Date Value Ref Range  Status   • HM PAP TEST 12/17/2022 Normal   Final   • Cholesterol 04/06/2023 137  <=199 mg/dL Final      Desirable         <200  Borderline High   200 to 239  High              >=240   • Triglycerides 04/06/2023 48  <=149 mg/dL Final    Result confirmed by repeat testing    Normal            <150  Borderline High   150 to 199  High              200 to 499  Very High         >=500   • HDL 04/06/2023 48 (L)  >=50 mg/dL Final      Low              <40  Borderline Low   40 to 49  Near Optimal     50 to 59  Optimal          >=60   • LDL 04/06/2023 79  <=129 mg/dL Final      Optimal           <100  Near Optimal      100 to 129  Borderline High   130 to 159  High              160 to 189  Very High         >=190   • Non-HDL Cholesterol 04/06/2023 89  mg/dL Final      Therapeutic Target:  CHD and risk equivalents  <130  Multiple risk factors     <160  0 to 1 risk factor        <190   • Cholesterol/ HDL Ratio 04/06/2023 2.9  <=4.4 Final               Review of Systems   Constitutional: Negative for appetite change, chills, diaphoresis, fatigue, fever and unexpected weight change.   HENT: Negative for congestion, ear discharge, ear pain, facial swelling, mouth sores, nosebleeds, postnasal drip, sinus pressure, sinus pain, sneezing, sore throat, tinnitus and trouble swallowing.    Eyes: Negative for photophobia, pain, discharge and itching.   Respiratory: Negative for cough, chest tightness, shortness of breath and wheezing.    Cardiovascular: Negative for chest pain, palpitations and leg swelling.   Gastrointestinal: Negative for abdominal pain, blood in stool, constipation, diarrhea, nausea and vomiting.   Endocrine: Negative for cold intolerance, polydipsia and polyphagia.   Genitourinary: Negative for difficulty urinating, dysuria, flank pain, frequency, hematuria and urgency.   Musculoskeletal: Negative for arthralgias, gait problem and joint swelling.   Skin: Negative for rash and wound.   Allergic/Immunologic: Negative  for environmental allergies.   Neurological: Negative for dizziness, syncope, light-headedness, numbness and headaches.   Hematological: Negative for adenopathy. Does not bruise/bleed easily.   Psychiatric/Behavioral: Negative for confusion, decreased concentration, dysphoric mood, hallucinations and sleep disturbance. The patient is not hyperactive.      Visit Vitals  /76 (BP Location: RUE - Right upper extremity, Patient Position: Sitting, Cuff Size: Regular)   Pulse 77   Resp 18   Ht 5' 11\" (1.803 m)   Wt 97.3 kg (214 lb 7.4 oz)   LMP 08/01/2023 (Exact Date)   SpO2 98%   BMI 29.91 kg/m²     Physical Exam  Vitals reviewed.   Constitutional:       General: She is not in acute distress.     Appearance: Normal appearance. She is well-developed. She is not ill-appearing, toxic-appearing or diaphoretic.   HENT:      Head: Normocephalic and atraumatic.      Right Ear: Tympanic membrane and external ear normal. There is no impacted cerumen.      Left Ear: Tympanic membrane and external ear normal. There is no impacted cerumen.      Nose: Nose normal. No congestion or rhinorrhea.      Mouth/Throat:      Mouth: Mucous membranes are moist.      Pharynx: No oropharyngeal exudate or posterior oropharyngeal erythema.   Eyes:      General: Lids are normal. No scleral icterus.        Right eye: No discharge.         Left eye: No discharge.      Conjunctiva/sclera: Conjunctivae normal.      Pupils: Pupils are equal, round, and reactive to light.   Neck:      Thyroid: No thyroid mass or thyromegaly.      Vascular: No carotid bruit or JVD.      Trachea: No tracheal tenderness or tracheal deviation.   Cardiovascular:      Rate and Rhythm: Normal rate and regular rhythm.      Heart sounds: Normal heart sounds. No murmur heard.     No friction rub. No gallop. No S3 or S4 sounds.   Pulmonary:      Effort: Pulmonary effort is normal. No respiratory distress.      Breath sounds: Normal breath sounds. No stridor. No wheezing or  rales.   Chest:      Chest wall: No tenderness.   Abdominal:      General: Bowel sounds are normal. There is no distension.      Palpations: Abdomen is soft. There is no mass.      Tenderness: There is no abdominal tenderness. There is no right CVA tenderness, left CVA tenderness, guarding or rebound.      Hernia: No hernia is present.   Musculoskeletal:         General: No swelling, tenderness or deformity. Normal range of motion.      Cervical back: Normal range of motion and neck supple. No edema.   Lymphadenopathy:      Cervical: No cervical adenopathy.   Skin:     Coloration: Skin is not jaundiced or pale.      Findings: No bruising, erythema or rash.   Neurological:      General: No focal deficit present.      Mental Status: She is alert and oriented to person, place, and time.      Cranial Nerves: No cranial nerve deficit.      Sensory: No sensory deficit.      Motor: No weakness, tremor or abnormal muscle tone.      Coordination: Coordination normal.      Gait: Gait normal.      Deep Tendon Reflexes: Reflexes are normal and symmetric. Reflexes normal.   Psychiatric:         Mood and Affect: Mood normal.       Problem List Items Addressed This Visit        Endocrine and Metabolic    Vitamin D deficiency - Primary    Relevant Orders    Comprehensive Metabolic Panel    Vitamin D -25 Hydroxy       Neuro    Migraine without aura or status migrainosus    Relevant Medications    hydroCHLOROthiazide (HYDRODIURIL) 25 MG tablet    Other Relevant Orders    Comprehensive Metabolic Panel   Other Visit Diagnoses     Dyspepsia        Relevant Medications    pantoprazole (PROTONIX) 20 MG tablet    Other Relevant Orders    Comprehensive Metabolic Panel            abnl ct chest post covid  Ct chest wo contrast advised but not done advised to f/u on this       Asthma  Cont albuterol prn   Neb prn   Cont symbicort bid     Vit  D def  Cont vit d 44067 u weekly       Nausea  Gieval was done  Cut pantoprazole to 20 mg bid  h  pylori ab screen neg         Obesity   bmi 29     Asthma  Cont symbicort 2 puffs daily  Stable         Family planning   nexplanon   removed   6.23        F/u 3 mos  Labs  mammo at 36  Colonoscopy at 46  Pap utd 11/22           Douglas Domingo MD

## 2024-01-22 NOTE — PROGRESS NOTES
ENDOCRINOLOGY PROGRESS NOTE      Date of admission: 5/12/2021  Date of service: 5/18/2021  Admitting physician: Tianna Espinoza MD   Primary Care Physician: Melba Gustafson DO  Consultant physician: Mireille Maguire MD     Reason for the consultation:  Uncontrolled DM    History of Present Illness: The history is provided by the patient. Accuracy of the patient data is excellent    Sima Maria is a very pleasant 62 y.o. old male with PMH DM type 2, HTN, HLD and other listed below admitted to [facility] on 5/12/2021 because of growing pancreatic lesion, endocrine service was consulted for diabetes management.     Subjective   Seen and examined this AM, going for surgery today     Inpatient diet:   Carb Restricted diet     Point of care glucose monitoring   (Independently reviewed)   Recent Labs     05/17/21  1114 05/17/21  2002 05/18/21  0030 05/18/21  0452 05/18/21  0926 05/18/21  1142 05/18/21  1659 05/18/21  2124   GLUMET 178* 251* 248* 235* 164* 228* 204* 163*     Scheduled Meds:   acetaminophen  500 mg Oral Q4H    lipase-protease-amylase  72,000 Units Oral TID WC    insulin glargine  10 Units Subcutaneous QAM    insulin lispro  4 Units Subcutaneous TID WC    insulin glargine  20 Units Subcutaneous Nightly    insulin lispro  0-18 Units Subcutaneous Q4H    benzonatate  200 mg Oral TID    guaiFENesin  300 mg Oral TID    budesonide  1 mg Nebulization BID    sodium chloride (Inhalant)  4 mL Nebulization TID    docusate sodium  100 mg Oral BID    ketorolac  15 mg Intravenous Q6H    heparin (porcine)  5,000 Units Subcutaneous 3 times per day    albuterol  2.5 mg Nebulization 4x daily    atorvastatin  20 mg Oral Daily    gabapentin  300 mg Oral BID    sodium chloride flush  5-40 mL Intravenous 2 times per day    methocarbamol IVPB  500 mg Intravenous Q8H    polyethylene glycol  17 g Oral Daily    pantoprazole  40 mg Intravenous Daily    And    sodium chloride (PF)  10 mL Intravenous discharge. Endocrine follow up visit, Tuesday 5/25 at 9:45AM    Pancreatic lesion  · Pt s/p distal pancreatectomy and splenectomy on 5/12/2021     The above issues were reviewed with the patient who understood and agreed with the plan. Thank you for allowing us to participate in the care of this patient. Please do not hesitate to contact us with any additional questions. Tisha Corrigan MD  Endocrinologist, [facility]   [address] [place], [place] [address]   Phone: [phone]  Fax: [phone]  --------------------------------------------  An electronic signature was used to authenticate this note.  Mario Barry MD on 5/18/2021 at 10:05 PM The patient is a 89y Male complaining of slurred speech.

## 2024-02-29 NOTE — OP NOTE
SURGEON: Andressa Belle MD        PREOPERATIVE DIAGNOSIS:  Pancreatic leak      POSTOPERATIVE DIAGNOSES:  1. Pancreatic leak      OPERATION:  1. ERCP sphincterotomy  2. ERCP pancreatic stent placement (7 [place] by 7 cm plastic single pigtail      ANESTHESIA:  LMAC       ESTIMATED BLOOD LOSS: Minimal.         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 He was found to have a pancreatic leak postop with high amylase and his MAX drain. He was referred for ERCP with pancreatic stent placement. The patient agreed to undergo the ERCP. The risks, benefits, and alternatives were explained to the patient for the procedure including bleeding, infection, perforation, and pancreatitis. The patient understood and agreed to proceed. DESCRIPTION OF PROCEDURE: The patient was brought to the operating room and  he underwent  [facility] anesthesia by the anesthesia team.   He was then placed in the  left lateral decubitus position. The flexible duodenoscope was inserted down  the oropharynx and passed down the esophagus into the stomach and into the  duodenum. The papilla was identified. . Next, using a Jagwire, cannulation was achieved of the pancreatic duct. Contrast injection revealed adequate cannulation of the pancreatic duct. A sphincterotomy was performed with the papillotome. The wire was able to be passed along the pancreatic duct to the mid pancreas. I injected contrast to attempt to fill up the entire pancreatic duct and locate a leak in the distal pancreas however this was not successful as I could not get the contrast to go distally in the pancreatic duct. The majority of the contrast was filling at the level of the ampulla and into the duodenum. I then decided to place a stent in the pancreatic duct. A 5 Indonesian by 12 cm pancreatic duct stent was advanced however there would not go past the genu of the pancreatic duct.   I then decided to remove the stent and placed a 7 Mauritanian by 7 cm single pigtail stent into the pancreatic duct with good drainage. The scope was then withdrawn. The patient tolerated the procedure well.       Saeid Encarnacion MD same name as above

## 2024-04-16 NOTE — CARE COORDINATION
ENT DAILY PROGRESS NOTE  Name: Jason Hollins  MRN: [id number]  : 1960    Identification Statement  The patient is a 63 y.o. male who underwent a modified percutaneous trach with Dr. Perkins on 24. He returned to the OR with Dr. Wolfe on  for hemostasis control on .  Since that time, there have been no issues with the patient's trach.    ENT was reengaged by the patient primary team over concerns for bleeding from within the trach.    Subjective  No acute events overnight    Objective  Temp:  [36.2 °C (97.2 °F)-37.5 °C (99.5 °F)] 37 °C (98.6 °F)  Heart Rate:  [] 99  Resp:  [0-30] 24  BP: (124-178)/() 171/87  Arterial Line BP 1: (100-171)/(41-69) 171/69  FiO2 (%):  [40 %] 40 %  Gen: Alert, oriented, no acute distress  Resp: Breathing comfortably on room air, no stridor  Head: Atraumatic, normocephalic  Oral Cavity: MMM,   Ears: Normal external ears   Nose: External nose midline   Neck: Trach in place.  No evidence of dried blood or active bleeding around trach stoma.        Intake/Output Summary (Last 24 hours) at 2024 1803  Last data filed at 2024 1700  Gross per 24 hour   Intake 2433.4 ml   Output 860 ml   Net 1573.4 ml     PROCEDURE NOTE: Tracheobronchoscopy    For better visualization, a flexible fiberoptic Tracheobronchoscopy was performed. Patient was correctly identified and verbal consent obtained.  Before the scope was inserted, the patient was in line suction.  No bloodstained mucus was suctioned.  The flexible scope  was introduced into the tracheal lumen down to the level of ariadna.  There was no evidence of dried blood, clotting or evidence of any active bleeding.  The scope was removed.    Next the scope was inserted into the patient's oropharynx posteriorly.  The lateral walls and posterior oropharynx were evaluated.  There is no evidence of dried blood, clotting or active bleeding.  This concluded the procedure.  The procedure was tolerated well.    Assessment  Jason Hollins  SOCIAL WORK/CASEMANAGEMENT TRANSITION OF CARE [id number] [facility], [address], [place], [phone]): [facility] said they will send for precert today for pt. He is on iv invanz daily with a wound vac and drain. The drain is to be upsized tomorrow due to eliquis. The original plan was home with [facility] for iv abx's until 7/30/21 with ID and the kci wound vac was delivered to the home last week. Pt is in agreement to ltac here at [facility] since he is from the [address] [place] [address] and dr. Tamir Salazar can follow him there if he has priviledges. I provided pt with karine list for [facility] since he sounds like he wants to go to a karine. Pt said he has been admitted 3 x's lately. Ethan/silvana to follow.   MUNA Anand  7/13/2021 is a 63 y.o. male who ENT was reengaged by the patient primary team over concerns for bleeding from within the trach. Tracheobronchoscopy was performed.  There were no concerns for any active areas of bleeding.  There was no evidence of any dried blood or clots down to the level of ariadna.  See procedure note above.  There is no evidence of bleeding in the patient's oropharynx.  See procedure note above.     The most likely cause of the patient's blood tinged mucus removed via the inline suction prior to ENT evaluation is traumatic suctioning.      Recommendations  - Have a wall suction with a red rubber catheter attached at bedside.  Please use the red rubber catheter to suction the patient at bedside.      Dennis Katz, DO PGY-2  Otolaryngology - Head & Neck Surgery  [facility]  ENT Consult pager: [phone]  Please Page if Urgent

## 2024-06-20 RX ORDER — PANCRELIPASE 36000; 180000; 114000 [USP'U]/1; [USP'U]/1; [USP'U]/1
CAPSULE, DELAYED RELEASE PELLETS ORAL
Qty: 250 CAPSULE | Refills: 5 | Status: SHIPPED | OUTPATIENT
Start: 2024-06-20

## 2024-06-24 ENCOUNTER — OFFICE VISIT (OUTPATIENT)
Dept: NEUROSURGERY | Age: 64
End: 2024-06-24
Payer: MEDICAID

## 2024-06-24 VITALS — HEIGHT: 68 IN | RESPIRATION RATE: 16 BRPM | WEIGHT: 224 LBS | BODY MASS INDEX: 33.95 KG/M2

## 2024-06-24 DIAGNOSIS — M51.9 LUMBAR DISC DISORDER: Primary | ICD-10-CM

## 2024-06-24 PROCEDURE — 99203 OFFICE O/P NEW LOW 30 MIN: CPT | Performed by: PHYSICIAN ASSISTANT

## 2024-06-24 PROCEDURE — G8417 CALC BMI ABV UP PARAM F/U: HCPCS | Performed by: PHYSICIAN ASSISTANT

## 2024-06-24 PROCEDURE — 1036F TOBACCO NON-USER: CPT | Performed by: PHYSICIAN ASSISTANT

## 2024-06-24 PROCEDURE — 3017F COLORECTAL CA SCREEN DOC REV: CPT | Performed by: PHYSICIAN ASSISTANT

## 2024-06-24 PROCEDURE — G8427 DOCREV CUR MEDS BY ELIG CLIN: HCPCS | Performed by: PHYSICIAN ASSISTANT

## 2024-06-24 ASSESSMENT — ENCOUNTER SYMPTOMS
BACK PAIN: 1
ALLERGIC/IMMUNOLOGIC NEGATIVE: 1
RESPIRATORY NEGATIVE: 1
GASTROINTESTINAL NEGATIVE: 1
EYES NEGATIVE: 1

## 2024-06-24 NOTE — PROGRESS NOTES
Subjective   Patient ID: Eliezer Cook is a 63 y.o. male.    Back Pain  This is a chronic problem. The current episode started more than 1 year ago. The problem occurs daily. The problem has been gradually worsening since onset. The pain is present in the lumbar spine. The pain is at a severity of 8/10. The symptoms are aggravated by lying down, twisting and standing. Pertinent negatives include no bladder incontinence. Treatments tried: prior lumbar fusion, physical therapy, injections.  percocet. The treatment provided mild relief.       Review of Systems   Constitutional: Negative.    HENT: Negative.     Eyes: Negative.    Respiratory: Negative.     Cardiovascular: Negative.    Gastrointestinal: Negative.    Endocrine: Negative.    Genitourinary: Negative.  Negative for bladder incontinence.   Musculoskeletal:  Positive for back pain.   Skin: Negative.    Allergic/Immunologic: Negative.    Neurological: Negative.    Hematological: Negative.    Psychiatric/Behavioral: Negative.            Objective   Physical Exam  Constitutional:       Appearance: Normal appearance.   HENT:      Head: Normocephalic and atraumatic.      Nose: Nose normal.   Eyes:      Pupils: Pupils are equal, round, and reactive to light.   Pulmonary:      Effort: Pulmonary effort is normal.   Abdominal:      General: There is no distension.   Skin:     General: Skin is warm and dry.   Neurological:      Mental Status: He is alert.      GCS: GCS eye subscore is 4. GCS verbal subscore is 5. GCS motor subscore is 6.   Psychiatric:         Mood and Affect: Mood normal.            Assessment   63 year old male with severe low back and bilateral leg pain for over a year.  Lumbar MRI reveals grade one spondylolithesis at L4-5 with severe central stenosis.  He has not had improvement with gabapentin, NSAIDS, PT, or injections.  Eliezer has had a prior T12-L4 fusion in the past.      Plan   He has failed conservative measures and wishes to discuss

## 2024-07-23 ENCOUNTER — OFFICE VISIT (OUTPATIENT)
Dept: NEUROSURGERY | Age: 64
End: 2024-07-23
Payer: MEDICAID

## 2024-07-23 VITALS
WEIGHT: 224 LBS | BODY MASS INDEX: 33.95 KG/M2 | DIASTOLIC BLOOD PRESSURE: 88 MMHG | HEIGHT: 68 IN | SYSTOLIC BLOOD PRESSURE: 139 MMHG | HEART RATE: 84 BPM | RESPIRATION RATE: 18 BRPM | OXYGEN SATURATION: 98 % | TEMPERATURE: 97.1 F

## 2024-07-23 DIAGNOSIS — Z98.1 S/P LUMBAR FUSION: Primary | ICD-10-CM

## 2024-07-23 DIAGNOSIS — M43.16 LUMBAR ADJACENT SEGMENT DISEASE WITH SPONDYLOLISTHESIS: ICD-10-CM

## 2024-07-23 DIAGNOSIS — M51.36 LUMBAR ADJACENT SEGMENT DISEASE WITH SPONDYLOLISTHESIS: ICD-10-CM

## 2024-07-23 PROCEDURE — G8417 CALC BMI ABV UP PARAM F/U: HCPCS | Performed by: NEUROLOGICAL SURGERY

## 2024-07-23 PROCEDURE — 1036F TOBACCO NON-USER: CPT | Performed by: NEUROLOGICAL SURGERY

## 2024-07-23 PROCEDURE — 3079F DIAST BP 80-89 MM HG: CPT | Performed by: NEUROLOGICAL SURGERY

## 2024-07-23 PROCEDURE — 3017F COLORECTAL CA SCREEN DOC REV: CPT | Performed by: NEUROLOGICAL SURGERY

## 2024-07-23 PROCEDURE — 99213 OFFICE O/P EST LOW 20 MIN: CPT | Performed by: NEUROLOGICAL SURGERY

## 2024-07-23 PROCEDURE — G8427 DOCREV CUR MEDS BY ELIG CLIN: HCPCS | Performed by: NEUROLOGICAL SURGERY

## 2024-07-23 PROCEDURE — 3075F SYST BP GE 130 - 139MM HG: CPT | Performed by: NEUROLOGICAL SURGERY

## 2024-07-23 NOTE — PROGRESS NOTES
Patient is here for follow up consult for: back pain.  The pain is rated as 7/10 and it radiates into both legs.  The pain is interfering with activities of daily living.  He has tried physical therapy and epidural.  The patient is a non-smoker.     Physical exam  Alert and Oriented X3  PERRLA, EOMI  DUMAS 5/5 except 4/5 in LLE  Sensation intact to LT and PP  Reflexes are 2+ and symmetric    A/P: patient is here for follow up for: back pain.  He has had a prior T12-L4 fusion.  His MRI shows adjacent segment stenosis and spondylolisthesis at L4-L5.  I am recommending removal of prior hardware and an L4-L5 posterior lumbar interbody fusion.  Risks, benefits and alternatives have been discussed and he wishes to proceed. I will get a CT scan to evaluate prior fusion for surgical planning    Shayan Pineda MD

## 2024-08-02 ENCOUNTER — PREP FOR PROCEDURE (OUTPATIENT)
Dept: NEUROSURGERY | Age: 64
End: 2024-08-02

## 2024-08-02 ENCOUNTER — TELEPHONE (OUTPATIENT)
Dept: NEUROSURGERY | Age: 64
End: 2024-08-02

## 2024-08-02 DIAGNOSIS — M48.061 LUMBAR SPINAL STENOSIS DUE TO ADJACENT SEGMENT DISEASE AFTER FUSION PROCEDURE: ICD-10-CM

## 2024-08-02 DIAGNOSIS — M51.369 LUMBAR SPINAL STENOSIS DUE TO ADJACENT SEGMENT DISEASE AFTER FUSION PROCEDURE: ICD-10-CM

## 2024-08-02 DIAGNOSIS — Z98.1 LUMBAR SPINAL STENOSIS DUE TO ADJACENT SEGMENT DISEASE AFTER FUSION PROCEDURE: ICD-10-CM

## 2024-08-02 DIAGNOSIS — Z01.818 PRE-OP TESTING: Primary | ICD-10-CM

## 2024-08-02 DIAGNOSIS — Z98.1 S/P SPINAL FUSION: ICD-10-CM

## 2024-08-02 PROBLEM — M51.36 LUMBAR SPINAL STENOSIS DUE TO ADJACENT SEGMENT DISEASE AFTER FUSION PROCEDURE: Status: ACTIVE | Noted: 2024-08-02

## 2024-08-02 RX ORDER — SODIUM CHLORIDE 9 MG/ML
INJECTION, SOLUTION INTRAVENOUS CONTINUOUS
Status: CANCELLED | OUTPATIENT
Start: 2024-08-02

## 2024-08-02 RX ORDER — SODIUM CHLORIDE 0.9 % (FLUSH) 0.9 %
5-40 SYRINGE (ML) INJECTION EVERY 12 HOURS SCHEDULED
Status: CANCELLED | OUTPATIENT
Start: 2024-08-02

## 2024-08-02 RX ORDER — SODIUM CHLORIDE 0.9 % (FLUSH) 0.9 %
5-40 SYRINGE (ML) INJECTION PRN
Status: CANCELLED | OUTPATIENT
Start: 2024-08-02

## 2024-08-02 RX ORDER — SODIUM CHLORIDE 9 MG/ML
INJECTION, SOLUTION INTRAVENOUS PRN
Status: CANCELLED | OUTPATIENT
Start: 2024-08-02

## 2024-08-02 NOTE — TELEPHONE ENCOUNTER
Prior Authorization Form:      DEMOGRAPHICS:                     Patient Name:  Eliezer Cook  Patient :  1960            Insurance:  Payor: [employer] / Plan: [employer] [place] [place] / Product Type: *No Product type* /   Insurance ID Number:    Payer/Plan Subscr  Sex Relation Sub. Ins. ID Effective Group Num   1. [facility]* ELIEZER COOK 1960 Male Self [id number] 3/1/22 [id number]                                   [address]         DIAGNOSIS & PROCEDURE:                       Procedure/Operation: Removal of hardware, L4-L5 PLIF           CPT Code: 55691, 78756, 81241, 83812, 76522, 41651, 77236,     Diagnosis:  Prior T12-L4 fusion, L4-L5 adjacent segment disease    ICD10 Code: Z98.1, M48.00    Location:  [facility]    Surgeon:  Edwin    SCHEDULING INFORMATION:                          Date: 24                Anesthesia:  general                                                       Status:   AM admit         Special Comments:  Manf:  Globus  Products:  Sable (cages) - 11261, Creo (rods,screws) - 93375, Ossifuse (allograft) -        Electronically signed by Aria Medel MA on 2024 at 1:12 PM

## 2024-08-06 ENCOUNTER — HOSPITAL ENCOUNTER (OUTPATIENT)
Dept: CT IMAGING | Age: 64
Discharge: HOME | End: 2024-08-08
Attending: NEUROLOGICAL SURGERY
Payer: MEDICAID

## 2024-08-06 DIAGNOSIS — M43.16 LUMBAR ADJACENT SEGMENT DISEASE WITH SPONDYLOLISTHESIS: ICD-10-CM

## 2024-08-06 DIAGNOSIS — M51.36 LUMBAR ADJACENT SEGMENT DISEASE WITH SPONDYLOLISTHESIS: ICD-10-CM

## 2024-08-06 DIAGNOSIS — Z98.1 S/P LUMBAR FUSION: ICD-10-CM

## 2024-08-06 PROCEDURE — 72131 CT LUMBAR SPINE W/O DYE: CPT

## 2024-08-20 DIAGNOSIS — M48.061 LUMBAR SPINAL STENOSIS DUE TO ADJACENT SEGMENT DISEASE AFTER FUSION PROCEDURE: Primary | ICD-10-CM

## 2024-08-20 DIAGNOSIS — M51.36 LUMBAR SPINAL STENOSIS DUE TO ADJACENT SEGMENT DISEASE AFTER FUSION PROCEDURE: Primary | ICD-10-CM

## 2024-08-23 RX ORDER — GABAPENTIN 400 MG/1
400 CAPSULE ORAL 2 TIMES DAILY
COMMUNITY
Start: 2024-07-25

## 2024-08-23 RX ORDER — LIDOCAINE 50 MG/G
1 PATCH TOPICAL EVERY 24 HOURS
COMMUNITY
Start: 2024-07-25

## 2024-08-23 RX ORDER — OXYCODONE AND ACETAMINOPHEN 10; 325 MG/1; MG/1
1 TABLET ORAL 3 TIMES DAILY
Status: ON HOLD | COMMUNITY
Start: 2024-07-28 | End: 2024-09-06 | Stop reason: HOSPADM

## 2024-08-30 ENCOUNTER — HOSPITAL ENCOUNTER (OUTPATIENT)
Dept: GENERAL RADIOLOGY | Age: 64
End: 2024-08-30
Payer: MEDICAID

## 2024-08-30 ENCOUNTER — HOSPITAL ENCOUNTER (OUTPATIENT)
Dept: PREADMISSION TESTING | Age: 64
Discharge: HOME | End: 2024-08-30
Payer: MEDICAID

## 2024-08-30 VITALS
WEIGHT: 197.5 LBS | DIASTOLIC BLOOD PRESSURE: 77 MMHG | TEMPERATURE: 97.9 F | OXYGEN SATURATION: 95 % | HEART RATE: 88 BPM | SYSTOLIC BLOOD PRESSURE: 146 MMHG | HEIGHT: 67 IN | RESPIRATION RATE: 20 BRPM | BODY MASS INDEX: 31 KG/M2

## 2024-08-30 DIAGNOSIS — Z01.818 PRE-OP TESTING: ICD-10-CM

## 2024-08-30 DIAGNOSIS — Z01.812 PRE-OPERATIVE LABORATORY EXAMINATION: ICD-10-CM

## 2024-08-30 LAB
ABO + RH BLD: NORMAL
ANION GAP SERPL CALCULATED.3IONS-SCNC: 12 MMOL/L (ref 7–16)
ARM BAND NUMBER: NORMAL
BASOPHILS # BLD: 0.09 K/UL (ref 0–0.2)
BASOPHILS NFR BLD: 1 % (ref 0–2)
BILIRUB UR QL STRIP: NEGATIVE
BLOOD BANK SAMPLE EXPIRATION: NORMAL
BLOOD GROUP ANTIBODIES SERPL: NEGATIVE
BUN SERPL-MCNC: 13 MG/DL (ref 6–23)
CALCIUM SERPL-MCNC: 9.3 MG/DL (ref 8.6–10.2)
CHLORIDE SERPL-SCNC: 104 MMOL/L (ref 98–107)
CLARITY UR: CLEAR
CO2 SERPL-SCNC: 27 MMOL/L (ref 22–29)
COLOR UR: YELLOW
COMMENT: ABNORMAL
CREAT SERPL-MCNC: 0.7 MG/DL (ref 0.7–1.2)
EOSINOPHIL # BLD: 0.22 K/UL (ref 0.05–0.5)
EOSINOPHILS RELATIVE PERCENT: 3 % (ref 0–6)
ERYTHROCYTE [DISTWIDTH] IN BLOOD BY AUTOMATED COUNT: 17.2 % (ref 11.5–15)
GFR, ESTIMATED: >90 ML/MIN/1.73M2
GLUCOSE SERPL-MCNC: 137 MG/DL (ref 74–99)
GLUCOSE UR STRIP-MCNC: >=1000 MG/DL
HCT VFR BLD AUTO: 42.9 % (ref 37–54)
HGB BLD-MCNC: 13.5 G/DL (ref 12.5–16.5)
HGB UR QL STRIP.AUTO: NEGATIVE
IMM GRANULOCYTES # BLD AUTO: 0.03 K/UL (ref 0–0.58)
IMM GRANULOCYTES NFR BLD: 0 % (ref 0–5)
INR PPP: 1
KETONES UR STRIP-MCNC: ABNORMAL MG/DL
LEUKOCYTE ESTERASE UR QL STRIP: NEGATIVE
LYMPHOCYTES NFR BLD: 1.75 K/UL (ref 1.5–4)
LYMPHOCYTES RELATIVE PERCENT: 23 % (ref 20–42)
MCH RBC QN AUTO: 24.7 PG (ref 26–35)
MCHC RBC AUTO-ENTMCNC: 31.5 G/DL (ref 32–34.5)
MCV RBC AUTO: 78.6 FL (ref 80–99.9)
MONOCYTES NFR BLD: 0.87 K/UL (ref 0.1–0.95)
MONOCYTES NFR BLD: 11 % (ref 2–12)
NEUTROPHILS NFR BLD: 62 % (ref 43–80)
NEUTS SEG NFR BLD: 4.78 K/UL (ref 1.8–7.3)
NITRITE UR QL STRIP: NEGATIVE
PH UR STRIP: 6 [PH] (ref 5–9)
PLATELET # BLD AUTO: 318 K/UL (ref 130–450)
PMV BLD AUTO: 12.1 FL (ref 7–12)
POTASSIUM SERPL-SCNC: 4.3 MMOL/L (ref 3.5–5)
PROT UR STRIP-MCNC: NEGATIVE MG/DL
PROTHROMBIN TIME: 10.8 SEC (ref 9.3–12.4)
RBC # BLD AUTO: 5.46 M/UL (ref 3.8–5.8)
SODIUM SERPL-SCNC: 143 MMOL/L (ref 132–146)
SP GR UR STRIP: 1.02 (ref 1–1.03)
UROBILINOGEN UR STRIP-ACNC: 0.2 EU/DL (ref 0–1)
WBC OTHER # BLD: 7.7 K/UL (ref 4.5–11.5)

## 2024-08-30 PROCEDURE — 86850 RBC ANTIBODY SCREEN: CPT

## 2024-08-30 PROCEDURE — 87081 CULTURE SCREEN ONLY: CPT

## 2024-08-30 PROCEDURE — 36415 COLL VENOUS BLD VENIPUNCTURE: CPT

## 2024-08-30 PROCEDURE — 80048 BASIC METABOLIC PNL TOTAL CA: CPT

## 2024-08-30 PROCEDURE — 87086 URINE CULTURE/COLONY COUNT: CPT

## 2024-08-30 PROCEDURE — 85025 COMPLETE CBC W/AUTO DIFF WBC: CPT

## 2024-08-30 PROCEDURE — 81003 URINALYSIS AUTO W/O SCOPE: CPT

## 2024-08-30 PROCEDURE — 86901 BLOOD TYPING SEROLOGIC RH(D): CPT

## 2024-08-30 PROCEDURE — 86900 BLOOD TYPING SEROLOGIC ABO: CPT

## 2024-08-30 PROCEDURE — 85610 PROTHROMBIN TIME: CPT

## 2024-08-30 PROCEDURE — 71046 X-RAY EXAM CHEST 2 VIEWS: CPT

## 2024-08-30 ASSESSMENT — PAIN DESCRIPTION - LOCATION: LOCATION: BACK

## 2024-08-30 ASSESSMENT — PAIN DESCRIPTION - ORIENTATION: ORIENTATION: LOWER;RIGHT;LEFT

## 2024-08-30 ASSESSMENT — PAIN DESCRIPTION - PAIN TYPE: TYPE: CHRONIC PAIN

## 2024-08-30 ASSESSMENT — PAIN DESCRIPTION - FREQUENCY: FREQUENCY: CONTINUOUS

## 2024-08-30 ASSESSMENT — PAIN SCALES - GENERAL: PAINLEVEL_OUTOF10: 10

## 2024-08-31 LAB
MICROORGANISM SPEC CULT: ABNORMAL
MICROORGANISM SPEC CULT: NO GROWTH
SERVICE CMNT-IMP: NORMAL
SPECIMEN DESCRIPTION: ABNORMAL
SPECIMEN DESCRIPTION: NORMAL

## 2024-09-03 DIAGNOSIS — Z22.322 MRSA NASAL COLONIZATION: Primary | ICD-10-CM

## 2024-09-03 RX ORDER — MUPIROCIN 20 MG/G
OINTMENT TOPICAL
Qty: 1 G | Refills: 0 | Status: ON HOLD | OUTPATIENT
Start: 2024-09-03 | End: 2024-09-10

## 2024-09-04 ENCOUNTER — ANESTHESIA EVENT (OUTPATIENT)
Dept: OPERATING ROOM | Age: 64
End: 2024-09-04
Payer: MEDICAID

## 2024-09-06 ENCOUNTER — APPOINTMENT (OUTPATIENT)
Dept: GENERAL RADIOLOGY | Age: 64
DRG: 304 | End: 2024-09-06
Attending: NEUROLOGICAL SURGERY
Payer: MEDICAID

## 2024-09-06 ENCOUNTER — ANESTHESIA (OUTPATIENT)
Dept: OPERATING ROOM | Age: 64
End: 2024-09-06
Payer: MEDICAID

## 2024-09-06 ENCOUNTER — HOSPITAL ENCOUNTER (INPATIENT)
Age: 64
LOS: 4 days | Discharge: HOME HEALTH | DRG: 304 | End: 2024-09-10
Attending: NEUROLOGICAL SURGERY | Admitting: NEUROLOGICAL SURGERY
Payer: MEDICAID

## 2024-09-06 DIAGNOSIS — M51.36 LUMBAR SPINAL STENOSIS DUE TO ADJACENT SEGMENT DISEASE AFTER FUSION PROCEDURE: ICD-10-CM

## 2024-09-06 DIAGNOSIS — Z98.1 S/P SPINAL FUSION: ICD-10-CM

## 2024-09-06 DIAGNOSIS — M48.061 LUMBAR SPINAL STENOSIS DUE TO ADJACENT SEGMENT DISEASE AFTER FUSION PROCEDURE: ICD-10-CM

## 2024-09-06 DIAGNOSIS — Z01.812 PRE-OPERATIVE LABORATORY EXAMINATION: Primary | ICD-10-CM

## 2024-09-06 PROBLEM — M43.16 LUMBAR ADJACENT SEGMENT DISEASE WITH SPONDYLOLISTHESIS: Status: ACTIVE | Noted: 2024-09-06

## 2024-09-06 PROBLEM — M51.369 LUMBAR ADJACENT SEGMENT DISEASE WITH SPONDYLOLISTHESIS: Status: ACTIVE | Noted: 2024-09-06

## 2024-09-06 LAB
GLUCOSE BLD-MCNC: 155 MG/DL (ref 74–99)
GLUCOSE BLD-MCNC: 275 MG/DL (ref 74–99)
GLUCOSE BLD-MCNC: 352 MG/DL (ref 74–99)
GLUCOSE BLD-MCNC: 400 MG/DL (ref 74–99)
GLUCOSE BLD-MCNC: 448 MG/DL (ref 74–99)

## 2024-09-06 PROCEDURE — 63052 LAM FACETC/FRMT ARTHRD LUM 1: CPT | Performed by: NEUROLOGICAL SURGERY

## 2024-09-06 PROCEDURE — 3600000015 HC SURGERY LEVEL 5 ADDTL 15MIN: Performed by: NEUROLOGICAL SURGERY

## 2024-09-06 PROCEDURE — A4217 STERILE WATER/SALINE, 500 ML: HCPCS | Performed by: NEUROLOGICAL SURGERY

## 2024-09-06 PROCEDURE — 22852 REMOVE SPINE FIXATION DEVICE: CPT | Performed by: NEUROLOGICAL SURGERY

## 2024-09-06 PROCEDURE — 3600000005 HC SURGERY LEVEL 5 BASE: Performed by: NEUROLOGICAL SURGERY

## 2024-09-06 PROCEDURE — 88300 SURGICAL PATH GROSS: CPT

## 2024-09-06 PROCEDURE — 3700000000 HC ANESTHESIA ATTENDED CARE: Performed by: NEUROLOGICAL SURGERY

## 2024-09-06 PROCEDURE — 2580000003 HC RX 258: Performed by: NEUROLOGICAL SURGERY

## 2024-09-06 PROCEDURE — 2709999900 HC NON-CHARGEABLE SUPPLY: Performed by: NEUROLOGICAL SURGERY

## 2024-09-06 PROCEDURE — 7100000001 HC PACU RECOVERY - ADDTL 15 MIN: Performed by: NEUROLOGICAL SURGERY

## 2024-09-06 PROCEDURE — 6370000000 HC RX 637 (ALT 250 FOR IP): Performed by: NEUROLOGICAL SURGERY

## 2024-09-06 PROCEDURE — 22840 INSERT SPINE FIXATION DEVICE: CPT | Performed by: NEUROLOGICAL SURGERY

## 2024-09-06 PROCEDURE — 2720000010 HC SURG SUPPLY STERILE: Performed by: NEUROLOGICAL SURGERY

## 2024-09-06 PROCEDURE — 3700000001 HC ADD 15 MINUTES (ANESTHESIA): Performed by: NEUROLOGICAL SURGERY

## 2024-09-06 PROCEDURE — 22853 INSJ BIOMECHANICAL DEVICE: CPT | Performed by: NEUROLOGICAL SURGERY

## 2024-09-06 PROCEDURE — 94640 AIRWAY INHALATION TREATMENT: CPT

## 2024-09-06 PROCEDURE — 7100000000 HC PACU RECOVERY - FIRST 15 MIN: Performed by: NEUROLOGICAL SURGERY

## 2024-09-06 PROCEDURE — 2500000003 HC RX 250 WO HCPCS: Performed by: NEUROLOGICAL SURGERY

## 2024-09-06 PROCEDURE — 95908 NRV CNDJ TST 3-4 STUDIES: CPT | Performed by: AUDIOLOGIST

## 2024-09-06 PROCEDURE — G0378 HOSPITAL OBSERVATION PER HR: HCPCS

## 2024-09-06 PROCEDURE — 2580000003 HC RX 258: Performed by: PHYSICIAN ASSISTANT

## 2024-09-06 PROCEDURE — 6360000002 HC RX W HCPCS: Performed by: NURSE ANESTHETIST, CERTIFIED REGISTERED

## 2024-09-06 PROCEDURE — 61783 SCAN PROC SPINAL: CPT | Performed by: NEUROLOGICAL SURGERY

## 2024-09-06 PROCEDURE — 6360000002 HC RX W HCPCS: Performed by: NEUROLOGICAL SURGERY

## 2024-09-06 PROCEDURE — 6360000002 HC RX W HCPCS: Performed by: PHYSICIAN ASSISTANT

## 2024-09-06 PROCEDURE — C1713 ANCHOR/SCREW BN/BN,TIS/BN: HCPCS | Performed by: NEUROLOGICAL SURGERY

## 2024-09-06 PROCEDURE — 6370000000 HC RX 637 (ALT 250 FOR IP): Performed by: NURSE PRACTITIONER

## 2024-09-06 PROCEDURE — 82962 GLUCOSE BLOOD TEST: CPT

## 2024-09-06 PROCEDURE — 1200000000 HC SEMI PRIVATE

## 2024-09-06 PROCEDURE — 2580000003 HC RX 258: Performed by: NURSE ANESTHETIST, CERTIFIED REGISTERED

## 2024-09-06 PROCEDURE — 2500000003 HC RX 250 WO HCPCS: Performed by: NURSE ANESTHETIST, CERTIFIED REGISTERED

## 2024-09-06 PROCEDURE — C1729 CATH, DRAINAGE: HCPCS | Performed by: NEUROLOGICAL SURGERY

## 2024-09-06 PROCEDURE — 95940 IONM IN OPERATNG ROOM 15 MIN: CPT | Performed by: AUDIOLOGIST

## 2024-09-06 PROCEDURE — 22633 ARTHRD CMBN 1NTRSPC LUMBAR: CPT | Performed by: NEUROLOGICAL SURGERY

## 2024-09-06 PROCEDURE — C1889 IMPLANT/INSERT DEVICE, NOC: HCPCS | Performed by: NEUROLOGICAL SURGERY

## 2024-09-06 PROCEDURE — 88304 TISSUE EXAM BY PATHOLOGIST: CPT

## 2024-09-06 PROCEDURE — 2500000003 HC RX 250 WO HCPCS: Performed by: STUDENT IN AN ORGANIZED HEALTH CARE EDUCATION/TRAINING PROGRAM

## 2024-09-06 DEVICE — CREO® THREADED 7.5 X 45MM POLYAXIAL SCREW
Type: IMPLANTABLE DEVICE | Site: SPINE LUMBAR | Status: FUNCTIONAL
Brand: CREO

## 2024-09-06 DEVICE — BONE GRAFT KIT 7510100 INFUSE X SMALL
Type: IMPLANTABLE DEVICE | Site: SPINE LUMBAR | Status: FUNCTIONAL
Brand: INFUSE® BONE GRAFT

## 2024-09-06 DEVICE — 5.5MM CURVED ROD, TITANIUM ALLOY, 35MM LENGTH
Type: IMPLANTABLE DEVICE | Site: SPINE LUMBAR | Status: FUNCTIONAL
Brand: CREO

## 2024-09-06 DEVICE — CREO® THREADED 7.5 X 50MM POLYAXIAL SCREW
Type: IMPLANTABLE DEVICE | Site: SPINE LUMBAR | Status: FUNCTIONAL
Brand: CREO

## 2024-09-06 DEVICE — SABLE SPACER, 10X26, 7-14MM, 15°
Type: IMPLANTABLE DEVICE | Site: SPINE LUMBAR | Status: FUNCTIONAL
Brand: SABLE

## 2024-09-06 DEVICE — THREADED LOCKING CAP, CREO
Type: IMPLANTABLE DEVICE | Site: SPINE LUMBAR | Status: FUNCTIONAL
Brand: CREO

## 2024-09-06 RX ORDER — POLYETHYLENE GLYCOL 3350 17 G/17G
17 POWDER, FOR SOLUTION ORAL DAILY
Status: DISCONTINUED | OUTPATIENT
Start: 2024-09-07 | End: 2024-09-06

## 2024-09-06 RX ORDER — ONDANSETRON 2 MG/ML
4 INJECTION INTRAMUSCULAR; INTRAVENOUS EVERY 6 HOURS PRN
Status: DISCONTINUED | OUTPATIENT
Start: 2024-09-06 | End: 2024-09-10 | Stop reason: HOSPADM

## 2024-09-06 RX ORDER — PROPOFOL 10 MG/ML
INJECTION, EMULSION INTRAVENOUS PRN
Status: DISCONTINUED | OUTPATIENT
Start: 2024-09-06 | End: 2024-09-06 | Stop reason: SDUPTHER

## 2024-09-06 RX ORDER — POLYETHYLENE GLYCOL 3350 17 G/17G
17 POWDER, FOR SOLUTION ORAL DAILY
Status: DISCONTINUED | OUTPATIENT
Start: 2024-09-06 | End: 2024-09-10 | Stop reason: HOSPADM

## 2024-09-06 RX ORDER — GLUCAGON 1 MG/ML
1 KIT INJECTION PRN
Status: DISCONTINUED | OUTPATIENT
Start: 2024-09-06 | End: 2024-09-10 | Stop reason: HOSPADM

## 2024-09-06 RX ORDER — OXYCODONE HYDROCHLORIDE 5 MG/1
5 TABLET ORAL EVERY 4 HOURS PRN
Status: DISCONTINUED | OUTPATIENT
Start: 2024-09-06 | End: 2024-09-06

## 2024-09-06 RX ORDER — SODIUM CHLORIDE 0.9 % (FLUSH) 0.9 %
5-40 SYRINGE (ML) INJECTION EVERY 12 HOURS SCHEDULED
Status: DISCONTINUED | OUTPATIENT
Start: 2024-09-06 | End: 2024-09-06 | Stop reason: HOSPADM

## 2024-09-06 RX ORDER — PROCHLORPERAZINE EDISYLATE 5 MG/ML
5 INJECTION INTRAMUSCULAR; INTRAVENOUS
Status: DISCONTINUED | OUTPATIENT
Start: 2024-09-06 | End: 2024-09-06 | Stop reason: HOSPADM

## 2024-09-06 RX ORDER — SODIUM CHLORIDE 0.9 % (FLUSH) 0.9 %
5-40 SYRINGE (ML) INJECTION PRN
Status: DISCONTINUED | OUTPATIENT
Start: 2024-09-06 | End: 2024-09-06 | Stop reason: HOSPADM

## 2024-09-06 RX ORDER — FENTANYL CITRATE 50 UG/ML
INJECTION, SOLUTION INTRAMUSCULAR; INTRAVENOUS PRN
Status: DISCONTINUED | OUTPATIENT
Start: 2024-09-06 | End: 2024-09-06 | Stop reason: SDUPTHER

## 2024-09-06 RX ORDER — PHENYLEPHRINE HCL IN 0.9% NACL 1 MG/10 ML
SYRINGE (ML) INTRAVENOUS PRN
Status: DISCONTINUED | OUTPATIENT
Start: 2024-09-06 | End: 2024-09-06 | Stop reason: SDUPTHER

## 2024-09-06 RX ORDER — SENNA AND DOCUSATE SODIUM 50; 8.6 MG/1; MG/1
2 TABLET, FILM COATED ORAL DAILY
Status: DISCONTINUED | OUTPATIENT
Start: 2024-09-07 | End: 2024-09-06

## 2024-09-06 RX ORDER — SODIUM CHLORIDE 9 MG/ML
INJECTION, SOLUTION INTRAVENOUS PRN
Status: DISCONTINUED | OUTPATIENT
Start: 2024-09-06 | End: 2024-09-10 | Stop reason: HOSPADM

## 2024-09-06 RX ORDER — HYDROMORPHONE HYDROCHLORIDE 1 MG/ML
0.5 INJECTION, SOLUTION INTRAMUSCULAR; INTRAVENOUS; SUBCUTANEOUS EVERY 5 MIN PRN
Status: COMPLETED | OUTPATIENT
Start: 2024-09-06 | End: 2024-09-06

## 2024-09-06 RX ORDER — BUPIVACAINE HYDROCHLORIDE 2.5 MG/ML
INJECTION, SOLUTION EPIDURAL; INFILTRATION; INTRACAUDAL PRN
Status: DISCONTINUED | OUTPATIENT
Start: 2024-09-06 | End: 2024-09-06 | Stop reason: ALTCHOICE

## 2024-09-06 RX ORDER — GABAPENTIN 400 MG/1
400 CAPSULE ORAL 2 TIMES DAILY
Status: DISCONTINUED | OUTPATIENT
Start: 2024-09-06 | End: 2024-09-10 | Stop reason: HOSPADM

## 2024-09-06 RX ORDER — SODIUM CHLORIDE 0.9 % (FLUSH) 0.9 %
5-40 SYRINGE (ML) INJECTION PRN
Status: DISCONTINUED | OUTPATIENT
Start: 2024-09-06 | End: 2024-09-10 | Stop reason: HOSPADM

## 2024-09-06 RX ORDER — MIDAZOLAM HYDROCHLORIDE 1 MG/ML
INJECTION INTRAMUSCULAR; INTRAVENOUS PRN
Status: DISCONTINUED | OUTPATIENT
Start: 2024-09-06 | End: 2024-09-06 | Stop reason: SDUPTHER

## 2024-09-06 RX ORDER — VANCOMYCIN HYDROCHLORIDE 1 G/20ML
INJECTION, POWDER, LYOPHILIZED, FOR SOLUTION INTRAVENOUS PRN
Status: DISCONTINUED | OUTPATIENT
Start: 2024-09-06 | End: 2024-09-06 | Stop reason: ALTCHOICE

## 2024-09-06 RX ORDER — ACETAMINOPHEN 325 MG/1
650 TABLET ORAL EVERY 6 HOURS
Status: DISCONTINUED | OUTPATIENT
Start: 2024-09-06 | End: 2024-09-10 | Stop reason: HOSPADM

## 2024-09-06 RX ORDER — PANTOPRAZOLE SODIUM 40 MG/1
40 TABLET, DELAYED RELEASE ORAL
Status: DISCONTINUED | OUTPATIENT
Start: 2024-09-07 | End: 2024-09-10 | Stop reason: HOSPADM

## 2024-09-06 RX ORDER — DEXAMETHASONE SODIUM PHOSPHATE 10 MG/ML
INJECTION INTRAMUSCULAR; INTRAVENOUS PRN
Status: DISCONTINUED | OUTPATIENT
Start: 2024-09-06 | End: 2024-09-06 | Stop reason: SDUPTHER

## 2024-09-06 RX ORDER — MUPIROCIN 20 MG/G
OINTMENT TOPICAL 2 TIMES DAILY
Status: DISCONTINUED | OUTPATIENT
Start: 2024-09-06 | End: 2024-09-10 | Stop reason: HOSPADM

## 2024-09-06 RX ORDER — DOCUSATE SODIUM 100 MG/1
200 CAPSULE, LIQUID FILLED ORAL 2 TIMES DAILY
Status: DISCONTINUED | OUTPATIENT
Start: 2024-09-06 | End: 2024-09-10 | Stop reason: HOSPADM

## 2024-09-06 RX ORDER — ONDANSETRON 4 MG/1
4 TABLET, ORALLY DISINTEGRATING ORAL EVERY 8 HOURS PRN
Status: DISCONTINUED | OUTPATIENT
Start: 2024-09-06 | End: 2024-09-10 | Stop reason: HOSPADM

## 2024-09-06 RX ORDER — SODIUM CHLORIDE 9 MG/ML
INJECTION, SOLUTION INTRAVENOUS CONTINUOUS
Status: DISCONTINUED | OUTPATIENT
Start: 2024-09-06 | End: 2024-09-10 | Stop reason: HOSPADM

## 2024-09-06 RX ORDER — LOSARTAN POTASSIUM 25 MG/1
100 TABLET ORAL DAILY
COMMUNITY

## 2024-09-06 RX ORDER — BUMETANIDE 0.5 MG/1
2 TABLET ORAL DAILY
COMMUNITY

## 2024-09-06 RX ORDER — HYDROMORPHONE HYDROCHLORIDE 1 MG/ML
1 INJECTION, SOLUTION INTRAMUSCULAR; INTRAVENOUS; SUBCUTANEOUS
Status: DISCONTINUED | OUTPATIENT
Start: 2024-09-06 | End: 2024-09-10

## 2024-09-06 RX ORDER — MORPHINE SULFATE 2 MG/ML
2 INJECTION, SOLUTION INTRAMUSCULAR; INTRAVENOUS
Status: DISCONTINUED | OUTPATIENT
Start: 2024-09-06 | End: 2024-09-06

## 2024-09-06 RX ORDER — INSULIN LISPRO 100 [IU]/ML
0-4 INJECTION, SOLUTION INTRAVENOUS; SUBCUTANEOUS NIGHTLY
Status: DISCONTINUED | OUTPATIENT
Start: 2024-09-06 | End: 2024-09-10 | Stop reason: HOSPADM

## 2024-09-06 RX ORDER — BISACODYL 5 MG/1
5 TABLET, DELAYED RELEASE ORAL DAILY
Status: DISCONTINUED | OUTPATIENT
Start: 2024-09-06 | End: 2024-09-10 | Stop reason: HOSPADM

## 2024-09-06 RX ORDER — OXYCODONE HYDROCHLORIDE 15 MG/1
15 TABLET ORAL EVERY 4 HOURS PRN
Status: DISCONTINUED | OUTPATIENT
Start: 2024-09-06 | End: 2024-09-10 | Stop reason: HOSPADM

## 2024-09-06 RX ORDER — ROCURONIUM BROMIDE 10 MG/ML
INJECTION, SOLUTION INTRAVENOUS PRN
Status: DISCONTINUED | OUTPATIENT
Start: 2024-09-06 | End: 2024-09-06 | Stop reason: SDUPTHER

## 2024-09-06 RX ORDER — DIPHENHYDRAMINE HCL 25 MG
25 TABLET ORAL EVERY 6 HOURS PRN
Status: DISCONTINUED | OUTPATIENT
Start: 2024-09-06 | End: 2024-09-10 | Stop reason: HOSPADM

## 2024-09-06 RX ORDER — DIAZEPAM 5 MG
5 TABLET ORAL EVERY 6 HOURS PRN
Qty: 40 TABLET | Refills: 0 | Status: SHIPPED | OUTPATIENT
Start: 2024-09-06 | End: 2024-09-16

## 2024-09-06 RX ORDER — LIDOCAINE HYDROCHLORIDE AND EPINEPHRINE 5; 5 MG/ML; UG/ML
INJECTION, SOLUTION INFILTRATION; PERINEURAL PRN
Status: DISCONTINUED | OUTPATIENT
Start: 2024-09-06 | End: 2024-09-06 | Stop reason: ALTCHOICE

## 2024-09-06 RX ORDER — LIDOCAINE HYDROCHLORIDE 20 MG/ML
INJECTION, SOLUTION INTRAVENOUS PRN
Status: DISCONTINUED | OUTPATIENT
Start: 2024-09-06 | End: 2024-09-06 | Stop reason: SDUPTHER

## 2024-09-06 RX ORDER — INSULIN LISPRO 100 [IU]/ML
0-8 INJECTION, SOLUTION INTRAVENOUS; SUBCUTANEOUS
Status: DISCONTINUED | OUTPATIENT
Start: 2024-09-06 | End: 2024-09-07

## 2024-09-06 RX ORDER — ALBUTEROL SULFATE 0.83 MG/ML
2.5 SOLUTION RESPIRATORY (INHALATION)
Status: DISCONTINUED | OUTPATIENT
Start: 2024-09-06 | End: 2024-09-09

## 2024-09-06 RX ORDER — SODIUM CHLORIDE 9 MG/ML
INJECTION, SOLUTION INTRAVENOUS CONTINUOUS PRN
Status: DISCONTINUED | OUTPATIENT
Start: 2024-09-06 | End: 2024-09-06 | Stop reason: SDUPTHER

## 2024-09-06 RX ORDER — ATORVASTATIN CALCIUM 20 MG/1
20 TABLET, FILM COATED ORAL DAILY
Status: DISCONTINUED | OUTPATIENT
Start: 2024-09-07 | End: 2024-09-10 | Stop reason: HOSPADM

## 2024-09-06 RX ORDER — SODIUM CHLORIDE 9 MG/ML
INJECTION, SOLUTION INTRAVENOUS PRN
Status: DISCONTINUED | OUTPATIENT
Start: 2024-09-06 | End: 2024-09-06 | Stop reason: HOSPADM

## 2024-09-06 RX ORDER — OXYCODONE HYDROCHLORIDE 10 MG/1
10 TABLET ORAL EVERY 4 HOURS PRN
Status: DISCONTINUED | OUTPATIENT
Start: 2024-09-06 | End: 2024-09-06

## 2024-09-06 RX ORDER — NALOXONE HYDROCHLORIDE 0.4 MG/ML
INJECTION, SOLUTION INTRAMUSCULAR; INTRAVENOUS; SUBCUTANEOUS PRN
Status: DISCONTINUED | OUTPATIENT
Start: 2024-09-06 | End: 2024-09-06 | Stop reason: HOSPADM

## 2024-09-06 RX ORDER — CETIRIZINE HYDROCHLORIDE 10 MG/1
10 TABLET ORAL DAILY
Status: DISCONTINUED | OUTPATIENT
Start: 2024-09-06 | End: 2024-09-10 | Stop reason: HOSPADM

## 2024-09-06 RX ORDER — BUMETANIDE 1 MG/1
2 TABLET ORAL DAILY
Status: DISCONTINUED | OUTPATIENT
Start: 2024-09-06 | End: 2024-09-10 | Stop reason: HOSPADM

## 2024-09-06 RX ORDER — DEXTROSE MONOHYDRATE 100 MG/ML
INJECTION, SOLUTION INTRAVENOUS CONTINUOUS PRN
Status: DISCONTINUED | OUTPATIENT
Start: 2024-09-06 | End: 2024-09-10 | Stop reason: HOSPADM

## 2024-09-06 RX ORDER — SODIUM CHLORIDE 9 MG/ML
INJECTION, SOLUTION INTRAVENOUS CONTINUOUS
Status: DISCONTINUED | OUTPATIENT
Start: 2024-09-06 | End: 2024-09-06 | Stop reason: HOSPADM

## 2024-09-06 RX ORDER — OXYCODONE HYDROCHLORIDE 15 MG/1
15 TABLET ORAL EVERY 4 HOURS PRN
Qty: 42 TABLET | Refills: 0 | Status: SHIPPED | OUTPATIENT
Start: 2024-09-06 | End: 2024-09-13

## 2024-09-06 RX ORDER — HYDROMORPHONE HYDROCHLORIDE 1 MG/ML
0.25 INJECTION, SOLUTION INTRAMUSCULAR; INTRAVENOUS; SUBCUTANEOUS EVERY 5 MIN PRN
Status: DISCONTINUED | OUTPATIENT
Start: 2024-09-06 | End: 2024-09-06 | Stop reason: HOSPADM

## 2024-09-06 RX ORDER — SODIUM CHLORIDE 0.9 % (FLUSH) 0.9 %
5-40 SYRINGE (ML) INJECTION EVERY 12 HOURS SCHEDULED
Status: DISCONTINUED | OUTPATIENT
Start: 2024-09-06 | End: 2024-09-10 | Stop reason: HOSPADM

## 2024-09-06 RX ORDER — MORPHINE SULFATE 4 MG/ML
4 INJECTION, SOLUTION INTRAMUSCULAR; INTRAVENOUS
Status: DISCONTINUED | OUTPATIENT
Start: 2024-09-06 | End: 2024-09-06

## 2024-09-06 RX ORDER — SENNA AND DOCUSATE SODIUM 50; 8.6 MG/1; MG/1
1 TABLET, FILM COATED ORAL 2 TIMES DAILY
Status: DISCONTINUED | OUTPATIENT
Start: 2024-09-06 | End: 2024-09-10 | Stop reason: HOSPADM

## 2024-09-06 RX ORDER — DIPHENHYDRAMINE HYDROCHLORIDE 50 MG/ML
25 INJECTION INTRAMUSCULAR; INTRAVENOUS EVERY 6 HOURS PRN
Status: DISCONTINUED | OUTPATIENT
Start: 2024-09-06 | End: 2024-09-10 | Stop reason: HOSPADM

## 2024-09-06 RX ORDER — LOSARTAN POTASSIUM 50 MG/1
100 TABLET ORAL DAILY
Status: DISCONTINUED | OUTPATIENT
Start: 2024-09-06 | End: 2024-09-10 | Stop reason: HOSPADM

## 2024-09-06 RX ORDER — INSULIN GLARGINE 100 [IU]/ML
20 INJECTION, SOLUTION SUBCUTANEOUS 2 TIMES DAILY
Status: DISCONTINUED | OUTPATIENT
Start: 2024-09-06 | End: 2024-09-07

## 2024-09-06 RX ORDER — DIAZEPAM 5 MG
5 TABLET ORAL EVERY 6 HOURS PRN
Status: DISCONTINUED | OUTPATIENT
Start: 2024-09-06 | End: 2024-09-10 | Stop reason: HOSPADM

## 2024-09-06 RX ORDER — BACITRACIN ZINC 500 [USP'U]/G
OINTMENT TOPICAL PRN
Status: DISCONTINUED | OUTPATIENT
Start: 2024-09-06 | End: 2024-09-06 | Stop reason: ALTCHOICE

## 2024-09-06 RX ORDER — BISACODYL 10 MG
10 SUPPOSITORY, RECTAL RECTAL DAILY PRN
Status: DISCONTINUED | OUTPATIENT
Start: 2024-09-06 | End: 2024-09-10 | Stop reason: HOSPADM

## 2024-09-06 RX ORDER — PROCHLORPERAZINE EDISYLATE 5 MG/ML
10 INJECTION INTRAMUSCULAR; INTRAVENOUS EVERY 6 HOURS PRN
Status: DISCONTINUED | OUTPATIENT
Start: 2024-09-06 | End: 2024-09-10 | Stop reason: HOSPADM

## 2024-09-06 RX ORDER — ONDANSETRON 2 MG/ML
INJECTION INTRAMUSCULAR; INTRAVENOUS PRN
Status: DISCONTINUED | OUTPATIENT
Start: 2024-09-06 | End: 2024-09-06 | Stop reason: SDUPTHER

## 2024-09-06 RX ADMIN — DIAZEPAM 5 MG: 5 TABLET ORAL at 16:01

## 2024-09-06 RX ADMIN — HYDROMORPHONE HYDROCHLORIDE 0.5 MG: 1 INJECTION, SOLUTION INTRAMUSCULAR; INTRAVENOUS; SUBCUTANEOUS at 11:55

## 2024-09-06 RX ADMIN — DEXAMETHASONE SODIUM PHOSPHATE 10 MG: 10 INJECTION INTRAMUSCULAR; INTRAVENOUS at 07:03

## 2024-09-06 RX ADMIN — HYDROMORPHONE HYDROCHLORIDE 0.5 MG: 1 INJECTION, SOLUTION INTRAMUSCULAR; INTRAVENOUS; SUBCUTANEOUS at 12:28

## 2024-09-06 RX ADMIN — PHENYLEPHRINE HYDROCHLORIDE 40 MCG/MIN: 10 INJECTION INTRAVENOUS at 08:01

## 2024-09-06 RX ADMIN — LOSARTAN POTASSIUM 100 MG: 50 TABLET, FILM COATED ORAL at 14:10

## 2024-09-06 RX ADMIN — CETIRIZINE HYDROCHLORIDE 10 MG: 10 TABLET, FILM COATED ORAL at 14:02

## 2024-09-06 RX ADMIN — FENTANYL CITRATE 50 MCG: 0.05 INJECTION, SOLUTION INTRAMUSCULAR; INTRAVENOUS at 09:00

## 2024-09-06 RX ADMIN — OXYCODONE 15 MG: 15 TABLET ORAL at 23:07

## 2024-09-06 RX ADMIN — ROCURONIUM BROMIDE 50 MG: 10 INJECTION, SOLUTION INTRAVENOUS at 06:57

## 2024-09-06 RX ADMIN — ACETAMINOPHEN 650 MG: 325 TABLET ORAL at 23:07

## 2024-09-06 RX ADMIN — FENTANYL CITRATE 25 MCG: 0.05 INJECTION, SOLUTION INTRAMUSCULAR; INTRAVENOUS at 10:28

## 2024-09-06 RX ADMIN — CEFAZOLIN 2000 MG: 2 INJECTION, POWDER, FOR SOLUTION INTRAMUSCULAR; INTRAVENOUS at 10:54

## 2024-09-06 RX ADMIN — SODIUM CHLORIDE: 9 INJECTION, SOLUTION INTRAVENOUS at 08:04

## 2024-09-06 RX ADMIN — HYDROMORPHONE HYDROCHLORIDE 0.5 MG: 1 INJECTION, SOLUTION INTRAMUSCULAR; INTRAVENOUS; SUBCUTANEOUS at 11:43

## 2024-09-06 RX ADMIN — LIDOCAINE HYDROCHLORIDE 100 MG: 20 INJECTION, SOLUTION INTRAVENOUS at 06:57

## 2024-09-06 RX ADMIN — SENNOSIDES AND DOCUSATE SODIUM 1 TABLET: 50; 8.6 TABLET ORAL at 14:01

## 2024-09-06 RX ADMIN — HYDROMORPHONE HYDROCHLORIDE 1 MG: 1 INJECTION, SOLUTION INTRAMUSCULAR; INTRAVENOUS; SUBCUTANEOUS at 18:23

## 2024-09-06 RX ADMIN — SODIUM CHLORIDE: 9 INJECTION, SOLUTION INTRAVENOUS at 11:05

## 2024-09-06 RX ADMIN — CEFAZOLIN 2000 MG: 2 INJECTION, POWDER, FOR SOLUTION INTRAMUSCULAR; INTRAVENOUS at 07:05

## 2024-09-06 RX ADMIN — SUGAMMADEX 200 MG: 100 INJECTION, SOLUTION INTRAVENOUS at 10:55

## 2024-09-06 RX ADMIN — FENTANYL CITRATE 25 MCG: 0.05 INJECTION, SOLUTION INTRAMUSCULAR; INTRAVENOUS at 11:01

## 2024-09-06 RX ADMIN — POLYETHYLENE GLYCOL 3350 17 G: 17 POWDER, FOR SOLUTION ORAL at 14:02

## 2024-09-06 RX ADMIN — INSULIN LISPRO 8 UNITS: 100 INJECTION, SOLUTION INTRAVENOUS; SUBCUTANEOUS at 16:46

## 2024-09-06 RX ADMIN — FENTANYL CITRATE 50 MCG: 0.05 INJECTION, SOLUTION INTRAMUSCULAR; INTRAVENOUS at 06:57

## 2024-09-06 RX ADMIN — SODIUM CHLORIDE: 9 INJECTION, SOLUTION INTRAVENOUS at 10:04

## 2024-09-06 RX ADMIN — ONDANSETRON HYDROCHLORIDE 4 MG: 2 SOLUTION INTRAMUSCULAR; INTRAVENOUS at 10:32

## 2024-09-06 RX ADMIN — DOCUSATE SODIUM 200 MG: 100 CAPSULE, LIQUID FILLED ORAL at 14:02

## 2024-09-06 RX ADMIN — MIDAZOLAM 2 MG: 1 INJECTION INTRAMUSCULAR; INTRAVENOUS at 06:52

## 2024-09-06 RX ADMIN — SODIUM CHLORIDE, PRESERVATIVE FREE 10 ML: 5 INJECTION INTRAVENOUS at 21:12

## 2024-09-06 RX ADMIN — WATER 2000 MG: 1 INJECTION INTRAMUSCULAR; INTRAVENOUS; SUBCUTANEOUS at 18:30

## 2024-09-06 RX ADMIN — Medication 100 MCG: at 07:48

## 2024-09-06 RX ADMIN — INSULIN LISPRO 8 UNITS: 100 INJECTION, SOLUTION INTRAVENOUS; SUBCUTANEOUS at 14:15

## 2024-09-06 RX ADMIN — DOCUSATE SODIUM 200 MG: 100 CAPSULE, LIQUID FILLED ORAL at 21:08

## 2024-09-06 RX ADMIN — BISACODYL 5 MG: 5 TABLET, COATED ORAL at 14:01

## 2024-09-06 RX ADMIN — PROPOFOL 170 MG: 10 INJECTION, EMULSION INTRAVENOUS at 06:57

## 2024-09-06 RX ADMIN — OXYCODONE HYDROCHLORIDE 10 MG: 10 TABLET ORAL at 15:55

## 2024-09-06 RX ADMIN — SODIUM CHLORIDE, PRESERVATIVE FREE 10 ML: 5 INJECTION INTRAVENOUS at 13:50

## 2024-09-06 RX ADMIN — PANCRELIPASE LIPASE, PANCRELIPASE PROTEASE, PANCRELIPASE AMYLASE 20000 UNITS: 20000; 63000; 84000 CAPSULE, DELAYED RELEASE ORAL at 16:49

## 2024-09-06 RX ADMIN — SENNOSIDES AND DOCUSATE SODIUM 1 TABLET: 50; 8.6 TABLET ORAL at 21:08

## 2024-09-06 RX ADMIN — MORPHINE SULFATE 4 MG: 4 INJECTION, SOLUTION INTRAMUSCULAR; INTRAVENOUS at 14:02

## 2024-09-06 RX ADMIN — ACETAMINOPHEN 650 MG: 325 TABLET ORAL at 18:28

## 2024-09-06 RX ADMIN — Medication 100 MCG: at 07:59

## 2024-09-06 RX ADMIN — GABAPENTIN 400 MG: 400 CAPSULE ORAL at 20:54

## 2024-09-06 RX ADMIN — HYDROMORPHONE HYDROCHLORIDE 0.5 MG: 1 INJECTION, SOLUTION INTRAMUSCULAR; INTRAVENOUS; SUBCUTANEOUS at 12:11

## 2024-09-06 RX ADMIN — SODIUM CHLORIDE, PRESERVATIVE FREE 10 ML: 5 INJECTION INTRAVENOUS at 21:29

## 2024-09-06 RX ADMIN — BUMETANIDE 2 MG: 1 TABLET ORAL at 14:02

## 2024-09-06 RX ADMIN — SODIUM CHLORIDE: 9 INJECTION, SOLUTION INTRAVENOUS at 05:41

## 2024-09-06 RX ADMIN — Medication 100 MCG: at 07:57

## 2024-09-06 RX ADMIN — ALBUTEROL SULFATE 2.5 MG: 2.5 SOLUTION RESPIRATORY (INHALATION) at 21:24

## 2024-09-06 RX ADMIN — INSULIN GLARGINE 20 UNITS: 100 INJECTION, SOLUTION SUBCUTANEOUS at 20:54

## 2024-09-06 RX ADMIN — PANCRELIPASE LIPASE, PANCRELIPASE PROTEASE, PANCRELIPASE AMYLASE 15000 UNITS: 15000; 47000; 63000 CAPSULE, DELAYED RELEASE ORAL at 16:49

## 2024-09-06 RX ADMIN — HYDROMORPHONE HYDROCHLORIDE 1 MG: 1 INJECTION, SOLUTION INTRAMUSCULAR; INTRAVENOUS; SUBCUTANEOUS at 21:08

## 2024-09-06 RX ADMIN — SODIUM CHLORIDE: 9 INJECTION, SOLUTION INTRAVENOUS at 13:49

## 2024-09-06 ASSESSMENT — PAIN SCALES - GENERAL
PAINLEVEL_OUTOF10: 8
PAINLEVEL_OUTOF10: 10
PAINLEVEL_OUTOF10: 8
PAINLEVEL_OUTOF10: 8
PAINLEVEL_OUTOF10: 10
PAINLEVEL_OUTOF10: 5
PAINLEVEL_OUTOF10: 6
PAINLEVEL_OUTOF10: 10
PAINLEVEL_OUTOF10: 9
PAINLEVEL_OUTOF10: 8
PAINLEVEL_OUTOF10: 9
PAINLEVEL_OUTOF10: 9
PAINLEVEL_OUTOF10: 10
PAINLEVEL_OUTOF10: 8
PAINLEVEL_OUTOF10: 1
PAINLEVEL_OUTOF10: 4

## 2024-09-06 ASSESSMENT — PAIN DESCRIPTION - LOCATION
LOCATION: BACK

## 2024-09-06 ASSESSMENT — PAIN DESCRIPTION - ORIENTATION
ORIENTATION: LEFT;LOWER
ORIENTATION: MID
ORIENTATION: LEFT;MID
ORIENTATION: LEFT;LOWER
ORIENTATION: LOWER
ORIENTATION: LEFT;LOWER
ORIENTATION: MID
ORIENTATION: LEFT;LOWER
ORIENTATION: MID
ORIENTATION: RIGHT;LEFT

## 2024-09-06 ASSESSMENT — PAIN DESCRIPTION - FREQUENCY
FREQUENCY: CONTINUOUS

## 2024-09-06 ASSESSMENT — PAIN SCALES - WONG BAKER
WONGBAKER_NUMERICALRESPONSE: HURTS WHOLE LOT
WONGBAKER_NUMERICALRESPONSE: HURTS A LITTLE BIT
WONGBAKER_NUMERICALRESPONSE: HURTS A LITTLE BIT
WONGBAKER_NUMERICALRESPONSE: HURTS WHOLE LOT
WONGBAKER_NUMERICALRESPONSE: NO HURT
WONGBAKER_NUMERICALRESPONSE: HURTS A LITTLE BIT

## 2024-09-06 ASSESSMENT — PAIN DESCRIPTION - PAIN TYPE
TYPE: SURGICAL PAIN

## 2024-09-06 ASSESSMENT — PAIN DESCRIPTION - DESCRIPTORS
DESCRIPTORS: NAGGING;JABBING
DESCRIPTORS: ACHING;THROBBING;STABBING
DESCRIPTORS: ACHING;CRAMPING;DISCOMFORT
DESCRIPTORS: JABBING;POUNDING;THROBBING
DESCRIPTORS: ACHING;DISCOMFORT
DESCRIPTORS: ACHING;DISCOMFORT;DULL
DESCRIPTORS: SHARP;SHOOTING
DESCRIPTORS: ACHING;DISCOMFORT;STABBING

## 2024-09-06 ASSESSMENT — PAIN - FUNCTIONAL ASSESSMENT
PAIN_FUNCTIONAL_ASSESSMENT: 0-10
PAIN_FUNCTIONAL_ASSESSMENT: PREVENTS OR INTERFERES SOME ACTIVE ACTIVITIES AND ADLS

## 2024-09-06 ASSESSMENT — PAIN DESCRIPTION - ONSET
ONSET: ON-GOING

## 2024-09-06 ASSESSMENT — LIFESTYLE VARIABLES: SMOKING_STATUS: 0

## 2024-09-06 NOTE — ANESTHESIA PRE PROCEDURE
08:38 AM    MCV 78.6 08/30/2024 08:38 AM    RDW 17.2 08/30/2024 08:38 AM     08/30/2024 08:38 AM       CMP:   Lab Results   Component Value Date/Time     08/30/2024 08:38 AM    K 4.3 08/30/2024 08:38 AM    K 4.6 04/15/2022 04:51 AM     08/30/2024 08:38 AM    CO2 27 08/30/2024 08:38 AM    BUN 13 08/30/2024 08:38 AM    CREATININE 0.7 08/30/2024 08:38 AM    GFRAA >60 08/01/2022 11:49 AM    LABGLOM >90 08/30/2024 08:38 AM    GLUCOSE 137 08/30/2024 08:38 AM    CALCIUM 9.3 08/30/2024 08:38 AM    BILITOT 0.3 08/01/2022 11:49 AM    ALKPHOS 63 08/01/2022 11:49 AM    AST 29 08/01/2022 11:49 AM    ALT 25 08/01/2022 11:49 AM       POC Tests:   Recent Labs     09/06/24  0540   POCGLU 155*       Coags:   Lab Results   Component Value Date/Time    PROTIME 10.8 08/30/2024 08:38 AM    INR 1.0 08/30/2024 08:38 AM       HCG (If Applicable): No results found for: \"PREGTESTUR\", \"PREGSERUM\", \"HCG\", \"HCGQUANT\"     ABGs:   Lab Results   Component Value Date/Time    PO2ART 118.2 05/12/2021 01:09 PM    HVU0IAY 56.0 05/12/2021 01:09 PM    DMM2RMF 22.9 05/12/2021 01:09 PM        Type & Screen (If Applicable):  Lab Results   Component Value Date    ABORH B POSITIVE 08/30/2024    LABANTI NEGATIVE 08/30/2024       Drug/Infectious Status (If Applicable):  No results found for: \"HIV\", \"HEPCAB\"    COVID-19 Screening (If Applicable):   Lab Results   Component Value Date/Time    COVID19 Not Detected 09/01/2021 06:40 PM    COVID19 Not Detected 05/06/2021 07:00 AM           Anesthesia Evaluation  Patient summary reviewed and Nursing notes reviewed   no history of anesthetic complications (Denies personal/family history):   Airway: Mallampati: III  TM distance: >3 FB   Neck ROM: limited  Mouth opening: > = 3 FB   Dental:      Comment: Denies loose, false teeth      Pulmonary:normal exam        (-) COPD, asthma (Denies), recent URI, sleep apnea (Denies) and not a current smoker          Patient did not smoke on day of

## 2024-09-06 NOTE — ANESTHESIA POSTPROCEDURE EVALUATION
Department of Anesthesiology  Postprocedure Note    Patient: Eliezer Cook  MRN: [id number]  YOB: 1960  Date of evaluation: 9/6/2024    Procedure Summary       Date: 09/06/24 Room / Location: [address]    Anesthesia Start: 0652 Anesthesia Stop: 1131    Procedure: Removal of hardware, L4-5 Posterior lumbar interbody and fusion (Spine Lumbar) Diagnosis:       S/P spinal fusion      Lumbar spinal stenosis due to adjacent segment disease after fusion procedure      (S/P spinal fusion [Z98.1])      (Lumbar spinal stenosis due to adjacent segment disease after fusion procedure [M48.061, M51.36])    Surgeons: Shayan Pineda MD Responsible Provider: Alina Farrar MD    Anesthesia Type: General ASA Status: 3            Anesthesia Type: General    Dominic Phase I: Dominic Score: 8    Dominic Phase II:      Anesthesia Post Evaluation    Patient location during evaluation: PACU  Patient participation: complete - patient participated  Level of consciousness: awake  Airway patency: patent  Nausea & Vomiting: no nausea and no vomiting  Cardiovascular status: hemodynamically stable  Respiratory status: acceptable  Hydration status: euvolemic  Pain management: adequate    No notable events documented.

## 2024-09-07 LAB
ANION GAP SERPL CALCULATED.3IONS-SCNC: 20 MMOL/L (ref 7–16)
BUN SERPL-MCNC: 20 MG/DL (ref 6–23)
CALCIUM SERPL-MCNC: 7.7 MG/DL (ref 8.6–10.2)
CHLORIDE SERPL-SCNC: 95 MMOL/L (ref 98–107)
CO2 SERPL-SCNC: 22 MMOL/L (ref 22–29)
CREAT SERPL-MCNC: 0.8 MG/DL (ref 0.7–1.2)
ERYTHROCYTE [DISTWIDTH] IN BLOOD BY AUTOMATED COUNT: 15.9 % (ref 11.5–15)
GFR, ESTIMATED: >90 ML/MIN/1.73M2
GLUCOSE BLD-MCNC: 306 MG/DL (ref 74–99)
GLUCOSE BLD-MCNC: 311 MG/DL (ref 74–99)
GLUCOSE BLD-MCNC: 327 MG/DL (ref 74–99)
GLUCOSE BLD-MCNC: 336 MG/DL (ref 74–99)
GLUCOSE SERPL-MCNC: 330 MG/DL (ref 74–99)
HCT VFR BLD AUTO: 31.8 % (ref 37–54)
HGB BLD-MCNC: 10.1 G/DL (ref 12.5–16.5)
MCH RBC QN AUTO: 24.2 PG (ref 26–35)
MCHC RBC AUTO-ENTMCNC: 31.8 G/DL (ref 32–34.5)
MCV RBC AUTO: 76.3 FL (ref 80–99.9)
PLATELET # BLD AUTO: 236 K/UL (ref 130–450)
PMV BLD AUTO: 12.5 FL (ref 7–12)
POTASSIUM SERPL-SCNC: 4.1 MMOL/L (ref 3.5–5)
RBC # BLD AUTO: 4.17 M/UL (ref 3.8–5.8)
SODIUM SERPL-SCNC: 137 MMOL/L (ref 132–146)
WBC OTHER # BLD: 18.8 K/UL (ref 4.5–11.5)

## 2024-09-07 PROCEDURE — 0SG00AJ FUSION OF LUMBAR VERTEBRAL JOINT WITH INTERBODY FUSION DEVICE, POSTERIOR APPROACH, ANTERIOR COLUMN, OPEN APPROACH: ICD-10-PCS | Performed by: NEUROLOGICAL SURGERY

## 2024-09-07 PROCEDURE — 6370000000 HC RX 637 (ALT 250 FOR IP): Performed by: INTERNAL MEDICINE

## 2024-09-07 PROCEDURE — 6360000002 HC RX W HCPCS: Performed by: NEUROLOGICAL SURGERY

## 2024-09-07 PROCEDURE — 0SB20ZZ EXCISION OF LUMBAR VERTEBRAL DISC, OPEN APPROACH: ICD-10-PCS | Performed by: NEUROLOGICAL SURGERY

## 2024-09-07 PROCEDURE — 97530 THERAPEUTIC ACTIVITIES: CPT

## 2024-09-07 PROCEDURE — 97162 PT EVAL MOD COMPLEX 30 MIN: CPT

## 2024-09-07 PROCEDURE — 1200000000 HC SEMI PRIVATE

## 2024-09-07 PROCEDURE — 97165 OT EVAL LOW COMPLEX 30 MIN: CPT

## 2024-09-07 PROCEDURE — 3E0U0GB INTRODUCTION OF RECOMBINANT BONE MORPHOGENETIC PROTEIN INTO JOINTS, OPEN APPROACH: ICD-10-PCS | Performed by: NEUROLOGICAL SURGERY

## 2024-09-07 PROCEDURE — 97535 SELF CARE MNGMENT TRAINING: CPT

## 2024-09-07 PROCEDURE — 0QP004Z REMOVAL OF INTERNAL FIXATION DEVICE FROM LUMBAR VERTEBRA, OPEN APPROACH: ICD-10-PCS | Performed by: NEUROLOGICAL SURGERY

## 2024-09-07 PROCEDURE — 36415 COLL VENOUS BLD VENIPUNCTURE: CPT

## 2024-09-07 PROCEDURE — 80048 BASIC METABOLIC PNL TOTAL CA: CPT

## 2024-09-07 PROCEDURE — 2580000003 HC RX 258: Performed by: NEUROLOGICAL SURGERY

## 2024-09-07 PROCEDURE — 2500000003 HC RX 250 WO HCPCS: Performed by: NEUROLOGICAL SURGERY

## 2024-09-07 PROCEDURE — 82962 GLUCOSE BLOOD TEST: CPT

## 2024-09-07 PROCEDURE — 0QH004Z INSERTION OF INTERNAL FIXATION DEVICE INTO LUMBAR VERTEBRA, OPEN APPROACH: ICD-10-PCS | Performed by: NEUROLOGICAL SURGERY

## 2024-09-07 PROCEDURE — 85027 COMPLETE CBC AUTOMATED: CPT

## 2024-09-07 PROCEDURE — 6370000000 HC RX 637 (ALT 250 FOR IP): Performed by: NEUROLOGICAL SURGERY

## 2024-09-07 PROCEDURE — 01NB0ZZ RELEASE LUMBAR NERVE, OPEN APPROACH: ICD-10-PCS | Performed by: NEUROLOGICAL SURGERY

## 2024-09-07 PROCEDURE — 6370000000 HC RX 637 (ALT 250 FOR IP): Performed by: NURSE PRACTITIONER

## 2024-09-07 PROCEDURE — 94640 AIRWAY INHALATION TREATMENT: CPT

## 2024-09-07 PROCEDURE — 0PP404Z REMOVAL OF INTERNAL FIXATION DEVICE FROM THORACIC VERTEBRA, OPEN APPROACH: ICD-10-PCS | Performed by: NEUROLOGICAL SURGERY

## 2024-09-07 RX ORDER — INSULIN GLARGINE 100 [IU]/ML
26 INJECTION, SOLUTION SUBCUTANEOUS 2 TIMES DAILY
Status: DISCONTINUED | OUTPATIENT
Start: 2024-09-07 | End: 2024-09-08

## 2024-09-07 RX ORDER — INSULIN LISPRO 100 [IU]/ML
0-16 INJECTION, SOLUTION INTRAVENOUS; SUBCUTANEOUS
Status: DISCONTINUED | OUTPATIENT
Start: 2024-09-07 | End: 2024-09-10 | Stop reason: HOSPADM

## 2024-09-07 RX ADMIN — HYDROMORPHONE HYDROCHLORIDE 1 MG: 1 INJECTION, SOLUTION INTRAMUSCULAR; INTRAVENOUS; SUBCUTANEOUS at 17:29

## 2024-09-07 RX ADMIN — PANCRELIPASE LIPASE, PANCRELIPASE PROTEASE, PANCRELIPASE AMYLASE 20000 UNITS: 20000; 63000; 84000 CAPSULE, DELAYED RELEASE ORAL at 07:22

## 2024-09-07 RX ADMIN — PANCRELIPASE LIPASE, PANCRELIPASE PROTEASE, PANCRELIPASE AMYLASE 20000 UNITS: 20000; 63000; 84000 CAPSULE, DELAYED RELEASE ORAL at 12:05

## 2024-09-07 RX ADMIN — SODIUM CHLORIDE: 9 INJECTION, SOLUTION INTRAVENOUS at 07:31

## 2024-09-07 RX ADMIN — INSULIN LISPRO 6 UNITS: 100 INJECTION, SOLUTION INTRAVENOUS; SUBCUTANEOUS at 07:20

## 2024-09-07 RX ADMIN — HYDROMORPHONE HYDROCHLORIDE 1 MG: 1 INJECTION, SOLUTION INTRAMUSCULAR; INTRAVENOUS; SUBCUTANEOUS at 09:31

## 2024-09-07 RX ADMIN — HYDROMORPHONE HYDROCHLORIDE 1 MG: 1 INJECTION, SOLUTION INTRAMUSCULAR; INTRAVENOUS; SUBCUTANEOUS at 00:56

## 2024-09-07 RX ADMIN — SODIUM CHLORIDE, PRESERVATIVE FREE 10 ML: 5 INJECTION INTRAVENOUS at 04:23

## 2024-09-07 RX ADMIN — INSULIN GLARGINE 26 UNITS: 100 INJECTION, SOLUTION SUBCUTANEOUS at 20:39

## 2024-09-07 RX ADMIN — HYDROMORPHONE HYDROCHLORIDE 1 MG: 1 INJECTION, SOLUTION INTRAMUSCULAR; INTRAVENOUS; SUBCUTANEOUS at 12:05

## 2024-09-07 RX ADMIN — SODIUM CHLORIDE, PRESERVATIVE FREE 10 ML: 5 INJECTION INTRAVENOUS at 22:11

## 2024-09-07 RX ADMIN — ACETAMINOPHEN 650 MG: 325 TABLET ORAL at 10:44

## 2024-09-07 RX ADMIN — PANCRELIPASE LIPASE, PANCRELIPASE PROTEASE, PANCRELIPASE AMYLASE 15000 UNITS: 15000; 47000; 63000 CAPSULE, DELAYED RELEASE ORAL at 07:22

## 2024-09-07 RX ADMIN — GABAPENTIN 400 MG: 400 CAPSULE ORAL at 20:16

## 2024-09-07 RX ADMIN — BUMETANIDE 2 MG: 1 TABLET ORAL at 07:22

## 2024-09-07 RX ADMIN — DOCUSATE SODIUM 200 MG: 100 CAPSULE, LIQUID FILLED ORAL at 20:16

## 2024-09-07 RX ADMIN — LOSARTAN POTASSIUM 100 MG: 50 TABLET, FILM COATED ORAL at 07:22

## 2024-09-07 RX ADMIN — SENNOSIDES AND DOCUSATE SODIUM 1 TABLET: 50; 8.6 TABLET ORAL at 07:22

## 2024-09-07 RX ADMIN — ALBUTEROL SULFATE 2.5 MG: 2.5 SOLUTION RESPIRATORY (INHALATION) at 17:35

## 2024-09-07 RX ADMIN — WATER 2000 MG: 1 INJECTION INTRAMUSCULAR; INTRAVENOUS; SUBCUTANEOUS at 17:29

## 2024-09-07 RX ADMIN — CETIRIZINE HYDROCHLORIDE 10 MG: 10 TABLET, FILM COATED ORAL at 07:22

## 2024-09-07 RX ADMIN — INSULIN LISPRO 12 UNITS: 100 INJECTION, SOLUTION INTRAVENOUS; SUBCUTANEOUS at 16:27

## 2024-09-07 RX ADMIN — HYDROMORPHONE HYDROCHLORIDE 1 MG: 1 INJECTION, SOLUTION INTRAMUSCULAR; INTRAVENOUS; SUBCUTANEOUS at 04:23

## 2024-09-07 RX ADMIN — ALBUTEROL SULFATE 2.5 MG: 2.5 SOLUTION RESPIRATORY (INHALATION) at 13:05

## 2024-09-07 RX ADMIN — POLYETHYLENE GLYCOL 3350 17 G: 17 POWDER, FOR SOLUTION ORAL at 07:23

## 2024-09-07 RX ADMIN — HYDROMORPHONE HYDROCHLORIDE 1 MG: 1 INJECTION, SOLUTION INTRAMUSCULAR; INTRAVENOUS; SUBCUTANEOUS at 07:19

## 2024-09-07 RX ADMIN — WATER 2000 MG: 1 INJECTION INTRAMUSCULAR; INTRAVENOUS; SUBCUTANEOUS at 01:00

## 2024-09-07 RX ADMIN — BISACODYL 5 MG: 5 TABLET, COATED ORAL at 07:23

## 2024-09-07 RX ADMIN — PANCRELIPASE LIPASE, PANCRELIPASE PROTEASE, PANCRELIPASE AMYLASE 15000 UNITS: 15000; 47000; 63000 CAPSULE, DELAYED RELEASE ORAL at 16:27

## 2024-09-07 RX ADMIN — WATER 2000 MG: 1 INJECTION INTRAMUSCULAR; INTRAVENOUS; SUBCUTANEOUS at 10:44

## 2024-09-07 RX ADMIN — DIAZEPAM 5 MG: 5 TABLET ORAL at 22:14

## 2024-09-07 RX ADMIN — HYDROMORPHONE HYDROCHLORIDE 1 MG: 1 INJECTION, SOLUTION INTRAMUSCULAR; INTRAVENOUS; SUBCUTANEOUS at 14:11

## 2024-09-07 RX ADMIN — OXYCODONE 15 MG: 15 TABLET ORAL at 10:44

## 2024-09-07 RX ADMIN — SENNOSIDES AND DOCUSATE SODIUM 1 TABLET: 50; 8.6 TABLET ORAL at 20:16

## 2024-09-07 RX ADMIN — HYDROMORPHONE HYDROCHLORIDE 1 MG: 1 INJECTION, SOLUTION INTRAMUSCULAR; INTRAVENOUS; SUBCUTANEOUS at 22:10

## 2024-09-07 RX ADMIN — GABAPENTIN 400 MG: 400 CAPSULE ORAL at 07:22

## 2024-09-07 RX ADMIN — OXYCODONE 15 MG: 15 TABLET ORAL at 20:16

## 2024-09-07 RX ADMIN — PANCRELIPASE LIPASE, PANCRELIPASE PROTEASE, PANCRELIPASE AMYLASE 20000 UNITS: 20000; 63000; 84000 CAPSULE, DELAYED RELEASE ORAL at 16:27

## 2024-09-07 RX ADMIN — ACETAMINOPHEN 650 MG: 325 TABLET ORAL at 05:57

## 2024-09-07 RX ADMIN — ACETAMINOPHEN 650 MG: 325 TABLET ORAL at 16:27

## 2024-09-07 RX ADMIN — INSULIN LISPRO 6 UNITS: 100 INJECTION, SOLUTION INTRAVENOUS; SUBCUTANEOUS at 13:23

## 2024-09-07 RX ADMIN — INSULIN LISPRO 4 UNITS: 100 INJECTION, SOLUTION INTRAVENOUS; SUBCUTANEOUS at 20:50

## 2024-09-07 RX ADMIN — MUPIROCIN: 20 OINTMENT TOPICAL at 20:22

## 2024-09-07 RX ADMIN — DOCUSATE SODIUM 200 MG: 100 CAPSULE, LIQUID FILLED ORAL at 07:22

## 2024-09-07 RX ADMIN — OXYCODONE 15 MG: 15 TABLET ORAL at 15:58

## 2024-09-07 RX ADMIN — ALBUTEROL SULFATE 2.5 MG: 2.5 SOLUTION RESPIRATORY (INHALATION) at 21:54

## 2024-09-07 RX ADMIN — PANCRELIPASE LIPASE, PANCRELIPASE PROTEASE, PANCRELIPASE AMYLASE 15000 UNITS: 15000; 47000; 63000 CAPSULE, DELAYED RELEASE ORAL at 12:05

## 2024-09-07 RX ADMIN — PANTOPRAZOLE SODIUM 40 MG: 40 TABLET, DELAYED RELEASE ORAL at 06:01

## 2024-09-07 RX ADMIN — SODIUM CHLORIDE, PRESERVATIVE FREE 10 ML: 5 INJECTION INTRAVENOUS at 07:20

## 2024-09-07 RX ADMIN — OXYCODONE 15 MG: 15 TABLET ORAL at 06:07

## 2024-09-07 RX ADMIN — INSULIN GLARGINE 20 UNITS: 100 INJECTION, SOLUTION SUBCUTANEOUS at 07:20

## 2024-09-07 RX ADMIN — DIAZEPAM 5 MG: 5 TABLET ORAL at 16:29

## 2024-09-07 ASSESSMENT — PAIN DESCRIPTION - LOCATION
LOCATION: BACK

## 2024-09-07 ASSESSMENT — PAIN SCALES - GENERAL
PAINLEVEL_OUTOF10: 10
PAINLEVEL_OUTOF10: 9
PAINLEVEL_OUTOF10: 10
PAINLEVEL_OUTOF10: 7
PAINLEVEL_OUTOF10: 10
PAINLEVEL_OUTOF10: 9
PAINLEVEL_OUTOF10: 8
PAINLEVEL_OUTOF10: 8
PAINLEVEL_OUTOF10: 9
PAINLEVEL_OUTOF10: 10

## 2024-09-07 ASSESSMENT — PAIN DESCRIPTION - DESCRIPTORS
DESCRIPTORS: ACHING;STABBING;DISCOMFORT
DESCRIPTORS: SHARP;SHOOTING;STABBING
DESCRIPTORS: ACHING;SORE;DISCOMFORT
DESCRIPTORS: ACHING;STABBING;DISCOMFORT
DESCRIPTORS: ACHING;THROBBING;DISCOMFORT
DESCRIPTORS: ACHING;STABBING;DISCOMFORT
DESCRIPTORS: ACHING;THROBBING;DISCOMFORT
DESCRIPTORS: ACHING;SHOOTING;TENDER
DESCRIPTORS: ACHING;SORE;DISCOMFORT

## 2024-09-07 ASSESSMENT — PAIN - FUNCTIONAL ASSESSMENT
PAIN_FUNCTIONAL_ASSESSMENT: ACTIVITIES ARE NOT PREVENTED
PAIN_FUNCTIONAL_ASSESSMENT: PREVENTS OR INTERFERES SOME ACTIVE ACTIVITIES AND ADLS

## 2024-09-07 ASSESSMENT — PAIN DESCRIPTION - ORIENTATION
ORIENTATION: MID

## 2024-09-07 ASSESSMENT — PAIN SCALES - WONG BAKER
WONGBAKER_NUMERICALRESPONSE: HURTS WHOLE LOT
WONGBAKER_NUMERICALRESPONSE: HURTS WHOLE LOT
WONGBAKER_NUMERICALRESPONSE: HURTS A LITTLE BIT
WONGBAKER_NUMERICALRESPONSE: HURTS WHOLE LOT

## 2024-09-07 ASSESSMENT — PAIN DESCRIPTION - PAIN TYPE: TYPE: ACUTE PAIN;SURGICAL PAIN

## 2024-09-08 LAB
ALBUMIN SERPL-MCNC: 3.7 G/DL (ref 3.5–5.2)
ALP SERPL-CCNC: 57 U/L (ref 40–129)
ALT SERPL-CCNC: 10 U/L (ref 0–40)
ANION GAP SERPL CALCULATED.3IONS-SCNC: 15 MMOL/L (ref 7–16)
AST SERPL-CCNC: 19 U/L (ref 0–39)
BASOPHILS # BLD: 0 K/UL (ref 0–0.2)
BASOPHILS NFR BLD: 0 % (ref 0–2)
BILIRUB SERPL-MCNC: 0.3 MG/DL (ref 0–1.2)
BUN SERPL-MCNC: 16 MG/DL (ref 6–23)
CALCIUM SERPL-MCNC: 8.2 MG/DL (ref 8.6–10.2)
CHLORIDE SERPL-SCNC: 90 MMOL/L (ref 98–107)
CO2 SERPL-SCNC: 26 MMOL/L (ref 22–29)
CREAT SERPL-MCNC: 1 MG/DL (ref 0.7–1.2)
EOSINOPHIL # BLD: 0.41 K/UL (ref 0.05–0.5)
EOSINOPHILS RELATIVE PERCENT: 3 % (ref 0–6)
ERYTHROCYTE [DISTWIDTH] IN BLOOD BY AUTOMATED COUNT: 15.7 % (ref 11.5–15)
GFR, ESTIMATED: 87 ML/MIN/1.73M2
GLUCOSE BLD-MCNC: 235 MG/DL (ref 74–99)
GLUCOSE BLD-MCNC: 255 MG/DL (ref 74–99)
GLUCOSE BLD-MCNC: 288 MG/DL (ref 74–99)
GLUCOSE BLD-MCNC: 298 MG/DL (ref 74–99)
GLUCOSE SERPL-MCNC: 294 MG/DL (ref 74–99)
HBA1C MFR BLD: 10.8 % (ref 4–5.6)
HCT VFR BLD AUTO: 30.6 % (ref 37–54)
HGB BLD-MCNC: 9.7 G/DL (ref 12.5–16.5)
LYMPHOCYTES NFR BLD: 2.61 K/UL (ref 1.5–4)
LYMPHOCYTES RELATIVE PERCENT: 17 % (ref 20–42)
MAGNESIUM SERPL-MCNC: 1.6 MG/DL (ref 1.6–2.6)
MCH RBC QN AUTO: 24.3 PG (ref 26–35)
MCHC RBC AUTO-ENTMCNC: 31.7 G/DL (ref 32–34.5)
MCV RBC AUTO: 76.7 FL (ref 80–99.9)
METAMYELOCYTES ABSOLUTE COUNT: 0.14 K/UL (ref 0–0.12)
METAMYELOCYTES: 1 % (ref 0–1)
MONOCYTES NFR BLD: 1.79 K/UL (ref 0.1–0.95)
MONOCYTES NFR BLD: 11 % (ref 2–12)
NEUTROPHILS NFR BLD: 69 % (ref 43–80)
NEUTS SEG NFR BLD: 10.85 K/UL (ref 1.8–7.3)
PLATELET # BLD AUTO: 233 K/UL (ref 130–450)
PMV BLD AUTO: 12.2 FL (ref 7–12)
POTASSIUM SERPL-SCNC: 3.4 MMOL/L (ref 3.5–5)
PROT SERPL-MCNC: 6.9 G/DL (ref 6.4–8.3)
RBC # BLD AUTO: 3.99 M/UL (ref 3.8–5.8)
RBC # BLD: ABNORMAL 10*6/UL
SODIUM SERPL-SCNC: 131 MMOL/L (ref 132–146)
WBC OTHER # BLD: 15.8 K/UL (ref 4.5–11.5)

## 2024-09-08 PROCEDURE — 82962 GLUCOSE BLOOD TEST: CPT

## 2024-09-08 PROCEDURE — 6360000002 HC RX W HCPCS: Performed by: NURSE PRACTITIONER

## 2024-09-08 PROCEDURE — 94640 AIRWAY INHALATION TREATMENT: CPT

## 2024-09-08 PROCEDURE — 6370000000 HC RX 637 (ALT 250 FOR IP): Performed by: INTERNAL MEDICINE

## 2024-09-08 PROCEDURE — 80053 COMPREHEN METABOLIC PANEL: CPT

## 2024-09-08 PROCEDURE — 36415 COLL VENOUS BLD VENIPUNCTURE: CPT

## 2024-09-08 PROCEDURE — 83735 ASSAY OF MAGNESIUM: CPT

## 2024-09-08 PROCEDURE — 6360000002 HC RX W HCPCS: Performed by: NEUROLOGICAL SURGERY

## 2024-09-08 PROCEDURE — 6370000000 HC RX 637 (ALT 250 FOR IP): Performed by: NURSE PRACTITIONER

## 2024-09-08 PROCEDURE — 85025 COMPLETE CBC W/AUTO DIFF WBC: CPT

## 2024-09-08 PROCEDURE — 6370000000 HC RX 637 (ALT 250 FOR IP): Performed by: NEUROLOGICAL SURGERY

## 2024-09-08 PROCEDURE — 2580000003 HC RX 258: Performed by: NEUROLOGICAL SURGERY

## 2024-09-08 PROCEDURE — 2500000003 HC RX 250 WO HCPCS: Performed by: NEUROLOGICAL SURGERY

## 2024-09-08 PROCEDURE — 83036 HEMOGLOBIN GLYCOSYLATED A1C: CPT

## 2024-09-08 PROCEDURE — 1200000000 HC SEMI PRIVATE

## 2024-09-08 RX ORDER — INSULIN GLARGINE 100 [IU]/ML
30 INJECTION, SOLUTION SUBCUTANEOUS 2 TIMES DAILY
Status: DISCONTINUED | OUTPATIENT
Start: 2024-09-08 | End: 2024-09-10 | Stop reason: HOSPADM

## 2024-09-08 RX ORDER — INSULIN GLARGINE 100 [IU]/ML
4 INJECTION, SOLUTION SUBCUTANEOUS ONCE
Status: COMPLETED | OUTPATIENT
Start: 2024-09-08 | End: 2024-09-08

## 2024-09-08 RX ORDER — POTASSIUM CHLORIDE 1500 MG/1
40 TABLET, EXTENDED RELEASE ORAL ONCE
Status: COMPLETED | OUTPATIENT
Start: 2024-09-08 | End: 2024-09-08

## 2024-09-08 RX ORDER — OXYCODONE HCL 10 MG/1
10 TABLET, FILM COATED, EXTENDED RELEASE ORAL EVERY 12 HOURS SCHEDULED
Status: DISCONTINUED | OUTPATIENT
Start: 2024-09-08 | End: 2024-09-10 | Stop reason: HOSPADM

## 2024-09-08 RX ORDER — MAGNESIUM SULFATE IN WATER 40 MG/ML
2000 INJECTION, SOLUTION INTRAVENOUS ONCE
Status: COMPLETED | OUTPATIENT
Start: 2024-09-08 | End: 2024-09-08

## 2024-09-08 RX ADMIN — BISACODYL 5 MG: 5 TABLET, COATED ORAL at 07:46

## 2024-09-08 RX ADMIN — HYDROMORPHONE HYDROCHLORIDE 1 MG: 1 INJECTION, SOLUTION INTRAMUSCULAR; INTRAVENOUS; SUBCUTANEOUS at 15:24

## 2024-09-08 RX ADMIN — PANCRELIPASE LIPASE, PANCRELIPASE PROTEASE, PANCRELIPASE AMYLASE 15000 UNITS: 15000; 47000; 63000 CAPSULE, DELAYED RELEASE ORAL at 07:47

## 2024-09-08 RX ADMIN — OXYCODONE 15 MG: 15 TABLET ORAL at 06:44

## 2024-09-08 RX ADMIN — DIAZEPAM 5 MG: 5 TABLET ORAL at 11:16

## 2024-09-08 RX ADMIN — PANCRELIPASE LIPASE, PANCRELIPASE PROTEASE, PANCRELIPASE AMYLASE 15000 UNITS: 15000; 47000; 63000 CAPSULE, DELAYED RELEASE ORAL at 16:42

## 2024-09-08 RX ADMIN — PANTOPRAZOLE SODIUM 40 MG: 40 TABLET, DELAYED RELEASE ORAL at 06:22

## 2024-09-08 RX ADMIN — ACETAMINOPHEN 650 MG: 325 TABLET ORAL at 16:42

## 2024-09-08 RX ADMIN — ALBUTEROL SULFATE 2.5 MG: 2.5 SOLUTION RESPIRATORY (INHALATION) at 21:09

## 2024-09-08 RX ADMIN — ALBUTEROL SULFATE 2.5 MG: 2.5 SOLUTION RESPIRATORY (INHALATION) at 09:24

## 2024-09-08 RX ADMIN — WATER 2000 MG: 1 INJECTION INTRAMUSCULAR; INTRAVENOUS; SUBCUTANEOUS at 11:16

## 2024-09-08 RX ADMIN — POTASSIUM CHLORIDE 40 MEQ: 1500 TABLET, EXTENDED RELEASE ORAL at 17:25

## 2024-09-08 RX ADMIN — PANCRELIPASE LIPASE, PANCRELIPASE PROTEASE, PANCRELIPASE AMYLASE 20000 UNITS: 20000; 63000; 84000 CAPSULE, DELAYED RELEASE ORAL at 11:23

## 2024-09-08 RX ADMIN — SODIUM CHLORIDE, PRESERVATIVE FREE 10 ML: 5 INJECTION INTRAVENOUS at 00:14

## 2024-09-08 RX ADMIN — DOCUSATE SODIUM 200 MG: 100 CAPSULE, LIQUID FILLED ORAL at 20:17

## 2024-09-08 RX ADMIN — ACETAMINOPHEN 650 MG: 325 TABLET ORAL at 00:13

## 2024-09-08 RX ADMIN — HYDROMORPHONE HYDROCHLORIDE 1 MG: 1 INJECTION, SOLUTION INTRAMUSCULAR; INTRAVENOUS; SUBCUTANEOUS at 00:13

## 2024-09-08 RX ADMIN — DIAZEPAM 5 MG: 5 TABLET ORAL at 17:26

## 2024-09-08 RX ADMIN — HYDROMORPHONE HYDROCHLORIDE 1 MG: 1 INJECTION, SOLUTION INTRAMUSCULAR; INTRAVENOUS; SUBCUTANEOUS at 05:39

## 2024-09-08 RX ADMIN — HYDROMORPHONE HYDROCHLORIDE 1 MG: 1 INJECTION, SOLUTION INTRAMUSCULAR; INTRAVENOUS; SUBCUTANEOUS at 13:12

## 2024-09-08 RX ADMIN — OXYCODONE 15 MG: 15 TABLET ORAL at 21:32

## 2024-09-08 RX ADMIN — INSULIN GLARGINE 30 UNITS: 100 INJECTION, SOLUTION SUBCUTANEOUS at 20:18

## 2024-09-08 RX ADMIN — HYDROMORPHONE HYDROCHLORIDE 1 MG: 1 INJECTION, SOLUTION INTRAMUSCULAR; INTRAVENOUS; SUBCUTANEOUS at 20:17

## 2024-09-08 RX ADMIN — MAGNESIUM SULFATE HEPTAHYDRATE 2000 MG: 40 INJECTION, SOLUTION INTRAVENOUS at 17:38

## 2024-09-08 RX ADMIN — HYDROMORPHONE HYDROCHLORIDE 1 MG: 1 INJECTION, SOLUTION INTRAMUSCULAR; INTRAVENOUS; SUBCUTANEOUS at 17:40

## 2024-09-08 RX ADMIN — MUPIROCIN: 20 OINTMENT TOPICAL at 20:26

## 2024-09-08 RX ADMIN — PANCRELIPASE LIPASE, PANCRELIPASE PROTEASE, PANCRELIPASE AMYLASE 15000 UNITS: 15000; 47000; 63000 CAPSULE, DELAYED RELEASE ORAL at 11:23

## 2024-09-08 RX ADMIN — SENNOSIDES AND DOCUSATE SODIUM 1 TABLET: 50; 8.6 TABLET ORAL at 20:17

## 2024-09-08 RX ADMIN — GABAPENTIN 400 MG: 400 CAPSULE ORAL at 07:46

## 2024-09-08 RX ADMIN — OXYCODONE 15 MG: 15 TABLET ORAL at 16:42

## 2024-09-08 RX ADMIN — DIAZEPAM 5 MG: 5 TABLET ORAL at 05:42

## 2024-09-08 RX ADMIN — POLYETHYLENE GLYCOL 3350 17 G: 17 POWDER, FOR SOLUTION ORAL at 07:46

## 2024-09-08 RX ADMIN — OXYCODONE HYDROCHLORIDE 10 MG: 10 TABLET, FILM COATED, EXTENDED RELEASE ORAL at 07:47

## 2024-09-08 RX ADMIN — MUPIROCIN: 20 OINTMENT TOPICAL at 07:44

## 2024-09-08 RX ADMIN — CETIRIZINE HYDROCHLORIDE 10 MG: 10 TABLET, FILM COATED ORAL at 07:46

## 2024-09-08 RX ADMIN — PANCRELIPASE LIPASE, PANCRELIPASE PROTEASE, PANCRELIPASE AMYLASE 20000 UNITS: 20000; 63000; 84000 CAPSULE, DELAYED RELEASE ORAL at 07:47

## 2024-09-08 RX ADMIN — DOCUSATE SODIUM 200 MG: 100 CAPSULE, LIQUID FILLED ORAL at 07:46

## 2024-09-08 RX ADMIN — LOSARTAN POTASSIUM 100 MG: 50 TABLET, FILM COATED ORAL at 07:46

## 2024-09-08 RX ADMIN — INSULIN GLARGINE 26 UNITS: 100 INJECTION, SOLUTION SUBCUTANEOUS at 07:45

## 2024-09-08 RX ADMIN — HYDROMORPHONE HYDROCHLORIDE 1 MG: 1 INJECTION, SOLUTION INTRAMUSCULAR; INTRAVENOUS; SUBCUTANEOUS at 02:57

## 2024-09-08 RX ADMIN — INSULIN LISPRO 4 UNITS: 100 INJECTION, SOLUTION INTRAVENOUS; SUBCUTANEOUS at 07:45

## 2024-09-08 RX ADMIN — OXYCODONE 15 MG: 15 TABLET ORAL at 11:16

## 2024-09-08 RX ADMIN — GABAPENTIN 400 MG: 400 CAPSULE ORAL at 20:17

## 2024-09-08 RX ADMIN — BUMETANIDE 2 MG: 1 TABLET ORAL at 07:46

## 2024-09-08 RX ADMIN — INSULIN LISPRO 8 UNITS: 100 INJECTION, SOLUTION INTRAVENOUS; SUBCUTANEOUS at 11:15

## 2024-09-08 RX ADMIN — INSULIN GLARGINE 4 UNITS: 100 INJECTION, SOLUTION SUBCUTANEOUS at 15:22

## 2024-09-08 RX ADMIN — SODIUM CHLORIDE, PRESERVATIVE FREE 10 ML: 5 INJECTION INTRAVENOUS at 07:47

## 2024-09-08 RX ADMIN — SENNOSIDES AND DOCUSATE SODIUM 1 TABLET: 50; 8.6 TABLET ORAL at 07:46

## 2024-09-08 RX ADMIN — ACETAMINOPHEN 650 MG: 325 TABLET ORAL at 11:15

## 2024-09-08 RX ADMIN — ALBUTEROL SULFATE 2.5 MG: 2.5 SOLUTION RESPIRATORY (INHALATION) at 12:50

## 2024-09-08 RX ADMIN — ACETAMINOPHEN 650 MG: 325 TABLET ORAL at 06:22

## 2024-09-08 RX ADMIN — WATER 2000 MG: 1 INJECTION INTRAMUSCULAR; INTRAVENOUS; SUBCUTANEOUS at 02:58

## 2024-09-08 RX ADMIN — INSULIN LISPRO 8 UNITS: 100 INJECTION, SOLUTION INTRAVENOUS; SUBCUTANEOUS at 16:42

## 2024-09-08 RX ADMIN — SODIUM CHLORIDE, PRESERVATIVE FREE 10 ML: 5 INJECTION INTRAVENOUS at 02:58

## 2024-09-08 RX ADMIN — PANCRELIPASE LIPASE, PANCRELIPASE PROTEASE, PANCRELIPASE AMYLASE 20000 UNITS: 20000; 63000; 84000 CAPSULE, DELAYED RELEASE ORAL at 16:42

## 2024-09-08 RX ADMIN — OXYCODONE HYDROCHLORIDE 10 MG: 10 TABLET, FILM COATED, EXTENDED RELEASE ORAL at 20:17

## 2024-09-08 RX ADMIN — HYDROMORPHONE HYDROCHLORIDE 1 MG: 1 INJECTION, SOLUTION INTRAMUSCULAR; INTRAVENOUS; SUBCUTANEOUS at 09:56

## 2024-09-08 RX ADMIN — HYDROMORPHONE HYDROCHLORIDE 1 MG: 1 INJECTION, SOLUTION INTRAMUSCULAR; INTRAVENOUS; SUBCUTANEOUS at 07:45

## 2024-09-08 RX ADMIN — WATER 2000 MG: 1 INJECTION INTRAMUSCULAR; INTRAVENOUS; SUBCUTANEOUS at 17:26

## 2024-09-08 ASSESSMENT — PAIN DESCRIPTION - ORIENTATION
ORIENTATION: MID;LOWER;UPPER
ORIENTATION: LEFT
ORIENTATION: LOWER
ORIENTATION: LEFT;LOWER
ORIENTATION: MID
ORIENTATION: MID;UPPER;LOWER
ORIENTATION: MID;LOWER;UPPER

## 2024-09-08 ASSESSMENT — PAIN SCALES - GENERAL
PAINLEVEL_OUTOF10: 10
PAINLEVEL_OUTOF10: 7
PAINLEVEL_OUTOF10: 9
PAINLEVEL_OUTOF10: 7
PAINLEVEL_OUTOF10: 10
PAINLEVEL_OUTOF10: 8
PAINLEVEL_OUTOF10: 9
PAINLEVEL_OUTOF10: 10
PAINLEVEL_OUTOF10: 9
PAINLEVEL_OUTOF10: 10
PAINLEVEL_OUTOF10: 9
PAINLEVEL_OUTOF10: 8
PAINLEVEL_OUTOF10: 9

## 2024-09-08 ASSESSMENT — PAIN - FUNCTIONAL ASSESSMENT
PAIN_FUNCTIONAL_ASSESSMENT: PREVENTS OR INTERFERES SOME ACTIVE ACTIVITIES AND ADLS
PAIN_FUNCTIONAL_ASSESSMENT: ACTIVITIES ARE NOT PREVENTED

## 2024-09-08 ASSESSMENT — PAIN DESCRIPTION - LOCATION
LOCATION: BACK
LOCATION: HIP
LOCATION: BACK

## 2024-09-08 ASSESSMENT — PAIN DESCRIPTION - DESCRIPTORS
DESCRIPTORS: SPASM;ACHING;DISCOMFORT
DESCRIPTORS: ACHING;STABBING;DISCOMFORT;SORE
DESCRIPTORS: ACHING;STABBING;DISCOMFORT;SORE
DESCRIPTORS: ACHING;DISCOMFORT;SORE;TENDER
DESCRIPTORS: ACHING;THROBBING;SORE
DESCRIPTORS: ACHING;SHOOTING;DISCOMFORT
DESCRIPTORS: ACHING;SORE;DISCOMFORT;TENDER

## 2024-09-08 ASSESSMENT — PAIN DESCRIPTION - PAIN TYPE: TYPE: SURGICAL PAIN

## 2024-09-08 ASSESSMENT — PAIN DESCRIPTION - ONSET: ONSET: ON-GOING

## 2024-09-08 ASSESSMENT — PAIN DESCRIPTION - FREQUENCY: FREQUENCY: CONTINUOUS

## 2024-09-09 LAB
ANION GAP SERPL CALCULATED.3IONS-SCNC: 13 MMOL/L (ref 7–16)
BASOPHILS # BLD: 0.08 K/UL (ref 0–0.2)
BASOPHILS NFR BLD: 1 % (ref 0–2)
BUN SERPL-MCNC: 13 MG/DL (ref 6–23)
CALCIUM SERPL-MCNC: 8.3 MG/DL (ref 8.6–10.2)
CHLORIDE SERPL-SCNC: 96 MMOL/L (ref 98–107)
CO2 SERPL-SCNC: 26 MMOL/L (ref 22–29)
CREAT SERPL-MCNC: 0.7 MG/DL (ref 0.7–1.2)
EOSINOPHIL # BLD: 0.34 K/UL (ref 0.05–0.5)
EOSINOPHILS RELATIVE PERCENT: 2 % (ref 0–6)
ERYTHROCYTE [DISTWIDTH] IN BLOOD BY AUTOMATED COUNT: 15.4 % (ref 11.5–15)
GFR, ESTIMATED: >90 ML/MIN/1.73M2
GLUCOSE BLD-MCNC: 181 MG/DL (ref 74–99)
GLUCOSE BLD-MCNC: 213 MG/DL (ref 74–99)
GLUCOSE BLD-MCNC: 234 MG/DL (ref 74–99)
GLUCOSE BLD-MCNC: 321 MG/DL (ref 74–99)
GLUCOSE SERPL-MCNC: 199 MG/DL (ref 74–99)
HCT VFR BLD AUTO: 28 % (ref 37–54)
HGB BLD-MCNC: 9 G/DL (ref 12.5–16.5)
IMM GRANULOCYTES # BLD AUTO: 0.09 K/UL (ref 0–0.58)
IMM GRANULOCYTES NFR BLD: 1 % (ref 0–5)
LYMPHOCYTES NFR BLD: 2.56 K/UL (ref 1.5–4)
LYMPHOCYTES RELATIVE PERCENT: 18 % (ref 20–42)
MCH RBC QN AUTO: 24.7 PG (ref 26–35)
MCHC RBC AUTO-ENTMCNC: 32.1 G/DL (ref 32–34.5)
MCV RBC AUTO: 76.7 FL (ref 80–99.9)
MONOCYTES NFR BLD: 1.5 K/UL (ref 0.1–0.95)
MONOCYTES NFR BLD: 11 % (ref 2–12)
NEUTROPHILS NFR BLD: 68 % (ref 43–80)
NEUTS SEG NFR BLD: 9.65 K/UL (ref 1.8–7.3)
PLATELET # BLD AUTO: 242 K/UL (ref 130–450)
PMV BLD AUTO: 12.6 FL (ref 7–12)
POTASSIUM SERPL-SCNC: 4 MMOL/L (ref 3.5–5)
RBC # BLD AUTO: 3.65 M/UL (ref 3.8–5.8)
SODIUM SERPL-SCNC: 135 MMOL/L (ref 132–146)
WBC OTHER # BLD: 14.2 K/UL (ref 4.5–11.5)

## 2024-09-09 PROCEDURE — 6370000000 HC RX 637 (ALT 250 FOR IP): Performed by: INTERNAL MEDICINE

## 2024-09-09 PROCEDURE — 36415 COLL VENOUS BLD VENIPUNCTURE: CPT

## 2024-09-09 PROCEDURE — 2580000003 HC RX 258: Performed by: NURSE PRACTITIONER

## 2024-09-09 PROCEDURE — 82962 GLUCOSE BLOOD TEST: CPT

## 2024-09-09 PROCEDURE — 2500000003 HC RX 250 WO HCPCS: Performed by: NEUROLOGICAL SURGERY

## 2024-09-09 PROCEDURE — 6360000002 HC RX W HCPCS: Performed by: NEUROLOGICAL SURGERY

## 2024-09-09 PROCEDURE — 94640 AIRWAY INHALATION TREATMENT: CPT

## 2024-09-09 PROCEDURE — 6370000000 HC RX 637 (ALT 250 FOR IP): Performed by: NURSE PRACTITIONER

## 2024-09-09 PROCEDURE — 6370000000 HC RX 637 (ALT 250 FOR IP): Performed by: NEUROLOGICAL SURGERY

## 2024-09-09 PROCEDURE — 85025 COMPLETE CBC W/AUTO DIFF WBC: CPT

## 2024-09-09 PROCEDURE — 1200000000 HC SEMI PRIVATE

## 2024-09-09 PROCEDURE — 80048 BASIC METABOLIC PNL TOTAL CA: CPT

## 2024-09-09 PROCEDURE — 2580000003 HC RX 258: Performed by: NEUROLOGICAL SURGERY

## 2024-09-09 RX ORDER — ALBUTEROL SULFATE 0.83 MG/ML
2.5 SOLUTION RESPIRATORY (INHALATION) 4 TIMES DAILY PRN
Status: DISCONTINUED | OUTPATIENT
Start: 2024-09-09 | End: 2024-09-10 | Stop reason: HOSPADM

## 2024-09-09 RX ADMIN — ACETAMINOPHEN 650 MG: 325 TABLET ORAL at 23:17

## 2024-09-09 RX ADMIN — GABAPENTIN 400 MG: 400 CAPSULE ORAL at 07:37

## 2024-09-09 RX ADMIN — BUMETANIDE 2 MG: 1 TABLET ORAL at 07:37

## 2024-09-09 RX ADMIN — OXYCODONE 15 MG: 15 TABLET ORAL at 03:17

## 2024-09-09 RX ADMIN — DIAZEPAM 5 MG: 5 TABLET ORAL at 10:57

## 2024-09-09 RX ADMIN — HYDROMORPHONE HYDROCHLORIDE 1 MG: 1 INJECTION, SOLUTION INTRAMUSCULAR; INTRAVENOUS; SUBCUTANEOUS at 18:21

## 2024-09-09 RX ADMIN — ACETAMINOPHEN 650 MG: 325 TABLET ORAL at 00:13

## 2024-09-09 RX ADMIN — HYDROMORPHONE HYDROCHLORIDE 1 MG: 1 INJECTION, SOLUTION INTRAMUSCULAR; INTRAVENOUS; SUBCUTANEOUS at 00:14

## 2024-09-09 RX ADMIN — PANCRELIPASE LIPASE, PANCRELIPASE PROTEASE, PANCRELIPASE AMYLASE 20000 UNITS: 20000; 63000; 84000 CAPSULE, DELAYED RELEASE ORAL at 07:37

## 2024-09-09 RX ADMIN — OXYCODONE 15 MG: 15 TABLET ORAL at 16:17

## 2024-09-09 RX ADMIN — DOCUSATE SODIUM 200 MG: 100 CAPSULE, LIQUID FILLED ORAL at 20:59

## 2024-09-09 RX ADMIN — ACETAMINOPHEN 650 MG: 325 TABLET ORAL at 11:29

## 2024-09-09 RX ADMIN — WATER 2000 MG: 1 INJECTION INTRAMUSCULAR; INTRAVENOUS; SUBCUTANEOUS at 11:29

## 2024-09-09 RX ADMIN — ALBUTEROL SULFATE 2.5 MG: 2.5 SOLUTION RESPIRATORY (INHALATION) at 09:31

## 2024-09-09 RX ADMIN — GABAPENTIN 400 MG: 400 CAPSULE ORAL at 21:00

## 2024-09-09 RX ADMIN — MUPIROCIN: 20 OINTMENT TOPICAL at 21:35

## 2024-09-09 RX ADMIN — INSULIN GLARGINE 30 UNITS: 100 INJECTION, SOLUTION SUBCUTANEOUS at 07:36

## 2024-09-09 RX ADMIN — INSULIN LISPRO 4 UNITS: 100 INJECTION, SOLUTION INTRAVENOUS; SUBCUTANEOUS at 06:19

## 2024-09-09 RX ADMIN — INSULIN LISPRO 12 UNITS: 100 INJECTION, SOLUTION INTRAVENOUS; SUBCUTANEOUS at 16:17

## 2024-09-09 RX ADMIN — MUPIROCIN: 20 OINTMENT TOPICAL at 08:58

## 2024-09-09 RX ADMIN — HYDROMORPHONE HYDROCHLORIDE 1 MG: 1 INJECTION, SOLUTION INTRAMUSCULAR; INTRAVENOUS; SUBCUTANEOUS at 23:16

## 2024-09-09 RX ADMIN — DIAZEPAM 5 MG: 5 TABLET ORAL at 18:21

## 2024-09-09 RX ADMIN — BISACODYL 5 MG: 5 TABLET, COATED ORAL at 07:37

## 2024-09-09 RX ADMIN — HYDROMORPHONE HYDROCHLORIDE 1 MG: 1 INJECTION, SOLUTION INTRAMUSCULAR; INTRAVENOUS; SUBCUTANEOUS at 14:53

## 2024-09-09 RX ADMIN — DOCUSATE SODIUM 200 MG: 100 CAPSULE, LIQUID FILLED ORAL at 07:37

## 2024-09-09 RX ADMIN — ACETAMINOPHEN 650 MG: 325 TABLET ORAL at 16:17

## 2024-09-09 RX ADMIN — SODIUM CHLORIDE, PRESERVATIVE FREE 10 ML: 5 INJECTION INTRAVENOUS at 21:35

## 2024-09-09 RX ADMIN — OXYCODONE HYDROCHLORIDE 10 MG: 10 TABLET, FILM COATED, EXTENDED RELEASE ORAL at 21:00

## 2024-09-09 RX ADMIN — INSULIN LISPRO 4 UNITS: 100 INJECTION, SOLUTION INTRAVENOUS; SUBCUTANEOUS at 11:30

## 2024-09-09 RX ADMIN — WATER 2000 MG: 1 INJECTION INTRAMUSCULAR; INTRAVENOUS; SUBCUTANEOUS at 03:17

## 2024-09-09 RX ADMIN — PANCRELIPASE LIPASE, PANCRELIPASE PROTEASE, PANCRELIPASE AMYLASE 15000 UNITS: 15000; 47000; 63000 CAPSULE, DELAYED RELEASE ORAL at 07:37

## 2024-09-09 RX ADMIN — ACETAMINOPHEN 650 MG: 325 TABLET ORAL at 05:36

## 2024-09-09 RX ADMIN — HYDROMORPHONE HYDROCHLORIDE 1 MG: 1 INJECTION, SOLUTION INTRAMUSCULAR; INTRAVENOUS; SUBCUTANEOUS at 08:57

## 2024-09-09 RX ADMIN — SENNOSIDES AND DOCUSATE SODIUM 1 TABLET: 50; 8.6 TABLET ORAL at 20:59

## 2024-09-09 RX ADMIN — CETIRIZINE HYDROCHLORIDE 10 MG: 10 TABLET, FILM COATED ORAL at 07:36

## 2024-09-09 RX ADMIN — PANCRELIPASE LIPASE, PANCRELIPASE PROTEASE, PANCRELIPASE AMYLASE 15000 UNITS: 15000; 47000; 63000 CAPSULE, DELAYED RELEASE ORAL at 16:17

## 2024-09-09 RX ADMIN — POLYETHYLENE GLYCOL 3350 17 G: 17 POWDER, FOR SOLUTION ORAL at 07:38

## 2024-09-09 RX ADMIN — OXYCODONE 15 MG: 15 TABLET ORAL at 10:55

## 2024-09-09 RX ADMIN — DIAZEPAM 5 MG: 5 TABLET ORAL at 03:31

## 2024-09-09 RX ADMIN — OXYCODONE HYDROCHLORIDE 10 MG: 10 TABLET, FILM COATED, EXTENDED RELEASE ORAL at 07:37

## 2024-09-09 RX ADMIN — LOSARTAN POTASSIUM 100 MG: 50 TABLET, FILM COATED ORAL at 07:36

## 2024-09-09 RX ADMIN — PANCRELIPASE LIPASE, PANCRELIPASE PROTEASE, PANCRELIPASE AMYLASE 15000 UNITS: 15000; 47000; 63000 CAPSULE, DELAYED RELEASE ORAL at 11:31

## 2024-09-09 RX ADMIN — INSULIN GLARGINE 30 UNITS: 100 INJECTION, SOLUTION SUBCUTANEOUS at 21:00

## 2024-09-09 RX ADMIN — SENNOSIDES AND DOCUSATE SODIUM 1 TABLET: 50; 8.6 TABLET ORAL at 07:37

## 2024-09-09 RX ADMIN — HYDROMORPHONE HYDROCHLORIDE 1 MG: 1 INJECTION, SOLUTION INTRAMUSCULAR; INTRAVENOUS; SUBCUTANEOUS at 05:36

## 2024-09-09 RX ADMIN — PANTOPRAZOLE SODIUM 40 MG: 40 TABLET, DELAYED RELEASE ORAL at 06:12

## 2024-09-09 RX ADMIN — PANCRELIPASE LIPASE, PANCRELIPASE PROTEASE, PANCRELIPASE AMYLASE 20000 UNITS: 20000; 63000; 84000 CAPSULE, DELAYED RELEASE ORAL at 16:17

## 2024-09-09 RX ADMIN — SODIUM CHLORIDE: 9 INJECTION, SOLUTION INTRAVENOUS at 04:04

## 2024-09-09 RX ADMIN — PANCRELIPASE LIPASE, PANCRELIPASE PROTEASE, PANCRELIPASE AMYLASE 20000 UNITS: 20000; 63000; 84000 CAPSULE, DELAYED RELEASE ORAL at 11:29

## 2024-09-09 ASSESSMENT — PAIN - FUNCTIONAL ASSESSMENT
PAIN_FUNCTIONAL_ASSESSMENT: ACTIVITIES ARE NOT PREVENTED
PAIN_FUNCTIONAL_ASSESSMENT: PREVENTS OR INTERFERES SOME ACTIVE ACTIVITIES AND ADLS
PAIN_FUNCTIONAL_ASSESSMENT: ACTIVITIES ARE NOT PREVENTED
PAIN_FUNCTIONAL_ASSESSMENT: ACTIVITIES ARE NOT PREVENTED
PAIN_FUNCTIONAL_ASSESSMENT: PREVENTS OR INTERFERES SOME ACTIVE ACTIVITIES AND ADLS

## 2024-09-09 ASSESSMENT — PAIN DESCRIPTION - DESCRIPTORS
DESCRIPTORS: ACHING;SORE;DISCOMFORT
DESCRIPTORS: ACHING;DISCOMFORT;SPASM
DESCRIPTORS: ACHING;SORE;DISCOMFORT
DESCRIPTORS: ACHING;DISCOMFORT;SORE
DESCRIPTORS: ACHING;DISCOMFORT;SPASM
DESCRIPTORS: ACHING;DISCOMFORT;SORE
DESCRIPTORS: ACHING;SORE;DISCOMFORT
DESCRIPTORS: ACHING;DISCOMFORT
DESCRIPTORS: ACHING;DISCOMFORT

## 2024-09-09 ASSESSMENT — PAIN DESCRIPTION - LOCATION
LOCATION: BACK
LOCATION: BACK;HIP
LOCATION: BACK

## 2024-09-09 ASSESSMENT — PAIN DESCRIPTION - ONSET
ONSET: ON-GOING
ONSET: GRADUAL
ONSET: GRADUAL
ONSET: ON-GOING

## 2024-09-09 ASSESSMENT — PAIN SCALES - GENERAL
PAINLEVEL_OUTOF10: 10
PAINLEVEL_OUTOF10: 10
PAINLEVEL_OUTOF10: 8
PAINLEVEL_OUTOF10: 8
PAINLEVEL_OUTOF10: 3
PAINLEVEL_OUTOF10: 10
PAINLEVEL_OUTOF10: 3
PAINLEVEL_OUTOF10: 9
PAINLEVEL_OUTOF10: 9
PAINLEVEL_OUTOF10: 10
PAINLEVEL_OUTOF10: 10
PAINLEVEL_OUTOF10: 3
PAINLEVEL_OUTOF10: 8
PAINLEVEL_OUTOF10: 3
PAINLEVEL_OUTOF10: 7

## 2024-09-09 ASSESSMENT — PAIN DESCRIPTION - ORIENTATION
ORIENTATION: LOWER;MID
ORIENTATION: MID;LOWER
ORIENTATION: MID;LOWER
ORIENTATION: MID
ORIENTATION: LOWER;MID
ORIENTATION: MID
ORIENTATION: MID;RIGHT;LEFT
ORIENTATION: LOWER;MID
ORIENTATION: MID;LOWER

## 2024-09-09 ASSESSMENT — PAIN DESCRIPTION - PAIN TYPE
TYPE: SURGICAL PAIN

## 2024-09-09 ASSESSMENT — PAIN SCALES - WONG BAKER
WONGBAKER_NUMERICALRESPONSE: HURTS A LITTLE BIT
WONGBAKER_NUMERICALRESPONSE: HURTS A LITTLE BIT

## 2024-09-09 ASSESSMENT — PAIN DESCRIPTION - FREQUENCY
FREQUENCY: CONTINUOUS

## 2024-09-10 VITALS
WEIGHT: 197 LBS | HEART RATE: 93 BPM | SYSTOLIC BLOOD PRESSURE: 106 MMHG | RESPIRATION RATE: 17 BRPM | BODY MASS INDEX: 29.86 KG/M2 | HEIGHT: 68 IN | TEMPERATURE: 97 F | DIASTOLIC BLOOD PRESSURE: 70 MMHG | OXYGEN SATURATION: 93 %

## 2024-09-10 LAB — GLUCOSE BLD-MCNC: 155 MG/DL (ref 74–99)

## 2024-09-10 PROCEDURE — 6370000000 HC RX 637 (ALT 250 FOR IP): Performed by: NEUROLOGICAL SURGERY

## 2024-09-10 PROCEDURE — 97530 THERAPEUTIC ACTIVITIES: CPT

## 2024-09-10 PROCEDURE — 6370000000 HC RX 637 (ALT 250 FOR IP): Performed by: NURSE PRACTITIONER

## 2024-09-10 PROCEDURE — 97535 SELF CARE MNGMENT TRAINING: CPT

## 2024-09-10 PROCEDURE — 97112 NEUROMUSCULAR REEDUCATION: CPT

## 2024-09-10 PROCEDURE — 2500000003 HC RX 250 WO HCPCS: Performed by: NEUROLOGICAL SURGERY

## 2024-09-10 PROCEDURE — 82962 GLUCOSE BLOOD TEST: CPT

## 2024-09-10 PROCEDURE — 2580000003 HC RX 258: Performed by: NEUROLOGICAL SURGERY

## 2024-09-10 RX ORDER — OXYCODONE HCL 10 MG/1
10 TABLET, FILM COATED, EXTENDED RELEASE ORAL EVERY 12 HOURS SCHEDULED
Qty: 14 EACH | Refills: 0 | Status: SHIPPED | OUTPATIENT
Start: 2024-09-10 | End: 2024-09-17

## 2024-09-10 RX ORDER — OXYCODONE HCL 10 MG/1
10 TABLET, FILM COATED, EXTENDED RELEASE ORAL EVERY 12 HOURS SCHEDULED
Qty: 14 EACH | Refills: 0 | Status: SHIPPED | OUTPATIENT
Start: 2024-09-10 | End: 2024-09-10

## 2024-09-10 RX ADMIN — SODIUM CHLORIDE, PRESERVATIVE FREE 10 ML: 5 INJECTION INTRAVENOUS at 08:17

## 2024-09-10 RX ADMIN — LOSARTAN POTASSIUM 100 MG: 50 TABLET, FILM COATED ORAL at 08:30

## 2024-09-10 RX ADMIN — ACETAMINOPHEN 650 MG: 325 TABLET ORAL at 05:18

## 2024-09-10 RX ADMIN — PANCRELIPASE LIPASE, PANCRELIPASE PROTEASE, PANCRELIPASE AMYLASE 20000 UNITS: 20000; 63000; 84000 CAPSULE, DELAYED RELEASE ORAL at 08:32

## 2024-09-10 RX ADMIN — POLYETHYLENE GLYCOL 3350 17 G: 17 POWDER, FOR SOLUTION ORAL at 08:30

## 2024-09-10 RX ADMIN — HYDROMORPHONE HYDROCHLORIDE 1 MG: 1 INJECTION, SOLUTION INTRAMUSCULAR; INTRAVENOUS; SUBCUTANEOUS at 08:17

## 2024-09-10 RX ADMIN — DIAZEPAM 5 MG: 5 TABLET ORAL at 02:48

## 2024-09-10 RX ADMIN — MUPIROCIN: 20 OINTMENT TOPICAL at 08:38

## 2024-09-10 RX ADMIN — PANCRELIPASE LIPASE, PANCRELIPASE PROTEASE, PANCRELIPASE AMYLASE 15000 UNITS: 15000; 47000; 63000 CAPSULE, DELAYED RELEASE ORAL at 08:31

## 2024-09-10 RX ADMIN — OXYCODONE 15 MG: 15 TABLET ORAL at 13:14

## 2024-09-10 RX ADMIN — DIAZEPAM 5 MG: 5 TABLET ORAL at 09:22

## 2024-09-10 RX ADMIN — OXYCODONE 15 MG: 15 TABLET ORAL at 09:22

## 2024-09-10 RX ADMIN — BISACODYL 5 MG: 5 TABLET, COATED ORAL at 08:29

## 2024-09-10 RX ADMIN — PANTOPRAZOLE SODIUM 40 MG: 40 TABLET, DELAYED RELEASE ORAL at 05:19

## 2024-09-10 RX ADMIN — BUMETANIDE 2 MG: 1 TABLET ORAL at 08:30

## 2024-09-10 RX ADMIN — CETIRIZINE HYDROCHLORIDE 10 MG: 10 TABLET, FILM COATED ORAL at 08:30

## 2024-09-10 RX ADMIN — GABAPENTIN 400 MG: 400 CAPSULE ORAL at 08:25

## 2024-09-10 RX ADMIN — HYDROMORPHONE HYDROCHLORIDE 1 MG: 1 INJECTION, SOLUTION INTRAMUSCULAR; INTRAVENOUS; SUBCUTANEOUS at 02:42

## 2024-09-10 RX ADMIN — OXYCODONE 15 MG: 15 TABLET ORAL at 05:19

## 2024-09-10 RX ADMIN — DOCUSATE SODIUM 200 MG: 100 CAPSULE, LIQUID FILLED ORAL at 08:30

## 2024-09-10 RX ADMIN — SENNOSIDES AND DOCUSATE SODIUM 1 TABLET: 50; 8.6 TABLET ORAL at 08:30

## 2024-09-10 RX ADMIN — OXYCODONE HYDROCHLORIDE 10 MG: 10 TABLET, FILM COATED, EXTENDED RELEASE ORAL at 08:25

## 2024-09-10 RX ADMIN — INSULIN GLARGINE 30 UNITS: 100 INJECTION, SOLUTION SUBCUTANEOUS at 08:31

## 2024-09-10 RX ADMIN — ACETAMINOPHEN 650 MG: 325 TABLET ORAL at 13:14

## 2024-09-10 ASSESSMENT — PAIN DESCRIPTION - DESCRIPTORS
DESCRIPTORS: ACHING;DISCOMFORT;SORE
DESCRIPTORS: ACHING;JABBING;DISCOMFORT
DESCRIPTORS: ACHING;DISCOMFORT;SPASM

## 2024-09-10 ASSESSMENT — PAIN DESCRIPTION - FREQUENCY
FREQUENCY: CONTINUOUS

## 2024-09-10 ASSESSMENT — PAIN DESCRIPTION - LOCATION
LOCATION: BACK
LOCATION: BACK
LOCATION: BACK;HIP
LOCATION: BACK
LOCATION: BACK;HIP

## 2024-09-10 ASSESSMENT — PAIN SCALES - GENERAL
PAINLEVEL_OUTOF10: 0
PAINLEVEL_OUTOF10: 10
PAINLEVEL_OUTOF10: 8
PAINLEVEL_OUTOF10: 2
PAINLEVEL_OUTOF10: 10
PAINLEVEL_OUTOF10: 8
PAINLEVEL_OUTOF10: 8
PAINLEVEL_OUTOF10: 9
PAINLEVEL_OUTOF10: 8
PAINLEVEL_OUTOF10: 10

## 2024-09-10 ASSESSMENT — PAIN DESCRIPTION - PAIN TYPE
TYPE: SURGICAL PAIN;CHRONIC PAIN
TYPE: CHRONIC PAIN;SURGICAL PAIN
TYPE: SURGICAL PAIN;CHRONIC PAIN

## 2024-09-10 ASSESSMENT — PAIN SCALES - WONG BAKER
WONGBAKER_NUMERICALRESPONSE: HURTS A LITTLE BIT
WONGBAKER_NUMERICALRESPONSE: HURTS A LITTLE BIT

## 2024-09-10 ASSESSMENT — PAIN DESCRIPTION - ORIENTATION
ORIENTATION: MID;LOWER
ORIENTATION: MID;RIGHT;LEFT
ORIENTATION: LOWER;MID
ORIENTATION: MID;RIGHT;LEFT
ORIENTATION: LOWER;MID

## 2024-09-10 ASSESSMENT — PAIN DESCRIPTION - ONSET
ONSET: ON-GOING

## 2024-09-12 ENCOUNTER — TELEPHONE (OUTPATIENT)
Dept: NEUROSURGERY | Age: 64
End: 2024-09-12

## 2024-09-12 DIAGNOSIS — Z98.1 S/P SPINAL FUSION: Primary | ICD-10-CM

## 2024-09-12 LAB — SURGICAL PATHOLOGY REPORT: NORMAL

## 2024-09-17 ENCOUNTER — TELEPHONE (OUTPATIENT)
Dept: NEUROSURGERY | Age: 64
End: 2024-09-17

## 2024-09-17 DIAGNOSIS — Z98.1 S/P SPINAL FUSION: Primary | ICD-10-CM

## 2024-09-17 RX ORDER — OXYCODONE AND ACETAMINOPHEN 10; 325 MG/1; MG/1
1 TABLET ORAL EVERY 4 HOURS PRN
Qty: 42 TABLET | Refills: 0 | Status: SHIPPED | OUTPATIENT
Start: 2024-09-17 | End: 2024-09-24

## 2024-09-20 ENCOUNTER — OFFICE VISIT (OUTPATIENT)
Dept: NEUROSURGERY | Age: 64
End: 2024-09-20

## 2024-09-20 DIAGNOSIS — Z98.1 S/P LUMBAR FUSION: Primary | ICD-10-CM

## 2024-09-20 PROCEDURE — 99024 POSTOP FOLLOW-UP VISIT: CPT | Performed by: STUDENT IN AN ORGANIZED HEALTH CARE EDUCATION/TRAINING PROGRAM

## 2024-09-23 ENCOUNTER — TELEPHONE (OUTPATIENT)
Dept: NEUROSURGERY | Age: 64
End: 2024-09-23

## 2024-09-23 DIAGNOSIS — M48.061 LUMBAR SPINAL STENOSIS DUE TO ADJACENT SEGMENT DISEASE AFTER FUSION PROCEDURE: ICD-10-CM

## 2024-09-23 DIAGNOSIS — M51.36 LUMBAR SPINAL STENOSIS DUE TO ADJACENT SEGMENT DISEASE AFTER FUSION PROCEDURE: ICD-10-CM

## 2024-09-23 DIAGNOSIS — Z98.1 S/P SPINAL FUSION: ICD-10-CM

## 2024-09-23 RX ORDER — DIAZEPAM 5 MG
5 TABLET ORAL EVERY 6 HOURS PRN
Qty: 40 TABLET | Refills: 0 | Status: SHIPPED | OUTPATIENT
Start: 2024-09-23 | End: 2024-10-03

## 2024-09-23 RX ORDER — OXYCODONE AND ACETAMINOPHEN 10; 325 MG/1; MG/1
1 TABLET ORAL EVERY 4 HOURS PRN
Qty: 42 TABLET | Refills: 0 | Status: SHIPPED | OUTPATIENT
Start: 2024-09-23 | End: 2024-09-30

## 2024-09-26 DIAGNOSIS — Z98.1 S/P LUMBAR FUSION: Primary | ICD-10-CM

## 2024-10-02 ENCOUNTER — TELEPHONE (OUTPATIENT)
Dept: NEUROSURGERY | Age: 64
End: 2024-10-02

## 2024-10-02 DIAGNOSIS — M48.061 LUMBAR SPINAL STENOSIS DUE TO ADJACENT SEGMENT DISEASE AFTER FUSION PROCEDURE: ICD-10-CM

## 2024-10-02 DIAGNOSIS — Z98.1 S/P SPINAL FUSION: ICD-10-CM

## 2024-10-02 DIAGNOSIS — M51.369 LUMBAR SPINAL STENOSIS DUE TO ADJACENT SEGMENT DISEASE AFTER FUSION PROCEDURE: ICD-10-CM

## 2024-10-02 DIAGNOSIS — Z98.1 LUMBAR SPINAL STENOSIS DUE TO ADJACENT SEGMENT DISEASE AFTER FUSION PROCEDURE: ICD-10-CM

## 2024-10-02 RX ORDER — DIAZEPAM 5 MG
5 TABLET ORAL EVERY 6 HOURS PRN
Qty: 40 TABLET | Refills: 0 | Status: SHIPPED | OUTPATIENT
Start: 2024-10-02 | End: 2024-10-12

## 2024-10-02 RX ORDER — OXYCODONE AND ACETAMINOPHEN 10; 325 MG/1; MG/1
1 TABLET ORAL EVERY 4 HOURS PRN
Qty: 42 TABLET | Refills: 0 | Status: SHIPPED | OUTPATIENT
Start: 2024-10-02 | End: 2024-10-09

## 2024-10-09 ENCOUNTER — HOSPITAL ENCOUNTER (OUTPATIENT)
Dept: GENERAL RADIOLOGY | Age: 64
Discharge: HOME | End: 2024-10-11
Payer: MEDICAID

## 2024-10-09 ENCOUNTER — HOSPITAL ENCOUNTER (OUTPATIENT)
Age: 64
Discharge: HOME | End: 2024-10-11
Payer: MEDICAID

## 2024-10-09 ENCOUNTER — OFFICE VISIT (OUTPATIENT)
Dept: NEUROSURGERY | Age: 64
End: 2024-10-09
Payer: MEDICAID

## 2024-10-09 VITALS
BODY MASS INDEX: 29.86 KG/M2 | OXYGEN SATURATION: 100 % | WEIGHT: 197 LBS | HEART RATE: 51 BPM | DIASTOLIC BLOOD PRESSURE: 60 MMHG | SYSTOLIC BLOOD PRESSURE: 122 MMHG | TEMPERATURE: 97.8 F | HEIGHT: 68 IN

## 2024-10-09 DIAGNOSIS — Z98.1 S/P LUMBAR FUSION: ICD-10-CM

## 2024-10-09 DIAGNOSIS — M51.369 LUMBAR SPINAL STENOSIS DUE TO ADJACENT SEGMENT DISEASE AFTER FUSION PROCEDURE: ICD-10-CM

## 2024-10-09 DIAGNOSIS — M48.061 LUMBAR SPINAL STENOSIS DUE TO ADJACENT SEGMENT DISEASE AFTER FUSION PROCEDURE: ICD-10-CM

## 2024-10-09 DIAGNOSIS — Z98.1 S/P SPINAL FUSION: ICD-10-CM

## 2024-10-09 DIAGNOSIS — Z98.1 LUMBAR SPINAL STENOSIS DUE TO ADJACENT SEGMENT DISEASE AFTER FUSION PROCEDURE: ICD-10-CM

## 2024-10-09 PROCEDURE — 99024 POSTOP FOLLOW-UP VISIT: CPT | Performed by: STUDENT IN AN ORGANIZED HEALTH CARE EDUCATION/TRAINING PROGRAM

## 2024-10-09 PROCEDURE — 72100 X-RAY EXAM L-S SPINE 2/3 VWS: CPT

## 2024-10-09 RX ORDER — DIAZEPAM 5 MG
5 TABLET ORAL EVERY 6 HOURS PRN
Qty: 40 TABLET | Refills: 0 | Status: SHIPPED | OUTPATIENT
Start: 2024-10-09 | End: 2024-10-19

## 2024-10-09 RX ORDER — OXYCODONE AND ACETAMINOPHEN 10; 325 MG/1; MG/1
1 TABLET ORAL EVERY 4 HOURS PRN
Qty: 42 TABLET | Refills: 0 | Status: SHIPPED | OUTPATIENT
Start: 2024-10-09 | End: 2024-10-16

## 2024-10-09 NOTE — PROGRESS NOTES
Post-Operative Follow-up     This is a 64 year old male who presents to the office for a 1 month follow-up s/p L4-L5 PLIF     Subjective: Patient states he is doing well. He admits to some back pain, but improvement from prior to surgery. States he is walking better and standing up straighter. Denies any significant pain down the legs. No numbness or weakness. Brace complaint. XR reviewed.      Physical Exam:              WDWN, no apparent distress              Non-labored breathing               Vitals Stable              Alert and oriented x3              CN 3-12 intact              PERRL              EOMI              DUMAS well              Motor strength symmetric              Sensation to LT intact bilaterally   LSO on     Incision healing well without signs of infection              Imaging: 10/09/2024 XR Lumbar Spine  Stable alignment, stable fusion. No acute abnormalities noted. Final report pending.     Assessment: This is a 64 y.o.  male presenting for a 1 month follow-up s/p L4-L5 PLIF     Plan:  -Pain control and expectations discussed  -Continue brace and restrictions x 2 more months  -Continue bone stimulator for 6-9 months   -OARRS report reviewed   -Follow-up in neurosurgery clinic in 2 months with XR  -Call or return to neurosurgery office sooner if symptoms worsen or if new issues arise in the interim.    Electronically signed by Marie Friend PA-C on 10/9/2024 at 5:18 PM

## 2024-10-15 ENCOUNTER — TELEPHONE (OUTPATIENT)
Dept: NEUROSURGERY | Age: 64
End: 2024-10-15

## 2024-10-15 DIAGNOSIS — M51.369 LUMBAR SPINAL STENOSIS DUE TO ADJACENT SEGMENT DISEASE AFTER FUSION PROCEDURE: ICD-10-CM

## 2024-10-15 DIAGNOSIS — Z98.1 LUMBAR SPINAL STENOSIS DUE TO ADJACENT SEGMENT DISEASE AFTER FUSION PROCEDURE: ICD-10-CM

## 2024-10-15 DIAGNOSIS — Z98.1 S/P SPINAL FUSION: ICD-10-CM

## 2024-10-15 DIAGNOSIS — M48.061 LUMBAR SPINAL STENOSIS DUE TO ADJACENT SEGMENT DISEASE AFTER FUSION PROCEDURE: ICD-10-CM

## 2024-10-15 RX ORDER — DIAZEPAM 5 MG
5 TABLET ORAL EVERY 6 HOURS PRN
Qty: 40 TABLET | Refills: 0 | Status: SHIPPED | OUTPATIENT
Start: 2024-10-15 | End: 2024-10-25

## 2024-10-15 RX ORDER — OXYCODONE AND ACETAMINOPHEN 10; 325 MG/1; MG/1
1 TABLET ORAL EVERY 4 HOURS PRN
Qty: 42 TABLET | Refills: 0 | Status: SHIPPED | OUTPATIENT
Start: 2024-10-15 | End: 2024-10-22

## 2024-10-22 ENCOUNTER — TELEPHONE (OUTPATIENT)
Dept: NEUROSURGERY | Age: 64
End: 2024-10-22

## 2024-10-22 DIAGNOSIS — M51.369 LUMBAR SPINAL STENOSIS DUE TO ADJACENT SEGMENT DISEASE AFTER FUSION PROCEDURE: ICD-10-CM

## 2024-10-22 DIAGNOSIS — M48.061 LUMBAR SPINAL STENOSIS DUE TO ADJACENT SEGMENT DISEASE AFTER FUSION PROCEDURE: ICD-10-CM

## 2024-10-22 DIAGNOSIS — Z98.1 S/P SPINAL FUSION: ICD-10-CM

## 2024-10-22 DIAGNOSIS — Z98.1 LUMBAR SPINAL STENOSIS DUE TO ADJACENT SEGMENT DISEASE AFTER FUSION PROCEDURE: ICD-10-CM

## 2024-10-22 RX ORDER — OXYCODONE AND ACETAMINOPHEN 10; 325 MG/1; MG/1
1 TABLET ORAL EVERY 4 HOURS PRN
Qty: 42 TABLET | Refills: 0 | Status: SHIPPED | OUTPATIENT
Start: 2024-10-22 | End: 2024-10-29

## 2024-10-22 RX ORDER — DIAZEPAM 5 MG/1
5 TABLET ORAL EVERY 6 HOURS PRN
Qty: 40 TABLET | Refills: 0 | Status: SHIPPED | OUTPATIENT
Start: 2024-10-22 | End: 2024-11-01

## 2024-10-28 ENCOUNTER — TELEPHONE (OUTPATIENT)
Dept: NEUROSURGERY | Age: 64
End: 2024-10-28

## 2024-10-28 DIAGNOSIS — Z98.1 LUMBAR SPINAL STENOSIS DUE TO ADJACENT SEGMENT DISEASE AFTER FUSION PROCEDURE: ICD-10-CM

## 2024-10-28 DIAGNOSIS — M51.369 LUMBAR SPINAL STENOSIS DUE TO ADJACENT SEGMENT DISEASE AFTER FUSION PROCEDURE: ICD-10-CM

## 2024-10-28 DIAGNOSIS — M48.061 LUMBAR SPINAL STENOSIS DUE TO ADJACENT SEGMENT DISEASE AFTER FUSION PROCEDURE: ICD-10-CM

## 2024-10-28 DIAGNOSIS — Z98.1 S/P SPINAL FUSION: ICD-10-CM

## 2024-10-28 RX ORDER — DIAZEPAM 5 MG/1
5 TABLET ORAL EVERY 6 HOURS PRN
Qty: 40 TABLET | Refills: 0 | Status: SHIPPED | OUTPATIENT
Start: 2024-10-28 | End: 2024-11-07

## 2024-10-28 RX ORDER — OXYCODONE AND ACETAMINOPHEN 10; 325 MG/1; MG/1
1 TABLET ORAL EVERY 4 HOURS PRN
Qty: 42 TABLET | Refills: 0 | Status: SHIPPED | OUTPATIENT
Start: 2024-10-28 | End: 2024-11-04

## 2024-11-05 ENCOUNTER — TELEPHONE (OUTPATIENT)
Dept: NEUROSURGERY | Age: 64
End: 2024-11-05

## 2024-11-05 DIAGNOSIS — Z98.1 S/P SPINAL FUSION: ICD-10-CM

## 2024-11-05 RX ORDER — OXYCODONE AND ACETAMINOPHEN 10; 325 MG/1; MG/1
1 TABLET ORAL EVERY 4 HOURS PRN
Qty: 42 TABLET | Refills: 0 | Status: SHIPPED | OUTPATIENT
Start: 2024-11-05 | End: 2024-11-12

## 2024-11-12 ENCOUNTER — TELEPHONE (OUTPATIENT)
Dept: NEUROSURGERY | Age: 64
End: 2024-11-12

## 2024-11-12 DIAGNOSIS — M48.061 LUMBAR SPINAL STENOSIS DUE TO ADJACENT SEGMENT DISEASE AFTER FUSION PROCEDURE: ICD-10-CM

## 2024-11-12 DIAGNOSIS — Z98.1 S/P SPINAL FUSION: ICD-10-CM

## 2024-11-12 DIAGNOSIS — Z98.1 LUMBAR SPINAL STENOSIS DUE TO ADJACENT SEGMENT DISEASE AFTER FUSION PROCEDURE: ICD-10-CM

## 2024-11-12 DIAGNOSIS — M51.369 LUMBAR SPINAL STENOSIS DUE TO ADJACENT SEGMENT DISEASE AFTER FUSION PROCEDURE: ICD-10-CM

## 2024-11-12 RX ORDER — OXYCODONE AND ACETAMINOPHEN 10; 325 MG/1; MG/1
1 TABLET ORAL EVERY 4 HOURS PRN
Qty: 42 TABLET | Refills: 0 | Status: SHIPPED | OUTPATIENT
Start: 2024-11-12 | End: 2024-11-19

## 2024-11-12 RX ORDER — DIAZEPAM 5 MG/1
5 TABLET ORAL EVERY 6 HOURS PRN
Qty: 40 TABLET | Refills: 0 | Status: SHIPPED | OUTPATIENT
Start: 2024-11-12 | End: 2024-11-22

## 2024-11-12 NOTE — TELEPHONE ENCOUNTER
Patient called for a refill on pain medication and muscle relaxer to be sent to [facility].

## 2024-11-19 ENCOUNTER — TELEPHONE (OUTPATIENT)
Dept: NEUROSURGERY | Age: 64
End: 2024-11-19

## 2024-11-19 DIAGNOSIS — M48.061 LUMBAR SPINAL STENOSIS DUE TO ADJACENT SEGMENT DISEASE AFTER FUSION PROCEDURE: ICD-10-CM

## 2024-11-19 DIAGNOSIS — M51.369 LUMBAR SPINAL STENOSIS DUE TO ADJACENT SEGMENT DISEASE AFTER FUSION PROCEDURE: ICD-10-CM

## 2024-11-19 DIAGNOSIS — Z98.1 LUMBAR SPINAL STENOSIS DUE TO ADJACENT SEGMENT DISEASE AFTER FUSION PROCEDURE: ICD-10-CM

## 2024-11-19 DIAGNOSIS — Z98.1 S/P SPINAL FUSION: ICD-10-CM

## 2024-11-19 RX ORDER — DIAZEPAM 5 MG/1
5 TABLET ORAL EVERY 6 HOURS PRN
Qty: 40 TABLET | Refills: 0 | Status: SHIPPED | OUTPATIENT
Start: 2024-11-19 | End: 2024-11-29

## 2024-11-19 RX ORDER — OXYCODONE AND ACETAMINOPHEN 10; 325 MG/1; MG/1
1 TABLET ORAL EVERY 4 HOURS PRN
Qty: 42 TABLET | Refills: 0 | Status: SHIPPED | OUTPATIENT
Start: 2024-11-19 | End: 2024-11-26

## 2024-11-20 ENCOUNTER — TELEPHONE (OUTPATIENT)
Dept: NEUROSURGERY | Age: 64
End: 2024-11-20

## 2024-11-26 ENCOUNTER — TELEPHONE (OUTPATIENT)
Dept: NEUROSURGERY | Age: 64
End: 2024-11-26

## 2024-11-26 DIAGNOSIS — M51.369 LUMBAR SPINAL STENOSIS DUE TO ADJACENT SEGMENT DISEASE AFTER FUSION PROCEDURE: ICD-10-CM

## 2024-11-26 DIAGNOSIS — Z98.1 LUMBAR SPINAL STENOSIS DUE TO ADJACENT SEGMENT DISEASE AFTER FUSION PROCEDURE: ICD-10-CM

## 2024-11-26 DIAGNOSIS — Z98.1 S/P SPINAL FUSION: ICD-10-CM

## 2024-11-26 DIAGNOSIS — M48.061 LUMBAR SPINAL STENOSIS DUE TO ADJACENT SEGMENT DISEASE AFTER FUSION PROCEDURE: ICD-10-CM

## 2024-11-26 RX ORDER — OXYCODONE AND ACETAMINOPHEN 5; 325 MG/1; MG/1
1 TABLET ORAL EVERY 4 HOURS PRN
Qty: 42 TABLET | Refills: 0 | Status: SHIPPED | OUTPATIENT
Start: 2024-11-26 | End: 2024-12-03

## 2024-11-26 RX ORDER — DIAZEPAM 5 MG/1
5 TABLET ORAL EVERY 6 HOURS PRN
Qty: 40 TABLET | Refills: 0 | Status: SHIPPED | OUTPATIENT
Start: 2024-11-26 | End: 2024-12-06

## 2024-12-03 ENCOUNTER — TELEPHONE (OUTPATIENT)
Dept: NEUROSURGERY | Age: 64
End: 2024-12-03

## 2024-12-03 DIAGNOSIS — Z98.1 LUMBAR SPINAL STENOSIS DUE TO ADJACENT SEGMENT DISEASE AFTER FUSION PROCEDURE: ICD-10-CM

## 2024-12-03 DIAGNOSIS — M48.061 LUMBAR SPINAL STENOSIS DUE TO ADJACENT SEGMENT DISEASE AFTER FUSION PROCEDURE: ICD-10-CM

## 2024-12-03 DIAGNOSIS — M51.369 LUMBAR SPINAL STENOSIS DUE TO ADJACENT SEGMENT DISEASE AFTER FUSION PROCEDURE: ICD-10-CM

## 2024-12-03 DIAGNOSIS — Z98.1 S/P SPINAL FUSION: ICD-10-CM

## 2024-12-03 RX ORDER — DIAZEPAM 5 MG/1
5 TABLET ORAL EVERY 6 HOURS PRN
Qty: 40 TABLET | Refills: 0 | Status: SHIPPED | OUTPATIENT
Start: 2024-12-03 | End: 2024-12-13

## 2024-12-03 RX ORDER — OXYCODONE AND ACETAMINOPHEN 5; 325 MG/1; MG/1
1 TABLET ORAL EVERY 4 HOURS PRN
Qty: 42 TABLET | Refills: 0 | Status: SHIPPED | OUTPATIENT
Start: 2024-12-03 | End: 2024-12-10

## 2024-12-10 ENCOUNTER — TELEPHONE (OUTPATIENT)
Dept: NEUROSURGERY | Age: 64
End: 2024-12-10

## 2024-12-10 DIAGNOSIS — Z98.1 LUMBAR SPINAL STENOSIS DUE TO ADJACENT SEGMENT DISEASE AFTER FUSION PROCEDURE: ICD-10-CM

## 2024-12-10 DIAGNOSIS — M51.369 LUMBAR SPINAL STENOSIS DUE TO ADJACENT SEGMENT DISEASE AFTER FUSION PROCEDURE: ICD-10-CM

## 2024-12-10 DIAGNOSIS — M48.061 LUMBAR SPINAL STENOSIS DUE TO ADJACENT SEGMENT DISEASE AFTER FUSION PROCEDURE: ICD-10-CM

## 2024-12-10 DIAGNOSIS — Z98.1 S/P SPINAL FUSION: ICD-10-CM

## 2024-12-10 RX ORDER — DIAZEPAM 5 MG/1
5 TABLET ORAL EVERY 6 HOURS PRN
Qty: 40 TABLET | Refills: 0 | Status: SHIPPED | OUTPATIENT
Start: 2024-12-10 | End: 2024-12-20

## 2024-12-10 RX ORDER — OXYCODONE AND ACETAMINOPHEN 5; 325 MG/1; MG/1
1 TABLET ORAL EVERY 4 HOURS PRN
Qty: 42 TABLET | Refills: 0 | Status: SHIPPED | OUTPATIENT
Start: 2024-12-10 | End: 2024-12-17

## 2024-12-10 NOTE — TELEPHONE ENCOUNTER
Patient called for a refill on pain medication to be sent to [facility].     Patient states that he has a 3 month follow up appointment on 12/12/24-he will need a letter stating that he is out of post-op period and will no longer getting medications from us to give to his pain management doctor.  I advised patient to speak to provider when he comes to his next appointment.

## 2024-12-12 ENCOUNTER — HOSPITAL ENCOUNTER (OUTPATIENT)
Age: 64
Discharge: HOME | End: 2024-12-12

## 2024-12-12 ENCOUNTER — HOSPITAL ENCOUNTER (OUTPATIENT)
Dept: GENERAL RADIOLOGY | Age: 64
Discharge: HOME | End: 2024-12-14
Payer: MEDICAID

## 2024-12-12 ENCOUNTER — OFFICE VISIT (OUTPATIENT)
Dept: NEUROSURGERY | Age: 64
End: 2024-12-12
Payer: MEDICAID

## 2024-12-12 VITALS
OXYGEN SATURATION: 98 % | SYSTOLIC BLOOD PRESSURE: 122 MMHG | HEART RATE: 51 BPM | WEIGHT: 197 LBS | HEIGHT: 68 IN | RESPIRATION RATE: 18 BRPM | DIASTOLIC BLOOD PRESSURE: 60 MMHG | BODY MASS INDEX: 29.86 KG/M2 | TEMPERATURE: 97.6 F

## 2024-12-12 DIAGNOSIS — M48.061 LUMBAR SPINAL STENOSIS DUE TO ADJACENT SEGMENT DISEASE AFTER FUSION PROCEDURE: Primary | ICD-10-CM

## 2024-12-12 DIAGNOSIS — M48.061 LUMBAR SPINAL STENOSIS DUE TO ADJACENT SEGMENT DISEASE AFTER FUSION PROCEDURE: ICD-10-CM

## 2024-12-12 DIAGNOSIS — Z98.1 S/P SPINAL FUSION: ICD-10-CM

## 2024-12-12 DIAGNOSIS — Z98.1 LUMBAR SPINAL STENOSIS DUE TO ADJACENT SEGMENT DISEASE AFTER FUSION PROCEDURE: ICD-10-CM

## 2024-12-12 DIAGNOSIS — Z98.1 LUMBAR SPINAL STENOSIS DUE TO ADJACENT SEGMENT DISEASE AFTER FUSION PROCEDURE: Primary | ICD-10-CM

## 2024-12-12 DIAGNOSIS — M51.369 LUMBAR ADJACENT SEGMENT DISEASE WITH SPONDYLOLISTHESIS: ICD-10-CM

## 2024-12-12 DIAGNOSIS — M51.369 LUMBAR SPINAL STENOSIS DUE TO ADJACENT SEGMENT DISEASE AFTER FUSION PROCEDURE: ICD-10-CM

## 2024-12-12 DIAGNOSIS — M51.369 LUMBAR SPINAL STENOSIS DUE TO ADJACENT SEGMENT DISEASE AFTER FUSION PROCEDURE: Primary | ICD-10-CM

## 2024-12-12 DIAGNOSIS — M43.16 LUMBAR ADJACENT SEGMENT DISEASE WITH SPONDYLOLISTHESIS: ICD-10-CM

## 2024-12-12 PROCEDURE — 72100 X-RAY EXAM L-S SPINE 2/3 VWS: CPT

## 2024-12-12 PROCEDURE — 99024 POSTOP FOLLOW-UP VISIT: CPT | Performed by: PHYSICIAN ASSISTANT

## 2024-12-12 NOTE — PROGRESS NOTES
Post-Operative Follow-up     This is a 64 year old male who presents to the office for a 3 month follow-up s/p L4-L5 PLIF     Subjective: Patient states the pain in his back and legs continues to improve. Continues to have some soreness and weakness in his legs. No numbness or weakness. Brace complaint. XR reviewed.      Physical Exam:              WDWN, no apparent distress              Non-labored breathing               Vitals Stable              Alert and oriented x3              CN 3-12 intact              PERRL              EOMI              DUMAS well              Motor strength symmetric              Sensation to LT intact bilaterally   LSO on     Incision healing well without signs of infection              Imagin24 lumbar XR: Stable alignment, stable fusion. No acute abnormalities noted. Final report pending.       Assessment: This is a 64 y.o.  male presenting for a 3 month follow-up s/p L4-L5 PLIF     Plan:  -Pain control and expectations discussed  -XR reviewed  -Okay to d/c LSO. No restrictions  -Referral for PT given to patient  -Continue bone stimulator for 6-9 months   -Our office can give 1 more pain medication refill. Next refill will be for 10 days as the office is closed on . This will last until 24. At that time, our Neurosurgery office will no longer prescribe patient's pain medications and Dr. Hernandez' office will take over. Letter written and given to patient.   -OARRS report reviewed   -Follow-up in neurosurgery clinic in 9 months with XR  -Call or return to neurosurgery office sooner if symptoms worsen or if new issues arise in the interim.    Electronically signed by SPEEDY Rome on 2024 at 3:49 PM

## 2024-12-17 ENCOUNTER — TELEPHONE (OUTPATIENT)
Dept: NEUROSURGERY | Age: 64
End: 2024-12-17

## 2024-12-17 NOTE — TELEPHONE ENCOUNTER
Patient called back, I informed him the letter was sent to Dr. Hernandez' office. He states that Dr. Hernandez will be out of the office from 12/24-1/3 and he is requesting to know if we could extend giving him medication until he can be seen on 1/6. I informed him that we already have sent the letter and we are pretty strict when it comes to prescribing pain medication. He just filled his diazepam last night. Please advise.

## 2024-12-17 NOTE — TELEPHONE ENCOUNTER
Thank you, informed patient. He was told he would be able to get 1 more refill on Oxycodone (that would be the last refill) to [facility] on [address] In [place].

## 2024-12-17 NOTE — TELEPHONE ENCOUNTER
Eliezer called today regarding letter and rx for pain meds. He wanted us to fax letter that was written  on last vist and op report which I sent.  He is saying that he won't be able to be seen with Dr. Hernandez until Jan 6 at the earliest. They would not schedule him until they get the fax from us.  He is concerned since we will only give him pain meds until Dec 27.   I told them I can let us know about this issue.  Dr. Hernandez won't prescribe anything until he is seen sometime in January.

## 2024-12-18 ENCOUNTER — TELEPHONE (OUTPATIENT)
Dept: NEUROSURGERY | Age: 64
End: 2024-12-18

## 2024-12-18 DIAGNOSIS — M48.061 LUMBAR SPINAL STENOSIS DUE TO ADJACENT SEGMENT DISEASE AFTER FUSION PROCEDURE: ICD-10-CM

## 2024-12-18 DIAGNOSIS — Z98.1 LUMBAR SPINAL STENOSIS DUE TO ADJACENT SEGMENT DISEASE AFTER FUSION PROCEDURE: ICD-10-CM

## 2024-12-18 DIAGNOSIS — M51.369 LUMBAR SPINAL STENOSIS DUE TO ADJACENT SEGMENT DISEASE AFTER FUSION PROCEDURE: ICD-10-CM

## 2024-12-18 DIAGNOSIS — Z98.1 S/P SPINAL FUSION: ICD-10-CM

## 2024-12-18 RX ORDER — OXYCODONE AND ACETAMINOPHEN 5; 325 MG/1; MG/1
1 TABLET ORAL EVERY 4 HOURS PRN
Qty: 60 TABLET | Refills: 0 | Status: SHIPPED | OUTPATIENT
Start: 2024-12-18 | End: 2024-12-28

## 2024-12-18 NOTE — TELEPHONE ENCOUNTER
Patient is requesting his last refill of his pain meds, Percocet  sent to [facility].  He wants a 10 day supply.

## 2025-01-24 NOTE — OP NOTE
PROCEDURE NOTE    DATE OF PROCEDURE: 9/2/21     SURGEON: Satish Sears M.D.    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 year old male with history of above preop diagnosis. I recommended esophagogastroduodenoscopy with possible biopsy and I explained the risk, benefits, expected outcome, and alternatives to the procedure. Risks included but are not limited to bleeding, infection, respiratory distress, hypotension, and perforation of the esophagus, stomach, or duodenum. Patient understands and is in agreement. PROCEDURE: The patient was connected to the monitors and given supplemental oxygen by nasal cannula. The patient was sedated. The gastroscope was inserted orally and advanced under direct vision through the esophagus, through the stomach, through the pylorus, and into the descending duodenum. Findings:  Duodenum:     Descending: normal    Bulb: D1 with 1cm ulcer with stigmata of recent bleeding, 2mL epinephrine injection with 1/2mL aliquot injections at 12, 3, 6, and 9 o clock positions. Stomach:    Antrum: mild gastritis with cold forcep biopsies to rule out H. Pylori    Body: normal    Fundus: normal    Esophagus: normal    Larynx: normal    The scope was removed and the patient tolerated the procedure well. IMPRESSION/PLAN:   1. Hold eliquis  2.  Continue protonix bid and carafate qid    Electronically signed by Tari Eastman MD on 9/2/21 at 8:21 AM EDT unintentional 2 weeks, until cleared by surgeon

## (undated) DEVICE — GAUZE,SPONGE,POST-OP,4X3,STRL,LF: Brand: MEDLINE

## (undated) DEVICE — SCISSORS SURG DIA8MM MPLR CRV ENDOWRIST

## (undated) DEVICE — GOWN,SIRUS,FABRNF,XL,20/CS: Brand: MEDLINE

## (undated) DEVICE — ADHESIVE SKIN CLOSURE TOP 36 CC HI VISC DERMBND MINI

## (undated) DEVICE — Z DISCONTINUED PER MEDLINE USE 2425483 TAPE UMB L30IN DIA1/8IN WHT COT NONABSORBABLE W/O NDL FOR

## (undated) DEVICE — DRESSING GZ XRFRM 4X4(25/BX 6BX/CS)

## (undated) DEVICE — NEEDLE HYPO 25GA L1.5IN BLU POLYPR HUB S STL REG BVL STR

## (undated) DEVICE — MEDIUM-LARGE CLIP APPLIER: Brand: ENDOWRIST

## (undated) DEVICE — SOLUTION SURG PREP 26 CC PURPREP

## (undated) DEVICE — BLOCK BITE 60FR RUBBER ADLT DENTAL

## (undated) DEVICE — GOWN,SIRUS,NONRNF,SETINSLV,XL,20/CS: Brand: MEDLINE

## (undated) DEVICE — SEAL CANN DIA8.5-13MM ENDOWRIST DA VINCI SI

## (undated) DEVICE — PATIENT RETURN ELECTRODE, SINGLE-USE, CONTACT QUALITY MONITORING, ADULT, WITH 9FT CORD, FOR PATIENTS WEIGING OVER 33LBS. (15KG): Brand: MEGADYNE

## (undated) DEVICE — 6 ML SYRINGE LUER-LOCK TIP: Brand: MONOJECT

## (undated) DEVICE — BANDAGE ADH W1XL3IN NAT FAB WVN FLX DURABLE N ADH PD SEAL

## (undated) DEVICE — ARM DRAPE

## (undated) DEVICE — NEEDLE HYPO 21GA L1.5IN GRN POLYPR HUB S STL REG BVL STR

## (undated) DEVICE — NEEDLE HYPO 18GA L1.5IN PNK POLYPR HUB S STL THN WALL FILL

## (undated) DEVICE — GAUZE,SPONGE,4"X4",12PLY,STERILE,LF,2'S: Brand: MEDLINE

## (undated) DEVICE — SCOPE DAVINCI XI 30 DEG W/CORD

## (undated) DEVICE — Device: Brand: BALLOON3

## (undated) DEVICE — GLOVE SURG 8.5 PF POLYMER WHT STRL SIGN LTX ESSENTIAL LTX

## (undated) DEVICE — 1810 FOAM BLOCK NEEDLE COUNTER: Brand: DEVON

## (undated) DEVICE — Device

## (undated) DEVICE — BITEBLOCK 54FR W/ DENT RIM BLOX

## (undated) DEVICE — NEEDLE INSUF L120MM DIA2MM DISP FOR PNEUMOPERI ENDOPATH

## (undated) DEVICE — SPHERE EYE 1 MRK GUIDANCE PASS STEALTHSTATION 1PK/EA

## (undated) DEVICE — SYRINGE MED 20ML STD CLR PLAS LUERLOCK TIP N CTRL DISP

## (undated) DEVICE — PEN: MARKING STD 100/CS: Brand: MEDICAL ACTION INDUSTRIES

## (undated) DEVICE — RX PUSHER: Brand: NAVIFLEX™ RX PUSHER

## (undated) DEVICE — TIP COVER ACCESSORY

## (undated) DEVICE — SPHINCTEROTOME: Brand: HYDRATOME RX 44

## (undated) DEVICE — GLOVE SURG SZ 75 L12IN FNGR THK94MIL TRNSLUC YEL LTX

## (undated) DEVICE — INTENDED FOR TISSUE SEPARATION, AND OTHER PROCEDURES THAT REQUIRE A SHARP SURGICAL BLADE TO PUNCTURE OR CUT.: Brand: BARD-PARKER ® STAINLESS STEEL BLADES

## (undated) DEVICE — SURGICAL PROCEDURE PACK ROBOTIC

## (undated) DEVICE — SYRINGE 20ML LL S/C 50

## (undated) DEVICE — HANDLE CVR PATENTED RETENTION DISC STRL LIGHT SHLD

## (undated) DEVICE — STANDARD HYPODERMIC NEEDLE,POLYPROPYLENE HUB: Brand: MONOJECT

## (undated) DEVICE — [HIGH FLOW INSUFFLATOR,  DO NOT USE IF PACKAGE IS DAMAGED,  KEEP DRY,  KEEP AWAY FROM SUNLIGHT,  PROTECT FROM HEAT AND RADIOACTIVE SOURCES.]: Brand: PNEUMOSURE

## (undated) DEVICE — DEFENDO AIR WATER SUCTION AND BIOPSY VALVE KIT FOR  OLYMPUS: Brand: DEFENDO AIR/WATER/SUCTION AND BIOPSY VALVE

## (undated) DEVICE — 3M™ RED DOT™ MONITORING ELECTRODE WITH FOAM TAPE AND STICKY GEL 2560, 50/BAG, 20/CASE, 72/PLT: Brand: RED DOT™

## (undated) DEVICE — SOLUTION IRRIG 1000ML 09% SOD CHL USP PIC PLAS CONTAINER

## (undated) DEVICE — ENCORE® LATEX TEXTURED SIZE 6.5, STERILE LATEX POWDER-FREE SURGICAL GLOVE: Brand: ENCORE

## (undated) DEVICE — STRAP POS MP 30X3 IN HK LOOP CLOSURE FOAM DISP

## (undated) DEVICE — Z DISCONTINUED NO SUB IDED TUBING ETCO2 AD L6.5FT NSL ORAL CVD PRNG NONFLARED TIP OVR

## (undated) DEVICE — OBTURATOR ROBOTIC DIA8MM BLDELSS ENDOSCP DISP DA VINCI SI

## (undated) DEVICE — ELECTRODE PT RET AD L9FT HI MOIST COND ADH HYDRGEL CORDED

## (undated) DEVICE — CATHETER URETH 8FR RED RUB INTMIT ALL PURP 12 PER CA

## (undated) DEVICE — GLOVE ORANGE PI 8   MSG9080

## (undated) DEVICE — COVER,LIGHT HANDLE,FLX,1/PK: Brand: MEDLINE INDUSTRIES, INC.

## (undated) DEVICE — Z DISCONTINUED APPLICATOR SURG PREP 0.35OZ 2% CHG 70% ISO ALC W/ HI LT

## (undated) DEVICE — COLUMN DRAPE

## (undated) DEVICE — RELOAD STPL L45MM H1.5-3.6MM REG TISS BLU GRIPPING SURF B

## (undated) DEVICE — GARMENT,MEDLINE,DVT,INT,CALF,MED, GEN2: Brand: MEDLINE

## (undated) DEVICE — SUTURE ABSORBABLE MONOFILAMENT 2-0 SH 6 IN STRATAFIX SPRL SXPP1B415

## (undated) DEVICE — FENESTRATED BIPOLAR FORCEPS: Brand: ENDOWRIST

## (undated) DEVICE — MICRO TIP WIPE: Brand: DEVON

## (undated) DEVICE — MARKER,SKIN,WI/RULER AND LABELS: Brand: MEDLINE

## (undated) DEVICE — APPLICATOR MEDICATED 26 CC SOLUTION HI LT ORNG CHLORAPREP

## (undated) DEVICE — SYSTEM INJ BILI RAP REFIL CONT

## (undated) DEVICE — SKIN AFFIX SURG ADHESIVE 72/CS 0.55ML: Brand: MEDLINE

## (undated) DEVICE — MEDIA CONTRAST ISOVUE  300 10X50ML

## (undated) DEVICE — BANDAGE,GAUZE,BULKEE II,4.5"X4.1YD,STRL: Brand: MEDLINE

## (undated) DEVICE — SOLUTION IV IRRIG WATER 1000ML POUR BRL 2F7114

## (undated) DEVICE — CLIP INT M L GRN TI TRNSVRS GRV CHEVRON SHP W/ PRECIS TIP

## (undated) DEVICE — GAUZE,SPONGE,4"X4",16PLY,XRAY,STRL,LF: Brand: MEDLINE

## (undated) DEVICE — 5.0MM PRECISION ROUND

## (undated) DEVICE — TOWEL,OR,DSP,ST,BLUE,STD,6/PK,12PK/CS: Brand: MEDLINE

## (undated) DEVICE — GLOVE ORANGE PI 7 1/2   MSG9075

## (undated) DEVICE — INSUFFLATION NEEDLE TO ESTABLISH PNEUMOPERITONEUM.: Brand: INSUFFLATION NEEDLE

## (undated) DEVICE — GAUZE,SPONGE,4"X4",16PLY,STRL,LF,10/TRAY: Brand: MEDLINE

## (undated) DEVICE — SUTURE NONABSORBABLE MONOFILAMENT 4-0 PS-2 18 IN BLU PROLENE 8682H

## (undated) DEVICE — NEPTUNE E-SEP 125MM SUCTION SLEEVE: Brand: NEPTUNE E-SEP

## (undated) DEVICE — CANNULA NSL ORAL AD FOR CAPNOFLEX CO2 O2 AIRLFE

## (undated) DEVICE — CANNULA SEAL

## (undated) DEVICE — SET INST DAVINCI ACCESSORIES

## (undated) DEVICE — GRADUATE TRIANG MEASURE 1000ML BLK PRNT

## (undated) DEVICE — Z DISCONTINUED USE 2272124 DRAPE SURG XL N INVASIVE 2 LAYR DISP

## (undated) DEVICE — NON-DEHP CATHETER EXTENSION SET, MALE LUER LOCK ADAPTER

## (undated) DEVICE — SET MAJOR INSTR HOUSE

## (undated) DEVICE — SYRINGE IRRIG 60ML SFT PLIABLE BLB EZ TO GRP 1 HND USE W/

## (undated) DEVICE — TUBING, SUCTION, 3/16" X 12', STRAIGHT: Brand: MEDLINE

## (undated) DEVICE — TRAY EPI SGL DOSE 18GA NDL CUST AULTMAN HOSP

## (undated) DEVICE — Z INACTIVE USE 2660664 SOLUTION IRRIG 3000ML 0.9% SOD CHL USP UROMATIC PLAS CONT

## (undated) DEVICE — KIT DRP 3 ARM ACC DISP ENDOWRIST DA VINCI SI

## (undated) DEVICE — SET INST DAVINCI LAP

## (undated) DEVICE — DOUBLE BASIN SET: Brand: MEDLINE INDUSTRIES, INC.

## (undated) DEVICE — DRAPE,REIN 53X77,STERILE: Brand: MEDLINE

## (undated) DEVICE — SHEET,DRAPE,40X58,STERILE: Brand: MEDLINE

## (undated) DEVICE — TIBURON EXTREMITY SHEET: Brand: CONVERTORS

## (undated) DEVICE — PANCREATIC STENT
Type: IMPLANTABLE DEVICE | Site: BILE DUCT | Status: NON-FUNCTIONAL
Brand: ADVANIX™ PANCREATIC STENT
Removed: 2021-05-17

## (undated) DEVICE — SYSTEM BX CAP BILI RAP EXCHG CAP LOK DEV COMPATIBLE W/ OLY

## (undated) DEVICE — PACK PROCEDURE SURG GEN CUST

## (undated) DEVICE — MEGA SUTURECUT ND: Brand: ENDOWRIST

## (undated) DEVICE — NEEDLE SPNL 22GA L5IN BLK HUB S STL W/ QNCKE PNT W/OUT

## (undated) DEVICE — LAPAROSCOPIC SCISSORS: Brand: EPIX LAPAROSCOPIC SCISSORS

## (undated) DEVICE — GLOVE SURG SZ 85 L12IN FNGR THK79MIL GRN LTX FREE

## (undated) DEVICE — SUCTION IRRIGATOR: Brand: ENDOWRIST

## (undated) DEVICE — 3 ML SYRINGE LUER-LOCK TIP: Brand: MONOJECT

## (undated) DEVICE — TROCAR: Brand: KII FIOS FIRST ENTRY

## (undated) DEVICE — BLADE ES L6IN ELASTOMERIC COAT EXT DURABLE BEND UPTO 90DEG

## (undated) DEVICE — GLOVE SURG SZ 65 THK91MIL LTX FREE SYN POLYISOPRENE

## (undated) DEVICE — DRAPE THER FLUID WARMING 66X44 IN FLAT SLUSH DBL DISC ORS

## (undated) DEVICE — MARKER SURG PASS SPHR NDI

## (undated) DEVICE — SURGICEL ENDOSCP APPL

## (undated) DEVICE — SYRINGE MED 10ML TRNSLUC BRL PLUNG BLK MRK POLYPR CTRL

## (undated) DEVICE — 3M™ IOBAN™ 2 ANTIMICROBIAL INCISE DRAPE 6650EZ: Brand: IOBAN™ 2

## (undated) DEVICE — SNARE MICRO 195CM 2.4MM POLYPECT OVAL

## (undated) DEVICE — TOWEL OR BLUEE 16X26IN ST 8 PACK ORB08 16X26ORTWL

## (undated) DEVICE — GOWN,SIRUS,FABRNF,L,20/CS: Brand: MEDLINE

## (undated) DEVICE — Z CONVERTED USE 2275207 CLOTH PREP W7.5XL7.5IN 2% CHG SKIN ALC AND RNS FREE

## (undated) DEVICE — SYSTEM BX 25GA FN NDL SIX DST CUT EDG SHARKCORE

## (undated) DEVICE — SYRINGE MED 10ML LUERLOCK TIP W/O SFTY DISP

## (undated) DEVICE — DRAPE,LAP,CHOLE,W/TROUGHS,STERILE: Brand: MEDLINE

## (undated) DEVICE — Z DISCONTINUED USE 2275686 GLOVE SURG SZ 8 L12IN FNGR THK13MIL WHT ISOLEX POLYISOPRENE

## (undated) DEVICE — GLOVE SURG SZ 7 L12IN FNGR THK79MIL GRN LTX FREE

## (undated) DEVICE — TUBING SUCT 12FR MAL ALUM SHFT FN CAP VENT UNIV CONN W/ OBT

## (undated) DEVICE — CHLORAPREP 26ML ORANGE

## (undated) DEVICE — HYPODERMIC SAFETY NEEDLE: Brand: MAGELLAN

## (undated) DEVICE — STAPLER ENDOSCP 45MM L34CM STD NAT ARTC LIN CUT ECHELON FLX

## (undated) DEVICE — TIP-UP FENESTRATED GRASPER: Brand: ENDOWRIST

## (undated) DEVICE — Z DISCONTINUED BY MEDLINE USE 2661081 SHEARS SEAL L20CM DIA5MM ULTRASONIC CRV TIP HARM HD 1000I

## (undated) DEVICE — RETRIEVAL BALLOON CATHETER: Brand: EXTRACTOR™ PRO RX

## (undated) DEVICE — AGENT HEMSTAT 3GM OXIDIZED REGENERATED CELOS ABSRB FOR CONT (ORDER MULTIPLES OF 5EA)

## (undated) DEVICE — NEEDLE SPNL WEISS LNG 18 GAX5 IN MOD TUOHY PT TW PERISAFE

## (undated) DEVICE — 12 ML SYRINGE,LUER-LOCK TIP: Brand: MONOJECT

## (undated) DEVICE — GOWN SURG XL SMS FAB NONREINFORCED RAGLAN SLV HK LOOP CLSR

## (undated) DEVICE — JACKSON TABLE POSITIONER KIT: Brand: MEDLINE INDUSTRIES, INC.

## (undated) DEVICE — KIT,ANTI FOG,W/SPONGE & FLUID,SOFT PACK: Brand: MEDLINE

## (undated) DEVICE — SOLUTION IV IRRIG 1000ML POUR BTL 2F7114

## (undated) DEVICE — TTL1LYR 16FR10ML 100%SIL TMPST TR: Brand: MEDLINE

## (undated) DEVICE — SNARE ENDOSCP POLYP SM 2.4 MM 195 CM 13 MM 2.8 MM CAPTIVATOR

## (undated) DEVICE — LUMBAR LAMINECTOMY: Brand: MEDLINE INDUSTRIES, INC.

## (undated) DEVICE — SUTURE V-LOC 180 SZ 0 L9IN ABSRB GRN GS-21 L37MM 1/2 CIR VLOCL0346

## (undated) DEVICE — CLOTH SURG PREP PREOPERATIVE CHLORHEXIDINE GLUC 2% READYPREP

## (undated) DEVICE — PACK SURG LAP CHOLE CUSTOM

## (undated) DEVICE — SPONGE LAP W18XL18IN WHT COT 4 PLY FLD STRUNG RADPQ DISP ST

## (undated) DEVICE — SPONGE GZ W4XL4IN RAYON POLY FILL CVR W/ NONWOVEN FAB

## (undated) DEVICE — NEEDLE SPNL L3.5IN PNK HUB S STL REG WALL FIT STYL W/ QNCKE

## (undated) DEVICE — FORCEPS BX OVL CUP FEN DISPOSABLE CAP L 160CM RAD JAW 4

## (undated) DEVICE — ELECTRO LUBE IS A SINGLE PATIENT USE DEVICE THAT IS INTENDED TO BE USED ON ELECTROSURGICAL ELECTRODES TO REDUCE STICKING.: Brand: KEY SURGICAL ELECTRO LUBE

## (undated) DEVICE — SET INST DAVINCI XI ACCESSORIES

## (undated) DEVICE — AGENT HEMSTAT W4XL8IN OXIDIZED REGENERATED CELOS ABSRB

## (undated) DEVICE — BLADELESS OBTURATOR: Brand: WECK VISTA

## (undated) DEVICE — NEEDLE SCLERO L200CM OD0.51MM ID0.24MM SHTH DIA1.8MM LOK

## (undated) DEVICE — DRAIN SURG 19FR 100% SIL RADPQ RND CHN FULL FLUT

## (undated) DEVICE — HOOK LOCK LATEX FREE ELASTIC BANDAGE 3INX5YD

## (undated) DEVICE — NEEDLE SPNL 22GA L3.5IN BLK HUB S STL REG WALL FIT STYL W/

## (undated) DEVICE — 20 ML SYRINGE REGULAR TIP: Brand: MONOJECT

## (undated) DEVICE — PLUMEPORT LAPAROSCOPIC SMOKE FILTRATION DEVICE: Brand: PLUMEPORT ACTIV

## (undated) DEVICE — TIBURON GENERAL ENDOSCOPY DRAPE: Brand: CONVERTORS

## (undated) DEVICE — SKIN PREP TRAY 4 COMPARTM TRAY: Brand: MEDLINE INDUSTRIES, INC.

## (undated) DEVICE — SOLUTION IV IRRIG POUR BRL 0.9% SODIUM CHL 2F7124

## (undated) DEVICE — TISSUE RETRIEVAL SYSTEM: Brand: INZII RETRIEVAL SYSTEM

## (undated) DEVICE — BASIC SINGLE BASIN 1-LF: Brand: MEDLINE INDUSTRIES, INC.

## (undated) DEVICE — BASIC PACK: Brand: CONVERTORS

## (undated) DEVICE — KIT EVAC 400CC DIA1/8IN H PAT 12.5IN 3 SPR RND SHP PVC DRN

## (undated) DEVICE — VESSEL SEALER EXTEND: Brand: ENDOWRIST

## (undated) DEVICE — BOWL ASSY BM210 DUAL BLADE DISPOSABLE: Brand: MIDAS REX™

## (undated) DEVICE — CADIERE FORCEPS: Brand: ENDOWRIST

## (undated) DEVICE — SHEET,DRAPE,70X100,STERILE: Brand: MEDLINE

## (undated) DEVICE — 3M(TM) MEDIPORE(TM) +PAD SOFT CLOTH ADHESIVE WOUND DRESSING 3570: Brand: 3M™ MEDIPORE™